# Patient Record
Sex: FEMALE | Race: BLACK OR AFRICAN AMERICAN | NOT HISPANIC OR LATINO | Employment: OTHER | URBAN - METROPOLITAN AREA
[De-identification: names, ages, dates, MRNs, and addresses within clinical notes are randomized per-mention and may not be internally consistent; named-entity substitution may affect disease eponyms.]

---

## 2019-03-05 ENCOUNTER — TRANSFERRED RECORDS (OUTPATIENT)
Dept: HEALTH INFORMATION MANAGEMENT | Facility: CLINIC | Age: 64
End: 2019-03-05

## 2019-10-29 ENCOUNTER — DOCUMENTATION ONLY (OUTPATIENT)
Dept: CARE COORDINATION | Facility: CLINIC | Age: 64
End: 2019-10-29

## 2019-10-30 ENCOUNTER — TELEPHONE (OUTPATIENT)
Dept: INTERNAL MEDICINE | Facility: CLINIC | Age: 64
End: 2019-10-30

## 2019-10-31 NOTE — TELEPHONE ENCOUNTER
Patient can't get in to see Dr. Hendrix until 12-26-19. She has been taking Omeprazole 20 mg tabs 1 tab before breakfast every morning. She needs to get this filled before her appointment.  Please call her at [phone].   '    Pt has not been seen or 2 years in PCP and will need to be seen before medication can be prescribed.  Delilah Rice RN 8:25 AM on 10/31/2019.

## 2019-11-08 ENCOUNTER — APPOINTMENT (OUTPATIENT)
Dept: OPTOMETRY | Facility: CLINIC | Age: 64
End: 2019-11-08
Payer: MEDICARE

## 2019-11-08 PROCEDURE — 92341 FIT SPECTACLES BIFOCAL: CPT | Performed by: OPTOMETRIST

## 2019-12-12 ENCOUNTER — PRE VISIT (OUTPATIENT)
Dept: INTERNAL MEDICINE | Facility: CLINIC | Age: 64
End: 2019-12-12

## 2020-01-13 ENCOUNTER — OFFICE VISIT (OUTPATIENT)
Dept: INTERNAL MEDICINE | Facility: CLINIC | Age: 65
End: 2020-01-13
Payer: MEDICARE

## 2020-01-13 VITALS
HEART RATE: 80 BPM | OXYGEN SATURATION: 95 % | SYSTOLIC BLOOD PRESSURE: 165 MMHG | WEIGHT: 199 LBS | DIASTOLIC BLOOD PRESSURE: 81 MMHG | BODY MASS INDEX: 32.12 KG/M2

## 2020-01-13 DIAGNOSIS — R21 RASH AND NONSPECIFIC SKIN ERUPTION: ICD-10-CM

## 2020-01-13 DIAGNOSIS — H04.123 DRY EYES: ICD-10-CM

## 2020-01-13 DIAGNOSIS — I10 HYPERTENSION, UNSPECIFIED TYPE: ICD-10-CM

## 2020-01-13 DIAGNOSIS — R10.11 RIGHT UPPER QUADRANT PAIN: Primary | ICD-10-CM

## 2020-01-13 DIAGNOSIS — R10.11 RIGHT UPPER QUADRANT PAIN: ICD-10-CM

## 2020-01-13 DIAGNOSIS — R12 HEART BURN: ICD-10-CM

## 2020-01-13 LAB
ALBUMIN SERPL-MCNC: 4 G/DL (ref 3.4–5)
ALP SERPL-CCNC: 121 U/L (ref 40–150)
ALT SERPL W P-5'-P-CCNC: 26 U/L (ref 0–50)
ANION GAP SERPL CALCULATED.3IONS-SCNC: 4 MMOL/L (ref 3–14)
AST SERPL W P-5'-P-CCNC: 12 U/L (ref 0–45)
BILIRUB SERPL-MCNC: 0.4 MG/DL (ref 0.2–1.3)
BUN SERPL-MCNC: 14 MG/DL (ref 7–30)
CALCIUM SERPL-MCNC: 9.3 MG/DL (ref 8.5–10.1)
CHLORIDE SERPL-SCNC: 112 MMOL/L (ref 94–109)
CHOLEST SERPL-MCNC: 242 MG/DL
CO2 SERPL-SCNC: 24 MMOL/L (ref 20–32)
CREAT SERPL-MCNC: 0.63 MG/DL (ref 0.52–1.04)
GFR SERPL CREATININE-BSD FRML MDRD: >90 ML/MIN/{1.73_M2}
GLUCOSE SERPL-MCNC: 93 MG/DL (ref 70–99)
HDLC SERPL-MCNC: 56 MG/DL
LDLC SERPL CALC-MCNC: 160 MG/DL
NONHDLC SERPL-MCNC: 186 MG/DL
POTASSIUM SERPL-SCNC: 3.8 MMOL/L (ref 3.4–5.3)
PROT SERPL-MCNC: 7.6 G/DL (ref 6.8–8.8)
SODIUM SERPL-SCNC: 141 MMOL/L (ref 133–144)
TRIGL SERPL-MCNC: 129 MG/DL

## 2020-01-13 RX ORDER — CLOTRIMAZOLE AND BETAMETHASONE DIPROPIONATE 10; .64 MG/G; MG/G
CREAM TOPICAL 2 TIMES DAILY
Qty: 15 G | Refills: 0 | Status: SHIPPED | OUTPATIENT
Start: 2020-01-13 | End: 2022-02-01

## 2020-01-13 RX ORDER — CARBOXYMETHYLCELLULOSE SODIUM 5 MG/ML
1 SOLUTION/ DROPS OPHTHALMIC 3 TIMES DAILY PRN
Qty: 1 BOTTLE | Refills: 3 | Status: SHIPPED | OUTPATIENT
Start: 2020-01-13 | End: 2021-10-13

## 2020-01-13 ASSESSMENT — PAIN SCALES - GENERAL: PAINLEVEL: NO PAIN (0)

## 2020-01-13 NOTE — NURSING NOTE
Chief Complaint   Patient presents with     Recheck Medication     patient is here for a general follow up        Keiko Royal EMT at 10:06 AM on 1/13/2020.

## 2020-01-13 NOTE — PATIENT INSTRUCTIONS
[facility] Medication Refill Request Information:  * Please contact your pharmacy regarding ANY request for medication refills.  ** [facility] Prescription Fax = [phone]  * Please allow 3 business days for routine medication refills.  * Please allow 5 business days for controlled substance medication refills.     [facility] Test Result notification information:  *You will be notified with in 7-10 days of your appointment day regarding the results of your test.  If you are on MyChart you will be notified as soon as the provider has reviewed the results and signed off on them.    [facility]: [phone]

## 2020-01-13 NOTE — NURSING NOTE
Yancy Rivera comes into clinic today at the request of Dr. Jonatan Villegas, Ordering Provider for an immunization/s.    I gave the flu immunization/s while the patient was in clinic. They received an informational sheet and I explained the common side effects of the injection. The patient tolerated the procedure well and had no complications while here in clinic.     This service provided today was under the supervising provider of the day Dr. Jonatan Villegas, who was available if needed.    Keiko Royal, EMT at 11:01 AM on 1/13/2020.

## 2020-01-14 NOTE — PROGRESS NOTES
"                     PRIMARY CARE CENTER       SUBJECTIVE:  Yancy Rivera is a 64 year old female with a PMHx of   Past Medical History:   Diagnosis Date     Blepharitis      Esophageal reflux (aka GERD)      Glaucoma      Hyperlipidemia LDL goal < 130      Nonsenile cataract      Shingles     11/14/15 Left approximately C6 distribution    who comes in  To re establish care after several years away from [place].Her acute complaint was  A rash on her r shoulder blade of several weeks duration. Other than that she wanted med refills for her  Heart burn and refill of  eye drops for dry eyes .  She felt well otherwise but will need referral for woman's health maintenance.    Medications and allergies reviewed by me today.     ROS10 point ROS was neg except for intermittent r sides abd pain with lifting that has been present since a \"hernia repair  \" several years ago. A CT in 3/2019 did not reveal gall bladder disease but showed divertivulosis.    OBJECTIVE:    BP (!) 165/81 (BP Location: Right arm, Patient Position: Sitting, Cuff Size: Adult Regular)   Pulse 80   Wt 90.3 kg (199 lb)   LMP  (LMP Unknown)   SpO2 95%   Breastfeeding No   BMI 32.12 kg/m     Wt Readings from Last 1 Encounters:   01/13/20 90.3 kg (199 lb)     A pleasant woman with  present cheerful alert good historian  Normal gait and strength- rises from chair un assisted.   HEENT sclera clear  Pupils equal reactive  EOm normal slight conjunctival erythema   Pharynx benign  Neck supple thyroid not enlarged  No cervical adenopathy  LUNGS   Clear to P&A  Heart  Regular no murmur     No cvat   ABd evidence of prior \"cupping\" procedures  BS hypo active   No masses some voluntary guarding R side lateral to umbilicus  Ext no edema or joint inflammation  neuro non focal  Skin r shoulder blade  Excoriation with out scaling or evidence of macule or  papules          ASSESSMENT/PLAN:        Yancy was seen today for recheck " medication.    Diagnoses and all orders for this visit:    Right upper quadrant pain  -     Comprehensive metabolic panel **(2 WKS); Future    Hypertension, unspecified type  -     Lipid panel reflex to direct LDL Fasting; Future    Heart burn  -     omeprazole (PRILOSEC) 20 MG DR capsule; Take 1 capsule (20 mg) by mouth daily    Dry eyes  -     carboxymethylcellulose PF (CARBOXYMETHYLCELLULOSE SODIUM) 0.5 % ophthalmic solution; Place 1 drop into both eyes 3 times daily as needed for dry eyes    Rash and nonspecific skin eruption  -     clotrimazole-betamethasone (LOTRISONE) 1-0.05 % external cream; Apply topically 2 times daily    Other orders  -     FLU VAC PRESRV FREE QUAD SPLIT VIR 3+YRS IM     labs reveal hyperlipidemia  No renal glucose ,LFTS  She will see NP on 1/22 for woman health maintenance exam   Will ask Wood(NP ) to address lipids at that time and order 24 home Bp monitor           Jonatan Villegas MD  Jan 13, 2020

## 2020-01-14 NOTE — RESULT ENCOUNTER NOTE
Mariaelena you see her on the 22 for woman's health    She is new to our sx  again   She needs Lipid conversation values on the 13th and perhaps a home Bp monitoring given BP in clinic on the 13th thank you

## 2020-01-21 NOTE — PROGRESS NOTES
[facility]  Primary Care Center   Mariaelena Schultz, CHRISSIE CNP  01/22/2020      Chief Complaint:   Women's Health       History of Present Illness:   Yancy Rivera is a 64 year old female with a history of glaucoma, hypertension, and prediabetes who presents for lab follow-up. She has no specific gynecologic concerns, but was recommended to return for routine pap/pelvic. Her pap was normal when done in 2016 with negative HPV co-testing. Denies breast changes.     She had a lipid panel on 1/13 which showed elevated LDL. 10 year ASCVD was 10.2%. Her blood pressure was high at her previous visit but close to goal today. Her at home blood pressures are typically 129-140 systolic and ~70 diastolic. She is no longer taking Amlodipine, she is working on diet and exercise. She's been off antihypertensives for 3 years after an episode of hypotension.    Review of Systems:   Pertinent items are noted in HPI, remainder of complete ROS is negative.      Active Medications:     Current Outpatient Medications:      amitriptyline (ELAVIL) 10 MG tablet, Take 1 tablet (10 mg) by mouth At Bedtime, Disp: 90 tablet, Rfl: 3     Artificial Tear Ointment (REFRESH P.M.) OINT, Apply 3.5 g to eye At Bedtime, Disp: 1 Tube, Rfl: 11     atorvastatin (LIPITOR) 20 MG tablet, Take 1 tablet (20 mg) by mouth daily, Disp: 90 tablet, Rfl: 3     carboxymethylcellulose (REFRESH PLUS) 0.5 % SOLN, Place 1 drop into both eyes 4 times daily as needed, Disp: , Rfl:      carboxymethylcellulose PF (CARBOXYMETHYLCELLULOSE SODIUM) 0.5 % ophthalmic solution, Place 1 drop into both eyes 3 times daily as needed for dry eyes, Disp: 1 Bottle, Rfl: 3     Cholecalciferol (VITAMIN D) 2000 UNITS tablet, Take 1,000 Units by mouth daily, Disp: 45 tablet, Rfl: 3     clotrimazole-betamethasone (LOTRISONE) 1-0.05 % external cream, Apply topically 2 times daily, Disp: 15 g, Rfl: 0     dorzolamide-timolol (COSOPT) 2-0.5 % ophthalmic solution, Place 1 drop into both eyes 2 times  daily, Disp: 1 Bottle, Rfl: 11     doxycycline Monohydrate 100 MG TABS, Take 100 mg by mouth daily, Disp: 90 tablet, Rfl: 3     ferrous sulfate (IRON) 325 (65 FE) MG tablet, Take 1 tablet (325 mg) by mouth daily (with breakfast), Disp: 30 tablet, Rfl: 11     gabapentin (NEURONTIN) 300 MG capsule, Take 1 capsule (300 mg) by mouth 3 times daily, Disp: 270 capsule, Rfl: 1     gabapentin (NEURONTIN) 600 MG tablet, Take 1 tablet (600 mg) by mouth 3 times daily, Disp: 90 tablet, Rfl: 11     hydroxychloroquine (PLAQUENIL) 200 MG tablet, Take 2 tablets (400 mg) by mouth daily, Disp: 60 tablet, Rfl: 4     latanoprost (XALATAN) 0.005 % ophthalmic solution, Place 1 drop into both eyes At Bedtime, Disp: 1 Bottle, Rfl: 11     methylPREDNISolone (MEDROL DOSEPAK) 4 MG tablet, Take 1 tablet (4 mg) by mouth See Admin Instructions, Disp: 21 tablet, Rfl: 0     Multiple Minerals (CALCIUM-MAGNESIUM-ZINC) TABS, Take 1 tablet by mouth daily, Disp: 90 tablet, Rfl: 0     omega 3 1000 MG CAPS, Take 1 g by mouth daily, Disp: 90 capsule, Rfl: 0     omeprazole (PRILOSEC) 20 MG capsule, Take 1 capsule (20 mg) by mouth daily, Disp: 90 capsule, Rfl: 1     omeprazole (PRILOSEC) 20 MG DR capsule, Take 1 capsule (20 mg) by mouth daily, Disp: 30 capsule, Rfl: 3     oxyCODONE (ROXICODONE) 5 MG immediate release tablet, Take 1 tablet (5 mg) by mouth every 6 hours as needed for moderate to severe pain, Disp: 20 tablet, Rfl: 0     piroxicam (FELDENE) 20 MG capsule, Take 1 capsule (20 mg) by mouth daily (with breakfast), Disp: 30 capsule, Rfl: 11     Riboflavin 400 MG TABS, Take 1 tablet by mouth daily, Disp: 30 tablet, Rfl: 11     SUMAtriptan (IMITREX) 50 MG tablet, Take 1 tablet (50 mg) by mouth at onset of headache for migraine (May repeat in one hour. Max 200 mg in 24 hours. limit use to no more than 2 days per week) May repeat dose within one hour., Disp: 12 tablet, Rfl: 6      Allergies:   No clinical screening - see comments      Past Medical  History:  Blepharitis  GERD (gastroesophageal reflux disease)  Glaucoma  Hyperlipidemia  Shingles  Prediabetes  Primary open angle glaucoma  Hypertension     Past Surgical History:  Chalazion excision  Hernia repair    Family History:   Mother: glaucoma     Social History:     Non smoker    Physical Exam:   /79 (BP Location: Right arm, Patient Position: Sitting, Cuff Size: Adult Large)   Pulse 72   Wt 90.3 kg (199 lb)   LMP  (LMP Unknown)   SpO2 96%   BMI 32.12 kg/m     Constitutional: Alert, oriented, pleasant, no acute distress  Head: Normocephalic, atraumatic  Eyes: Extra-ocular movements intact, pupils equally round and reactive bilaterally, no scleral icterus  ENT: Oropharynx clear, moist mucus membranes, good dentition  Cardiovascular: Regular rate and rhythm, no murmurs, rubs or gallops  Respiratory: Good air movement bilaterally, lungs clear, no wheezes/rales/rhonchi.   Breast/: deferred per patient  Psychiatric: normal mentation, affect and mood     The 10-year ASCVD risk score (Arline SIEGEL Jr., et al., 2013) is: 10.2%    Values used to calculate the score:      Age: 64 years      Sex: Female      Is Non- : Yes      Diabetic: No      Tobacco smoker: No      Systolic Blood Pressure: 134 mmHg      Is BP treated: Yes      HDL Cholesterol: 56 mg/dL      Total Cholesterol: 242 mg/dL    Labs  Component      Latest Ref Rng & Units 1/13/2020   Sodium      133 - 144 mmol/L 141   Potassium      3.4 - 5.3 mmol/L 3.8   Chloride      94 - 109 mmol/L 112 (H)   Carbon Dioxide      20 - 32 mmol/L 24   Anion Gap      3 - 14 mmol/L 4   Glucose      70 - 99 mg/dL 93   Urea Nitrogen      7 - 30 mg/dL 14   Creatinine      0.52 - 1.04 mg/dL 0.63   GFR Estimate      >60 mL/min/1.73:m2 >90   GFR Estimate If Black      >60 mL/min/1.73:m2 >90   Calcium      8.5 - 10.1 mg/dL 9.3   Bilirubin Total      0.2 - 1.3 mg/dL 0.4   Albumin      3.4 - 5.0 g/dL 4.0   Protein Total      6.8 - 8.8 g/dL 7.6    Alkaline Phosphatase      40 - 150 U/L 121   ALT      0 - 50 U/L 26   AST      0 - 45 U/L 12   Cholesterol      <200 mg/dL 242 (H)   Triglycerides      <150 mg/dL 129   HDL Cholesterol      >49 mg/dL 56   LDL Cholesterol Calculated      <100 mg/dL 160 (H)   Non HDL Cholesterol      <130 mg/dL 186 (H)     Assessment and Plan:  Hyperlipidemia LDL goal <100  Lipid panel reviewed. Her 10 year risk is 10.2% indicating she should begin statin therapy to help reduce risk of stroke or CVD. We discussed the need for follow-up labs after starting the medication. Recheck hepatic panel and fasting lipids in 1 month.  - atorvastatin (LIPITOR) 20 MG tablet  Dispense: 90 tablet; Refill: 3    Elevated BP without diagnosis of hypertension  Currently off blood pressure medications due to a previous episode of hypotension while on Amlodipine. She is slightly above goal in clinic today, will have her complete a 24 hour blood pressure monitor to determine whether antihypertensives need to be resumed.  - 24 Hour Blood Pressure Monitor - Adult     Reviewed lab records, pap is not due until June 2021. Offered to complete pelvic exam today without a pap, as well as routine breast exam, but she declines these today, preferring to wait until next year when her pap smear is due.    Follow-up: as needed for any changes or concerns     Scribe Disclosure:  I, Paresh Morton, am serving as a scribe to document services personally performed by CHRISSIE Hopper CNP at this visit, based upon the provider's statements to me. All documentation has been reviewed by the aforementioned provider prior to being entered into the official medical record.    Portions of this medical record were completed by a scribe. UPON MY REVIEW AND AUTHENTICATION BY ELECTRONIC SIGNATURE, this confirms (a) I performed the applicable clinical services, and (b) the record is accurate.     CHRISSIE Hopper CNP

## 2020-01-22 ENCOUNTER — OFFICE VISIT (OUTPATIENT)
Dept: INTERNAL MEDICINE | Facility: CLINIC | Age: 65
End: 2020-01-22
Payer: MEDICARE

## 2020-01-22 VITALS
OXYGEN SATURATION: 96 % | DIASTOLIC BLOOD PRESSURE: 79 MMHG | BODY MASS INDEX: 32.12 KG/M2 | SYSTOLIC BLOOD PRESSURE: 134 MMHG | WEIGHT: 199 LBS | HEART RATE: 72 BPM

## 2020-01-22 DIAGNOSIS — R03.0 ELEVATED BP WITHOUT DIAGNOSIS OF HYPERTENSION: ICD-10-CM

## 2020-01-22 DIAGNOSIS — E78.5 HYPERLIPIDEMIA LDL GOAL <100: Primary | ICD-10-CM

## 2020-01-22 RX ORDER — ATORVASTATIN CALCIUM 20 MG/1
20 TABLET, FILM COATED ORAL DAILY
Qty: 90 TABLET | Refills: 3 | Status: SHIPPED | OUTPATIENT
Start: 2020-01-22 | End: 2021-02-23

## 2020-01-22 ASSESSMENT — PAIN SCALES - GENERAL: PAINLEVEL: NO PAIN (0)

## 2020-01-22 NOTE — PATIENT INSTRUCTIONS
[facility] Medication Refill Request Information:  * Please contact your pharmacy regarding ANY request for medication refills.  ** [facility] Prescription Fax = [phone]  * Please allow 3 business days for routine medication refills.  * Please allow 5 business days for controlled substance medication refills.     [facility] Test Result notification information:  *You will be notified with in 7-10 days of your appointment day regarding the results of your test.  If you are on MyChart you will be notified as soon as the provider has reviewed the results and signed off on them.    [facility]: [phone]     Return to lab in 1 month for fasting lab appointment.

## 2020-01-22 NOTE — NURSING NOTE
Chief Complaint   Patient presents with     Women's Health     pt here for womens health issues       Kayley Bueno CMA, EMT at 9:56 AM on 1/22/2020.

## 2020-02-21 ENCOUNTER — APPOINTMENT (OUTPATIENT)
Dept: LAB | Facility: CLINIC | Age: 65
End: 2020-02-21
Attending: NURSE PRACTITIONER
Payer: MEDICARE

## 2020-02-21 ENCOUNTER — HOSPITAL ENCOUNTER (OUTPATIENT)
Dept: CARDIOLOGY | Facility: CLINIC | Age: 65
Discharge: HOME | End: 2020-02-21
Attending: NURSE PRACTITIONER | Admitting: NURSE PRACTITIONER
Payer: MEDICARE

## 2020-02-21 DIAGNOSIS — E78.5 HYPERLIPIDEMIA LDL GOAL <100: ICD-10-CM

## 2020-02-21 DIAGNOSIS — R03.0 ELEVATED BP WITHOUT DIAGNOSIS OF HYPERTENSION: ICD-10-CM

## 2020-02-21 LAB
ALBUMIN SERPL-MCNC: 4.2 G/DL (ref 3.4–5)
ALP SERPL-CCNC: 124 U/L (ref 40–150)
ALT SERPL W P-5'-P-CCNC: 24 U/L (ref 0–50)
AST SERPL W P-5'-P-CCNC: 17 U/L (ref 0–45)
BILIRUB DIRECT SERPL-MCNC: 0.1 MG/DL (ref 0–0.2)
BILIRUB SERPL-MCNC: 0.5 MG/DL (ref 0.2–1.3)
CHOLEST SERPL-MCNC: 173 MG/DL
HDLC SERPL-MCNC: 63 MG/DL
LDLC SERPL CALC-MCNC: 93 MG/DL
NONHDLC SERPL-MCNC: 110 MG/DL
PROT SERPL-MCNC: 7.6 G/DL (ref 6.8–8.8)
TRIGL SERPL-MCNC: 82 MG/DL

## 2020-02-21 PROCEDURE — 93786 AMBL BP MNTR W/SW REC ONLY: CPT

## 2020-02-21 PROCEDURE — T1013 SIGN LANG/ORAL INTERPRETER: HCPCS | Mod: U3

## 2020-02-21 PROCEDURE — 93790 AMBL BP MNTR W/SW I&R: CPT | Performed by: INTERNAL MEDICINE

## 2020-02-27 ENCOUNTER — TELEPHONE (OUTPATIENT)
Dept: INTERNAL MEDICINE | Facility: CLINIC | Age: 65
End: 2020-02-27

## 2020-02-27 NOTE — TELEPHONE ENCOUNTER
I reviewed recent 24 hour blood pressure monitoring and labs with Yancy today, with the assistance of an . We discussed that no changes are needed at this time. Jaydefranklinwalt voiced understanding and does not have further questions at this time.    Raegan Denton) PEGGY Felix

## 2020-05-07 ENCOUNTER — OFFICE VISIT (OUTPATIENT)
Dept: INTERNAL MEDICINE | Facility: CLINIC | Age: 65
End: 2020-05-07
Payer: MEDICARE

## 2020-05-07 ENCOUNTER — ANCILLARY PROCEDURE (OUTPATIENT)
Dept: GENERAL RADIOLOGY | Facility: CLINIC | Age: 65
End: 2020-05-07
Payer: MEDICARE

## 2020-05-07 VITALS
WEIGHT: 196.5 LBS | SYSTOLIC BLOOD PRESSURE: 170 MMHG | HEART RATE: 72 BPM | BODY MASS INDEX: 31.72 KG/M2 | DIASTOLIC BLOOD PRESSURE: 89 MMHG | OXYGEN SATURATION: 96 %

## 2020-05-07 DIAGNOSIS — M25.531 RIGHT WRIST PAIN: ICD-10-CM

## 2020-05-07 DIAGNOSIS — S52.614A CLOSED NONDISPLACED FRACTURE OF STYLOID PROCESS OF RIGHT ULNA, INITIAL ENCOUNTER: ICD-10-CM

## 2020-05-07 ASSESSMENT — PAIN SCALES - GENERAL: PAINLEVEL: EXTREME PAIN (8)

## 2020-05-07 NOTE — NURSING NOTE
Chief Complaint   Patient presents with     Musculoskeletal Problem     Pain on R hand.      María Ko, A 9:50 AM  5/7/2020

## 2020-05-07 NOTE — PROGRESS NOTES
Cast/splint application    Date/Time: 5/7/2020 12:16 PM  Performed by: Mary Pope ATC  Authorized by: Valentín Ortega MD     Consent:     Consent obtained:  Verbal    Consent given by:  Patient    Risks discussed:  Discoloration, numbness, swelling and pain  Pre-procedure details:     Sensation:  Normal  Procedure details:     Laterality:  Right    Location:  Wrist    Wrist:  R wrist    Strapping: no      Cast type:  Ulnar gutter    Splint type:  Ulnar gutter    Supplies used: orthoglass.  Post-procedure details:     Pain:  Unchanged    Sensation:  Normal    Patient tolerance of procedure:  Tolerated well, no immediate complications    Patient provided with cast or splint care instructions: Yes

## 2020-05-07 NOTE — PROGRESS NOTES
"                     PRIMARY CARE CENTER       SUBJECTIVE:  Yancy Rivera is a 64 year old female with a PMHx of HTN, HLD and prediabetes  who comes in for R hand pain.     Patient 2 weeks ago was trying to left a 3 gallons of water using her right hand when she felt an acute pain with a\"click\" in her right wrist. Pain is constant and 8/10 in intensity. Later patient started to notice that her hand got swollen, but no bruising. Pain is around the styloid process. She has been using a scarf to limit motion in her hand.   Pt reported that her hand is also looks different than before. She reported that she had an old fracture in her right hand in 2004, per chart review, it was a left radial styloid fracture.   No numbness or tingling sensation in distal hand.   No recent trauma or falls, no pain anywhere else.       Medications and allergies reviewed by me today.     ROS:   Constitutional, neuro, ENT, endocrine, pulmonary, cardiac, gastrointestinal, genitourinary, musculoskeletal, integument and psychiatric systems are negative, except as otherwise noted.    OBJECTIVE:    BP (!) 170/89   Pulse 72   Wt 89.1 kg (196 lb 8 oz)   LMP  (LMP Unknown)   SpO2 96%   BMI 31.72 kg/m     Wt Readings from Last 1 Encounters:   05/07/20 89.1 kg (196 lb 8 oz)        General: NAD, holding her right hand with her left arm     RESP: lungs clear to auscultation - no rales, rhonchi or wheezes     CV: regular rates and rhythm, normal S1 S2, no S3 or S4 and no murmur, click or rub     MS: extremities normal-  Right wrist has decreased ROM on pronation, supination flexion and extension. There is a distal downward ? Dislocation distal to the ulnar styloid process.  no gross deformities noted, no evidence of inflammation in joints, FROM in all other extremities.     SKIN: no suspicious lesions or rashes     NEURO: Normal strength and tone, sensory exam grossly normal, mentation intact and speech normal     ASSESSMENT/PLAN:  Yancy Rivera is " a 64 year old female with a PMHx of HTN, HLD and prediabetes  who comes in for R hand pain with concern for fracture/dislocation     Right wrist pain  Closed nondisplaced fracture of styloid process of right ulna  -     XR Wrist Right G/E 3 Views; Future  -     [facility] helped with applying a cast with plan to f/u with them in 2 months        Riaz Sanchez MD  Internal Medicine PGY-1  May 7, 2020    Pt  and plan of care discussed with Dr. Reyes   While the patient was in clinic, I reviewed the pertinent medical history and results.  I discussed the current findings on physical examination, as well as the patient s diagnosis and treatment plan with the resident and agree with the information as documented with the following exceptions: none.  Isidro Reyes MD

## 2020-05-14 ENCOUNTER — OFFICE VISIT (OUTPATIENT)
Dept: ORTHOPEDICS | Facility: CLINIC | Age: 65
End: 2020-05-14
Payer: MEDICARE

## 2020-05-14 ENCOUNTER — THERAPY VISIT (OUTPATIENT)
Dept: OCCUPATIONAL THERAPY | Facility: CLINIC | Age: 65
End: 2020-05-14
Payer: MEDICARE

## 2020-05-14 ENCOUNTER — ANCILLARY PROCEDURE (OUTPATIENT)
Dept: GENERAL RADIOLOGY | Facility: CLINIC | Age: 65
End: 2020-05-14
Attending: FAMILY MEDICINE
Payer: MEDICARE

## 2020-05-14 VITALS — BODY MASS INDEX: 31.64 KG/M2 | WEIGHT: 196 LBS

## 2020-05-14 DIAGNOSIS — M25.531 RIGHT WRIST PAIN: ICD-10-CM

## 2020-05-14 DIAGNOSIS — S52.614A CLOSED NONDISPLACED FRACTURE OF STYLOID PROCESS OF RIGHT ULNA: ICD-10-CM

## 2020-05-14 DIAGNOSIS — M25.531 RIGHT WRIST PAIN: Primary | ICD-10-CM

## 2020-05-14 DIAGNOSIS — M77.8 TENDINITIS OF EXTENSOR TENDON OF RIGHT HAND: Primary | ICD-10-CM

## 2020-05-14 PROCEDURE — 97110 THERAPEUTIC EXERCISES: CPT | Mod: GO | Performed by: OCCUPATIONAL THERAPIST

## 2020-05-14 PROCEDURE — 97760 ORTHOTIC MGMT&TRAING 1ST ENC: CPT | Mod: GO | Performed by: OCCUPATIONAL THERAPIST

## 2020-05-14 PROCEDURE — 97165 OT EVAL LOW COMPLEX 30 MIN: CPT | Mod: GO | Performed by: OCCUPATIONAL THERAPIST

## 2020-05-14 RX ADMIN — TRIAMCINOLONE ACETONIDE 20 MG: 40 INJECTION, SUSPENSION INTRA-ARTICULAR; INTRAMUSCULAR at 12:39

## 2020-05-14 RX ADMIN — LIDOCAINE HYDROCHLORIDE 2 ML: 10 INJECTION, SOLUTION EPIDURAL; INFILTRATION; INTRACAUDAL; PERINEURAL at 12:39

## 2020-05-14 NOTE — PROGRESS NOTES
"Hand Therapy Initial Evaluation    Current Date:  5/14/2020    Diagnosis: Closed nondisplaced fracture of styloid process of right ulna  DOI: April 2020  Procedure:  NA    Precautions: NA    Subjective:  Yancy Rivera is a 64 year old female.    Patient reports symptoms of the right wrist which occurred due to trying to lift 3 gallons of water using her right hand when she felt an acute pain with a\"click\" in her right wrist. Since onset symptoms are Unchanged  Special tests:  x-ray.  Previous treatment: casted, injection.    General health as reported by patient is good.  Pertinent medical history includes:  Past Medical History:   Diagnosis Date     Blepharitis      Esophageal reflux (aka GERD)      Glaucoma      Hyperlipidemia LDL goal < 130      Nonsenile cataract      Shingles     11/14/15 Left approximately C6 distribution     Medical allergies:none.  Surgical history:   Past Surgical History:   Procedure Laterality Date     CHALAZION EXCISION  08/21/2015    Left lid     HERNIA REPAIR      abdominal hernia repair 2012     Medication history: cholesterol, heartburn, painmedication.  Current occupation is retired  Currently retired    Occupational Profile Information:  Right hand dominant  Prior functional level:  no limitations  Patient reports symptoms of pain, stiffness/loss of motion, weakness/loss of strength and edema  Special tests:  x-ray.    Barriers include:none  Mobility: No difficulty  Transportation: public transportation and medical transportation    Objective:  Pain Level (Scale 0-10)   5/14/2020   At Rest 7/10   At Worst 9/10     Pain Description  Date 5/14/2020   Location Wrist, forearm, finger   Pain Quality Aching and Sharp   Frequency intermittent     Pain is worst  daytime   Exacerbated by  movement, use   Relieved by rest   Progression No change     Edema (Circumference measured in cm)   5/14/2020 5/14/2020    L R   DWC 16.0 17.7     Sensation   WNL throughout all nerve distributions; per " patient report    ROM  Hand 5/14/2020   AROM(PROM) R   Index MP 0/42   PIP 0/83   DIP 0/57   PELLETIER 182   Long MP -8/83   PIP 0/86   DIP 0/50   PELLETIER 211   Ring MP 0/75   PIP 0/80   DIP 0/42   PELLETIER 197   Small MP 0/77   PIP -12/74   DIP 0/45   PELLETIER 184     Strength  contraindicated    Assessment:  Patient presents with symptoms consistent with diagnosis of right wrist pain, secondary to ECU strain and ulna fracture,  with conservative intervention.     Patient's limitations or Problem List includes:  Pain, Decreased ROM/motion, Increased edema and Weakness of the right wrist which interferes with the patient's ability to perform Self Care Tasks (dressing, eating, bathing, hygiene/toileting), Work Tasks, Sleep Patterns, Recreational Activities, Household Chores and Driving  as compared to previous level of function.    Rehab Potential:  Good - Return to full activity, some limitations    Patient will benefit from skilled Occupational Therapy to increase ROM and overall strength and decrease pain and edema to return to previous activity level and resume normal daily tasks and to reach their rehab potential.    Barriers to Learning:  Language    Communication Issues:  Patient appears to be able to clearly communicate and understand verbal and written communication and follow directions correctly.    Chart Review: Simple history review with patient    Identified Performance Deficits: bathing/showering, toileting, dressing, feeding, hygiene and grooming, driving and community mobility, health management and maintenance, home establishment and management, meal preparation and cleanup, shopping and leisure activities    Assessment of Occupational Performance:  5 or more Performance Deficits    Clinical Decision Making (Complexity): Low complexity    Treatment Explanation:  The following has been discussed with the patient:  RX ordered/plan of care  Anticipated outcomes  Possible risks and side effects    Plan:  Frequency:  1 X  week, once daily  Duration:  for 6 weeks    Treatment Plan:   Therapeutic Exercise:  AROM, AAROM, PROM, Isotonics, Isometrics and Stabilization  Neuromuscular re-education:  Nerve Gliding and Kinesiotaping  Manual Techniques:  Myofascial release  Orthotic Fabrication:  Static orthosis  Self Care:  Self Care Tasks    Discharge Plan:  Achieve all LTG.  Independent in home treatment program.  Reach maximal therapeutic benefit.    Home Exercise Program:  Zipper orthosis full time, remove for hygiene and exercise  AROM of thumb  Tendon gliding  icing    Next Visit:  Orthosis modifications  AROM  MFR

## 2020-05-14 NOTE — PROGRESS NOTES
SPORTS & ORTHOPEDIC WALK-IN VISIT 5/14/2020    Primary Care Physician: Dr. Schultz    She was carrying a water jug and felt a click in her wrist. She is having pain on her lateral wrist down into mid forearm. The splint has been helping but she is still having pain.     Reason for visit:     What part of your body is injured / painful?  right wrist    What caused the injury /pain? Lifting    How long ago did your injury occur or pain begin? 3 weeks ago    What are your most bothersome symptoms? Pain    How would you characterize your symptom?  aching and burning    What makes your symptoms better? Ibuprofen and Wrap or brace    What makes your symptoms worse? Movement    Have you been previously seen for this problem? Yes, PCP    Medical History:    Any recent changes to your medical history? No    Any new medication prescribed since last visit? No    Have you had surgery on this body part before? No    Social History:    Occupation:     Handedness: Right    Exercise: Most days/week    Review of Systems:    Do you have fever, chills, weight loss? No    Do you have any vision problems? No    Do you have any chest pain or edema? No    Do you have any shortness of breath or wheezing?  No    Do you have stomach problems? No    Do you have any numbness or focal weakness? No    Do you have diabetes? No    Do you have problems with bleeding or clotting? No    Do you have an rashes or other skin lesions? No       Medium Joint Injection: ECU tendon sheath injection    Date/Time: 5/14/2020 12:39 PM  Performed by: Basil Santiago DO  Authorized by: Basil Santiago DO     Indications:  Pain  Needle Size:  25 G  Guidance: ultrasound    Approach:  Dorsal  Location:  Wrist  Location comment:  ECU tendon sheath  Medications:  20 mg triamcinolone 40 MG/ML; 2 mL lidocaine (PF) 1 %  Outcome:  Tolerated well, no immediate complications  Procedure discussed: discussed risks, benefits, and alternatives    Consent Given by:   Patient  Timeout: timeout called immediately prior to procedure    Prep: patient was prepped and draped in usual sterile fashion

## 2020-05-14 NOTE — LETTER
5/14/2020       RE: Yancy Rivera  [address]  [place] [address]     Dear Colleague,    Thank you for referring your patient, Yancy Rivera, to the [facility] SPORTS AND ORTHOPAEDIC WALK IN CLINIC at [facility]. Please see a copy of my visit note below.          SPORTS & ORTHOPEDIC WALK-IN VISIT 5/14/2020    Primary Care Physician: Dr. Schultz    She was carrying a water jug and felt a click in her wrist. She is having pain on her lateral wrist down into mid forearm. The splint has been helping but she is still having pain.     Reason for visit:     What part of your body is injured / painful?  right wrist    What caused the injury /pain? Lifting    How long ago did your injury occur or pain begin? 3 weeks ago    What are your most bothersome symptoms? Pain    How would you characterize your symptom?  aching and burning    What makes your symptoms better? Ibuprofen and Wrap or brace    What makes your symptoms worse? Movement    Have you been previously seen for this problem? Yes, PCP    Medical History:    Any recent changes to your medical history? No    Any new medication prescribed since last visit? No    Have you had surgery on this body part before? No    Social History:    Occupation:     Handedness: Right    Exercise: Most days/week    Review of Systems:    Do you have fever, chills, weight loss? No    Do you have any vision problems? No    Do you have any chest pain or edema? No    Do you have any shortness of breath or wheezing?  No    Do you have stomach problems? No    Do you have any numbness or focal weakness? No    Do you have diabetes? No    Do you have problems with bleeding or clotting? No    Do you have an rashes or other skin lesions? No       Medium Joint Injection: ECU tendon sheath injection    Date/Time: 5/14/2020 12:39 PM  Performed by: Basil Santiago DO  Authorized by: Basil Santiago DO     Indications:  Pain  Needle Size:  25  G  Guidance: ultrasound    Approach:  Dorsal  Location:  Wrist  Location comment:  ECU tendon sheath  Medications:  20 mg triamcinolone 40 MG/ML; 2 mL lidocaine (PF) 1 %  Outcome:  Tolerated well, no immediate complications  Procedure discussed: discussed risks, benefits, and alternatives    Consent Given by:  Patient  Timeout: timeout called immediately prior to procedure    Prep: patient was prepped and draped in usual sterile fashion            CHIEF COMPLAINT:  Follow Up of the Left Wrist       HISTORY OF PRESENT ILLNESS  Ms. Rivera is a pleasant 64 year old year old female who presents to clinic today with acute left wrist pain.  Yancy explains that she suffered injury three weeks ago, carrying a water jug and felt a click in her wrist. She is having pain on her lateral wrist down into mid forearm. The splint has been helping but she is still having pain.       What part of your body is injured / painful?  right wrist    What caused the injury /pain? Lifting    How long ago did your injury occur or pain begin? 3 weeks ago    What are your most bothersome symptoms? Pain    How would you characterize your symptom?  aching and burning    What makes your symptoms better? Ibuprofen and Wrap or brace    What makes your symptoms worse? Movement    Have you been previously seen for this problem? Yes, PCP    Additional history: as documented    MEDICAL HISTORY  Patient Active Problem List   Diagnosis     Hyperlipidemia with target LDL less than 130     Prediabetes     Disorder of bursae and tendons in shoulder region     Shingles     Primary open angle glaucoma of both eyes, moderate stage     Senile nuclear sclerosis, bilateral     Benign essential hypertension     Right wrist pain     Closed nondisplaced fracture of styloid process of right ulna       Current Outpatient Medications   Medication Sig Dispense Refill     amitriptyline (ELAVIL) 10 MG tablet Take 1 tablet (10 mg) by mouth At Bedtime 90 tablet 3      Artificial Tear Ointment (REFRESH P.M.) OINT Apply 3.5 g to eye At Bedtime 1 Tube 11     atorvastatin (LIPITOR) 20 MG tablet Take 1 tablet (20 mg) by mouth daily 90 tablet 3     carboxymethylcellulose (REFRESH PLUS) 0.5 % SOLN Place 1 drop into both eyes 4 times daily as needed       carboxymethylcellulose PF (CARBOXYMETHYLCELLULOSE SODIUM) 0.5 % ophthalmic solution Place 1 drop into both eyes 3 times daily as needed for dry eyes 1 Bottle 3     Cholecalciferol (VITAMIN D) 2000 UNITS tablet Take 1,000 Units by mouth daily 45 tablet 3     clotrimazole-betamethasone (LOTRISONE) 1-0.05 % external cream Apply topically 2 times daily 15 g 0     dorzolamide-timolol (COSOPT) 2-0.5 % ophthalmic solution Place 1 drop into both eyes 2 times daily 1 Bottle 11     doxycycline Monohydrate 100 MG TABS Take 100 mg by mouth daily 90 tablet 3     ferrous sulfate (IRON) 325 (65 FE) MG tablet Take 1 tablet (325 mg) by mouth daily (with breakfast) 30 tablet 11     gabapentin (NEURONTIN) 300 MG capsule Take 1 capsule (300 mg) by mouth 3 times daily 270 capsule 1     gabapentin (NEURONTIN) 600 MG tablet Take 1 tablet (600 mg) by mouth 3 times daily 90 tablet 11     hydroxychloroquine (PLAQUENIL) 200 MG tablet Take 2 tablets (400 mg) by mouth daily 60 tablet 4     latanoprost (XALATAN) 0.005 % ophthalmic solution Place 1 drop into both eyes At Bedtime 1 Bottle 11     methylPREDNISolone (MEDROL DOSEPAK) 4 MG tablet Take 1 tablet (4 mg) by mouth See Admin Instructions 21 tablet 0     Multiple Minerals (CALCIUM-MAGNESIUM-ZINC) TABS Take 1 tablet by mouth daily 90 tablet 0     omega 3 1000 MG CAPS Take 1 g by mouth daily 90 capsule 0     omeprazole (PRILOSEC) 20 MG capsule Take 1 capsule (20 mg) by mouth daily 90 capsule 1     omeprazole (PRILOSEC) 20 MG DR capsule Take 1 capsule (20 mg) by mouth daily 30 capsule 3     oxyCODONE (ROXICODONE) 5 MG immediate release tablet Take 1 tablet (5 mg) by mouth every 6 hours as needed for moderate to  severe pain 20 tablet 0     piroxicam (FELDENE) 20 MG capsule Take 1 capsule (20 mg) by mouth daily (with breakfast) 30 capsule 11     Riboflavin 400 MG TABS Take 1 tablet by mouth daily 30 tablet 11     SUMAtriptan (IMITREX) 50 MG tablet Take 1 tablet (50 mg) by mouth at onset of headache for migraine (May repeat in one hour. Max 200 mg in 24 hours. limit use to no more than 2 days per week) May repeat dose within one hour. 12 tablet 6       Allergies   Allergen Reactions     No Clinical Screening - See Comments Nausea and Vomiting     Liver side effects from INH for TB       Family History   Problem Relation Age of Onset     Macular Degeneration No family hx of      Cancer No family hx of      Diabetes No family hx of      Hypertension No family hx of      Cerebrovascular Disease No family hx of      Thyroid Disease No family hx of      Eye Surgery No family hx of      Glaucoma Mother        Additional medical/Social/Surgical histories reviewed in [facility] and updated as appropriate.     REVIEW OF SYSTEMS (5/17/2020)  A 10-point review of systems was obtained and is negative except for as noted in the HPI.      PHYSICAL EXAM  Wt 88.9 kg (196 lb)   LMP  (LMP Unknown)   BMI 31.64 kg/m      General  - normal appearance, in no obvious distress  HEENT  - conjunctivae not injected, moist mucous membranes  CV  - normal radial pulse   Pulm  - normal respiratory pattern, non-labored  Musculoskeletal - right wrist  - inspection: normal joint alignment, no obvious deformity, no swelling  - palpation: TTP at ECU tendon near distal ulna and into carpal region across joint.  No foveal tenderness.  - ROM:  Decreased ROM and pain with extension, ulnar deviation, near normal flexion and radial deviation  - strength: Grossly intact, painful extension active ulnar deviation.  - special tests:  (-) Tinel's   (-) Finkelstein  (+) ulnar impaction pain  Neuro  - no sensory or motor deficit, grossly normal coordination, normal muscle  tone  Skin  - no ecchymosis, erythema, warmth, or induration, no obvious rash  Psych  - interactive, appropriate, normal mood and affect    IMAGING :   3 views right wrist radiographs 5/14/2020 11:34 AM     History: AP, lateral and oblique; Right wrist pain      Comparison: 5/7/2020     Findings:     PA, oblique and lateral view(s) of the right wrist were obtained.      No acute osseous abnormality. No erosion.     Mild degenerative changes of the first carpometacarpal and triscaphe  joints.  Ossicle adjacent to the ulnar styloid likely from remote  trauma. Bones appear osteopenic.     Slight contour deformity of the fifth metacarpal, similar to prior,  presumably from remote trauma.     Soft tissue is unremarkable.                                                                      Impression:  1. No acute osseous abnormality. If persistent clinical concern, MRI  may be helpful given the underlying osteopenia.  2. Mild degenerative changes of the first carpometacarpal joint.  3. Osteopenic appearance.     I have personally reviewed the examination and initial interpretation  and I agree with the findings.     LYLY DOSS       ASSESSMENT & PLAN  Ms. Rivera is a 64 year old year old female who presents to clinic today with ongoing pain of right wrist after lifting a heavy object three weeks ago.  Although there was some concern initially for acute ulnar styloid fracture, I favor this to be a chronic appearing ossicle.  I believe she may have subluxed or exacerbated ECU tendon with ongoing synovitis.  Possible underlying TFCC involvement although this seems less likely based on exam.    Diagnosis: Acute pain of right wrist, ECU Tenosynovitis of right wrist    -ECU tendon sheath corticosteroid injection  -OT appointment for more functional zipper splint and ROM exercises for wrist and hand.  -Ice, analgesics as needed  -Follow up 4 weeks for reevaluation.    Wrist Injection, ECU TENDON SHEATH of RIGHT WRIST  The  "patient was informed of the risks and the benefits of the procedure and a written consent was signed.  The patient s right wrist was prepped with chlorhexidine in sterile fashion.   20 mg of kenalog suspension was drawn up into a 3 mL syringe with 2.0 mL of 1% lidocaine.  Injection was performed using sterile technique.  Under ultrasound guidance a 1.5\" 25-gauge needle was used to enter the right wrist at the ECU tendon sheath.  Needle placement was visualized and documented with ultrasound.  Ultrasound visualization required to ensure injection material enters the tendon sheath and not the tendon itself.  Injection performed long axis to the probe.  Injection solution visualized within the tendon sheath.  Images were permanently stored for the patient's record.  There were no complications. The patient tolerated the procedure well. There was negligible bleeding.   The patient was instructed to ice the wrist upon leaving clinic and refrain from overuse over the next 3 days.   The patient was instructed to call or go to the emergency room with any unusual pain, swelling, redness, or if otherwise concerned  It was a pleasure seeing Belenesh today.    Basil Santiago DO, [facility]  Primary Care Sports Medicine        "

## 2020-05-14 NOTE — LETTER
"DEPARTMENT OF HEALTH AND HUMAN SERVICES  CENTERS FOR MEDICARE & MEDICAID SERVICES    PLAN/UPDATED PLAN OF PROGRESS FOR OUTPATIENT REHABILITATION    PATIENTS NAME:  Yancy Rivera   : 1955  PROVIDER NUMBER:    [id number]  HICN: [id number]  PROVIDER NAME: [facility]  MEDICAL RECORD NUMBER: [id number]   START OF CARE DATE:  SOC Date: 20   TYPE:  PT  PRIMARY/TREATMENT DIAGNOSIS: (Pertinent Medical Diagnosis)  Right wrist pain  Closed nondisplaced fracture of styloid process of right ulna  VISITS FROM START OF CARE:  Rxs Used: 1     Hand Therapy Initial Evaluation  Current Date:  2020  Diagnosis: Closed nondisplaced fracture of styloid process of right ulna  DOI: 2020  Procedure:  NA  Precautions: NA    Subjective:  Yancy Rivera is a 64 year old female.    Patient reports symptoms of the right wrist which occurred due to trying to lift 3 gallons of water using her right hand when she felt an acute pain with a\"click\" in her right wrist. Since onset symptoms are Unchanged  Special tests:  x-ray.  Previous treatment: casted, injection.    General health as reported by patient is good.  Pertinent medical history includes:  Past Medical History:   Diagnosis Date     Blepharitis      Esophageal reflux (aka GERD)      Glaucoma      Hyperlipidemia LDL goal < 130      Nonsenile cataract      Shingles     11/14/15 Left approximately C6 distribution     Medical allergies:none.  Surgical history:   Past Surgical History:   Procedure Laterality Date     CHALAZION EXCISION  2015    Left lid     HERNIA REPAIR      abdominal hernia repair      Medication history: cholesterol, heartburn, painmedication.  Current occupation is retired  Currently retired      PATIENTS NAME:  Yancy Rivera   : 1955    Occupational Profile Information:  Right hand dominant  Prior functional level:  no limitations  Patient reports symptoms of pain, stiffness/loss of motion, weakness/loss of strength and " edema  Special tests:  x-ray.    Barriers include:none  Mobility: No difficulty  Transportation: public transportation and medical transportation    Objective:  Pain Level (Scale 0-10)   2020   At Rest 7/10   At Worst 9/10     Pain Description  Date 2020   Location Wrist, forearm, finger   Pain Quality Aching and Sharp   Frequency intermittent     Pain is worst  daytime   Exacerbated by  movement, use   Relieved by rest   Progression No change     Edema (Circumference measured in cm)   2020    L R   DWC 16.0 17.7     Sensation   WNL throughout all nerve distributions; per patient report                                    PATIENTS NAME:  Yancy Rivera   : 1955    ROM  Hand 2020   AROM(PROM) R   Index MP 0/42   PIP 0/83   DIP 0/57   PELLETIER 182   Long MP -8/83   PIP 0/86   DIP 0/50   PELLETIER 211   Ring MP 0/75   PIP 0/80   DIP 0/42   PELLETIER 197   Small MP 0/77   PIP -12/74   DIP 0/45   PELLETIER 184       Strength  contraindicated    Assessment:  Patient presents with symptoms consistent with diagnosis of right wrist pain, secondary to ECU strain and ulna fracture,  with conservative intervention.     Patient's limitations or Problem List includes:  Pain, Decreased ROM/motion, Increased edema and Weakness of the right wrist which interferes with the patient's ability to perform Self Care Tasks (dressing, eating, bathing, hygiene/toileting), Work Tasks, Sleep Patterns, Recreational Activities, Household Chores and Driving  as compared to previous level of function.  Rehab Potential:  Good - Return to full activity, some limitations  Patient will benefit from skilled Occupational Therapy to increase ROM and overall strength and decrease pain and edema to return to previous activity level and resume normal daily tasks and to reach their rehab potential.  Barriers to Learning:  Language  Communication Issues:  Patient appears to be able to clearly communicate and understand verbal and written  "communication and follow directions correctly.  Chart Review: Simple history review with patient  Identified Performance Deficits: bathing/showering, toileting, dressing, feeding, hygiene and grooming, driving and community mobility, health management and maintenance, home establishment and management, meal preparation and cleanup, shopping and leisure activities    Assessment of Occupational Performance:  5 or more Performance Deficits  Clinical Decision Making (Complexity): Low complexity  Treatment Explanation:  The following has been discussed with the patient:  RX ordered/plan of care  Anticipated outcomes  PATIENTS NAME:  Yancy Rivera   : 1955    Possible risks and side effects    Plan:  Frequency:  1 X week, once daily  Duration:  for 6 weeks  Treatment Plan:   Therapeutic Exercise:  AROM, AAROM, PROM, Isotonics, Isometrics and Stabilization  Neuromuscular re-education:  Nerve Gliding and Kinesiotaping  Manual Techniques:  Myofascial release  Orthotic Fabrication:  Static orthosis  Self Care:  Self Care Tasks  Discharge Plan:  Achieve all LTG.  Independent in home treatment program.  Reach maximal therapeutic benefit.  Home Exercise Program:  Zipper orthosis full time, remove for hygiene and exercise  AROM of thumb  Tendon gliding  icing  Next Visit:  Orthosis modifications  AROM  MFR      Caregiver Signature/Credentials _____________________________ Date ________         Elly Gonzales OTR/L, CHT  I have reviewed and certified the need for these services and plan of treatment while under my care.        PHYSICIAN'S SIGNATURE:   _____________________________________  Date___________     Basil Santiago MD    Certification period:  Beginning of Cert date period: 20 to  End of Cert period date: 20     Functional Level Progress Report: Please see attached \"Goal Flow sheet for Functional level.\"    ____X____ Continue Services or       ________ DC Services                Service dates: From  " SOC Date: 05/14/20 date to present

## 2020-05-14 NOTE — NURSING NOTE
[address] [address]  Dept: [phone]  ______________________________________________________________________________    Patient: Yancy Rivera   : 1955   MRN: [id number]   May 14, 2020    INVASIVE PROCEDURE SAFETY CHECKLIST    Date: May 14, 2020  Procedure: Right ecu tendon sheath injection  Patient Name: Yancy Rivera  MRN: [id number]  YOB: 1955    Action: Complete sections as appropriate. Any discrepancy results in a HARD COPY until resolved.     PRE PROCEDURE:  Patient ID verified with 2 identifiers (name and  or MRN): Yes  Procedure and site verified with patient/designee (when able): Yes  Accurate consent documentation in medical record: Yes  H&P (or appropriate assessment) documented in medical record: Yes  H&P must be up to 20 days prior to procedure and updates within 24 hours of procedure as applicable: NA  Relevant diagnostic and radiology test results appropriately labeled and displayed as applicable: Yes  Procedure site(s) marked with provider initials: NA    TIMEOUT:  Time-Out performed immediately prior to starting procedure, including verbal and active participation of all team members addressing the following:Yes  * Correct patient identify  * Confirmed that the correct side and site are marked  * An accurate procedure consent form  * Agreement on the procedure to be done  * Correct patient position  * Relevant images and results are properly labeled and appropriately displayed  * The need to administer antibiotics or fluids for irrigation purposes during the procedure as applicable   * Safety precautions based on patient history or medication use    DURING PROCEDURE: Verification of correct person, site, and procedures any time the responsibility for care of the patient is transferred to another member of the care team.     The following medication was given:     MEDICATION:  Kenalog 40 mg  ROUTE: tendon  sheath  SITE: right Wrist  DOSE: 1/2mL  LOT #: [id number]  : [employer]  EXPIRATION DATE: 11/2021  NDC#: [address]-8   Was there drug waste? Yes  Amount of drug waste (mL): 1/2.  Reason for waste:  As per MD    MEDICATION:  Lidocaine without epinephrine  ROUTE: tendon sheath  SITE: right Wrist  DOSE: 2mL  LOT #: [id number]  : [employer]  EXPIRATION DATE: 10/2023  NDC#: 06356-154-32   Was there drug waste? Yes  Amount of drug waste (mL): 3.  Reason for waste:  As per MD    Prior to injection, verified patient identity using patient's name and date of birth.  Due to injection administration, patient instructed to remain in clinic for 15 minutes  afterwards, and to report any adverse reaction to me immediately.    Mary Pope, ATC  May 14, 2020

## 2020-05-17 RX ORDER — LIDOCAINE HYDROCHLORIDE 10 MG/ML
2 INJECTION, SOLUTION EPIDURAL; INFILTRATION; INTRACAUDAL; PERINEURAL
Status: SHIPPED | OUTPATIENT
Start: 2020-05-14

## 2020-05-17 RX ORDER — TRIAMCINOLONE ACETONIDE 40 MG/ML
20 INJECTION, SUSPENSION INTRA-ARTICULAR; INTRAMUSCULAR
Status: SHIPPED | OUTPATIENT
Start: 2020-05-14

## 2020-05-17 NOTE — PROGRESS NOTES
CHIEF COMPLAINT:  Follow Up of the Left Wrist       HISTORY OF PRESENT ILLNESS  Ms. Rivera is a pleasant 64 year old year old female who presents to clinic today with acute left wrist pain.  Yancy explains that she suffered injury three weeks ago, carrying a water jug and felt a click in her wrist. She is having pain on her lateral wrist down into mid forearm. The splint has been helping but she is still having pain.       What part of your body is injured / painful?  right wrist    What caused the injury /pain? Lifting    How long ago did your injury occur or pain begin? 3 weeks ago    What are your most bothersome symptoms? Pain    How would you characterize your symptom?  aching and burning    What makes your symptoms better? Ibuprofen and Wrap or brace    What makes your symptoms worse? Movement    Have you been previously seen for this problem? Yes, PCP    Additional history: as documented    MEDICAL HISTORY  Patient Active Problem List   Diagnosis     Hyperlipidemia with target LDL less than 130     Prediabetes     Disorder of bursae and tendons in shoulder region     Shingles     Primary open angle glaucoma of both eyes, moderate stage     Senile nuclear sclerosis, bilateral     Benign essential hypertension     Right wrist pain     Closed nondisplaced fracture of styloid process of right ulna       Current Outpatient Medications   Medication Sig Dispense Refill     amitriptyline (ELAVIL) 10 MG tablet Take 1 tablet (10 mg) by mouth At Bedtime 90 tablet 3     Artificial Tear Ointment (REFRESH P.M.) OINT Apply 3.5 g to eye At Bedtime 1 Tube 11     atorvastatin (LIPITOR) 20 MG tablet Take 1 tablet (20 mg) by mouth daily 90 tablet 3     carboxymethylcellulose (REFRESH PLUS) 0.5 % SOLN Place 1 drop into both eyes 4 times daily as needed       carboxymethylcellulose PF (CARBOXYMETHYLCELLULOSE SODIUM) 0.5 % ophthalmic solution Place 1 drop into both eyes 3 times daily as needed for dry eyes 1 Bottle 3      Cholecalciferol (VITAMIN D) 2000 UNITS tablet Take 1,000 Units by mouth daily 45 tablet 3     clotrimazole-betamethasone (LOTRISONE) 1-0.05 % external cream Apply topically 2 times daily 15 g 0     dorzolamide-timolol (COSOPT) 2-0.5 % ophthalmic solution Place 1 drop into both eyes 2 times daily 1 Bottle 11     doxycycline Monohydrate 100 MG TABS Take 100 mg by mouth daily 90 tablet 3     ferrous sulfate (IRON) 325 (65 FE) MG tablet Take 1 tablet (325 mg) by mouth daily (with breakfast) 30 tablet 11     gabapentin (NEURONTIN) 300 MG capsule Take 1 capsule (300 mg) by mouth 3 times daily 270 capsule 1     gabapentin (NEURONTIN) 600 MG tablet Take 1 tablet (600 mg) by mouth 3 times daily 90 tablet 11     hydroxychloroquine (PLAQUENIL) 200 MG tablet Take 2 tablets (400 mg) by mouth daily 60 tablet 4     latanoprost (XALATAN) 0.005 % ophthalmic solution Place 1 drop into both eyes At Bedtime 1 Bottle 11     methylPREDNISolone (MEDROL DOSEPAK) 4 MG tablet Take 1 tablet (4 mg) by mouth See Admin Instructions 21 tablet 0     Multiple Minerals (CALCIUM-MAGNESIUM-ZINC) TABS Take 1 tablet by mouth daily 90 tablet 0     omega 3 1000 MG CAPS Take 1 g by mouth daily 90 capsule 0     omeprazole (PRILOSEC) 20 MG capsule Take 1 capsule (20 mg) by mouth daily 90 capsule 1     omeprazole (PRILOSEC) 20 MG DR capsule Take 1 capsule (20 mg) by mouth daily 30 capsule 3     oxyCODONE (ROXICODONE) 5 MG immediate release tablet Take 1 tablet (5 mg) by mouth every 6 hours as needed for moderate to severe pain 20 tablet 0     piroxicam (FELDENE) 20 MG capsule Take 1 capsule (20 mg) by mouth daily (with breakfast) 30 capsule 11     Riboflavin 400 MG TABS Take 1 tablet by mouth daily 30 tablet 11     SUMAtriptan (IMITREX) 50 MG tablet Take 1 tablet (50 mg) by mouth at onset of headache for migraine (May repeat in one hour. Max 200 mg in 24 hours. limit use to no more than 2 days per week) May repeat dose within one hour. 12 tablet 6        Allergies   Allergen Reactions     No Clinical Screening - See Comments Nausea and Vomiting     Liver side effects from INH for TB       Family History   Problem Relation Age of Onset     Macular Degeneration No family hx of      Cancer No family hx of      Diabetes No family hx of      Hypertension No family hx of      Cerebrovascular Disease No family hx of      Thyroid Disease No family hx of      Eye Surgery No family hx of      Glaucoma Mother        Additional medical/Social/Surgical histories reviewed in [facility] and updated as appropriate.     REVIEW OF SYSTEMS (5/17/2020)  A 10-point review of systems was obtained and is negative except for as noted in the HPI.      PHYSICAL EXAM  Wt 88.9 kg (196 lb)   LMP  (LMP Unknown)   BMI 31.64 kg/m      General  - normal appearance, in no obvious distress  HEENT  - conjunctivae not injected, moist mucous membranes  CV  - normal radial pulse   Pulm  - normal respiratory pattern, non-labored  Musculoskeletal - right wrist  - inspection: normal joint alignment, no obvious deformity, no swelling  - palpation: TTP at ECU tendon near distal ulna and into carpal region across joint.  No foveal tenderness.  - ROM:  Decreased ROM and pain with extension, ulnar deviation, near normal flexion and radial deviation  - strength: Grossly intact, painful extension active ulnar deviation.  - special tests:  (-) Tinel's   (-) Finkelstein  (+) ulnar impaction pain  Neuro  - no sensory or motor deficit, grossly normal coordination, normal muscle tone  Skin  - no ecchymosis, erythema, warmth, or induration, no obvious rash  Psych  - interactive, appropriate, normal mood and affect    IMAGING :   3 views right wrist radiographs 5/14/2020 11:34 AM     History: AP, lateral and oblique; Right wrist pain      Comparison: 5/7/2020     Findings:     PA, oblique and lateral view(s) of the right wrist were obtained.      No acute osseous abnormality. No erosion.     Mild degenerative changes  "of the first carpometacarpal and triscaphe  joints.  Ossicle adjacent to the ulnar styloid likely from remote  trauma. Bones appear osteopenic.     Slight contour deformity of the fifth metacarpal, similar to prior,  presumably from remote trauma.     Soft tissue is unremarkable.                                                                      Impression:  1. No acute osseous abnormality. If persistent clinical concern, MRI  may be helpful given the underlying osteopenia.  2. Mild degenerative changes of the first carpometacarpal joint.  3. Osteopenic appearance.     I have personally reviewed the examination and initial interpretation  and I agree with the findings.     LYLY DOSS       ASSESSMENT & PLAN  Ms. Rivera is a 64 year old year old female who presents to clinic today with ongoing pain of right wrist after lifting a heavy object three weeks ago.  Although there was some concern initially for acute ulnar styloid fracture, I favor this to be a chronic appearing ossicle.  I believe she may have subluxed or exacerbated ECU tendon with ongoing synovitis.  Possible underlying TFCC involvement although this seems less likely based on exam.    Diagnosis: Acute pain of right wrist, ECU Tenosynovitis of right wrist    -ECU tendon sheath corticosteroid injection  -OT appointment for more functional zipper splint and ROM exercises for wrist and hand.  -Ice, analgesics as needed  -Follow up 4 weeks for reevaluation.    Wrist Injection, ECU TENDON SHEATH of RIGHT WRIST  The patient was informed of the risks and the benefits of the procedure and a written consent was signed.  The patient s right wrist was prepped with chlorhexidine in sterile fashion.   20 mg of kenalog suspension was drawn up into a 3 mL syringe with 2.0 mL of 1% lidocaine.  Injection was performed using sterile technique.  Under ultrasound guidance a 1.5\" 25-gauge needle was used to enter the right wrist at the ECU tendon sheath.  Needle " placement was visualized and documented with ultrasound.  Ultrasound visualization required to ensure injection material enters the tendon sheath and not the tendon itself.  Injection performed long axis to the probe.  Injection solution visualized within the tendon sheath.  Images were permanently stored for the patient's record.  There were no complications. The patient tolerated the procedure well. There was negligible bleeding.   The patient was instructed to ice the wrist upon leaving clinic and refrain from overuse over the next 3 days.   The patient was instructed to call or go to the emergency room with any unusual pain, swelling, redness, or if otherwise concerned  It was a pleasure seeing Belenesh today.    Basil Santiago DO, CAM  Primary Care Sports Medicine

## 2020-06-01 ENCOUNTER — THERAPY VISIT (OUTPATIENT)
Dept: OCCUPATIONAL THERAPY | Facility: CLINIC | Age: 65
End: 2020-06-01
Payer: MEDICARE

## 2020-06-01 DIAGNOSIS — M25.531 RIGHT WRIST PAIN: ICD-10-CM

## 2020-06-01 DIAGNOSIS — S52.614A CLOSED NONDISPLACED FRACTURE OF STYLOID PROCESS OF RIGHT ULNA: ICD-10-CM

## 2020-06-01 DIAGNOSIS — R12 HEART BURN: ICD-10-CM

## 2020-06-01 PROCEDURE — 97140 MANUAL THERAPY 1/> REGIONS: CPT | Mod: GO | Performed by: OCCUPATIONAL THERAPIST

## 2020-06-01 PROCEDURE — 97110 THERAPEUTIC EXERCISES: CPT | Mod: GO | Performed by: OCCUPATIONAL THERAPIST

## 2020-06-01 NOTE — PROGRESS NOTES
SOAP note objective information for 6/1/2020.  ROM  Pain Report: - none  + mild    ++ moderate    +++ severe   Wrist 6/1/2020   AROM (PROM) R   Extension 44   Flexion 15   RD 10   UD 15   Supination 55   Pronation 74   Please refer to the daily flowsheet for treatment today, total treatment time and time spent performing 1:1 timed codes.       Home Exercise Program:  Zipper orthosis full time, remove for hygiene and exercise  AROM of thumb  Wrist AROM  Tendon gliding  icing    Next Visit:  Orthosis modifications  AROM  MFR

## 2020-06-08 ENCOUNTER — OFFICE VISIT (OUTPATIENT)
Dept: ORTHOPEDICS | Facility: CLINIC | Age: 65
End: 2020-06-08
Payer: MEDICARE

## 2020-06-08 VITALS — BODY MASS INDEX: 31.64 KG/M2 | WEIGHT: 196 LBS

## 2020-06-08 DIAGNOSIS — M25.531 RIGHT WRIST PAIN: Primary | ICD-10-CM

## 2020-06-08 NOTE — LETTER
6/8/2020     RE: Yancy Rivera  [address]  [place] [address]    Dear Colleague,    Thank you for referring your patient, Yancy Rivera, to the [facility]TS AND [facility]THOP[facility] WALK [facility] CLINIC. Please see a copy of my visit note below.          SPORTS & ORTHOPEDIC WALK-IN FOLLOW-UP VISIT 6/8/2020    Interval History:     Follow up reason: Right wrist pain    Date of injury: About 2 months    Date last seen: 5/14/2020    Following Therapeutic Plan: Yes     Pain: Worsening    Function: Unchanged    Interval History: She is still having a lot of achy pain. The injection helped slightly but she has had a lot more pain this week. She has been trying to do HEP from hand therapy but is struggling due to pain.     Medical History:    Any recent changes to your medical history? No    Any new medication prescribed since last visit? No    Review of Systems:    Do you have fever, chills, weight loss? No    Do you have any vision problems? No    Do you have any chest pain or edema? No    Do you have any shortness of breath or wheezing?  No    Do you have stomach problems? No    Do you have any numbness or focal weakness? No    Do you have diabetes? No    Do you have problems with bleeding or clotting? No    Do you have an rashes or other skin lesions? No             ESTABLISHED PATIENT FOLLOW-UP:  Follow Up of the Right Wrist     HISTORY OF PRESENT ILLNESS  Ms. Rivera is a pleasant 64 year old year old female who presents to clinic today for follow-up of right wrist pain    Date of injury: Late April 2020  Date last seen: 5/14/20  Following Therapeutic Plan: Yes  Pain: Unchanged  Function: Unchanged  Interval History: Yancy has been compliant with her bracing, home exercises with PT.  Had improvement of pain for about 3 days after injection.  Pain has spread to other areas of wrist. Still limited motion and pain is moderate to severe at times.  No pain proximally in arm, shoulder, or neck.     Additional  medical/Social/Surgical histories reviewed in [facility] and updated as appropriate.    REVIEW OF SYSTEMS (6/8/2020)  CONSTITUTIONAL: Denies fever and weight loss  GASTROINTESTINAL: Denies abdominal pain, nausea, vomiting  MUSCULOSKELETAL: See HPI  SKIN: Denies any recent rash or lesion  NEUROLOGICAL: Denies numbness or focal weakness     PHYSICAL EXAM  Wt 88.9 kg (196 lb)   LMP  (LMP Unknown)   BMI 31.64 kg/m      General  - normal appearance, in no obvious distress  Musculoskeletal - right wrist  - inspection: normal joint alignment, no obvious deformity  - palpation: TTP at ECU tendon near distal ulna and into carpal region across joint.  No foveal tenderness.  Tenderness to palpation at the radial aspect of wrist near extensor carpi radialis, tenderness at the radiocarpal joint.  - ROM:  Extension decreased to 45 degrees, flexion decreased to about 20 degrees.  Radial and ulnar deviation 15 degrees.  Supination limited.   - strength: Grossly intact, painful extension active ulnar deviation.  Neuro  - no sensory or motor deficit, grossly normal coordination, normal muscle tone  Skin  - no ecchymosis, erythema, warmth, or induration, no obvious rash  Psych  - interactive, appropriate, normal mood and affect    IMAGING :   3 views right wrist radiographs 5/14/2020 11:34 AM     History: AP, lateral and oblique; Right wrist pain      Comparison: 5/7/2020     Findings:     PA, oblique and lateral view(s) of the right wrist were obtained.      No acute osseous abnormality. No erosion.     Mild degenerative changes of the first carpometacarpal and triscaphe  joints.  Ossicle adjacent to the ulnar styloid likely from remote  trauma. Bones appear osteopenic.     Slight contour deformity of the fifth metacarpal, similar to prior,  presumably from remote trauma.     Soft tissue is unremarkable.                                                                    Impression:  1. No acute osseous abnormality. If persistent clinical  concern, MRI  may be helpful given the underlying osteopenia.  2. Mild degenerative changes of the first carpometacarpal joint.  3. Osteopenic appearance.     I have personally reviewed the examination and initial interpretation  and I agree with the findings.     LYLY DOSS     ASSESSMENT & PLAN  Ms. Rivera is a 64 year old year old female who presents to clinic today  for reevaluation of right wrist pain.  Her last visit she was diagnosed with ECU tendinitis and underwent sheath injection at her ulnar styloid. Today she notes not experiencing significant relief from this injection.  Her pain seems more diffuse today in the region of the ulnar side of the wrist as well as radial aspect.  Given lack of improvement to date with injection, hand therapy, bracing for past six weeks, MRI is warranted for further investigation.    It was a pleasure seeing Yancy.    Basil Santiago DO, CAM  Primary Care Sports Medicine

## 2020-06-08 NOTE — PROGRESS NOTES
SPORTS & ORTHOPEDIC WALK-IN FOLLOW-UP VISIT 6/8/2020    Interval History:     Follow up reason: Right wrist pain    Date of injury: About 2 months    Date last seen: 5/14/2020    Following Therapeutic Plan: Yes     Pain: Worsening    Function: Unchanged    Interval History: She is still having a lot of achy pain. The injection helped slightly but she has had a lot more pain this week. She has been trying to do HEP from hand therapy but is struggling due to pain.     Medical History:    Any recent changes to your medical history? No    Any new medication prescribed since last visit? No    Review of Systems:    Do you have fever, chills, weight loss? No    Do you have any vision problems? No    Do you have any chest pain or edema? No    Do you have any shortness of breath or wheezing?  No    Do you have stomach problems? No    Do you have any numbness or focal weakness? No    Do you have diabetes? No    Do you have problems with bleeding or clotting? No    Do you have an rashes or other skin lesions? No

## 2020-06-08 NOTE — PROGRESS NOTES
ESTABLISHED PATIENT FOLLOW-UP:  Follow Up of the Right Wrist       HISTORY OF PRESENT ILLNESS  Ms. Rivera is a pleasant 64 year old year old female who presents to clinic today for follow-up of right wrist pain    Date of injury: Late April 2020  Date last seen: 5/14/20  Following Therapeutic Plan: Yes  Pain: Unchanged  Function: Unchanged  Interval History: Yancy has been compliant with her bracing, home exercises with PT.  Had improvement of pain for about 3 days after injection.  Pain has spread to other areas of wrist. Still limited motion and pain is moderate to severe at times.  No pain proximally in arm, shoulder, or neck.     Additional medical/Social/Surgical histories reviewed in [facility] and updated as appropriate.    REVIEW OF SYSTEMS (6/8/2020)  CONSTITUTIONAL: Denies fever and weight loss  GASTROINTESTINAL: Denies abdominal pain, nausea, vomiting  MUSCULOSKELETAL: See HPI  SKIN: Denies any recent rash or lesion  NEUROLOGICAL: Denies numbness or focal weakness     PHYSICAL EXAM  Wt 88.9 kg (196 lb)   LMP  (LMP Unknown)   BMI 31.64 kg/m      General  - normal appearance, in no obvious distress  Musculoskeletal - right wrist  - inspection: normal joint alignment, no obvious deformity  - palpation: TTP at ECU tendon near distal ulna and into carpal region across joint.  No foveal tenderness.  Tenderness to palpation at the radial aspect of wrist near extensor carpi radialis, tenderness at the radiocarpal joint.  - ROM:  Extension decreased to 45 degrees, flexion decreased to about 20 degrees.  Radial and ulnar deviation 15 degrees.  Supination limited.   - strength: Grossly intact, painful extension active ulnar deviation.  Neuro  - no sensory or motor deficit, grossly normal coordination, normal muscle tone  Skin  - no ecchymosis, erythema, warmth, or induration, no obvious rash  Psych  - interactive, appropriate, normal mood and affect    IMAGING :   3 views right wrist radiographs 5/14/2020 11:34  AM     History: AP, lateral and oblique; Right wrist pain      Comparison: 5/7/2020     Findings:     PA, oblique and lateral view(s) of the right wrist were obtained.      No acute osseous abnormality. No erosion.     Mild degenerative changes of the first carpometacarpal and triscaphe  joints.  Ossicle adjacent to the ulnar styloid likely from remote  trauma. Bones appear osteopenic.     Slight contour deformity of the fifth metacarpal, similar to prior,  presumably from remote trauma.     Soft tissue is unremarkable.                                                                      Impression:  1. No acute osseous abnormality. If persistent clinical concern, MRI  may be helpful given the underlying osteopenia.  2. Mild degenerative changes of the first carpometacarpal joint.  3. Osteopenic appearance.     I have personally reviewed the examination and initial interpretation  and I agree with the findings.     LYLY DOSS     ASSESSMENT & PLAN  Ms. Rivera is a 64 year old year old female who presents to clinic today  for reevaluation of right wrist pain.  Her last visit she was diagnosed with ECU tendinitis and underwent sheath injection at her ulnar styloid. Today she notes not experiencing significant relief from this injection.  Her pain seems more diffuse today in the region of the ulnar side of the wrist as well as radial aspect.  Given lack of improvement to date with injection, hand therapy, bracing for past six weeks, MRI is warranted for further investigation.    It was a pleasure seeing Yancy.    Basil Santiago DO, [facility]  Primary Care Sports Medicine

## 2020-06-11 ENCOUNTER — ANCILLARY PROCEDURE (OUTPATIENT)
Dept: MRI IMAGING | Facility: CLINIC | Age: 65
End: 2020-06-11
Attending: FAMILY MEDICINE
Payer: MEDICARE

## 2020-06-11 DIAGNOSIS — M25.531 RIGHT WRIST PAIN: ICD-10-CM

## 2020-06-15 ENCOUNTER — THERAPY VISIT (OUTPATIENT)
Dept: OCCUPATIONAL THERAPY | Facility: CLINIC | Age: 65
End: 2020-06-15
Payer: MEDICARE

## 2020-06-15 ENCOUNTER — OFFICE VISIT (OUTPATIENT)
Dept: ORTHOPEDICS | Facility: CLINIC | Age: 65
End: 2020-06-15
Payer: MEDICARE

## 2020-06-15 VITALS — WEIGHT: 196 LBS | BODY MASS INDEX: 31.64 KG/M2

## 2020-06-15 DIAGNOSIS — M25.531 RIGHT WRIST PAIN: ICD-10-CM

## 2020-06-15 DIAGNOSIS — M19.031 PRIMARY OSTEOARTHRITIS, RIGHT WRIST: Primary | ICD-10-CM

## 2020-06-15 DIAGNOSIS — S52.614A CLOSED NONDISPLACED FRACTURE OF STYLOID PROCESS OF RIGHT ULNA: ICD-10-CM

## 2020-06-15 DIAGNOSIS — M25.531 RIGHT WRIST PAIN: Primary | ICD-10-CM

## 2020-06-15 PROCEDURE — 97110 THERAPEUTIC EXERCISES: CPT | Mod: GO | Performed by: OCCUPATIONAL THERAPIST

## 2020-06-15 NOTE — PROGRESS NOTES
Please refer to the daily flowsheet for treatment today, total treatment time and time spent performing 1:1 timed codes.      Home Exercise Program:  Zipper orthosis full time, remove for hygiene and exercise  AROM of thumb  Wrist AROM  Tendon gliding  Icing  6/15/2020  Hold on AROM of FA exercise due to pain level/ulnar wrist issues    Next Visit:  Orthosis modifications  AROM  MFR

## 2020-06-15 NOTE — PROGRESS NOTES
ESTABLISHED PATIENT FOLLOW-UP:  Follow Up of the Right Wrist       HISTORY OF PRESENT ILLNESS  Ms. Rivera is a pleasant 65 year old year old female who presents to clinic today for follow-up of right wrist pain.      service used during visit.    Date of injury: April 2020  Date last seen: 6/8/20  Following Therapeutic Plan: Yes  Pain: Unchanged  Function: Unchanged  Interval History: Patient has been wearing brace. Pain continues at wrist, pointing toward joint. Also pain worst at at ulnar side of wrist. Using a sling at times to keep arm still.      MRI completed.    Additional medical/Social/Surgical histories reviewed in [facility] and updated as appropriate.    REVIEW OF SYSTEMS (6/15/2020)  CONSTITUTIONAL: Denies fever and weight loss  GASTROINTESTINAL: Denies abdominal pain, nausea, vomiting  MUSCULOSKELETAL: See HPI  SKIN: Denies any recent rash or lesion  NEUROLOGICAL: Denies numbness or focal weakness     PHYSICAL EXAM  Wt 88.9 kg (196 lb)   LMP  (LMP Unknown)   BMI 31.64 kg/m      General  - normal appearance, in no obvious distress  Musculoskeletal - right wrist  - inspection: normal joint alignment, no obvious deformity  - palpation: TTP at ECU tendon near distal ulna and into carpal region across joint to distal ulna.  No foveal tenderness.  Tenderness to palpation at the radial aspect of wrist near extensor carpi radialis, tenderness at the radiocarpal joint.  - ROM:  Extension decreased to 45 degrees, flexion decreased to about 20 degrees.  Radial and ulnar deviation 15 degrees.  Supination limited.   - strength: Grossly intact, painful extension active ulnar deviation.  Neuro  - no sensory or motor deficit, grossly normal coordination, normal muscle tone  Skin  - no ecchymosis, erythema, warmth, or induration, no obvious rash  Psych  - interactive, appropriate, normal mood and affect    IMAGING :  IMPRESSION:  1.  Tearing and degeneration of the central disc of the TFCC.  2.  Degenerative  changes involving the triscaphe, first  carpometacarpal, radiocarpal and scapholunate joints. No acute osseous  abnormality  3.  Small joint effusion.  4.  Tendinosis and partial intrasubstance tearing of the extensor  carpi ulnaris.     I have personally reviewed the examination and initial interpretation  and I agree with the findings.     GAYATRI SPRINGER MD (Joe)     ASSESSMENT & PLAN  Ms. Rivera is a 65 year old year old female who presents to clinic today for reevaluation of right wrist pain, which remains quite debilitating.  MRI reviewed and reveals no significant acute osseous abnormalities.  There is degenerative changes of the TFCC as well as radiocarpal joint, into the carpal region.  There is also tendinosis and a partial intrasubstance tear of the ECU tendon.  Treatment options reviewed and will have her see our hand therapist today in person.  Continue wearing wrist brace.  I would encourage her to start utilizing home exercises and range of motion.  She should cease all use of the arm sling at this time.  Start diclofenac 4 times daily to wrist region.  May consider radiocarpal wrist injection in the future, although she does not have significant benefit from ECU tendon sheath injection.    1.  ECU tendinosis  2.  Osteoarthritis of right wrist    Follow-up in 4-6 weeks    It was a pleasure seeing Yancy.    Basil Santiago DO, [facility]  Primary Care Sports Medicine

## 2020-06-15 NOTE — LETTER
6/15/2020       RE: Yancy Rivera  [address]  [place] [address]      Dear Colleague,    Thank you for referring your patient, Yancy Rivera, to the [facility] SPORTS AND ORTHOPA[facility]IC WALK IN CLINIC. Please see a copy of my visit note below.          SPORTS & ORTHOPEDIC WALK-IN FOLLOW-UP VISIT 6/15/2020    MRI f/u    Interval History:     Follow up reason: MRI f/u    Date of injury: 4/2020    Date last seen: 6/8/20    Following Therapeutic Plan: Yes     Pain: Unchanged    Function: Unchanged                 ESTABLISHED PATIENT FOLLOW-UP:  Follow Up of the Right Wrist       HISTORY OF PRESENT ILLNESS  Ms. Rivera is a pleasant 65 year old year old female who presents to clinic today for follow-up of right wrist pain.      service used during visit.    Date of injury: April 2020  Date last seen: 6/8/20  Following Therapeutic Plan: Yes  Pain: Unchanged  Function: Unchanged  Interval History: Patient has been wearing brace. Pain continues at wrist, pointing toward joint. Also pain worst at at ulnar side of wrist. Using a sling at times to keep arm still.      MRI completed.    Additional medical/Social/Surgical histories reviewed in [facility] and updated as appropriate.    REVIEW OF SYSTEMS (6/15/2020)  CONSTITUTIONAL: Denies fever and weight loss  GASTROINTESTINAL: Denies abdominal pain, nausea, vomiting  MUSCULOSKELETAL: See HPI  SKIN: Denies any recent rash or lesion  NEUROLOGICAL: Denies numbness or focal weakness     PHYSICAL EXAM  Wt 88.9 kg (196 lb)   LMP  (LMP Unknown)   BMI 31.64 kg/m      General  - normal appearance, in no obvious distress  Musculoskeletal - right wrist  - inspection: normal joint alignment, no obvious deformity  - palpation: TTP at ECU tendon near distal ulna and into carpal region across joint to distal ulna.  No foveal tenderness.  Tenderness to palpation at the radial aspect of wrist near extensor carpi radialis, tenderness at the radiocarpal joint.  - ROM:   Extension decreased to 45 degrees, flexion decreased to about 20 degrees.  Radial and ulnar deviation 15 degrees.  Supination limited.   - strength: Grossly intact, painful extension active ulnar deviation.  Neuro  - no sensory or motor deficit, grossly normal coordination, normal muscle tone  Skin  - no ecchymosis, erythema, warmth, or induration, no obvious rash  Psych  - interactive, appropriate, normal mood and affect    IMAGING :  IMPRESSION:  1.  Tearing and degeneration of the central disc of the TFCC.  2.  Degenerative changes involving the triscaphe, first  carpometacarpal, radiocarpal and scapholunate joints. No acute osseous  abnormality  3.  Small joint effusion.  4.  Tendinosis and partial intrasubstance tearing of the extensor  carpi ulnaris.     I have personally reviewed the examination and initial interpretation  and I agree with the findings.     GAYATRI SPRINGER MD (Joe)     ASSESSMENT & PLAN  Ms. Rivera is a 65 year old year old female who presents to clinic today for reevaluation of right wrist pain, which remains quite debilitating.  MRI reviewed and reveals no significant acute osseous abnormalities.  There is degenerative changes of the TFCC as well as radiocarpal joint, into the carpal region.  There is also tendinosis and a partial intrasubstance tear of the ECU tendon.  Treatment options reviewed and will have her see our hand therapist today in person.  Continue wearing wrist brace.  I would encourage her to start utilizing home exercises and range of motion.  She should cease all use of the arm sling at this time.  Start diclofenac 4 times daily to wrist region.  May consider radiocarpal wrist injection in the future, although she does not have significant benefit from ECU tendon sheath injection.    1.  ECU tendinosis  2.  Osteoarthritis of right wrist    Follow-up in 4-6 weeks    It was a pleasure seeing Yancy.    Basil Santiago DO, [facility]  Primary Care Sports Medicine

## 2020-06-15 NOTE — PROGRESS NOTES
SPORTS & ORTHOPEDIC WALK-IN FOLLOW-UP VISIT 6/15/2020    MRI f/u    Interval History:     Follow up reason: MRI f/u    Date of injury: 4/2020    Date last seen: 6/8/20    Following Therapeutic Plan: Yes     Pain: Unchanged    Function: Unchanged

## 2020-06-21 ENCOUNTER — HOSPITAL ENCOUNTER (EMERGENCY)
Facility: CLINIC | Age: 65
Discharge: HOME | End: 2020-06-21
Attending: EMERGENCY MEDICINE | Admitting: EMERGENCY MEDICINE
Payer: MEDICARE

## 2020-06-21 ENCOUNTER — APPOINTMENT (OUTPATIENT)
Dept: GENERAL RADIOLOGY | Facility: CLINIC | Age: 65
End: 2020-06-21
Attending: EMERGENCY MEDICINE
Payer: MEDICARE

## 2020-06-21 VITALS
SYSTOLIC BLOOD PRESSURE: 132 MMHG | TEMPERATURE: 98 F | HEART RATE: 74 BPM | RESPIRATION RATE: 20 BRPM | OXYGEN SATURATION: 96 % | DIASTOLIC BLOOD PRESSURE: 80 MMHG

## 2020-06-21 DIAGNOSIS — M54.41 ACUTE RIGHT-SIDED LOW BACK PAIN WITH RIGHT-SIDED SCIATICA: ICD-10-CM

## 2020-06-21 PROCEDURE — 25000128 H RX IP 250 OP 636: Performed by: EMERGENCY MEDICINE

## 2020-06-21 PROCEDURE — 96374 THER/PROPH/DIAG INJ IV PUSH: CPT | Performed by: EMERGENCY MEDICINE

## 2020-06-21 PROCEDURE — 72100 X-RAY EXAM L-S SPINE 2/3 VWS: CPT

## 2020-06-21 PROCEDURE — 99284 EMERGENCY DEPT VISIT MOD MDM: CPT | Mod: 25 | Performed by: EMERGENCY MEDICINE

## 2020-06-21 PROCEDURE — 99284 EMERGENCY DEPT VISIT MOD MDM: CPT | Mod: Z6 | Performed by: EMERGENCY MEDICINE

## 2020-06-21 RX ORDER — KETOROLAC TROMETHAMINE 15 MG/ML
15 INJECTION, SOLUTION INTRAMUSCULAR; INTRAVENOUS ONCE
Status: COMPLETED | OUTPATIENT
Start: 2020-06-21 | End: 2020-06-21

## 2020-06-21 RX ORDER — NAPROXEN 500 MG/1
500 TABLET ORAL 2 TIMES DAILY WITH MEALS
Qty: 16 TABLET | Refills: 0 | Status: SHIPPED | OUTPATIENT
Start: 2020-06-21 | End: 2020-06-29

## 2020-06-21 RX ADMIN — KETOROLAC TROMETHAMINE 15 MG: 15 INJECTION, SOLUTION INTRAMUSCULAR; INTRAVENOUS at 11:47

## 2020-06-21 ASSESSMENT — ENCOUNTER SYMPTOMS
MYALGIAS: 0
DIFFICULTY URINATING: 0
WEAKNESS: 0
ABDOMINAL PAIN: 0
NAUSEA: 0
NUMBNESS: 1
COLOR CHANGE: 0
HEMATURIA: 0
HEADACHES: 0
EYE REDNESS: 0
BACK PAIN: 1
FLANK PAIN: 0
CONFUSION: 0
FEVER: 0
JOINT SWELLING: 0
SHORTNESS OF BREATH: 0
NECK PAIN: 0
ADENOPATHY: 0
DYSURIA: 0
NECK STIFFNESS: 0
VOMITING: 0
ARTHRALGIAS: 0
CHILLS: 0
BRUISES/BLEEDS EASILY: 0

## 2020-06-21 NOTE — ED PROVIDER NOTES
ED Provider Note  [facility]      History     Chief Complaint   Patient presents with     Back Pain     pt got up from chair on Friday and hurt the right esme of back.  Says it is hard to stand, sit, lay down.     The history is provided by the patient and medical records.     Yancy Rivera is a 65 year old female with past medical history significant for a herniated disc (per patient), HDL, and GERD who presents to the ED today with right lower back pain. Patient reports that on 6/19/2020, she went to stand up from a chair when she felt a sudden sharp pain in her right lower back. Patient reports that the pain is constant, moderate, sharp, confined to the right side, and the pain radiates and is numb down her right leg to her toes.  Walking actually improves it, but she reports that sitting makes it worse. Patient states she took Tylenol at 9am this morning (~2.5 hrs PTA) and stated that it helped ease the pain a little. Patient denies having a fever. Patient denies urinary retention, bowel incontinence, weakness or sensory changes in her lower extremities.  No fevers.  Patient denies being on any anticoagulation medications.    Per chart review, patient was seen at [facility] on 5/7/2020 for a closed nondisplaced fracture of styloid process of the right ulna.      Past Medical History  Past Medical History:   Diagnosis Date     Blepharitis      Esophageal reflux (aka GERD)      Glaucoma      Hyperlipidemia LDL goal < 130      Nonsenile cataract      Shingles     11/14/15 Left approximately C6 distribution     Past Surgical History:   Procedure Laterality Date     CHALAZION EXCISION  08/21/2015    Left lid     HERNIA REPAIR      abdominal hernia repair 2012     naproxen (NAPROSYN) 500 MG tablet  amitriptyline (ELAVIL) 10 MG tablet  Artificial Tear Ointment (REFRESH P.M.) OINT  atorvastatin (LIPITOR) 20 MG tablet  carboxymethylcellulose (REFRESH PLUS) 0.5 %  SOLN  carboxymethylcellulose PF (CARBOXYMETHYLCELLULOSE SODIUM) 0.5 % ophthalmic solution  Cholecalciferol (VITAMIN D) 2000 UNITS tablet  clotrimazole-betamethasone (LOTRISONE) 1-0.05 % external cream  diclofenac (VOLTAREN) 1 % topical gel  dorzolamide-timolol (COSOPT) 2-0.5 % ophthalmic solution  doxycycline Monohydrate 100 MG TABS  ferrous sulfate (IRON) 325 (65 FE) MG tablet  gabapentin (NEURONTIN) 300 MG capsule  gabapentin (NEURONTIN) 600 MG tablet  hydroxychloroquine (PLAQUENIL) 200 MG tablet  latanoprost (XALATAN) 0.005 % ophthalmic solution  methylPREDNISolone (MEDROL DOSEPAK) 4 MG tablet  Multiple Minerals (CALCIUM-MAGNESIUM-ZINC) TABS  omega 3 1000 MG CAPS  omeprazole (PRILOSEC) 20 MG capsule  omeprazole (PRILOSEC) 20 MG DR capsule  oxyCODONE (ROXICODONE) 5 MG immediate release tablet  piroxicam (FELDENE) 20 MG capsule  Riboflavin 400 MG TABS  SUMAtriptan (IMITREX) 50 MG tablet      Allergies   Allergen Reactions     No Clinical Screening - See Comments Nausea and Vomiting     Liver side effects from INH for TB     Past medical history, past surgical history, medications, and allergies were reviewed with the patient. Additional pertinent items: None    Family History  Family History   Problem Relation Age of Onset     Glaucoma Mother      Macular Degeneration No family hx of      Cancer No family hx of      Diabetes No family hx of      Hypertension No family hx of      Cerebrovascular Disease No family hx of      Thyroid Disease No family hx of      Eye Surgery No family hx of      Family history was reviewed with the patient. Additional pertinent items: None    Social History  Social History     Tobacco Use     Smoking status: Never Smoker     Smokeless tobacco: Never Used   Substance Use Topics     Alcohol use: No     Drug use: No      Social history was reviewed with the patient. Additional pertinent items: None    Review of Systems   Constitutional: Negative for chills and fever.   HENT: Negative for  congestion.    Eyes: Negative for redness.   Respiratory: Negative for shortness of breath.    Cardiovascular: Negative for chest pain.   Gastrointestinal: Negative for abdominal pain, nausea and vomiting.   Genitourinary: Negative for difficulty urinating, dysuria, flank pain and hematuria.   Musculoskeletal: Positive for back pain. Negative for arthralgias, gait problem, joint swelling, myalgias, neck pain and neck stiffness.   Skin: Negative for color change.   Neurological: Positive for numbness ( down right leg). Negative for weakness and headaches.   Hematological: Negative for adenopathy. Does not bruise/bleed easily.   Psychiatric/Behavioral: Negative for confusion.   All other systems reviewed and are negative.      Physical Exam   BP: 136/80  Pulse: 74  Heart Rate: 66  Temp: 98  F (36.7  C)  Resp: 20  SpO2: 97 %  Physical Exam  Vitals signs and nursing note reviewed.   Constitutional:       General: She is not in acute distress ( mild, from pain).     Appearance: Normal appearance. She is normal weight. She is not ill-appearing, toxic-appearing or diaphoretic.   HENT:      Head: Normocephalic and atraumatic.      Nose: Nose normal.      Mouth/Throat:      Mouth: Mucous membranes are moist.      Pharynx: Oropharynx is clear. No oropharyngeal exudate.   Eyes:      General: No scleral icterus.     Extraocular Movements: Extraocular movements intact.      Conjunctiva/sclera: Conjunctivae normal.   Neck:      Musculoskeletal: Normal range of motion and neck supple.   Cardiovascular:      Rate and Rhythm: Normal rate.      Pulses: Normal pulses.      Heart sounds: Normal heart sounds.   Pulmonary:      Effort: Pulmonary effort is normal. No respiratory distress.      Breath sounds: Normal breath sounds. No wheezing, rhonchi or rales.   Abdominal:      Palpations: Abdomen is soft.      Tenderness: There is no abdominal tenderness. There is no right CVA tenderness, left CVA tenderness or guarding.    Musculoskeletal: Normal range of motion.         General: Tenderness present.      Right lower leg: No edema.      Left lower leg: No edema.      Comments: No midline cervical, thoracic, or lumbar spinous process tenderness.  There is mild tenderness in the right, lower back musculature.  No tenderness over hips.  Full range of motion, active and passive, in hips, knees, and ankles with mild pain in right lower back with range of motion.   Skin:     General: Skin is warm.      Capillary Refill: Capillary refill takes less than 2 seconds.      Findings: No rash.   Neurological:      General: No focal deficit present.      Mental Status: She is alert and oriented to person, place, and time.      GCS: GCS eye subscore is 4. GCS verbal subscore is 5. GCS motor subscore is 6.      Cranial Nerves: Cranial nerves are intact. No cranial nerve deficit.      Sensory: Sensation is intact. No sensory deficit.      Motor: Motor function is intact. No weakness.      Coordination: Coordination is intact. Coordination normal.      Gait: Gait is intact. Gait normal.      Deep Tendon Reflexes: Reflexes normal.      Reflex Scores:       Patellar reflexes are 2+ on the right side and 2+ on the left side.       Achilles reflexes are 2+ on the right side and 2+ on the left side.     Comments: Strength 5/5 in b/l UEs with  and b/l LEs with dorsi- and plantar-flexion against resistance. Sensation to light touch and 2-point discrimination intact in b/l distal UEs and LEs. No saddle anesthesia. Normal gait. 2+ pattellar and Achilles reflexes b/l.   Psychiatric:         Mood and Affect: Mood normal.         Behavior: Behavior normal.         Thought Content: Thought content normal.         Judgment: Judgment normal.         ED Course      Procedures   11:30 AM  The patient was seen and examined by Jayy Garrido MD in Room ED02.                            Results for orders placed or performed during the hospital encounter of 06/21/20    Lumbar spine XR, 2-3 views     Status: None    Narrative    LUMBAR SPINE TWO-THREE  VIEWS  6/21/2020 12:54 PM     HISTORY: right lower back pain    COMPARISON: Film dated 10/28/2016.    FINDINGS: Mild curvature of the lumbar spine convex towards the right.  There is normal osseous alignment.  No fractures are identified.  Mild  degenerative change in the facet joints.      Impression    IMPRESSION: Mild degenerative change. No change since 10/28/2016.    JIM MCFARLAND MD     Medications   ketorolac (TORADOL) injection 15 mg (15 mg Intravenous Given 6/21/20 1147)        Assessments & Plan (with Medical Decision Making)   65-year-old woman presenting with right lower lobe back pain for 3 days.  Differential diagnosis: Nerve impingement, herniated disc, muscle strain, unlikely cauda equina syndrome.    After thorough history and physical examination patient appears to be mild distress due to pain.  I will treat her pain with IV Toradol and obtain an x-ray of the lumbar spine.  She agrees with the plan.  I do not see any red flags for cauda equina syndrome.    I reviewed patient's x-ray and I read the radiology report; no evidence of bony abnormalities such as fracture or spondylolisthesis.  Patient's pain has significant improved after IV Toradol and at this time she is stable for discharge with primary care follow-up and prescription for naproxen.  She agrees with the plan.  No red flags for cauda equina syndrome.  Gait is normal at discharge.  She agrees to return if her symptoms worsen.    I have reviewed the nursing notes. I have reviewed the findings, diagnosis, plan and need for follow up with the patient.    Discharge Medication List as of 6/21/2020  2:36 PM      START taking these medications    Details   naproxen (NAPROSYN) 500 MG tablet Take 1 tablet (500 mg) by mouth 2 times daily (with meals) for 8 days, Disp-16 tablet,R-0, E-Prescribe             Final diagnoses:   Acute right-sided low back pain with  right-sided sciatica   I, Tunde Chau, am serving as a trained medical scribe to document services personally performed by Jayy Garrido MD, based on the provider's statements to me.  IJayy MD, was physically present and have reviewed and verified the accuracy of this note documented by Tunde Chau.      --    Emergency Medicine   [facility], [place], EMERGENCY DEPARTMENT  6/21/2020     Jayy Garrido MD  06/21/20 [id number]

## 2020-06-21 NOTE — DISCHARGE INSTRUCTIONS
Please make an appointment to follow up with Your Primary Care Provider in 2 days unless symptoms completely resolve.  Please take the prescribed medications as instructed.  If your symptoms significantly worsen please come back to the emergency department.        *BACK PAIN [acute or chronic]    Back pain is usually caused by an injury to the muscles or ligaments of the spine. Sometimes the disks that separate each bone in the spine may bulge and cause pain by pressing on a nearby nerve. Back pain may also appear after a sudden twisting/bending force (such as in a car accident), after a simple awkward movement, or lifting something heavy with poor body positioning. In either case, muscle spasm is often present and adds to the pain.  Acute back pain usually gets better in one to two weeks. Back pain related to disk disease, arthritis in the spinal joints or spinal stenosis (narrowing of the spinal canal) can become chronic and last for months or years.  Unless you had a physical injury (for example, a car accident or fall) X-rays are usually not ordered for the initial evaluation of back pain. If pain continues and does not respond to medical treatment, x-rays and other tests may be performed at a later time.  HOME CARE:  You should rest as needed. But, as soon as possible, begin sitting or walking to avoid problems with prolonged bed rest (muscle weakness, worsening back stiffness and pain, blood clots in the legs).  When in bed, try to find a position of comfort. A firm mattress is best. Try lying flat on your back with pillows under your knees. You can also try lying on your side with your knees bent up towards your chest and a pillow between your knees.  Avoid prolonged sitting. This puts more stress on the lower back than standing or walking.  During the first two days after injury, apply an ICE PACK to the painful area for 20 minutes every 2-4 hours. This will reduce swelling and pain. HEAT (hot shower, hot  bath or heating pad) works well for muscle spasm. You can start with ice, then switch to heat after two days. Some patients feel best alternating ice and heat treatments. Use the one method that feels the best to you.  You may use acetaminophen (Tylenol) 650-1000 mg every 6 hours or ibuprofen (Motrin, Advil) 600 mg every 6-8 hours with food to control pain, if you are able to take these medicines. [NOTE: If you have chronic liver or kidney disease or ever had a stomach ulcer or GI bleeding, talk with your doctor before using these medicines.]  Be aware of safe lifting methods and do not lift anything over 15 pounds until all the pain is gone.  FOLLOW UP with your doctor if your symptoms do not start to improve after one week. Your doctor may consider using physical therapy to help your recover.   GET PROMPT MEDICAL ATTENTION if any of the following occur:  Pain becomes worse or spreads to your legs  Weakness or numbness in one or both legs  Loss of bowel or bladder control  Numbness in the groin or genital area    8765-9022 [employer], [address] [place] [address], [place] [address]. All rights reserved. This information is not intended as a substitute for professional medical care. Always follow your healthcare professional's instructions.

## 2020-06-21 NOTE — ED AVS SNAPSHOT
[facility], [place], Emergency Department  [address]  [place] [address]  Phone:  [phone]  Fax:  [phone]                                    Yancy Rivera   MRN: [id number]    Department:  [facility], Emergency Department   Date of Visit:  6/21/2020           After Visit Summary Signature Page    I have received my discharge instructions, and my questions have been answered. I have discussed any challenges I see with this plan with the nurse or doctor.    ..........................................................................................................................................  Patient/Patient Representative Signature      ..........................................................................................................................................  Patient Representative Print Name and Relationship to Patient    ..................................................               ................................................  Date                                   Time    ..........................................................................................................................................  Reviewed by Signature/Title    ...................................................              ..............................................  Date                                               Time          22EPIC Rev 08/18

## 2020-06-24 ENCOUNTER — THERAPY VISIT (OUTPATIENT)
Dept: OCCUPATIONAL THERAPY | Facility: CLINIC | Age: 65
End: 2020-06-24
Payer: MEDICARE

## 2020-06-24 DIAGNOSIS — M25.531 RIGHT WRIST PAIN: Primary | ICD-10-CM

## 2020-06-24 DIAGNOSIS — S52.614A CLOSED NONDISPLACED FRACTURE OF STYLOID PROCESS OF RIGHT ULNA: ICD-10-CM

## 2020-06-24 PROCEDURE — 97110 THERAPEUTIC EXERCISES: CPT | Mod: GO | Performed by: OCCUPATIONAL THERAPIST

## 2020-06-24 NOTE — PROGRESS NOTES
"Hand Therapy Progress Note  Please refer to the daily flowsheet for treatment today, total treatment time and time spent performing 1:1 timed codes.       Current Date:  6/24/2020    Diagnosis: Closed nondisplaced fracture of styloid process of right ulna, ulnar wrist pain  DOI: April 2020  Procedure:  NA    Precautions: NA  Reporting period is 5/14 to 6/24/2020       Subjective:  It is doing better, still sore to ulnar wrist, and dorsal wrist/hand, also radial wrist at times. The orthosis is tight in some areas. Has had bad back pain. Made appt this date with sports/ortho clinic.    Initial Subjective:  Yancy Rivera is a 64 year old female.  Patient reports symptoms of the right wrist which occurred due to trying to lift 3 gallons of water using her right hand when she felt an acute pain with a\"click\" in her right wrist. Since onset symptoms are Unchanged  Special tests:  x-ray.  Previous treatment: casted, injection.    General health as reported by patient is good.  Pertinent medical history includes:  Occupational Profile Information:  Right hand dominant  Transportation: public transportation and medical transportation    Objective:  Pain Level (Scale 0-10)   5/14/2020 6/24/2020     At Rest 7/10 Somewhat better   At Worst 9/10      Pain Description  Date 5/14/2020   Location Wrist, forearm, finger   Pain Quality Aching and Sharp   Frequency intermittent     Pain is worst  daytime   Exacerbated by  movement, use   Relieved by rest   Progression No change     Edema (Circumference measured in cm)   5/14/2020 5/14/2020    L R   DWC 16.0 17.7     Sensation   WNL throughout all nerve distributions; per patient report    SOAP note objective information for 6/1/2020.  ROM  Pain Report: - none  + mild    ++ moderate    +++ severe   Wrist 6/1/2020 6/24/2020     AROM (PROM) R R   Extension 44 59   Flexion 15 30   RD 10    UD 15    Supination 55    Pronation 74        ROM:  6/24/2020  Ability to make a fist is improving, " about 1 cm from finger tip to DPC this date, after ROM    Strength  contraindicated    Assessment:  Response to therapy has been improvement to:  ROM of Wrist:  Ext , Flex. Pt has notable hand stiffness. Started to ease back into FA AROM this date, avoiding full rotation.    Overall Assessment:  Patient is ready to progress to more complex exercises.  Patient would benefit from continued therapy to achieve rehab potential  STG/LTG:  STGoals have been reviewed and progress or achievement has occurred;  see goal sheet for details and updates.    Plan:  Frequency/Duration:  Recommend continuing to see patient  1 X week, once daily  for 6 weeks  Appropriateness of Rx I have re-evaluated this patient and find that the nature, scope, duration and intensity of the therapy is appropriate for the medical condition of the patient.    Treatment Plan:  Continue treatment plan as outlined in initial evaluation    Home Exercise Program:  Zipper orthosis full time, remove for hygiene and exercise  AROM of thumb  Wrist AROM  Tendon gliding  Icing  Supination gentle ROM okay, not full motion  Added tendon gliding  Modified orthosis slightly    Next Visit:  Orthosis modifications  AROM  MFR

## 2020-06-30 ENCOUNTER — THERAPY VISIT (OUTPATIENT)
Dept: OCCUPATIONAL THERAPY | Facility: CLINIC | Age: 65
End: 2020-06-30
Payer: MEDICARE

## 2020-06-30 ENCOUNTER — OFFICE VISIT (OUTPATIENT)
Dept: ORTHOPEDICS | Facility: CLINIC | Age: 65
End: 2020-06-30
Payer: MEDICARE

## 2020-06-30 VITALS — WEIGHT: 196 LBS | BODY MASS INDEX: 31.64 KG/M2

## 2020-06-30 DIAGNOSIS — M54.16 ACUTE RIGHT LUMBAR RADICULOPATHY: Primary | ICD-10-CM

## 2020-06-30 DIAGNOSIS — M25.531 RIGHT WRIST PAIN: Primary | ICD-10-CM

## 2020-06-30 DIAGNOSIS — S52.614A CLOSED NONDISPLACED FRACTURE OF STYLOID PROCESS OF RIGHT ULNA: ICD-10-CM

## 2020-06-30 PROCEDURE — 97110 THERAPEUTIC EXERCISES: CPT | Mod: GO | Performed by: OCCUPATIONAL THERAPIST

## 2020-06-30 PROCEDURE — 97140 MANUAL THERAPY 1/> REGIONS: CPT | Mod: GO | Performed by: OCCUPATIONAL THERAPIST

## 2020-06-30 RX ORDER — METHYLPREDNISOLONE 4 MG
TABLET, DOSE PACK ORAL
Qty: 21 TABLET | Refills: 0 | Status: SHIPPED | OUTPATIENT
Start: 2020-06-30 | End: 2022-01-10

## 2020-06-30 NOTE — PROGRESS NOTES
"SOAP note objective information for 6/30/2020.  Please refer to the daily flowsheet for treatment today, total treatment time and time spent performing 1:1 timed codes.       Diagnosis: Closed nondisplaced fracture of styloid process of right ulna, ulnar wrist pain  DOI: April 2020  Procedure:  NA    Precautions: NA    Initial Subjective:  Yancy Rivera is a 64 year old female.  Patient reports symptoms of the right wrist which occurred due to trying to lift 3 gallons of water using her right hand when she felt an acute pain with a\"click\" in her right wrist. Since onset symptoms are Unchanged  Special tests:  x-ray.  Previous treatment: casted, injection.    General health as reported by patient is good.  Pertinent medical history includes:  Occupational Profile Information:  Right hand dominant  Transportation: public transportation and medical transportation    Objective:  Pain Level (Scale 0-10)   5/14/2020 6/24/2020 6/30/2020     At Rest 7/10 Somewhat better 8/10 today   At Worst 9/10       Pain Description  Date 5/14/2020   Location Wrist, forearm, finger   Pain Quality Aching and Sharp   Frequency intermittent     Pain is worst  daytime   Exacerbated by  movement, use   Relieved by rest   Progression No change     Edema (Circumference measured in cm)   5/14/2020 5/14/2020    L R   DWC 16.0 17.7     Sensation   WNL throughout all nerve distributions; per patient report    SOAP note objective information for 6/1/2020.  ROM  Pain Report: - none  + mild    ++ moderate    +++ severe   Wrist 6/1/2020 6/24/2020 6/30/2020     AROM (PROM) R R R   Extension 44 59 54   Flexion 15 30 32   RD 10  15   UD 15  40   Supination 55  65   Pronation 74  75       ROM:  Finger AROM       Date 6/30/2020  R L   DPC to index (cm) 2 0   DPC to long (cm) 2 0   DPC to ring (cm) 2 0   DPC to small (cm) 2 0   As measured by Digit o Meter    Strength:  Pain-free /Pinch Test    6/30/2020   Trials R L   1   5 40     Lat Pinch  " 6/30/2020   Trials R L   1   8 18     3 Pt Pinch  6/30/2020   Trials R L   1   5 12     Home Exercise Program:  Zipper orthosis full time, remove for hygiene and exercise  AROM of thumb  Wrist AROM  Tendon gliding  Icing  Supination gentle ROM okay, not full motion  Added tendon gliding  Modified orthosis slightly - wear only when out of the home  Edema sleeve size E for day - otherwise splint off  Heat then gentle  Massage is okay    Next Visit:  AROM  MFR/STM, heat seems to help  Paraffin possibly and really work on fist  possibly add strengthening and/or progress ROM

## 2020-06-30 NOTE — LETTER
6/30/2020     RE: Yancy Rivera  [address]  [place] [address]    Dear Colleague,    Thank you for referring your patient, Yancy Rivera, to the [facility]. Please see a copy of my visit note below.          SPORTS & ORTHOPEDIC WALK-IN VISIT 6/30/2020    Primary Care Physician: Mariaelena Schultz    Reason for visit:     What part of your body is injured / painful?  low back and right lower leg    What caused the injury /pain? No inciting event     How long ago did your injury occur or pain begin? a week ago    What are your most bothersome symptoms? Pain    How would you characterize your symptom?  4/10    What makes your symptoms better? Other: Naproxen     What makes your symptoms worse? Other: Getting up from chair    Have you been previously seen for this problem? Yes, ED 6/21/2020    Medical History:    Any recent changes to your medical history? No    Any new medication prescribed since last visit? No    Have you had surgery on this body part before? No    Social History:    Occupation: Disability    Handedness: Right      Review of Systems:    Do you have fever, chills, weight loss? No    Do you have any vision problems? No    Do you have any chest pain or edema? No    Do you have any shortness of breath or wheezing?  No    Do you have stomach problems? No    Do you have any numbness or focal weakness? No    Do you have diabetes? No    Do you have problems with bleeding or clotting? No    Do you have an rashes or other skin lesions? No           CHIEF COMPLAINT:  Consult (Low back pain)       HISTORY OF PRESENT ILLNESS  Ms. Rivera is a pleasant 65 year old year old female who presents to clinic today with acute low back pain. She reports pain beginning on 6/19/20 when standing up from a seated position in a chair.  Felt immediate low back pain with radiation down back of leg into her foot.  Pain worse with sitting. Improved with movement and walking.  Seen  and examined in ED due to severity of pain on 6/21. XR without acute abnormality, discharged with naproxen and toradol administered in ED.      What part of your body is injured / painful?  low back and right lower leg    What caused the injury /pain? No inciting event     How long ago did your injury occur or pain begin? a week ago    What are your most bothersome symptoms? Pain    How would you characterize your symptom?  4/10    What makes your symptoms better? Other: Naproxen     What makes your symptoms worse? Other: Getting up from chair    Have you been previously seen for this problem? Yes, ED 6/21/2020    Additional history: as documented    MEDICAL HISTORY  Patient Active Problem List   Diagnosis     Hyperlipidemia with target LDL less than 130     Prediabetes     Disorder of bursae and tendons in shoulder region     Shingles     Primary open angle glaucoma of both eyes, moderate stage     Senile nuclear sclerosis, bilateral     Benign essential hypertension     Right wrist pain     Closed nondisplaced fracture of styloid process of right ulna       Current Outpatient Medications   Medication Sig Dispense Refill     amitriptyline (ELAVIL) 10 MG tablet Take 1 tablet (10 mg) by mouth At Bedtime 90 tablet 3     Artificial Tear Ointment (REFRESH P.M.) OINT Apply 3.5 g to eye At Bedtime 1 Tube 11     atorvastatin (LIPITOR) 20 MG tablet Take 1 tablet (20 mg) by mouth daily 90 tablet 3     carboxymethylcellulose (REFRESH PLUS) 0.5 % SOLN Place 1 drop into both eyes 4 times daily as needed       carboxymethylcellulose PF (CARBOXYMETHYLCELLULOSE SODIUM) 0.5 % ophthalmic solution Place 1 drop into both eyes 3 times daily as needed for dry eyes 1 Bottle 3     Cholecalciferol (VITAMIN D) 2000 UNITS tablet Take 1,000 Units by mouth daily 45 tablet 3     clotrimazole-betamethasone (LOTRISONE) 1-0.05 % external cream Apply topically 2 times daily 15 g 0     diclofenac (VOLTAREN) 1 % topical gel Place 2 g onto the skin 4  times daily 100 g 0     dorzolamide-timolol (COSOPT) 2-0.5 % ophthalmic solution Place 1 drop into both eyes 2 times daily 1 Bottle 11     doxycycline Monohydrate 100 MG TABS Take 100 mg by mouth daily 90 tablet 3     ferrous sulfate (IRON) 325 (65 FE) MG tablet Take 1 tablet (325 mg) by mouth daily (with breakfast) 30 tablet 11     gabapentin (NEURONTIN) 300 MG capsule Take 1 capsule (300 mg) by mouth 3 times daily 270 capsule 1     gabapentin (NEURONTIN) 600 MG tablet Take 1 tablet (600 mg) by mouth 3 times daily 90 tablet 11     hydroxychloroquine (PLAQUENIL) 200 MG tablet Take 2 tablets (400 mg) by mouth daily 60 tablet 4     latanoprost (XALATAN) 0.005 % ophthalmic solution Place 1 drop into both eyes At Bedtime 1 Bottle 11     methylPREDNISolone (MEDROL DOSEPAK) 4 MG tablet therapy pack Follow Package Directions 21 tablet 0     methylPREDNISolone (MEDROL DOSEPAK) 4 MG tablet Take 1 tablet (4 mg) by mouth See Admin Instructions 21 tablet 0     Multiple Minerals (CALCIUM-MAGNESIUM-ZINC) TABS Take 1 tablet by mouth daily 90 tablet 0     omega 3 1000 MG CAPS Take 1 g by mouth daily 90 capsule 0     omeprazole (PRILOSEC) 20 MG capsule Take 1 capsule (20 mg) by mouth daily 90 capsule 1     omeprazole (PRILOSEC) 20 MG DR capsule Take 1 capsule (20 mg) by mouth daily 90 capsule 3     oxyCODONE (ROXICODONE) 5 MG immediate release tablet Take 1 tablet (5 mg) by mouth every 6 hours as needed for moderate to severe pain 20 tablet 0     piroxicam (FELDENE) 20 MG capsule Take 1 capsule (20 mg) by mouth daily (with breakfast) 30 capsule 11     Riboflavin 400 MG TABS Take 1 tablet by mouth daily 30 tablet 11     SUMAtriptan (IMITREX) 50 MG tablet Take 1 tablet (50 mg) by mouth at onset of headache for migraine (May repeat in one hour. Max 200 mg in 24 hours. limit use to no more than 2 days per week) May repeat dose within one hour. 12 tablet 6     tiZANidine (ZANAFLEX) 4 MG tablet Take 1 tablet (4 mg) by mouth 3 times daily  10 tablet 0       Allergies   Allergen Reactions     No Clinical Screening - See Comments Nausea and Vomiting     Liver side effects from [facility] for TB       Family History   Problem Relation Age of Onset     Glaucoma Mother      Macular Degeneration No family hx of      Cancer No family hx of      Diabetes No family hx of      Hypertension No family hx of      Cerebrovascular Disease No family hx of      Thyroid Disease No family hx of      Eye Surgery No family hx of        Additional medical/Social/Surgical histories reviewed in [facility] and updated as appropriate.     REVIEW OF SYSTEMS (7/3/2020)  A 10-point review of systems was obtained and is negative except for as noted in the HPI.      PHYSICAL EXAM  Wt 88.9 kg (196 lb)   LMP  (LMP Unknown)   BMI 31.64 kg/m      General  - normal appearance, in no obvious distress  HEENT  - conjunctivae not injected, moist mucous membranes  CV  - normal peripheral perfusion  Pulm  - normal respiratory pattern, non-labored  Musculoskeletal - lumbar spine  - stance: slow to rise and sit  - inspection: normal bone and joint alignment, no obvious scoliosis  - palpation: bilateral lumbar paravertebral spasm  - ROM: pain with bilateral rotation, flexion past 30 deg, extension  - strength: lower extremities 5/5 in all planes  - special tests:  (+) straight leg raise right  Neuro  - patellar and Achilles DTRs 2+ bilaterally, no sensory or motor deficit, grossly normal coordination, normal muscle tone  Skin  - no ecchymosis, erythema, warmth, or induration, no obvious rash  Psych  - interactive, appropriate, normal mood and affect    IMAGING : XR Lumbar 2V.     LUMBAR SPINE TWO-THREE  VIEWS  6/21/2020 12:54 PM      HISTORY: right lower back pain     COMPARISON: Film dated 10/28/2016.     FINDINGS: Mild curvature of the lumbar spine convex towards the right.  There is normal osseous alignment.  No fractures are identified.  Mild  degenerative change in the facet joints.                                                                       IMPRESSION: Mild degenerative change. No change since 10/28/2016.     JIM MCFARLAND MD     ASSESSMENT & PLAN  Ms. Rivera is a 65 year old year old female who presents to clinic today acute right sided lumbago affecting right lower extremity.  XR revealing only mild degenerative changes. No weakness. Will proceed with conservative measures at this time.    Diagnosis: Lumbar radiculopathy affecting right lower extremity    -Medrol pack  -Tizanidine at bedtime  -Physical therapy, lumbar disc herniation protocol  activity modifications discussed  -Heat/ice  -Follow up 4 weeks if persisting or sooner if worsening; consider mri lumbar    It was a pleasure seeing Gelywalt today.    Basil Santiago DO, CAQSM  Primary Care Sports Medicine

## 2020-06-30 NOTE — PATIENT INSTRUCTIONS
tiZANidine (ZANAFLEX) 4 MG tablet - Take at night before bedtime  MEDROL PACK - FOLLOW INSTRUCTIONS ON PACKAGE  PHYSICAL THERAPY  FOLLOW UP IN July - 3 weeks

## 2020-06-30 NOTE — PROGRESS NOTES
SPORTS & ORTHOPEDIC WALK-IN VISIT 6/30/2020    Primary Care Physician: Mariaelena Schultz    Reason for visit:     What part of your body is injured / painful?  low back and right lower leg    What caused the injury /pain? No inciting event     How long ago did your injury occur or pain begin? a week ago    What are your most bothersome symptoms? Pain    How would you characterize your symptom?  4/10    What makes your symptoms better? Other: Naproxen     What makes your symptoms worse? Other: Getting up from chair    Have you been previously seen for this problem? Yes, ED 6/21/2020    Medical History:    Any recent changes to your medical history? No    Any new medication prescribed since last visit? No    Have you had surgery on this body part before? No    Social History:    Occupation: Disability    Handedness: Right      Review of Systems:    Do you have fever, chills, weight loss? No    Do you have any vision problems? No    Do you have any chest pain or edema? No    Do you have any shortness of breath or wheezing?  No    Do you have stomach problems? No    Do you have any numbness or focal weakness? No    Do you have diabetes? No    Do you have problems with bleeding or clotting? No    Do you have an rashes or other skin lesions? No

## 2020-07-02 ENCOUNTER — APPOINTMENT (OUTPATIENT)
Dept: INTERPRETER SERVICES | Facility: CLINIC | Age: 65
End: 2020-07-02
Payer: MEDICARE

## 2020-07-03 ENCOUNTER — THERAPY VISIT (OUTPATIENT)
Dept: PHYSICAL THERAPY | Facility: CLINIC | Age: 65
End: 2020-07-03
Payer: MEDICARE

## 2020-07-03 DIAGNOSIS — M54.16 RIGHT LUMBAR RADICULOPATHY: Primary | ICD-10-CM

## 2020-07-03 PROCEDURE — 97161 PT EVAL LOW COMPLEX 20 MIN: CPT | Mod: GP | Performed by: PHYSICAL THERAPIST

## 2020-07-03 PROCEDURE — 97110 THERAPEUTIC EXERCISES: CPT | Mod: GP | Performed by: PHYSICAL THERAPIST

## 2020-07-03 NOTE — PROGRESS NOTES
[facility] Initial Evaluation  Subjective:  The history is provided by the patient. No  was used (pt requested not to, appeared to be understanding well).   Patient Health History  Yancy Rivera being seen for right low back and leg pain.     Date of Onset: 6/30/20, date of order.   Problem occurred: not sure going from sitting to standing?   Pain is reported as 8/10 on pain scale.  General health as reported by patient is good.  Health conditions: current fracture of styloid process of right ulna (pt in sling) and unable to write currently, GERD, pre diabetic.   Red flags:  None as reported by patient.     Surgeries include:  Other (hernia repair 2012).        Current occupation is Retired.   Primary job tasks include:  Prolonged sitting.                  Therapist Generated HPI Evaluation  Problem details: Pt presents today with right sided low back pain. She has the most pain when getting up from the chair. In 2016 she fell down the stairs and had low back pain then. Pt can sit for no longer than 30 minutes without needing to move  .         Type of problem:  Lumbar.    This is a new condition.  Condition occurred with:  Insidious onset.  Where condition occurred: for unknown reasons.  Patient reports pain:  Lumbar spine right.  Pain is described as burning and shooting (numbness/tingling) and is intermittent.  Pain radiates to:  Thigh right, gluteals right, lower leg right and foot right. Pain is worse during the day.    Associated symptoms:  Numbness and tingling. Symptoms are exacerbated by sitting and walking (sitting<>standing)  Relieved by: laying on L side.  Special tests included:  X-ray (mild degenration lumbar spine, no change since 10/2016).    Restrictions due to condition include:  Working in normal job without restrictions (pt not working).  Barriers include:  None as reported by patient.                        Objective:    Gait:  Decreased stance time R  side    Gait Type:  Antalgic                    Lumbar/SI Evaluation  ROM:      Strength: trendelenburg positive bilaterally      Cord Signs:  Cord signs lumbar: pt able to heel toe walk, increase R LE pain with toe walking.    Lumbar Dermtomes:      L1 Left:  Normal-light touch     L1 Right:  Normal-light touch  L2 Left:  Normal-light touch     L2 Right:  Normal-light touch  L3 Left:  Normal-light touch     L3 Right:  Normal-light touch  L4 Left:  Normal-light touch       L4 Right:  Normal-light touch  L5 Left:  Normal-light touch     L5 Right:  Normal-light touch    Neural Tension/Mobility:      Left side:SLR  negative.   Right side:   SLR positive.  Lumbar Palpation:      Tenderness present at Right: Quadratus Lumborum; Erector Spinae; Piriformis and Gluteus Medius                                          Hip Evaluation          Functional Testing:          Quad:      Bilateral leg squat:  Requiring UE support  Moderate loss of control                  General     ROS    Assessment/Plan:    Patient is a 65 year old female with lumbar complaints.    Patient has the following significant findings with corresponding treatment plan.                Diagnosis 1:  R lumbar radiculopathy  Pain -  hot/cold therapy, US, electric stimulation, mechanical traction, manual therapy, STS, splint/taping/bracing/orthotics, self management, education, directional preference exercise and home program  Decreased ROM/flexibility - manual therapy, therapeutic exercise, therapeutic activity and home program  Decreased strength - therapeutic exercise, therapeutic activities and home program  Impaired gait - gait training, assistive devices and home program    Therapy Evaluation Codes:   Cumulative Therapy Evaluation is: Low complexity.    Previous and current functional limitations:  (See Goal Flow Sheet for this information)    Short term and Long term goals: (See Goal Flow Sheet for this information)     Communication ability:  Patient  appears to be able to clearly communicate and understand verbal and written communication and follow directions correctly.  Treatment Explanation - The following has been discussed with the patient:   RX ordered/plan of care  Anticipated outcomes  Possible risks and side effects  This patient would benefit from PT intervention to resume normal activities.   Rehab potential is good.    Frequency:  1 X week, once daily  Duration:  for 8 weeks  Discharge Plan:  Achieve all LTG.  Independent in home treatment program.  Reach maximal therapeutic benefit.    Please refer to the daily flowsheet for treatment today, total treatment time and time spent performing 1:1 timed codes.

## 2020-07-03 NOTE — PROGRESS NOTES
CHIEF COMPLAINT:  Consult (Low back pain)       HISTORY OF PRESENT ILLNESS  Ms. Rivera is a pleasant 65 year old year old female who presents to clinic today with acute low back pain. She reports pain beginning on 6/19/20 when standing up from a seated position in a chair.  Felt immediate low back pain with radiation down back of leg into her foot.  Pain worse with sitting. Improved with movement and walking.  Seen and examined in ED due to severity of pain on 6/21. XR without acute abnormality, discharged with naproxen and toradol administered in ED.      What part of your body is injured / painful?  low back and right lower leg    What caused the injury /pain? No inciting event     How long ago did your injury occur or pain begin? a week ago    What are your most bothersome symptoms? Pain    How would you characterize your symptom?  4/10    What makes your symptoms better? Other: Naproxen     What makes your symptoms worse? Other: Getting up from chair    Have you been previously seen for this problem? Yes, ED 6/21/2020    Additional history: as documented    MEDICAL HISTORY  Patient Active Problem List   Diagnosis     Hyperlipidemia with target LDL less than 130     Prediabetes     Disorder of bursae and tendons in shoulder region     Shingles     Primary open angle glaucoma of both eyes, moderate stage     Senile nuclear sclerosis, bilateral     Benign essential hypertension     Right wrist pain     Closed nondisplaced fracture of styloid process of right ulna       Current Outpatient Medications   Medication Sig Dispense Refill     amitriptyline (ELAVIL) 10 MG tablet Take 1 tablet (10 mg) by mouth At Bedtime 90 tablet 3     Artificial Tear Ointment (REFRESH P.M.) OINT Apply 3.5 g to eye At Bedtime 1 Tube 11     atorvastatin (LIPITOR) 20 MG tablet Take 1 tablet (20 mg) by mouth daily 90 tablet 3     carboxymethylcellulose (REFRESH PLUS) 0.5 % SOLN Place 1 drop into both eyes 4 times daily as needed        carboxymethylcellulose PF (CARBOXYMETHYLCELLULOSE SODIUM) 0.5 % ophthalmic solution Place 1 drop into both eyes 3 times daily as needed for dry eyes 1 Bottle 3     Cholecalciferol (VITAMIN D) 2000 UNITS tablet Take 1,000 Units by mouth daily 45 tablet 3     clotrimazole-betamethasone (LOTRISONE) 1-0.05 % external cream Apply topically 2 times daily 15 g 0     diclofenac (VOLTAREN) 1 % topical gel Place 2 g onto the skin 4 times daily 100 g 0     dorzolamide-timolol (COSOPT) 2-0.5 % ophthalmic solution Place 1 drop into both eyes 2 times daily 1 Bottle 11     doxycycline Monohydrate 100 MG TABS Take 100 mg by mouth daily 90 tablet 3     ferrous sulfate (IRON) 325 (65 FE) MG tablet Take 1 tablet (325 mg) by mouth daily (with breakfast) 30 tablet 11     gabapentin (NEURONTIN) 300 MG capsule Take 1 capsule (300 mg) by mouth 3 times daily 270 capsule 1     gabapentin (NEURONTIN) 600 MG tablet Take 1 tablet (600 mg) by mouth 3 times daily 90 tablet 11     hydroxychloroquine (PLAQUENIL) 200 MG tablet Take 2 tablets (400 mg) by mouth daily 60 tablet 4     latanoprost (XALATAN) 0.005 % ophthalmic solution Place 1 drop into both eyes At Bedtime 1 Bottle 11     methylPREDNISolone (MEDROL DOSEPAK) 4 MG tablet therapy pack Follow Package Directions 21 tablet 0     methylPREDNISolone (MEDROL DOSEPAK) 4 MG tablet Take 1 tablet (4 mg) by mouth See Admin Instructions 21 tablet 0     Multiple Minerals (CALCIUM-MAGNESIUM-ZINC) TABS Take 1 tablet by mouth daily 90 tablet 0     omega 3 1000 MG CAPS Take 1 g by mouth daily 90 capsule 0     omeprazole (PRILOSEC) 20 MG capsule Take 1 capsule (20 mg) by mouth daily 90 capsule 1     omeprazole (PRILOSEC) 20 MG DR capsule Take 1 capsule (20 mg) by mouth daily 90 capsule 3     oxyCODONE (ROXICODONE) 5 MG immediate release tablet Take 1 tablet (5 mg) by mouth every 6 hours as needed for moderate to severe pain 20 tablet 0     piroxicam (FELDENE) 20 MG capsule Take 1 capsule (20 mg) by mouth  daily (with breakfast) 30 capsule 11     Riboflavin 400 MG TABS Take 1 tablet by mouth daily 30 tablet 11     SUMAtriptan (IMITREX) 50 MG tablet Take 1 tablet (50 mg) by mouth at onset of headache for migraine (May repeat in one hour. Max 200 mg in 24 hours. limit use to no more than 2 days per week) May repeat dose within one hour. 12 tablet 6     tiZANidine (ZANAFLEX) 4 MG tablet Take 1 tablet (4 mg) by mouth 3 times daily 10 tablet 0       Allergies   Allergen Reactions     No Clinical Screening - See Comments Nausea and Vomiting     Liver side effects from [facility] for TB       Family History   Problem Relation Age of Onset     Glaucoma Mother      Macular Degeneration No family hx of      Cancer No family hx of      Diabetes No family hx of      Hypertension No family hx of      Cerebrovascular Disease No family hx of      Thyroid Disease No family hx of      Eye Surgery No family hx of        Additional medical/Social/Surgical histories reviewed in [employer] and updated as appropriate.     REVIEW OF SYSTEMS (7/3/2020)  A 10-point review of systems was obtained and is negative except for as noted in the HPI.      PHYSICAL EXAM  Wt 88.9 kg (196 lb)   LMP  (LMP Unknown)   BMI 31.64 kg/m      General  - normal appearance, in no obvious distress  HEENT  - conjunctivae not injected, moist mucous membranes  CV  - normal peripheral perfusion  Pulm  - normal respiratory pattern, non-labored  Musculoskeletal - lumbar spine  - stance: slow to rise and sit  - inspection: normal bone and joint alignment, no obvious scoliosis  - palpation: bilateral lumbar paravertebral spasm  - ROM: pain with bilateral rotation, flexion past 30 deg, extension  - strength: lower extremities 5/5 in all planes  - special tests:  (+) straight leg raise right  Neuro  - patellar and Achilles DTRs 2+ bilaterally, no sensory or motor deficit, grossly normal coordination, normal muscle tone  Skin  - no ecchymosis, erythema, warmth, or induration, no  obvious rash  Psych  - interactive, appropriate, normal mood and affect    IMAGING : XR Lumbar 2V.     LUMBAR SPINE TWO-THREE  VIEWS  6/21/2020 12:54 PM      HISTORY: right lower back pain     COMPARISON: Film dated 10/28/2016.     FINDINGS: Mild curvature of the lumbar spine convex towards the right.  There is normal osseous alignment.  No fractures are identified.  Mild  degenerative change in the facet joints.                                                                      IMPRESSION: Mild degenerative change. No change since 10/28/2016.     JIM MCFARLAND MD     ASSESSMENT & PLAN  Ms. Rivera is a 65 year old year old female who presents to clinic today acute right sided lumbago affecting right lower extremity.  XR revealing only mild degenerative changes. No weakness. Will proceed with conservative measures at this time.    Diagnosis: Lumbar radiculopathy affecting right lower extremity    -Medrol pack  -Tizanidine at bedtime  -Physical therapy, lumbar disc herniation protocol  activity modifications discussed  -Heat/ice  -Follow up 4 weeks if persisting or sooner if worsening; consider mri lumbar    It was a pleasure seeing Belenesh today.    Basil Santiago DO, CAQSM  Primary Care Sports Medicine

## 2020-07-03 NOTE — LETTER
DEPARTMENT OF HEALTH AND HUMAN SERVICES  CENTERS FOR MEDICARE & MEDICAID SERVICES    PLAN/UPDATED PLAN OF PROGRESS FOR OUTPATIENT REHABILITATION    PATIENTS NAME:  Yancy Rivera   : 1955  PROVIDER NUMBER:    [id number]  HICN:  [id number]  PROVIDER NAME: [facility] [place]  MEDICAL RECORD NUMBER: [id number]   START OF CARE DATE:  SOC Date: 20   TYPE:  PT  PRIMARY/TREATMENT DIAGNOSIS: (Pertinent Medical Diagnosis)  Right lumbar radiculopathy  VISITS FROM START OF CARE:  Rxs Used: 1     [place] [facility] Initial Evaluation  Subjective:  The history is provided by the patient. No  was used (pt requested not to, appeared to be understanding well).   Patient Health History  Yancy Rivera being seen for right low back and leg pain.   Date of Onset: 20, date of order.   Problem occurred: not sure going from sitting to standing?   Pain is reported as 8/10 on pain scale.  General health as reported by patient is good.  Health conditions: current fracture of styloid process of right ulna (pt in sling) and unable to write currently, GERD, pre diabetic.   Red flags:  None as reported by patient.     Surgeries include:  Other (hernia repair )    Current occupation is Retired.   Primary job tasks include:  Prolonged sitting.                Therapist Generated HPI Evaluation  Problem details: Pt presents today with right sided low back pain. She has the most pain when getting up from the chair. In 2016 she fell down the stairs and had low back pain then. Pt can sit for no longer than 30 minutes without needing to move.         Type of problem:  Lumbar.  This is a new condition.  Condition occurred with:  Insidious onset.  Where condition occurred: for unknown reasons.  Patient reports pain:  Lumbar spine right.  Pain is described as burning and shooting (numbness/tingling) and is intermittent.  Pain radiates to:  Thigh right, gluteals right, lower  leg right and foot right. Pain is worse during the day.    Associated symptoms:  Numbness and tingling. Symptoms are exacerbated by sitting and walking (sitting<>standing)  Relieved by: laying on L side.  Special tests included:  X-ray (mild degenration lumbar spine, no change since 10/2016).    Restrictions due to condition include:  Working in normal job without restrictions (pt not working).  Barriers include:  None as reported by patient.    Objective:  Gait:  Decreased stance time R side  Gait Type:  Antalgic          Lumbar/SI Evaluation  ROM:    Strength: trendelenburg positive bilaterally  Cord Signs:  Cord signs lumbar: pt able to heel toe walk, increase R LE pain with toe walking.  Lumbar Dermtomes:    L1 Left:  Normal-light touch     L1 Right:  Normal-light touch  L2 Left:  Normal-light touch     L2 Right:  Normal-light touch  L3 Left:  Normal-light touch     L3 Right:  Normal-light touch  L4 Left:  Normal-light touch       L4 Right:  Normal-light touch  L5 Left:  Normal-light touch     L5 Right:  Normal-light touch    Neural Tension/Mobility:    Left side:SLR  negative.   Right side:   SLR positive.  Lumbar Palpation:    Tenderness present at Right: Quadratus Lumborum; Erector Spinae; Piriformis and Gluteus Medius       Hip Evaluation  Functional Testing:    Quad:    Bilateral leg squat:  Requiring UE support  Moderate loss of control     General   ROS    Assessment/Plan:    Patient is a 65 year old female with lumbar complaints.    Patient has the following significant findings with corresponding treatment plan.                Diagnosis 1:  R lumbar radiculopathy  Pain -  hot/cold therapy, US, electric stimulation, mechanical traction, manual therapy, STS, splint/taping/bracing/orthotics, self management, education, directional preference exercise and home program  Decreased ROM/flexibility - manual therapy, therapeutic exercise, therapeutic activity and home program  Decreased strength - therapeutic  "exercise, therapeutic activities and home program  Impaired gait - gait training, assistive devices and home program    Therapy Evaluation Codes:   Cumulative Therapy Evaluation is: Low complexity.    Previous and current functional limitations:  (See Goal Flow Sheet for this information)    Short term and Long term goals: (See Goal Flow Sheet for this information)     Communication ability:  Patient appears to be able to clearly communicate and understand verbal and written communication and follow directions correctly.  Treatment Explanation - The following has been discussed with the patient:   RX ordered/plan of care  Anticipated outcomes  Possible risks and side effects  This patient would benefit from PT intervention to resume normal activities.   Rehab potential is good.    Frequency:  1 X week, once daily  Duration:  for 8 weeks  Discharge Plan:  Achieve all LTG.  Independent in home treatment program.  Reach maximal therapeutic benefit.      Caregiver Signature/Credentials _____________________________ Date ________       Treating Provider: Michelle Phillips DPT     I have reviewed and certified the need for these services and plan of treatment while under my care.        PHYSICIAN'S SIGNATURE:   _________________________________________  Date___________   Basil Santiago MD    Certification period:  Beginning of Cert date period: 07/03/20 to  End of Cert period date: 08/28/20     Functional Level Progress Report: Please see attached \"Goal Flow sheet for Functional level.\"    ____X____ Continue Services or       ________ DC Services                Service dates: From  SOC Date: 07/03/20 date to present                         "

## 2020-07-08 ENCOUNTER — THERAPY VISIT (OUTPATIENT)
Dept: PHYSICAL THERAPY | Facility: CLINIC | Age: 65
End: 2020-07-08
Payer: MEDICARE

## 2020-07-08 ENCOUNTER — THERAPY VISIT (OUTPATIENT)
Dept: OCCUPATIONAL THERAPY | Facility: CLINIC | Age: 65
End: 2020-07-08
Payer: MEDICARE

## 2020-07-08 DIAGNOSIS — M54.16 RIGHT LUMBAR RADICULOPATHY: ICD-10-CM

## 2020-07-08 DIAGNOSIS — M25.531 RIGHT WRIST PAIN: Primary | ICD-10-CM

## 2020-07-08 DIAGNOSIS — S52.614A CLOSED NONDISPLACED FRACTURE OF STYLOID PROCESS OF RIGHT ULNA: ICD-10-CM

## 2020-07-08 PROCEDURE — 97110 THERAPEUTIC EXERCISES: CPT | Mod: GO | Performed by: OCCUPATIONAL THERAPIST

## 2020-07-08 PROCEDURE — 97140 MANUAL THERAPY 1/> REGIONS: CPT | Mod: GP | Performed by: PHYSICAL THERAPIST

## 2020-07-08 PROCEDURE — 97140 MANUAL THERAPY 1/> REGIONS: CPT | Mod: GO | Performed by: OCCUPATIONAL THERAPIST

## 2020-07-08 PROCEDURE — 97110 THERAPEUTIC EXERCISES: CPT | Mod: GP | Performed by: PHYSICAL THERAPIST

## 2020-07-08 NOTE — PROGRESS NOTES
"SOAP note objective information for 7/8/2020.  Please refer to the daily flowsheet for treatment today, total treatment time and time spent performing 1:1 timed codes.       Diagnosis: Closed nondisplaced fracture of styloid process of right ulna, ulnar wrist pain  DOI: April 2020  Procedure:  NA    Precautions: NA    Initial Subjective:  Yancy Rivera is a 64 year old female.  Patient reports symptoms of the right wrist which occurred due to trying to lift 3 gallons of water using her right hand when she felt an acute pain with a\"click\" in her right wrist. Since onset symptoms are Unchanged  Special tests:  x-ray.  Previous treatment: casted, injection.    General health as reported by patient is good.  Pertinent medical history includes:  Occupational Profile Information:  Right hand dominant  Transportation: public transportation and medical transportation    Objective:  Pain Level (Scale 0-10)   5/14/2020 6/24/2020 6/30/2020     At Rest 7/10 Somewhat better 8/10 today   At Worst 9/10       Pain Description  Date 5/14/2020   Location Wrist, forearm, finger   Pain Quality Aching and Sharp   Frequency intermittent     Pain is worst  daytime   Exacerbated by  movement, use   Relieved by rest   Progression No change     Edema (Circumference measured in cm)   5/14/2020 5/14/2020 7/8/2020      L R R   DWC 16.0 17.7 18.4     Sensation   WNL throughout all nerve distributions; per patient report    ROM  Pain Report: - none  + mild    ++ moderate    +++ severe   Wrist 6/1/2020 6/24/2020 6/30/2020 7/8/2020     AROM (PROM) R R R R   Extension 44 59 54 50 pain to volar wrist, dorsal central hand   Flexion 15 30 32 38   RD 10  15    UD 15  40    Supination 55  65    Pronation 74  75        ROM:  Finger AROM        Date 6/30/2020  R L 7/8/2020     DPC to index (cm) 2 0 1   DPC to long (cm) 2 0 2   DPC to ring (cm) 2 0 1   DPC to small (cm) 2 0 1.5   As measured by Digit o Meter    Strength:  Pain-free /Pinch " Test    6/30/2020   Trials R L   1   5 40     Lat Pinch  6/30/2020   Trials R L   1   8 18     3 Pt Pinch  6/30/2020   Trials R L   1   5 12     Home Exercise Program:  Zipper orthosis only when out  AROM of thumb  Wrist AROM  Tendon gliding  Icing  Supination gentle ROM okay, not full motion  Added tendon gliding  Modified orthosis slightly - wear only when out of the home  Edema sleeve size E for day - otherwise splint off  Heat then gentle  Massage is okay  Added gripping - gentle  Added wrist PROM ext    Next Visit:  AROM  MFR/STM, heat seems to help  heat and really work on fist  possibly add more strengthening and/or progress ROM

## 2020-07-15 ENCOUNTER — THERAPY VISIT (OUTPATIENT)
Dept: PHYSICAL THERAPY | Facility: CLINIC | Age: 65
End: 2020-07-15
Payer: MEDICARE

## 2020-07-15 DIAGNOSIS — M54.16 RIGHT LUMBAR RADICULOPATHY: ICD-10-CM

## 2020-07-15 PROCEDURE — 97110 THERAPEUTIC EXERCISES: CPT | Mod: GP | Performed by: PHYSICAL THERAPIST

## 2020-07-15 PROCEDURE — 97140 MANUAL THERAPY 1/> REGIONS: CPT | Mod: GP | Performed by: PHYSICAL THERAPIST

## 2020-07-21 ENCOUNTER — OFFICE VISIT (OUTPATIENT)
Dept: ORTHOPEDICS | Facility: CLINIC | Age: 65
End: 2020-07-21
Payer: MEDICARE

## 2020-07-21 VITALS — BODY MASS INDEX: 32.65 KG/M2 | HEIGHT: 65 IN | WEIGHT: 196 LBS

## 2020-07-21 DIAGNOSIS — M19.031 PRIMARY OSTEOARTHRITIS, RIGHT WRIST: ICD-10-CM

## 2020-07-21 DIAGNOSIS — G89.29 CHRONIC PAIN OF RIGHT WRIST: Primary | ICD-10-CM

## 2020-07-21 DIAGNOSIS — M25.531 CHRONIC PAIN OF RIGHT WRIST: Primary | ICD-10-CM

## 2020-07-21 DIAGNOSIS — M54.16 ACUTE RIGHT LUMBAR RADICULOPATHY: ICD-10-CM

## 2020-07-21 ASSESSMENT — MIFFLIN-ST. JEOR: SCORE: 1444.93

## 2020-07-21 NOTE — PATIENT INSTRUCTIONS
Bone health Information    Calcium  Adequate calcium ingestion is essential for maintaining healthy bones. The recommended dose daily intake of calcium varies depending on individual needs but is usually between 1200 and 1500mg daily. This is equivalent to about five 8oz glasses of milk per day.    Start with calcium supplement - 500 mg/day.    Many foods are rich in calcium and they include:  - Dairy products: milk, cheese, yogurt, ice-cream  - Fish products: canned salmon, sardines and shrimp  - Vegetables and Fruit: bok-burks, turnips, broccoli, kale, collards,  - Cereals and nuts: almonds, sesame seeds, fortified cereals and oatmeal  - Other foods: fortified orange-juice, figs, soybeans, other beans and eggs.    CALCIUM CALCULATOR - This tool will help you calculate the amount of calcium in foods you are already consuming, to see whether additional supplementation is needed to reach the recommended daily intake.  Visit website <https://www.iofbonehealth.org/calcium-calculator>.    If you have a low calcium diet and cannot tolerate calcium-rich foods, many supplements are available today. Your pharmacist can help you choose the one which best suits your needs.     A few tips on supplements:  - They should be easy to swallow  - They should dissolve easily in   cup of vinegar in < 15 minutes.  - Count the ELEMENTAL calcium mgs. E.g. Calcium 499mg may have only 221mg of elemental Calcium.  - There is such a variety today that it is best to bring in the bottle to your doctor to show them exactly what you are taking.    Lastly too much calcium can be bad for you. Recent studies show extra supplements may increase your risk of kidney stones or cause high calcium levels in some people. You should discuss how much you should be taking with your doctor before starting them.    Further Information is available from the following resources:    www.nof.org    http://www.osteo.org/osteolinks.asp    National Institutes of  [facility]: 0-150-970-BONE    [facility] Calcium Information [place]: 1-599.420.5832     Vitamin D  Vitamin D is essential for calcium metabolism. It is really a hormone produced mainly in your skin after exposure to sunlight. Vitamin D helps you absorb calcium from your stomach and kidneys and incorporates it into your bones. Studies show approximately 50% of [place] men and women are vitamin D deficient in the winter. Milder cases of vitamin D are usually asymptomatic so the only way to know you have a problem is to have a blood level checked. More severe cases can cause osteomalacia (a.k.a. fei) which can result in bone pain, weak bones and several abnormal laboratory tests and also weak muscles (a.k.a. myopathy). When this happens, your bones lose a lot of their calcium stores as the body tries to regulate the calcium required by other tissues. Prolonged deficiency can lead to severe bone disorders and fractures.  Unlike calcium, dietary sources of vitamin D are rare, limited to a few fish oils particularly cod-liver oil, other fortified foods and egg yolks. Often supplementation is needed. Many multivitamins contain some vitamin D and vitamin D alone preparations are now available in several forms. The recommended daily intake of vitamin D is usually between 400 and 800 international units, but often larger amounts are needed. Your doctor can prescribe prescription strength vitamin D for you if necessary, if you have marked deficiency or diseases of the liver or kidney. Supplementation in patients with severe deficiency can stabilize or improve bone mineral density and in frail elderly persons, may reduce their risk of falling.    WOMEN AND MEN  Under age 50- 400-800 international units (IU) daily**  Age 50 and older - 800-1,000 IU daily**    Additional Information is available  from:    http://www.[facility].org/arthritis/osteo/info.htm    www.nof.org    http://ods.od.nih.gov/factsheets/vitamind.asp    National Institutes of Health: 2-540-748-BONE    [facility] Calcium Information [place]: 7-240-547-2248    Summarizing Recommendations:    Bone Density: check as indicated and after menopause.    Calcium(1200mg total a day in divided doses as dietary intake or supplement). If history of kidney stones (calcium type), then Calcium Citrate is the preferred calcium supplement and it is recommended to avoid caclium carbonate products.    Vitamin D(1000 to 2000 IU total a day or dose necessary to achieve a Vitamin D 25-OH blood level in range of 40-60 ng/mL). If vitamin D level is unknown, start at 1,000 IU    Regular weight-bearing and muscle-strengthening exercise    Avoidance of tobacco smoking, excessive alcohol intake and excessive caffeine intake.    Continue with good dental/gum care and regular dental follow-ups

## 2020-07-21 NOTE — PROGRESS NOTES
"ESTABLISHED PATIENT FOLLOW-UP:  Pain and Follow Up of the Right Wrist       HISTORY OF PRESENT ILLNESS  Ms. Rivera is a pleasant 65 year old year old female who presents to clinic today for follow-up of right wrist pain and low back pain.    WRIST PAIN  Date of injury: Late April 2020  Date last seen: 6/15/20  Following Therapeutic Plan: Yes  Pain: Unchanged  Function: Unchanged  Interval History: Has been making some progress in OT. Diclofenac gel was utilized and she feels this helps. Pain remains in region of wrist joint, sometimes to fingers.  In region of ECU near ulna.    LUMBAR PAIN  Date of injury: 6/21/20  Date last seen: 6/30/20  Following Therapeutic Plan:Yes  Pain: Remains at low lumbar LS region but improving, no radicular symptoms  Function: Improving  Interval History: Working with Michelle in PT. Pain has improved, still aching with spine rotation and flexion. Resolved radiation.    Additional medical/Social/Surgical histories reviewed in EP[facility] and updated as appropriate.    REVIEW OF SYSTEMS (7/21/2020)  CONSTITUTIONAL: Denies fever and weight loss  GASTROINTESTINAL: Denies abdominal pain, nausea, vomiting  MUSCULOSKELETAL: See HPI  SKIN: Denies any recent rash or lesion  NEUROLOGICAL: Denies numbness or focal weakness     PHYSICAL EXAM  Ht 1.651 m (5' 5\")   Wt 88.9 kg (196 lb)   LMP  (LMP Unknown)   BMI 32.62 kg/m      General  - normal appearance, in no obvious distress  Musculoskeletal - lumbar spine  - stance: slow to rise and sit  - inspection: normal bone and joint alignment, no obvious scoliosis  - palpation: bilateral lumbar paravertebral spasm  - ROM: pain with bilateral rotation, flexion past 60 deg, extension  - strength: lower extremities 5/5 in all planes  - special tests:  (-) straight leg raise right  Musculoskeletal - right wrist  - inspection: normal joint alignment, no obvious deformity  - palpation: TTP at ECU tendon near distal ulna and into carpal region across joint to distal " ulna.  No foveal tenderness.  Tenderness to palpation at the radial aspect of wrist near extensor carpi radialis, tenderness at the radiocarpal joint.  - ROM:  Extension decreased to 55 degrees, flexion decreased to about 45 degrees.  Radial and ulnar deviation 15 degrees. Supination 65, pronation 76  - strength: Grossly intact, painful extension active ulnar deviation.  Neuro  - no sensory or motor deficit, grossly normal coordination, normal muscle tone  Skin  - no ecchymosis, erythema, warmth, or induration, no obvious rash  Psych  - interactive, appropriate, normal mood and affect     IMAGING :  IMPRESSION:  1.  Tearing and degeneration of the central disc of the TFCC.  2.  Degenerative changes involving the triscaphe, first  carpometacarpal, radiocarpal and scapholunate joints. No acute osseous  abnormality  3.  Small joint effusion.  4.  Tendinosis and partial intrasubstance tearing of the extensor  carpi ulnaris.     I have personally reviewed the examination and initial interpretation  and I agree with the findings.     GAYATRI SPRINGER MD (Joe)    ASSESSMENT & PLAN  Ms. Rivera is a 65 year old year old female who presents to clinic today for reevaluation of right wrist and lumbar back pain.  In regard to her right wrist pain, she does continue to have pain despite ongoing occupational therapy.  Her range of motion however is improved steadily since her sessions.  I would like her to continue wearing zipper orthosis out of the house.  Focusing on range of motion exercises.  Diclofenac gel was refilled today.  I also discussed consideration for intra-articular corticosteroid injection.  Previous ECU tendon injection was symptomatically unsuccessful.  Despite this she would like to proceed with intra-articular right wrist injection next week.  I do not want her to wear her right shoulder sling and this was discussed today.    In regard to her lumbar pain, it has been making steady improvement.  She is  working with Michelle in physical therapy.  I recommend she continue physical therapy exercises as well as home program.  Heat/ice, over-the-counter analgesics as needed.  Do not feel MRI is warranted at this time and she can follow-up as needed if she reaches a plateau.    We did discuss considering gabapentin, patient was hesitant.  We may consider this in the future if her wrist pain continues to become a significant issue.    It was a pleasure seeing Yancy.    Basil Santiago DO, CAQSM  Primary Care Sports Medicine

## 2020-07-21 NOTE — LETTER
7/21/2020         RE: Yancy Rivera  [address]  [place] [address]        Dear Colleague,    Thank you for referring your patient, Yancy Rivera, to the [facility] WALK [facility]INIC. Please see a copy of my visit note below.          SPORTS & ORTHOPEDIC WALK-IN FOLLOW-UP VISIT 7/21/2020    PT seems to be helping a little bit. The topical diclofenac was helpful.    Has pain on volar and dorsal sides of her carpal bones. Demonstrates difficulty with dextrous motions and is concerned for some finger swelling.    Interval History:     Follow up reason: persistent pain Sx    Date of injury: 4/2020    Date last seen: 6/15/20    Following Therapeutic Plan: Yes     Pain: Improving    Function: Unchanged      Medical History:    Any recent changes to your medical history? No    Any new medication prescribed since last visit? No    Review of Systems:    Do you have fever, chills, weight loss? No    Do you have any vision problems? No    Do you have any chest pain or edema? No    Do you have any shortness of breath or wheezing?  No    Do you have stomach problems? No    Do you have any numbness or focal weakness? No    Do you have diabetes? No    Do you have problems with bleeding or clotting? No    Do you have an rashes or other skin lesions? No             ESTABLISHED PATIENT FOLLOW-UP:  Pain and Follow Up of the Right Wrist       HISTORY OF PRESENT ILLNESS  Ms. Rivera is a pleasant 65 year old year old female who presents to clinic today for follow-up of right wrist pain and low back pain.    WRIST PAIN  Date of injury: Late April 2020  Date last seen: 6/15/20  Following Therapeutic Plan: Yes  Pain: Unchanged  Function: Unchanged  Interval History: Has been making some progress in OT. Diclofenac gel was utilized and she feels this helps. Pain remains in region of wrist joint, sometimes to fingers.  In region of ECU near ulna.    LUMBAR PAIN  Date of injury: 6/21/20  Date last seen:  "6/30/20  Following Therapeutic Plan:Yes  Pain: Remains at low lumbar LS region but improving, no radicular symptoms  Function: Improving  Interval History: Working with Michelle in PT. Pain has improved, still aching with spine rotation and flexion. Resolved radiation.    Additional medical/Social/Surgical histories reviewed in [facility] and updated as appropriate.    REVIEW OF SYSTEMS (7/21/2020)  CONSTITUTIONAL: Denies fever and weight loss  GASTROINTESTINAL: Denies abdominal pain, nausea, vomiting  MUSCULOSKELETAL: See HPI  SKIN: Denies any recent rash or lesion  NEUROLOGICAL: Denies numbness or focal weakness     PHYSICAL EXAM  Ht 1.651 m (5' 5\")   Wt 88.9 kg (196 lb)   LMP  (LMP Unknown)   BMI 32.62 kg/m      General  - normal appearance, in no obvious distress  Musculoskeletal - lumbar spine  - stance: slow to rise and sit  - inspection: normal bone and joint alignment, no obvious scoliosis  - palpation: bilateral lumbar paravertebral spasm  - ROM: pain with bilateral rotation, flexion past 60 deg, extension  - strength: lower extremities 5/5 in all planes  - special tests:  (-) straight leg raise right  Musculoskeletal - right wrist  - inspection: normal joint alignment, no obvious deformity  - palpation: TTP at ECU tendon near distal ulna and into carpal region across joint to distal ulna.  No foveal tenderness.  Tenderness to palpation at the radial aspect of wrist near extensor carpi radialis, tenderness at the radiocarpal joint.  - ROM:  Extension decreased to 55 degrees, flexion decreased to about 45 degrees.  Radial and ulnar deviation 15 degrees. Supination 65, pronation 76  - strength: Grossly intact, painful extension active ulnar deviation.  Neuro  - no sensory or motor deficit, grossly normal coordination, normal muscle tone  Skin  - no ecchymosis, erythema, warmth, or induration, no obvious rash  Psych  - interactive, appropriate, normal mood and affect     IMAGING :  IMPRESSION:  1.  Tearing and " degeneration of the central disc of the TFCC.  2.  Degenerative changes involving the triscaphe, first  carpometacarpal, radiocarpal and scapholunate joints. No acute osseous  abnormality  3.  Small joint effusion.  4.  Tendinosis and partial intrasubstance tearing of the extensor  carpi ulnaris.     I have personally reviewed the examination and initial interpretation  and I agree with the findings.     GAYATRI SPRINGER MD (Joe)    ASSESSMENT & PLAN  Ms. Rivera is a 65 year old year old female who presents to clinic today for reevaluation of right wrist and lumbar back pain.  In regard to her right wrist pain, she does continue to have pain despite ongoing occupational therapy.  Her range of motion however is improved steadily since her sessions.  I would like her to continue wearing zipper orthosis out of the house.  Focusing on range of motion exercises.  Diclofenac gel was refilled today.  I also discussed consideration for intra-articular corticosteroid injection.  Previous ECU tendon injection was symptomatically unsuccessful.  Despite this she would like to proceed with intra-articular right wrist injection next week.  I do not want her to wear her right shoulder sling and this was discussed today.    In regard to her lumbar pain, it has been making steady improvement.  She is working with Michelle in physical therapy.  I recommend she continue physical therapy exercises as well as home program.  Heat/ice, over-the-counter analgesics as needed.  Do not feel MRI is warranted at this time and she can follow-up as needed if she reaches a plateau.    We did discuss considering gabapentin, patient was hesitant.  We may consider this in the future if her wrist pain continues to become a significant issue.    It was a pleasure seeing Yancy.    Basil Santiago DO, [facility]  Primary Care Sports Medicine

## 2020-07-21 NOTE — PROGRESS NOTES
SPORTS & ORTHOPEDIC WALK-IN FOLLOW-UP VISIT 7/21/2020    PT seems to be helping a little bit. The topical diclofenac was helpful.    Has pain on volar and dorsal sides of her carpal bones. Demonstrates difficulty with dextrous motions and is concerned for some finger swelling.    Interval History:     Follow up reason: persistent pain Sx    Date of injury: 4/2020    Date last seen: 6/15/20    Following Therapeutic Plan: Yes     Pain: Improving    Function: Unchanged      Medical History:    Any recent changes to your medical history? No    Any new medication prescribed since last visit? No    Review of Systems:    Do you have fever, chills, weight loss? No    Do you have any vision problems? No    Do you have any chest pain or edema? No    Do you have any shortness of breath or wheezing?  No    Do you have stomach problems? No    Do you have any numbness or focal weakness? No    Do you have diabetes? No    Do you have problems with bleeding or clotting? No    Do you have an rashes or other skin lesions? No

## 2020-07-28 ENCOUNTER — TELEPHONE (OUTPATIENT)
Dept: ORTHOPEDICS | Facility: CLINIC | Age: 65
End: 2020-07-28

## 2020-07-28 NOTE — TELEPHONE ENCOUNTER
Spoke with Yancy via .  We helped her reschedule her Wednesday appt with Dr. Ortega to Friday in the injection clinic with Dr. Santiago.  Yancy was wondering if I could change her OT appt that day as well, I apologized and let her know she needs to call their scheduling number to change those appts.     - Sb ARROYO ATC

## 2020-07-30 ENCOUNTER — APPOINTMENT (OUTPATIENT)
Dept: INTERPRETER SERVICES | Facility: CLINIC | Age: 65
End: 2020-07-30
Payer: MEDICARE

## 2020-07-31 ENCOUNTER — THERAPY VISIT (OUTPATIENT)
Dept: PHYSICAL THERAPY | Facility: CLINIC | Age: 65
End: 2020-07-31
Payer: MEDICARE

## 2020-07-31 ENCOUNTER — OFFICE VISIT (OUTPATIENT)
Dept: ORTHOPEDICS | Facility: CLINIC | Age: 65
End: 2020-07-31
Payer: MEDICARE

## 2020-07-31 VITALS — WEIGHT: 196 LBS | HEIGHT: 65 IN | BODY MASS INDEX: 32.65 KG/M2

## 2020-07-31 DIAGNOSIS — M54.16 RIGHT LUMBAR RADICULOPATHY: ICD-10-CM

## 2020-07-31 DIAGNOSIS — M19.031 PRIMARY OSTEOARTHRITIS, RIGHT WRIST: Primary | ICD-10-CM

## 2020-07-31 PROCEDURE — 97110 THERAPEUTIC EXERCISES: CPT | Mod: GP | Performed by: PHYSICAL THERAPIST

## 2020-07-31 PROCEDURE — 97530 THERAPEUTIC ACTIVITIES: CPT | Mod: GP | Performed by: PHYSICAL THERAPIST

## 2020-07-31 RX ORDER — TRIAMCINOLONE ACETONIDE 40 MG/ML
40 INJECTION, SUSPENSION INTRA-ARTICULAR; INTRAMUSCULAR
Status: SHIPPED | OUTPATIENT
Start: 2020-07-31

## 2020-07-31 RX ORDER — LIDOCAINE HYDROCHLORIDE 10 MG/ML
2 INJECTION, SOLUTION EPIDURAL; INFILTRATION; INTRACAUDAL; PERINEURAL
Status: SHIPPED | OUTPATIENT
Start: 2020-07-31

## 2020-07-31 RX ORDER — LIDOCAINE HYDROCHLORIDE 10 MG/ML
3 INJECTION, SOLUTION EPIDURAL; INFILTRATION; INTRACAUDAL; PERINEURAL
Status: SHIPPED | OUTPATIENT
Start: 2020-07-31

## 2020-07-31 RX ADMIN — LIDOCAINE HYDROCHLORIDE 2 ML: 10 INJECTION, SOLUTION EPIDURAL; INFILTRATION; INTRACAUDAL; PERINEURAL at 14:43

## 2020-07-31 RX ADMIN — LIDOCAINE HYDROCHLORIDE 3 ML: 10 INJECTION, SOLUTION EPIDURAL; INFILTRATION; INTRACAUDAL; PERINEURAL at 14:43

## 2020-07-31 RX ADMIN — TRIAMCINOLONE ACETONIDE 40 MG: 40 INJECTION, SUSPENSION INTRA-ARTICULAR; INTRAMUSCULAR at 14:43

## 2020-07-31 ASSESSMENT — MIFFLIN-ST. JEOR: SCORE: 1444.93

## 2020-07-31 NOTE — NURSING NOTE
[address] [address]  Dept: [phone]  ______________________________________________________________________________    Patient: Yancy Rivera   : 1955   MRN: [id number]   2020    INVASIVE PROCEDURE SAFETY CHECKLIST    Date: 2020   Procedure: Right radiocarpal joint US guided CSI  Patient Name: Yancy Rivera  MRN: [id number]  YOB: 1955    Action: Complete sections as appropriate. Any discrepancy results in a HARD COPY until resolved.     PRE PROCEDURE:  Patient ID verified with 2 identifiers (name and  or MRN): Yes  Procedure and site verified with patient/designee (when able): Yes  Accurate consent documentation in medical record: Yes  H&P (or appropriate assessment) documented in medical record: Yes  H&P must be up to 20 days prior to procedure and updates within 24 hours of procedure as applicable: NA  Relevant diagnostic and radiology test results appropriately labeled and displayed as applicable: Yes  Procedure site(s) marked with provider initials: NA    TIMEOUT:  Time-Out performed immediately prior to starting procedure, including verbal and active participation of all team members addressing the following:Yes  * Correct patient identify  * Confirmed that the correct side and site are marked  * An accurate procedure consent form  * Agreement on the procedure to be done  * Correct patient position  * Relevant images and results are properly labeled and appropriately displayed  * The need to administer antibiotics or fluids for irrigation purposes during the procedure as applicable   * Safety precautions based on patient history or medication use    DURING PROCEDURE: Verification of correct person, site, and procedures any time the responsibility for care of the patient is transferred to another member of the care team.       Prior to injection, verified patient identity using patient's name and date of  birth.  Due to injection administration, patient instructed to remain in clinic for 15 minutes  afterwards, and to report any adverse reaction to me immediately.    Joint injection was performed.      Drug Amount Wasted:  None.  Vial/Syringe: Single dose vial  Expiration Date:  N/A      Tania Carlson, ATC  July 31, 2020

## 2020-07-31 NOTE — PROGRESS NOTES
PROCEDURE ENCOUNTER    [facility]  Orthopedics  Basil Santiago DO  2020     Name: Yancy Rivera  MRN: [id number]  Age: 65 year old  : 1955    Referring provider: Basil Santiago DO  Diagnosis: Right Radiocarpal joint osteoarthritis, chronic right wrist pain.    Right Wrist Injection - Radiocarpal under Ultrasound Guidance  The patient was informed of the risks and the benefits of the procedure and a written consent was signed.  The patient s Right wrist was prepped with chlorhexidine in sterile fashion.   40 mg of kenalog suspension was drawn up into a 5 mL syringe with 2 mL of 1% lidocaine.  Local anesthesia was performed using a 27-gauge 1.5-inch needle to administer 3 mL of 1% lidocaine without epi.  Injection was performed using sterile technique.  Under ultrasound guidance a 1.5-inch 22-gauge needle was used to enter the dorsal radiocarpal joint of the wrist.  Needle placement was visualized and documented with ultrasound.  Needle placed in short axis to the probe.  Ultrasound visualization was necessary due to decreased joint space in the setting of osteoarthritis and avoidance of vascular structures.  Images were permanently stored for the patient's record.  There were no complications. The patient tolerated the procedure well. There was negligible bleeding.   The patient was instructed to ice wrist upon leaving clinic and refrain from overuse over the next 3 days.   The patient was instructed to call or go to the emergency room with any unusual pain, swelling, redness, or if otherwise concerned.  Continue occupational therapy  Follow up in my clinic 4 weeks  Medium Joint Injection/Arthrocentesis: R radiocarpal    Date/Time: 2020 2:43 PM  Performed by: Basil Santiago DO  Authorized by: Basil Santiago DO     Indications:  Pain  Needle Size:  22 G  Guidance: ultrasound    Approach:  Dorsal  Location:  Wrist  Site:  R radiocarpal  Medications:  2 mL lidocaine (PF) 1 %; 3 mL lidocaine (PF) 1  %; 40 mg triamcinolone 40 MG/ML  Outcome:  Tolerated well, no immediate complications  Procedure discussed: discussed risks, benefits, and alternatives    Consent Given by:  Patient  Timeout: timeout called immediately prior to procedure    Prep: patient was prepped and draped in usual sterile fashion     Scribed by Tania Carlson, MS, LAT, ATC for Dr. Santiago on 7/31/2020 at 2:44 PM, based on the provider s statements to me.

## 2020-07-31 NOTE — LETTER
2020      RE: Yancy Rivera  [address]  [place] [address]       PROCEDURE ENCOUNTER    [facility]  Orthopedics  Basil Santiago DO  2020     Name: Yancy Rivera  MRN: [id number]  Age: 65 year old  : 1955    Referring provider: Basil Santiago DO  Diagnosis: Right Radiocarpal joint osteoarthritis, chronic right wrist pain.    Right Wrist Injection - Radiocarpal under Ultrasound Guidance  The patient was informed of the risks and the benefits of the procedure and a written consent was signed.  The patient s Right wrist was prepped with chlorhexidine in sterile fashion.   40 mg of kenalog suspension was drawn up into a 5 mL syringe with 2 mL of 1% lidocaine.  Local anesthesia was performed using a 27-gauge 1.5-inch needle to administer 3 mL of 1% lidocaine without epi.  Injection was performed using sterile technique.  Under ultrasound guidance a 1.5-inch 22-gauge needle was used to enter the dorsal radiocarpal joint of the wrist.  Needle placement was visualized and documented with ultrasound.  Needle placed in short axis to the probe.  Ultrasound visualization was necessary due to decreased joint space in the setting of osteoarthritis and avoidance of vascular structures.  Images were permanently stored for the patient's record.  There were no complications. The patient tolerated the procedure well. There was negligible bleeding.   The patient was instructed to ice wrist upon leaving clinic and refrain from overuse over the next 3 days.   The patient was instructed to call or go to the emergency room with any unusual pain, swelling, redness, or if otherwise concerned.  Continue occupational therapy  Follow up in my clinic 4 weeks  Medium Joint Injection/Arthrocentesis: R radiocarpal    Date/Time: 2020 2:43 PM  Performed by: Basil Santiago DO  Authorized by: Basil Santiago DO     Indications:  Pain  Needle Size:  22 G  Guidance: ultrasound    Approach:  Dorsal  Location:   Wrist  Site:  R radiocarpal  Medications:  2 mL lidocaine (PF) 1 %; 3 mL lidocaine (PF) 1 %; 40 mg triamcinolone 40 MG/ML  Outcome:  Tolerated well, no immediate complications  Procedure discussed: discussed risks, benefits, and alternatives    Consent Given by:  Patient  Timeout: timeout called immediately prior to procedure    Prep: patient was prepped and draped in usual sterile fashion     Scribed by Tania Carlson MS, LAT, ATC for Dr. Santiago on 7/31/2020 at 2:44 PM, based on the provider s statements to me.        Basil Santiago, DO

## 2020-07-31 NOTE — PROGRESS NOTES
DISCHARGE REPORT    Progress reporting period is from 7/3/20 to 7/31/20.       SUBJECTIVE   Subjective: The pt reports she is not having any pain in her back. About 4 days ago she had some pain in her low back when bending forward, which went away upon standing. She has been keeping up with her exercises.    Current pain level is 0/10  .     Changes in function:  Yes (See Goal flowsheet attached for changes in current functional level)  Adverse reaction to treatment or activity: None    OBJECTIVE  Changes noted in objective findings:  Yes,   Objective: UMAIR: 0, Full lumbar AROM no pain     ASSESSMENT/PLAN  Updated problem list and treatment plan: Diagnosis 1:  Low back pain  Pain -  hot/cold therapy, US, electric stimulation, mechanical traction, manual therapy, STS, splint/taping/bracing/orthotics, self management, education, directional preference exercise and home program  Decreased strength - therapeutic exercise, therapeutic activities and home program  STG/LTGs have been met or progress has been made towards goals:  Yes (See Goal flow sheet completed today.)  Assessment of Progress: The patient has met all of their long term goals.  Self Management Plans:  Patient is independent in a home treatment program.  Patient is independent in self management of symptoms.  I have re-evaluated this patient and find that the nature, scope, duration and intensity of the therapy is appropriate for the medical condition of the patient.  [facility] continues to require the following intervention to meet STG and LTG's:  PT intervention is no longer required to meet STG/LTG.    Recommendations:  This patient is ready to be discharged from therapy and continue their home treatment program.    Please refer to the daily flowsheet for treatment today, total treatment time and time spent performing 1:1 timed codes.

## 2020-08-12 ENCOUNTER — THERAPY VISIT (OUTPATIENT)
Dept: OCCUPATIONAL THERAPY | Facility: CLINIC | Age: 65
End: 2020-08-12
Payer: MEDICARE

## 2020-08-12 DIAGNOSIS — Z23 NEED FOR SHINGLES VACCINE: Primary | ICD-10-CM

## 2020-08-12 DIAGNOSIS — M25.531 RIGHT WRIST PAIN: Primary | ICD-10-CM

## 2020-08-12 DIAGNOSIS — S52.614A CLOSED NONDISPLACED FRACTURE OF STYLOID PROCESS OF RIGHT ULNA: ICD-10-CM

## 2020-08-12 PROCEDURE — 97140 MANUAL THERAPY 1/> REGIONS: CPT | Mod: GO | Performed by: OCCUPATIONAL THERAPIST

## 2020-08-12 PROCEDURE — 97110 THERAPEUTIC EXERCISES: CPT | Mod: GO | Performed by: OCCUPATIONAL THERAPIST

## 2020-08-12 NOTE — LETTER
DEPARTMENT OF HEALTH AND HUMAN SERVICES  CENTERS FOR MEDICARE & MEDICAID SERVICES    PLAN/UPDATED PLAN OF PROGRESS FOR OUTPATIENT REHABILITATION@       PATIENTS NAME:  Yancy Rivera   : 1955  PROVIDER NUMBER:    [id number]  HICN: [id number]   PROVIDER NAME: [facility]  MEDICAL RECORD NUMBER: [id number]   START OF CARE DATE:  SOC Date: 20   TYPE:  PT  PRIMARY/TREATMENT DIAGNOSIS: (Pertinent Medical Diagnosis)     Right wrist pain  Closed nondisplaced fracture of styloid process of right ulna    VISITS FROM START OF CARE:  Rxs Used: 7     Hand Therapy Progress Note  Please refer to the daily flowsheet for treatment today, total treatment time and time spent performing 1:1 timed codes.       Current Date:  2020    Diagnosis: Closed nondisplaced fracture of styloid process of right ulna, ulnar wrist pain  DOI: 2020  Procedure:  NA     Precautions: NA    Reporting period is 20 to 2020    Subjective:  Can lift things but not heavy items. Feeling better after injection      Objective:  Pain Level (Scale 0-10)   2020     At Rest 7/10 Somewhat better 8/10 today 2   At Worst 9/10        Pain Description  Date 2020     Location Wrist, forearm, finger R wrist   Pain Quality Aching and Sharp    Frequency intermittent      Pain is worst  daytime    Exacerbated by  movement, use    Relieved by rest    Progression No change Improving, injection helped     Edema (Circumference measured in cm)   2020      L R R R   DWC 16.0 17.7 18.4 16.8     Sensation   WNL throughout all nerve distributions; per patient report    ROM  Pain Report: - none  + mild    ++ moderate    +++ severe   Wrist 2020     AROM (PROM) R R R   Extension 44 50 pain to volar wrist, dorsal central hand 63   Flexion 15 38 55   RD 10  15   UD 15  55   Supination 55  75   Pronation 74         ROM:  Finger  AROM         Date 6/30/2020  R L 7/8/2020 8/12/2020     DPC to index (cm) 2 0 1 0   DPC to long (cm) 2 0 2 0   DPC to ring (cm) 2 0 1 0   DPC to small (cm) 2 0 1.5 0   As measured by Digit o Meter    Strength:  Pain-free /Pinch Test    6/30/2020 8/12/2020     Trials R L R   1   5 40 13     Lat Pinch  6/30/2020 .8/12/2020     Trials R L R   1   8 18 11     3 Pt Pinch  6/30/2020 8/12/2020     Trials R L R   1   5 12 8     Assessment:  Response to therapy has been improvement to:  ROM of Wrist:  All Planes  Overall Assessment:  Patient is progressing well and is ready to decrease frequency of treatment in the clinic.  Patient is ready to progress to more complex exercises.  Patient would benefit from continued therapy to achieve rehab potential  STG/LTG:  STGoals have been reviewed and progress or achievement has occurred;  see goal sheet for details and updates.  LTGoals have been reviewed and progress or achievement has occurred:  see goal sheet for details and updates  Appropriateness of Rx I have re-evaluated this patient and find that the nature, scope, duration and intensity of the therapy is appropriate for the medical condition of the patient.  Plan:  Frequency/Duration:  Recommend continuing to see patient  2 X a month, once daily  for 2 months    Treatment Plan:    Continue plan as outlined in initial evaluation   Discharge Plan:  Achieve all LTG.  Independent in home treatment program.  Reach maximal therapeutic benefit.  Home Exercise Program:  Zipper orthosis only when out  Wrist AROM, PROM  Gentle strengthening  Pt looking into getting hot pack for back; likes ice to wrist  Next Visit:  AROM  MFR/STM  possibly add more strengthening and/or progress ROM      Caregiver Signature/Credentials _____________________________ Date ________       Staci Chandler OTR/L CHT    I have reviewed and certified the need for these services and plan of treatment while under my care.        PHYSICIAN'S SIGNATURE:    "_________________________________________  Date___________   Basil Santiago MD    Certification period:  Beginning of Cert date period: 08/12/20 to  End of Cert period date: 10/13/20     Functional Level Progress Report: Please see attached \"Goal Flow sheet for Functional level.\"    ____X____ Continue Services or       ________ DC Services                Service dates: From  SOC Date: 05/14/20 date to present                         "

## 2020-08-12 NOTE — PROGRESS NOTES
Hand Therapy Progress Note  Please refer to the daily flowsheet for treatment today, total treatment time and time spent performing 1:1 timed codes.       Current Date:  8/13/2020    Diagnosis: Closed nondisplaced fracture of styloid process of right ulna, ulnar wrist pain  DOI: April 2020  Procedure:  NA     Precautions: NA    Reporting period is 6/24/20 to 8/13/2020    Subjective:  Can lift things but not heavy items. Feeling better after injection      Objective:  Pain Level (Scale 0-10)   5/14/2020 6/24/2020 6/30/2020 8/12/2020     At Rest 7/10 Somewhat better 8/10 today 2   At Worst 9/10        Pain Description  Date 5/14/2020 8/12/2020     Location Wrist, forearm, finger R wrist   Pain Quality Aching and Sharp    Frequency intermittent      Pain is worst  daytime    Exacerbated by  movement, use    Relieved by rest    Progression No change Improving, injection helped     Edema (Circumference measured in cm)   5/14/2020 5/14/2020 7/8/2020 8/12/2020      L R R R   DWC 16.0 17.7 18.4 16.8     Sensation   WNL throughout all nerve distributions; per patient report    ROM  Pain Report: - none  + mild    ++ moderate    +++ severe   Wrist 6/1/2020 7/8/2020 8/12/2020     AROM (PROM) R R R   Extension 44 50 pain to volar wrist, dorsal central hand 63   Flexion 15 38 55   RD 10  15   UD 15  55   Supination 55  75   Pronation 74         ROM:  Finger AROM         Date 6/30/2020  R L 7/8/2020 8/12/2020     DPC to index (cm) 2 0 1 0   DPC to long (cm) 2 0 2 0   DPC to ring (cm) 2 0 1 0   DPC to small (cm) 2 0 1.5 0   As measured by Digit o Meter    Strength:  Pain-free /Pinch Test    6/30/2020 8/12/2020     Trials R L R   1   5 40 13     Lat Pinch  6/30/2020 .8/12/2020     Trials R L R   1   8 18 11     3 Pt Pinch  6/30/2020 8/12/2020     Trials R L R   1   5 12 8     Assessment:  Response to therapy has been improvement to:  ROM of Wrist:  All Planes    Overall Assessment:  Patient is progressing well and  is ready to decrease frequency of treatment in the clinic.  Patient is ready to progress to more complex exercises.  Patient would benefit from continued therapy to achieve rehab potential  STG/LTG:  STGoals have been reviewed and progress or achievement has occurred;  see goal sheet for details and updates.  LTGoals have been reviewed and progress or achievement has occurred:  see goal sheet for details and updates.    Appropriateness of Rx I have re-evaluated this patient and find that the nature, scope, duration and intensity of the therapy is appropriate for the medical condition of the patient.    Plan:  Frequency/Duration:  Recommend continuing to see patient  2 X a month, once daily  for 2 months    Treatment Plan:    Continue plan as outlined in initial evaluation     Discharge Plan:  Achieve all LTG.  Independent in home treatment program.  Reach maximal therapeutic benefit.    Home Exercise Program:  Zipper orthosis only when out  Wrist AROM, PROM  Gentle strengthening  Pt looking into getting hot pack for back; likes ice to wrist    Next Visit:  AROM  MFR/STM  possibly add more strengthening and/or progress ROM

## 2020-08-31 ENCOUNTER — OFFICE VISIT (OUTPATIENT)
Dept: ORTHOPEDICS | Facility: CLINIC | Age: 65
End: 2020-08-31
Payer: MEDICARE

## 2020-08-31 ENCOUNTER — THERAPY VISIT (OUTPATIENT)
Dept: OCCUPATIONAL THERAPY | Facility: CLINIC | Age: 65
End: 2020-08-31
Payer: MEDICARE

## 2020-08-31 DIAGNOSIS — M25.531 RIGHT WRIST PAIN: Primary | ICD-10-CM

## 2020-08-31 DIAGNOSIS — M19.031 PRIMARY OSTEOARTHRITIS, RIGHT WRIST: ICD-10-CM

## 2020-08-31 DIAGNOSIS — G89.29 CHRONIC PAIN OF RIGHT WRIST: Primary | ICD-10-CM

## 2020-08-31 DIAGNOSIS — M25.531 CHRONIC PAIN OF RIGHT WRIST: Primary | ICD-10-CM

## 2020-08-31 DIAGNOSIS — M54.16 RIGHT LUMBAR RADICULOPATHY: ICD-10-CM

## 2020-08-31 DIAGNOSIS — S52.614A CLOSED NONDISPLACED FRACTURE OF STYLOID PROCESS OF RIGHT ULNA: ICD-10-CM

## 2020-08-31 PROCEDURE — 97110 THERAPEUTIC EXERCISES: CPT | Mod: GO | Performed by: OCCUPATIONAL THERAPIST

## 2020-08-31 NOTE — LETTER
8/31/2020         RE: Yancy Rivera  [address]  [place] [address]        Dear Colleague,    Thank you for referring your patient, Yancy Rivera, to the [facility]. Please see a copy of my visit note below.          SPORTS & ORTHOPEDIC WALK-IN FOLLOW-UP VISIT 8/31/2020    Mentions that she is feeling improvement. Did notice some pain as she was cutting vegetables.    Interval History:     Follow up reason: 1 month f/u    Date of injury: Late 4/2020    Date last seen: 7/31/20    Following Therapeutic Plan: Yes     Pain: Improving    Function: Improving      Medical History:    Any recent changes to your medical history? No    Any new medication prescribed since last visit? No    Review of Systems:    Do you have fever, chills, weight loss? No    Do you have any vision problems? No    Do you have any chest pain or edema? No    Do you have any shortness of breath or wheezing?  No    Do you have stomach problems? No    Do you have any numbness or focal weakness? No    Do you have diabetes? No    Do you have problems with bleeding or clotting? No    Do you have an rashes or other skin lesions? No             ESTABLISHED PATIENT FOLLOW-UP:  Follow Up of the Right Wrist       HISTORY OF PRESENT ILLNESS  Ms. Rivera is a pleasant 65 year old year old female who presents to clinic today for follow-up of right wrist pain secondary to injury on chronic right wrist pain.     Interval History:     Follow up reason: 1 month f/u    Date of injury: April 2020    Date last seen: 7/31/20    Following Therapeutic Plan: Yes     Pain: Improving    Function: Improving    No longer requiring wrist brace.  She is ceased to use the arm sling over the past 6 weeks.  She notes her range of motion is improved greatly.  She feels as if the injection helped her to turn the corner substantially.  Corticosteroid injection of dorsal radiocarpal joint on 7/31/2020.    Additional  medical/Social/Surgical histories reviewed in [facility] and updated as appropriate.    REVIEW OF SYSTEMS (8/31/2020)  CONSTITUTIONAL: Denies fever and weight loss  GASTROINTESTINAL: Denies abdominal pain, nausea, vomiting  MUSCULOSKELETAL: See HPI  SKIN: Denies any recent rash or lesion  NEUROLOGICAL: Denies numbness or focal weakness     PHYSICAL EXAM  LMP  (LMP Unknown)     General  - normal appearance, in no obvious distress  Musculoskeletal - right wrist  - inspection: normal joint alignment, no obvious deformity  - palpation: Resolved TTP at ECU tendon near distal ulna and into carpal region across joint.  No foveal tenderness.  Minimal tenderness to palpation at the radial aspect of wrist near extensor carpi radialis  - ROM: Wrist extension approximately 65 degrees, wrist flexion is 70 degrees, radial ulnar deviation intact.  Markedly improved supination pronation.  - strength: 5/5 strength in all planes  Neuro  - no sensory or motor deficit, grossly normal coordination, normal muscle tone  Skin  - no ecchymosis, erythema, warmth, or induration, no obvious rash  Psych  - interactive, appropriate, normal mood and affect    ASSESSMENT & PLAN  Ms. Rivera is a 65 year old year old female who presents to clinic today with Follow Up of the Right Wrist    Delighted to report that she is making great strides over the past month.  She is completely wean her brace and her range of motion is returning nicely.    Diagnosis:   Primary osteoarthritis of right wrist  ECU tendinosis of right wrist    -Market improvement with last radiocarpal corticosteroid injection last month  -Continue with occupational therapy, she may be graduating soon due to the substantial improvement  -May continue to use wrist brace with provocative activities such as cutting vegetables  -Continue home exercise program for range of motion and strengthening  -Voltaren gel refill was provided today  -Follow up PRN    It was a pleasure seeing  Yancy.    Basil Santiago DO, CAQSM  Primary Care Sports Medicine

## 2020-08-31 NOTE — PROGRESS NOTES
Discharge Note - Hand Therapy      Current Date:  8/31/2020    Initial Evaluation Date:  6/1/20  Number of Visits:  8    Diagnosis: Closed nondisplaced fracture of styloid process of right ulna, ulnar wrist pain  DOI: April 2020  Procedure:  NA  Precautions: NA  Reporting period is  8/13/2020 to 8/31/2020    Subjective:  Doing more things, improving. Feels she can continue on her own with her home program to gain strength.       Objective:  Pain Level (Scale 0-10)   5/14/2020 6/24/2020 6/30/2020 8/12/2020 8/31/2020     At Rest 7/10 Somewhat better 8/10 today 2 2   At Worst 9/10         Pain Description  Date 5/14/2020 8/12/2020     Location Wrist, forearm, finger R wrist   Pain Quality Aching and Sharp    Frequency intermittent      Pain is worst  daytime    Exacerbated by  movement, use    Relieved by rest    Progression No change Improving, injection helped     Edema (Circumference measured in cm)   5/14/2020 5/14/2020 7/8/2020 8/12/2020      L R R R   DWC 16.0 17.7 18.4 16.8     Sensation   WNL throughout all nerve distributions; per patient report    ROM  Pain Report: - none  + mild    ++ moderate    +++ severe   Wrist 6/1/2020 7/8/2020 8/12/2020   .8/31/2020     AROM (PROM) R R R R   Extension 44 50 pain to volar wrist, dorsal central hand 63 70   Flexion 15 38 55 59   RD 10  15 10   UD 15  55 52   Supination 55  75 70   Pronation 74          ROM:  Finger AROM         Date 6/30/2020  R L 7/8/2020 8/12/2020     DPC to index (cm) 2 0 1 0   DPC to long (cm) 2 0 2 0   DPC to ring (cm) 2 0 1 0   DPC to small (cm) 2 0 1.5 0   As measured by Digit o Meter    Strength:  Pain-free /Pinch Test    6/30/2020 8/12/2020 8/31/2020     Trials R L R R   1   5 40 13 19     Lat Pinch  6/30/2020 .8/12/2020 8/31/2020     Trials R L R R   1   8 18 11 14.5     3 Pt Pinch  6/30/2020 8/12/2020 8/31/2020     Trials R L R R   1   5 12 8 9     Assessment:  Response to therapy has been improvement to:  ROM of  Wrist:  All Planes  Fingers: All Planes  Strength:   and pinch and wrist strength  Appropriateness of Rx I have re-evaluated this patient and find that the nature, scope, duration and intensity of the therapy is appropriate for the medical condition of the patient.  Overall Assessment:  Patient is progressing well.  Patient is ready to be discharged from therapy and continue their home treatment program.  STG/LTG:  See goal sheet for details and updates.    Plan:  Frequency/Duration:  Discharge from Hand Therapy; continue home program.

## 2020-08-31 NOTE — PROGRESS NOTES
ESTABLISHED PATIENT FOLLOW-UP:  Follow Up of the Right Wrist       HISTORY OF PRESENT ILLNESS  Ms. Rivera is a pleasant 65 year old year old female who presents to clinic today for follow-up of right wrist pain secondary to injury on chronic right wrist pain.     Interval History:     Follow up reason: 1 month f/u    Date of injury: April 2020    Date last seen: 7/31/20    Following Therapeutic Plan: Yes     Pain: Improving    Function: Improving    No longer requiring wrist brace.  She is ceased to use the arm sling over the past 6 weeks.  She notes her range of motion is improved greatly.  She feels as if the injection helped her to turn the corner substantially.  Corticosteroid injection of dorsal radiocarpal joint on 7/31/2020.    Additional medical/Social/Surgical histories reviewed in [facility] and updated as appropriate.    REVIEW OF SYSTEMS (8/31/2020)  CONSTITUTIONAL: Denies fever and weight loss  GASTROINTESTINAL: Denies abdominal pain, nausea, vomiting  MUSCULOSKELETAL: See HPI  SKIN: Denies any recent rash or lesion  NEUROLOGICAL: Denies numbness or focal weakness     PHYSICAL EXAM  LMP  (LMP Unknown)     General  - normal appearance, in no obvious distress  Musculoskeletal - right wrist  - inspection: normal joint alignment, no obvious deformity  - palpation: Resolved TTP at ECU tendon near distal ulna and into carpal region across joint.  No foveal tenderness.  Minimal tenderness to palpation at the radial aspect of wrist near extensor carpi radialis  - ROM: Wrist extension approximately 65 degrees, wrist flexion is 70 degrees, radial ulnar deviation intact.  Markedly improved supination pronation.  - strength: 5/5 strength in all planes  Neuro  - no sensory or motor deficit, grossly normal coordination, normal muscle tone  Skin  - no ecchymosis, erythema, warmth, or induration, no obvious rash  Psych  - interactive, appropriate, normal mood and affect    ASSESSMENT & PLAN  Ms. Rivera is a 65 year old year old  female who presents to clinic today with Follow Up of the Right Wrist    Delighted to report that she is making great strides over the past month.  She is completely wean her brace and her range of motion is returning nicely.    Diagnosis:   Primary osteoarthritis of right wrist  ECU tendinosis of right wrist    -Market improvement with last radiocarpal corticosteroid injection last month  -Continue with occupational therapy, she may be graduating soon due to the substantial improvement  -May continue to use wrist brace with provocative activities such as cutting vegetables  -Continue home exercise program for range of motion and strengthening  -Voltaren gel refill was provided today  -Follow up PRN    It was a pleasure seeing Yancy.    Basil Santiago DO, CAQSM  Primary Care Sports Medicine

## 2020-08-31 NOTE — PROGRESS NOTES
SPORTS & ORTHOPEDIC WALK-IN FOLLOW-UP VISIT 8/31/2020    Mentions that she is feeling improvement. Did notice some pain as she was cutting vegetables.    Interval History:     Follow up reason: 1 month f/u    Date of injury: Late 4/2020    Date last seen: 7/31/20    Following Therapeutic Plan: Yes     Pain: Improving    Function: Improving      Medical History:    Any recent changes to your medical history? No    Any new medication prescribed since last visit? No    Review of Systems:    Do you have fever, chills, weight loss? No    Do you have any vision problems? No    Do you have any chest pain or edema? No    Do you have any shortness of breath or wheezing?  No    Do you have stomach problems? No    Do you have any numbness or focal weakness? No    Do you have diabetes? No    Do you have problems with bleeding or clotting? No    Do you have an rashes or other skin lesions? No

## 2020-09-29 RX ORDER — ZOSTER VACCINE RECOMBINANT, ADJUVANTED 50 MCG/0.5
KIT INTRAMUSCULAR
Qty: 1 EACH | Refills: 0 | Status: SHIPPED | OUTPATIENT
Start: 2020-09-29 | End: 2021-04-30

## 2021-02-21 DIAGNOSIS — E78.5 HYPERLIPIDEMIA LDL GOAL <100: ICD-10-CM

## 2021-02-23 RX ORDER — ATORVASTATIN CALCIUM 20 MG/1
TABLET, FILM COATED ORAL
Qty: 90 TABLET | Refills: 0 | Status: SHIPPED | OUTPATIENT
Start: 2021-02-23 | End: 2023-03-28

## 2021-02-23 NOTE — TELEPHONE ENCOUNTER
Atorvastatin Calcium 20 MG Oral Tablet    Last Written Prescription Date:  1/22/20  Last Fill Quantity: 90,   # refills: 3  Last Office Visit : 5/7/20  Future Office visit:  None       90 RF sent, LDL due  02/21/20  1100    LDL 93

## 2021-03-18 ENCOUNTER — OFFICE VISIT (OUTPATIENT)
Dept: OPHTHALMOLOGY | Facility: CLINIC | Age: 66
End: 2021-03-18
Attending: OPHTHALMOLOGY
Payer: MEDICARE

## 2021-03-18 DIAGNOSIS — Z79.899 HIGH RISK MEDICATION USE: ICD-10-CM

## 2021-03-18 DIAGNOSIS — H40.1132 PRIMARY OPEN ANGLE GLAUCOMA OF BOTH EYES, MODERATE STAGE: Primary | ICD-10-CM

## 2021-03-18 DIAGNOSIS — M06.9 RHEUMATOID ARTHRITIS INVOLVING MULTIPLE SITES, UNSPECIFIED WHETHER RHEUMATOID FACTOR PRESENT (H): ICD-10-CM

## 2021-03-18 PROCEDURE — 92133 CPTRZD OPH DX IMG PST SGM ON: CPT | Performed by: OPHTHALMOLOGY

## 2021-03-18 PROCEDURE — 92134 CPTRZ OPH DX IMG PST SGM RTA: CPT | Performed by: OPHTHALMOLOGY

## 2021-03-18 PROCEDURE — 92250 FUNDUS PHOTOGRAPHY W/I&R: CPT | Performed by: OPHTHALMOLOGY

## 2021-03-18 PROCEDURE — G0463 HOSPITAL OUTPT CLINIC VISIT: HCPCS

## 2021-03-18 PROCEDURE — 99207 OCT OPTIC NERVE RNFL SPECTRALIS OU (BOTH EYES): CPT | Mod: 26 | Performed by: OPHTHALMOLOGY

## 2021-03-18 PROCEDURE — 92015 DETERMINE REFRACTIVE STATE: CPT | Mod: GY

## 2021-03-18 PROCEDURE — 99207 FUNDUS AUTOFLUORESCENCE IMAGE (FAF) OU (BOTH EYES): CPT | Mod: 26 | Performed by: OPHTHALMOLOGY

## 2021-03-18 PROCEDURE — 99204 OFFICE O/P NEW MOD 45 MIN: CPT | Performed by: OPHTHALMOLOGY

## 2021-03-18 ASSESSMENT — REFRACTION_MANIFEST
OS_SPHERE: +0.75
OS_ADD: +2.50
OS_AXIS: 135
OS_CYLINDER: +0.50
OD_ADD: +2.50
OD_CYLINDER: +1.00
OD_AXIS: 151
OD_SPHERE: -0.25

## 2021-03-18 ASSESSMENT — VISUAL ACUITY
OD_PH_SC+: +2
OS_PH_SC: 20/30
OS_SC+: -2
OD_SC: 20/40 SLOW
METHOD: SNELLEN - LINEAR
OS_SC: 20/50 SLOW
OD_PH_SC: 20/40
OD_SC+: -1

## 2021-03-18 ASSESSMENT — SLIT LAMP EXAM - LIDS
COMMENTS: PTOSIS
COMMENTS: PTOSIS

## 2021-03-18 ASSESSMENT — TONOMETRY
OS_IOP_MMHG: 19
OD_IOP_MMHG: 22
IOP_METHOD: APPLANATION

## 2021-03-18 ASSESSMENT — EXTERNAL EXAM - LEFT EYE: OS_EXAM: NORMAL

## 2021-03-18 ASSESSMENT — EXTERNAL EXAM - RIGHT EYE: OD_EXAM: NORMAL

## 2021-03-18 ASSESSMENT — CONF VISUAL FIELD
OD_NORMAL: 1
OS_NORMAL: 1

## 2021-03-18 ASSESSMENT — CUP TO DISC RATIO
OS_RATIO: 0.7
OD_RATIO: 0.6

## 2021-03-18 NOTE — PROGRESS NOTES
Chief Complaint(s) and History of Present Illness(es)     COMPREHENSIVE EYE EXAM     Laterality: both eyes    Associated symptoms: dryness, eye pain and photophobia.  Negative for   redness and tearing    Pain scale: 3/10              Comments     Pt is here for a complete eye exam. Pts last visit was back in 2016 with   Dr. Masters. Pt had cataract surgery in [place] back in 2018. Pt would   like to get new glasses. Pt also notes she is sensitive to light as well x   many years, a little better after the cataract surgery. Pt feels like her   vision is still good after cataract surgery.     Pt c/o a FB sensation in the RE like it is poking her x 1 week. Pt tried   flushing it out, but no help. Pt would like to get a prescription for her   GenTeal tears. BE are dry, but not any worse.     Ocular meds:   GenTeal Tears QID BE    Darlene Akbar, COMT 9:33 AM March 18, 2021                 Review of systems for the eyes was negative other than the pertinent positives/negatives listed in the HPI.      Assessment & Plan      Yancy Rivera is a 65 year old female with the following diagnoses:   1. Primary open angle glaucoma of both eyes, moderate stage    2. High risk medication use    3. Rheumatoid arthritis involving multiple sites, unspecified whether rheumatoid factor present (H)       Last seen with Dr. Masters in 2016  Moved to [place] and underwent cataract extraction in both eyes in [place]  Reviewed outside records.  Intraocular pressure was 20-25 in both eyes during that time  She stopped eye drops due to irritation, and has not been examined for glaucoma since our last visit in 2016    She has also self discontinued plaquenil, after being seen in [place] and told she did not have RA  No plaquenil maculopathy on exam today  Macular pigmentary changes right eye since previous exam    Progressed glaucoma in both eyes  Reviewed the need to resume intraocular pressure lowering  Offered selected laser trabeculoplasty (SLT)  given previous drop sensitivity; she would like to proceed with this  Discussed need for chronic treatment to avoid permanent blindness and recommended management in detail         Patient disposition:   Return for VT, 24-2 Dynamic VF, SLT left eye.   Refer to Rheumatology to re-evaluate possible RA           Attending Physician Attestation:  Complete documentation of historical and exam elements from today's encounter can be found in the full encounter summary report (not reduplicated in this progress note).  I personally obtained the chief complaint(s) and history of present illness.  I confirmed and edited as necessary the review of systems, past medical/surgical history, family history, social history, and examination findings as documented by others; and I examined the patient myself.  I personally reviewed the relevant tests, images, and reports as documented above.  I formulated and edited as necessary the assessment and plan and discussed the findings and management plan with the patient and family. . - Constantin Rollins MD

## 2021-03-18 NOTE — NURSING NOTE
Chief Complaint(s) and History of Present Illness(es)     COMPREHENSIVE EYE EXAM     In both eyes.  Associated symptoms include dryness, eye pain and photophobia.  Negative for redness and tearing.  Pain was noted as 3/10.              Comments     Pt is here for a complete eye exam. Pts last visit was back in 2016 with Dr. Masters. Pt had cataract surgery in [place] back in 2018. Pt would like to get new glasses. Pt also notes she is sensitive to light as well x many years, a little better after the cataract surgery. Pt feels like her vision is still good after cataract surgery.     Pt c/o a FB sensation in the RE like it is poking her x 1 week. Pt tried flushing it out, but no help. Pt would like to get a prescription for her GenTeal tears. BE are dry, but not any worse.     Ocular meds:   GenTeal Tears QID BE    Darlene Akbar, COMT 9:33 AM March 18, 2021

## 2021-03-21 DIAGNOSIS — E78.5 HYPERLIPIDEMIA LDL GOAL <100: ICD-10-CM

## 2021-03-25 RX ORDER — ATORVASTATIN CALCIUM 20 MG/1
TABLET, FILM COATED ORAL
Qty: 30 TABLET | Refills: 0 | OUTPATIENT
Start: 2021-03-25

## 2021-03-25 NOTE — TELEPHONE ENCOUNTER
Atorvastatin Calcium 20 MG Oral Tablet   atorvastatin (LIPITOR) 20 MG tablet 90 tablet 0 2/23/2021  No   Sig: Take 1 tablet by mouth once daily   Sent to pharmacy as: Atorvastatin Calcium 20 MG Oral Tablet (LIPITOR)   Class: E-Prescribe   Order: [id number]   E-Prescribing Status: Receipt confirmed by pharmacy (2/23/2021  3:17 PM CST)   Printout Tracking    External Result Report   Pharmacy    [facility] - [place], [place] - [address] [place] [address]   Associated Diagnoses    Hyperlipidemia LDL goal <100 [E78.5]             Vijaya Huston RN  Central Triage Red Flags/Med Refills

## 2021-03-26 ENCOUNTER — APPOINTMENT (OUTPATIENT)
Dept: INTERPRETER SERVICES | Facility: CLINIC | Age: 66
End: 2021-03-26
Payer: MEDICARE

## 2021-04-05 DIAGNOSIS — H40.1132 PRIMARY OPEN ANGLE GLAUCOMA OF BOTH EYES, MODERATE STAGE: Primary | ICD-10-CM

## 2021-04-07 ENCOUNTER — OFFICE VISIT (OUTPATIENT)
Dept: OPHTHALMOLOGY | Facility: CLINIC | Age: 66
End: 2021-04-07
Attending: OPHTHALMOLOGY
Payer: MEDICARE

## 2021-04-07 ENCOUNTER — APPOINTMENT (OUTPATIENT)
Dept: INTERPRETER SERVICES | Facility: CLINIC | Age: 66
End: 2021-04-07
Payer: MEDICARE

## 2021-04-07 DIAGNOSIS — H40.1132 PRIMARY OPEN ANGLE GLAUCOMA OF BOTH EYES, MODERATE STAGE: ICD-10-CM

## 2021-04-07 PROCEDURE — G0463 HOSPITAL OUTPT CLINIC VISIT: HCPCS | Mod: 25

## 2021-04-07 PROCEDURE — 65855 TRABECULOPLASTY LASER SURG: CPT | Mod: LT | Performed by: OPHTHALMOLOGY

## 2021-04-07 PROCEDURE — 92083 EXTENDED VISUAL FIELD XM: CPT | Performed by: OPHTHALMOLOGY

## 2021-04-07 RX ORDER — KETOROLAC TROMETHAMINE 5 MG/ML
1 SOLUTION OPHTHALMIC 2 TIMES DAILY
Qty: 5 ML | Refills: 0 | Status: SHIPPED | OUTPATIENT
Start: 2021-04-07 | End: 2021-05-05

## 2021-04-07 ASSESSMENT — TONOMETRY
IOP_METHOD: APPLANATION
IOP_METHOD: APPLANATION
OD_IOP_MMHG: 10
OS_IOP_MMHG: 10
IOP_METHOD: APPLANATION
OS_IOP_MMHG: 10
OD_IOP_MMHG: 12
IOP_METHOD: APPLANATION
OD_IOP_MMHG: 11
OS_IOP_MMHG: 9
OS_IOP_MMHG: 13

## 2021-04-07 ASSESSMENT — CONF VISUAL FIELD
OD_NORMAL: 1
OS_NORMAL: 1
METHOD: COUNTING FINGERS

## 2021-04-07 ASSESSMENT — VISUAL ACUITY
OD_SC: 20/30
OS_SC+: -2
OS_SC: 20/30
OD_SC+: -2
METHOD: SNELLEN - LINEAR

## 2021-04-07 NOTE — NURSING NOTE
Chief Complaints and History of Present Illnesses   Patient presents with     Follow Up     Primary open angle glaucoma of both eyes, moderate stage      Chief Complaint(s) and History of Present Illness(es)     Follow Up     Laterality: both eyes    Onset: gradual    Onset: years ago    Course: gradually worsening    Associated symptoms: foreign body sensation, eye pain, dryness and photophobia.  Negative for tearing, flashes and floaters    Treatments tried: artificial tears    Response to treatment: mild improvement    Comments: Primary open angle glaucoma of both eyes, moderate stage               Comments     Pt states vision is worse since last visit.  She has had pain/FBS BE for about 3 weeks.  Treats CHRISTOPHER with Refresh  PFAT's 4x day.  She is also light sensitive for many years.  She did not fill last Rx.    TAMMI Cruz April 7, 2021 9:39 AM

## 2021-04-07 NOTE — PROGRESS NOTES
Chief Complaint(s) and History of Present Illness(es)     Follow Up     Laterality: both eyes    Onset: gradual    Onset: years ago    Course: gradually worsening    Associated symptoms: foreign body sensation, eye pain, dryness and   photophobia.  Negative for tearing, flashes and floaters    Treatments tried: artificial tears    Response to treatment: mild improvement    Comments: Primary open angle glaucoma of both eyes, moderate stage               Comments     Pt states vision is worse since last visit.  She has had pain/FBS BE for   about 3 weeks.  Treats CHRISTOPHER with Refresh  PFAT's 4x day.  She is also light   sensitive for many years.  She did not fill last Rx.    TAMMI Cruz April 7, 2021 9:39 AM              Review of systems for the eyes was negative other than the pertinent positives/negatives listed in the HPI.      Assessment & Plan      Yancy Rivera is a 65 year old female with the following diagnoses:   1. Primary open angle glaucoma of both eyes, moderate stage         Moved to [place] and underwent cataract extraction in both eyes in [place]  Reviewed outside records.  Intraocular pressure was 20-25 in both eyes during that time  She stopped eye drops due to irritation, and has not been examined for glaucoma since our last visit in 2016     She has also self discontinued plaquenil, after being seen in [place] and told she did not have RA.   No plaquenil maculopathy on exam today  Macular pigmentary changes right eye since previous exam     Progressed glaucoma in both eyes. VF 24-2 today both eyes worse than 2016.   IOP around 10 each eye today.  Selected laser trabeculoplasty (SLT) still indicated, discussed RBA, consent obtained    Proceed with SLT left eye today.   Start Ketorolac BID left eye until next visit.   Return precautions reviewed       Patient disposition:   RTC in 4 weeks, VT, SLT right eye    Gaudencio Tony MD  Ophthalmology PGY-3      Attending Physician Attestation:  Complete  documentation of historical and exam elements from today's encounter can be found in the full encounter summary report (not reduplicated in this progress note).  I personally obtained the chief complaint(s) and history of present illness.  I confirmed and edited as necessary the review of systems, past medical/surgical history, family history, social history, and examination findings as documented by others; and I examined the patient myself.  I personally reviewed the relevant tests, images, and reports as documented above.  I formulated and edited as necessary the assessment and plan and discussed the findings and management plan with the patient and family. .Attending Physician Image/Tesing Attestation: I personally reviewed the ophthalmic test(s) associated with this encounter, agree with the interpretation(s) as documented by the resident/fellow, and have edited the corresponding report(s) as necessary.  Attending Physician Procedure Attestation: I was present for the entire procedure      - Constantin Rollins MD

## 2021-04-12 ENCOUNTER — TELEPHONE (OUTPATIENT)
Dept: INTERNAL MEDICINE | Facility: CLINIC | Age: 66
End: 2021-04-12

## 2021-04-12 ENCOUNTER — NURSE TRIAGE (OUTPATIENT)
Dept: NURSING | Facility: CLINIC | Age: 66
End: 2021-04-12

## 2021-04-12 NOTE — TELEPHONE ENCOUNTER
Call patient regarding abdominal pain and to help schedule in-person appointment with any available providers.  If no sooner appointment, advise urgent care or ER. Patient was offer NP clinic on Thursday April 15 as PCP has no sooner openings. Patient states she will go to the ER is symptoms persist.    Darlene Akbar, Clinic Coordinator, April 12, 2021 at 12:04 PM

## 2021-04-12 NOTE — TELEPHONE ENCOUNTER
Will have our clinic coordinator call to schedule in-person appointment with any available providers.  If no sooner appointment, advise urgent care or ER.      Nic Chinchilla CMA ([facility]) at 11:40 AM on 4/12/2021

## 2021-04-12 NOTE — TELEPHONE ENCOUNTER
Pt reporting, having moderate to severe abdominal pain for over a week.    The pain seems to be getting worse.  Lower right abdominal pain.   Pt does not have her appendix anymore.   No nausea, vomiting, diarrhea or constipation noted.  No fever symptoms.       Pt mentioned she had hernia surgery awhile ago.   Wondering if she is still having issues with the hernia again that she had surgery on.           Pt refused ER visit.  Pt wanted to be seen in the clinic.     Please call Pt back @ [phone] for further assistance.      Vijaya Huston RN  Central Triage Red Flags/Med Refills      Additional Information    Negative: Passed out (i.e., fainted, collapsed and was not responding)    Negative: Shock suspected (e.g., cold/pale/clammy skin, too weak to stand, low BP, rapid pulse)    Negative: Sounds like a life-threatening emergency to the triager    Negative: Chest pain    Negative: Pain is mainly in upper abdomen (if needed ask: 'is it mainly above the belly button?')    Negative: Abdominal pain and pregnant > 20 weeks    Negative: Abdominal pain and pregnant < 20 weeks    Negative: SEVERE abdominal pain (e.g., excruciating)    Negative: Vomiting red blood or black (coffee ground) material    Negative: Bloody, black, or tarry bowel movements    Negative: Constant abdominal pain lasting > 2 hours    Negative: Vomiting bile (green color)    Negative: Patient sounds very sick or weak to the triager    Negative: Vomiting and abdomen looks much more swollen than usual    Negative: White of the eyes have turned yellow (i.e., jaundice)    Negative: Blood in urine (red, pink, or tea-colored)    Negative: Fever > 103 F (39.4 C)    Negative: Fever > 101 F (38.3 C) and over 60 years of age    Negative: Fever > 100.0 F (37.8 C) and has diabetes mellitus or a weak immune system (e.g., HIV positive, cancer chemotherapy, organ transplant, splenectomy, chronic steroids)    Negative: Fever > 100.0 F (37.8 C) and bedridden (e.g.,  nursing home patient, stroke, chronic illness, recovering from surgery)    Negative: Pregnant or could be pregnant (i.e., missed last menstrual period)    Negative: MODERATE OR MILD pain that comes and goes (cramps) lasts > 24 hours    Negative: Unusual vaginal discharge    Negative: Age > 60 years    Patient wants to be seen    Protocols used: ABDOMINAL PAIN - FEMALE-A-[place]

## 2021-04-13 ENCOUNTER — APPOINTMENT (OUTPATIENT)
Dept: CT IMAGING | Facility: CLINIC | Age: 66
End: 2021-04-13
Attending: EMERGENCY MEDICINE
Payer: MEDICARE

## 2021-04-13 ENCOUNTER — HOSPITAL ENCOUNTER (EMERGENCY)
Facility: CLINIC | Age: 66
Discharge: HOME | End: 2021-04-13
Attending: EMERGENCY MEDICINE | Admitting: EMERGENCY MEDICINE
Payer: MEDICARE

## 2021-04-13 VITALS
WEIGHT: 200.2 LBS | TEMPERATURE: 98.3 F | DIASTOLIC BLOOD PRESSURE: 89 MMHG | HEART RATE: 86 BPM | BODY MASS INDEX: 33.32 KG/M2 | RESPIRATION RATE: 18 BRPM | OXYGEN SATURATION: 99 % | SYSTOLIC BLOOD PRESSURE: 145 MMHG

## 2021-04-13 DIAGNOSIS — R10.11 RUQ ABDOMINAL PAIN: ICD-10-CM

## 2021-04-13 LAB
ALBUMIN SERPL-MCNC: 3.8 G/DL (ref 3.4–5)
ALBUMIN UR-MCNC: NEGATIVE MG/DL
ALP SERPL-CCNC: 104 U/L (ref 40–150)
ALT SERPL W P-5'-P-CCNC: 21 U/L (ref 0–50)
ANION GAP SERPL CALCULATED.3IONS-SCNC: 8 MMOL/L (ref 3–14)
APPEARANCE UR: CLEAR
AST SERPL W P-5'-P-CCNC: 11 U/L (ref 0–45)
BASOPHILS # BLD AUTO: 0 10E9/L (ref 0–0.2)
BASOPHILS NFR BLD AUTO: 0.4 %
BILIRUB SERPL-MCNC: 0.4 MG/DL (ref 0.2–1.3)
BILIRUB UR QL STRIP: NEGATIVE
BUN SERPL-MCNC: 8 MG/DL (ref 7–30)
CALCIUM SERPL-MCNC: 9 MG/DL (ref 8.5–10.1)
CHLORIDE SERPL-SCNC: 106 MMOL/L (ref 94–109)
CO2 SERPL-SCNC: 24 MMOL/L (ref 20–32)
COLOR UR AUTO: ABNORMAL
CREAT SERPL-MCNC: 0.7 MG/DL (ref 0.52–1.04)
DIFFERENTIAL METHOD BLD: NORMAL
EOSINOPHIL # BLD AUTO: 0.3 10E9/L (ref 0–0.7)
EOSINOPHIL NFR BLD AUTO: 4.9 %
ERYTHROCYTE [DISTWIDTH] IN BLOOD BY AUTOMATED COUNT: 12.4 % (ref 10–15)
GFR SERPL CREATININE-BSD FRML MDRD: >90 ML/MIN/{1.73_M2}
GLUCOSE SERPL-MCNC: 112 MG/DL (ref 70–99)
GLUCOSE UR STRIP-MCNC: NEGATIVE MG/DL
HCT VFR BLD AUTO: 38.7 % (ref 35–47)
HGB BLD-MCNC: 12.9 G/DL (ref 11.7–15.7)
HGB UR QL STRIP: NEGATIVE
IMM GRANULOCYTES # BLD: 0 10E9/L (ref 0–0.4)
IMM GRANULOCYTES NFR BLD: 0.3 %
KETONES UR STRIP-MCNC: NEGATIVE MG/DL
LEUKOCYTE ESTERASE UR QL STRIP: NEGATIVE
LIPASE SERPL-CCNC: 174 U/L (ref 73–393)
LYMPHOCYTES # BLD AUTO: 1.6 10E9/L (ref 0.8–5.3)
LYMPHOCYTES NFR BLD AUTO: 23.6 %
MCH RBC QN AUTO: 29.3 PG (ref 26.5–33)
MCHC RBC AUTO-ENTMCNC: 33.3 G/DL (ref 31.5–36.5)
MCV RBC AUTO: 88 FL (ref 78–100)
MONOCYTES # BLD AUTO: 0.5 10E9/L (ref 0–1.3)
MONOCYTES NFR BLD AUTO: 6.9 %
NEUTROPHILS # BLD AUTO: 4.4 10E9/L (ref 1.6–8.3)
NEUTROPHILS NFR BLD AUTO: 63.9 %
NITRATE UR QL: NEGATIVE
NRBC # BLD AUTO: 0 10*3/UL
NRBC BLD AUTO-RTO: 0 /100
PH UR STRIP: 7.5 PH (ref 5–7)
PLATELET # BLD AUTO: 274 10E9/L (ref 150–450)
POTASSIUM SERPL-SCNC: 3.8 MMOL/L (ref 3.4–5.3)
PROT SERPL-MCNC: 7.2 G/DL (ref 6.8–8.8)
RBC # BLD AUTO: 4.41 10E12/L (ref 3.8–5.2)
SODIUM SERPL-SCNC: 138 MMOL/L (ref 133–144)
SOURCE: ABNORMAL
SP GR UR STRIP: 1 (ref 1–1.03)
UROBILINOGEN UR STRIP-MCNC: NORMAL MG/DL (ref 0–2)
WBC # BLD AUTO: 7 10E9/L (ref 4–11)

## 2021-04-13 PROCEDURE — 85025 COMPLETE CBC W/AUTO DIFF WBC: CPT | Performed by: EMERGENCY MEDICINE

## 2021-04-13 PROCEDURE — 74177 CT ABD & PELVIS W/CONTRAST: CPT | Mod: MG

## 2021-04-13 PROCEDURE — 250N000009 HC RX 250: Performed by: EMERGENCY MEDICINE

## 2021-04-13 PROCEDURE — 80053 COMPREHEN METABOLIC PANEL: CPT | Performed by: EMERGENCY MEDICINE

## 2021-04-13 PROCEDURE — 99285 EMERGENCY DEPT VISIT HI MDM: CPT | Mod: 25 | Performed by: EMERGENCY MEDICINE

## 2021-04-13 PROCEDURE — 250N000013 HC RX MED GY IP 250 OP 250 PS 637: Performed by: EMERGENCY MEDICINE

## 2021-04-13 PROCEDURE — 81003 URINALYSIS AUTO W/O SCOPE: CPT | Performed by: EMERGENCY MEDICINE

## 2021-04-13 PROCEDURE — 83690 ASSAY OF LIPASE: CPT | Performed by: EMERGENCY MEDICINE

## 2021-04-13 PROCEDURE — 99284 EMERGENCY DEPT VISIT MOD MDM: CPT | Performed by: EMERGENCY MEDICINE

## 2021-04-13 PROCEDURE — 250N000011 HC RX IP 250 OP 636: Performed by: EMERGENCY MEDICINE

## 2021-04-13 RX ORDER — IOPAMIDOL 755 MG/ML
100 INJECTION, SOLUTION INTRAVASCULAR ONCE
Status: COMPLETED | OUTPATIENT
Start: 2021-04-13 | End: 2021-04-13

## 2021-04-13 RX ADMIN — LIDOCAINE HYDROCHLORIDE 30 ML: 20 SOLUTION ORAL; TOPICAL at 09:44

## 2021-04-13 RX ADMIN — IOPAMIDOL 98 ML: 755 INJECTION, SOLUTION INTRAVENOUS at 10:55

## 2021-04-13 RX ADMIN — SODIUM CHLORIDE 65 ML: 9 INJECTION, SOLUTION INTRAVENOUS at 10:58

## 2021-04-13 ASSESSMENT — ENCOUNTER SYMPTOMS
CONFUSION: 0
FEVER: 0
SHORTNESS OF BREATH: 0
EYE REDNESS: 0
NECK STIFFNESS: 0
HEADACHES: 0
ARTHRALGIAS: 0
COLOR CHANGE: 0
ABDOMINAL PAIN: 1
DIFFICULTY URINATING: 0

## 2021-04-13 NOTE — ED NOTES
States abdomen pain is similar to hernia pain in past. Had hernia surgery in 2012 at [facility]

## 2021-04-13 NOTE — ED PROVIDER NOTES
ED Provider Note  [facility]      History     Chief Complaint   Patient presents with     Abdominal Pain     mid abdomen pain for 1 week. pain is similar to hernia pain in the past     HPI  Yancy Rivera is a 65 year old female with a history of glaucoma, lumbar radiculopathy, essential hypertension, hyperlipidemia and prediabetes who presents for evaluation of intermittent periumbilical and right upper quadrant abdominal pain.  Patient notes the pain began approximately 1 to 2 weeks earlier.  She notes that on occasion, the pain will radiate towards her back.  It is worse after eating as well as with straining for a bowel movement.  She notes that when she lays on her back, she notes the pain is more in her right flank region.  Patient states that she has a history of hernia repair 9 years ago as well as an appendectomy in the past.  She denies other ongoing symptoms with the exception of occasional hard stool.  Specifically, she denies nausea, vomiting, diarrhea, weight loss, fever, chills, sweats.    Past Medical History  Past Medical History:   Diagnosis Date     Blepharitis      Esophageal reflux (aka GERD)      Glaucoma      Hyperlipidemia LDL goal < 130      Nonsenile cataract      Shingles     11/14/15 Left approximately C6 distribution     Past Surgical History:   Procedure Laterality Date     CATARACT IOL, RT/LT Right 10/16/2018    [facility]      CATARACT IOL, RT/LT Left 11/01/2018    [facility]      CHALAZION EXCISION  08/21/2015    Left lid     HERNIA REPAIR      abdominal hernia repair 2012     amitriptyline (ELAVIL) 10 MG tablet  Artificial Tear Ointment (REFRESH P.M.) OINT  atorvastatin (LIPITOR) 20 MG tablet  carboxymethylcellulose (REFRESH PLUS) 0.5 % SOLN  carboxymethylcellulose PF (CARBOXYMETHYLCELLULOSE SODIUM) 0.5 % ophthalmic solution  Cholecalciferol (VITAMIN D) 2000 UNITS  tablet  clotrimazole-betamethasone (LOTRISONE) 1-0.05 % external cream  diclofenac (VOLTAREN) 1 % topical gel  diclofenac (VOLTAREN) 1 % topical gel  doxycycline Monohydrate 100 MG TABS  ferrous sulfate (IRON) 325 (65 FE) MG tablet  gabapentin (NEURONTIN) 300 MG capsule  gabapentin (NEURONTIN) 600 MG tablet  hydroxychloroquine (PLAQUENIL) 200 MG tablet  ketorolac (ACULAR) 0.5 % ophthalmic solution  methylPREDNISolone (MEDROL DOSEPAK) 4 MG tablet therapy pack  methylPREDNISolone (MEDROL DOSEPAK) 4 MG tablet  Multiple Minerals (CALCIUM-MAGNESIUM-ZINC) TABS  omega 3 1000 MG CAPS  omeprazole (PRILOSEC) 20 MG capsule  omeprazole (PRILOSEC) 20 MG DR capsule  oxyCODONE (ROXICODONE) 5 MG immediate release tablet  piroxicam (FELDENE) 20 MG capsule  Riboflavin 400 MG TABS  SHINGRIX injection  SUMAtriptan (IMITREX) 50 MG tablet  tiZANidine (ZANAFLEX) 4 MG tablet      Allergies   Allergen Reactions     No Clinical Screening - See Comments Nausea and Vomiting     Liver side effects from INH for TB     Past medical history, past surgical history, medications, and allergies were reviewed with the patient. Additional pertinent items: None    Family History  Family History   Problem Relation Age of Onset     Glaucoma Mother      Macular Degeneration No family hx of      Cancer No family hx of      Diabetes No family hx of      Hypertension No family hx of      Cerebrovascular Disease No family hx of      Thyroid Disease No family hx of      Eye Surgery No family hx of      Family history was reviewed with the patient. Additional pertinent items: None    Social History  Social History     Tobacco Use     Smoking status: Never Smoker     Smokeless tobacco: Never Used   Substance Use Topics     Alcohol use: No     Drug use: No      Social history was reviewed with the patient. Additional pertinent items: None      Review of Systems   Constitutional: Negative for fever.   HENT: Negative for congestion.    Eyes: Negative for redness.    Respiratory: Negative for shortness of breath.    Cardiovascular: Negative for chest pain.   Gastrointestinal: Positive for abdominal pain.   Genitourinary: Negative for difficulty urinating.   Musculoskeletal: Negative for arthralgias and neck stiffness.   Skin: Negative for color change.   Neurological: Negative for headaches.   Psychiatric/Behavioral: Negative for confusion.     A complete review of systems was performed with pertinent positives and negatives noted in the HPI, and all other systems negative.    Physical Exam   BP: (!) 179/90  Pulse: 92  Temp: 96.5  F (35.8  C)  Resp: 16  Weight: 90.8 kg (200 lb 3.2 oz)  SpO2: 99 %  Physical Exam  Constitutional:       General: She is not in acute distress.     Appearance: She is obese. She is not diaphoretic.   HENT:      Head: Atraumatic.      Mouth/Throat:      Pharynx: No oropharyngeal exudate.   Eyes:      General: No scleral icterus.     Pupils: Pupils are equal, round, and reactive to light.   Cardiovascular:      Heart sounds: Normal heart sounds.   Pulmonary:      Effort: No respiratory distress.      Breath sounds: Normal breath sounds.   Abdominal:      General: Bowel sounds are normal.      Palpations: Abdomen is soft.      Tenderness: There is no abdominal tenderness.       Musculoskeletal:         General: No tenderness.   Skin:     General: Skin is warm.      Findings: No rash.         ED Course      Procedures        The medical record was reviewed and interpreted.  Current labs reviewed and interpreted.  Previous labs reviewed and interpreted.  Current images reviewed and interpreted: No obvious ventral hernia.       Results for orders placed or performed during the hospital encounter of 04/13/21   CT Abdomen Pelvis w Contrast     Status: None    Narrative    CT ABDOMEN/PELVIS WITH CONTRAST April 13, 2021 11:01 AM    CLINICAL HISTORY: Abdominal pain, hernia suspected.    TECHNIQUE: CT scan of the abdomen and pelvis was performed  following  injection of IV contrast. Multiplanar reformats were obtained. Dose  reduction techniques were used.  CONTRAST: 98mL of isovue 370.    COMPARISON: None.    FINDINGS:   LOWER CHEST: No infiltrates or effusions.    HEPATOBILIARY: No significant mass or bile duct dilatation. No  calcified gallstones.     PANCREAS: No significant mass, duct dilatation, or inflammatory  change.    SPLEEN: Normal size.    ADRENAL GLANDS: No significant nodules.    KIDNEYS/BLADDER: No significant mass, stones, or hydronephrosis.    BOWEL: No obstruction or inflammatory change.    PELVIC ORGANS: No pelvic masses.    ADDITIONAL FINDINGS: Hernia repair changes seen anteriorly above the  umbilicus. No definite recurrent hernia there. Question a small  fat-containing right inguinal hernia. Possible small fat-containing  left inguinal hernia as well. No umbilical hernia. No ascites.    MUSCULOSKELETAL: Survey of the visualized bony structures demonstrates  no destructive bony lesions.      Impression    IMPRESSION:   1.  No acute process demonstrated.  2.  Possible tiny bilateral fat-containing inguinal hernias without  inflammatory change. No other hernia demonstrated.    RONN GONZALEZ MD   Comprehensive metabolic panel     Status: Abnormal   Result Value Ref Range    Sodium 138 133 - 144 mmol/L    Potassium 3.8 3.4 - 5.3 mmol/L    Chloride 106 94 - 109 mmol/L    Carbon Dioxide 24 20 - 32 mmol/L    Anion Gap 8 3 - 14 mmol/L    Glucose 112 (H) 70 - 99 mg/dL    Urea Nitrogen 8 7 - 30 mg/dL    Creatinine 0.70 0.52 - 1.04 mg/dL    GFR Estimate >90 >60 mL/min/[1.73_m2]    GFR Estimate If Black >90 >60 mL/min/[1.73_m2]    Calcium 9.0 8.5 - 10.1 mg/dL    Bilirubin Total 0.4 0.2 - 1.3 mg/dL    Albumin 3.8 3.4 - 5.0 g/dL    Protein Total 7.2 6.8 - 8.8 g/dL    Alkaline Phosphatase 104 40 - 150 U/L    ALT 21 0 - 50 U/L    AST 11 0 - 45 U/L   CBC with platelets differential     Status: None   Result Value Ref Range    WBC 7.0 4.0 - 11.0 10e9/L     RBC Count 4.41 3.8 - 5.2 10e12/L    Hemoglobin 12.9 11.7 - 15.7 g/dL    Hematocrit 38.7 35.0 - 47.0 %    MCV 88 78 - 100 fl    MCH 29.3 26.5 - 33.0 pg    MCHC 33.3 31.5 - 36.5 g/dL    RDW 12.4 10.0 - 15.0 %    Platelet Count 274 150 - 450 10e9/L    Diff Method Automated Method     % Neutrophils 63.9 %    % Lymphocytes 23.6 %    % Monocytes 6.9 %    % Eosinophils 4.9 %    % Basophils 0.4 %    % Immature Granulocytes 0.3 %    Nucleated RBCs 0 0 /100    Absolute Neutrophil 4.4 1.6 - 8.3 10e9/L    Absolute Lymphocytes 1.6 0.8 - 5.3 10e9/L    Absolute Monocytes 0.5 0.0 - 1.3 10e9/L    Absolute Eosinophils 0.3 0.0 - 0.7 10e9/L    Absolute Basophils 0.0 0.0 - 0.2 10e9/L    Abs Immature Granulocytes 0.0 0 - 0.4 10e9/L    Absolute Nucleated RBC 0.0    Lipase     Status: None   Result Value Ref Range    Lipase 174 73 - 393 U/L   UA reflex to Microscopic and Culture     Status: Abnormal    Specimen: Urine Midstream; Midstream Urine   Result Value Ref Range    Color Urine Straw     Appearance Urine Clear     Glucose Urine Negative NEG^Negative mg/dL    Bilirubin Urine Negative NEG^Negative    Ketones Urine Negative NEG^Negative mg/dL    Specific Gravity Urine 1.004 1.003 - 1.035    Blood Urine Negative NEG^Negative    pH Urine 7.5 (H) 5.0 - 7.0 pH    Protein Albumin Urine Negative NEG^Negative mg/dL    Urobilinogen mg/dL Normal 0.0 - 2.0 mg/dL    Nitrite Urine Negative NEG^Negative    Leukocyte Esterase Urine Negative NEG^Negative    Source Midstream Urine      Medications   lidocaine (XYLOCAINE) 2 % 15 mL, alum & mag hydroxide-simethicone (MAALOX) 15 mL GI Cocktail (30 mLs Oral Given 4/13/21 1802)   iopamidol (ISOVUE-370) solution 100 mL (98 mLs Intravenous Given 4/13/21 0759)   sodium chloride 0.9 % bag 500mL for CT scan flush use (65 mLs As instructed Given 4/13/21 6064)        Assessments & Plan (with Medical Decision Making)   65-year-old female presents for evaluation of right upper quadrant abdomen and periumbilical  pain.  Differential included hernia, incarceration, strangulation, pancreatitis, cholecystitis, cholelithiasis, colitis, diverticulitis.  Exam revealed minimal right upper quadrant tenderness.  Laboratories including CBC, comprehensive metabolic panel and urinalysis were all grossly normal.  CT of the abdomen revealed no acute pathology with exception of possible inguinal hernia.  Will be treated with over-the-counter analgesics and instructions to follow-up with primary physician.    I have reviewed the nursing notes. I have reviewed the findings, diagnosis, plan and need for follow up with the patient.    New Prescriptions    No medications on file       Final diagnoses:   RUQ abdominal pain       --  Kirit Barros MD  [facility] EMERGENCY DEPARTMENT  4/13/2021     Kirit Barros MD  04/13/21 1214

## 2021-04-30 ENCOUNTER — OFFICE VISIT (OUTPATIENT)
Dept: INTERNAL MEDICINE | Facility: CLINIC | Age: 66
End: 2021-04-30
Payer: MEDICARE

## 2021-04-30 ENCOUNTER — ANCILLARY PROCEDURE (OUTPATIENT)
Dept: MAMMOGRAPHY | Facility: CLINIC | Age: 66
End: 2021-04-30
Attending: NURSE PRACTITIONER
Payer: MEDICARE

## 2021-04-30 VITALS
OXYGEN SATURATION: 97 % | DIASTOLIC BLOOD PRESSURE: 83 MMHG | WEIGHT: 200.5 LBS | SYSTOLIC BLOOD PRESSURE: 148 MMHG | BODY MASS INDEX: 33.36 KG/M2 | HEART RATE: 72 BPM

## 2021-04-30 DIAGNOSIS — Z13.220 LIPID SCREENING: ICD-10-CM

## 2021-04-30 DIAGNOSIS — K21.9 GASTROESOPHAGEAL REFLUX DISEASE, UNSPECIFIED WHETHER ESOPHAGITIS PRESENT: ICD-10-CM

## 2021-04-30 DIAGNOSIS — R73.03 PREDIABETES: ICD-10-CM

## 2021-04-30 DIAGNOSIS — Z11.4 SCREENING FOR HUMAN IMMUNODEFICIENCY VIRUS WITHOUT PRESENCE OF RISK FACTORS: ICD-10-CM

## 2021-04-30 DIAGNOSIS — Z87.19 S/P HERNIA REPAIR: ICD-10-CM

## 2021-04-30 DIAGNOSIS — Z12.31 ENCOUNTER FOR SCREENING MAMMOGRAM FOR BREAST CANCER: ICD-10-CM

## 2021-04-30 DIAGNOSIS — Z12.4 CERVICAL CANCER SCREENING: ICD-10-CM

## 2021-04-30 DIAGNOSIS — Z00.00 ROUTINE HISTORY AND PHYSICAL EXAMINATION OF ADULT: Primary | ICD-10-CM

## 2021-04-30 DIAGNOSIS — I10 BENIGN ESSENTIAL HYPERTENSION: ICD-10-CM

## 2021-04-30 DIAGNOSIS — M25.50 MULTIPLE JOINT PAIN: ICD-10-CM

## 2021-04-30 DIAGNOSIS — Z98.890 S/P HERNIA REPAIR: ICD-10-CM

## 2021-04-30 DIAGNOSIS — Z12.11 SPECIAL SCREENING FOR MALIGNANT NEOPLASMS, COLON: ICD-10-CM

## 2021-04-30 DIAGNOSIS — L29.9 GENERALIZED PRURITUS: ICD-10-CM

## 2021-04-30 LAB
CHOLEST SERPL-MCNC: 176 MG/DL
HBA1C MFR BLD: 5.7 % (ref 0–5.6)
HDLC SERPL-MCNC: 70 MG/DL
HIV 1+2 AB+HIV1 P24 AG SERPL QL IA: NONREACTIVE
LDLC SERPL CALC-MCNC: 85 MG/DL
NONHDLC SERPL-MCNC: 106 MG/DL
TRIGL SERPL-MCNC: 103 MG/DL

## 2021-04-30 PROCEDURE — 83036 HEMOGLOBIN GLYCOSYLATED A1C: CPT | Performed by: PATHOLOGY

## 2021-04-30 PROCEDURE — 87624 HPV HI-RISK TYP POOLED RSLT: CPT | Performed by: PATHOLOGY

## 2021-04-30 PROCEDURE — 36415 COLL VENOUS BLD VENIPUNCTURE: CPT | Performed by: PATHOLOGY

## 2021-04-30 PROCEDURE — 87389 HIV-1 AG W/HIV-1&-2 AB AG IA: CPT | Performed by: NURSE PRACTITIONER

## 2021-04-30 PROCEDURE — G0123 SCREEN CERV/VAG THIN LAYER: HCPCS | Performed by: PATHOLOGY

## 2021-04-30 PROCEDURE — 80061 LIPID PANEL: CPT | Performed by: PATHOLOGY

## 2021-04-30 PROCEDURE — 77067 SCR MAMMO BI INCL CAD: CPT | Mod: GC | Performed by: RADIOLOGY

## 2021-04-30 PROCEDURE — 99396 PREV VISIT EST AGE 40-64: CPT | Performed by: NURSE PRACTITIONER

## 2021-04-30 RX ORDER — PANTOPRAZOLE SODIUM 20 MG/1
40 TABLET, DELAYED RELEASE ORAL DAILY
Qty: 90 TABLET | Refills: 1 | Status: SHIPPED | OUTPATIENT
Start: 2021-04-30 | End: 2021-05-04

## 2021-04-30 RX ORDER — CETIRIZINE HYDROCHLORIDE 10 MG/1
10 TABLET ORAL DAILY
Qty: 90 TABLET | Refills: 1 | Status: SHIPPED | OUTPATIENT
Start: 2021-04-30 | End: 2021-09-14

## 2021-04-30 NOTE — LETTER
Patient:  Yancy Rivera  :   1955  MRN:     [id number]        Ms. Yancy Rivera  [address]   [place] [address]        May 10, 2021    Dear Ms. Rivera,    Thank you for choosing the [facility] [place] for your healthcare needs.  We appreciate the opportunity to serve you.    The following are your recent test results.     HiYancy,     Your pap smear was normal. There was no sign of abnormal cervical cells. You tested negative for HPV (the virus that causes cervical cancer).     We don't typically do routine pap smears after age 65 as long as you are not having any abnormal symptoms.     Please let me know if you have any questions.   Thank you,   CHRISSIE Hopper CNP   Resulted Orders   HPV High Risk Types DNA Cervical   Result Value Ref Range    HPV Source SurePath     HPV 16 DNA Negative NEG^Negative    HPV 18 DNA Negative NEG^Negative    Other HR HPV Negative NEG^Negative    Final Diagnosis This patient's sample is negative for HPV DNA.       Comment:      This test was developed and its performance characteristics determined by the   [facility], [facility]. It   has not been cleared or approved by the FDA. The laboratory is regulated under   CLIA as qualified to perform high-complexity testing. This test is used for   clinical purposes. It should not be regarded as investigational or for   research.  (Note)  METHODOLOGY:  The Roche roshan 4800 system uses automated extraction,   simultaneous amplification of HPV (L1 region) and beta-globin,    followed by  real time detection of fluorescent labeled HPV and beta   globin using specific oligonucleotide probes . The test specifically   identifies types HPV 16 DNA and HPV 18 DNA while concurrently   detecting the rest of the high risk types (31, 33, 35, 39, 45, 51,   52, 56, 58, 59, 66 or 68).  COMMENTS:  This test is not intended for use as  a screening device   for women under age 30 with normal cervical cytology.  Results should   be correl ated with cytologic and histologic findings. Close clinical   followup is recommended.      Specimen Description Cervical Cells    A pap thin layer screen   Result Value Ref Range    PAP NIL     Copath Report         Patient Name: ROSA DOUGHERTY  MR#: [id number]  Specimen #: [id number]  Collected: 4/30/2021  Received: 5/4/2021  Reported: 5/5/2021 10:21  Ordering Phy(s): BETSY MONTEIRO    For improved result formatting, select 'View Enhanced Report Format' under   Linked Documents section.    SPECIMEN/STAIN PROCESS:  Pap thin layer prep screening (Surepath)       Pap-Cyto x 1, HPV ordered x 1    SOURCE: Cervical, endocervical  ----------------------------------------------------------------   Pap thin layer prep screening (Surepath)  SPECIMEN ADEQUACY:  Satisfactory for evaluation.  Specimen was unable to be imaged on the   Longaccess Slide .  -Transformation zone component present.    CYTOLOGIC INTERPRETATION:    Negative for intraepithelial lesion or malignancy    Electronically signed out by:  BELINDA Koch (ASCP)    CLINICAL HISTORY:    Post Menopausal, A previous normal pap  Date of Last Pap: 6/20/2016,    Papanicolaou Test Limitations:  Cervical cytology is a screening t est with   limited sensitivity; regular  screening is critical for cancer prevention; Pap tests are primarily   effective for the diagnosis/prevention of  squamous cell carcinoma, not adenocarcinomas or other cancers.    The technical component of this testing was completed at the [facility], with the professional component performed   at the [facility], [address] [place] [address],   [place] [address] ([phone])    COLLECTION SITE:  Client:  [facility]  Location:  [facility] (B)         Please contact your provider if you have any questions or concerns.  We look forward to serving your needs in the future.      Sincerely,        [facility] Staff

## 2021-04-30 NOTE — NURSING NOTE
Chief Complaint   Patient presents with     Physical     annual physical     Gastric Problem     wants to change to protonix 40mg     Derm Problem     itching along entire body       Evelyn Chicas EMT at 10:42 AM on 4/30/2021

## 2021-04-30 NOTE — PROGRESS NOTES
"SUBJECTIVE:  Yancy Rivera is a 65 year old female with pmh of   Patient Active Problem List   Diagnosis     Hyperlipidemia with target LDL less than 130     Prediabetes     Disorder of bursae and tendons in shoulder region     Shingles     Primary open angle glaucoma of both eyes, moderate stage     Senile nuclear sclerosis, bilateral     Right lumbar radiculopathy     Benign essential hypertension     who comes in for preventive care examination today.   She has the following concerns     GERD--has been taking Omeprazole for >20 years, but this is no longer helping. Has acid reflux on daily basis. Would like to try Pantoprazole 20 mg as an alternative, borrowed from a friend and this worked better for her.     Hernia/lower abd pain--had hernia repair in 2012, feels pain when walking, seems similar now. Pain has been going on x2 months, presented to ED on 4/13/21, CT abd/pelvis showed per radiology read,   \"IMPRESSION:   1.  No acute process demonstrated.  2.  Possible tiny bilateral fat-containing inguinal hernias without  inflammatory change. No other hernia demonstrated.\"    Generalized itching--feels \"itchy everywhere\" over the past month, no rashes or discoloration. Has not done anything to treat it. Itching is random, no known causes. Not sure if she's had any new lotions, but otherwise no new products or detergents.    In terms of lifestyle, exercise has been limited, whatever she can do at home. Not as much as she would like, however. Does an exercise video, as well as in her senior living complex. Certain movements cause abdominal pain, core exercises, etc.    BPs used to fluctuate at home, no longer has a BP monitor but would like to have one. 24-hour BP monitor in 2019 showed average 120s/70s.     She previously saw Dr. Gallardo in Rheumatology for arthritis, was on Plaquenil, would like to follow-up with him again.     Menses:  No LMP recorded (lmp unknown). Patient is postmenopausal.  Menstrual cycles " are absent, menopause ~20 yrs ago.     PAP HX:   Last   Lab Results   Component Value Date    PAP NIL 06/20/2016     History of abnormal None    Breast:   Patient performs self breast exams  Yes   Breast concerns No  No results found.    Sexual Hx:  Sexually active  not at present  Partner(s) are  [place]  IS NOT interested in STD testing    Pregnancy:   G  5 and P  7 (triplets)      Medications and allergies reviewed by me today.     Family History   Problem Relation Age of Onset     Glaucoma Mother      Macular Degeneration No family hx of      Cancer No family hx of      Diabetes No family hx of      Hypertension No family hx of      Cerebrovascular Disease No family hx of      Thyroid Disease No family hx of      Eye Surgery No family hx of        Social History     Tobacco Use     Smoking status: Never Smoker     Smokeless tobacco: Never Used   Substance Use Topics     Alcohol use: No     Drug use: No       Immunization History   Administered Date(s) Administered     COVID-19,PF,Moderna 02/24/2021, 03/24/2021     HEPA 07/02/2013     HepA-Adult 06/20/2016     Influenza (IIV3) PF 10/01/2009, 12/21/2011, 10/24/2012, 02/12/2014, 11/10/2014, 10/18/2016     Influenza Vaccine IM > 6 months Valent IIV4 01/13/2020     MMR 05/24/1999, 11/19/2001     Mantoux Tuberculin Skin Test 02/06/2008     Meningococcal (Menomune ) 07/02/2013     Pneumococcal 23 valent 06/20/2016     Poliovirus, inactivated (IPV) 07/02/2013     TD (ADULT, 7+) 05/24/1999, 07/09/2001     Tdap (Adacel,Boostrix) 12/21/2011     Typhoid IM 07/02/2013     Yellow Fever 07/02/2013     Zoster vaccine recombinant adjuvanted (SHINGRIX) 05/07/2020, 08/12/2020         Review Of Systems    Constitutional: no fevers, chills, night sweats or unintentional weight change   Eyes: no vision change, diplopia or red eyes   Ears, Nose, Mouth, Throat: no tinnitus or hearing change, no epistaxis or nasal discharge, no oral lesions, throat clear   Cardiovascular: no chest pain,  palpitations, or pain with walking, no orthopnea or PND   Respiratory: no dyspnea, cough, shortness of breath or wheezing   GI: no nausea, vomiting, diarrhea or constipation, no abdominal pain   : no change in urine, no dysuria or hematuria, no sexual dysfunction   Musculoskeletal: no joint or muscle pain or swelling   Integumentary: no concerning lesions or moles   Neuro: no loss of strength or sensation, no numbness or tingling, no tremor, no dizziness, no headache   Psych: no depression or anxiety, no sleep problems      OBJECTIVE:    BP (!) 148/83   Pulse 72   Wt 90.9 kg (200 lb 8 oz)   LMP  (LMP Unknown)   SpO2 97%   BMI 33.36 kg/m     Wt Readings from Last 1 Encounters:   04/30/21 90.9 kg (200 lb 8 oz)       Constitutional: no distress, comfortable, pleasant   Eyes: anicteric, normal extra-ocular movements   Ears, Nose and Throat: tympanic membranes clear, nose clear and free of lesions, throat clear, neck supple with full range of motion, no thyromegaly.   Cardiovascular: regular rate and rhythm, normal S1 and S2, no murmurs, rubs or gallops, peripheral pulses full and symmetric   Respiratory: clear to auscultation, no wheezes or crackles, normal breath sounds   Gastrointestinal: positive bowel sounds, nontender, no hepatosplenomegaly, no masses   Genitourinary: normal external genitalia,  no enlargement of the [place] or [place] glands, urethra normal, perineum normal and free of lesions, cervix normal without lesions, no masses or cervical motion tenderness, adnexa without enlargement or lesions, though exam limited d/t body habitus  Musculoskeletal: full range of motion, no edema   Skin: no concerning rash, has a few scattered small scabs on arms, no jaundice   Breast: no lumps or masses, no nipple discharge, no skin changes  Neurological: cranial nerves intact, normal strength and sensation, reflexes at patella and biceps normal, normal gait, no tremor   Psychological: appropriate mood,  demonstrates intact judgment and logical thought processes  Lymphatic: no cervical or clavicular lymphadenopathy    ASSESSMENT/PLAN:  Pt is a 65 year old female here for preventive examination    1. Routine history and physical examination of adult  Encouraged work on healthy lifestyle with increased physical activity, nutritious diet with good portion control, focusing on lean proteins and vegetable/fruit intake, stress management and safety.    2. Benign essential hypertension  BP elevated in clinic; has previously been within normal limits on 24-hr ambulatory BP monitor. Continue to monitor at home, new BP cuff ordered for her.   - Home Blood Pressure Monitor Order    3. Prediabetes  Recheck A1c today.  - Hemoglobin A1c; Future    4. S/P hernia repair  She has ongoing pain at her prior hernia repair site. Discussed that this is likely multifactorial, could be related to scar tissue, etc. Encouraged her to work on weight loss. She would like to meet with Gen Surgery to discuss options. Referral provided.  - GENERAL SURG ADULT REFERRAL    5. Cervical cancer screening  Due for routine pap.  - Pap imaged thin layer screen with HPV - recommended age 30 - 65 years (select HPV order below)  - HPV High Risk Types DNA Cervical    6. Encounter for screening mammogram for breast cancer  - Mammogram, routine screening; Future    7. Lipid screening  She is fasting today. Continues on the Atorvastatin.  - Lipid panel reflex to direct LDL Fasting; Future    8. Gastroesophageal reflux disease, unspecified whether esophagitis present  Due for colonoscopy, and given persistent GERD on Omeprazole x20 years, now with exacerbation in her symptoms, will check EGD as well.   - GASTROENTEROLOGY ADULT REF PROCEDURE ONLY; Future  - pantoprazole (PROTONIX) 20 MG EC tablet; Take 2 tablets (40 mg) by mouth daily  Dispense: 90 tablet; Refill: 1    9. Special screening for malignant neoplasms, colon  - GASTROENTEROLOGY ADULT REF PROCEDURE  ONLY; Future    10. Generalized pruritus  Can use Cetrizine for generalized itching.   - cetirizine (ZYRTEC) 10 MG tablet; Take 1 tablet (10 mg) by mouth daily  Dispense: 90 tablet; Refill: 1    11. Multiple joint pain  Seronegative rheumatoid arthritis (RF, PREET, and anti-CCP negative in 8007-4128, increased CRP). She would like to follow-up with Rheumatology, last saw Dr. Gallardo in 2016 and 6 month follow-up was recommended at that time. She has persistent pain in the hands and wrists.  - Rheumatology Referral; Future    12. Screening for human immunodeficiency virus without presence of risk factors  One-time screening.   - HIV Antigen Antibody Combo; Future    FOLLOW UP: Return in about 3 months (around 7/30/2021) for in person.    GYN TESTING:  Breast examination performed.Yes   Pelvic examination performed Yes    Pap   Yes  If normal PAP results no further pap needed  STD testing No declines, not sexually active in several years    PREVENTATIVE TESTING:  Breast cancer screening   Ordered  Colorectal screening   Ordered  Osteoporosis screening not indicated.  Recommend that patient take 1200 mg calcium in divided doses and 1000 IU of vitamin D on daily basis.   Immunizations reviewed and updated in EPIC    Labs ordered As above  Counseling was provided in the following areas:  regular exercise  weight management  healthy diet/nutrition  osteoporosis prevention/bone health  self breast exam     CHRISSIE Hopper CNP      DME (Durable Medical Equipment) Orders and Documentation  Orders Placed This Encounter   Procedures     Home Blood Pressure Monitor Order      The patient was assessed and it was determined the patient is in need of the following listed DME Supplies/Equipment. Please complete supporting documentation below to demonstrate medical necessity.      DME All Other Item(s) Documentation    List reason for need and supporting documentation for medical necessity below for each DME item.     1. BP  elevated in clinic, needs home BP monitor for ongoing assessment to determine need for antihypertensive medication.

## 2021-05-03 ENCOUNTER — TELEPHONE (OUTPATIENT)
Dept: INTERNAL MEDICINE | Facility: CLINIC | Age: 66
End: 2021-05-03

## 2021-05-03 DIAGNOSIS — K21.9 GASTROESOPHAGEAL REFLUX DISEASE, UNSPECIFIED WHETHER ESOPHAGITIS PRESENT: ICD-10-CM

## 2021-05-03 NOTE — TELEPHONE ENCOUNTER
[facility] Call Center    Phone Message    May a detailed message be left on voicemail: yes     Reason for Call: Medication Question or concern regarding medication   Prescription Clarification  Name of Medication: pantoprazole (PROTONIX) 20 MG EC tablet  Prescribing Provider: Mariaelena Schultz   Pharmacy: Patient wants to switch pharmacy to [facility] in [place] [address] [address]   What on the order needs clarification? Patient would like to know the status of this prescription refill and would like to switch pharmacy. Please send prescription to new pharmacy. Thank you.           Action Taken: Message routed to:  [facility] ([facility]    Travel Screening: Not Applicable

## 2021-05-03 NOTE — TELEPHONE ENCOUNTER
REFERRAL INFORMATION:    Referring Provider:  Mariaelena Schultz NP    Referring Clinic:  [facility] - Primary Care    Reason for Visit/Diagnosis: Abdominal Pain. [place]       FUTURE VISIT INFORMATION:    Appointment Date: 5/6/2021    Appointment Time: 12:30 PM     NOTES RECORD STATUS  DETAILS   OFFICE NOTE from Referring Provider Internal ealth:  4/30/21 - [facility] OV with Mariaelena Schultz NP   OFFICE NOTE from Other Specialists Care Everywhere [place]:  6/2/17 - [facility] OV with Dr. Bonner   HOSPITAL DISCHARGE SUMMARY/ ED VISITS  Care Everywhere / Internal [facility]:  4/13/21 - ED OV with Dr. Papo Villalta:  3/5/19 - ED OV with Dr. Faustin   OPERATIVE REPORT Care Everywhere [facility]  11/12/2012 - OP Note for Hernia Repair   ENDOSCOPY (EGD)  N/A    PERTINENT LABS Internal    PATHOLOGY REPORTS (RELATED) N/A    IMAGING (CT, MRI, US, XR)  In process / Internal [facility]:  4/13/21 - CT Abd/Pelvis    [facility]:  3/5/19 - CT Abd/Pelvis     Records Requested  05/03/21    Facility  [facility]  Fax: [phone]    Outcome * 5/3/21 12:30 PM faxed req to [employer] for images to be sent to the clinic. - Yolanda

## 2021-05-04 RX ORDER — PANTOPRAZOLE SODIUM 20 MG/1
40 TABLET, DELAYED RELEASE ORAL DAILY
Qty: 90 TABLET | Refills: 1 | Status: SHIPPED | OUTPATIENT
Start: 2021-05-04 | End: 2021-07-27

## 2021-05-05 ENCOUNTER — OFFICE VISIT (OUTPATIENT)
Dept: OPHTHALMOLOGY | Facility: CLINIC | Age: 66
End: 2021-05-05
Attending: OPHTHALMOLOGY
Payer: MEDICARE

## 2021-05-05 DIAGNOSIS — H40.1132 PRIMARY OPEN ANGLE GLAUCOMA OF BOTH EYES, MODERATE STAGE: Primary | ICD-10-CM

## 2021-05-05 LAB
COPATH REPORT: NORMAL
PAP: NORMAL

## 2021-05-05 PROCEDURE — 65855 TRABECULOPLASTY LASER SURG: CPT | Mod: RT | Performed by: OPHTHALMOLOGY

## 2021-05-05 PROCEDURE — G0463 HOSPITAL OUTPT CLINIC VISIT: HCPCS | Mod: 25

## 2021-05-05 RX ORDER — KETOROLAC TROMETHAMINE 5 MG/ML
1 SOLUTION OPHTHALMIC 2 TIMES DAILY
Qty: 5 ML | Refills: 1 | Status: SHIPPED | OUTPATIENT
Start: 2021-05-05 | End: 2021-10-13

## 2021-05-05 ASSESSMENT — VISUAL ACUITY
OS_SC+: -1
OD_SC: 20/25
OS_PH_SC+: -1
OS_SC: 20/40
OS_PH_SC: 20/30
METHOD: SNELLEN - LINEAR
OD_SC+: -1

## 2021-05-05 ASSESSMENT — SLIT LAMP EXAM - LIDS
COMMENTS: PTOSIS
COMMENTS: PTOSIS

## 2021-05-05 ASSESSMENT — TONOMETRY
OD_IOP_MMHG: 13
OS_IOP_MMHG: 14
IOP_METHOD: TONOPEN
OD_IOP_MMHG: 16
IOP_METHOD: APPLANATION

## 2021-05-05 ASSESSMENT — CONF VISUAL FIELD
OS_NORMAL: 1
METHOD: COUNTING FINGERS
OD_NORMAL: 1

## 2021-05-05 ASSESSMENT — EXTERNAL EXAM - LEFT EYE: OS_EXAM: NORMAL

## 2021-05-05 ASSESSMENT — EXTERNAL EXAM - RIGHT EYE: OD_EXAM: NORMAL

## 2021-05-05 NOTE — PROGRESS NOTES
Chief Complaint(s) and History of Present Illness(es)     Glaucoma Follow-Up     Laterality: both eyes    Associated symptoms: Negative for eye pain, floaters and flashes    Pain scale: 0/10              Comments     Yancy is here to continue care for Primary open angle glaucoma of both   eyes, moderate stage. Today she is here for SLT laser RE and is one week   post SLT LE. She says LE feels very good.     Gigi Villar COT 10:38 AM May 5, 2021               Review of systems for the eyes was negative other than the pertinent positives/negatives listed in the HPI.      Assessment & Plan      Yancy Rivera is a 65 year old female with the following diagnoses:   1. Primary open angle glaucoma of both eyes, moderate stage       S/P Selected laser trabeculoplasty (SLT) left eye 4/7/21  Intraocular pressure stable in the low teens, no complications  Here today for right eye laser    RBA to Selected laser trabeculoplasty (SLT) right eye discussed, consent obtained, will proceed today.   Start Ketorolac BID left eye until next visit.         Patient disposition:   Return in about 6 weeks (around 6/16/2021) for VT only.           Attending Physician Attestation:  Complete documentation of historical and exam elements from today's encounter can be found in the full encounter summary report (not reduplicated in this progress note).  I personally obtained the chief complaint(s) and history of present illness.  I confirmed and edited as necessary the review of systems, past medical/surgical history, family history, social history, and examination findings as documented by others; and I examined the patient myself.  I personally reviewed the relevant tests, images, and reports as documented above.  I formulated and edited as necessary the assessment and plan and discussed the findings and management plan with the patient and family. . - Constantin Rollins MD

## 2021-05-05 NOTE — NURSING NOTE
Chief Complaints and History of Present Illnesses   Patient presents with     Glaucoma Follow-Up     Chief Complaint(s) and History of Present Illness(es)     Glaucoma Follow-Up     Laterality: both eyes    Associated symptoms: Negative for eye pain, floaters and flashes    Pain scale: 0/10              Comments     Yancy is here to continue care for Primary open angle glaucoma of both eyes, moderate stage. Today she is here for SLT laser RE and is one week post SLT LE. She says LE feels very good.     Gigi Villar COT 10:38 AM May 5, 2021

## 2021-05-06 ENCOUNTER — OFFICE VISIT (OUTPATIENT)
Dept: SURGERY | Facility: CLINIC | Age: 66
End: 2021-05-06
Attending: NURSE PRACTITIONER
Payer: MEDICARE

## 2021-05-06 ENCOUNTER — PRE VISIT (OUTPATIENT)
Dept: SURGERY | Facility: CLINIC | Age: 66
End: 2021-05-06

## 2021-05-06 VITALS
SYSTOLIC BLOOD PRESSURE: 141 MMHG | DIASTOLIC BLOOD PRESSURE: 75 MMHG | BODY MASS INDEX: 33.15 KG/M2 | TEMPERATURE: 97.7 F | WEIGHT: 199 LBS | OXYGEN SATURATION: 98 % | HEART RATE: 63 BPM | HEIGHT: 65 IN

## 2021-05-06 DIAGNOSIS — R10.33 PERIUMBILICAL PAIN: Primary | ICD-10-CM

## 2021-05-06 PROCEDURE — 99203 OFFICE O/P NEW LOW 30 MIN: CPT | Performed by: SURGERY

## 2021-05-06 ASSESSMENT — PAIN SCALES - GENERAL: PAINLEVEL: WORST PAIN (10)

## 2021-05-06 ASSESSMENT — MIFFLIN-ST. JEOR: SCORE: 1450.6

## 2021-05-06 NOTE — LETTER
5/6/2021       RE: Yancy Rivera  [address]  [place] [address]     Dear Colleague,    Thank you for referring your patient, Yancy Rivera, to the [facility] GENERAL SURGERY [facility]IC [place] [facility]. Please see a copy of my visit note below.    Yancy Rivera is a 65 year old female with a 2 month history of a tori umbilical  abdominal pain that is intermittent and localized at points.    Onset did not occur with lifting.  Obstructive symptoms:  no  Urinary difficulties:  no  Chronic cough: no  Constipation:  Not assessed.  Current level of activity:  Low    Past medical history, medications, allergies, family history, and social history were reviewed with the patient.  [place] [facility] [place]    Past Medical History:   Diagnosis Date     Blepharitis      Esophageal reflux (aka GERD)      Glaucoma      Hyperlipidemia LDL goal < 130      Nonsenile cataract      Shingles     11/14/15 Left approximately C6 distribution       Past Surgical History:   Procedure Laterality Date     CATARACT IOL, RT/LT Right 10/16/2018    [place] [facility]      CATARACT IOL, RT/LT Left 11/01/2018    [place] [facility]      CHALAZION EXCISION  08/21/2015    Left lid     HERNIA REPAIR      abdominal hernia repair 2012       ROS: 10 point review of systems negative except noted in HPI  CT done at ER visit.  Results reviewed and I agree with findings -  IMPRESSION:   1.  No acute process demonstrated.  2.  Possible tiny bilateral fat-containing inguinal hernias without  inflammatory change. No other hernia demonstrated.  OF NOTE: multiple tacks noted around mesh repair from previous surgery. No recurrence of hernia.  Findings of fat in inguinal canals are incidental and of no concern to today's evaluation.    Impression: chronic pain in patient s/p ventral hernia repair  "without recurrence. Suspect scar tear inflammatory response.  Rec: Local heat and follow up with pain specialist. See no indications for further surgical intervention.  Inguinal hernia are incidental and asymptomatic. Follow watchful waiting protocol.  \"Total time = 30 minutes, spent on the date of encounter doing chart review, history and physical, dressing changes, documentation, patient education, and any further activity as noted above.     Again, thank you for allowing me to participate in the care of your patient.      Sincerely,    Rajan Holloway MD      "

## 2021-05-06 NOTE — NURSING NOTE
"Chief Complaint   Patient presents with     Hernia     New consult for possible hernia surgery       Vitals:    05/06/21 1205   BP: (!) 141/75   BP Location: Left arm   Patient Position: Sitting   Cuff Size: Adult Large   Pulse: 63   Temp: 97.7  F (36.5  C)   TempSrc: Oral   SpO2: 98%   Weight: 199 lb   Height: 5' 4.5\"       Body mass index is 33.63 kg/m .       Bre Richard CMA    "

## 2021-05-06 NOTE — PATIENT INSTRUCTIONS
You met with Dr. Rajan Holloway.      Today's visit instructions:    Return to the Surgery Clinic on an as needed basis.  No surgery is recommended.    A referral has been placed for your to consult with a Pain Specialist.       If you have questions please contact Fanny WOODS during regular clinic hours, Monday through Friday 7:30 AM - 4:00 PM, or you can contact us via [email or web address] at anytime.       If you have urgent needs after-hours, weekends, or holidays please call the hospital at [phone] and ask to speak with our on-call General Surgery Team.    Appointment [phone], option #1   Nurse Advice (Fanny): [phone]   Surgery Scheduler (Nayla): [phone]  Fax: [phone]

## 2021-05-06 NOTE — PROGRESS NOTES
"Yancy Rivera is a 65 year old female with a 2 month history of a tori umbilical  abdominal pain that is intermittent and localized at points.    Onset did not occur with lifting.  Obstructive symptoms:  no  Urinary difficulties:  no  Chronic cough: no  Constipation:  Not assessed.  Current level of activity:  Low    Past medical history, medications, allergies, family history, and social history were reviewed with the patient.  Had [place] [facility].    Past Medical History:   Diagnosis Date     Blepharitis      Esophageal reflux (aka GERD)      Glaucoma      Hyperlipidemia LDL goal < 130      Nonsenile cataract      Shingles     11/14/15 Left approximately C6 distribution       Past Surgical History:   Procedure Laterality Date     CATARACT IOL, RT/LT Right 10/16/2018    [place] [facility]      CATARACT IOL, RT/LT Left 11/01/2018    [place] [facility]      CHALAZION EXCISION  08/21/2015    Left lid     HERNIA REPAIR      abdominal hernia repair 2012       ROS: 10 point review of systems negative except noted in HPI  CT done at ER visit.  Results reviewed and I agree with findings -  IMPRESSION:   1.  No acute process demonstrated.  2.  Possible tiny bilateral fat-containing inguinal hernias without  inflammatory change. No other hernia demonstrated.  OF NOTE: multiple tacks noted around mesh repair from previous surgery. No recurrence of hernia.  Findings of fat in inguinal canals are incidental and of no concern to today's evaluation.    Impression: chronic pain in patient s/p ventral hernia repair without recurrence. Suspect scar tear inflammatory response.  Rec: Local heat and follow up with pain specialist. See no indications for further surgical intervention.  Inguinal hernia are incidental and asymptomatic. Follow watchful waiting protocol.  \"Total time = 30 minutes, spent on the date of encounter doing chart review, " history and physical, dressing changes, documentation, patient education, and any further activity as noted above.

## 2021-05-07 ENCOUNTER — OFFICE VISIT (OUTPATIENT)
Dept: ANESTHESIOLOGY | Facility: CLINIC | Age: 66
End: 2021-05-07
Attending: SURGERY
Payer: MEDICARE

## 2021-05-07 ENCOUNTER — TELEPHONE (OUTPATIENT)
Dept: ANESTHESIOLOGY | Facility: CLINIC | Age: 66
End: 2021-05-07

## 2021-05-07 VITALS
BODY MASS INDEX: 33.15 KG/M2 | HEART RATE: 63 BPM | SYSTOLIC BLOOD PRESSURE: 155 MMHG | RESPIRATION RATE: 16 BRPM | HEIGHT: 65 IN | WEIGHT: 199 LBS | DIASTOLIC BLOOD PRESSURE: 81 MMHG

## 2021-05-07 DIAGNOSIS — R10.9 ABDOMINAL PAIN: Primary | ICD-10-CM

## 2021-05-07 DIAGNOSIS — R10.33 PERIUMBILICAL PAIN: Primary | ICD-10-CM

## 2021-05-07 LAB
FINAL DIAGNOSIS: NORMAL
HPV HR 12 DNA CVX QL NAA+PROBE: NEGATIVE
HPV16 DNA SPEC QL NAA+PROBE: NEGATIVE
HPV18 DNA SPEC QL NAA+PROBE: NEGATIVE
SPECIMEN DESCRIPTION: NORMAL
SPECIMEN SOURCE CVX/VAG CYTO: NORMAL

## 2021-05-07 PROCEDURE — 99203 OFFICE O/P NEW LOW 30 MIN: CPT | Mod: 24

## 2021-05-07 RX ORDER — GABAPENTIN 100 MG/1
100 CAPSULE ORAL 3 TIMES DAILY
Qty: 90 CAPSULE | Refills: 1 | Status: SHIPPED | OUTPATIENT
Start: 2021-05-07 | End: 2023-03-28

## 2021-05-07 ASSESSMENT — PAIN SCALES - GENERAL: PAINLEVEL: WORST PAIN (10)

## 2021-05-07 ASSESSMENT — MIFFLIN-ST. JEOR: SCORE: 1450.6

## 2021-05-07 NOTE — LETTER
5/7/2021       RE: Yancy Rivera  [address]  [place] [address]     Dear Colleague,    Thank you for referring your patient, Yancy Rivera, to the [facility] [place] at [facility]. Please see a copy of my visit note below.    Yancy Rivera is a 65 year old female with a past medical history significant for esophageal reflux and hyperlipidemia who presents with a chief complaint of right upper and lowe quadrant pain.  The patient states that she had umbilical hernia surgery in 2012.  She developed pain approximately one month ago.     The pain has been present for one month .    The pain is Worst Pain (10) in severity.    The pain is described as sharp and burning.   The pain is alleviated by lying in the left lateral decubitus position.    It is exacerbated by straightening her legs.    She has not tried anything for her pain    Current Outpatient Medications   Medication     amitriptyline (ELAVIL) 10 MG tablet     Artificial Tear Ointment (REFRESH P.M.) OINT     atorvastatin (LIPITOR) 20 MG tablet     carboxymethylcellulose PF (CARBOXYMETHYLCELLULOSE SODIUM) 0.5 % ophthalmic solution     cetirizine (ZYRTEC) 10 MG tablet     Cholecalciferol (VITAMIN D) 2000 UNITS tablet     clotrimazole-betamethasone (LOTRISONE) 1-0.05 % external cream     diclofenac (VOLTAREN) 1 % topical gel     diclofenac (VOLTAREN) 1 % topical gel     doxycycline Monohydrate 100 MG TABS     ferrous sulfate (IRON) 325 (65 FE) MG tablet     gabapentin (NEURONTIN) 300 MG capsule     gabapentin (NEURONTIN) 600 MG tablet     hydroxychloroquine (PLAQUENIL) 200 MG tablet     ketorolac (ACULAR) 0.5 % ophthalmic solution     methylPREDNISolone (MEDROL DOSEPAK) 4 MG tablet therapy pack     methylPREDNISolone (MEDROL DOSEPAK) 4 MG tablet     Multiple Minerals (CALCIUM-MAGNESIUM-ZINC) TABS     omega 3 1000 MG CAPS     oxyCODONE  (ROXICODONE) 5 MG immediate release tablet     pantoprazole (PROTONIX) 20 MG EC tablet     piroxicam (FELDENE) 20 MG capsule     Riboflavin 400 MG TABS     SUMAtriptan (IMITREX) 50 MG tablet     tiZANidine (ZANAFLEX) 4 MG tablet     gabapentin (NEURONTIN) 100 MG capsule     Current Facility-Administered Medications   Medication     lidocaine (PF) (XYLOCAINE) 1 % injection 2 mL     lidocaine (PF) (XYLOCAINE) 1 % injection 2 mL     lidocaine (PF) (XYLOCAINE) 1 % injection 3 mL     triamcinolone (KENALOG-40) injection 20 mg     triamcinolone (KENALOG-40) injection 40 mg     Allergies   Allergen Reactions     No Clinical Screening - See Comments Nausea and Vomiting     Liver side effects from INH for TB      Past Medical History:   Diagnosis Date     Blepharitis      Esophageal reflux (aka GERD)      Glaucoma      Hyperlipidemia LDL goal < 130      Nonsenile cataract      Shingles     11/14/15 Left approximately C6 distribution     Past Surgical History:   Procedure Laterality Date     CATARACT IOL, RT/LT Right 10/16/2018    [place] [facility]      CATARACT IOL, RT/LT Left 11/01/2018    [place] [facility]      CHALAZION EXCISION  08/21/2015    Left lid     HERNIA REPAIR      abdominal hernia repair 2012     Family History   Problem Relation Age of Onset     Glaucoma Mother      Macular Degeneration No family hx of      Cancer No family hx of      Diabetes No family hx of      Hypertension No family hx of      Cerebrovascular Disease No family hx of      Thyroid Disease No family hx of      Eye Surgery No family hx of      Social History     Socioeconomic History     Marital status:      Spouse name: None     Number of children: None     Years of education: None     Highest education level: None   Occupational History     None   Social Needs     Financial resource strain: None     Food insecurity     Worry: None     Inability: None      Transportation needs     Medical: None     Non-medical: None   Tobacco Use     Smoking status: Never Smoker     Smokeless tobacco: Never Used   Substance and Sexual Activity     Alcohol use: No     Drug use: No     Sexual activity: Never   Lifestyle     Physical activity     Days per week: None     Minutes per session: None     Stress: None   Relationships     Social connections     Talks on phone: None     Gets together: None     Attends Latter day service: None     Active member of club or organization: None     Attends meetings of clubs or organizations: None     Relationship status: None     Intimate partner violence     Fear of current or ex partner: None     Emotionally abused: None     Physically abused: None     Forced sexual activity: None   Other Topics Concern     Parent/sibling w/ CABG, MI or angioplasty before 65F 55M? Not Asked   Social History Narrative     None      ROS: 10 point ROS neg other than the symptoms noted above in the HPI.  Physical Exam  Vitals signs and nursing note reviewed.   Constitutional:       Appearance: Normal appearance.   HENT:      Head: Normocephalic.      Mouth/Throat:      Mouth: Mucous membranes are moist.   Eyes:      Extraocular Movements: Extraocular movements intact.      Pupils: Pupils are equal, round, and reactive to light.   Neck:      Musculoskeletal: Normal range of motion and neck supple.   Cardiovascular:      Rate and Rhythm: Normal rate and regular rhythm.   Pulmonary:      Effort: Pulmonary effort is normal.      Breath sounds: Normal breath sounds.   Abdominal:      General: Abdomen is flat. There is no distension.      Palpations: Abdomen is soft. There is no mass.      Tenderness: There is abdominal tenderness. There is guarding. There is no right CVA tenderness, left CVA tenderness or rebound.      Hernia: No hernia is present.   Neurological:      Mental Status: She is alert.         A/P:The patient is a 66 y/o lady with abdominal pain in the right  upper and lower quadrants.  She notes that she had an umbilical hernia repair in 2012. It is now clear that the pain is secondary to the procedure.  She states that mesh was placed,  Her description of pain is neuropathic.  She notes some swelling in her abdomen.  She denies weight loss.  I recommend:  1.  Add gabapentin 100 mg tid.  Will reassess and escalate if needed.  2.  Tylenol 1000 mg tid  3.  Consider abdominal CT in light of the pain and self reported swelling.    Again, thank you for allowing me to participate in the care of your patient.      Sincerely,    Ethan Francois MD

## 2021-05-07 NOTE — TELEPHONE ENCOUNTER
LPN returned phone call to the pt, and left VM.   Medication was sent to the pharmacy.   Staci Ramos LPN

## 2021-05-07 NOTE — TELEPHONE ENCOUNTER
[facility] Call Center    Phone Message    May a detailed message be left on voicemail: yes     Reason for Call: Medication Question or concern regarding medication   Prescription Clarification  Name of Medication: Gabapentin  Prescribing Provider: Dr. Francois   Pharmacy: [facility] in [place]   What on the order needs clarification? Pt called stating she saw Dr. Francois today and he ordered the pt Gabapentin and when she went to the pharmacy it was not there.    Pt would like a call back to discuss          Action Taken: Message routed to:  [facility] ([facility] Pain    Travel Screening: Not Applicable

## 2021-05-07 NOTE — PATIENT INSTRUCTIONS
Medications:    Start Gabapentin 100 mg, Three times daily (Morning afternoon and Bedtime)    Trial using Tylenol 1,000 mg- up to three times daily as needed for pain.     Imaging:    Abdominal CT with contrast ordered for work up of your Abdominal pain.     IMAGING SERVICES HOURS:    All imaging modalities are available from 7 a.m. - 9 p.m. Monday through Friday  X-ray, CT, MRI, and General Ultrasound appointments are available from 7 a.m. -3:30 p.m. on Saturdays  X-ray, CT and MRI appointments are available from 8 a.m. - 4:30 p.m. on Sundays  Please call [phone] to schedule imaging exams      Recommended Follow up:      After the CT to discuss with Dr. Francois. (1-2 months or earlier if indicated.)    To speak with a nurse, schedule/reschedule/cancel a clinic appointment, or request a medication refill call: [phone], option #1.    You can also reach us by [employer]: [email or web address]

## 2021-05-13 ENCOUNTER — ANCILLARY PROCEDURE (OUTPATIENT)
Dept: CT IMAGING | Facility: CLINIC | Age: 66
End: 2021-05-13
Payer: MEDICARE

## 2021-05-13 DIAGNOSIS — R10.33 PERIUMBILICAL PAIN: ICD-10-CM

## 2021-05-13 DIAGNOSIS — R10.9 ABDOMINAL PAIN: ICD-10-CM

## 2021-05-13 PROCEDURE — G1004 CDSM NDSC: HCPCS | Performed by: RADIOLOGY

## 2021-05-13 PROCEDURE — 74177 CT ABD & PELVIS W/CONTRAST: CPT | Mod: MG | Performed by: RADIOLOGY

## 2021-05-13 RX ORDER — IOPAMIDOL 755 MG/ML
122 INJECTION, SOLUTION INTRAVASCULAR ONCE
Status: COMPLETED | OUTPATIENT
Start: 2021-05-13 | End: 2021-05-13

## 2021-05-13 RX ADMIN — IOPAMIDOL 122 ML: 755 INJECTION, SOLUTION INTRAVASCULAR at 12:51

## 2021-05-17 ENCOUNTER — TELEPHONE (OUTPATIENT)
Dept: INTERNAL MEDICINE | Facility: CLINIC | Age: 66
End: 2021-05-17

## 2021-05-17 ENCOUNTER — APPOINTMENT (OUTPATIENT)
Dept: INTERPRETER SERVICES | Facility: CLINIC | Age: 66
End: 2021-05-17
Payer: MEDICARE

## 2021-05-17 NOTE — PROGRESS NOTES
Yancy Rivera is a 65 year old female with a past medical history significant for esophageal reflux and hyperlipidemia who presents with a chief complaint of right upper and lowe quadrant pain.  The patient states that she had umbilical hernia surgery in 2012.  She developed pain approximately one month ago.     The pain has been present for one month .    The pain is Worst Pain (10) in severity.    The pain is described as sharp and burning.   The pain is alleviated by lying in the left lateral decubitus position.    It is exacerbated by straightening her legs.    She has not tried anything for her pain        Current Outpatient Medications   Medication     amitriptyline (ELAVIL) 10 MG tablet     Artificial Tear Ointment (REFRESH P.M.) OINT     atorvastatin (LIPITOR) 20 MG tablet     carboxymethylcellulose PF (CARBOXYMETHYLCELLULOSE SODIUM) 0.5 % ophthalmic solution     cetirizine (ZYRTEC) 10 MG tablet     Cholecalciferol (VITAMIN D) 2000 UNITS tablet     clotrimazole-betamethasone (LOTRISONE) 1-0.05 % external cream     diclofenac (VOLTAREN) 1 % topical gel     diclofenac (VOLTAREN) 1 % topical gel     doxycycline Monohydrate 100 MG TABS     ferrous sulfate (IRON) 325 (65 FE) MG tablet     gabapentin (NEURONTIN) 300 MG capsule     gabapentin (NEURONTIN) 600 MG tablet     hydroxychloroquine (PLAQUENIL) 200 MG tablet     ketorolac (ACULAR) 0.5 % ophthalmic solution     methylPREDNISolone (MEDROL DOSEPAK) 4 MG tablet therapy pack     methylPREDNISolone (MEDROL DOSEPAK) 4 MG tablet     Multiple Minerals (CALCIUM-MAGNESIUM-ZINC) TABS     omega 3 1000 MG CAPS     oxyCODONE (ROXICODONE) 5 MG immediate release tablet     pantoprazole (PROTONIX) 20 MG EC tablet     piroxicam (FELDENE) 20 MG capsule     Riboflavin 400 MG TABS     SUMAtriptan (IMITREX) 50 MG tablet     tiZANidine (ZANAFLEX) 4 MG tablet     gabapentin (NEURONTIN) 100 MG capsule     Current Facility-Administered Medications   Medication     lidocaine (PF)  (XYLOCAINE) 1 % injection 2 mL     lidocaine (PF) (XYLOCAINE) 1 % injection 2 mL     lidocaine (PF) (XYLOCAINE) 1 % injection 3 mL     triamcinolone (KENALOG-40) injection 20 mg     triamcinolone (KENALOG-40) injection 40 mg     Allergies   Allergen Reactions     No Clinical Screening - See Comments Nausea and Vomiting     Liver side effects from INH for TB      Past Medical History:   Diagnosis Date     Blepharitis      Esophageal reflux (aka GERD)      Glaucoma      Hyperlipidemia LDL goal < 130      Nonsenile cataract      Shingles     11/14/15 Left approximately C6 distribution     Past Surgical History:   Procedure Laterality Date     CATARACT IOL, RT/LT Right 10/16/2018    [place] [facility]      CATARACT IOL, RT/LT Left 11/01/2018    [place] [facility]      CHALAZION EXCISION  08/21/2015    Left lid     HERNIA REPAIR      abdominal hernia repair 2012     Family History   Problem Relation Age of Onset     Glaucoma Mother      Macular Degeneration No family hx of      Cancer No family hx of      Diabetes No family hx of      Hypertension No family hx of      Cerebrovascular Disease No family hx of      Thyroid Disease No family hx of      Eye Surgery No family hx of      Social History     Socioeconomic History     Marital status:      Spouse name: None     Number of children: None     Years of education: None     Highest education level: None   Occupational History     None   Social Needs     Financial resource strain: None     Food insecurity     Worry: None     Inability: None     Transportation needs     Medical: None     Non-medical: None   Tobacco Use     Smoking status: Never Smoker     Smokeless tobacco: Never Used   Substance and Sexual Activity     Alcohol use: No     Drug use: No     Sexual activity: Never   Lifestyle     Physical activity     Days per week: None     Minutes per session: None     Stress: None   Relationships      Social connections     Talks on phone: None     Gets together: None     Attends Yarsani service: None     Active member of club or organization: None     Attends meetings of clubs or organizations: None     Relationship status: None     Intimate partner violence     Fear of current or ex partner: None     Emotionally abused: None     Physically abused: None     Forced sexual activity: None   Other Topics Concern     Parent/sibling w/ CABG, MI or angioplasty before 65F 55M? Not Asked   Social History Narrative     None      ROS: 10 point ROS neg other than the symptoms noted above in the HPI.  Physical Exam  Vitals signs and nursing note reviewed.   Constitutional:       Appearance: Normal appearance.   HENT:      Head: Normocephalic.      Mouth/Throat:      Mouth: Mucous membranes are moist.   Eyes:      Extraocular Movements: Extraocular movements intact.      Pupils: Pupils are equal, round, and reactive to light.   Neck:      Musculoskeletal: Normal range of motion and neck supple.   Cardiovascular:      Rate and Rhythm: Normal rate and regular rhythm.   Pulmonary:      Effort: Pulmonary effort is normal.      Breath sounds: Normal breath sounds.   Abdominal:      General: Abdomen is flat. There is no distension.      Palpations: Abdomen is soft. There is no mass.      Tenderness: There is abdominal tenderness. There is guarding. There is no right CVA tenderness, left CVA tenderness or rebound.      Hernia: No hernia is present.   Neurological:      Mental Status: She is alert.         A/P:The patient is a 66 y/o lady with abdominal pain in the right upper and lower quadrants.  She notes that she had an umbilical hernia repair in 2012. It is now clear that the pain is secondary to the procedure.  She states that mesh was placed,  Her description of pain is neuropathic.  She notes some swelling in her abdomen.  She denies weight loss.  I recommend:  1.  Add gabapentin 100 mg tid.  Will reassess and escalate  if needed.  2.  Tylenol 1000 mg tid  3.  Consider abdominal CT in light of the pain and self reported swelling.

## 2021-05-17 NOTE — TELEPHONE ENCOUNTER
I called Yancy to follow up on the results letter that were sent out, and determine if patient has any questions. Voicemail reached. Message left with the assistance of an . Callback requested if patient has any questions regarding blood lab and pap results.    Raegan RubiCrittenden [facility] Hospital) PEGGY Felix

## 2021-05-20 ENCOUNTER — OFFICE VISIT (OUTPATIENT)
Dept: RHEUMATOLOGY | Facility: CLINIC | Age: 66
End: 2021-05-20
Attending: NURSE PRACTITIONER
Payer: MEDICARE

## 2021-05-20 VITALS — OXYGEN SATURATION: 98 % | WEIGHT: 202.2 LBS | HEART RATE: 73 BPM | HEIGHT: 65 IN | BODY MASS INDEX: 33.69 KG/M2

## 2021-05-20 DIAGNOSIS — M54.6 CHRONIC MIDLINE THORACIC BACK PAIN: ICD-10-CM

## 2021-05-20 DIAGNOSIS — M21.42 BILATERAL PES PLANUS: ICD-10-CM

## 2021-05-20 DIAGNOSIS — G89.29 CHRONIC MIDLINE LOW BACK PAIN WITHOUT SCIATICA: Primary | ICD-10-CM

## 2021-05-20 DIAGNOSIS — M21.41 BILATERAL PES PLANUS: ICD-10-CM

## 2021-05-20 DIAGNOSIS — Z87.39 HISTORY OF RHEUMATOID ARTHRITIS: ICD-10-CM

## 2021-05-20 DIAGNOSIS — E66.811 OBESITY (BMI 30.0-34.9): ICD-10-CM

## 2021-05-20 DIAGNOSIS — G89.29 CHRONIC MIDLINE THORACIC BACK PAIN: ICD-10-CM

## 2021-05-20 DIAGNOSIS — M54.50 CHRONIC MIDLINE LOW BACK PAIN WITHOUT SCIATICA: Primary | ICD-10-CM

## 2021-05-20 PROCEDURE — 99204 OFFICE O/P NEW MOD 45 MIN: CPT | Performed by: INTERNAL MEDICINE

## 2021-05-20 ASSESSMENT — MIFFLIN-ST. JEOR: SCORE: 1465.11

## 2021-05-20 NOTE — PROGRESS NOTES
Rheumatology Clinic Visit      Yancy Rivera MRN# [id number]   YOB: 1955 Age: 65 year old      Date of visit: 5/20/21   PCP: Mariaelena Schultz    Chief Complaint   Patient presents with:  Consult: All joints hurt    Assessment and Plan     1.  Chronic lumbar and thoracic spine pain: Symptoms are degenerative in nature.  She reports having had a back injection when she was in [place] that did not provide much benefit.  Imaging shows mild degenerative changes lumbar spine and degenerative changes of the SI joints; personal review of the 5/13/2021 CT abd/pelvis showed degenerative changes of the lumbar spine and SI joints, that is consistent with the radiology report.  She is most symptomatic at the SI joints.  Advised that she start physical therapy, and lose weight.  Advised that she follow-up with her PCP for longterm management of this issue.   -Physical therapy referral    2.  Bilateral medial ankle pain: Worse when she is ambulating without shoes.  Pes planus bilaterally.  Advised that she wear stiff soled shoes with good arch support whenever ambulatory.    3.  Right wrist pain: Status-post injury due to a fall last year.  An MRI performed on 6/11/2020 showed degenerative changes but no evidence of an inflammatory arthritis.  Please see the radiology report for complete details of the MRI.    4.  History of seronegative rheumatoid arthritis: Diagnosed at the [facility], and then reportedly told that she did not have rheumatoid arthritis when she was in [place].  Hydroxychloroquine was documented to be of benefit but the patient reports that it did not help her.  No change in symptoms since stopping hydroxychloroquine.  No synovitis on exam today.  No synovitis seen on the 6/11/2020 MRI of the right wrist.  At this point she does not have active rheumatoid arthritis so no DMARDs to be added at this time.    5.  Obesity, BMI 34.17: Encouraged weight loss.  Diet and exercise advised.      6. Elevated blood pressure:  Yancy to follow up with Primary Care provider regarding elevated blood pressure.     # s/p 2 doses of the Moderna COVID19 vaccine    Total minutes spent in evaluation with patient, documentation, , and review of pertinent studies and chart notes: 39.  Independent interpretation of the SI joints and lumbar spine seen on the 5/13/2021 CT abdomen/pelvis    Ms. Rivera verbalized agreement with and understanding of the rational for the diagnosis and treatment plan.  All questions were answered to best of my ability and the patient's satisfaction. Ms. Rivera was advised to contact the clinic with any questions that may arise after the clinic visit.      Thank you for involving me in the care of the patient    Return to clinic: No scheduled return appointment in rheumatology needed at this time. Return PRN.       HPI   Yancy Rivera is a 65 year old female with a past medical history significant for glaucoma, hypertension, hyperlipidemia, and history of rheumatoid arthritis who presents for initial rheumatology evaluation in this clinic for rheumatoid arthritis.    12/9/2016 [facility] rheumatology clinic note by Dr. Eduardo Gallardo documents seronegative rheumatoid arthritis, with significant improvement since starting hydroxychloroquine.  No active synovitis at that time and is suggested to use hydroxychloroquine 400 mg daily and naproxen 500 mg twice daily.  Impingement syndrome of both shoulders.    Today, 5/20/2021:  Ms. Rivera reports that she has a history of rheumatoid arthritis and was on hydroxychloroquine 400 mg daily.  Hydroxychloroquine provides no benefit to her.  She then moved to [place] and she was told that she did not have rheumatoid arthritis so hydroxychloroquine was discontinued with no worsening of her joint symptoms.  When in [place] she had worsening back pain and had an injection in her back that provided some benefit that was short-lived.   Currently with low back pain that is worse in the evening, worse after activity, and improved with rest.  Back pain does not radiate down her legs.  Also with mid back pain that is worse with activity and improved with rest.  Tenseness of the upper back.  She fell last year and hurt her right wrist; no surgical treatment was needed for this.  Her right wrist still bothers her from time to time, worse with activity and improves with rest; no swelling or increased warmth of the right wrist.  MCPs, PIPs, DIPs without swelling or pain.  Knees and MTPs without swelling or pain.  Medial ankles sometimes hurt when she is ambulating more; she typically uses slippers at home, and is often at home now during the pandemic.  Morning stiffness for no more than 5 minutes.     Denies fevers, chills, nausea, vomiting, constipation, diarrhea. No abdominal pain. No chest pain/pressure, palpitations, or shortness of breath. No LE swelling. No neck pain. No oral or nasal sores.  No rash. No sicca symptoms. No photosensitivity or photophobia. No eye pain or redness. No history of inflammatory eye disease.  No history of inflammatory bowel disease.  Denies family history of rheumatologic/autoimmune disease    An Affaredelgiorno  assisted with the entire clinic visit.     Tobacco: None  EtOH: None  Drugs: None    ROS   GEN: No fevers, chills, night sweats, or weight change  SKIN: No itching, rashes, sores  HEENT: No epistaxis. No oral or nasal ulcers.  CV: No chest pain, pressure, palpitations, or dyspnea on exertion.  PULM: No SOB, wheeze, cough.  GI: No nausea, vomiting, constipation, diarrhea. No blood in stool. No abdominal pain.  : No blood in urine.  MSK: See HPI.  NEURO: No numbness or tingling  EXT: No LE swelling  PSYCH: Negative    Active Problem List     Patient Active Problem List   Diagnosis     Hyperlipidemia with target LDL less than 130     Prediabetes     Disorder of bursae and tendons in shoulder region     Shingles      Primary open angle glaucoma of both eyes, moderate stage     Senile nuclear sclerosis, bilateral     Right lumbar radiculopathy     Benign essential hypertension     Past Medical History     Past Medical History:   Diagnosis Date     Blepharitis      Esophageal reflux (aka GERD)      Glaucoma      Hyperlipidemia LDL goal < 130      Nonsenile cataract      Shingles     11/14/15 Left approximately C6 distribution     Past Surgical History     Past Surgical History:   Procedure Laterality Date     CATARACT IOL, RT/LT Right 10/16/2018    [place] [facility]      CATARACT IOL, RT/LT Left 11/01/2018    [place] [facility]      CHALAZION EXCISION  08/21/2015    Left lid     HERNIA REPAIR      abdominal hernia repair 2012     Allergy     Allergies   Allergen Reactions     No Clinical Screening - See Comments Nausea and Vomiting     Liver side effects from INH for TB     Current Medication List     Current Outpatient Medications   Medication Sig     amitriptyline (ELAVIL) 10 MG tablet Take 1 tablet (10 mg) by mouth At Bedtime     Artificial Tear Ointment (REFRESH P.M.) OINT Apply 3.5 g to eye At Bedtime     atorvastatin (LIPITOR) 20 MG tablet Take 1 tablet by mouth once daily     carboxymethylcellulose PF (CARBOXYMETHYLCELLULOSE SODIUM) 0.5 % ophthalmic solution Place 1 drop into both eyes 3 times daily as needed for dry eyes     cetirizine (ZYRTEC) 10 MG tablet Take 1 tablet (10 mg) by mouth daily     Cholecalciferol (VITAMIN D) 2000 UNITS tablet Take 1,000 Units by mouth daily     clotrimazole-betamethasone (LOTRISONE) 1-0.05 % external cream Apply topically 2 times daily     diclofenac (VOLTAREN) 1 % topical gel Place 2 g onto the skin 4 times daily To right wrist     diclofenac (VOLTAREN) 1 % topical gel Place 2 g onto the skin 4 times daily     doxycycline Monohydrate 100 MG TABS Take 100 mg by mouth daily     ferrous sulfate (IRON) 325 (65 FE) MG  tablet Take 1 tablet (325 mg) by mouth daily (with breakfast)     gabapentin (NEURONTIN) 100 MG capsule Take 1 capsule (100 mg) by mouth 3 times daily     gabapentin (NEURONTIN) 300 MG capsule Take 1 capsule (300 mg) by mouth 3 times daily     gabapentin (NEURONTIN) 600 MG tablet Take 1 tablet (600 mg) by mouth 3 times daily     hydroxychloroquine (PLAQUENIL) 200 MG tablet Take 2 tablets (400 mg) by mouth daily     ketorolac (ACULAR) 0.5 % ophthalmic solution Place 1 drop into both eyes 2 times daily Until next appointment on 6/9/21     methylPREDNISolone (MEDROL DOSEPAK) 4 MG tablet therapy pack Follow Package Directions     methylPREDNISolone (MEDROL DOSEPAK) 4 MG tablet Take 1 tablet (4 mg) by mouth See Admin Instructions     Multiple Minerals (CALCIUM-MAGNESIUM-ZINC) TABS Take 1 tablet by mouth daily     omega 3 1000 MG CAPS Take 1 g by mouth daily     oxyCODONE (ROXICODONE) 5 MG immediate release tablet Take 1 tablet (5 mg) by mouth every 6 hours as needed for moderate to severe pain     pantoprazole (PROTONIX) 20 MG EC tablet Take 2 tablets (40 mg) by mouth daily     piroxicam (FELDENE) 20 MG capsule Take 1 capsule (20 mg) by mouth daily (with breakfast)     Riboflavin 400 MG TABS Take 1 tablet by mouth daily     SUMAtriptan (IMITREX) 50 MG tablet Take 1 tablet (50 mg) by mouth at onset of headache for migraine (May repeat in one hour. Max 200 mg in 24 hours. limit use to no more than 2 days per week) May repeat dose within one hour.     tiZANidine (ZANAFLEX) 4 MG tablet Take 1 tablet (4 mg) by mouth 3 times daily     Current Facility-Administered Medications   Medication     lidocaine (PF) (XYLOCAINE) 1 % injection 2 mL     lidocaine (PF) (XYLOCAINE) 1 % injection 2 mL     lidocaine (PF) (XYLOCAINE) 1 % injection 3 mL     triamcinolone (KENALOG-40) injection 20 mg     triamcinolone (KENALOG-40) injection 40 mg         Social History   See HPI    Family History     Family History   Problem Relation Age of  "Onset     Glaucoma Mother      Macular Degeneration No family hx of      Cancer No family hx of      Diabetes No family hx of      Hypertension No family hx of      Cerebrovascular Disease No family hx of      Thyroid Disease No family hx of      Eye Surgery No family hx of      Denies family history of rheumatologic disease    Physical Exam     Temp Readings from Last 3 Encounters:   05/06/21 97.7  F (36.5  C) (Oral)   04/13/21 98.3  F (36.8  C) (Oral)   06/21/20 98  F (36.7  C) (Oral)     BP Readings from Last 5 Encounters:   05/07/21 (!) 155/81   05/06/21 (!) 141/75   04/30/21 (!) 148/83   04/13/21 (!) 145/89   06/21/20 132/80     Pulse Readings from Last 1 Encounters:   05/20/21 73     Resp Readings from Last 1 Encounters:   05/07/21 16     Estimated body mass index is 34.17 kg/m  as calculated from the following:    Height as of this encounter: 1.638 m (5' 4.5\").    Weight as of this encounter: 91.7 kg (202 lb 3.2 oz).      GEN: NAD.  Obese  HEENT:  Anicteric, noninjected sclera. No obvious external lesions of the ear and nose. Hearing intact.  PULM: No increased work of breathing  MSK: MCPs, PIPs, DIPs without swelling or tenderness to palpation.  Right wrist with reduced range of motion, but no swelling, increased warmth, tenderness to palpation, or overlying erythema.  Left wrist without swelling or tenderness to palpation.  Elbows and shoulders without swelling or tenderness to palpation.  Mildly tender to palpation across the trapezius bilaterally.  Lumbar spine nontender to palpation.  Thoracic spine nontender to palpation.  Mildly tender to palpation over the bilateral SI joints.  Hips nontender over the trochanteric bursae.  Nontender with internal or external range of motion of the hips.  Knees without swelling or tenderness palpation.  Ankles without swelling or tenderness to palpation.  Pes planus bilaterally with ankle eversion when she stands.  Negative MTP squeeze.      SKIN: No rash or jaundice " seen  PSYCH: Alert. Appropriate.        Labs / Imaging (select studies)     RF/CCP  Recent Labs   Lab Test 12/31/14  1304   CCPABY <20  Interpretation:  Negative     RHF <20     PREET  Recent Labs   Lab Test 12/31/14  1304   KATERINA <1.0  Interpretation:  Negative       RNP/Sm/SSA/SSB  Recent Labs   Lab Test 12/31/14  1304   SSAIGG <0.2  Negative   Antibody index (AI) values reflect qualitative changes in antibody   concentration that cannot be directly associated with clinical condition or   disease state.     SSBIGG <0.2  Negative   Antibody index (AI) values reflect qualitative changes in antibody   concentration that cannot be directly associated with clinical condition or   disease state.       C3/C4  Recent Labs   Lab Test 12/31/14  1304   U5EMFGC 113   S4RXUPV 26     CBC  Recent Labs   Lab Test 04/13/21  0824 10/28/16  0825 06/09/16  0931   WBC 7.0 4.8 6.8   RBC 4.41 4.49 4.53   HGB 12.9 12.9 13.4   HCT 38.7 40.5 41.4   MCV 88 90 91   RDW 12.4 13.1 12.4    214 264   MCH 29.3 28.7 29.6   MCHC 33.3 31.9 32.4   NEUTROPHIL 63.9 50.8 60.1   LYMPH 23.6 36.5 26.7   MONOCYTE 6.9 6.3 6.0   EOSINOPHIL 4.9 6.0 6.2   BASOPHIL 0.4 0.2 0.4   ANEU 4.4 2.4 4.1   ALYM 1.6 1.8 1.8   TALISHA 0.5 0.3 0.4   AEOS 0.3 0.3 0.4   ABAS 0.0 0.0 0.0     CMP  Recent Labs   Lab Test 04/13/21  0824 02/21/20  1100 01/13/20  1131 10/28/16  0825 06/09/16  0931 10/29/15  1003 10/29/15  1003 12/31/14  1304 12/31/14  1304     --  141 142 139  --  141  --  139   POTASSIUM 3.8  --  3.8 3.8 4.0  --  4.0  --  3.7   CHLORIDE 106  --  112* 109 109  --  109  --  111*   CO2 24  --  24 28 26  --  26  --  25   ANIONGAP 8  --  4 5 4  --  6  --  3   *  --  93 97 101*  --  84  --  79   BUN 8  --  14 7 11  --  12  --  14   CR 0.70  --  0.63 0.68 0.64   < > 0.71  --  0.69   GFRESTIMATED >90  --  >90 88 >90  Non  GFR Calc     < > 84  --  87   GFRESTBLACK >90  --  >90 >90   GFR Calc   >90   GFR Calc      < > >90   GFR Calc    --  >90   GFR Calc     SUZANNA 9.0  --  9.3 9.2 9.3  --  9.2  --  9.2   BILITOTAL 0.4 0.5 0.4 0.4  --   --  0.5   < >  --    ALBUMIN 3.8 4.2 4.0 4.0  --   --  3.7   < >  --    PROTTOTAL 7.2 7.6 7.6 7.4  --   --  7.4   < >  --    ALKPHOS 104 124 121 97  --   --  89   < >  --    AST 11 17 12 15  --   --  14   < >  --    ALT 21 24 26 22  --   --  28   < >  --     < > = values in this interval not displayed.     HgA1c  Recent Labs   Lab Test 04/30/21  1256   A1C 5.7*     Calcium/VitaminD  Recent Labs   Lab Test 04/13/21  0824 01/13/20  1131 10/28/16  0825 06/01/16  1033 06/01/16  1033 12/31/14  1304 12/31/14  1304   SUZANNA 9.0 9.3 9.2   < >  --    < > 9.2   VITDT  --   --   --   --  22  --  16*    < > = values in this interval not displayed.     ESR/CRP  Recent Labs   Lab Test 10/28/16  0825 06/09/16  0931 06/01/16  1033 09/02/15  1000 02/25/15  0943   SED 9  --   --   --  10   CRP  --  6.4 11.0* 16.0* 5.5     TSH/T4  Recent Labs   Lab Test 02/25/15  0943   TSH 2.40     Lipid Panel  Recent Labs   Lab Test 04/30/21  1256 02/21/20  1100 01/13/20  1131 09/23/14  1021   CHOL 176 173 242* 231*   TRIG 103 82 129 123   HDL 70 63 56 65   LDL 85 93 160* 141*   VLDL  --   --   --  25   CHOLHDLRATIO  --   --   --  3.6   NHDL 106 110 186*  --      Hepatitis B  Recent Labs   Lab Test 02/25/15  0943   HEPBANG Nonreactive     Hepatitis C  Recent Labs   Lab Test 02/25/15  0943   HCVAB Nonreactive   Assay performance characteristics have not been established for newborns,   infants, and children       HIV Screening  Recent Labs   Lab Test 04/30/21  1256   HIAGAB Nonreactive     UA  Recent Labs   Lab Test 04/13/21  0946 11/02/16  1830 05/27/15  1110 02/25/15  1041   COLOR Straw Straw Light Yellow Straw   APPEARANCE Clear Clear Clear Clear   URINEGLC Negative Negative Negative Negative   URINEBILI Negative Negative Negative Negative   SG 1.004 1.006 1.010 1.006   URINEPH 7.5* 6.0 7.0 7.0    PROTEIN Negative Negative Negative Negative   NITRITE Negative Negative Negative Negative   UBLD Negative Negative Negative Negative   LEUKEST Negative Negative Moderate* Trace*   WBCU  --  1 3* 1   RBCU  --  1 1 0   BACTERIA  --  Few*  --   --      Urine Microscopic  Recent Labs   Lab Test 11/02/16  1830 05/27/15  1110 02/25/15  1041   WBCU 1 3* 1   RBCU 1 1 0   BACTERIA Few*  --   --      Immunization History     Immunization History   Administered Date(s) Administered     COVID-19,PF,Moderna 02/24/2021, 03/24/2021     HEPA 07/02/2013     HepA-Adult 06/20/2016     Influenza (IIV3) PF 10/01/2009, 12/21/2011, 10/24/2012, 02/12/2014, 11/10/2014, 10/18/2016     Influenza Vaccine IM > 6 months Valent IIV4 01/13/2020     MMR 05/24/1999, 11/19/2001     Mantoux Tuberculin Skin Test 02/06/2008     Meningococcal (Menomune ) 07/02/2013     Pneumococcal 23 valent 06/20/2016     Poliovirus, inactivated (IPV) 07/02/2013     TD (ADULT, 7+) 05/24/1999, 07/09/2001     Tdap (Adacel,Boostrix) 12/21/2011     Typhoid IM 07/02/2013     Yellow Fever 07/02/2013     Zoster vaccine recombinant adjuvanted (SHINGRIX) 05/07/2020, 08/12/2020          Chart documentation done in part with Dragon Voice recognition Software. Although reviewed after completion, some word and grammatical error may remain.    Tao Lawrence MD

## 2021-05-24 ENCOUNTER — THERAPY VISIT (OUTPATIENT)
Dept: PHYSICAL THERAPY | Facility: CLINIC | Age: 66
End: 2021-05-24
Attending: INTERNAL MEDICINE
Payer: MEDICARE

## 2021-05-24 DIAGNOSIS — M54.6 CHRONIC MIDLINE THORACIC BACK PAIN: ICD-10-CM

## 2021-05-24 DIAGNOSIS — M54.50 CHRONIC MIDLINE LOW BACK PAIN WITHOUT SCIATICA: Primary | ICD-10-CM

## 2021-05-24 DIAGNOSIS — G89.29 CHRONIC MIDLINE THORACIC BACK PAIN: ICD-10-CM

## 2021-05-24 DIAGNOSIS — G89.29 CHRONIC MIDLINE LOW BACK PAIN WITHOUT SCIATICA: Primary | ICD-10-CM

## 2021-05-24 PROCEDURE — 97112 NEUROMUSCULAR REEDUCATION: CPT | Mod: GP | Performed by: PHYSICAL THERAPIST

## 2021-05-24 PROCEDURE — 97162 PT EVAL MOD COMPLEX 30 MIN: CPT | Mod: GP | Performed by: PHYSICAL THERAPIST

## 2021-05-24 PROCEDURE — 97110 THERAPEUTIC EXERCISES: CPT | Mod: GP | Performed by: PHYSICAL THERAPIST

## 2021-05-24 NOTE — PROGRESS NOTES
[facility] Initial Evaluation -- Lumbar    Date: May 24, 2021  Yancy Rivera is a 65 year old female with a mid and  LBP condition.   Referral: Ortho  Work mechanical stresses:  None - disabled  Employment status:    Leisure mechanical stresses: Walks daily walks 1-2 hours  Functional disability score (UMAIR/STarT Back):  See Flowsheet   VAS score (0-10): 9/10  Patient goals/expectations:  Be able to bend, sit, rise from sitting w/ less pain    HISTORY:    Present symptoms: Constant Bilat LBP (R>L) with Int R leg pain to foot  Pain quality (sharp/shooting/stabbing/aching/burning/cramping):  aching   Paresthesia (yes/no):  Yes - Int numbness thigh, lower leg and foot    Present since (onset date): Recent exacerbation 2 mos ago.  MD referral 5-    Symptoms (improving/unchanging/worsening):  unchanging.     Symptoms commenced as a result of: Unknown   Condition occurred in the following environment:   Unknown     Symptoms at onset (back/thigh/leg): back initially then moved into leg also  Constant symptoms (back/thigh/leg): LB  Intermittent symptoms (back/thigh/leg): R leg    Symptoms are made worse with the following: Always Bending, Always Sitting, Always Rising, Time of day - No effect - varies day to day and Other - stairs - painful on joints   Symptoms are made better with the following: Always Walking and Sometimes Lying    Disturbed sleep (yes/no):  no Sleeping postures (prone/sup/side R/L):     Previous episodes (0/1-5/6-10/11+): 1-5 Year of first episode: 2016    Previous history: fell down stairs carrying something heavy - Ruptured disc, did not have surgery, had PT and pain resolved  Previous treatments:  PT- manual and exer      Specific Questions:  Cough/Sneeze/Strain (pos/neg): neg  Bowel/Bladder (normal/abnormal): normal  Gait (normal/abnormal): normal  Medications (nil/NSAIDS/analg/steroids/anticoag/other):  Narcotics/Opiods and Other - cholesterol and acid reflux  Medical  allergies:  NKA  General health (excellent/good/fair/poor):  good  Pertinent medical history:  Menopausal, Osteoarthritis and Cholesterol  Imaging (None/Xray/MRI/Other):  None recently  Recent or major surgery (yes/no):  Hernia 2012  Night pain (yes/no): no  Accidents (yes/no): no  Unexplained weight loss (yes/no): no  Barriers at home: none  Other red flags: N&T and Pain at rest    EXAMINATION    Posture:   Sitting (good/fair/poor): poor  Standing (good/fair/poor):fair-good  Lordosis (red/acc/normal): incr  Correction of posture (better/worse/no effect): better    Lateral Shift (right/left/nil): nil  Relevant (yes/no):    Other Observations: Pt is very expressive about her pain.   Pt pointed out that she has a lump in LB at L4-5 on R side - feels it is the cause of her pain.  Appears to be a lipoma, reassured pt it is probably not the cause of her pain.    Neurological:    Motor deficit:  NA d/t time limits  Reflexes:  NA  Sensory deficit:  NA  Dural signs:  Slump: R Pain LB thigh and ankle R, L (-)    Movement Loss:   Mike Mod Min Nil Pain   Flexion   X  R LB and leg   Extension  X   R LB and leg   Side Gliding R   X     Side Gliding L   X  Mod LB and leg pain     Test Movements:   During: produces, abolishes, increases, decreases, no effect, centralizing, peripheralizing   After: better, worse, no better, no worse, no effect, centralized, peripheralized    Pretest symptoms standing: Pain R>L LB and slight R leg pain   Symptoms During Symptoms After ROM increased ROM decreased No Effect   Rep FIS        Rep EIS - hands on back Increases  LB  No Worse      X   Rep EIS - Back against table Increases  LB, Decr R leg  Better  Less leg pain  X       Static Tests:  Sitting slouched:  Painful   Sitting erect:  decr pain  Standing slouched:   Standing erect:    Lying prone in extension:   Long sitting:      Other Tests:     Provisional Classification:  Derangement - Asymmetrical, unilateral, symptoms below  knee    Principle of Management:  Education:  Posture - Neutral Spine, use of L-roll, affect of posture on spine/pain, no couch, recliner, or propping up on pillows in bed, specificity of exer, centralisation/peripheralisation, and HEP     Equipment provided:  None - Recommended using rolled towel for L-Roll whenever sitting.  Mechanical therapy (Y/N):  Y   Extension principle:  EIS back against the kitchen counter x10 6 x/day  Lateral Principle:    Flexion principle:    Other:      ASSESSMENT/PLAN:    Patient is a 65 year old female with lumbar complaints.    Patient has the following significant findings with corresponding treatment plan.                 Diagnosis 1:  Chronic Midline LBP w/o Sciatica  Pain -  hot/cold therapy, manual therapy, self management, education, directional preference exercise and home program  Decreased ROM/flexibility - manual therapy, therapeutic exercise and home program  Decreased strength - therapeutic exercise, therapeutic activities and home program  Decreased function - therapeutic activities and home program  Impaired posture - neuro re-education and home program    Therapy Evaluation Codes:   1) History comprised of:   Personal factors that impact the plan of care:      Past/current experiences and Social history/culture.    Comorbidity factors that impact the plan of care are:      Menopausal, Numbness/tingling, Osteoarthritis and Pain at night/rest.     Medications impacting care: Pain and Cholesterol and Acid Reflux meds.  2) Examination of Body Systems comprised of:   Body structures and functions that impact the plan of care:      Lumbar spine.   Activity limitations that impact the plan of care are:      Bending, Dressing, Lifting, Sitting and rising from sitting.  3) Clinical presentation characteristics are:   Evolving/Changing.  4) Decision-Making    Moderate complexity using standardized patient assessment instrument and/or measureable assessment of functional  outcome.  Cumulative Therapy Evaluation is: Moderate complexity.    Previous and current functional limitations:  (See Goal Flow Sheet for this information)    Short term and Long term goals: (See Goal Flow Sheet for this information)     Communication ability:  Patient appears to be able to clearly communicate and understand verbal and written communication and follow directions correctly.  Treatment Explanation - The following has been discussed with the patient:   RX ordered/plan of care  Anticipated outcomes  Possible risks and side effects  This patient would benefit from PT intervention to resume normal activities.   Rehab potential is good.    Frequency:  1 X week, once daily  Duration:  for 8 weeks  Discharge Plan:  Achieve all LTG.  Independent in home treatment program.  Reach maximal therapeutic benefit.    Please refer to the daily flowsheet for treatment today, total treatment time and time spent performing 1:1 timed codes.

## 2021-05-24 NOTE — LETTER
DEPARTMENT OF HEALTH AND HUMAN SERVICES  [facility] FOR MEDICARE & MEDICAID SERVICES    PLAN/UPDATED PLAN OF PROGRESS FOR OUTPATIENT REHABILITATION     PATIENTS NAME:  Yancy Rivera   : 1955  PROVIDER NUMBER:    [id number]  HICN: [id number]   PROVIDER NAME: [facility]  MEDICAL RECORD NUMBER: [id number]   START OF CARE DATE:  SOC Date: 21   TYPE:  PT  PRIMARY/TREATMENT DIAGNOSIS: (Pertinent Medical Diagnosis)  Chronic midline low back pain without sciatica  Chronic midline thoracic back pain  VISITS FROM START OF CARE:  1     [facility] Initial Evaluation -- Lumbar  Date: May 24, 2021  Yancy Rivera is a 65 year old female with a mid and  LBP condition.   Referral: Ortho  Work mechanical stresses:  None - disabled  Employment status:    Leisure mechanical stresses: Walks daily walks 1-2 hours  Functional disability score (UMAIR/STarT Back):  See Flowsheet   VAS score (0-10): 9/10  Patient goals/expectations:  Be able to bend, sit, rise from sitting w/ less pain    HISTORY:  Present symptoms: Constant Bilat LBP (R>L) with Int R leg pain to foot  Pain quality (sharp/shooting/stabbing/aching/burning/cramping):  aching   Paresthesia (yes/no):  Yes - Int numbness thigh, lower leg and foot  Present since (onset date): Recent exacerbation 2 mos ago.  MD referral 2021    Symptoms (improving/unchanging/worsening):  unchanging.   Symptoms commenced as a result of: Unknown   Condition occurred in the following environment:   Unknown   Symptoms at onset (back/thigh/leg): back initially then moved into leg also  Constant symptoms (back/thigh/leg): LB  Intermittent symptoms (back/thigh/leg): R leg  Symptoms are made worse with the following: Always Bending, Always Sitting, Always Rising, Time of day - No effect - varies day to day and Other - stairs - painful on joints   Symptoms are made better with the following: Always Walking and Sometimes  Lying  Disturbed sleep (yes/no):  no   Sleeping postures (prone/sup/side R/L):   Previous episodes (0/1-5/6-10/11+): 1-5   Year of first episode: 2016  Previous history: fell down stairs carrying something heavy - Ruptured disc, did not have surgery, had PT and pain resolved  Previous treatments:  PT- manual and exer        PATIENTS NAME:  Yancy Rivera   : 1955    Specific Questions:  Cough/Sneeze/Strain (pos/neg): neg  Bowel/Bladder (normal/abnormal): normal  Gait (normal/abnormal): normal  Medications (nil/NSAIDS/analg/steroids/anticoag/other):  Narcotics/Opiods and Other - cholesterol and acid reflux  Medical allergies:  NKA  General health (excellent/good/fair/poor):  good  Pertinent medical history:  Menopausal, Osteoarthritis and Cholesterol  Imaging (None/Xray/MRI/Other):  None recently  Recent or major surgery (yes/no):  Hernia   Night pain (yes/no): no  Accidents (yes/no): no  Unexplained weight loss (yes/no): no  Barriers at home: none  Other red flags: N&T and Pain at rest    EXAMINATION  Posture:   Sitting (good/fair/poor): poor  Standing (good/fair/poor):fair-good  Lordosis (red/acc/normal): incr  Correction of posture (better/worse/no effect): better  Lateral Shift (right/left/nil): nil  Relevant (yes/no):    Other Observations: Pt is very expressive about her pain.   Pt pointed out that she has a lump in LB at L4-5 on R side - feels it is the cause of her pain.  Appears to be a lipoma, reassured pt it is probably not the cause of her pain.    Neurological:  Motor deficit:  NA d/t time limits    Reflexes:  NA  Sensory deficit:  NA    Dural signs:  Slump: R Pain LB thigh and ankle R, L (-)    Movement Loss:   Mike Mod Min Nil Pain   Flexion   X  R LB and leg   Extension  X   R LB and leg   Side Gliding R   X     Side Gliding L   X  Mod LB and leg pain     Test Movements:   During: produces, abolishes, increases, decreases, no effect, centralizing, peripheralizing             PATIENTS NAME:   Yancy Rivera   : 1955    After: better, worse, no better, no worse, no effect, centralized, peripheralized  Pretest symptoms standing: Pain R>L LB and slight R leg pain   Symptoms During Symptoms After ROM increased ROM decreased No Effect   Rep FIS        Rep EIS - hands on back Increases  LB  No Worse      X   Rep EIS - Back against table Increases  LB, Decr R leg  Better  Less leg pain  X       Static Tests:  Sitting slouched:  Painful     Sitting erect:  decr pain  Standing slouched:     Standing erect:    Lying prone in extension:    Long sitting:    Other Tests:   Provisional Classification:  Derangement - Asymmetrical, unilateral, symptoms below knee  Principle of Management:  Education:  Posture - Neutral Spine, use of L-roll, affect of posture on spine/pain, no couch, recliner, or propping up on pillows in bed, specificity of exer, centralisation/peripheralisation, and HEP  Equipment provided:  None - Recommended using rolled towel for L-Roll whenever sitting.  Mechanical therapy (Y/N):  Y   Extension principle:  EIS back against the kitchen counter x10 6 x/day   Lateral Principle:    Flexion principle:      Other:      ASSESSMENT/PLAN:  Patient is a 65 year old female with lumbar complaints.    Patient has the following significant findings with corresponding treatment plan.                 Diagnosis 1:  Chronic Midline LBP w/o Sciatica  Pain -  hot/cold therapy, manual therapy, self management, education, directional preference exercise and home program  Decreased ROM/flexibility - manual therapy, therapeutic exercise and home program  Decreased strength - therapeutic exercise, therapeutic activities and home program  Decreased function - therapeutic activities and home program  Impaired posture - neuro re-education and home program                  PATIENTS NAME:  Yancy Rivera   : 1955    Therapy Evaluation Codes:   1) History comprised of:   Personal factors that impact the plan of  care:      Past/current experiences and Social history/culture.    Comorbidity factors that impact the plan of care are:      Menopausal, Numbness/tingling, Osteoarthritis and Pain at night/rest.     Medications impacting care: Pain and Cholesterol and Acid Reflux meds.  2) Examination of Body Systems comprised of:   Body structures and functions that impact the plan of care:      Lumbar spine.   Activity limitations that impact the plan of care are:      Bending, Dressing, Lifting, Sitting and rising from sitting.  3) Clinical presentation characteristics are:   Evolving/Changing.  4) Decision-Making    Moderate complexity using standardized patient assessment instrument   and/or measureable assessment of functional outcome.  Cumulative Therapy Evaluation is: Moderate complexity.    Previous and current functional limitations:  (See Goal Flow Sheet for this information)    Short term and Long term goals: (See Goal Flow Sheet for this information)   Communication ability:  Patient appears to be able to clearly communicate and understand verbal and written communication and follow directions correctly.  Treatment Explanation - The following has been discussed with the patient:   RX ordered/plan of care, Anticipated outcomes, Possible risks and side effects                                                      PATIENTS NAME:  Yancy Rivera   : 1955      This patient would benefit from PT intervention to resume normal activities.   Rehab potential is good.  Frequency:  1 X week, once daily  Duration:  for 8 weeks  Discharge Plan:  Achieve all LTG.  Independent in home treatment program.  Reach maximal therapeutic benefit.        Caregiver Signature/Credentials _____________________________ Date ________         Sofya Sierra, PT, Cert. MDT    I have reviewed and certified the need for these services and plan of treatment while under my care.        PHYSICIAN'S SIGNATURE:    "_________________________________________      Date___________   Tao Lawrence MD    Certification period:  Beginning of Cert date period: 05/24/21 to  08/21/21     Functional Level Progress Report: Please see attached \"Goal Flow sheet for Functional level.\"    ____X____ Continue Services or       ________ DC Services                Service dates: From  SOC Date: 05/24/21 to present                         "

## 2021-05-31 PROBLEM — M54.50 CHRONIC MIDLINE LOW BACK PAIN WITHOUT SCIATICA: Status: ACTIVE | Noted: 2021-05-31

## 2021-05-31 PROBLEM — G89.29 CHRONIC MIDLINE LOW BACK PAIN WITHOUT SCIATICA: Status: ACTIVE | Noted: 2021-05-31

## 2021-06-01 ENCOUNTER — THERAPY VISIT (OUTPATIENT)
Dept: PHYSICAL THERAPY | Facility: CLINIC | Age: 66
End: 2021-06-01
Payer: MEDICARE

## 2021-06-01 DIAGNOSIS — G89.29 CHRONIC MIDLINE LOW BACK PAIN WITHOUT SCIATICA: Primary | ICD-10-CM

## 2021-06-01 DIAGNOSIS — M54.50 CHRONIC MIDLINE LOW BACK PAIN WITHOUT SCIATICA: Primary | ICD-10-CM

## 2021-06-01 PROCEDURE — 97110 THERAPEUTIC EXERCISES: CPT | Mod: GP | Performed by: PHYSICAL THERAPIST

## 2021-06-01 PROCEDURE — 97530 THERAPEUTIC ACTIVITIES: CPT | Mod: GP | Performed by: PHYSICAL THERAPIST

## 2021-06-01 NOTE — PROGRESS NOTES
Daily Note:    SUBJECTIVE:  See Flowsheet    OBJECTIVE:    Lumbar AROM:   Flex To toes   Ext Min decr - R LBP   SG R Min decr - center LB          L Min Decr - center LB  Slump: R Pain LB thigh and ankle R, L (-)  Myotomes: R  L   HF 5-/5  5/5   Q 5-/5  5/5   HS 4+/5  5/5   AT 5-/5  5/5  ASSESSMENT/PLAN:  See Flowsheet

## 2021-06-07 ENCOUNTER — APPOINTMENT (OUTPATIENT)
Dept: CT IMAGING | Facility: CLINIC | Age: 66
End: 2021-06-07
Attending: FAMILY MEDICINE
Payer: MEDICARE

## 2021-06-07 ENCOUNTER — HOSPITAL ENCOUNTER (EMERGENCY)
Facility: CLINIC | Age: 66
Discharge: HOME | End: 2021-06-07
Attending: FAMILY MEDICINE | Admitting: FAMILY MEDICINE
Payer: MEDICARE

## 2021-06-07 ENCOUNTER — APPOINTMENT (OUTPATIENT)
Dept: GENERAL RADIOLOGY | Facility: CLINIC | Age: 66
End: 2021-06-07
Attending: FAMILY MEDICINE
Payer: MEDICARE

## 2021-06-07 ENCOUNTER — TELEPHONE (OUTPATIENT)
Dept: NURSING | Facility: CLINIC | Age: 66
End: 2021-06-07

## 2021-06-07 VITALS
BODY MASS INDEX: 34.01 KG/M2 | DIASTOLIC BLOOD PRESSURE: 70 MMHG | HEART RATE: 73 BPM | HEIGHT: 64 IN | WEIGHT: 199.2 LBS | SYSTOLIC BLOOD PRESSURE: 130 MMHG | OXYGEN SATURATION: 98 % | TEMPERATURE: 98.1 F | RESPIRATION RATE: 18 BRPM

## 2021-06-07 DIAGNOSIS — M79.18 MUSCULOSKELETAL PAIN: ICD-10-CM

## 2021-06-07 DIAGNOSIS — M25.512 ACUTE PAIN OF LEFT SHOULDER: ICD-10-CM

## 2021-06-07 LAB
ALBUMIN SERPL-MCNC: 4 G/DL (ref 3.4–5)
ALP SERPL-CCNC: 98 U/L (ref 40–150)
ALT SERPL W P-5'-P-CCNC: 25 U/L (ref 0–50)
ANION GAP SERPL CALCULATED.3IONS-SCNC: 5 MMOL/L (ref 3–14)
APTT PPP: 26 SEC (ref 22–37)
AST SERPL W P-5'-P-CCNC: 19 U/L (ref 0–45)
BASOPHILS # BLD AUTO: 0 10E9/L (ref 0–0.2)
BASOPHILS NFR BLD AUTO: 0.3 %
BILIRUB SERPL-MCNC: 0.4 MG/DL (ref 0.2–1.3)
BUN SERPL-MCNC: 9 MG/DL (ref 7–30)
CALCIUM SERPL-MCNC: 9.3 MG/DL (ref 8.5–10.1)
CHLORIDE SERPL-SCNC: 108 MMOL/L (ref 94–109)
CO2 SERPL-SCNC: 24 MMOL/L (ref 20–32)
CREAT SERPL-MCNC: 0.7 MG/DL (ref 0.52–1.04)
D DIMER PPP FEU-MCNC: 4.3 UG/ML FEU (ref 0–0.5)
DIFFERENTIAL METHOD BLD: NORMAL
EOSINOPHIL # BLD AUTO: 0.3 10E9/L (ref 0–0.7)
EOSINOPHIL NFR BLD AUTO: 3.7 %
ERYTHROCYTE [DISTWIDTH] IN BLOOD BY AUTOMATED COUNT: 12.8 % (ref 10–15)
GFR SERPL CREATININE-BSD FRML MDRD: >90 ML/MIN/{1.73_M2}
GLUCOSE SERPL-MCNC: 91 MG/DL (ref 70–99)
HCT VFR BLD AUTO: 39.9 % (ref 35–47)
HGB BLD-MCNC: 12.8 G/DL (ref 11.7–15.7)
IMM GRANULOCYTES # BLD: 0 10E9/L (ref 0–0.4)
IMM GRANULOCYTES NFR BLD: 0.3 %
INR PPP: 0.99 (ref 0.86–1.14)
INTERPRETATION ECG - MUSE: NORMAL
LABORATORY COMMENT REPORT: NORMAL
LYMPHOCYTES # BLD AUTO: 1.8 10E9/L (ref 0.8–5.3)
LYMPHOCYTES NFR BLD AUTO: 23.2 %
MCH RBC QN AUTO: 28.9 PG (ref 26.5–33)
MCHC RBC AUTO-ENTMCNC: 32.1 G/DL (ref 31.5–36.5)
MCV RBC AUTO: 90 FL (ref 78–100)
MONOCYTES # BLD AUTO: 0.5 10E9/L (ref 0–1.3)
MONOCYTES NFR BLD AUTO: 6.6 %
NEUTROPHILS # BLD AUTO: 5 10E9/L (ref 1.6–8.3)
NEUTROPHILS NFR BLD AUTO: 65.9 %
NRBC # BLD AUTO: 0 10*3/UL
NRBC BLD AUTO-RTO: 0 /100
NT-PROBNP SERPL-MCNC: 55 PG/ML (ref 0–900)
PLATELET # BLD AUTO: 273 10E9/L (ref 150–450)
POTASSIUM SERPL-SCNC: 4.1 MMOL/L (ref 3.4–5.3)
PROT SERPL-MCNC: 7.4 G/DL (ref 6.8–8.8)
RBC # BLD AUTO: 4.43 10E12/L (ref 3.8–5.2)
SARS-COV-2 RNA RESP QL NAA+PROBE: NEGATIVE
SODIUM SERPL-SCNC: 138 MMOL/L (ref 133–144)
SPECIMEN SOURCE: NORMAL
TROPONIN I SERPL-MCNC: <0.015 UG/L (ref 0–0.04)
WBC # BLD AUTO: 7.6 10E9/L (ref 4–11)

## 2021-06-07 PROCEDURE — 83880 ASSAY OF NATRIURETIC PEPTIDE: CPT | Performed by: FAMILY MEDICINE

## 2021-06-07 PROCEDURE — G1004 CDSM NDSC: HCPCS | Mod: GC | Performed by: RADIOLOGY

## 2021-06-07 PROCEDURE — 250N000011 HC RX IP 250 OP 636: Performed by: GENERAL PRACTICE

## 2021-06-07 PROCEDURE — 84484 ASSAY OF TROPONIN QUANT: CPT | Performed by: FAMILY MEDICINE

## 2021-06-07 PROCEDURE — 250N000013 HC RX MED GY IP 250 OP 250 PS 637: Performed by: FAMILY MEDICINE

## 2021-06-07 PROCEDURE — 71046 X-RAY EXAM CHEST 2 VIEWS: CPT | Mod: 26 | Performed by: RADIOLOGY

## 2021-06-07 PROCEDURE — 73030 X-RAY EXAM OF SHOULDER: CPT | Mod: 26 | Performed by: RADIOLOGY

## 2021-06-07 PROCEDURE — 96361 HYDRATE IV INFUSION ADD-ON: CPT | Performed by: FAMILY MEDICINE

## 2021-06-07 PROCEDURE — 250N000011 HC RX IP 250 OP 636: Performed by: FAMILY MEDICINE

## 2021-06-07 PROCEDURE — 71275 CT ANGIOGRAPHY CHEST: CPT | Mod: 26 | Performed by: RADIOLOGY

## 2021-06-07 PROCEDURE — 93010 ELECTROCARDIOGRAM REPORT: CPT | Performed by: FAMILY MEDICINE

## 2021-06-07 PROCEDURE — U0003 INFECTIOUS AGENT DETECTION BY NUCLEIC ACID (DNA OR RNA); SEVERE ACUTE RESPIRATORY SYNDROME CORONAVIRUS 2 (SARS-COV-2) (CORONAVIRUS DISEASE [COVID-19]), AMPLIFIED PROBE TECHNIQUE, MAKING USE OF HIGH THROUGHPUT TECHNOLOGIES AS DESCRIBED BY CMS-2020-01-R: HCPCS | Performed by: FAMILY MEDICINE

## 2021-06-07 PROCEDURE — U0005 INFEC AGEN DETEC AMPLI PROBE: HCPCS | Performed by: FAMILY MEDICINE

## 2021-06-07 PROCEDURE — 93005 ELECTROCARDIOGRAM TRACING: CPT | Performed by: FAMILY MEDICINE

## 2021-06-07 PROCEDURE — 99285 EMERGENCY DEPT VISIT HI MDM: CPT | Mod: 25 | Performed by: FAMILY MEDICINE

## 2021-06-07 PROCEDURE — 73030 X-RAY EXAM OF SHOULDER: CPT | Mod: LT

## 2021-06-07 PROCEDURE — 96374 THER/PROPH/DIAG INJ IV PUSH: CPT | Performed by: FAMILY MEDICINE

## 2021-06-07 PROCEDURE — 85730 THROMBOPLASTIN TIME PARTIAL: CPT | Performed by: FAMILY MEDICINE

## 2021-06-07 PROCEDURE — 258N000003 HC RX IP 258 OP 636: Performed by: FAMILY MEDICINE

## 2021-06-07 PROCEDURE — 85025 COMPLETE CBC W/AUTO DIFF WBC: CPT | Performed by: FAMILY MEDICINE

## 2021-06-07 PROCEDURE — 80053 COMPREHEN METABOLIC PANEL: CPT | Performed by: FAMILY MEDICINE

## 2021-06-07 PROCEDURE — C9803 HOPD COVID-19 SPEC COLLECT: HCPCS | Performed by: FAMILY MEDICINE

## 2021-06-07 PROCEDURE — 85610 PROTHROMBIN TIME: CPT | Performed by: FAMILY MEDICINE

## 2021-06-07 PROCEDURE — 71275 CT ANGIOGRAPHY CHEST: CPT | Mod: MG

## 2021-06-07 PROCEDURE — 99284 EMERGENCY DEPT VISIT MOD MDM: CPT | Mod: 25 | Performed by: FAMILY MEDICINE

## 2021-06-07 PROCEDURE — 85379 FIBRIN DEGRADATION QUANT: CPT | Performed by: FAMILY MEDICINE

## 2021-06-07 PROCEDURE — 71046 X-RAY EXAM CHEST 2 VIEWS: CPT

## 2021-06-07 RX ORDER — LIDOCAINE 4 G/G
3 PATCH TOPICAL ONCE
Status: DISCONTINUED | OUTPATIENT
Start: 2021-06-07 | End: 2021-06-07 | Stop reason: HOSPADM

## 2021-06-07 RX ORDER — LIDOCAINE 40 MG/G
CREAM TOPICAL
Status: DISCONTINUED | OUTPATIENT
Start: 2021-06-07 | End: 2021-06-07 | Stop reason: HOSPADM

## 2021-06-07 RX ORDER — TIZANIDINE HYDROCHLORIDE 4 MG/1
4 CAPSULE, GELATIN COATED ORAL 3 TIMES DAILY PRN
Qty: 15 CAPSULE | Refills: 0 | Status: SHIPPED | OUTPATIENT
Start: 2021-06-07 | End: 2023-03-28

## 2021-06-07 RX ORDER — LIDOCAINE 4 G/G
1 PATCH TOPICAL ONCE
Status: DISCONTINUED | OUTPATIENT
Start: 2021-06-07 | End: 2021-06-07

## 2021-06-07 RX ORDER — IOPAMIDOL 755 MG/ML
145 INJECTION, SOLUTION INTRAVASCULAR ONCE
Status: COMPLETED | OUTPATIENT
Start: 2021-06-07 | End: 2021-06-07

## 2021-06-07 RX ORDER — ACETAMINOPHEN 500 MG
1000 TABLET ORAL ONCE
Status: COMPLETED | OUTPATIENT
Start: 2021-06-07 | End: 2021-06-07

## 2021-06-07 RX ORDER — TRAMADOL HYDROCHLORIDE 50 MG/1
50 TABLET ORAL EVERY 6 HOURS PRN
Qty: 15 TABLET | Refills: 0 | Status: SHIPPED | OUTPATIENT
Start: 2021-06-07 | End: 2022-09-01

## 2021-06-07 RX ORDER — KETOROLAC TROMETHAMINE 15 MG/ML
15 INJECTION, SOLUTION INTRAMUSCULAR; INTRAVENOUS ONCE
Status: COMPLETED | OUTPATIENT
Start: 2021-06-07 | End: 2021-06-07

## 2021-06-07 RX ADMIN — SODIUM CHLORIDE 1000 ML: 9 INJECTION, SOLUTION INTRAVENOUS at 13:17

## 2021-06-07 RX ADMIN — IOPAMIDOL 145 ML: 755 INJECTION, SOLUTION INTRAVENOUS at 13:51

## 2021-06-07 RX ADMIN — KETOROLAC TROMETHAMINE 15 MG: 15 INJECTION, SOLUTION INTRAMUSCULAR; INTRAVENOUS at 15:21

## 2021-06-07 RX ADMIN — ACETAMINOPHEN 1000 MG: 500 TABLET, FILM COATED ORAL at 15:35

## 2021-06-07 RX ADMIN — LIDOCAINE 3 PATCH: 560 PATCH PERCUTANEOUS; TOPICAL; TRANSDERMAL at 15:22

## 2021-06-07 ASSESSMENT — ENCOUNTER SYMPTOMS
NECK PAIN: 1
EYE REDNESS: 0
TROUBLE SWALLOWING: 0
SINUS PAIN: 0
VOICE CHANGE: 0
FATIGUE: 0
DIFFICULTY URINATING: 0
VOMITING: 0
CHEST TIGHTNESS: 0
DYSPHORIC MOOD: 0
COLOR CHANGE: 0
SINUS PRESSURE: 0
NAUSEA: 0
ABDOMINAL PAIN: 0
BACK PAIN: 0
BRUISES/BLEEDS EASILY: 0
WEAKNESS: 0
SHORTNESS OF BREATH: 1
CHILLS: 0
ARTHRALGIAS: 1
DYSURIA: 0
MYALGIAS: 1
HEADACHES: 0
FACIAL ASYMMETRY: 0
SORE THROAT: 0
COUGH: 0
FEVER: 0
ACTIVITY CHANGE: 1
NECK STIFFNESS: 0
JOINT SWELLING: 0
CONFUSION: 0
NUMBNESS: 0
DECREASED CONCENTRATION: 0

## 2021-06-07 ASSESSMENT — MIFFLIN-ST. JEOR: SCORE: 1443.57

## 2021-06-07 NOTE — ED PROVIDER NOTES
"    [place] EMERGENCY DEPARTMENT ([facility])  6/07/21    History     Chief Complaint   Patient presents with     Shoulder Pain     Back Pain     The history is provided by the patient and medical records.     Yancy Rivera is a 65 year old female with a medical history significant for prediabetes, HLD, HTN, GERD, osteopenia of lumbar spine, chronic lumbar and thoracic spine pain, and seronegative rheumatoid arthritis who presents to the emergency department for evaluation of left shoulder pain and left upper back pain for the past 2 weeks. She notes that these symptoms have worsened since last evening and endorses intermittent shortness of breath.  Patient notes her symptoms began when she opened her window 2 weeks ago, to let \"outside air\" into her home. She denies history of asthma or high blood pressure. Patient denies abdominal pain or pain/numbness in her extremities.   Patient denies any cough hemoptysis.  No history of pulmonary embolism or hypertension or cardiac disease.  Patient has not gotten her Covid vaccine.  Patient notes pain describing it is sharp and worsened with movement in the lateral neck area along with the posterior scapular area of the lateral shoulder anterior chest area.  There is no rash no reports of zoster etc.    Per chart review patient underwent CT Abdomen Pelvis at [facility] on 5/13/21:     Abdomen Pelvis CT with contrast 5/13/21  IMPRESSION: Hepatic steatosis with mild enlargement of portal vein,  this may indicate portal hypertension. Subcentimeter left renal cyst  versus small cortical scar. Degenerative changes in the spine,  sacroiliac joints and parasymphyseal joint.    Past Medical History:   Diagnosis Date     Blepharitis      Esophageal reflux (aka GERD)      Glaucoma      Hyperlipidemia LDL goal < 130      Nonsenile cataract      Shingles     11/14/15 Left approximately C6 distribution       Past Surgical History:   Procedure Laterality " Date     CATARACT IOL, RT/LT Right 10/16/2018    [facility]      CATARACT IOL, RT/LT Left 11/01/2018    [facility]      CHALAZION EXCISION  08/21/2015    Left lid     HERNIA REPAIR      abdominal hernia repair 2012       Family History   Problem Relation Age of Onset     Glaucoma Mother      Macular Degeneration No family hx of      Cancer No family hx of      Diabetes No family hx of      Hypertension No family hx of      Cerebrovascular Disease No family hx of      Thyroid Disease No family hx of      Eye Surgery No family hx of        Social History     Tobacco Use     Smoking status: Never Smoker     Smokeless tobacco: Never Used   Substance Use Topics     Alcohol use: No       Current Facility-Administered Medications   Medication     lidocaine (PF) (XYLOCAINE) 1 % injection 2 mL     lidocaine (PF) (XYLOCAINE) 1 % injection 2 mL     lidocaine (PF) (XYLOCAINE) 1 % injection 3 mL     triamcinolone (KENALOG-40) injection 20 mg     triamcinolone (KENALOG-40) injection 40 mg     Current Outpatient Medications   Medication     tiZANidine (ZANAFLEX) 4 MG capsule     traMADol (ULTRAM) 50 MG tablet     amitriptyline (ELAVIL) 10 MG tablet     Artificial Tear Ointment (REFRESH P.M.) OINT     atorvastatin (LIPITOR) 20 MG tablet     carboxymethylcellulose PF (CARBOXYMETHYLCELLULOSE SODIUM) 0.5 % ophthalmic solution     cetirizine (ZYRTEC) 10 MG tablet     Cholecalciferol (VITAMIN D) 2000 UNITS tablet     clotrimazole-betamethasone (LOTRISONE) 1-0.05 % external cream     diclofenac (VOLTAREN) 1 % topical gel     diclofenac (VOLTAREN) 1 % topical gel     doxycycline Monohydrate 100 MG TABS     ferrous sulfate (IRON) 325 (65 FE) MG tablet     gabapentin (NEURONTIN) 100 MG capsule     gabapentin (NEURONTIN) 300 MG capsule     gabapentin (NEURONTIN) 600 MG tablet     ketorolac (ACULAR) 0.5 % ophthalmic solution     methylPREDNISolone (MEDROL  DOSEPAK) 4 MG tablet therapy pack     methylPREDNISolone (MEDROL DOSEPAK) 4 MG tablet     Multiple Minerals (CALCIUM-MAGNESIUM-ZINC) TABS     omega 3 1000 MG CAPS     oxyCODONE (ROXICODONE) 5 MG immediate release tablet     pantoprazole (PROTONIX) 20 MG EC tablet     piroxicam (FELDENE) 20 MG capsule     Riboflavin 400 MG TABS     SUMAtriptan (IMITREX) 50 MG tablet     tiZANidine (ZANAFLEX) 4 MG tablet        Allergies   Allergen Reactions     No Clinical Screening - See Comments Nausea and Vomiting     Liver side effects from INH for TB        Review of Systems   Constitutional: Positive for activity change. Negative for chills, fatigue and fever.        Movement precipitates left shoulder pain   HENT: Negative for congestion, sinus pressure, sinus pain, sore throat, trouble swallowing and voice change.    Eyes: Negative for redness and visual disturbance.   Respiratory: Positive for shortness of breath. Negative for cough and chest tightness.    Cardiovascular: Negative for chest pain.   Gastrointestinal: Negative for abdominal pain, nausea and vomiting.   Genitourinary: Negative for difficulty urinating and dysuria.   Musculoskeletal: Positive for arthralgias, myalgias and neck pain. Negative for back pain, joint swelling and neck stiffness.        Positive for left shoulder pain and left upper back pain   Skin: Negative for color change and rash.   Allergic/Immunologic: Negative for immunocompromised state.   Neurological: Negative for facial asymmetry, weakness, numbness and headaches.   Hematological: Does not bruise/bleed easily.   Psychiatric/Behavioral: Negative for confusion, decreased concentration and dysphoric mood.   All other systems reviewed and are negative.    A complete review of systems was performed with pertinent positives and negatives noted in the HPI, and all other systems negative.    Physical Exam   BP: (!) 177/77  Pulse: 71  Temp: 98.1  F (36.7  C)  Resp: 18  Height: 162.6 cm (5'  "4\")  Weight: 90.4 kg (199 lb 3.2 oz)  SpO2: 97 %  Physical Exam  Vitals signs and nursing note reviewed.   Constitutional:       General: She is in acute distress.      Appearance: She is well-developed. She is not toxic-appearing or diaphoretic.      Comments: Patient uncomfortable nontoxic no respiratory distress.  Vitals are stable mild hypertension   HENT:      Head: Normocephalic and atraumatic.      Nose: Nose normal.      Mouth/Throat:      Mouth: Mucous membranes are moist.      Pharynx: Oropharynx is clear.   Eyes:      General: No scleral icterus.     Extraocular Movements: Extraocular movements intact.      Conjunctiva/sclera: Conjunctivae normal.      Pupils: Pupils are equal, round, and reactive to light.   Neck:      Musculoskeletal: Normal range of motion and neck supple. Muscular tenderness present. No neck rigidity.      Comments: Trachea is midline.  No crepitus noted.  No dysphonia.  Tenderness of the lateral neck trapezial muscle region.  No mass noted  Cardiovascular:      Rate and Rhythm: Normal rate.   Pulmonary:      Effort: No respiratory distress.      Comments: Normal respiratory effort patient with reproducible tenderness upon palpation of the left anterior chest without rash or crepitus  Chest:      Chest wall: Tenderness present.   Abdominal:      General: There is no distension.      Palpations: Abdomen is soft. There is no mass.      Tenderness: There is no abdominal tenderness.   Musculoskeletal:         General: Tenderness present. No swelling, deformity or signs of injury.      Comments: Focal tenderness noted anterior chest lateral deltoid posterior scapular rhomboid area some trigger point tenderness also.  Distal CMS otherwise intact of the left arm lower extremities no edema negative Homans' sign pulses equal throughout.   Skin:     General: Skin is warm and dry.      Capillary Refill: Capillary refill takes less than 2 seconds.      Coloration: Skin is not jaundiced or pale. "      Findings: No erythema or rash.   Neurological:      General: No focal deficit present.      Mental Status: She is alert and oriented to person, place, and time. Mental status is at baseline.   Psychiatric:      Comments: Patient mildly uncomfortable but appropriate           ED Course     10:52 AM  The patient was seen and examined by Tom Hair MD in Room ED08.     Patient was evaluated here in the ER.  EKG done revealing normal sinus rhythm there is no tachycardia no acute ischemic changes.  Left shoulder film without acute abnormalities.  Chest x-ray some atelectasis no other mass noted mediastinum within normal limits.  Laboratory testing CBC chemistries within normal limits troponin negative.  Covid testing negative.  D-dimer is elevated 4.3.    With patient symptoms of some shortness of breath left upper chest pain also lateral neck pain wanted make sure this was anything such as a PE or possible dissection.  Vital signs are stable otherwise patient had a CT scan done a liter of normal saline bolus given also.  CT of the chest was negative for dissection CT PE protocol was also negative no reports of any significant abnormalities noted per radiology preliminary report.    Discussed with patient regarding findings more likely musculoskeletal with her history of rheumatological disorder also may be more myofascial.  Lidoderm patch was placed in the posterior scapular area for pain control she did go home with a couple of these also to use every 24 hours as needed also patient was given Toradol and Tylenol here in the ER.  Toradol was IV Toradol orally.    Patient stable is ready to go home.  At this point discussed with her extensively all are findings with reassurance etc. she was discharged with Ultram for pain Tylenol along with tizanidine for muscle spasm she is had this before in the past.    Discussed follow-up recommended with primary physician patient does agree with plan comfortable plan to be  discharged will use ice and heat also and return if any concerns.      Procedures              EKG Interpretation:        Interpreted by Tom Hair MD  Time reviewed: 10:41  Symptoms at time of EKG: Shoulder pain, back pain  Rhythm: Normal sinus   Rate: 68 bpm  Axis: Normal  Ectopy: None  Conduction: Normal  ST Segments/ T Waves: No ST-T wave changes and No acute ischemic changes  Q Waves: None  Comparison to prior: Unchanged from 1/2/2010    Clinical Impression: normal EKG             Results for orders placed or performed during the hospital encounter of 06/07/21   XR Chest 2 Views     Status: None    Narrative    Exam:  Chest X-ray 6/7/2021 11:47 AM    History: left chest and shoulder pain    Comparison: CT 9/24/2010. X-ray 1/1/2010. CT 5/13/2021    Findings: PA and lateral views of the chest. Trachea is midline. The  cardiomediastinal silhouette is within normal limits. No focal  opacity. Atelectasis in the left lung base. The pulmonary vessels are  distinct. No pneumothorax. No acute bone findings. Multilevel  degenerative changes of the thoracic spine.      Impression    Impression: Platelike atelectasis in the left lung base, otherwise  clear chest.    I have personally reviewed the examination and initial interpretation  and I agree with the findings.    JOEL PULLIAM MD   XR Shoulder Left 2 Views     Status: None    Narrative    3 views left shoulder radiographs 6/7/2021 11:54 AM    History: shoulder pain     Comparison: None    Findings:    AP internal, external rotation and transscapular Y  views of the left  shoulder were obtained.     No acute osseous abnormality. Glenohumeral and acromioclavicular  joints are congruent.    Mild degenerative changes of the acromioclavicular joint. No  substantial degenerative change of the glenohumeral joint.    Soft tissue is unremarkable. The visualized lung is clear.      Impression    Impression:  1. No acute osseous abnormality.  2. No substantial  degenerative change.    GAYATRI SPRINGER MD (Joe)   CTA Chest with Contrast     Status: None    Narrative    EXAMINATION: CTA pulmonary angiogram, 6/7/2021 2:37 PM     COMPARISON: Same-day radiograph and CT 9/24/2010    HISTORY: Elevated d dimer with htn with left neck and shoulder,back  and chest pain with sob. eval for PE vs aortic dissection. double rule  out.     TECHNIQUE: Volumetric helical acquisition of CT images of the chest  from the lung apices to the kidneys were acquired after the  administration of 145 mL of Isovue-370 IV contrast.   Post-processed  multiplanar and/or MIP reformations were obtained, archived to PACS  and used in interpretation of this study.     FINDINGS:  Contrast bolus is: adequate.  Exam is negative for acute  pulmonary embolism. Main pulmonary artery is normal in caliber. No  evidence of aortic injury or aortic dissection. Thoracic aorta is  normal in caliber.      Mild cardiomegaly. No pericardial effusion. No significant coronary  artery calcification. Common origin of the left common carotid and  brachiocephalic arteries. No thoracic lymphadenopathy. Small hiatal  hernia. Unchanged hypodense thyroid nodule in the right lobe.    The central tracheobronchial tree is patent. No pneumothorax or  pleural effusion. Bibasilar atelectasis. No focal pulmonary  consolidation. No suspicious mass or pulmonary nodule.    Limited evaluation of the upper abdomen. Adrenal glands are normal.    No acute or aggressive appearing osseous lesion. Degenerative changes  spine.       Impression    IMPRESSION:   1. Exam is negative for acute pulmonary embolism or acute aortic  pathology.       2. No acute airspace disease or other acute pathology is visualized  within the chest.    3. Mild cardiomegaly.       In the event of a positive result for acute pulmonary embolism  resulting in right heart strain, please activate the PERT  Multidisciplinary group for consultation by paging  [phone]  [phone]).     PERT -- Pulmonary Embolism Response Team (Multidisciplinary team  including cardiology, interventional radiology, critical care,  hematology)    I have personally reviewed the examination and initial interpretation  and I agree with the findings.    CASSIA MEJIA MD   CBC with platelets differential     Status: None   Result Value Ref Range    WBC 7.6 4.0 - 11.0 10e9/L    RBC Count 4.43 3.8 - 5.2 10e12/L    Hemoglobin 12.8 11.7 - 15.7 g/dL    Hematocrit 39.9 35.0 - 47.0 %    MCV 90 78 - 100 fl    MCH 28.9 26.5 - 33.0 pg    MCHC 32.1 31.5 - 36.5 g/dL    RDW 12.8 10.0 - 15.0 %    Platelet Count 273 150 - 450 10e9/L    Diff Method Automated Method     % Neutrophils 65.9 %    % Lymphocytes 23.2 %    % Monocytes 6.6 %    % Eosinophils 3.7 %    % Basophils 0.3 %    % Immature Granulocytes 0.3 %    Nucleated RBCs 0 0 /100    Absolute Neutrophil 5.0 1.6 - 8.3 10e9/L    Absolute Lymphocytes 1.8 0.8 - 5.3 10e9/L    Absolute Monocytes 0.5 0.0 - 1.3 10e9/L    Absolute Eosinophils 0.3 0.0 - 0.7 10e9/L    Absolute Basophils 0.0 0.0 - 0.2 10e9/L    Abs Immature Granulocytes 0.0 0 - 0.4 10e9/L    Absolute Nucleated RBC 0.0    D dimer quantitative     Status: Abnormal   Result Value Ref Range    D Dimer 4.3 (H) 0.0 - 0.50 ug/ml FEU   INR     Status: None   Result Value Ref Range    INR 0.99 0.86 - 1.14   Partial thromboplastin time     Status: None   Result Value Ref Range    PTT 26 22 - 37 sec   Comprehensive metabolic panel     Status: None   Result Value Ref Range    Sodium 138 133 - 144 mmol/L    Potassium 4.1 3.4 - 5.3 mmol/L    Chloride 108 94 - 109 mmol/L    Carbon Dioxide 24 20 - 32 mmol/L    Anion Gap 5 3 - 14 mmol/L    Glucose 91 70 - 99 mg/dL    Urea Nitrogen 9 7 - 30 mg/dL    Creatinine 0.70 0.52 - 1.04 mg/dL    GFR Estimate >90 >60 mL/min/[1.73_m2]    GFR Estimate If Black >90 >60 mL/min/[1.73_m2]    Calcium 9.3 8.5 - 10.1 mg/dL    Bilirubin Total 0.4 0.2 - 1.3 mg/dL    Albumin 4.0 3.4 - 5.0  g/dL    Protein Total 7.4 6.8 - 8.8 g/dL    Alkaline Phosphatase 98 40 - 150 U/L    ALT 25 0 - 50 U/L    AST 19 0 - 45 U/L   Troponin I     Status: None   Result Value Ref Range    Troponin I ES <0.015 0.000 - 0.045 ug/L   Nt probnp inpatient (BNP)     Status: None   Result Value Ref Range    N-Terminal Pro BNP Inpatient 55 0 - 900 pg/mL   Asymptomatic SARS-CoV-2 COVID-19 Virus (Coronavirus) by PCR     Status: None    Specimen: Nasopharyngeal   Result Value Ref Range    SARS-CoV-2 Virus Specimen Source Nasopharyngeal     SARS-CoV-2 PCR Result NEGATIVE     SARS-CoV-2 PCR Comment       Testing was performed using the [employer] Xpress SARS-CoV-2 Assay on the Cepheid Gene-Xpert   Instrument Systems. Additional information about this Emergency Use Authorization (EUA)   assay can be found via the Lab Guide.     EKG 12 lead     Status: None   Result Value Ref Range    Interpretation ECG Click View Image link to view waveform and result      Medications   0.9% sodium chloride BOLUS (0 mLs Intravenous Stopped 6/7/21 1523)   iopamidol (ISOVUE-370) solution 145 mL (145 mLs Intravenous Given 6/7/21 1351)   sodium chloride (PF) 0.9% PF flush 100 mL (100 mLs Intracatheter Given 6/7/21 1351)   ketorolac (TORADOL) injection 15 mg (15 mg Intravenous Given 6/7/21 1521)   acetaminophen (TYLENOL) tablet 1,000 mg (1,000 mg Oral Given 6/7/21 1535)        Assessments & Plan (with Medical Decision Making)  65-year-old female with history of rheumatological disorders etc. presents ER with 2 weeks of left shoulder pain worsened with movement some reports of intermittent shortness of breath but no cough.  Patient not had the Covid vaccine.  Patient noted symptoms began after she opened a window.  Questionable if it is musculoskeletal in etiology.  No history cardiac disease PEs DVTs etc.  Here in the ER patient's EKG was normal sinus rhythm no changes noted no tachycardia.  Chest x-ray without significant findings no pneumothorax etc.  Patient  D-dimer was elevated 4.5 other labs were negative including troponin.  IV fluids given CTA of the chest along with CT PE protocol was done with negative findings.  Patient here in the ER her symptoms are reproducible with movement palpation etc. most likely myofascial type pain syndrome.  We will treat for musculoskeletal given Tylenol orally and Toradol IV.  Patient sent home with a Lidoderm patch placed to posterior scapular area and 2 more if needed.  Prescription for tizanidine along with Ultram for pain and spasms and use Tylenol ice heat monitor symptoms follow-up with MD return if worsening symptoms.  Patient agrees with plan comfortably discharge.       I have reviewed the nursing notes. I have reviewed the findings, diagnosis, plan and need for follow up with the patient.    Discharge Medication List as of 6/7/2021  3:31 PM      START taking these medications    Details   tiZANidine (ZANAFLEX) 4 MG capsule Take 1 capsule (4 mg) by mouth 3 times daily as needed for muscle spasms, Disp-15 capsule, R-0, Local Print      traMADol (ULTRAM) 50 MG tablet Take 1 tablet (50 mg) by mouth every 6 hours as needed for pain or moderate to severe pain, Disp-15 tablet, R-0, Local Print             Final diagnoses:   Acute pain of left shoulder   Musculoskeletal pain     I, Nereyda Ziegler, am serving as a trained medical scribe to document services personally performed by Tom Hair MD based on the provider's statements to me on June 7, 2021.  This document has been checked and approved by the attending provider.    I, Tom Hair MD, was physically present and have reviewed and verified the accuracy of this note documented by Nereyda Ziegler, medical scribe.      --  Tom Hair MD  [facility] EMERGENCY DEPARTMENT  6/7/2021    This note was created at least in part by the use of dragon voice dictation system. Inadvertent typographical errors may still exist.  Tom Hair  MD.    Patient evaluated in the emergency department during the COVID-19 pandemic period. Careful attention to patients safety was addressed throughout the evaluation. Evaluation and treatment management was initiated with disposition made efficiently and appropriate as possible to minimize any risk of potential exposure to patient during this evaluation.       Tom Hair MD  06/07/21 2042

## 2021-06-07 NOTE — ED TRIAGE NOTES
Pt arrives to triage with complaints of L shoulder pain and L upper back pain. Pt reports pain for the past 2 weeks, increased since last evening. Intermittent SOB. A&O, BP elevated in triage.

## 2021-06-07 NOTE — TELEPHONE ENCOUNTER
Noted.   Confirmed patient went to ED today. [facility].      Nic Chinchilla CMA (AAMA) at 3:04 PM on 6/7/2021

## 2021-06-07 NOTE — TELEPHONE ENCOUNTER
".  [facility]: Nurse Triage Note  SITUATION/BACKGROUND                                                      Patient transferred to Red Flag Triage with [facility]  on line ID#[id number], \"Ana\".   Yancy Rivera is a 65 year old female who calls to report having intermittent sharp pain under (L) breast for 2 weeks.   Since last night pain has become worse had problem with sleeping. Inquired if she was having pain anywhere else? Patient responded, in left shoulder and neck. Took tylenol with little relief. Pain is constant with intermittent SOB. Pain increases with inhalation. Rates pain at 10/10.  This nurse advised patient to seek ER care now.  Offered to call 911.   Patient insisted that she would call her  who would be able to take her at 11 am. This nurse insisted that she seek care at ER now - right away. Patient agreed.    stated that he would assist patient with calling 911.   Patient will to to the  at [place] [address] [facility].   Routed as High Priority to Primary Care to review and follow up call to patient at [phone].     "

## 2021-06-07 NOTE — DISCHARGE INSTRUCTIONS
Home.  Your ekg and heart test and xray look good.  Your CT scan of chest did not show any blood clot or any changes with your major blood vessels.  Your pain may be more of musculoskeletal in nature.  Ice or heat to shoulder.  You can put a new lidocaine patch on left shoulder and take the other one off every day if needed.  Take the ultram for pain.  Tizanidine for pain and spasm  Tylenol for pain.  Return if any concerns.  Follow up with MD.

## 2021-06-09 ENCOUNTER — OFFICE VISIT (OUTPATIENT)
Dept: OPHTHALMOLOGY | Facility: CLINIC | Age: 66
End: 2021-06-09
Attending: OPHTHALMOLOGY
Payer: MEDICARE

## 2021-06-09 DIAGNOSIS — H40.1132 PRIMARY OPEN ANGLE GLAUCOMA OF BOTH EYES, MODERATE STAGE: Primary | ICD-10-CM

## 2021-06-09 DIAGNOSIS — H52.7 REFRACTIVE ERROR: ICD-10-CM

## 2021-06-09 DIAGNOSIS — Z96.1 PSEUDOPHAKIA OF BOTH EYES: ICD-10-CM

## 2021-06-09 PROCEDURE — 92012 INTRM OPH EXAM EST PATIENT: CPT | Performed by: OPHTHALMOLOGY

## 2021-06-09 PROCEDURE — G0463 HOSPITAL OUTPT CLINIC VISIT: HCPCS

## 2021-06-09 ASSESSMENT — VISUAL ACUITY
OS_SC: 20/40-
OS_PH_SC: 20/25-2/+2
METHOD: SNELLEN - LINEAR
OD_SC: 20/25-/+2

## 2021-06-09 ASSESSMENT — EXTERNAL EXAM - RIGHT EYE: OD_EXAM: NORMAL

## 2021-06-09 ASSESSMENT — EXTERNAL EXAM - LEFT EYE: OS_EXAM: NORMAL

## 2021-06-09 ASSESSMENT — TONOMETRY
OS_IOP_MMHG: 10
OD_IOP_MMHG: 10

## 2021-06-09 ASSESSMENT — SLIT LAMP EXAM - LIDS: COMMENTS: PTOSIS

## 2021-06-09 NOTE — PROGRESS NOTES
Chief Complaint(s) and History of Present Illness(es)     Post Op (Ophthalmology) Both Eyes     Laterality: both eyes    Pain scale: 0/10              Comments     Post op SLT each eye (Left eye 4/27/21, right eye 5/5/21)    Was using Ketorolac in both eyes BID - ran out yesterday and the pharmacy   said she couldn't get a refill covered by insurance until 6/17/21.    Eyes feel a little dry not using the ketorolac  Otherwise no complaints, no pain, stable per pt    Ragini Mckeon, TAMMI 10:40 AM 06/09/2021                  Review of systems for the eyes was negative other than the pertinent positives/negatives listed in the HPI.      Assessment & Plan      Yancy Rivera is a 65 year old female with the following diagnoses:   1. Primary open angle glaucoma of both eyes, moderate stage    2. Refractive error    3. Pseudophakia of both eyes         S/P Selected laser trabeculoplasty (SLT) both eyes, great Intraocular pressure response  Ok to monitor without drops for now  Discussed need for chronic treatment and recommended management in detail   Refraction given from March 2021  Return precautions reviewed       Patient disposition:   Return in about 4 months (around 10/9/2021) for VT only, 24-2 Dynamic VF, OCT NFL.           Attending Physician Attestation:  Complete documentation of historical and exam elements from today's encounter can be found in the full encounter summary report (not reduplicated in this progress note).  I personally obtained the chief complaint(s) and history of present illness.  I confirmed and edited as necessary the review of systems, past medical/surgical history, family history, social history, and examination findings as documented by others; and I examined the patient myself.  I personally reviewed the relevant tests, images, and reports as documented above.  I formulated and edited as necessary the assessment and plan and discussed the findings and management plan with the patient  and family. . - Constantin Rollins MD

## 2021-06-14 ENCOUNTER — DOCUMENTATION ONLY (OUTPATIENT)
Dept: SURGERY | Facility: CLINIC | Age: 66
End: 2021-06-14

## 2021-06-14 NOTE — PROGRESS NOTES
Action 6/14/2021 1:50pm -Emily   Action Taken Received CD with CT image 3/5/19; dropped off to [facility] ([facility]) for image to be uploaded into PACS.

## 2021-07-25 DIAGNOSIS — K21.9 GASTROESOPHAGEAL REFLUX DISEASE, UNSPECIFIED WHETHER ESOPHAGITIS PRESENT: ICD-10-CM

## 2021-07-27 RX ORDER — PANTOPRAZOLE SODIUM 40 MG/1
40 TABLET, DELAYED RELEASE ORAL DAILY
Qty: 90 TABLET | Refills: 1 | Status: SHIPPED | OUTPATIENT
Start: 2021-07-27 | End: 2022-05-16

## 2021-07-27 NOTE — TELEPHONE ENCOUNTER
pantoprazole (PROTONIX) 20 MG EC tablet      Last Written Prescription Date:  5/4/2021  Last Fill Quantity: 90,   # refills: 1  Last Office Visit : 4/30/2021  Future Office visit:  8/4/2021    Routing refill request to provider for review/approval because:  Refill needs review- tab strength change.  - dose increased to 40 mg QD at 4/30/2021 office visit  - Rx sent 5/4/2021 20 mg tabs take 2 QD (40 mg)  - Requested as 40 mg tabs take 1 QD (40 mg)  - pended Rx as requested.

## 2021-09-14 ENCOUNTER — LAB (OUTPATIENT)
Dept: LAB | Facility: CLINIC | Age: 66
End: 2021-09-14
Payer: MEDICARE

## 2021-09-14 ENCOUNTER — OFFICE VISIT (OUTPATIENT)
Dept: INTERNAL MEDICINE | Facility: CLINIC | Age: 66
End: 2021-09-14
Payer: MEDICARE

## 2021-09-14 VITALS
SYSTOLIC BLOOD PRESSURE: 152 MMHG | BODY MASS INDEX: 33.88 KG/M2 | DIASTOLIC BLOOD PRESSURE: 82 MMHG | HEART RATE: 82 BPM | WEIGHT: 197.4 LBS | OXYGEN SATURATION: 100 %

## 2021-09-14 DIAGNOSIS — L29.9 GENERALIZED PRURITUS: ICD-10-CM

## 2021-09-14 DIAGNOSIS — K21.00 GASTROESOPHAGEAL REFLUX DISEASE WITH ESOPHAGITIS WITHOUT HEMORRHAGE: ICD-10-CM

## 2021-09-14 DIAGNOSIS — M54.50 CHRONIC MIDLINE LOW BACK PAIN WITHOUT SCIATICA: ICD-10-CM

## 2021-09-14 DIAGNOSIS — E78.5 HYPERLIPIDEMIA LDL GOAL <130: ICD-10-CM

## 2021-09-14 DIAGNOSIS — L29.9 GENERALIZED PRURITUS: Primary | ICD-10-CM

## 2021-09-14 DIAGNOSIS — G89.29 CHRONIC MIDLINE LOW BACK PAIN WITHOUT SCIATICA: ICD-10-CM

## 2021-09-14 LAB
ALBUMIN SERPL-MCNC: 4 G/DL (ref 3.4–5)
ALP SERPL-CCNC: 119 U/L (ref 40–150)
ALT SERPL W P-5'-P-CCNC: 27 U/L (ref 0–50)
ANION GAP SERPL CALCULATED.3IONS-SCNC: 7 MMOL/L (ref 3–14)
AST SERPL W P-5'-P-CCNC: 18 U/L (ref 0–45)
BASOPHILS # BLD AUTO: 0 10E3/UL (ref 0–0.2)
BASOPHILS NFR BLD AUTO: 0 %
BILIRUB SERPL-MCNC: 0.3 MG/DL (ref 0.2–1.3)
BUN SERPL-MCNC: 11 MG/DL (ref 7–30)
CALCIUM SERPL-MCNC: 9.5 MG/DL (ref 8.5–10.1)
CHLORIDE BLD-SCNC: 108 MMOL/L (ref 94–109)
CO2 SERPL-SCNC: 24 MMOL/L (ref 20–32)
CREAT SERPL-MCNC: 0.67 MG/DL (ref 0.52–1.04)
EOSINOPHIL # BLD AUTO: 0.2 10E3/UL (ref 0–0.7)
EOSINOPHIL NFR BLD AUTO: 3 %
ERYTHROCYTE [DISTWIDTH] IN BLOOD BY AUTOMATED COUNT: 12.9 % (ref 10–15)
GFR SERPL CREATININE-BSD FRML MDRD: >90 ML/MIN/1.73M2
GLUCOSE BLD-MCNC: 87 MG/DL (ref 70–99)
HCT VFR BLD AUTO: 38.8 % (ref 35–47)
HGB BLD-MCNC: 12.8 G/DL (ref 11.7–15.7)
IMM GRANULOCYTES # BLD: 0 10E3/UL
IMM GRANULOCYTES NFR BLD: 0 %
LYMPHOCYTES # BLD AUTO: 1.9 10E3/UL (ref 0.8–5.3)
LYMPHOCYTES NFR BLD AUTO: 25 %
MCH RBC QN AUTO: 28.8 PG (ref 26.5–33)
MCHC RBC AUTO-ENTMCNC: 33 G/DL (ref 31.5–36.5)
MCV RBC AUTO: 87 FL (ref 78–100)
MONOCYTES # BLD AUTO: 0.6 10E3/UL (ref 0–1.3)
MONOCYTES NFR BLD AUTO: 7 %
NEUTROPHILS # BLD AUTO: 4.9 10E3/UL (ref 1.6–8.3)
NEUTROPHILS NFR BLD AUTO: 65 %
NRBC # BLD AUTO: 0 10E3/UL
NRBC BLD AUTO-RTO: 0 /100
PLATELET # BLD AUTO: 280 10E3/UL (ref 150–450)
POTASSIUM BLD-SCNC: 3.9 MMOL/L (ref 3.4–5.3)
PROT SERPL-MCNC: 7.5 G/DL (ref 6.8–8.8)
RBC # BLD AUTO: 4.45 10E6/UL (ref 3.8–5.2)
SODIUM SERPL-SCNC: 139 MMOL/L (ref 133–144)
TSH SERPL DL<=0.005 MIU/L-ACNC: 1.02 MU/L (ref 0.4–4)
WBC # BLD AUTO: 7.6 10E3/UL (ref 4–11)

## 2021-09-14 PROCEDURE — 86803 HEPATITIS C AB TEST: CPT | Performed by: INTERNAL MEDICINE

## 2021-09-14 PROCEDURE — 84443 ASSAY THYROID STIM HORMONE: CPT | Performed by: PATHOLOGY

## 2021-09-14 PROCEDURE — 36415 COLL VENOUS BLD VENIPUNCTURE: CPT | Performed by: PATHOLOGY

## 2021-09-14 PROCEDURE — 85025 COMPLETE CBC W/AUTO DIFF WBC: CPT | Performed by: PATHOLOGY

## 2021-09-14 PROCEDURE — 80053 COMPREHEN METABOLIC PANEL: CPT | Performed by: PATHOLOGY

## 2021-09-14 PROCEDURE — 99213 OFFICE O/P EST LOW 20 MIN: CPT | Mod: GC

## 2021-09-14 RX ORDER — ATORVASTATIN CALCIUM 20 MG/1
20 TABLET, FILM COATED ORAL DAILY
Qty: 30 TABLET | Refills: 11 | Status: SHIPPED | OUTPATIENT
Start: 2021-09-14 | End: 2021-10-14

## 2021-09-14 RX ORDER — IBUPROFEN 200 MG
400 TABLET ORAL
COMMUNITY
End: 2023-03-28

## 2021-09-14 RX ORDER — PANTOPRAZOLE SODIUM 40 MG/1
40 TABLET, DELAYED RELEASE ORAL DAILY
Qty: 30 TABLET | Refills: 11 | Status: SHIPPED | OUTPATIENT
Start: 2021-09-14 | End: 2021-10-14

## 2021-09-14 RX ORDER — GABAPENTIN 100 MG/1
100 CAPSULE ORAL 3 TIMES DAILY
Qty: 90 CAPSULE | Refills: 3 | Status: SHIPPED | OUTPATIENT
Start: 2021-09-14 | End: 2021-10-14

## 2021-09-14 RX ORDER — HYDROXYZINE HYDROCHLORIDE 25 MG/1
25 TABLET, FILM COATED ORAL 3 TIMES DAILY PRN
Qty: 90 TABLET | Refills: 1 | Status: SHIPPED | OUTPATIENT
Start: 2021-09-14 | End: 2021-10-14

## 2021-09-14 ASSESSMENT — PAIN SCALES - GENERAL: PAINLEVEL: NO PAIN (0)

## 2021-09-14 NOTE — LETTER
September 16, 2021      Gelywalt Valderramaleslee  [address]   [place] [address]        Dear ,    We are writing to inform you of your test results.    Dear Ms. Haines,     We received the results of your laboratory tests from yesterday. Your studies are normal, specifically your liver, and your thyroid function appropriately and your blood cells are at normal levels. Therefore, at this point, we do not have a diagnosis that could explain your continuous itching. As discussed yesterday, we will make a referral to allergy and dermatology to take a deeper look into potential causes for your symptom. It will be my pleasure to see you again in a few weeks to discuss the management of your other health issues, most notably your hyperlipidemia, your colon cancer screening and start treatment for seronegative rheumatoid arthritis. It has been my pleasure to be involved in your care!     Jennifer Nava MD       Resulted Orders   Comprehensive metabolic panel   Result Value Ref Range    Sodium 139 133 - 144 mmol/L    Potassium 3.9 3.4 - 5.3 mmol/L    Chloride 108 94 - 109 mmol/L    Carbon Dioxide (CO2) 24 20 - 32 mmol/L    Anion Gap 7 3 - 14 mmol/L    Urea Nitrogen 11 7 - 30 mg/dL    Creatinine 0.67 0.52 - 1.04 mg/dL    Calcium 9.5 8.5 - 10.1 mg/dL    Glucose 87 70 - 99 mg/dL    Alkaline Phosphatase 119 40 - 150 U/L    AST 18 0 - 45 U/L    ALT 27 0 - 50 U/L    Protein Total 7.5 6.8 - 8.8 g/dL    Albumin 4.0 3.4 - 5.0 g/dL    Bilirubin Total 0.3 0.2 - 1.3 mg/dL    GFR Estimate >90 >60 mL/min/1.73m2      Comment:      As of July 11, 2021, eGFR is calculated by the CKD-EPI creatinine equation, without race adjustment. eGFR can be influenced by muscle mass, exercise, and diet. The reported eGFR is an estimation only and is only applicable if the renal function is stable.   TSH with free T4 reflex   Result Value Ref Range    TSH 1.02 0.40 - 4.00 mU/L   CBC with platelets and differential   Result Value Ref  Range    WBC Count 7.6 4.0 - 11.0 10e3/uL    RBC Count 4.45 3.80 - 5.20 10e6/uL    Hemoglobin 12.8 11.7 - 15.7 g/dL    Hematocrit 38.8 35.0 - 47.0 %    MCV 87 78 - 100 fL    MCH 28.8 26.5 - 33.0 pg    MCHC 33.0 31.5 - 36.5 g/dL    RDW 12.9 10.0 - 15.0 %    Platelet Count 280 150 - 450 10e3/uL    % Neutrophils 65 %    % Lymphocytes 25 %    % Monocytes 7 %    % Eosinophils 3 %    % Basophils 0 %    % Immature Granulocytes 0 %    NRBCs per 100 WBC 0 <1 /100    Absolute Neutrophils 4.9 1.6 - 8.3 10e3/uL    Absolute Lymphocytes 1.9 0.8 - 5.3 10e3/uL    Absolute Monocytes 0.6 0.0 - 1.3 10e3/uL    Absolute Eosinophils 0.2 0.0 - 0.7 10e3/uL    Absolute Basophils 0.0 0.0 - 0.2 10e3/uL    Absolute Immature Granulocytes 0.0 <=0.0 10e3/uL    Absolute NRBCs 0.0 10e3/uL       If you have any questions or concerns, please call the clinic at the number listed above.       Sincerely,      Melony Madison MD

## 2021-09-14 NOTE — NURSING NOTE
Chief Complaint   Patient presents with     Derm Problem     Skin/body is itchy all over; Pt was here 3 months ago and got medication but it didn't help     Marlo Layne, EMT at 12:44 PM on 9/14/2021.

## 2021-09-14 NOTE — LETTER
September 15, 2021      Jaydekatie Rivera  [address]   [place] [address]        Dear Ms. Rivera,    We received the results of your laboratory tests from yesterday. Your studies are normal, specifically your liver, and your thyroid function appropriately and your blood cells are at normal levels. Therefore, at this point, we do not have a diagnosis that could explain your continuous itching. As discussed yesterday, we will make a referral to allergy and dermatology to take a deeper look into potential causes for your symptom. It will be my pleasure to see you again in a few weeks to discuss the management of your other health issues, most notably your hyperlipidemia, your colon cancer screening and start treatment for seronegative rheumatoid arthritis. It has been my pleasure to be involved in your care!      Resulted Orders   Comprehensive metabolic panel   Result Value Ref Range    Sodium 139 133 - 144 mmol/L    Potassium 3.9 3.4 - 5.3 mmol/L    Chloride 108 94 - 109 mmol/L    Carbon Dioxide (CO2) 24 20 - 32 mmol/L    Anion Gap 7 3 - 14 mmol/L    Urea Nitrogen 11 7 - 30 mg/dL    Creatinine 0.67 0.52 - 1.04 mg/dL    Calcium 9.5 8.5 - 10.1 mg/dL    Glucose 87 70 - 99 mg/dL    Alkaline Phosphatase 119 40 - 150 U/L    AST 18 0 - 45 U/L    ALT 27 0 - 50 U/L    Protein Total 7.5 6.8 - 8.8 g/dL    Albumin 4.0 3.4 - 5.0 g/dL    Bilirubin Total 0.3 0.2 - 1.3 mg/dL    GFR Estimate >90 >60 mL/min/1.73m2      Comment:      As of July 11, 2021, eGFR is calculated by the CKD-EPI creatinine equation, without race adjustment. eGFR can be influenced by muscle mass, exercise, and diet. The reported eGFR is an estimation only and is only applicable if the renal function is stable.   TSH with free T4 reflex   Result Value Ref Range    TSH 1.02 0.40 - 4.00 mU/L   CBC with platelets and differential   Result Value Ref Range    WBC Count 7.6 4.0 - 11.0 10e3/uL    RBC Count 4.45 3.80 - 5.20 10e6/uL    Hemoglobin 12.8 11.7  - 15.7 g/dL    Hematocrit 38.8 35.0 - 47.0 %    MCV 87 78 - 100 fL    MCH 28.8 26.5 - 33.0 pg    MCHC 33.0 31.5 - 36.5 g/dL    RDW 12.9 10.0 - 15.0 %    Platelet Count 280 150 - 450 10e3/uL    % Neutrophils 65 %    % Lymphocytes 25 %    % Monocytes 7 %    % Eosinophils 3 %    % Basophils 0 %    % Immature Granulocytes 0 %    NRBCs per 100 WBC 0 <1 /100    Absolute Neutrophils 4.9 1.6 - 8.3 10e3/uL    Absolute Lymphocytes 1.9 0.8 - 5.3 10e3/uL    Absolute Monocytes 0.6 0.0 - 1.3 10e3/uL    Absolute Eosinophils 0.2 0.0 - 0.7 10e3/uL    Absolute Basophils 0.0 0.0 - 0.2 10e3/uL    Absolute Immature Granulocytes 0.0 <=0.0 10e3/uL    Absolute NRBCs 0.0 10e3/uL       If you have any questions or concerns, please call the clinic at the number listed above.       Sincerely,      Melony Madison MD

## 2021-09-14 NOTE — LETTER
[address] [address]  Phone: [phone]  Fax: [phone]                  September 15, 2021        Yancy Rivera  [address]   [place] [address]           Dear Ms. Rivera,     We received the results of your laboratory tests from yesterday. Your studies are normal, specifically your liver, and your thyroid function appropriately and your blood cells are at normal levels. Therefore, at this point, we do not have a diagnosis that could explain your continuous itching. As discussed yesterday, we will make a referral to allergy and dermatology to take a deeper look into potential causes for your symptom. It will be my pleasure to see you again in a few weeks to discuss the management of your other health issues, most notably your hyperlipidemia, your colon cancer screening and start treatment for seronegative rheumatoid arthritis. It has been my pleasure to be involved in your care!                Resulted Orders   Comprehensive metabolic panel   Result Value Ref Range     Sodium 139 133 - 144 mmol/L     Potassium 3.9 3.4 - 5.3 mmol/L     Chloride 108 94 - 109 mmol/L     Carbon Dioxide (CO2) 24 20 - 32 mmol/L     Anion Gap 7 3 - 14 mmol/L     Urea Nitrogen 11 7 - 30 mg/dL     Creatinine 0.67 0.52 - 1.04 mg/dL     Calcium 9.5 8.5 - 10.1 mg/dL     Glucose 87 70 - 99 mg/dL     Alkaline Phosphatase 119 40 - 150 U/L     AST 18 0 - 45 U/L     ALT 27 0 - 50 U/L     Protein Total 7.5 6.8 - 8.8 g/dL     Albumin 4.0 3.4 - 5.0 g/dL     Bilirubin Total 0.3 0.2 - 1.3 mg/dL     GFR Estimate >90 >60 mL/min/1.73m2         Comment:         As of July 11, 2021, eGFR is calculated by the CKD-EPI creatinine equation, without race adjustment. eGFR can be influenced by muscle mass, exercise, and diet. The reported eGFR is an estimation only and is only applicable if the renal function is stable.   TSH with free T4 reflex   Result Value Ref Range      TSH 1.02 0.40 - 4.00 mU/L   CBC with platelets and differential   Result Value Ref Range     WBC Count 7.6 4.0 - 11.0 10e3/uL     RBC Count 4.45 3.80 - 5.20 10e6/uL     Hemoglobin 12.8 11.7 - 15.7 g/dL     Hematocrit 38.8 35.0 - 47.0 %     MCV 87 78 - 100 fL     MCH 28.8 26.5 - 33.0 pg     MCHC 33.0 31.5 - 36.5 g/dL     RDW 12.9 10.0 - 15.0 %     Platelet Count 280 150 - 450 10e3/uL     % Neutrophils 65 %     % Lymphocytes 25 %     % Monocytes 7 %     % Eosinophils 3 %     % Basophils 0 %     % Immature Granulocytes 0 %     NRBCs per 100 WBC 0 <1 /100     Absolute Neutrophils 4.9 1.6 - 8.3 10e3/uL     Absolute Lymphocytes 1.9 0.8 - 5.3 10e3/uL     Absolute Monocytes 0.6 0.0 - 1.3 10e3/uL     Absolute Eosinophils 0.2 0.0 - 0.7 10e3/uL     Absolute Basophils 0.0 0.0 - 0.2 10e3/uL     Absolute Immature Granulocytes 0.0 <=0.0 10e3/uL     Absolute NRBCs 0.0 10e3/uL         If you have any questions or concerns, please call the clinic at the number listed above.         Sincerely,        Melony Madison MD

## 2021-09-14 NOTE — PROGRESS NOTES
[facility]         HPI:       Yancy Rivera is a 66 year old female with PMH of seronegative rheumatoid athritis and hyperlipidemia who presents for itching. The itching started three months ago in May 2021 and is progressively getting worse. It is persistent, all over the patient's body and leads to the patient itching till her skin bleeds after scratch. The patient denies any relationship with showers, cold or water. She cannot identify any triggering or alleviating factors. She has tried cetirizine to control her symptoms but without success. She denies any other symptoms including abdominal pain, melena, lower GI bleeding, changes in her bowel habits or jaundice. She reports that she has had difficulty controlling her weight despite following a healthy diet.  She had a recent fall resulting in fracture. She was recently diagnosed with rheumatoid arthritis, was prescribed hydroxychloroquine but never took it. She denies any joint pain today.    Current Outpatient Medications   Medication Sig Dispense Refill     atorvastatin (LIPITOR) 20 MG tablet Take 1 tablet (20 mg) by mouth daily 30 tablet 11     atorvastatin (LIPITOR) 20 MG tablet Take 1 tablet by mouth once daily 90 tablet 0     gabapentin (NEURONTIN) 100 MG capsule Take 1 capsule (100 mg) by mouth 3 times daily 90 capsule 3     gabapentin (NEURONTIN) 100 MG capsule Take 1 capsule (100 mg) by mouth 3 times daily 90 capsule 1     hydrOXYzine (ATARAX) 25 MG tablet Take 1 tablet (25 mg) by mouth 3 times daily as needed for itching (Take at night) 90 tablet 1     pantoprazole (PROTONIX) 40 MG EC tablet Take 1 tablet (40 mg) by mouth daily 30 tablet 11     pantoprazole (PROTONIX) 40 MG EC tablet Take 1 tablet (40 mg) by mouth daily 90 tablet 1     Artificial Tear Ointment (REFRESH P.M.) OINT Apply 3.5 g to eye At Bedtime (Patient not taking: Reported on 9/14/2021) 1 Tube 11     carboxymethylcellulose PF (CARBOXYMETHYLCELLULOSE SODIUM) 0.5 %  ophthalmic solution Place 1 drop into both eyes 3 times daily as needed for dry eyes (Patient not taking: Reported on 9/14/2021) 1 Bottle 3     Cholecalciferol (VITAMIN D) 2000 UNITS tablet Take 1,000 Units by mouth daily (Patient not taking: Reported on 9/14/2021) 45 tablet 3     clotrimazole-betamethasone (LOTRISONE) 1-0.05 % external cream Apply topically 2 times daily (Patient not taking: Reported on 9/14/2021) 15 g 0     diclofenac (VOLTAREN) 1 % topical gel Place 2 g onto the skin 4 times daily To right wrist (Patient not taking: Reported on 9/14/2021) 100 g 3     diclofenac (VOLTAREN) 1 % topical gel Place 2 g onto the skin 4 times daily (Patient not taking: Reported on 9/14/2021) 100 g 3     doxycycline Monohydrate 100 MG TABS Take 100 mg by mouth daily (Patient not taking: Reported on 9/14/2021) 90 tablet 3     ferrous sulfate (IRON) 325 (65 FE) MG tablet Take 1 tablet (325 mg) by mouth daily (with breakfast) (Patient not taking: Reported on 9/14/2021) 30 tablet 11     gabapentin (NEURONTIN) 300 MG capsule Take 1 capsule (300 mg) by mouth 3 times daily (Patient not taking: Reported on 9/14/2021) 270 capsule 1     gabapentin (NEURONTIN) 600 MG tablet Take 1 tablet (600 mg) by mouth 3 times daily (Patient not taking: Reported on 9/14/2021) 90 tablet 11     ibuprofen (ADVIL/MOTRIN) 200 MG tablet Take 400 mg by mouth (Patient not taking: Reported on 9/14/2021)       ketorolac (ACULAR) 0.5 % ophthalmic solution Place 1 drop into both eyes 2 times daily Until next appointment on 6/9/21 (Patient not taking: Reported on 9/14/2021) 5 mL 1     methylPREDNISolone (MEDROL DOSEPAK) 4 MG tablet therapy pack Follow Package Directions (Patient not taking: Reported on 9/14/2021) 21 tablet 0     methylPREDNISolone (MEDROL DOSEPAK) 4 MG tablet Take 1 tablet (4 mg) by mouth See Admin Instructions (Patient not taking: Reported on 9/14/2021) 21 tablet 0     Multiple Minerals (CALCIUM-MAGNESIUM-ZINC) TABS Take 1 tablet by mouth  daily (Patient not taking: Reported on 9/14/2021) 90 tablet 0     omega 3 1000 MG CAPS Take 1 g by mouth daily (Patient not taking: Reported on 9/14/2021) 90 capsule 0     oxyCODONE (ROXICODONE) 5 MG immediate release tablet Take 1 tablet (5 mg) by mouth every 6 hours as needed for moderate to severe pain (Patient not taking: Reported on 9/14/2021) 20 tablet 0     piroxicam (FELDENE) 20 MG capsule Take 1 capsule (20 mg) by mouth daily (with breakfast) (Patient not taking: Reported on 9/14/2021) 30 capsule 11     Riboflavin 400 MG TABS Take 1 tablet by mouth daily (Patient not taking: Reported on 9/14/2021) 30 tablet 11     SUMAtriptan (IMITREX) 50 MG tablet Take 1 tablet (50 mg) by mouth at onset of headache for migraine (May repeat in one hour. Max 200 mg in 24 hours. limit use to no more than 2 days per week) May repeat dose within one hour. (Patient not taking: Reported on 9/14/2021) 12 tablet 6     tiZANidine (ZANAFLEX) 4 MG capsule Take 1 capsule (4 mg) by mouth 3 times daily as needed for muscle spasms (Patient not taking: Reported on 9/14/2021) 15 capsule 0     tiZANidine (ZANAFLEX) 4 MG tablet Take 1 tablet (4 mg) by mouth 3 times daily (Patient not taking: Reported on 9/14/2021) 10 tablet 0     traMADol (ULTRAM) 50 MG tablet Take 1 tablet (50 mg) by mouth every 6 hours as needed for pain or moderate to severe pain (Patient not taking: Reported on 9/14/2021) 15 tablet 0          Allergies   Allergen Reactions     No Clinical Screening - See Comments Nausea and Vomiting     Liver side effects from [facility] for TB            Review of Systems:     ROS  I have personally reviewed and updated the complete ROS on the day of the visit.             Past Medical History:     Past Medical History:   Diagnosis Date     Blepharitis      Esophageal reflux (aka GERD)      Glaucoma      Hyperlipidemia LDL goal < 130      Nonsenile cataract      Shingles     11/14/15 Left approximately C6 distribution            Past Surgical  History:     Past Surgical History:   Procedure Laterality Date     CATARACT IOL, RT/LT Right 10/16/2018    [place] [facility]      CATARACT IOL, RT/LT Left 11/01/2018    [place] [facility]      CHALAZION EXCISION  08/21/2015    Left lid     HERNIA REPAIR      abdominal hernia repair 2012            Family History:     Family History   Problem Relation Age of Onset     Glaucoma Mother      Macular Degeneration No family hx of      Cancer No family hx of      Diabetes No family hx of      Hypertension No family hx of      Cerebrovascular Disease No family hx of      Thyroid Disease No family hx of      Eye Surgery No family hx of             Social History:     Social History     Socioeconomic History     Marital status:      Spouse name: Not on file     Number of children: Not on file     Years of education: Not on file     Highest education level: Not on file   Occupational History     Not on file   Tobacco Use     Smoking status: Never Smoker     Smokeless tobacco: Never Used   Substance and Sexual Activity     Alcohol use: No     Drug use: No     Sexual activity: Never   Other Topics Concern     Parent/sibling w/ CABG, MI or angioplasty before 65F 55M? Not Asked   Social History Narrative     Not on file     Social Determinants of Health     Financial Resource Strain:      Difficulty of Paying Living Expenses:    Food Insecurity:      Worried About Running Out of Food in the Last Year:      Ran Out of Food in the Last Year:    Transportation Needs:      Lack of Transportation (Medical):      Lack of Transportation (Non-Medical):    Physical Activity:      Days of Exercise per Week:      Minutes of Exercise per Session:    Stress:      Feeling of Stress :    Social Connections:      Frequency of Communication with Friends and Family:      Frequency of Social Gatherings with Friends and Family:      Attends Rastafari Services:      Active  Member of Clubs or Organizations:      Attends Club or Organization Meetings:      Marital Status:    Intimate Partner Violence:      Fear of Current or Ex-Partner:      Emotionally Abused:      Physically Abused:      Sexually Abused:             Physical Exam:   BP (!) 152/82 (BP Location: Left arm, Patient Position: Sitting, Cuff Size: Adult Regular)   Pulse 82   Wt 89.5 kg (197 lb 6.4 oz)   LMP  (LMP Unknown)   SpO2 100%   BMI 33.88 kg/m    Body mass index is 33.88 kg/m .  Vitals were reviewed     Physical Exam  Constitutional:       Appearance: Normal appearance.   HENT:      Head: Normocephalic and atraumatic.      Mouth/Throat:      Mouth: Mucous membranes are moist.   Eyes:      Extraocular Movements: Extraocular movements intact.      Pupils: Pupils are equal, round, and reactive to light.   Cardiovascular:      Rate and Rhythm: Normal rate and regular rhythm.      Pulses: Normal pulses.      Heart sounds: Normal heart sounds. No murmur heard.   No friction rub. No gallop.    Pulmonary:      Effort: Pulmonary effort is normal. No respiratory distress.      Breath sounds: Normal breath sounds. No wheezing or rhonchi.   Abdominal:      General: There is no distension.      Palpations: There is no mass.      Tenderness: There is no abdominal tenderness. There is no guarding or rebound.      Hernia: No hernia is present.   Musculoskeletal:      Right wrist: No swelling.      Left wrist: No swelling.      Comments: Brace in right forearm due to fracture   Skin:     General: Skin is warm.      Comments: Diffuse excoriations resulting from the patient's itching. No randy rash.   Neurological:      General: No focal deficit present.      Mental Status: She is alert and oriented to person, place, and time. Mental status is at baseline.         Assessment and Plan     Yancy was seen today for derm problem and recheck medication.    Diagnoses and all orders for this visit:    Generalized pruritus  -      hydrOXYzine (ATARAX) 25 MG tablet; Take 1 tablet (25 mg) by mouth 3 times daily as needed for itching (Take at night)  -     Comprehensive metabolic panel; Future  -     HCV Screen with Reflex; Future  -     TSH with free T4 reflex; Future  -     CBC with platelets differential; Future    Hyperlipidemia LDL goal <130  -     atorvastatin (LIPITOR) 20 MG tablet; Take 1 tablet (20 mg) by mouth daily    Gastroesophageal reflux disease with esophagitis without hemorrhage  -     pantoprazole (PROTONIX) 40 MG EC tablet; Take 1 tablet (40 mg) by mouth daily    Chronic midline low back pain without sciatica  -     gabapentin (NEURONTIN) 100 MG capsule; Take 1 capsule (100 mg) by mouth 3 times daily        Ms. Haines pruritus has an unclear cause. The generalized nature, the lack of similar symptoms in the patient's environment together with the absence of exposure points towards a systemic cause. However, liver function tests and CBC were normal three months ago, shifting our thoughts away from liver disease or polycythemia vera as causes for the patient's itching. Her last CT scan demonstrated evidence of steatosis, but it is unlikely to be the cause for the patient's pruritus in the absence of abnormal liver function. We have reordered LFT and CBC, together with TSH as thyroid disorders can be a cause of itching.  However, we clarified to the patient that we do not necessarily anticipate to identify the cause of her pruritus from these tests. A referral to allergy and dermatology will be made if symptoms are negative. Since cetirizine has been ineffective in controlling the patient's itching, we have prescribed hydroxyzine 25 mg for symptom management. The patient's medications for her chronic conditions have been refilled.Options for treatment and follow-up care were reviewed with the patient. We will have a follow-up appointment in 6 weeks to assess progression of symptoms and management of her chronic health  conditions. Yancy Rivera engaged in the decision making process and verbalized understanding of the options discussed and agreed with the final plan.    Jennifer Nava MD  Sep 14, 2021    Pt was seen and plan of care discussed with Dr. Madison.    Attending Addendum:  Patient seen and examined with resident in clinic today.  Pertinent portions of history and exam were independently verified by myself.  I agree with the exam and plan as outlined above with the following modifications: none.  Melony Madison MD  Internal Medicine

## 2021-09-15 NOTE — RESULT ENCOUNTER NOTE
Dear Ms. Yancy,    We received the results of your laboratory tests from yesterday. Your studies are normal, specifically your liver, and your thyroid function appropriately and your blood cells are at normal levels. Therefore, at this point, we do not have a diagnosis that could explain your continuous itching. As discussed yesterday, we will make a referral to allergy and dermatology to take a deeper look into potential causes for your symptom. It will be my pleasure to see you again in a few weeks to discuss the management of your other health issues, most notably your hyperlipidemia, your colon cancer screening and start treatment for seronegative rheumatoid arthritis. It has been my pleasure to be involved in your care!    Jeninfer Nava MD

## 2021-09-16 DIAGNOSIS — L29.9 PRURITIC DISORDER: Primary | ICD-10-CM

## 2021-09-16 LAB — HCV AB SERPL QL IA: NONREACTIVE

## 2021-09-17 ENCOUNTER — APPOINTMENT (OUTPATIENT)
Dept: INTERPRETER SERVICES | Facility: CLINIC | Age: 66
End: 2021-09-17
Payer: MEDICARE

## 2021-10-08 DIAGNOSIS — H40.1132 PRIMARY OPEN ANGLE GLAUCOMA OF BOTH EYES, MODERATE STAGE: Primary | ICD-10-CM

## 2021-10-13 ENCOUNTER — OFFICE VISIT (OUTPATIENT)
Dept: OPHTHALMOLOGY | Facility: CLINIC | Age: 66
End: 2021-10-13
Attending: OPHTHALMOLOGY
Payer: MEDICARE

## 2021-10-13 DIAGNOSIS — M06.9 RHEUMATOID ARTHRITIS INVOLVING MULTIPLE SITES, UNSPECIFIED WHETHER RHEUMATOID FACTOR PRESENT (H): ICD-10-CM

## 2021-10-13 DIAGNOSIS — H04.123 DRY EYES, BILATERAL: ICD-10-CM

## 2021-10-13 DIAGNOSIS — H40.1132 PRIMARY OPEN ANGLE GLAUCOMA OF BOTH EYES, MODERATE STAGE: ICD-10-CM

## 2021-10-13 DIAGNOSIS — Z96.1 PSEUDOPHAKIA OF BOTH EYES: Primary | ICD-10-CM

## 2021-10-13 PROCEDURE — 92083 EXTENDED VISUAL FIELD XM: CPT | Performed by: OPHTHALMOLOGY

## 2021-10-13 PROCEDURE — G0463 HOSPITAL OUTPT CLINIC VISIT: HCPCS

## 2021-10-13 PROCEDURE — 92133 CPTRZD OPH DX IMG PST SGM ON: CPT | Performed by: OPHTHALMOLOGY

## 2021-10-13 PROCEDURE — 99214 OFFICE O/P EST MOD 30 MIN: CPT | Performed by: OPHTHALMOLOGY

## 2021-10-13 RX ORDER — MINERAL OIL, PETROLATUM 425; 573 MG/G; MG/G
1 OINTMENT OPHTHALMIC AT BEDTIME
Qty: 3.5 G | Refills: 3 | Status: SHIPPED | OUTPATIENT
Start: 2021-10-13 | End: 2021-10-14

## 2021-10-13 RX ORDER — MINERAL OIL, PETROLATUM 425; 573 MG/G; MG/G
1 OINTMENT OPHTHALMIC AT BEDTIME
Qty: 3.5 G | Refills: 3 | Status: SHIPPED | OUTPATIENT
Start: 2021-10-13 | End: 2021-10-13

## 2021-10-13 RX ORDER — CARBOXYMETHYLCELLULOSE SODIUM 5 MG/ML
1 SOLUTION/ DROPS OPHTHALMIC 4 TIMES DAILY PRN
Qty: 70 EACH | Refills: 11 | Status: SHIPPED | OUTPATIENT
Start: 2021-10-13 | End: 2023-06-07

## 2021-10-13 RX ORDER — CARBOXYMETHYLCELLULOSE SODIUM 5 MG/ML
1 SOLUTION/ DROPS OPHTHALMIC 4 TIMES DAILY PRN
Qty: 70 EACH | Refills: 11 | Status: SHIPPED | OUTPATIENT
Start: 2021-10-13 | End: 2021-10-13

## 2021-10-13 ASSESSMENT — EXTERNAL EXAM - RIGHT EYE: OD_EXAM: NORMAL

## 2021-10-13 ASSESSMENT — VISUAL ACUITY
METHOD: SNELLEN - LINEAR
OD_SC+: -1
OD_SC: 20/25
OS_SC: 20/40

## 2021-10-13 ASSESSMENT — EXTERNAL EXAM - LEFT EYE: OS_EXAM: NORMAL

## 2021-10-13 ASSESSMENT — CONF VISUAL FIELD
OD_NORMAL: 1
OS_NORMAL: 1

## 2021-10-13 ASSESSMENT — TONOMETRY
IOP_METHOD: APPLANATION
OD_IOP_MMHG: 12
OS_IOP_MMHG: 12

## 2021-10-13 NOTE — NURSING NOTE
Chief Complaints and History of Present Illnesses   Patient presents with     Glaucoma Follow-Up     Chief Complaint(s) and History of Present Illness(es)     Glaucoma Follow-Up     Laterality: both eyes              Comments     Pt. States that she is doing well. No change in VA BE. No pain BE.  Colette Degroot COT 9:28 AM October 13, 2021

## 2021-10-13 NOTE — PROGRESS NOTES
Chief Complaint(s) and History of Present Illness(es)     Glaucoma Follow-Up     Laterality: both eyes              Comments     Pt. States that she is doing well. No change in VA BE. No pain BE.  Colette Degroot COT 9:28 AM October 13, 2021               Review of systems for the eyes was negative other than the pertinent positives/negatives listed in the HPI.      Assessment & Plan      Yancy Rivera is a 66 year old female with the following diagnoses:   1. Pseudophakia of both eyes    2. Primary open angle glaucoma of both eyes, moderate stage    3. Dry eyes, bilateral    4. Rheumatoid arthritis involving multiple sites, unspecified whether rheumatoid factor present (H)         Doing well overall, no vision change  Eyes still dry, but artificial tears helping    S/P Selected laser trabeculoplasty (SLT) both eyes Spring 2021  Intraocular pressure acceptable since that time  Goal low teens both eyes     Stable visual field and OCT Nerve fiber layer today   Continue close monitoring  Artificial tears four times a day    Artificial tear ointment at bedtime both eyes   Rx sent to pharmacy for both    Patient disposition:   Return in about 6 months (around 4/13/2022) for DFE, 24-2 Dynamic VF, OCT NFL.           Attending Physician Attestation:  Complete documentation of historical and exam elements from today's encounter can be found in the full encounter summary report (not reduplicated in this progress note).  I personally obtained the chief complaint(s) and history of present illness.  I confirmed and edited as necessary the review of systems, past medical/surgical history, family history, social history, and examination findings as documented by others; and I examined the patient myself.  I personally reviewed the relevant tests, images, and reports as documented above.  I formulated and edited as necessary the assessment and plan and discussed the findings and management plan with the patient and family. . -  Constantin Rollins MD

## 2021-10-14 DIAGNOSIS — H04.123 DRY EYES, BILATERAL: ICD-10-CM

## 2021-10-14 RX ORDER — MINERAL OIL, PETROLATUM 425; 573 MG/G; MG/G
OINTMENT OPHTHALMIC
Qty: 3.5 G | Refills: 3 | Status: SHIPPED | OUTPATIENT
Start: 2021-10-14 | End: 2023-08-09

## 2021-10-14 NOTE — TELEPHONE ENCOUNTER
Medication: White Petrolatum-Mineral Oil (REFRESH P.M.) OINT    Requested directions: *PHARM REQUESTING CLARIFICATION   Current directions on the medication list: Apply 3.5 g to eye At Bedtime - Ophthalmic    Last Written Prescription Date:  10-13-21  Last Fill Quantity: 3.5 G,   # refills: 3    Last Office Visit: 10-13-21  Future Office visit: 4-13-22    Attending Provider: Papito  Last Clinic Note: Artificial tear ointment at bedtime both eyes     Routing refill request to provider for review/approval because:  Pharmacy requesting clarification on directions and eye

## 2021-12-20 DIAGNOSIS — M79.642 LEFT HAND PAIN: Primary | ICD-10-CM

## 2021-12-20 NOTE — TELEPHONE ENCOUNTER
DIAGNOSIS: L hand pain fell on her hand 12/19 , per pt , no images   APPOINTMENT DATE: 12.21.21   NOTES STATUS DETAILS   OFFICE NOTE from referring provider N/A    OFFICE NOTE from other specialist N/A    DISCHARGE SUMMARY from hospital N/A    DISCHARGE REPORT from the ER N/A    OPERATIVE REPORT N/A    EMG report N/A    MEDICATION LIST Internal    MRI N/A    DEXA (osteoporosis/bone health) N/A    CT SCAN N/A    XRAYS (IMAGES & REPORTS) Internal 12.31.14 B hands

## 2021-12-21 ENCOUNTER — OFFICE VISIT (OUTPATIENT)
Dept: ORTHOPEDICS | Facility: CLINIC | Age: 66
End: 2021-12-21
Payer: MEDICARE

## 2021-12-21 ENCOUNTER — ANCILLARY PROCEDURE (OUTPATIENT)
Dept: GENERAL RADIOLOGY | Facility: CLINIC | Age: 66
End: 2021-12-21
Attending: FAMILY MEDICINE
Payer: MEDICARE

## 2021-12-21 ENCOUNTER — PRE VISIT (OUTPATIENT)
Dept: ORTHOPEDICS | Facility: CLINIC | Age: 66
End: 2021-12-21

## 2021-12-21 ENCOUNTER — ANCILLARY PROCEDURE (OUTPATIENT)
Dept: CT IMAGING | Facility: CLINIC | Age: 66
End: 2021-12-21
Attending: FAMILY MEDICINE
Payer: MEDICARE

## 2021-12-21 DIAGNOSIS — M54.2 NECK PAIN, ACUTE: ICD-10-CM

## 2021-12-21 DIAGNOSIS — S09.90XA TRAUMATIC INJURY OF HEAD, INITIAL ENCOUNTER: ICD-10-CM

## 2021-12-21 DIAGNOSIS — M25.532 LEFT WRIST PAIN: Primary | ICD-10-CM

## 2021-12-21 DIAGNOSIS — M79.642 LEFT HAND PAIN: ICD-10-CM

## 2021-12-21 PROCEDURE — 70450 CT HEAD/BRAIN W/O DYE: CPT | Mod: ME | Performed by: RADIOLOGY

## 2021-12-21 PROCEDURE — G1004 CDSM NDSC: HCPCS | Mod: GC | Performed by: RADIOLOGY

## 2021-12-21 PROCEDURE — 72040 X-RAY EXAM NECK SPINE 2-3 VW: CPT | Mod: GC | Performed by: RADIOLOGY

## 2021-12-21 PROCEDURE — 73130 X-RAY EXAM OF HAND: CPT | Mod: LT | Performed by: RADIOLOGY

## 2021-12-21 PROCEDURE — 99204 OFFICE O/P NEW MOD 45 MIN: CPT | Performed by: FAMILY MEDICINE

## 2021-12-21 NOTE — LETTER
12/21/2021      RE: Yancy Rivera  [address]  [place] [address]         [facility]  SPORTS & ORTHOPEDIC CLINIC VISIT     Dec 21, 2021        SUBJECTIVE  Ms. Rivera is a 66 year old female who is seen as a self referral for evaluation of left wrist pain after a fall. She is complaining of left hip pain and head pain.  She suffered a fall while cooking at home 2 nights ago.  She slipped, leaning to her left, directly striking her head and neck on the left side against a refrigerator.  She also fell to the ground, breaking the fall with an outstretched left hand.  Her headache is severe, this is her main complaint today besides her left wrist.  She reports she was unable to sleep last evening due to headache.     The patient is seen by themselves.  The patient is Right handed    Onset: 2 day(s) ago. Patient describes injury as she tripped in the kitchen and fell into fridge and cabinet.   Location of Pain: left wrist  Worsened by: all wrist motions.  Better with: Nothing  Treatments tried: ice, Tylenol and ibuprofen  Associated symptoms: swelling, warmth, decreased ROM of wrist and 4th-5th fingers and weakness of left wrist.    Orthopedic/Surgical history: Yes, history of right wrist fracture.   Social History/Occupation: None      REVIEW OF SYSTEMS:    Do you have fever, chills, weight loss? No    Do you have any vision problems? Yes, due to fall on 12/19/21    Do you have any chest pain or edema? No    Do you have any shortness of breath or wheezing?  No    Do you have stomach problems? No    Do you have any numbness or focal weakness? No    Do you have diabetes? No    Do you have problems with bleeding or clotting? No    Do you have an rashes or other skin lesions? Yes, being seen with dermatology.     OBJECTIVE:  General  - in mild distress  HEENT  -Pain and nystagmus with leftward gaze of left eye, painful upward gaze, pupils equal and reactive to  light  Musculoskeletal - left wrist  - inspection: swelling at ulnar aspect of wrist  - palpation: tender ulnar recess  - ROM:  90 deg flexion   70 deg extension   25 deg abduction   65 deg adduction  - strength: 5/5  strength  Neuro  - no sensory or motor deficit, grossly normal coordination, normal muscle tone      ASSESSMENT & PLAN  Ms. Rivera is a 66-year-old woman here with a headache and a left wrist injury after a fall 2 nights ago.    Her headache is severe, this does seem to be the most concerning issue, clinically.  Given her pain and dysfunction with leftward and upward gaze I am ordering a CT of her head.  I am also ordering a x-ray of her C-spine.  I will get in touch with her with the results.    As for her wrist I ordered and independently reviewed an x-ray of her wrist, this shows a bony projection at the ulnar recess that appears to be old.  No obvious acute fracture, no other obvious chronic issues.    We gave her a wrist brace today that she should wear while sleeping and at all times she is able while awake.  I do expect this to improve over the course of the coming weeks.      Basil Rodriguez DO  [facility] SPORTS MEDICINE CLINIC [place]

## 2021-12-21 NOTE — PROGRESS NOTES
[facility]  SPORTS & ORTHOPEDIC CLINIC VISIT     Dec 21, 2021        SUBJECTIVE  Ms. Rivera is a 66 year old female who is seen as a self referral for evaluation of left wrist pain after a fall. She is complaining of left hip pain and head pain.  She suffered a fall while cooking at home 2 nights ago.  She slipped, leaning to her left, directly striking her head and neck on the left side against a refrigerator.  She also fell to the ground, breaking the fall with an outstretched left hand.  Her headache is severe, this is her main complaint today besides her left wrist.  She reports she was unable to sleep last evening due to headache.     The patient is seen by themselves.  The patient is Right handed    Onset: 2 day(s) ago. Patient describes injury as she tripped in the kitchen and fell into fridge and cabinet.   Location of Pain: left wrist  Worsened by: all wrist motions.  Better with: Nothing  Treatments tried: ice, Tylenol and ibuprofen  Associated symptoms: swelling, warmth, decreased ROM of wrist and 4th-5th fingers and weakness of left wrist.    Orthopedic/Surgical history: Yes, history of right wrist fracture.   Social History/Occupation: None      REVIEW OF SYSTEMS:    Do you have fever, chills, weight loss? No    Do you have any vision problems? Yes, due to fall on 12/19/21    Do you have any chest pain or edema? No    Do you have any shortness of breath or wheezing?  No    Do you have stomach problems? No    Do you have any numbness or focal weakness? No    Do you have diabetes? No    Do you have problems with bleeding or clotting? No    Do you have an rashes or other skin lesions? Yes, being seen with dermatology.     OBJECTIVE:  General  - in mild distress  HEENT  -Pain and nystagmus with leftward gaze of left eye, painful upward gaze, pupils equal and reactive to light  Musculoskeletal - left wrist  - inspection: swelling at ulnar aspect of wrist  - palpation: tender ulnar  recess  - ROM:  90 deg flexion   70 deg extension   25 deg abduction   65 deg adduction  - strength: 5/5  strength  Neuro  - no sensory or motor deficit, grossly normal coordination, normal muscle tone      ASSESSMENT & PLAN  Ms. Rivera is a 66-year-old woman here with a headache and a left wrist injury after a fall 2 nights ago.    Her headache is severe, this does seem to be the most concerning issue, clinically.  Given her pain and dysfunction with leftward and upward gaze I am ordering a CT of her head.  I am also ordering a x-ray of her C-spine.  I will get in touch with her with the results.    As for her wrist I ordered and independently reviewed an x-ray of her wrist, this shows a bony projection at the ulnar recess that appears to be old.  No obvious acute fracture, no other obvious chronic issues.    We gave her a wrist brace today that she should wear while sleeping and at all times she is able while awake.  I do expect this to improve over the course of the coming weeks.          Basil Rodriguez DO  [facility] SPORTS MEDICINE CLINIC [place]

## 2022-01-10 ENCOUNTER — ANCILLARY PROCEDURE (OUTPATIENT)
Dept: GENERAL RADIOLOGY | Facility: CLINIC | Age: 67
End: 2022-01-10
Attending: PHYSICAL MEDICINE & REHABILITATION
Payer: MEDICARE

## 2022-01-10 ENCOUNTER — OFFICE VISIT (OUTPATIENT)
Dept: PHYSICAL MEDICINE AND REHAB | Facility: CLINIC | Age: 67
End: 2022-01-10
Payer: MEDICARE

## 2022-01-10 VITALS
OXYGEN SATURATION: 98 % | HEIGHT: 64 IN | WEIGHT: 190 LBS | BODY MASS INDEX: 32.44 KG/M2 | DIASTOLIC BLOOD PRESSURE: 82 MMHG | SYSTOLIC BLOOD PRESSURE: 157 MMHG | HEART RATE: 66 BPM

## 2022-01-10 DIAGNOSIS — S16.1XXA CERVICAL MYOFASCIAL STRAIN, INITIAL ENCOUNTER: ICD-10-CM

## 2022-01-10 DIAGNOSIS — R68.89 LIGHT SENSITIVITY: ICD-10-CM

## 2022-01-10 DIAGNOSIS — G44.319 ACUTE POST-TRAUMATIC HEADACHE, NOT INTRACTABLE: ICD-10-CM

## 2022-01-10 DIAGNOSIS — S06.0X0A CONCUSSION WITHOUT LOSS OF CONSCIOUSNESS, INITIAL ENCOUNTER: Primary | ICD-10-CM

## 2022-01-10 DIAGNOSIS — M25.531 RIGHT WRIST PAIN: ICD-10-CM

## 2022-01-10 PROCEDURE — 99204 OFFICE O/P NEW MOD 45 MIN: CPT | Performed by: PHYSICAL MEDICINE & REHABILITATION

## 2022-01-10 PROCEDURE — 73110 X-RAY EXAM OF WRIST: CPT | Mod: RT | Performed by: RADIOLOGY

## 2022-01-10 RX ORDER — BUTALBITAL, ACETAMINOPHEN AND CAFFEINE 50; 325; 40 MG/1; MG/1; MG/1
1 TABLET ORAL DAILY PRN
Qty: 14 TABLET | Refills: 1 | Status: SHIPPED | OUTPATIENT
Start: 2022-01-10 | End: 2022-01-24

## 2022-01-10 RX ORDER — LIDOCAINE 4 G/G
1 PATCH TOPICAL DAILY PRN
Qty: 30 PATCH | Refills: 1 | Status: SHIPPED | OUTPATIENT
Start: 2022-01-10 | End: 2022-02-09

## 2022-01-10 ASSESSMENT — MIFFLIN-ST. JEOR: SCORE: 1396.83

## 2022-01-10 ASSESSMENT — PAIN SCALES - GENERAL: PAINLEVEL: EXTREME PAIN (8)

## 2022-01-10 NOTE — PROGRESS NOTES
PM&R Clinic Note     Patient Name: Yancy Rivera : 1955 Medical Record: [id number]     Requesting Physician/clinician: No att. providers found           History of Present Illness:     Yancy Rivera is a 66 year old female that per records and reporting patient had a fall on Left side 15 days ago, 21.  No LOC.  But hurt l side.  She also fell in 2020 and hurt her R hand wrist which has been hurting since her last fall.     Symptoms  CONCUSSION SYMPTOMS ASSESSMENT 1/10/2022   Headache or Pressure In Head 5 - severe   Upset Stomach or Throwing Up 0 - none   Problems with Balance 4 - moderate to severe   Feeling Dizzy 4 - moderate to severe   Sensitivity to Light 4 - moderate to severe   Sensitivity to Noise 0 - none   Mood Changes 0 - none   Feeling sluggish, hazy, or foggy 0 - none   Trouble Concentrating, Lack of Focus 0 - none   Motion Sickness 0 - none   Vision Changes 0 - none   Memory Problems 0 - none   Feeling Confused 0 - none   Neck Pain 5 - severe   Trouble Sleeping 0 - none   Total Number of Symptoms 5   Symptom Severity Score 22       Headache on L side, sometimes advil and APAP helps.  Neck is sore as well, muscles are tight.  She has not started any therapy.  Sensitive to light.  Gets dizzy with overexertion, getting upas well. She states that her R arm is sore as well, she injured it a year ago but since fall she uses R side more and seems to be in more pain, she may have hit it again when she fell 21.  No issues with going to bathroom.    She states she keeps BP journal.                Past Medical and Surgical History:     Past Medical History:   Diagnosis Date     Blepharitis      Esophageal reflux (aka GERD)      Glaucoma      Hyperlipidemia LDL goal < 130      [place] cataract      Shingles     11/14/15 Left approximately C6 distribution     Past Surgical History:   Procedure Laterality Date     CATARACT IOL, RT/LT Right 10/16/2018    [place]      CATARACT IOL, RT/LT Left 11/01/2018    [place] [facility]      CHALAZION EXCISION  08/21/2015    Left lid     HERNIA REPAIR      abdominal hernia repair 2012            Social History:     Social History     Tobacco Use     Smoking status: Never Smoker     Smokeless tobacco: Never Used   Substance Use Topics     Alcohol use: No       Living situation: apartment  Family support:   Vocational History: retired  Recreational drug use: none         Functional history:     Yancy Rivera is independent with all aspects of life.    ADLs: I  Assistive devices: none   iADLs (medication management and finances): I  Hand dominance: R  Driving: no            Family History:     Family History   Problem Relation Age of Onset     Glaucoma Mother      Macular Degeneration No family hx of      Cancer No family hx of      Diabetes No family hx of      Hypertension No family hx of      Cerebrovascular Disease No family hx of      Thyroid Disease No family hx of      Eye Surgery No family hx of             Medications:     Current Outpatient Medications   Medication Sig Dispense Refill     atorvastatin (LIPITOR) 20 MG tablet Take 1 tablet by mouth once daily 90 tablet 0     carboxymethylcellulose PF (CARBOXYMETHYLCELLULOSE SODIUM) 0.5 % ophthalmic solution Place 1 drop into both eyes 4 times daily as needed for dry eyes 70 each 11     Cholecalciferol (VITAMIN D) 2000 UNITS tablet Take 1,000 Units by mouth daily 45 tablet 3     gabapentin (NEURONTIN) 100 MG capsule Take 1 capsule (100 mg) by mouth 3 times daily 90 capsule 1     gabapentin (NEURONTIN) 600 MG tablet Take 1 tablet (600 mg) by mouth 3 times daily 90 tablet 11     ibuprofen (ADVIL/MOTRIN) 200 MG tablet Take 400 mg by mouth        pantoprazole (PROTONIX) 40 MG EC tablet Take 1 tablet (40 mg) by mouth daily 90 tablet 1     atorvastatin (LIPITOR) 20 MG tablet Take 1 tablet (20 mg) by mouth daily 30  tablet 11     clotrimazole-betamethasone (LOTRISONE) 1-0.05 % external cream Apply topically 2 times daily (Patient not taking: Reported on 9/14/2021) 15 g 0     diclofenac (VOLTAREN) 1 % topical gel Place 2 g onto the skin 4 times daily To right wrist (Patient not taking: Reported on 9/14/2021) 100 g 3     diclofenac (VOLTAREN) 1 % topical gel Place 2 g onto the skin 4 times daily (Patient not taking: Reported on 9/14/2021) 100 g 3     doxycycline Monohydrate 100 MG TABS Take 100 mg by mouth daily (Patient not taking: Reported on 9/14/2021) 90 tablet 3     ferrous sulfate (IRON) 325 (65 FE) MG tablet Take 1 tablet (325 mg) by mouth daily (with breakfast) (Patient not taking: Reported on 9/14/2021) 30 tablet 11     gabapentin (NEURONTIN) 100 MG capsule Take 1 capsule (100 mg) by mouth 3 times daily 90 capsule 3     gabapentin (NEURONTIN) 300 MG capsule Take 1 capsule (300 mg) by mouth 3 times daily (Patient not taking: Reported on 9/14/2021) 270 capsule 1     methylPREDNISolone (MEDROL DOSEPAK) 4 MG tablet Take 1 tablet (4 mg) by mouth See Admin Instructions (Patient not taking: Reported on 1/10/2022) 21 tablet 0     Multiple Minerals (CALCIUM-MAGNESIUM-ZINC) TABS Take 1 tablet by mouth daily (Patient not taking: Reported on 9/14/2021) 90 tablet 0     omega 3 1000 MG CAPS Take 1 g by mouth daily (Patient not taking: Reported on 9/14/2021) 90 capsule 0     oxyCODONE (ROXICODONE) 5 MG immediate release tablet Take 1 tablet (5 mg) by mouth every 6 hours as needed for moderate to severe pain (Patient not taking: Reported on 9/14/2021) 20 tablet 0     piroxicam (FELDENE) 20 MG capsule Take 1 capsule (20 mg) by mouth daily (with breakfast) (Patient not taking: Reported on 9/14/2021) 30 capsule 11     Riboflavin 400 MG TABS Take 1 tablet by mouth daily (Patient not taking: Reported on 9/14/2021) 30 tablet 11     SUMAtriptan (IMITREX) 50 MG tablet Take 1 tablet (50 mg) by mouth at onset of headache for migraine (May repeat  "in one hour. Max 200 mg in 24 hours. limit use to no more than 2 days per week) May repeat dose within one hour. (Patient not taking: Reported on 9/14/2021) 12 tablet 6     tiZANidine (ZANAFLEX) 4 MG capsule Take 1 capsule (4 mg) by mouth 3 times daily as needed for muscle spasms (Patient not taking: Reported on 9/14/2021) 15 capsule 0     traMADol (ULTRAM) 50 MG tablet Take 1 tablet (50 mg) by mouth every 6 hours as needed for pain or moderate to severe pain (Patient not taking: Reported on 9/14/2021) 15 tablet 0     White Petrolatum-Mineral Oil (REFRESH P.M.) OINT Apply thin layer to both eyes at bedtime 3.5 g 3            Allergies:     Allergies   Allergen Reactions     No Clinical Screening - See Comments Nausea and Vomiting     Liver side effects from INH for TB              ROS:        ROS: 10 point ROS neg other than the symptoms noted above in the HPI.             Physical Examiniation:     VITAL SIGNS: BP (!) 157/82   Pulse 66   Ht 1.626 m (5' 4\")   Wt 86.2 kg (190 lb)   LMP  (LMP Unknown)   SpO2 98%   BMI 32.61 kg/m    BMI: Estimated body mass index is 32.61 kg/m  as calculated from the following:    Height as of this encounter: 1.626 m (5' 4\").    Weight as of this encounter: 86.2 kg (190 lb).    Gen: NAD, pleasant and cooperative   HEENT: NCAT, EOMI, no nystagmus, ANANDA, there is some reproducible headache and eye strain with VOMS. There is taut and tender cervical paraspinal muscles on L, no facial asymmetry   Cardio: 2+ radial pulse, well perfused  Pulm: non-labored breathing in room air, symmetrical chest rise  Abd: benign  Ext: WWP, no edema in BLE, no tenderness in calves  Neuro/MSK: 5/5 in c5-t1 and l2-s1 myotomes; however limited to pain in RUE.  There is tenderness along R wrist, scaphoid area also up her R forearm, + finlestein on R, SILT, negative ponce's b/l, CN 2-12 intact, AAOx3.  GAIT: WNFL               Laboratory/Imaging:     CT HEAD W/O CONTRAST 12/21/2021 2:30 PM     Provided " History: Headache, intracranial hemorrhage suspected;  Traumatic injury of head, initial encounter     Comparison: MR head 6/18/2016.     Technique: Using multidetector thin collimation helical acquisition  technique, axial, coronal and sagittal CT images from the skull base  to the vertex were obtained without intravenous contrast.      Findings:    No intracranial hemorrhage, mass effect, or midline shift. The  ventricles are proportionate to the cerebral sulci. Scattered patchy  foci of hypoattenuation in the periventricular and supraventricular  white matter, which is nonspecific, likely related to chronic small  vessel ischemic disease given the patient's age. Age appropriate  general parenchymal volume loss. No acute loss of gray-white matter  differentiation. Vascular atherosclerotic calcifications. The basal  cisterns are patent. Stable calcified focus along the anterior right  middle cranial fossa, likely representing benign dural calcification.     No acute osseous abnormality. The visualized paranasal sinuses are  clear. The mastoid air cells are clear. Bilateral pseudophakia.                                                                      Impression:   No acute intracranial pathology.           Assessment/Plan:     Yancy was seen today for head injury.    Diagnoses and all orders for this visit:    Concussion without loss of consciousness, initial encounter  -     Concussion  Referral; Future    Cervical myofascial strain, initial encounter  -     Concussion  Referral; Future  -     Lidocaine (LIDOCARE) 4 % Patch; Place 1 patch onto the skin daily as needed for moderate pain To prevent lidocaine toxicity, patient should be patch free for 12 hrs daily.    Light sensitivity    Acute post-traumatic headache, not intractable  -     butalbital-acetaminophen-caffeine (ESGIC) -40 MG tablet; Take 1 tablet by mouth daily as needed for headaches    Right wrist pain  -     XR Wrist  Right G/E 3 Views; Future  -     Orthopedic  Referral; Future  -     diclofenac (VOLTAREN) 1 % topical gel; Apply 2 g topically 4 times daily for 7 days          1. Patient education: In depth discussion and education was provided about the assessment and implications of each of the below recommendations for management. Patient indicated readiness to learn, all questions were answered and understanding of material presented was confirmed.    2. Work-up: Xray of R wrist     3. Therapy/equipment/braces: continue PT/OT program.  May benefit from R wrist steroid injection if no fracture.      4. Medications: above    5. Interventions: discussed recovery and starting progressive return to activity.     6. Referral / follow up with other providers: PCP, Sport Med    7. Follow up: 3 months for progress, if headaches persists may benefit from TCA vs inderal trial.         Thompson Carias, DO  Physical Medicine & Rehabilitation    I spent a total of 45 minutes face-to-face with Yancy Rivera during today's office visit. Over 50% of this time was spent counseling the patient and/or coordinating care. See note for details.     45 minutes spent on the date of the encounter doing chart review, history and exam, documentation and further activities as noted above

## 2022-01-10 NOTE — NURSING NOTE
"Chief Complaint   Patient presents with     Head Injury     concussion w/o LOC mechanical fall - head strike against refridgerator and second strike to wall.       Vitals:    01/10/22 1229   BP: (!) 157/82   Pulse: 66   SpO2: 98%   Weight: 86.2 kg (190 lb)   Height: 1.626 m (5' 4\")       Body mass index is 32.61 kg/m .                            "

## 2022-01-10 NOTE — LETTER
1/10/2022       RE: Yancy Rivera  [address]  [place] [address]     Dear Colleague,    Thank you for referring your patient, Yancy Rivera, to the [facility] PHYSICAL MEDICINE AND REHABILITATION CLINIC [place] at [facility]. Please see a copy of my visit note below.           PM&R Clinic Note     Patient Name: Yancy Rivera : 1955 Medical Record: [id number]     Requesting Physician/clinician: No att. providers found           History of Present Illness:     Yancy Rivera is a 66 year old female that per records and reporting patient had a fall on Left side 15 days ago, 21.  No LOC.  But hurt l side.  She also fell in 2020 and hurt her R hand wrist which has been hurting since her last fall.     Symptoms  CONCUSSION SYMPTOMS ASSESSMENT 1/10/2022   Headache or Pressure In Head 5 - severe   Upset Stomach or Throwing Up 0 - none   Problems with Balance 4 - moderate to severe   Feeling Dizzy 4 - moderate to severe   Sensitivity to Light 4 - moderate to severe   Sensitivity to Noise 0 - none   Mood Changes 0 - none   Feeling sluggish, hazy, or foggy 0 - none   Trouble Concentrating, Lack of Focus 0 - none   Motion Sickness 0 - none   Vision Changes 0 - none   Memory Problems 0 - none   Feeling Confused 0 - none   Neck Pain 5 - severe   Trouble Sleeping 0 - none   Total Number of Symptoms 5   Symptom Severity Score 22       Headache on L side, sometimes advil and APAP helps.  Neck is sore as well, muscles are tight.  She has not started any therapy.  Sensitive to light.  Gets dizzy with overexertion, getting upas well. She states that her R arm is sore as well, she injured it a year ago but since fall she uses R side more and seems to be in more pain, she may have hit it again when she fell 21.  No issues with going to bathroom.    She states she keeps BP journal.                Past Medical and Surgical History:      Past Medical History:   Diagnosis Date     Blepharitis      Esophageal reflux (aka GERD)      Glaucoma      Hyperlipidemia LDL goal < 130      Nonsenile cataract      Shingles     11/14/15 Left approximately C6 distribution     Past Surgical History:   Procedure Laterality Date     CATARACT IOL, RT/LT Right 10/16/2018    [facility]      CATARACT IOL, RT/LT Left 11/01/2018    [facility]      CHALAZION EXCISION  08/21/2015    Left lid     HERNIA REPAIR      abdominal hernia repair 2012            Social History:     Social History     Tobacco Use     Smoking status: Never Smoker     Smokeless tobacco: Never Used   Substance Use Topics     Alcohol use: No       Living situation: apartment  Family support:   Vocational History: retired  Recreational drug use: none         Functional history:     Yancy Rivera is independent with all aspects of life.    ADLs: I  Assistive devices: none   iADLs (medication management and finances): I  Hand dominance: R  Driving: no            Family History:     Family History   Problem Relation Age of Onset     Glaucoma Mother      Macular Degeneration No family hx of      Cancer No family hx of      Diabetes No family hx of      Hypertension No family hx of      Cerebrovascular Disease No family hx of      Thyroid Disease No family hx of      Eye Surgery No family hx of             Medications:     Current Outpatient Medications   Medication Sig Dispense Refill     atorvastatin (LIPITOR) 20 MG tablet Take 1 tablet by mouth once daily 90 tablet 0     carboxymethylcellulose PF (CARBOXYMETHYLCELLULOSE SODIUM) 0.5 % ophthalmic solution Place 1 drop into both eyes 4 times daily as needed for dry eyes 70 each 11     Cholecalciferol (VITAMIN D) 2000 UNITS tablet Take 1,000 Units by mouth daily 45 tablet 3     gabapentin (NEURONTIN) 100 MG capsule Take 1 capsule (100 mg) by mouth 3 times daily 90  capsule 1     gabapentin (NEURONTIN) 600 MG tablet Take 1 tablet (600 mg) by mouth 3 times daily 90 tablet 11     ibuprofen (ADVIL/MOTRIN) 200 MG tablet Take 400 mg by mouth        pantoprazole (PROTONIX) 40 MG EC tablet Take 1 tablet (40 mg) by mouth daily 90 tablet 1     atorvastatin (LIPITOR) 20 MG tablet Take 1 tablet (20 mg) by mouth daily 30 tablet 11     clotrimazole-betamethasone (LOTRISONE) 1-0.05 % external cream Apply topically 2 times daily (Patient not taking: Reported on 9/14/2021) 15 g 0     diclofenac (VOLTAREN) 1 % topical gel Place 2 g onto the skin 4 times daily To right wrist (Patient not taking: Reported on 9/14/2021) 100 g 3     diclofenac (VOLTAREN) 1 % topical gel Place 2 g onto the skin 4 times daily (Patient not taking: Reported on 9/14/2021) 100 g 3     doxycycline Monohydrate 100 MG TABS Take 100 mg by mouth daily (Patient not taking: Reported on 9/14/2021) 90 tablet 3     ferrous sulfate (IRON) 325 (65 FE) MG tablet Take 1 tablet (325 mg) by mouth daily (with breakfast) (Patient not taking: Reported on 9/14/2021) 30 tablet 11     gabapentin (NEURONTIN) 100 MG capsule Take 1 capsule (100 mg) by mouth 3 times daily 90 capsule 3     gabapentin (NEURONTIN) 300 MG capsule Take 1 capsule (300 mg) by mouth 3 times daily (Patient not taking: Reported on 9/14/2021) 270 capsule 1     methylPREDNISolone (MEDROL DOSEPAK) 4 MG tablet Take 1 tablet (4 mg) by mouth See Admin Instructions (Patient not taking: Reported on 1/10/2022) 21 tablet 0     Multiple Minerals (CALCIUM-MAGNESIUM-ZINC) TABS Take 1 tablet by mouth daily (Patient not taking: Reported on 9/14/2021) 90 tablet 0     omega 3 1000 MG CAPS Take 1 g by mouth daily (Patient not taking: Reported on 9/14/2021) 90 capsule 0     oxyCODONE (ROXICODONE) 5 MG immediate release tablet Take 1 tablet (5 mg) by mouth every 6 hours as needed for moderate to severe pain (Patient not taking: Reported on 9/14/2021) 20 tablet 0     piroxicam (FELDENE) 20 MG  "capsule Take 1 capsule (20 mg) by mouth daily (with breakfast) (Patient not taking: Reported on 9/14/2021) 30 capsule 11     Riboflavin 400 MG TABS Take 1 tablet by mouth daily (Patient not taking: Reported on 9/14/2021) 30 tablet 11     SUMAtriptan (IMITREX) 50 MG tablet Take 1 tablet (50 mg) by mouth at onset of headache for migraine (May repeat in one hour. Max 200 mg in 24 hours. limit use to no more than 2 days per week) May repeat dose within one hour. (Patient not taking: Reported on 9/14/2021) 12 tablet 6     tiZANidine (ZANAFLEX) 4 MG capsule Take 1 capsule (4 mg) by mouth 3 times daily as needed for muscle spasms (Patient not taking: Reported on 9/14/2021) 15 capsule 0     traMADol (ULTRAM) 50 MG tablet Take 1 tablet (50 mg) by mouth every 6 hours as needed for pain or moderate to severe pain (Patient not taking: Reported on 9/14/2021) 15 tablet 0     White Petrolatum-Mineral Oil (REFRESH P.M.) OINT Apply thin layer to both eyes at bedtime 3.5 g 3            Allergies:     Allergies   Allergen Reactions     No Clinical Screening - See Comments Nausea and Vomiting     Liver side effects from INH for TB              ROS:        ROS: 10 point ROS neg other than the symptoms noted above in the HPI.             Physical Examiniation:     VITAL SIGNS: BP (!) 157/82   Pulse 66   Ht 1.626 m (5' 4\")   Wt 86.2 kg (190 lb)   LMP  (LMP Unknown)   SpO2 98%   BMI 32.61 kg/m    BMI: Estimated body mass index is 32.61 kg/m  as calculated from the following:    Height as of this encounter: 1.626 m (5' 4\").    Weight as of this encounter: 86.2 kg (190 lb).    Gen: NAD, pleasant and cooperative   HEENT: NCAT, EOMI, no nystagmus, ANANDA, there is some reproducible headache and eye strain with VOMS. There is taut and tender cervical paraspinal muscles on L, no facial asymmetry   Cardio: 2+ radial pulse, well perfused  Pulm: non-labored breathing in room air, symmetrical chest rise  Abd: benign  Ext: WWP, no edema in BLE, " no tenderness in calves  Neuro/MSK: 5/5 in c5-t1 and l2-s1 myotomes; however limited to pain in RUE.  There is tenderness along R wrist, scaphoid area also up her R forearm, + finlestein on R, SILT, negative ponce's b/l, CN 2-12 intact, AAOx3.  GAIT: WNFL               Laboratory/Imaging:     CT HEAD W/O CONTRAST 12/21/2021 2:30 PM     Provided History: Headache, intracranial hemorrhage suspected;  Traumatic injury of head, initial encounter     Comparison: MR head 6/18/2016.     Technique: Using multidetector thin collimation helical acquisition  technique, axial, coronal and sagittal CT images from the skull base  to the vertex were obtained without intravenous contrast.      Findings:    No intracranial hemorrhage, mass effect, or midline shift. The  ventricles are proportionate to the cerebral sulci. Scattered patchy  foci of hypoattenuation in the periventricular and supraventricular  white matter, which is nonspecific, likely related to chronic small  vessel ischemic disease given the patient's age. Age appropriate  general parenchymal volume loss. No acute loss of gray-white matter  differentiation. Vascular atherosclerotic calcifications. The basal  cisterns are patent. Stable calcified focus along the anterior right  middle cranial fossa, likely representing benign dural calcification.     No acute osseous abnormality. The visualized paranasal sinuses are  clear. The mastoid air cells are clear. Bilateral pseudophakia.                                                                      Impression:   No acute intracranial pathology.           Assessment/Plan:     Yancy was seen today for head injury.    Diagnoses and all orders for this visit:    Concussion without loss of consciousness, initial encounter  -     Concussion  Referral; Future    Cervical myofascial strain, initial encounter  -     Concussion  Referral; Future  -     Lidocaine (LIDOCARE) 4 % Patch; Place 1 patch onto the  skin daily as needed for moderate pain To prevent lidocaine toxicity, patient should be patch free for 12 hrs daily.    Light sensitivity    Acute post-traumatic headache, not intractable  -     butalbital-acetaminophen-caffeine (ESGIC) -40 MG tablet; Take 1 tablet by mouth daily as needed for headaches    Right wrist pain  -     XR Wrist Right G/E 3 Views; Future  -     Orthopedic  Referral; Future  -     diclofenac (VOLTAREN) 1 % topical gel; Apply 2 g topically 4 times daily for 7 days          1. Patient education: In depth discussion and education was provided about the assessment and implications of each of the below recommendations for management. Patient indicated readiness to learn, all questions were answered and understanding of material presented was confirmed.    2. Work-up: Xray of R wrist     3. Therapy/equipment/braces: continue PT/OT program.  May benefit from R wrist steroid injection if no fracture.      4. Medications: above    5. Interventions: discussed recovery and starting progressive return to activity.     6. Referral / follow up with other providers: PCP, Sport Med    7. Follow up: 3 months for progress, if headaches persists may benefit from TCA vs inderal trial.         Thompson Carias, DO  Physical Medicine & Rehabilitation    I spent a total of 45 minutes face-to-face with Yancy Rivera during today's office visit. Over 50% of this time was spent counseling the patient and/or coordinating care. See note for details.

## 2022-01-11 NOTE — TELEPHONE ENCOUNTER
DIAGNOSIS: Right Wrist and Elbow Pain   APPOINTMENT DATE: 1/13/2022   NOTES STATUS DETAILS   OFFICE NOTE from referring provider [facility]:  1/10/22 - PMR OV with Dr. Carias   OFFICE NOTE from other specialist [facility]:  12/21/21 - SPORT OV with Dr. Rodriguez  8/31/20 - SPORT OV with Dr. Santiago  5/7/20 - PCC OV with Dr. Laura Aparicio [facility]:  8/31/20 - OT OV with Staci Chandler OT   DISCHARGE SUMMARY from hospital N/A    DISCHARGE REPORT from the ER N/A    OPERATIVE REPORT N/A    EMG report N/A    MEDICATION LIST Internal    MRI [facility]:  6/11/20 - MRI Wrist, RT   DEXA (osteoporosis/bone health) N/A    CT SCAN N/A    XRAYS (IMAGES & REPORTS) [facility]:  1/10/22 - XR Wrist, RT  5/14/20 - XR Wrist, RT  5/7/20 - XR Wrist, RT  12/31/14 - XR Wrist, Joss  12/31/14 - XR Hand, Joss

## 2022-01-13 ENCOUNTER — OFFICE VISIT (OUTPATIENT)
Dept: ORTHOPEDICS | Facility: CLINIC | Age: 67
End: 2022-01-13
Attending: PHYSICAL MEDICINE & REHABILITATION
Payer: MEDICARE

## 2022-01-13 ENCOUNTER — PRE VISIT (OUTPATIENT)
Dept: ORTHOPEDICS | Facility: CLINIC | Age: 67
End: 2022-01-13

## 2022-01-13 VITALS — WEIGHT: 190 LBS | HEIGHT: 64 IN | BODY MASS INDEX: 32.44 KG/M2

## 2022-01-13 DIAGNOSIS — M25.531 RIGHT WRIST PAIN: ICD-10-CM

## 2022-01-13 DIAGNOSIS — S69.91XA RIGHT WRIST INJURY, INITIAL ENCOUNTER: Primary | ICD-10-CM

## 2022-01-13 PROCEDURE — 99213 OFFICE O/P EST LOW 20 MIN: CPT | Performed by: FAMILY MEDICINE

## 2022-01-13 ASSESSMENT — MIFFLIN-ST. JEOR: SCORE: 1396.83

## 2022-01-13 NOTE — LETTER
1/13/2022      RE: Yancy Rivera  [address]  [place] [address]       Sports Medicine Clinic Visit    PCP: Mariaelena Schultz    Yancy Rivera is a 66 year old female who is seen  in consultation at the request of Dr. Carias presenting with right wrist pain.    Patient indicates that she fell 18 days ago and ever since then has persistent right-sided wrist pain.  She points to her distal radius and centralize the wrist as areas of specific discomfort.  She says it is difficult to  and grasp because of pain in the wrist and it has not improved in the last 2 weeks.  She is taking Tylenol and topical Voltaren for the discomfort.    1 year ago she also had a fall.  She had persistent right-sided wrist pain after that fall.  An MRI was done at that time that showed no occult fracture,, but did show some DJD at the base of the thumb and some DJD at the TFCC.    3 days ago she had an x-ray of the right wrist that showed no acute fracture and no significant degenerative change was noted.        Location of Pain: right general wrist   Duration of Pain: 18 day(s)  Rating of Pain: 8/10  Pain is better with: Voltaren, tylenol   Pain is worse with: general use   Additional Features: Swelling  Treatment so far consists of: topical Voltaren, tylenol   Prior History of related problems: previous right wrist injuy 1 year ago      3 views right wrist radiographs 1/10/2022 3:25 PM     History: Right wrist pain     Comparison: 5/14/2020     Findings:     PA, oblique and lateral view(s) of the right wrist were obtained.      No acute osseous abnormality.  No erosion. There is a small osseous  fragment just distal to the ulna styloid, also seen on prior  radiograph.     Mild to moderate degenerative changes of the first carpometacarpal and  triscaphe joints.       Soft tissue is unremarkable.                                                                      Impression:  1. No acute osseous  "abnormality.  2. Mild to moderate grade degenerative change of the first CMC joint.     JUTTA ELLERMANN, MD         MRI right wrist 6/11/2020  IMPRESSION:  1.  Tearing and degeneration of the central disc of the TFCC.  2.  Degenerative changes involving the triscaphe, first  carpometacarpal, radiocarpal and scapholunate joints. No acute osseous  abnormality  3.  Small joint effusion.  4.  Tendinosis and partial intrasubstance tearing of the extensor  carpi ulnaris.    Ht 1.626 m (5' 4\")   Wt 86.2 kg (190 lb)   LMP  (LMP Unknown)   BMI 32.61 kg/m       PMH:  Past Medical History:   Diagnosis Date     Blepharitis      Esophageal reflux (aka GERD)      Glaucoma      Hyperlipidemia LDL goal < 130      Nonsenile cataract      Shingles     11/14/15 Left approximately C6 distribution       Active problem list:  Patient Active Problem List   Diagnosis     Hyperlipidemia with target LDL less than 130     Prediabetes     Disorder of bursae and tendons in shoulder region     Shingles     Primary open angle glaucoma of both eyes, moderate stage     Senile nuclear sclerosis, bilateral     Right lumbar radiculopathy     Benign essential hypertension     Chronic midline low back pain without sciatica     Pseudophakia of both eyes       FH:  Family History   Problem Relation Age of Onset     Glaucoma Mother      Macular Degeneration No family hx of      Cancer No family hx of      Diabetes No family hx of      Hypertension No family hx of      Cerebrovascular Disease No family hx of      Thyroid Disease No family hx of      Eye Surgery No family hx of        SH:  Social History     Socioeconomic History     Marital status:      Spouse name: Not on file     Number of children: Not on file     Years of education: Not on file     Highest education level: Not on file   Occupational History     Not on file   Tobacco Use     Smoking status: Never Smoker     Smokeless tobacco: Never Used   Substance and Sexual Activity     " Alcohol use: No     Drug use: No     Sexual activity: Never   Other Topics Concern     Parent/sibling w/ CABG, MI or angioplasty before 65F 55M? Not Asked   Social History Narrative     Not on file     Social Determinants of Health     Financial Resource Strain: Not on file   Food Insecurity: Not on file   Transportation Needs: Not on file   Physical Activity: Not on file   Stress: Not on file   Social Connections: Not on file   Intimate Partner Violence: Not on file   Housing Stability: Not on file       MEDS:  See EMR, reviewed  ALL:  See EMR, reviewed    REVIEW OF SYSTEMS:  CONSTITUTIONAL:NEGATIVE for fever, chills, change in weight  INTEGUMENTARY/SKIN: NEGATIVE for worrisome rashes, moles or lesions  EYES: NEGATIVE for vision changes or irritation  ENT/MOUTH: NEGATIVE for ear, mouth and throat problems  RESP:NEGATIVE for significant cough or SOB  BREAST: NEGATIVE for masses, tenderness or discharge  CV: NEGATIVE for chest pain, palpitations or peripheral edema  GI: NEGATIVE for nausea, abdominal pain, heartburn, or change in bowel habits  :NEGATIVE for frequency, dysuria, or hematuria  :NEGATIVE for frequency, dysuria, or hematuria  NEURO: NEGATIVE for weakness, dizziness or paresthesias  ENDOCRINE: NEGATIVE for temperature intolerance, skin/hair changes  HEME/ALLERGY/IMMUNE: NEGATIVE for bleeding problems  PSYCHIATRIC: NEGATIVE for changes in mood or affect        Objective: She is nontender at the right elbow at the proximal radius and has full flexion and extension of the right elbow.  She is acutely tender over the distal radius and also persistently tender over the scaphoid and lunate had repeated points throughout the exam.  I do not palpate an effusion at the wrist.  The overlying skin is normal.  Her grasp strength is normal.  Her thumb strength is intact.  Sensation is intact.  I do not see any signs of swelling or discoloration about the forearm, wrist or hand.      Assessment persistent  right-sided wrist discomfort status post fall 18 days ago, negative x-ray, rule out occult fracture    Plan: She was given a thumb spica brace for comfort.  She has Tylenol and topical Voltaren available.  An MRI of the right wrist without contrast is pending.  She will follow-up after the MRI to go over results face-to-face.      Tony Harper MD

## 2022-01-13 NOTE — PROGRESS NOTES
Sports Medicine Clinic Visit    PCP: Mariaelena Schultz TU Rivera is a 66 year old female who is seen  in consultation at the request of Dr. Carias presenting with right wrist pain.    Patient indicates that she fell 18 days ago and ever since then has persistent right-sided wrist pain.  She points to her distal radius and centralize the wrist as areas of specific discomfort.  She says it is difficult to  and grasp because of pain in the wrist and it has not improved in the last 2 weeks.  She is taking Tylenol and topical Voltaren for the discomfort.    1 year ago she also had a fall.  She had persistent right-sided wrist pain after that fall.  An MRI was done at that time that showed no occult fracture,, but did show some DJD at the base of the thumb and some DJD at the TFCC.    3 days ago she had an x-ray of the right wrist that showed no acute fracture and no significant degenerative change was noted.        Location of Pain: right general wrist   Duration of Pain: 18 day(s)  Rating of Pain: 8/10  Pain is better with: Voltaren, tylenol   Pain is worse with: general use   Additional Features: Swelling  Treatment so far consists of: topical Voltaren, tylenol   Prior History of related problems: previous right wrist injuy 1 year ago      3 views right wrist radiographs 1/10/2022 3:25 PM     History: Right wrist pain     Comparison: 5/14/2020     Findings:     PA, oblique and lateral view(s) of the right wrist were obtained.      No acute osseous abnormality.  No erosion. There is a small osseous  fragment just distal to the ulna styloid, also seen on prior  radiograph.     Mild to moderate degenerative changes of the first carpometacarpal and  triscaphe joints.       Soft tissue is unremarkable.                                                                      Impression:  1. No acute osseous abnormality.  2. Mild to moderate grade degenerative change of the first CMC joint.     JUTTA ELLERMANN,  "MD         MRI right wrist 6/11/2020  IMPRESSION:  1.  Tearing and degeneration of the central disc of the TFCC.  2.  Degenerative changes involving the triscaphe, first  carpometacarpal, radiocarpal and scapholunate joints. No acute osseous  abnormality  3.  Small joint effusion.  4.  Tendinosis and partial intrasubstance tearing of the extensor  carpi ulnaris.    Ht 1.626 m (5' 4\")   Wt 86.2 kg (190 lb)   LMP  (LMP Unknown)   BMI 32.61 kg/m       PMH:  Past Medical History:   Diagnosis Date     Blepharitis      Esophageal reflux (aka GERD)      Glaucoma      Hyperlipidemia LDL goal < 130      Nonsenile cataract      Shingles     11/14/15 Left approximately C6 distribution       Active problem list:  Patient Active Problem List   Diagnosis     Hyperlipidemia with target LDL less than 130     Prediabetes     Disorder of bursae and tendons in shoulder region     Shingles     Primary open angle glaucoma of both eyes, moderate stage     Senile nuclear sclerosis, bilateral     Right lumbar radiculopathy     Benign essential hypertension     Chronic midline low back pain without sciatica     Pseudophakia of both eyes       FH:  Family History   Problem Relation Age of Onset     Glaucoma Mother      Macular Degeneration No family hx of      Cancer No family hx of      Diabetes No family hx of      Hypertension No family hx of      Cerebrovascular Disease No family hx of      Thyroid Disease No family hx of      Eye Surgery No family hx of        SH:  Social History     Socioeconomic History     Marital status:      Spouse name: Not on file     Number of children: Not on file     Years of education: Not on file     Highest education level: Not on file   Occupational History     Not on file   Tobacco Use     Smoking status: Never Smoker     Smokeless tobacco: Never Used   Substance and Sexual Activity     Alcohol use: No     Drug use: No     Sexual activity: Never   Other Topics Concern     Parent/sibling w/ CABG, " MI or angioplasty before 65F 55M? Not Asked   Social History Narrative     Not on file     Social Determinants of Health     Financial Resource Strain: Not on file   Food Insecurity: Not on file   Transportation Needs: Not on file   Physical Activity: Not on file   Stress: Not on file   Social Connections: Not on file   Intimate Partner Violence: Not on file   Housing Stability: Not on file       MEDS:  See EMR, reviewed  ALL:  See EMR, reviewed    REVIEW OF SYSTEMS:  CONSTITUTIONAL:NEGATIVE for fever, chills, change in weight  INTEGUMENTARY/SKIN: NEGATIVE for worrisome rashes, moles or lesions  EYES: NEGATIVE for vision changes or irritation  ENT/MOUTH: NEGATIVE for ear, mouth and throat problems  RESP:NEGATIVE for significant cough or SOB  BREAST: NEGATIVE for masses, tenderness or discharge  CV: NEGATIVE for chest pain, palpitations or peripheral edema  GI: NEGATIVE for nausea, abdominal pain, heartburn, or change in bowel habits  :NEGATIVE for frequency, dysuria, or hematuria  :NEGATIVE for frequency, dysuria, or hematuria  NEURO: NEGATIVE for weakness, dizziness or paresthesias  ENDOCRINE: NEGATIVE for temperature intolerance, skin/hair changes  HEME/ALLERGY/IMMUNE: NEGATIVE for bleeding problems  PSYCHIATRIC: NEGATIVE for changes in mood or affect        Objective: She is nontender at the right elbow at the proximal radius and has full flexion and extension of the right elbow.  She is acutely tender over the distal radius and also persistently tender over the scaphoid and lunate had repeated points throughout the exam.  I do not palpate an effusion at the wrist.  The overlying skin is normal.  Her grasp strength is normal.  Her thumb strength is intact.  Sensation is intact.  I do not see any signs of swelling or discoloration about the forearm, wrist or hand.      Assessment persistent right-sided wrist discomfort status post fall 18 days ago, negative x-ray, rule out occult fracture    Plan: She was  given a thumb spica brace for comfort.  She has Tylenol and topical Voltaren available.  An MRI of the right wrist without contrast is pending.  She will follow-up after the MRI to go over results face-to-face.

## 2022-02-01 ENCOUNTER — OFFICE VISIT (OUTPATIENT)
Dept: ORTHOPEDICS | Facility: CLINIC | Age: 67
End: 2022-02-01
Payer: MEDICARE

## 2022-02-01 ENCOUNTER — LAB (OUTPATIENT)
Dept: LAB | Facility: CLINIC | Age: 67
End: 2022-02-01
Payer: MEDICARE

## 2022-02-01 ENCOUNTER — OFFICE VISIT (OUTPATIENT)
Dept: DERMATOLOGY | Facility: CLINIC | Age: 67
End: 2022-02-01
Payer: MEDICARE

## 2022-02-01 VITALS — HEIGHT: 64 IN | WEIGHT: 190 LBS | BODY MASS INDEX: 32.44 KG/M2

## 2022-02-01 DIAGNOSIS — L29.9 PRURITUS: ICD-10-CM

## 2022-02-01 DIAGNOSIS — E55.9 VITAMIN D DEFICIENCY: ICD-10-CM

## 2022-02-01 DIAGNOSIS — L29.9 PRURITUS: Primary | ICD-10-CM

## 2022-02-01 DIAGNOSIS — S69.91XD RIGHT WRIST INJURY, SUBSEQUENT ENCOUNTER: ICD-10-CM

## 2022-02-01 DIAGNOSIS — R73.03 PREDIABETES: ICD-10-CM

## 2022-02-01 DIAGNOSIS — L28.1 PRURIGO NODULARIS: ICD-10-CM

## 2022-02-01 DIAGNOSIS — M25.531 RIGHT WRIST PAIN: Primary | ICD-10-CM

## 2022-02-01 LAB
BASOPHILS # BLD AUTO: 0 10E3/UL (ref 0–0.2)
BASOPHILS NFR BLD AUTO: 0 %
CRP SERPL-MCNC: 9.5 MG/L (ref 0–8)
EOSINOPHIL # BLD AUTO: 0.3 10E3/UL (ref 0–0.7)
EOSINOPHIL NFR BLD AUTO: 4 %
ERYTHROCYTE [DISTWIDTH] IN BLOOD BY AUTOMATED COUNT: 12.6 % (ref 10–15)
ERYTHROCYTE [SEDIMENTATION RATE] IN BLOOD BY WESTERGREN METHOD: 10 MM/HR (ref 0–30)
FERRITIN SERPL-MCNC: 76 NG/ML (ref 8–252)
HBA1C MFR BLD: 5.8 % (ref 0–5.6)
HCT VFR BLD AUTO: 40.7 % (ref 35–47)
HGB BLD-MCNC: 13.2 G/DL (ref 11.7–15.7)
IMM GRANULOCYTES # BLD: 0 10E3/UL
IMM GRANULOCYTES NFR BLD: 0 %
IRON SATN MFR SERPL: 11 % (ref 15–46)
IRON SERPL-MCNC: 38 UG/DL (ref 35–180)
LYMPHOCYTES # BLD AUTO: 2.4 10E3/UL (ref 0.8–5.3)
LYMPHOCYTES NFR BLD AUTO: 32 %
MCH RBC QN AUTO: 29 PG (ref 26.5–33)
MCHC RBC AUTO-ENTMCNC: 32.4 G/DL (ref 31.5–36.5)
MCV RBC AUTO: 90 FL (ref 78–100)
MONOCYTES # BLD AUTO: 0.6 10E3/UL (ref 0–1.3)
MONOCYTES NFR BLD AUTO: 7 %
NEUTROPHILS # BLD AUTO: 4.3 10E3/UL (ref 1.6–8.3)
NEUTROPHILS NFR BLD AUTO: 57 %
NRBC # BLD AUTO: 0 10E3/UL
NRBC BLD AUTO-RTO: 0 /100
PLATELET # BLD AUTO: 256 10E3/UL (ref 150–450)
RBC # BLD AUTO: 4.55 10E6/UL (ref 3.8–5.2)
TIBC SERPL-MCNC: 342 UG/DL (ref 240–430)
TOTAL PROTEIN SERUM FOR ELP: 7.6 G/DL (ref 6.8–8.8)
WBC # BLD AUTO: 7.6 10E3/UL (ref 4–11)

## 2022-02-01 PROCEDURE — 99204 OFFICE O/P NEW MOD 45 MIN: CPT | Mod: GC | Performed by: DERMATOLOGY

## 2022-02-01 PROCEDURE — 84165 PROTEIN E-PHORESIS SERUM: CPT | Mod: TC | Performed by: PATHOLOGY

## 2022-02-01 PROCEDURE — 99213 OFFICE O/P EST LOW 20 MIN: CPT | Performed by: FAMILY MEDICINE

## 2022-02-01 PROCEDURE — 84155 ASSAY OF PROTEIN SERUM: CPT | Performed by: DERMATOLOGY

## 2022-02-01 PROCEDURE — 86140 C-REACTIVE PROTEIN: CPT | Performed by: PATHOLOGY

## 2022-02-01 PROCEDURE — 84165 PROTEIN E-PHORESIS SERUM: CPT | Mod: 26 | Performed by: PATHOLOGY

## 2022-02-01 PROCEDURE — 85652 RBC SED RATE AUTOMATED: CPT | Performed by: PATHOLOGY

## 2022-02-01 PROCEDURE — 82728 ASSAY OF FERRITIN: CPT | Performed by: PATHOLOGY

## 2022-02-01 PROCEDURE — 36415 COLL VENOUS BLD VENIPUNCTURE: CPT | Performed by: PATHOLOGY

## 2022-02-01 PROCEDURE — 85025 COMPLETE CBC W/AUTO DIFF WBC: CPT | Performed by: PATHOLOGY

## 2022-02-01 PROCEDURE — 82306 VITAMIN D 25 HYDROXY: CPT | Performed by: DERMATOLOGY

## 2022-02-01 PROCEDURE — 83036 HEMOGLOBIN GLYCOSYLATED A1C: CPT | Performed by: PATHOLOGY

## 2022-02-01 PROCEDURE — 83550 IRON BINDING TEST: CPT | Performed by: PATHOLOGY

## 2022-02-01 RX ORDER — NAPROXEN 500 MG/1
500 TABLET ORAL 2 TIMES DAILY WITH MEALS
Qty: 60 TABLET | Refills: 1 | Status: SHIPPED | OUTPATIENT
Start: 2022-02-01 | End: 2022-03-30

## 2022-02-01 RX ORDER — TRIAMCINOLONE ACETONIDE 1 MG/G
OINTMENT TOPICAL 2 TIMES DAILY
Qty: 454 G | Refills: 3 | Status: SHIPPED | OUTPATIENT
Start: 2022-02-01 | End: 2022-08-16

## 2022-02-01 ASSESSMENT — PAIN SCALES - GENERAL: PAINLEVEL: EXTREME PAIN (9)

## 2022-02-01 ASSESSMENT — MIFFLIN-ST. JEOR: SCORE: 1396.83

## 2022-02-01 NOTE — PROGRESS NOTES
February 1, 2022: Yancy Rivera is a 66 year old female who is seen in f/u up for right wrist pain      S:  fup mri wrist.  DOI 12/26/21.  Pt s/p FOOSH injury, one month ago.  Pt using topical voltaren, tylenol.  Pt no-showed for recent wrist MRI appointment, as she forgot the appointment time and date.  Thumb spica brace provided at last visit.    She indicates the wrist is still uncomfortable. She would like a medicine to assist with pain. She points to the distal ulna, distal radius, and the base of her thumb at the areas that are uncomfortable. She has been using the brace. She has been using a Ziploc bag with ice to place it on the wrist, outside of the brace. She is looking for other icing options, and she wants a different pain medicine. Tylenol has not been helpful. She does have a history of GERD. She had recently normal kidney function tests and liver function test. She has no chart history of heart disease. She does have a history of mild hypertension. She has been able to tolerate ibuprofen in the past. She indicates that she has recently moved to [place],, is living with family there, and wants to use their pharmacy at [facility].      1 year ago she also had a fall.  She had persistent right-sided wrist pain after that fall.  An MRI was done at that time that showed no occult fracture,, but did show some DJD at the base of the thumb and some DJD at the TFCC.        3 views right wrist radiographs 1/10/2022 3:25 PM     History: Right wrist pain     Comparison: 5/14/2020     Findings:     PA, oblique and lateral view(s) of the right wrist were obtained.      No acute osseous abnormality.  No erosion. There is a small osseous  fragment just distal to the ulna styloid, also seen on prior  radiograph.     Mild to moderate degenerative changes of the first carpometacarpal and  triscaphe joints.       Soft tissue is  unremarkable.                                                                      Impression:  1. No acute osseous abnormality.  2. Mild to moderate grade degenerative change of the first CMC joint.     JUTTA ELLERMANN, MD         PMH:  Past Medical History:   Diagnosis Date     Blepharitis      Esophageal reflux (aka GERD)      Glaucoma      Hyperlipidemia LDL goal < 130      Nonsenile cataract      Shingles     11/14/15 Left approximately C6 distribution       Active problem list:  Patient Active Problem List   Diagnosis     Hyperlipidemia with target LDL less than 130     Prediabetes     Disorder of bursae and tendons in shoulder region     Shingles     Primary open angle glaucoma of both eyes, moderate stage     Senile nuclear sclerosis, bilateral     Right lumbar radiculopathy     Benign essential hypertension     Chronic midline low back pain without sciatica     Pseudophakia of both eyes       FH:  Family History   Problem Relation Age of Onset     Glaucoma Mother      Macular Degeneration No family hx of      Cancer No family hx of      Diabetes No family hx of      Hypertension No family hx of      Cerebrovascular Disease No family hx of      Thyroid Disease No family hx of      Eye Surgery No family hx of        SH:  Social History     Socioeconomic History     Marital status:      Spouse name: Not on file     Number of children: Not on file     Years of education: Not on file     Highest education level: Not on file   Occupational History     Not on file   Tobacco Use     Smoking status: Never Smoker     Smokeless tobacco: Never Used   Substance and Sexual Activity     Alcohol use: No     Drug use: No     Sexual activity: Never   Other Topics Concern     Parent/sibling w/ CABG, MI or angioplasty before 65F 55M? Not Asked   Social History Narrative     Not on file     Social Determinants of Health     Financial Resource Strain: Not on file   Food Insecurity: Not on file   Transportation Needs: Not  on file   Physical Activity: Not on file   Stress: Not on file   Social Connections: Not on file   Intimate Partner Violence: Not on file   Housing Stability: Not on file       MEDS:  See EMR, reviewed  ALL:  See EMR, reviewed    REVIEW OF SYSTEMS:  CONSTITUTIONAL:NEGATIVE for fever, chills, change in weight  INTEGUMENTARY/SKIN: NEGATIVE for worrisome rashes, moles or lesions  EYES: NEGATIVE for vision changes or irritation  ENT/MOUTH: NEGATIVE for ear, mouth and throat problems  RESP:NEGATIVE for significant cough or SOB  BREAST: NEGATIVE for masses, tenderness or discharge  CV: NEGATIVE for chest pain, palpitations or peripheral edema  GI: NEGATIVE for nausea, abdominal pain, heartburn, or change in bowel habits  :NEGATIVE for frequency, dysuria, or hematuria  :NEGATIVE for frequency, dysuria, or hematuria  NEURO: NEGATIVE for weakness, dizziness or paresthesias  ENDOCRINE: NEGATIVE for temperature intolerance, skin/hair changes  HEME/ALLERGY/IMMUNE: NEGATIVE for bleeding problems  PSYCHIATRIC: NEGATIVE for changes in mood or affect          Objective: The right wrist reveals no swelling. She is tender over the distal radius. She has tenderness over the scaphoid on exam. Her grasp strength is intact. Sensation is intact. Overlying skin is normal. Appropriate in conversation affect.    Assessment right-sided wrist discomfort after a fall 4 weeks ago, rule out occult fracture, including occult scaphoid fracture    Plan: I gave her a compressive wrap and options for over-the-counter icing as needed for pain. Naproxen 500 mg twice daily with food. Continue with thumb spica wrist brace. MRI will be rescheduled. Follow-up face-to-face visit after the MRI. We discussed the option of proceeding with physical therapy if the MRI shows no occult fracture.

## 2022-02-01 NOTE — LETTER
2/1/2022      RE: Yancy Rivera  [address]  [place] [address]       February 1, 2022: Yancy Rivera is a 66 year old female who is seen in f/u up for right wrist pain      S:  fup mri wrist.  DOI 12/26/21.  Pt s/p FOOSH injury, one month ago.  Pt using topical voltaren, tylenol.  Pt no-showed for recent wrist MRI appointment, as she forgot the appointment time and date.  Thumb spica brace provided at last visit.    She indicates the wrist is still uncomfortable. She would like a medicine to assist with pain. She points to the distal ulna, distal radius, and the base of her thumb at the areas that are uncomfortable. She has been using the brace. She has been using a Ziploc bag with ice to place it on the wrist, outside of the brace. She is looking for other icing options, and she wants a different pain medicine. Tylenol has not been helpful. She does have a history of GERD. She had recently normal kidney function tests and liver function test. She has no chart history of heart disease. She does have a history of mild hypertension. She has been able to tolerate ibuprofen in the past. She indicates that she has recently moved to [place],, is living with family there, and wants to use their pharmacy at [employer].      1 year ago she also had a fall.  She had persistent right-sided wrist pain after that fall.  An MRI was done at that time that showed no occult fracture,, but did show some DJD at the base of the thumb and some DJD at the TFCC.        3 views right wrist radiographs 1/10/2022 3:25 PM     History: Right wrist pain     Comparison: 5/14/2020     Findings:     PA, oblique and lateral view(s) of the right wrist were obtained.      No acute osseous abnormality.  No erosion. There is a small osseous  fragment just distal to the ulna styloid, also seen on prior  radiograph.     Mild to moderate degenerative changes of the first carpometacarpal and  triscaphe joints.       Soft tissue is  unremarkable.                                                                      Impression:  1. No acute osseous abnormality.  2. Mild to moderate grade degenerative change of the first CMC joint.     JUTTA ELLERMANN, MD         PMH:  Past Medical History:   Diagnosis Date     Blepharitis      Esophageal reflux (aka GERD)      Glaucoma      Hyperlipidemia LDL goal < 130      Nonsenile cataract      Shingles     11/14/15 Left approximately C6 distribution       Active problem list:  Patient Active Problem List   Diagnosis     Hyperlipidemia with target LDL less than 130     Prediabetes     Disorder of bursae and tendons in shoulder region     Shingles     Primary open angle glaucoma of both eyes, moderate stage     Senile nuclear sclerosis, bilateral     Right lumbar radiculopathy     Benign essential hypertension     Chronic midline low back pain without sciatica     Pseudophakia of both eyes       FH:  Family History   Problem Relation Age of Onset     Glaucoma Mother      Macular Degeneration No family hx of      Cancer No family hx of      Diabetes No family hx of      Hypertension No family hx of      Cerebrovascular Disease No family hx of      Thyroid Disease No family hx of      Eye Surgery No family hx of        SH:  Social History     Socioeconomic History     Marital status:      Spouse name: Not on file     Number of children: Not on file     Years of education: Not on file     Highest education level: Not on file   Occupational History     Not on file   Tobacco Use     Smoking status: Never Smoker     Smokeless tobacco: Never Used   Substance and Sexual Activity     Alcohol use: No     Drug use: No     Sexual activity: Never   Other Topics Concern     Parent/sibling w/ CABG, MI or angioplasty before 65F 55M? Not Asked   Social History Narrative     Not on file     Social Determinants of Health     Financial Resource Strain: Not on file   Food Insecurity: Not on file   Transportation Needs: Not  on file   Physical Activity: Not on file   Stress: Not on file   Social Connections: Not on file   Intimate Partner Violence: Not on file   Housing Stability: Not on file       MEDS:  See EMR, reviewed  ALL:  See EMR, reviewed    REVIEW OF SYSTEMS:  CONSTITUTIONAL:NEGATIVE for fever, chills, change in weight  INTEGUMENTARY/SKIN: NEGATIVE for worrisome rashes, moles or lesions  EYES: NEGATIVE for vision changes or irritation  ENT/MOUTH: NEGATIVE for ear, mouth and throat problems  RESP:NEGATIVE for significant cough or SOB  BREAST: NEGATIVE for masses, tenderness or discharge  CV: NEGATIVE for chest pain, palpitations or peripheral edema  GI: NEGATIVE for nausea, abdominal pain, heartburn, or change in bowel habits  :NEGATIVE for frequency, dysuria, or hematuria  :NEGATIVE for frequency, dysuria, or hematuria  NEURO: NEGATIVE for weakness, dizziness or paresthesias  ENDOCRINE: NEGATIVE for temperature intolerance, skin/hair changes  HEME/ALLERGY/IMMUNE: NEGATIVE for bleeding problems  PSYCHIATRIC: NEGATIVE for changes in mood or affect          Objective: The right wrist reveals no swelling. She is tender over the distal radius. She has tenderness over the scaphoid on exam. Her grasp strength is intact. Sensation is intact. Overlying skin is normal. Appropriate in conversation affect.    Assessment right-sided wrist discomfort after a fall 4 weeks ago, rule out occult fracture, including occult scaphoid fracture    Plan: I gave her a compressive wrap and options for over-the-counter icing as needed for pain. Naproxen 500 mg twice daily with food. Continue with thumb spica wrist brace. MRI will be rescheduled. Follow-up face-to-face visit after the MRI. We discussed the option of proceeding with physical therapy if the MRI shows no occult fracture.      Tony Harper MD

## 2022-02-01 NOTE — Clinical Note
2/1/2022       RE: Yancy Rivera  [address] [place] [address]     Dear Colleague,    Thank you for referring your patient, Yancy Rivera, to the [facility] [place] at [facility]. Please see a copy of my visit note below.    [facility] Dermatology Note  Encounter Date: Feb 1, 2022  Office Visit     Dermatology Problem List:  1. ***    ____________________________________________    Assessment & Plan:    # Prurtic papules symmetric distribution on upper extremities, lower extremities, abdomen and Mon pubic region.   {kkplans:200675}  Denies family history    - ***     # {Diagnosesderm:389459}.   {kkplans:781528}   - ***     Procedures Performed:   {kkprocedurenotes:889130}  {kkprocedurenotes:405751}    Follow-up: {kkfollowup:417528}    Staff and Scribe:     Scribe Disclosure:  I, Gerardo Oropeza, am serving as a scribe to document services personally performed by Tom Meyer MD based on data collection and the provider's statements to me.     ***  ____________________________________________    CC: No chief complaint on file.    HPI:  Ms. Yancy Rivera is a(n) 66 year old female who presents today as a new patient for ***    Discoloration, itchiness, burning   Abdomen, mon pubis.  upper extremity and lower extremity.    5 months.   She has not tried any medication.     Numular patch     Only her     Denies fever, chills, malaise.   Denies new medications or new creams.   She has started using       Gabapentin for pain after hernia surgery.     Herniated intervertebral disc of lumbar spine; Radicular pain  Not longer pain anymore.       Patient is otherwise feeling well, without additional skin concerns.    Labs Reviewed:  ***N/A    Physical Exam:  Vitals: LMP  (LMP Unknown)   SKIN: {kkSkinExam:379658}  - ***    - Hyperpigmented lesions patch in abdomen.   - Pruritus and scratching.   - {Skin Exam  Derm:966299}.   - {Skin Exam Derm:607679}.   - {Skin Exam Derm:044285}.   - No other lesions of concern on areas examined.     Medications:  Current Outpatient Medications   Medication     atorvastatin (LIPITOR) 20 MG tablet     atorvastatin (LIPITOR) 20 MG tablet     carboxymethylcellulose PF (CARBOXYMETHYLCELLULOSE SODIUM) 0.5 % ophthalmic solution     Cholecalciferol (VITAMIN D) 2000 UNITS tablet     clotrimazole-betamethasone (LOTRISONE) 1-0.05 % external cream     diclofenac (VOLTAREN) 1 % topical gel     diclofenac (VOLTAREN) 1 % topical gel     doxycycline Monohydrate 100 MG TABS     ferrous sulfate (IRON) 325 (65 FE) MG tablet     gabapentin (NEURONTIN) 100 MG capsule     gabapentin (NEURONTIN) 100 MG capsule     gabapentin (NEURONTIN) 300 MG capsule     gabapentin (NEURONTIN) 600 MG tablet     ibuprofen (ADVIL/MOTRIN) 200 MG tablet     Lidocaine (LIDOCARE) 4 % Patch     methylPREDNISolone (MEDROL DOSEPAK) 4 MG tablet     Multiple Minerals (CALCIUM-MAGNESIUM-ZINC) TABS     omega 3 1000 MG CAPS     oxyCODONE (ROXICODONE) 5 MG immediate release tablet     pantoprazole (PROTONIX) 40 MG EC tablet     piroxicam (FELDENE) 20 MG capsule     Riboflavin 400 MG TABS     SUMAtriptan (IMITREX) 50 MG tablet     tiZANidine (ZANAFLEX) 4 MG capsule     traMADol (ULTRAM) 50 MG tablet     White Petrolatum-Mineral Oil (REFRESH P.M.) OINT     Current Facility-Administered Medications   Medication     lidocaine (PF) (XYLOCAINE) 1 % injection 2 mL     lidocaine (PF) (XYLOCAINE) 1 % injection 2 mL     lidocaine (PF) (XYLOCAINE) 1 % injection 3 mL     triamcinolone (KENALOG-40) injection 20 mg     triamcinolone (KENALOG-40) injection 40 mg      Past Medical History:   Patient Active Problem List   Diagnosis     Hyperlipidemia with target LDL less than 130     Prediabetes     Disorder of bursae and tendons in shoulder region     Shingles     Primary open angle glaucoma of both eyes, moderate stage     Senile nuclear sclerosis,  bilateral     Right lumbar radiculopathy     Benign essential hypertension     Chronic midline low back pain without sciatica     Pseudophakia of both eyes     Past Medical History:   Diagnosis Date     Blepharitis      Esophageal reflux (aka GERD)      Glaucoma      Hyperlipidemia LDL goal < 130      Nonsenile cataract      Shingles     11/14/15 Left approximately C6 distribution        CC Melony Madison MD  [address] [address] on close of this encounter.      Again, thank you for allowing me to participate in the care of your patient.      Sincerely,    Tom Meyer MD

## 2022-02-01 NOTE — PROGRESS NOTES
[facility] Dermatology Note  Encounter Date: Feb 1, 2022  Office Visit     Dermatology Problem List:  1. Pruritus without primary lesion  -Presents with secondary lesions distributed symmetrically on upper/lower back with spare of the medial area, dorsum of forearms, proximal region of lower extremities and Mon pubic region for 5 months.   -Has normal CMP and TSH from 09/2021. No apparent past medical history for atopy.   -Started on Moisturizer, Triamcinolone ointment prn itchiness areas and encourage continue using gabapentin.   -General labs were ordered today.   ____________________________________________    Assessment & Plan:    #  Pruritus without primary lesions.   Patient presented with pruritic secondary lesions distributed symmetrically on upper/lower back with spare of the medial area, dorsum of forearms, proximal region of lower extremities and Mon pubic region. Duration of lesions are about 5 months.   Eventually lesions can be burning.   Denies changes on medications, topical creams, soap, lotions, prior onset of lesions.   Unclear if there was an primary lesions before onset of itchiness.   Has not tried any medication or creams/ointments for current lesions.   Has normal CMP and TSH from 09/2021. No apparent past medical history for atopy.   - Moisturize: Recommend good OTC moisturizer to full body, such as Cera-Ve, Marcia-cream, or Cetaphil. Advised best to apply after bathing to lock in moisture.  -Triamcinolone ointment prn itchiness areas.   -Continue using gabapentin 600 mg TID.   -Labs ordered for today: A1c, vitD, CBC, protein electrophoresis, iron panel, ferritin, CRP, ESR.     Procedures Performed:   None    Follow-up: 2 month(s) in-person, or earlier for new or changing lesions    Staff, Resident and Scribe:   Tom Meyer (Staff)   Zeenat Ni (IM resident)     Scribe Disclosure:  Gerardo BRICENO, am serving as a scribe to document services personally  performed by Tom Meyer MD based on data collection and the provider's statements to me.     Staff Physician Comments:   I saw and evaluated the patient with the resident and I agree with the assessment and plan.  I was present for the examination. I have made edits if needed.    Tom Meyer MD  Staff Dermatologist and Dermatopathologist  , Department of Dermatology    ____________________________________________    CC: No chief complaint on file.    HPI:  Ms. Yancy Rivera is a(n) 66 year old female who presents today as a new patient for persistent pruritus and skin lesions for 5 months.     Patient reports 5 months of pruritic lesions distributed symmetrically on upper/lower back, forearms, lower extremities and Mon pubic region. Eventually lesions can be burning. Patient describes discolorations on prior skin lesions.    Denies changes on medications, topical creams, soap, lotions, prior onset of lesions.   Unclear if there was an primary lesions before onset of itchiness.   No other realatives at home had experienced similar lesions.   Has not trial medication or creams for itchiness.     Denies fever, chills, malaise.   She is currently using gabapentin 100 mg TID  for radiated pain 2/2 hx of surgery for herniated intervertebral disc of lumbar spine.   Patient is otherwise feeling well, without additional skin concerns.    Labs Reviewed:  N/A    Physical Exam:  Vitals: LMP  (LMP Unknown)   SKIN: Full skin, which includes the head/face, both arms, chest, back, abdomen,both legs, genitalia and/or groin buttocks, digits and/or nails, was examined.  -Secondary lesions (papules with excoriations on top) symmetrically distributed on upper/lower back with spare of the medial area (butterfly sign), dorsum of forearms, proximal region of lower extremities and Mon pubic region.  - Multiple oval hyperpigmented patches on the abdomen. Not ichy without scales.   - Clean ulcerative lesions  on mons pubis.   - No other lesions of concern on areas examined.     Medications:  Current Outpatient Medications   Medication     atorvastatin (LIPITOR) 20 MG tablet     atorvastatin (LIPITOR) 20 MG tablet     carboxymethylcellulose PF (CARBOXYMETHYLCELLULOSE SODIUM) 0.5 % ophthalmic solution     Cholecalciferol (VITAMIN D) 2000 UNITS tablet     clotrimazole-betamethasone (LOTRISONE) 1-0.05 % external cream     diclofenac (VOLTAREN) 1 % topical gel     diclofenac (VOLTAREN) 1 % topical gel     doxycycline Monohydrate 100 MG TABS     ferrous sulfate (IRON) 325 (65 FE) MG tablet     gabapentin (NEURONTIN) 100 MG capsule     gabapentin (NEURONTIN) 100 MG capsule     gabapentin (NEURONTIN) 300 MG capsule     gabapentin (NEURONTIN) 600 MG tablet     ibuprofen (ADVIL/MOTRIN) 200 MG tablet     Lidocaine (LIDOCARE) 4 % Patch     methylPREDNISolone (MEDROL DOSEPAK) 4 MG tablet     Multiple Minerals (CALCIUM-MAGNESIUM-ZINC) TABS     omega 3 1000 MG CAPS     oxyCODONE (ROXICODONE) 5 MG immediate release tablet     pantoprazole (PROTONIX) 40 MG EC tablet     piroxicam (FELDENE) 20 MG capsule     Riboflavin 400 MG TABS     SUMAtriptan (IMITREX) 50 MG tablet     tiZANidine (ZANAFLEX) 4 MG capsule     traMADol (ULTRAM) 50 MG tablet     White Petrolatum-Mineral Oil (REFRESH P.M.) OINT     Current Facility-Administered Medications   Medication     lidocaine (PF) (XYLOCAINE) 1 % injection 2 mL     lidocaine (PF) (XYLOCAINE) 1 % injection 2 mL     lidocaine (PF) (XYLOCAINE) 1 % injection 3 mL     triamcinolone (KENALOG-40) injection 20 mg     triamcinolone (KENALOG-40) injection 40 mg      Past Medical History:   Patient Active Problem List   Diagnosis     Hyperlipidemia with target LDL less than 130     Prediabetes     Disorder of bursae and tendons in shoulder region     Shingles     Primary open angle glaucoma of both eyes, moderate stage     Senile nuclear sclerosis, bilateral     Right lumbar radiculopathy     Benign essential  hypertension     Chronic midline low back pain without sciatica     Pseudophakia of both eyes     Past Medical History:   Diagnosis Date     Blepharitis      Esophageal reflux (aka GERD)      Glaucoma      Hyperlipidemia LDL goal < 130      Nonsenile cataract      Shingles     11/14/15 Left approximately C6 distribution        CC Melony Madison MD  [address] [place] [address] on close of this encounter.

## 2022-02-01 NOTE — NURSING NOTE
Chief Complaint   Patient presents with     Derm Problem     [facility] for her pruritus,  she has had this probelm for the past 5 months with a burning sensation and itchiness.     Lexx Elias EMT

## 2022-02-02 LAB
ALBUMIN SERPL ELPH-MCNC: 4.7 G/DL (ref 3.7–5.1)
ALPHA1 GLOB SERPL ELPH-MCNC: 0.3 G/DL (ref 0.2–0.4)
ALPHA2 GLOB SERPL ELPH-MCNC: 0.6 G/DL (ref 0.5–0.9)
B-GLOBULIN SERPL ELPH-MCNC: 0.9 G/DL (ref 0.6–1)
DEPRECATED CALCIDIOL+CALCIFEROL SERPL-MC: 34 UG/L (ref 20–75)
GAMMA GLOB SERPL ELPH-MCNC: 1.1 G/DL (ref 0.7–1.6)
M PROTEIN SERPL ELPH-MCNC: 0 G/DL
PROT PATTERN SERPL ELPH-IMP: NORMAL

## 2022-02-15 ENCOUNTER — ANCILLARY PROCEDURE (OUTPATIENT)
Dept: MRI IMAGING | Facility: CLINIC | Age: 67
End: 2022-02-15
Attending: FAMILY MEDICINE
Payer: MEDICARE

## 2022-02-15 DIAGNOSIS — S69.91XA RIGHT WRIST INJURY, INITIAL ENCOUNTER: ICD-10-CM

## 2022-02-15 DIAGNOSIS — M25.531 RIGHT WRIST PAIN: ICD-10-CM

## 2022-02-15 PROCEDURE — G1004 CDSM NDSC: HCPCS | Performed by: RADIOLOGY

## 2022-02-15 PROCEDURE — 73221 MRI JOINT UPR EXTREM W/O DYE: CPT | Mod: RT | Performed by: RADIOLOGY

## 2022-02-15 PROCEDURE — T1013 SIGN LANG/ORAL INTERPRETER: HCPCS | Mod: U3 | Performed by: RADIOLOGY

## 2022-02-17 ENCOUNTER — THERAPY VISIT (OUTPATIENT)
Dept: OCCUPATIONAL THERAPY | Facility: CLINIC | Age: 67
End: 2022-02-17
Attending: FAMILY MEDICINE
Payer: MEDICARE

## 2022-02-17 ENCOUNTER — OFFICE VISIT (OUTPATIENT)
Dept: ORTHOPEDICS | Facility: CLINIC | Age: 67
End: 2022-02-17
Payer: MEDICARE

## 2022-02-17 VITALS — BODY MASS INDEX: 32.44 KG/M2 | HEIGHT: 64 IN | WEIGHT: 190 LBS

## 2022-02-17 DIAGNOSIS — S69.91XD RIGHT WRIST INJURY, SUBSEQUENT ENCOUNTER: ICD-10-CM

## 2022-02-17 DIAGNOSIS — S69.91XD SCAPHOLUNATE LIGAMENT INJURY WITH NO INSTABILITY, RIGHT, SUBSEQUENT ENCOUNTER: ICD-10-CM

## 2022-02-17 DIAGNOSIS — S69.91XD SCAPHOLUNATE LIGAMENT INJURY WITH NO INSTABILITY, RIGHT, SUBSEQUENT ENCOUNTER: Primary | ICD-10-CM

## 2022-02-17 DIAGNOSIS — M77.8 TENDINITIS OF EXTENSOR TENDON OF RIGHT HAND: ICD-10-CM

## 2022-02-17 PROBLEM — M25.531 RIGHT WRIST PAIN: Status: ACTIVE | Noted: 2020-05-07

## 2022-02-17 PROCEDURE — 99213 OFFICE O/P EST LOW 20 MIN: CPT | Performed by: FAMILY MEDICINE

## 2022-02-17 PROCEDURE — 97760 ORTHOTIC MGMT&TRAING 1ST ENC: CPT | Mod: GO | Performed by: OCCUPATIONAL THERAPIST

## 2022-02-17 PROCEDURE — T1013 SIGN LANG/ORAL INTERPRETER: HCPCS | Mod: U3 | Performed by: OCCUPATIONAL THERAPIST

## 2022-02-17 PROCEDURE — 97166 OT EVAL MOD COMPLEX 45 MIN: CPT | Mod: GO | Performed by: OCCUPATIONAL THERAPIST

## 2022-02-17 NOTE — LETTER
2022    Jaydekatie MAE Miguel  [address]  [place] [address]  [phone] (home)     :     1955      To Whom it May Concern:    This patient has an acute right wrist ligament injury, seen on wrist MRI, from a fall that occurred 21.  She will be using a custom molded right wrist splint from her hand therapist over the next four weeks.  This may result in the need for more frequent care from her current PCA, to assist with activities of daily living, over the next 4-6 weeks.      Sincerely,        Tony Harper MD

## 2022-02-17 NOTE — PROGRESS NOTES
[facility]    OUTPATIENT Occupational Therapy ORTHOPEDIC EVALUATION  PLAN OF TREATMENT FOR OUTPATIENT REHABILITATION  (COMPLETE FOR INITIAL CLAIMS ONLY)  Patient's Last Name, First Name, M.I.  YOB: 1955  Yancy Rivera    Provider s Name:  [facility]   Medical Record No.  [id number]   Start of Care Date:  02/17/22   Onset Date:   12/26/21 (2/17/2022 MD order date)   Type:     ___PT   _X__OT Medical Diagnosis:    Encounter Diagnosis   Name Primary?     Scapholunate ligament injury with no instability, right, subsequent encounter         Treatment Diagnosis:  Scapholunate ligament injury        Goals:     02/17/22 0500   Goal #1   Goal #1 eating   Previous Performance Level Independent   Current Functional Task Hold   Current Performance Level Extreme difficulty holding a utensil   STG Target Performance Utensil   STG Target Perform Level Moderate difficulty   Due Date 03/31/22   LTG Target Task/Performance No Difficulty   Due date 05/17/22       Therapy Frequency:  1x/week  Predicted Duration of Therapy Intervention:  8 weeks    Karen Beltran OT                 I CERTIFY THE NEED FOR THESE SERVICES FURNISHED UNDER        THIS PLAN OF TREATMENT AND WHILE UNDER MY CARE     (Physician attestation of this document indicates review and certification of the therapy plan).                       Certification Date From:  02/17/22   Certification Date To:  05/17/22    Referring Provider:  Tony Harper    Initial Assessment        See Epic Evaluation SOC Date: 02/17/22

## 2022-02-17 NOTE — PROGRESS NOTES
"Hand Therapy Initial Evaluation    Current Date:  2/17/2022    Diagnosis: Scapholunate ligament injury with no instability  DOI: 12/26/2021 (2/17/2022 MD order date)  Post:  7w 4d    Precautions: \"Custom molded wrist brace for scapholunate ligament sprain, 6 weeks ago, without complete disruption.  Extensor tenosynovitis.\"    Subjective:  Yancy Rivera is a 66 year old female.    Patient reports symptoms of the right wrist which occurred due to FOOSH. Since onset symptoms are Unchanged  General health as reported by patient is fair.  Pertinent medical history includes:arthritis  Medical allergies:none.  Surgical history: none.  Medication history: Muscle Relaxants, Pain.    Current occupation is None  Job Tasks: Lifting, Carrying, Prolonged Sitting, Prolonged Standing, Repetitive Tasks    Occupational Profile Information:  Right hand dominant  Prior functional level:  independent-shared household chores  Patient reports symptoms of pain, stiffness/loss of motion, weakness/loss of strength and edema  Special tests:  x-ray and MRI.    Previous treatment: OTC wrist brace  Barriers include:transportation  Mobility: No difficulty  Transportation: Unable to drive  Currently not working  Leisure activities/hobbies: Enjoys listening to [employer]  Other: [place]  present    Functional Outcome Measure:   Upper Extremity Functional Index Score:  SCORE:   Column Totals: /80: 3   (A lower score indicates greater disability.)    Objective:  Pain Level (Scale 0-10)   2/17/2022   At Rest 8/10   With Use 9/10     Pain Description  Date 2/17/2022   Location wrist and hand   Pain Quality Sharp and Shooting   Frequency constant     Pain is worst  daytime   Exacerbated by  use, movement   Relieved by rest and none   Progression Unchanged     Edema  Mild swelling present on dorsum of wrist over extensor retinaculum    Sensation   Decreased Median Nerve distribution per pt report and Decreased Dorsal Radial Sensory Nerve " distribution per pt report    ROM   Fingers appear WNL - Wrist is highly painful, so ROM measurements deferred    Strength  Deferred d/t high pain levels    Assessment:  Patient presents with symptoms consistent with diagnosis of right scapholunate injury,  with conservative intervention.     Patient's limitations or Problem List includes:  Pain, Decreased ROM/motion, Increased edema, Weakness and Sensory disturbance of the right wrist which interferes with the patient's ability to perform Self Care Tasks (dressing, eating, bathing, hygiene/toileting), Sleep Patterns, Recreational Activities, Household Chores and Driving  as compared to previous level of function.    Rehab Potential:  Excellent - Return to full activity, no limitations    Patient will benefit from skilled Occupational Therapy to increase ROM, overall strength and stability of wrist and decrease pain and edema to return to previous activity level and resume normal daily tasks and to reach their rehab potential.    Barriers to Learning:  Language    Communication Issues:  Patient appears to be able to clearly communicate and understand verbal and written communication and follow directions correctly.  Patient has an  for communication clarity.    Chart Review: Chart Review, Brief history including review of medical and/or therapy records relating to the presenting problem and Simple history review with patient    Identified Performance Deficits: bathing/showering, toileting, dressing, feeding, functional mobility, hygiene and grooming, driving and community mobility, health management and maintenance, home establishment and management, meal preparation and cleanup, shopping, sleep and leisure activities    Assessment of Occupational Performance:  5 or more Performance Deficits    Clinical Decision Making (Complexity): Moderate complexity    Treatment Explanation:  The following has been discussed with the patient:  RX ordered/plan of  care  Anticipated outcomes  Possible risks and side effects    Plan:  Frequency:  1 X week, once daily  Duration:  for 8 weeks    Treatment Plan:   Modalities:  US, Fluidotherapy and Paraffin  Therapeutic Exercise:  AROM, AAROM, PROM, Tendon Gliding, Blocking, Reverse Blocking, Place and Hold, Contract Relax, Extensor Tracking, Isotonics, Isometrics and Stabilization  Neuromuscular re-education:  Nerve Gliding, Coordination/Dexterity, Sensory re-education, Desensitization, Kinesthetic Training, Proprioceptive Training, Posture, Kinesiotaping, Strain Counter Strain, Isometrics, Stabilization  Manual Techniques:  Coordination/Dexterity, Joint mobilization, Scar mobilization, Friction massage, Myofascial release, Manual edema mobilization  Orthotic Fabrication:  Static and Forearm based  Self Care:  Self Care Tasks, Ergonomic Considerations    Discharge Plan:  Achieve all LTG.  Independent in home treatment program.  Reach maximal therapeutic benefit.    Home Exercise Program:  Icing  Forearm based thumb spica  Circumferential orficast wrist support adjusted underneath freedom thumbfit for wrist stability  Tendon gliding    Next Visit:  Check fit of orthoses  Initiate/progress HEP as tolerated

## 2022-02-17 NOTE — PROGRESS NOTES
S;   fup mri  Right wrist.   MRI suggested a new high-grade sprain of the dorsal and volar aspects of the scapholunate ligament without complete disruption. This was new compared to an MRI of the right wrist in 2020. She does have some degenerative changes at the TFCC that were present on the last MRI. No occult fracture. Second and third extensor compartment tenosynovitis.    She has had difficulty finding a brace that fits her well so she has only used the right over-the-counter wrist brace occasionally.  She presents without correct placement of the right wrist brace today.  She still has discomfort in the centralized wrist.    DOI 12/26/21.  Pt s/p FOOSH injury, 6 weeks ago.  Pt using topical voltaren, tylenol.     She indicates the wrist is still uncomfortable. She points to the distal ulna, distal radius, and the base of her thumb at the areas that are uncomfortable. She has been using an OTC brace. She has been using a Ziploc bag with ice to place it on the wrist, outside of the brace. Tylenol has not been helpful. She does have a history of GERD. She had recently normal kidney function tests and liver function test. She has no chart history of heart disease. She does have a history of mild hypertension. She has been able to tolerate ibuprofen in the past. She indicates that she has recently moved to [place],, is living with family there, and wants to use their pharmacy at [facility].     1 year ago she also had a fall.  She had persistent right-sided wrist pain after that fall.  An MRI was done at that time that showed no occult fracture,, but did show some DJD at the base of the thumb and some DJD at the TFCC.      Comparison: MRI 6/11/2020; x-ray 1/10/2022, 5/14/2020      Findings:  External marker placed over the scapholunate joint.     Triangular Fibrocartilage: Disruption of the styloid attachment and  central disc. No radial sided triangular cartilage perforation.     Interosseous  Ligaments:  Scapholunate ligament: High-grade sprain of the dorsal and volar  scapholunate ligament. High-grade tear of the membranous scapholunate  ligament, coronal image 8, sagittal image 17. Widening of the  scapholunate interosseous distance measuring 4.5 mm (series 5001,  image 8), new from prior.  Lunatotriquetral ligament: Intact.  Carpal alignment: Widening of scapholunate joint, otherwise  unremarkable.     Bones: Chronic changes of the distal ulna similar-appearing to prior  MR, presumably related to prior injury.     Articulations:  Joint effusion: None.  Cartilage: No hyaline cartilage defects.  Synovium: No synovial thickening.     Tendons:  Flexor tendons: Normal. No tear or tendon sheath effusion.  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     Miscellaneous soft tissues: Volar ganglion cyst originating from the  pisotriquetral  joint extending proximally towards the TFCC, axial  image 16. Increased fluid in the pisotriquetral recess.                                                                      IMPRESSION:  1. Directly underlying the external marker: Widening of the  scapholunate interosseous distance measuring 4.5 mm with high-grade  tearing of the membranous portion of the scapholunate ligament and  high-grade sprain of the dorsal/volar components. This is new from  prior.  2. In proximity to the external marker:  2a. Second and third extensor compartment tenosynovitis.  2b. Fluid in the pisotriquetral recess.   3. Redemonstration of chronic changes of the TFCC, predominantly of  the central disc and styloid attachment, similar to prior.     I have personally reviewed the examination and initial interpretation  and I agree with the findings.     JUTTA ELLERMANN, MD            [facility]:  Past Medical History:   Diagnosis Date     Blepharitis      Esophageal reflux (aka GERD)      Glaucoma      Hyperlipidemia LDL  goal < 130      Nonsenile cataract      Shingles     11/14/15 Left approximately C6 distribution       Active problem list:  Patient Active Problem List   Diagnosis     Hyperlipidemia with target LDL less than 130     Prediabetes     Disorder of bursae and tendons in shoulder region     Shingles     Primary open angle glaucoma of both eyes, moderate stage     Senile nuclear sclerosis, bilateral     Right lumbar radiculopathy     Benign essential hypertension     Chronic midline low back pain without sciatica     Pseudophakia of both eyes       FH:  Family History   Problem Relation Age of Onset     Glaucoma Mother      Macular Degeneration No family hx of      Cancer No family hx of      Diabetes No family hx of      Hypertension No family hx of      Cerebrovascular Disease No family hx of      Thyroid Disease No family hx of      Eye Surgery No family hx of        SH:  Social History     Socioeconomic History     Marital status:      Spouse name: Not on file     Number of children: Not on file     Years of education: Not on file     Highest education level: Not on file   Occupational History     Not on file   Tobacco Use     Smoking status: Never Smoker     Smokeless tobacco: Never Used   Substance and Sexual Activity     Alcohol use: No     Drug use: No     Sexual activity: Never   Other Topics Concern     Parent/sibling w/ CABG, MI or angioplasty before 65F 55M? Not Asked   Social History Narrative     Not on file     Social Determinants of Health     Financial Resource Strain: Not on file   Food Insecurity: Not on file   Transportation Needs: Not on file   Physical Activity: Not on file   Stress: Not on file   Social Connections: Not on file   Intimate Partner Violence: Not on file   Housing Stability: Not on file       MEDS:  See EMR, reviewed  ALL:  See EMR, reviewed    REVIEW OF SYSTEMS:   CONSTITUTIONAL:NEGATIVE for fever, chills, change in weight  INTEGUMENTARY/SKIN: NEGATIVE for worrisome rashes,  moles or lesions  EYES: NEGATIVE for vision changes or irritation  ENT/MOUTH: NEGATIVE for ear, mouth and throat problems  RESP:NEGATIVE for significant cough or SOB  BREAST: NEGATIVE for masses, tenderness or discharge  CV: NEGATIVE for chest pain, palpitations or peripheral edema  GI: NEGATIVE for nausea, abdominal pain, heartburn, or change in bowel habits  :NEGATIVE for frequency, dysuria, or hematuria  :NEGATIVE for frequency, dysuria, or hematuria  NEURO: NEGATIVE for weakness, dizziness or paresthesias  ENDOCRINE: NEGATIVE for temperature intolerance, skin/hair changes  HEME/ALLERGY/IMMUNE: NEGATIVE for bleeding problems  PSYCHIATRIC: NEGATIVE for changes in mood or affect        Objective: The right wrist reveals no swelling. She is tender over the distal radius. She has no tenderness over the scaphoid on exam. Her grasp strength is intact.  She can tolerate supination and pronation with some mild discomfort but through an adequate range of motion.  Sensation is intact. Overlying skin is normal. Appropriate in conversation affect    Assessment right-sided wrist scapholunate ligament sprain without ligament disruption.  Right wrist extensor tendinitis.  Chronic TFCC degenerative tearing.    Plan: Hand therapy will create a custom volar splint.  It does not need to be a thumb spica splint.  I would suggest that she use this over the next 3 to 4 weeks.  Hand therapy can progress her through some wrist exercises.  We discussed her MRI findings in detail.  I told her my hope would be that she improves with these conservative cares.  If this is not occurring she will return.

## 2022-02-17 NOTE — LETTER
2/17/2022      RE: Yancy Rivera  [address]  [facility] [address]       S;   fup mri  Right wrist.   MRI suggested a new high-grade sprain of the dorsal and volar aspects of the scapholunate ligament without complete disruption. This was new compared to an MRI of the right wrist in 2020. She does have some degenerative changes at the TFCC that were present on the last MRI. No occult fracture. Second and third extensor compartment tenosynovitis.    She has had difficulty finding a brace that fits her well so she has only used the right over-the-counter wrist brace occasionally.  She presents without correct placement of the right wrist brace today.  She still has discomfort in the centralized wrist.    DOI 12/26/21.  Pt s/p FOOSH injury, 6 weeks ago.  Pt using topical voltaren, tylenol.     She indicates the wrist is still uncomfortable. She points to the distal ulna, distal radius, and the base of her thumb at the areas that are uncomfortable. She has been using an OTC brace. She has been using a Ziploc bag with ice to place it on the wrist, outside of the brace. Tylenol has not been helpful. She does have a history of GERD. She had recently normal kidney function tests and liver function test. She has no chart history of heart disease. She does have a history of mild hypertension. She has been able to tolerate ibuprofen in the past. She indicates that she has recently moved to [place],, is living with family there, and wants to use their pharmacy at [facility].     1 year ago she also had a fall.  She had persistent right-sided wrist pain after that fall.  An MRI was done at that time that showed no occult fracture,, but did show some DJD at the base of the thumb and some DJD at the TFCC.      Comparison: MRI 6/11/2020; x-ray 1/10/2022, 5/14/2020      Findings:  External marker placed over the scapholunate joint.     Triangular Fibrocartilage: Disruption of the styloid attachment and  central disc. No  radial sided triangular cartilage perforation.     Interosseous Ligaments:  Scapholunate ligament: High-grade sprain of the dorsal and volar  scapholunate ligament. High-grade tear of the membranous scapholunate  ligament, coronal image 8, sagittal image 17. Widening of the  scapholunate interosseous distance measuring 4.5 mm (series 5001,  image 8), new from prior.  Lunatotriquetral ligament: Intact.  Carpal alignment: Widening of scapholunate joint, otherwise  unremarkable.     Bones: Chronic changes of the distal ulna similar-appearing to prior  MR, presumably related to prior injury.     Articulations:  Joint effusion: None.  Cartilage: No hyaline cartilage defects.  Synovium: No synovial thickening.     Tendons:  Flexor tendons: Normal. No tear or tendon sheath effusion.  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     Miscellaneous soft tissues: Volar ganglion cyst originating from the  pisotriquetral  joint extending proximally towards the TFCC, axial  image 16. Increased fluid in the pisotriquetral recess.                                                                      IMPRESSION:  1. Directly underlying the external marker: Widening of the  scapholunate interosseous distance measuring 4.5 mm with high-grade  tearing of the membranous portion of the scapholunate ligament and  high-grade sprain of the dorsal/volar components. This is new from  prior.  2. In proximity to the external marker:  2a. Second and third extensor compartment tenosynovitis.  2b. Fluid in the pisotriquetral recess.   3. Redemonstration of chronic changes of the TFCC, predominantly of  the central disc and styloid attachment, similar to prior.     I have personally reviewed the examination and initial interpretation  and I agree with the findings.     JUTTA ELLERMANN, MD            [facility]:  Past Medical History:   Diagnosis Date     Blepharitis       Esophageal reflux (aka GERD)      Glaucoma      Hyperlipidemia LDL goal < 130      Nonsenile cataract      Shingles     11/14/15 Left approximately C6 distribution       Active problem list:  Patient Active Problem List   Diagnosis     Hyperlipidemia with target LDL less than 130     Prediabetes     Disorder of bursae and tendons in shoulder region     Shingles     Primary open angle glaucoma of both eyes, moderate stage     Senile nuclear sclerosis, bilateral     Right lumbar radiculopathy     Benign essential hypertension     Chronic midline low back pain without sciatica     Pseudophakia of both eyes       FH:  Family History   Problem Relation Age of Onset     Glaucoma Mother      Macular Degeneration No family hx of      Cancer No family hx of      Diabetes No family hx of      Hypertension No family hx of      Cerebrovascular Disease No family hx of      Thyroid Disease No family hx of      Eye Surgery No family hx of        SH:  Social History     Socioeconomic History     Marital status:      Spouse name: Not on file     Number of children: Not on file     Years of education: Not on file     Highest education level: Not on file   Occupational History     Not on file   Tobacco Use     Smoking status: Never Smoker     Smokeless tobacco: Never Used   Substance and Sexual Activity     Alcohol use: No     Drug use: No     Sexual activity: Never   Other Topics Concern     Parent/sibling w/ CABG, MI or angioplasty before 65F 55M? Not Asked   Social History Narrative     Not on file     Social Determinants of Health     Financial Resource Strain: Not on file   Food Insecurity: Not on file   Transportation Needs: Not on file   Physical Activity: Not on file   Stress: Not on file   Social Connections: Not on file   Intimate Partner Violence: Not on file   Housing Stability: Not on file       MEDS:  See EMR, reviewed  ALL:  See EMR, reviewed    REVIEW OF SYSTEMS:   CONSTITUTIONAL:NEGATIVE for fever, chills,  change in weight  INTEGUMENTARY/SKIN: NEGATIVE for worrisome rashes, moles or lesions  EYES: NEGATIVE for vision changes or irritation  ENT/MOUTH: NEGATIVE for ear, mouth and throat problems  RESP:NEGATIVE for significant cough or SOB  BREAST: NEGATIVE for masses, tenderness or discharge  CV: NEGATIVE for chest pain, palpitations or peripheral edema  GI: NEGATIVE for nausea, abdominal pain, heartburn, or change in bowel habits  :NEGATIVE for frequency, dysuria, or hematuria  :NEGATIVE for frequency, dysuria, or hematuria  NEURO: NEGATIVE for weakness, dizziness or paresthesias  ENDOCRINE: NEGATIVE for temperature intolerance, skin/hair changes  HEME/ALLERGY/IMMUNE: NEGATIVE for bleeding problems  PSYCHIATRIC: NEGATIVE for changes in mood or affect        Objective: The right wrist reveals no swelling. She is tender over the distal radius. She has no tenderness over the scaphoid on exam. Her grasp strength is intact.  She can tolerate supination and pronation with some mild discomfort but through an adequate range of motion.  Sensation is intact. Overlying skin is normal. Appropriate in conversation affect    Assessment right-sided wrist scapholunate ligament sprain without ligament disruption.  Right wrist extensor tendinitis.  Chronic TFCC degenerative tearing.    Plan: Hand therapy will create a custom volar splint.  It does not need to be a thumb spica splint.  I would suggest that she use this over the next 3 to 4 weeks.  Hand therapy can progress her through some wrist exercises.  We discussed her MRI findings in detail.  I told her my hope would be that she improves with these conservative cares.  If this is not occurring she will return.        Tony Harper MD

## 2022-02-23 ENCOUNTER — THERAPY VISIT (OUTPATIENT)
Dept: OCCUPATIONAL THERAPY | Facility: CLINIC | Age: 67
End: 2022-02-23
Payer: MEDICARE

## 2022-02-23 DIAGNOSIS — S69.91XD SCAPHOLUNATE LIGAMENT INJURY WITH NO INSTABILITY, RIGHT, SUBSEQUENT ENCOUNTER: ICD-10-CM

## 2022-02-23 DIAGNOSIS — M25.531 RIGHT WRIST PAIN: ICD-10-CM

## 2022-02-23 PROCEDURE — 97763 ORTHC/PROSTC MGMT SBSQ ENC: CPT | Mod: GO | Performed by: OCCUPATIONAL THERAPIST

## 2022-02-23 PROCEDURE — 97110 THERAPEUTIC EXERCISES: CPT | Mod: GO | Performed by: OCCUPATIONAL THERAPIST

## 2022-02-23 NOTE — PROGRESS NOTES
"SOAP note objective information for 2/23/2022:    Diagnosis: Right wrist injury; scapholunate ligament injury with no instability  DOI: 12/26/2021 (2/17/2022 MD order date)    Precautions: \"Custom molded wrist brace for scapholunate ligament sprain, 6 weeks ago, without complete disruption.  Extensor tenosynovitis.\"    Pain Level (Scale 0-10)   2/17/2022 2/23/2022   At Rest 8 9   With Use 9 10     ROM  Pain Report: - none  + mild    ++ moderate    +++ severe   Patient very guarded with right hand and arm due to pain, full PROM but AROM finger flexion only 6-7 cm from DPC  Thumb 2/23/2022 2/23/2022   AROM  (PROM) Left Right   MP /55 /35   IP /50 /10   Kapandji Opposition Scale (0-10/10) 7/10 3/10     Wrist 2/23/2022 2/23/2022   AROM (PROM) Left Right   Extension 70 5   Flexion 65 25     Home Exercise Program:  Icing as needed for pain or swelling  Forearm based thumb spica orthosis full time  Tendon gliding with table top and full fisting as able, AA/PROM composite finger flexion  Gentle AROM thumb E/F and opposition  Gentle jog of wrist ROM with small DTM    Next Visit:  See in 1 week  Assess response to gentle AROM of thumb, fingers and wrist  Check fit of orthosis after refitting, decrease to wrist orthosis with thumb free as able (per MD note does not need to wear thumb spica)  Progress to wrist stabilization exercises as tolerated     Please refer to the daily flowsheet for treatment today, total treatment time and time spent performing 1:1 timed codes.   "

## 2022-03-03 ENCOUNTER — THERAPY VISIT (OUTPATIENT)
Dept: OCCUPATIONAL THERAPY | Facility: CLINIC | Age: 67
End: 2022-03-03
Payer: MEDICARE

## 2022-03-03 DIAGNOSIS — S69.91XD SCAPHOLUNATE LIGAMENT INJURY WITH NO INSTABILITY, RIGHT, SUBSEQUENT ENCOUNTER: ICD-10-CM

## 2022-03-03 DIAGNOSIS — M25.531 RIGHT WRIST PAIN: ICD-10-CM

## 2022-03-03 PROCEDURE — 97140 MANUAL THERAPY 1/> REGIONS: CPT | Mod: GO | Performed by: OCCUPATIONAL THERAPIST

## 2022-03-03 PROCEDURE — 97110 THERAPEUTIC EXERCISES: CPT | Mod: GO | Performed by: OCCUPATIONAL THERAPIST

## 2022-03-03 NOTE — PROGRESS NOTES
"SOAP note objective information for 3/3/2022:    Diagnosis: Right wrist injury; scapholunate ligament injury with no instability  DOI: 12/26/2021 (2/17/2022 MD order date)    Precautions: \"Custom molded wrist brace for scapholunate ligament sprain, 6 weeks ago, without complete disruption.  Extensor tenosynovitis.\"    Pain Level (Scale 0-10)   2/17/2022 2/23/2022 3/3/2022   At Rest 8 9 7   With Use 9 10 9-10     ROM  Pain Report: - none  + mild    ++ moderate    +++ severe   Patient very guarded with right hand and arm due to pain, full PROM but AROM finger flexion only  4 cm (previous 6-7 cm) from DPC  Thumb 2/23/2022 2/23/2022 3/3/2022   AROM  (PROM) Left Right Right   MP /55 /35 /40   IP /50 /10 /35   Kapandji Opposition Scale (0-10/10) 7/10 3/10 6/10     Wrist 2/23/2022 2/23/2022 3/3/2022   AROM (PROM) Left Right Right   Extension 70 5 15   Flexion 65 25 25     Home Exercise Program:  Icing as needed for pain or swelling  MFR to forearm extensors and flexors  Forearm based thumb spica orthosis full time  Tendon gliding with table top and full fisting as able, AA/PROM composite finger flexion  Gentle AROM thumb E/F and opposition  Gentle jog of wrist ROM with small DTM, progress to wrist E/F  Towel gathering, scrubbing and spreading for functional finger ROM  Gentle wrist isometrics in DTM wrist ext/RD and wrist flex/UD    Next Visit:  See in 1 week  Assess response to towel gathering and wrist isometrics   Decrease to wrist orthosis with thumb free as able (per MD note does not need to wear thumb spica)  Progress to wrist stabilization exercises as tolerated   To f/u with MD if not improving, pt would like to do f/u in [place] closer to home    Please refer to the daily flowsheet for treatment today, total treatment time and time spent performing 1:1 timed codes.   "

## 2022-03-08 ENCOUNTER — THERAPY VISIT (OUTPATIENT)
Dept: OCCUPATIONAL THERAPY | Facility: CLINIC | Age: 67
End: 2022-03-08
Payer: MEDICARE

## 2022-03-08 DIAGNOSIS — M25.531 RIGHT WRIST PAIN: ICD-10-CM

## 2022-03-08 DIAGNOSIS — S69.91XD SCAPHOLUNATE LIGAMENT INJURY WITH NO INSTABILITY, RIGHT, SUBSEQUENT ENCOUNTER: ICD-10-CM

## 2022-03-08 PROCEDURE — 97140 MANUAL THERAPY 1/> REGIONS: CPT | Mod: GO | Performed by: OCCUPATIONAL THERAPIST

## 2022-03-08 PROCEDURE — 97763 ORTHC/PROSTC MGMT SBSQ ENC: CPT | Mod: GO | Performed by: OCCUPATIONAL THERAPIST

## 2022-03-08 PROCEDURE — 97110 THERAPEUTIC EXERCISES: CPT | Mod: GO | Performed by: OCCUPATIONAL THERAPIST

## 2022-03-08 NOTE — PROGRESS NOTES
"SOAP note objective information for 3/8/2022:    Diagnosis: Right wrist injury; scapholunate ligament injury with no instability  DOI: 12/26/2021 (2/17/2022 MD order date)    Precautions: \"Custom molded wrist brace for scapholunate ligament sprain, 6 weeks ago, without complete disruption.  Extensor tenosynovitis.\"    Pain Level (Scale 0-10)   2/17/2022 2/23/2022 3/3/2022   At Rest 8 9 7   With Use 9 10 9-10     ROM    Patient very guarded with right hand and arm due to pain, full PROM finger flexion   Finger flexion to DPC  AROM 2/23/2022 3/3/2022 3/8/2022   Right 6-7 cm 4 cm 2-3 cm       Thumb 2/23/2022 2/23/2022 3/3/2022 3/8/2022   AROM  (PROM) Left Right Right Right   MP /55 /35 /40 /   IP /50 /10 /35 /   Kapandji Opposition Scale (0-10/10) 7/10 3/10 6/10 6.5/10     Wrist 2/23/2022 2/23/2022 3/3/2022 3/8/2022   AROM (PROM) Left Right Right Right   Extension 70 5 15 50   Flexion 65 25 25 25     Home Exercise Program:  Icing as needed for pain or swelling  MFR to forearm extensors and flexors  Forearm based thumb spica orthosis, decrease to wrist orthosis with thumb free  Tendon gliding with table top and full fisting as able, AA/PROM composite finger flexion  Gentle AROM thumb E/F and opposition  Gentle jog of wrist ROM with small DTM, progress to wrist E/F  Towel gathering, scrubbing and spreading for functional finger ROM  Gentle wrist isometrics in DTM wrist ext/RD and wrist flex/UD    Next Visit:  See in 1-2 weeks  Assess response to custom wrist orthosis with thumb free  Progress to wrist stabilization exercises as tolerated   To f/u with MD if not improving, pt would like to do f/u in [place] closer to home    Please refer to the daily flowsheet for treatment today, total treatment time and time spent performing 1:1 timed codes.   "

## 2022-03-21 ENCOUNTER — THERAPY VISIT (OUTPATIENT)
Dept: OCCUPATIONAL THERAPY | Facility: CLINIC | Age: 67
End: 2022-03-21
Payer: MEDICARE

## 2022-03-21 DIAGNOSIS — S69.91XD SCAPHOLUNATE LIGAMENT INJURY WITH NO INSTABILITY, RIGHT, SUBSEQUENT ENCOUNTER: ICD-10-CM

## 2022-03-21 DIAGNOSIS — M25.531 RIGHT WRIST PAIN: ICD-10-CM

## 2022-03-21 PROCEDURE — 97140 MANUAL THERAPY 1/> REGIONS: CPT | Mod: GO | Performed by: OCCUPATIONAL THERAPIST

## 2022-03-21 PROCEDURE — 97110 THERAPEUTIC EXERCISES: CPT | Mod: GO | Performed by: OCCUPATIONAL THERAPIST

## 2022-03-21 NOTE — PROGRESS NOTES
Hand Therapy Progress Note    Current Date:  3/21/2022    Reporting period is 2/17/2022 to 3/21/2022    Diagnosis: Right wrist injury; scapholunate ligament injury with no instability  DOI: 12/26/2021 (2/17/2022 MD order date)    Subjective:   Subjective changes noted by patient:  I like to new splint and I like having the thumb free. The wrist is moving better but the pain is no better.  Functional changes noted by patient:  No Change to Self Care Tasks (eating)  Patient has noted adverse reaction to:  None    Objective:  Pain Level (Scale 0-10)   2/17/2022 3/21/2022   At Rest 8 7   With Use 9 8-9     Pain Description  Date 2/17/2022 3/21/2022   Location wrist and hand Wrist and hand   Pain Quality Sharp and Shooting tender   Frequency constant   constant   Pain is worst  daytime daytime   Exacerbated by  use, movement Use of hand   Relieved by rest and none Support, splint   Progression Unchanged No significant change     Edema  Mild swelling present on dorsum of wrist     Sensation   Decreased intermittent in glove like distribution per pt report    ROM  Patient continues to be guarded with right hand and arm due to pain, full PROM finger flexion     Finger flexion to DPC  AROM 2/23/2022 3/21/2022   Right 6-7 cm 4 cm     Thumb 2/23/2022 2/23/2022 3/21/2022   AROM  (PROM) Left Right Right   MP /55 /35 /40   IP /50 /10 /25   Kapandji Opposition Scale (0-10/10) 7/10 3/10 4/10     Wrist 2/23/2022 2/23/2022 3/21/2022   AROM (PROM) Left Right Right   Extension 70 5 45   Flexion 65 25 10     Strength   (Measured in pounds)  Pain Report: - none  + mild    ++ moderate    +++ severe    3/21/2022 3/21/2022   Trials Left Right   1 50 8++     Lat Pinch 3/21/2022 3/21/2022   Trials Left Right   1 17 5++     3 Pt Pinch 3/21/2022 3/21/2022   Trials Left Right   1 16 2++     Please refer to the daily flowsheet for treatment provided today.     Assessment:  Response to therapy has been improvement to:  ROM of Wrist:  Ext    Thumb:  Flex  Fingers: Flex  Response to therapy has been lack of progress in:  Pain:  intensity of pain has not changed    Overall Assessment:  Patient would benefit from continued therapy to achieve rehab potential  STG/LTG:  STGoals have been reviewed and no progress has been made;  see goal sheet for details and changes.  I have re-evaluated this patient and find that the nature, scope, duration and intensity of the therapy is appropriate for the medical condition of the patient.    Plan:  Frequency/Duration:  Recommend continuing with the current treatment plan. 2 X a month, once daily  for 2 months    Recommendations for Continued Therapy  Modalities:  US and Paraffin  Therapeutic Exercise:  AROM, AAROM, PROM, Tendon Gliding, Blocking, Place and Hold, Isotonics, Isometrics and Stabilization  Neuromuscular re-education:  Desensitization, Proprioceptive Training, Posture, Kinesiotaping  Manual Techniques:  Joint mobilization, Friction massage, Myofascial release  Orthotic Fabrication:  Static Forearm based  Self Care:  Self Care Tasks    Home Exercise Program:  Icing as needed for pain or swelling  MFR to forearm extensors and flexors  Forearm based thumb spica orthosis, decrease to wrist orthosis with thumb free  Tendon gliding with table top and full fisting as able, AA/PROM composite finger flexion  Gentle AROM thumb E/F and opposition  Gentle jog of wrist ROM with small DTM, progress to wrist E/F  Towel gathering, scrubbing and spreading for functional finger ROM  Gentle wrist isometrics in DTM wrist ext/RD and wrist flex/UD   strengthening with yellow foam wedge, wear orthosis if needed    Next Visit:  See in 2 weeks  Assess response to  strengthening   Progress to wrist stabilization exercises as tolerated   Will recommend f/u with MD if pain not improving, pt would like to do f/u in [place] closer to home (message sent to Dr. Harper for recommendation)

## 2022-03-28 DIAGNOSIS — M25.531 RIGHT WRIST PAIN: ICD-10-CM

## 2022-03-28 DIAGNOSIS — S69.91XD RIGHT WRIST INJURY, SUBSEQUENT ENCOUNTER: ICD-10-CM

## 2022-03-30 RX ORDER — NAPROXEN 500 MG/1
TABLET ORAL
Qty: 60 TABLET | Refills: 1 | Status: SHIPPED | OUTPATIENT
Start: 2022-03-30 | End: 2022-06-09

## 2022-03-30 NOTE — TELEPHONE ENCOUNTER
: NAPROXEN 500 MG TABLET      Last Written Prescription Date:  2/1/22  Last Fill Quantity: 60,   # refills: 1  Last Office Visit : 2/17/22  Future Office visit:  PRN, if not improving with conservative cares    Routing refill request to provider for review/approval because:  Blood pressure out of range     01/10/22 (!) 157/82   09/14/21 (!) 152/82   06/07/21 130/70

## 2022-04-07 DIAGNOSIS — H40.1132 PRIMARY OPEN ANGLE GLAUCOMA OF BOTH EYES, MODERATE STAGE: Primary | ICD-10-CM

## 2022-04-11 ENCOUNTER — THERAPY VISIT (OUTPATIENT)
Dept: OCCUPATIONAL THERAPY | Facility: CLINIC | Age: 67
End: 2022-04-11
Payer: MEDICARE

## 2022-04-11 DIAGNOSIS — M25.531 RIGHT WRIST PAIN: ICD-10-CM

## 2022-04-11 DIAGNOSIS — S69.91XD SCAPHOLUNATE LIGAMENT INJURY WITH NO INSTABILITY, RIGHT, SUBSEQUENT ENCOUNTER: Primary | ICD-10-CM

## 2022-04-11 PROCEDURE — 97140 MANUAL THERAPY 1/> REGIONS: CPT | Mod: GO | Performed by: OCCUPATIONAL THERAPIST

## 2022-04-11 PROCEDURE — 97110 THERAPEUTIC EXERCISES: CPT | Mod: GO | Performed by: OCCUPATIONAL THERAPIST

## 2022-04-11 NOTE — PROGRESS NOTES
Hand Therapy Progress Note    Current Date:  4/11/2022    Reporting period is 3/21/2022 to 4/11/2022    Diagnosis: Right wrist injury; scapholunate ligament injury with no instability  DOI: 12/26/2021 (2/17/2022 MD order date)    Subjective:   Subjective changes noted by patient:  There is still bad pain.  Functional changes noted by patient:  No Change to Self Care Tasks (eating)  Patient has noted adverse reaction to:  None    Objective:  Pain Level (Scale 0-10)   2/17/2022 3/21/2022 4/11/2022   At Rest 8 7 9   With Use 9 8-9 9-10     ROM  Patient continues to be guarded with right hand and arm due to pain, full PROM finger flexion     Finger flexion to DPC  AROM 2/23/2022 3/21/2022 4/11/2022   Right 6-7 cm 4 cm 3 cm     Thumb 2/23/2022 2/23/2022 3/21/2022 4/11/2022   AROM  (PROM) Left Right Right Right   MP /55 /35 /40 /40   IP /50 /10 /25 /35   Kapandji Opposition Scale (0-10/10) 7/10 3/10 4/10 4/10     Wrist 2/23/2022 2/23/2022 3/21/2022 4/11/2022   AROM (PROM) Left Right Right Right   Extension 70 5 45 50   Flexion 65 25 10 20     Strength   (Measured in pounds)  Pain Report: - none  + mild    ++ moderate    +++ severe    3/21/2022 3/21/2022 4/11/2022   Trials Left Right Right   1 50 8++ 17++     Lat Pinch 3/21/2022 3/21/2022 4/11/2022   Trials Left Right Right   1 17 5++ 7++     3 Pt Pinch 3/21/2022 3/21/2022 4/11/2022   Trials Left Right Right   1 16 2++ 2++     Please refer to the daily flowsheet for treatment provided today.     Assessment:  Response to therapy has been improvement to:  ROM of Wrist:  Ext  and Flex  Thumb:  Flex  Fingers: Flex  Strength:   and pinch  Response to therapy has been lack of progress in:  Pain:  intensity of pain has increased    Overall Assessment:  Patient is not progressing as expected.  Patient would benefit from further evaluation of ongoing wrist pain.  STG/LTG:  STGoals have been reviewed and no progress has been made;  see goal sheet for details and changes.  I  have re-evaluated this patient and find that the nature, scope, duration and intensity of the therapy is appropriate for the medical condition of the patient.    Plan:  Patient to follow up with MD regarding ongoing wrist pain.    Frequency/Duration:  Recommend continuing with the current treatment plan. 2 X a month, once daily  for 1 month    Recommendations for Continued Therapy  Modalities:  US and Paraffin  Therapeutic Exercise:  AROM, AAROM, PROM, Tendon Gliding, Blocking, Place and Hold, Isotonics, Isometrics and Stabilization  Neuromuscular re-education:  Desensitization, Proprioceptive Training, Posture, Kinesiotaping  Manual Techniques:  Joint mobilization, Friction massage, Myofascial release  Orthotic Fabrication:  Static Forearm based  Self Care:  Self Care Tasks    Home Exercise Program:  Icing as needed for pain or swelling  MFR to forearm extensors and flexors  Forearm based thumb spica orthosis, decrease to wrist orthosis with thumb free  Tendon gliding with table top and full fisting as able, AA/PROM composite finger flexion  Gentle AROM thumb E/F and opposition  Gentle jog of wrist ROM with small DTM, progress to wrist E/F  Towel gathering, scrubbing and spreading for functional finger ROM  Gentle wrist isometrics in DTM wrist ext/RD and wrist flex/UD   strengthening with yellow foam wedge, wear orthosis if needed    Next Visit:  Recommend f/u with MD as pain is not improving, pt would like to do f/u in [place] closer to home, Dr. Harper recommended f/u with Dr. Nugent   Hold further therapy until MD f/u  Hold chart open for one month, if no contact is received from patient, discharge will occur at that time with this note serving as the discharge summary.

## 2022-04-11 NOTE — PROGRESS NOTES
[facility]    OUTPATIENT Occupational Therapy ORTHOPEDIC EVALUATION  PLAN OF TREATMENT FOR OUTPATIENT REHABILITATION  (COMPLETE FOR INITIAL CLAIMS ONLY)  Patient's Last Name, First Name, M.I.  YOB: 1955  Yancy Rivera    Provider s Name:  [facility]   Medical Record No.  [id number]   Start of Care Date:  02/17/22   Onset Date:   12/26/21 (2/17/2022 MD order date)   Type:     ___PT   __X_OT Medical Diagnosis:    Encounter Diagnoses   Name Primary?    Scapholunate ligament injury with no instability, right, subsequent encounter Yes    Right wrist pain         Treatment Diagnosis:  Scapholunate ligament injury        Goals:     04/11/22 0500   Goal #1   Goal #1 eating   Previous Performance Level Independent   Current Functional Task Hold   Current Performance Level cont extreme difficulty holding a utensil due to pain, goal extended   STG Target Performance Utensil   STG Target Perform Level Moderate difficulty   Due Date 05/01/22   If goal not met, Why? no change due to cont pain, rec MD f/u   LTG Target Task/Performance No Difficulty   Due date 05/17/22       Therapy Frequency:  2 x per month  Predicted Duration of Therapy Intervention:  1 month    Yolanda Thompson OT                 I CERTIFY THE NEED FOR THESE SERVICES FURNISHED UNDER        THIS PLAN OF TREATMENT AND WHILE UNDER MY CARE     (Physician attestation of this document indicates review and certification of the therapy plan).                     Certification Date From:  04/17/22   Certification Date To:  05/16/22    Referring Provider:  Tony Harper    Initial Assessment        See Epic Evaluation SOC Date: 02/17/22

## 2022-04-13 ENCOUNTER — OFFICE VISIT (OUTPATIENT)
Dept: OPHTHALMOLOGY | Facility: CLINIC | Age: 67
End: 2022-04-13
Attending: OPHTHALMOLOGY
Payer: MEDICARE

## 2022-04-13 DIAGNOSIS — H40.1132 PRIMARY OPEN ANGLE GLAUCOMA OF BOTH EYES, MODERATE STAGE: Primary | ICD-10-CM

## 2022-04-13 DIAGNOSIS — Z96.1 PSEUDOPHAKIA OF BOTH EYES: ICD-10-CM

## 2022-04-13 DIAGNOSIS — H04.123 DRY EYES, BILATERAL: ICD-10-CM

## 2022-04-13 PROCEDURE — 99214 OFFICE O/P EST MOD 30 MIN: CPT | Mod: GC | Performed by: OPHTHALMOLOGY

## 2022-04-13 PROCEDURE — 92133 CPTRZD OPH DX IMG PST SGM ON: CPT | Performed by: OPHTHALMOLOGY

## 2022-04-13 PROCEDURE — G0463 HOSPITAL OUTPT CLINIC VISIT: HCPCS

## 2022-04-13 PROCEDURE — 92083 EXTENDED VISUAL FIELD XM: CPT | Performed by: OPHTHALMOLOGY

## 2022-04-13 ASSESSMENT — VISUAL ACUITY
OS_PH_SC: 20/30
OD_SC: 20/30
OS_SC+: -2
OS_PH_SC+: -1
METHOD: SNELLEN - LINEAR
OD_PH_SC: 20/25
OD_PH_SC+: +1
OS_SC: 20/40

## 2022-04-13 ASSESSMENT — TONOMETRY
OD_IOP_MMHG: 14
OS_IOP_MMHG: 15
IOP_METHOD: APPLANATION
IOP_METHOD: TONOPEN
OD_IOP_MMHG: 14
OS_IOP_MMHG: 14

## 2022-04-13 ASSESSMENT — CUP TO DISC RATIO
OD_RATIO: 0.6
OS_RATIO: 0.8

## 2022-04-13 ASSESSMENT — EXTERNAL EXAM - RIGHT EYE: OD_EXAM: NORMAL

## 2022-04-13 ASSESSMENT — EXTERNAL EXAM - LEFT EYE: OS_EXAM: NORMAL

## 2022-04-13 ASSESSMENT — CONF VISUAL FIELD
OS_NORMAL: 1
OD_NORMAL: 1
METHOD: COUNTING FINGERS

## 2022-04-13 NOTE — NURSING NOTE
Chief Complaints and History of Present Illnesses   Patient presents with     Glaucoma Follow-Up     Pt here today for 6 mos Glaucoma follow up.  States eyes still dry and some light sensitivity.  Pt denies any eye pain.    No new concerns today.    Deanna FORBES, BRIANNA 12:11 PM 04/13/2022                Chief Complaint(s) and History of Present Illness(es)     Glaucoma Follow-Up     Laterality: both eyes    Associated symptoms: dryness and photophobia.  Negative for eye pain, flashes and floaters    Treatment side effects: none    Compliance with Treatment: always    Pain scale: 0/10    Comments: Pt here today for 6 mos Glaucoma follow up.  States eyes still dry and some light sensitivity.  Pt denies any eye pain.    No new concerns today.    Deanna FORBES, BRIANNA 12:11 PM 04/13/2022

## 2022-04-13 NOTE — PROGRESS NOTES
Chief Complaint(s) and History of Present Illness(es)     Glaucoma Follow-Up     Laterality: both eyes         Comments     Pt. States that she is doing well. No change in VA BE. No pain BE.  Colette Degroot COT 9:28 AM October 13, 2021         Review of systems for the eyes was negative other than the pertinent positives/negatives listed in the HPI.      Assessment & Plan      Yancy Rivera is a 66 year old female with the following diagnoses:   1. Primary open angle glaucoma of both eyes, moderate stage    2. Dry eyes, bilateral    3. Pseudophakia of both eyes         Doing well overall, no vision change  Eyes still dry, but artificial tears helping    S/P Selected laser trabeculoplasty (SLT) both eyes Spring 2021  Intraocular pressure acceptable since that time  Goal low teens both eyes     VF right eye stable, left eye with more dense sup nasal step  OCT RNFL stable each eye   Intraocular pressure borderline    Discussed.  Shorten interval for recheck  Low threshold to restart drops  Artificial tears four times a day    Artificial tear ointment at bedtime both eyes       Patient disposition:   Return in about 3 months (around 7/13/2022) for VT only, 24-2 Dynamic VF.            Armando Hernández MD  Ophthalmology PGY-4  [facility]    Attending Physician Attestation:  Complete documentation of historical and exam elements from today's encounter can be found in the full encounter summary report (not reduplicated in this progress note).  I personally obtained the chief complaint(s) and history of present illness.  I confirmed and edited as necessary the review of systems, past medical/surgical history, family history, social history, and examination findings as documented by others; and I examined the patient myself.  I personally reviewed the relevant tests, images, and reports as documented above.  I formulated and edited as necessary the assessment and plan and discussed the findings and management plan  with the patient and family. .Attending Physician Image/Tesing Attestation: I personally reviewed the ophthalmic test(s) associated with this encounter, agree with the interpretation(s) as documented by the resident/fellow, and have edited the corresponding report(s) as necessary.  I spent a total of 30 minutes face to face with the patient.  Over 50% of this time was spent counseling and coordinating care regarding their diagnosis and management.    - Constantin Rollins MD

## 2022-04-18 ENCOUNTER — OFFICE VISIT (OUTPATIENT)
Dept: PHYSICAL MEDICINE AND REHAB | Facility: CLINIC | Age: 67
End: 2022-04-18
Payer: MEDICARE

## 2022-04-18 VITALS
HEART RATE: 79 BPM | SYSTOLIC BLOOD PRESSURE: 134 MMHG | OXYGEN SATURATION: 97 % | BODY MASS INDEX: 33.29 KG/M2 | HEIGHT: 64 IN | WEIGHT: 195 LBS | DIASTOLIC BLOOD PRESSURE: 71 MMHG

## 2022-04-18 DIAGNOSIS — R68.89 LIGHT SENSITIVITY: ICD-10-CM

## 2022-04-18 DIAGNOSIS — S06.0X0A CONCUSSION WITHOUT LOSS OF CONSCIOUSNESS, INITIAL ENCOUNTER: Primary | ICD-10-CM

## 2022-04-18 DIAGNOSIS — S16.1XXA CERVICAL MYOFASCIAL STRAIN, INITIAL ENCOUNTER: ICD-10-CM

## 2022-04-18 PROCEDURE — 99215 OFFICE O/P EST HI 40 MIN: CPT | Performed by: PHYSICAL MEDICINE & REHABILITATION

## 2022-04-18 RX ORDER — LIDOCAINE 4 G/G
1 PATCH TOPICAL DAILY PRN
Qty: 30 PATCH | Refills: 3 | Status: SHIPPED | OUTPATIENT
Start: 2022-04-18 | End: 2022-05-18

## 2022-04-18 ASSESSMENT — ANXIETY QUESTIONNAIRES
7. FEELING AFRAID AS IF SOMETHING AWFUL MIGHT HAPPEN: NOT AT ALL
1. FEELING NERVOUS, ANXIOUS, OR ON EDGE: NOT AT ALL
GAD7 TOTAL SCORE: 0
5. BEING SO RESTLESS THAT IT IS HARD TO SIT STILL: NOT AT ALL
2. NOT BEING ABLE TO STOP OR CONTROL WORRYING: NOT AT ALL
6. BECOMING EASILY ANNOYED OR IRRITABLE: NOT AT ALL
3. WORRYING TOO MUCH ABOUT DIFFERENT THINGS: NOT AT ALL

## 2022-04-18 ASSESSMENT — PATIENT HEALTH QUESTIONNAIRE - PHQ9: 5. POOR APPETITE OR OVEREATING: NOT AT ALL

## 2022-04-18 ASSESSMENT — PAIN SCALES - GENERAL: PAINLEVEL: EXTREME PAIN (8)

## 2022-04-18 NOTE — LETTER
2022     RE: Yancy Rivera  [address] [facility] [address]     Dear Colleague,    Thank you for referring your patient, Yancy Rivera, to the [facility] [place] [facility]. Please see a copy of my visit note below.         PM&R Clinic Note     Patient Name: Yancy Rivera : 1955 Medical Record: [id number]     Requesting Physician/clinician: No att. providers found           History of Present Illness:     Yancy Rivera is a 66 year old female that per records and reporting patient had a fall on Left side on, 21.  No LOC.  But hurt l side.  She also fell in 2020 and hurt her R hand wrist which has been hurting since her last fall.     Symptoms  CONCUSSION SYMPTOMS ASSESSMENT 1/10/2022 2022   Headache or Pressure In Head 5 - severe 0 - none   Upset Stomach or Throwing Up 0 - none 0 - none   Problems with Balance 4 - moderate to severe 0 - none   Feeling Dizzy 4 - moderate to severe 0 - none   Sensitivity to Light 4 - moderate to severe 4 - moderate to severe   Sensitivity to Noise 0 - none 0 - none   Mood Changes 0 - none 0 - none   Feeling sluggish, hazy, or foggy 0 - none 0 - none   Trouble Concentrating, Lack of Focus 0 - none 0 - none   Motion Sickness 0 - none 0 - none   Vision Changes 0 - none 0 - none   Memory Problems 0 - none 0 - none   Feeling Confused 0 - none 0 - none   Neck Pain 5 - severe 5 - severe   Trouble Sleeping 0 - none 4 - moderate to severe   Total Number of Symptoms 5 3   Symptom Severity Score 22 13             Current:  Continues to do therapy for R hand.  But her neck still ges sore.  Pillow not comfortanle.  Musles are tight.  R side worse than .  Does not go down to hand.  She states she completed neck therapy already.               Past Medical and Surgical History:     Past Medical History:   Diagnosis Date     Blepharitis      Esophageal  reflux (aka GERD)      Glaucoma      Hyperlipidemia LDL goal < 130      Nonsenile cataract      Shingles     11/14/15 Left approximately C6 distribution     Past Surgical History:   Procedure Laterality Date     CATARACT IOL, RT/LT Right 10/16/2018    [facility]      CATARACT IOL, RT/LT Left 11/01/2018    [facility]      CHALAZION EXCISION  08/21/2015    Left lid     HERNIA REPAIR      abdominal hernia repair 2012            Social History:     Social History     Tobacco Use     Smoking status: Never Smoker     Smokeless tobacco: Never Used   Substance Use Topics     Alcohol use: No       Living situation: apartment  Family support:   Vocational History: retired  Recreational drug use: none         Functional history:     Yancy Rivera is independent with all aspects of life.    ADLs: I  Assistive devices: none   iADLs (medication management and finances): I  Hand dominance: R  Driving: no            Family History:     Family History   Problem Relation Age of Onset     Glaucoma Mother      Macular Degeneration No family hx of      Cancer No family hx of      Diabetes No family hx of      Hypertension No family hx of      Cerebrovascular Disease No family hx of      Thyroid Disease No family hx of      Eye Surgery No family hx of             Medications:     Current Outpatient Medications   Medication Sig Dispense Refill     atorvastatin (LIPITOR) 20 MG tablet Take 1 tablet by mouth once daily 90 tablet 0     carboxymethylcellulose PF (CARBOXYMETHYLCELLULOSE SODIUM) 0.5 % ophthalmic solution Place 1 drop into both eyes 4 times daily as needed for dry eyes 70 each 11     Cholecalciferol (VITAMIN D) 2000 UNITS tablet Take 1,000 Units by mouth daily 45 tablet 3     diclofenac (VOLTAREN) 1 % topical gel Place 2 g onto the skin 4 times daily To right wrist 100 g 3     diclofenac (VOLTAREN) 1 % topical gel Place 2 g onto the  skin 4 times daily 100 g 3     ferrous sulfate (IRON) 325 (65 FE) MG tablet Take 1 tablet (325 mg) by mouth daily (with breakfast) 30 tablet 11     Lidocaine (LIDOCARE) 4 % Patch Place 1 patch onto the skin daily as needed for moderate pain (apply to R neck) To prevent lidocaine toxicity, patient should be patch free for 12 hrs daily. 30 patch 3     naproxen (NAPROSYN) 500 MG tablet TAKE 1 TABLET BY MOUTH TWICE A DAY WITH MEALS 60 tablet 1     SUMAtriptan (IMITREX) 50 MG tablet Take 1 tablet (50 mg) by mouth at onset of headache for migraine (May repeat in one hour. Max 200 mg in 24 hours. limit use to no more than 2 days per week) May repeat dose within one hour. 12 tablet 6     triamcinolone (KENALOG) 0.1 % external ointment Apply topically 2 times daily 454 g 3     White Petrolatum-Mineral Oil (REFRESH P.M.) OINT Apply thin layer to both eyes at bedtime 3.5 g 3     atorvastatin (LIPITOR) 20 MG tablet Take 1 tablet (20 mg) by mouth daily 30 tablet 11     doxycycline Monohydrate 100 MG TABS Take 100 mg by mouth daily (Patient not taking: No sig reported) 90 tablet 3     gabapentin (NEURONTIN) 100 MG capsule Take 1 capsule (100 mg) by mouth 3 times daily 90 capsule 3     gabapentin (NEURONTIN) 100 MG capsule Take 1 capsule (100 mg) by mouth 3 times daily (Patient not taking: Reported on 4/18/2022) 90 capsule 1     gabapentin (NEURONTIN) 600 MG tablet Take 1 tablet (600 mg) by mouth 3 times daily (Patient not taking: Reported on 4/18/2022) 90 tablet 11     ibuprofen (ADVIL/MOTRIN) 200 MG tablet Take 400 mg by mouth  (Patient not taking: Reported on 4/18/2022)       methylPREDNISolone (MEDROL DOSEPAK) 4 MG tablet Take 1 tablet (4 mg) by mouth See Admin Instructions (Patient not taking: No sig reported) 21 tablet 0     Multiple Minerals (CALCIUM-MAGNESIUM-ZINC) TABS Take 1 tablet by mouth daily (Patient not taking: No sig reported) 90 tablet 0     omega 3 1000 MG CAPS Take 1 g by mouth daily (Patient not taking:  "Reported on 4/18/2022) 90 capsule 0     oxyCODONE (ROXICODONE) 5 MG immediate release tablet Take 1 tablet (5 mg) by mouth every 6 hours as needed for moderate to severe pain (Patient not taking: No sig reported) 20 tablet 0     pantoprazole (PROTONIX) 40 MG EC tablet Take 1 tablet (40 mg) by mouth daily (Patient not taking: Reported on 4/18/2022) 90 tablet 1     piroxicam (FELDENE) 20 MG capsule Take 1 capsule (20 mg) by mouth daily (with breakfast) (Patient not taking: No sig reported) 30 capsule 11     predniSONE (DELTASONE) 20 MG tablet Take 2 tablets (40 mg) by mouth daily 10 tablet 0     Riboflavin 400 MG TABS Take 1 tablet by mouth daily (Patient not taking: No sig reported) 30 tablet 11     tiZANidine (ZANAFLEX) 4 MG capsule Take 1 capsule (4 mg) by mouth 3 times daily as needed for muscle spasms (Patient not taking: No sig reported) 15 capsule 0     traMADol (ULTRAM) 50 MG tablet Take 1 tablet (50 mg) by mouth every 6 hours as needed for pain or moderate to severe pain (Patient not taking: No sig reported) 15 tablet 0            Allergies:     Allergies   Allergen Reactions     No Clinical Screening - See Comments Nausea and Vomiting     Liver side effects from INH for TB              ROS:        ROS: 10 point ROS neg other than the symptoms noted above in the HPI.             Physical Examiniation:     VITAL SIGNS: /71   Pulse 79   Ht 1.626 m (5' 4\")   Wt 88.5 kg (195 lb)   LMP  (LMP Unknown)   SpO2 97%   BMI 33.47 kg/m    BMI: Estimated body mass index is 33.47 kg/m  as calculated from the following:    Height as of this encounter: 1.626 m (5' 4\").    Weight as of this encounter: 88.5 kg (195 lb).    Gen: NAD, pleasant and cooperative   HEENT: NCAT, EOMI, no nystagmus, ANANDA, there is no reproducible headache and eye strain with VOMS. There is taut and tender cervical paraspinal muscles on L, no facial asymmetry   Cardio: 2+ radial pulse, well perfused  Pulm: non-labored breathing in room air, " symmetrical chest rise  Abd: benign  Ext: WWP, no edema in BLE, no tenderness in calves  Neuro/MSK: 5/5 in c5-t1 and l2-s1 myotomes; however limited to pain in RUE.  There is tenderness along R wrist, scaphoid area also up her R forearm,  SILT, negative ponce's b/l, CN 2-12 intact, AAOx3.  GAIT: WNFL               Laboratory/Imaging:     CT HEAD W/O CONTRAST 12/21/2021 2:30 PM     Provided History: Headache, intracranial hemorrhage suspected;  Traumatic injury of head, initial encounter     Comparison: MR head 6/18/2016.     Technique: Using multidetector thin collimation helical acquisition  technique, axial, coronal and sagittal CT images from the skull base  to the vertex were obtained without intravenous contrast.      Findings:    No intracranial hemorrhage, mass effect, or midline shift. The  ventricles are proportionate to the cerebral sulci. Scattered patchy  foci of hypoattenuation in the periventricular and supraventricular  white matter, which is nonspecific, likely related to chronic small  vessel ischemic disease given the patient's age. Age appropriate  general parenchymal volume loss. No acute loss of gray-white matter  differentiation. Vascular atherosclerotic calcifications. The basal  cisterns are patent. Stable calcified focus along the anterior right  middle cranial fossa, likely representing benign dural calcification.     No acute osseous abnormality. The visualized paranasal sinuses are  clear. The mastoid air cells are clear. Bilateral pseudophakia.                                                                      Impression:   No acute intracranial pathology.           Assessment/Plan:     Yancy was seen today for head injury.    Diagnoses and all orders for this visit:    Concussion without loss of consciousness, initial encounter  -     Concussion  Referral; Future  -     Concussion  Referral; Future    Light sensitivity  -     Concussion  Referral;  Future    Cervical myofascial strain, initial encounter  -     Concussion  Referral; Future  -     Concussion  Referral; Future  -     Lidocaine (LIDOCARE) 4 % Patch; Place 1 patch onto the skin daily as needed for moderate pain (apply to R neck) To prevent lidocaine toxicity, patient should be patch free for 12 hrs daily.  -     Cancel: MISCELLANEOUS DME SUPPLY OR ACCESSORY, NOT OTHERWISE SPECIFIED  -     MISCELLANEOUS DME SUPPLY OR ACCESSORY, NOT OTHERWISE SPECIFIED  -     MISCELLANEOUS DME SUPPLY OR ACCESSORY, NOT OTHERWISE SPECIFIED          1. Patient education: In depth discussion and education was provided about the assessment and implications of each of the below recommendations for management. Patient indicated readiness to learn, all questions were answered and understanding of material presented was confirmed.    2. Work-up: none      3. Therapy/equipment/braces: continue PT/OT program.     4. Medications: above    5. Interventions: discussed recovery and starting progressive return to activity.     6. Referral / follow up with other providers: PCP, Sport Med    7. Follow up: 3 months for progress        Thompson Carias, DO  Physical Medicine & Rehabilitation    I spent a total of 45 minutes face-to-face with Yancy Rivera during today's office visit. Over 50% of this time was spent counseling the patient and/or coordinating care. See note for details.     45 minutes spent on the date of the encounter doing chart review, history and exam, documentation and further activities as noted above    Again, thank you for allowing me to participate in the care of your patient.      Sincerely,    Thompson Carias, DO

## 2022-04-18 NOTE — PROGRESS NOTES
PM&R Clinic Note     Patient Name: Yancy Rivera : 1955 Medical Record: [id number]     Requesting Physician/clinician: No att. providers found           History of Present Illness:     Yancy Rivera is a 66 year old female that per records and reporting patient had a fall on Left side on, 21.  No LOC.  But hurt l side.  She also fell in 2020 and hurt her R hand wrist which has been hurting since her last fall.     Symptoms  CONCUSSION SYMPTOMS ASSESSMENT 1/10/2022 2022   Headache or Pressure In Head 5 - severe 0 - none   Upset Stomach or Throwing Up 0 - none 0 - none   Problems with Balance 4 - moderate to severe 0 - none   Feeling Dizzy 4 - moderate to severe 0 - none   Sensitivity to Light 4 - moderate to severe 4 - moderate to severe   Sensitivity to Noise 0 - none 0 - none   Mood Changes 0 - none 0 - none   Feeling sluggish, hazy, or foggy 0 - none 0 - none   Trouble Concentrating, Lack of Focus 0 - none 0 - none   Motion Sickness 0 - none 0 - none   Vision Changes 0 - none 0 - none   Memory Problems 0 - none 0 - none   Feeling Confused 0 - none 0 - none   Neck Pain 5 - severe 5 - severe   Trouble Sleeping 0 - none 4 - moderate to severe   Total Number of Symptoms 5 3   Symptom Severity Score 22 13             Current:  Continues to do therapy for R hand.  But her neck still ges sore.  Pillow not comfortanle.  Musles are tight.  R side worse than .  Does not go down to hand.  She states she completed neck therapy already.               Past Medical and Surgical History:     Past Medical History:   Diagnosis Date     Blepharitis      Esophageal reflux (aka GERD)      Glaucoma      Hyperlipidemia LDL goal < 130      Nonsenile cataract      Shingles     11/14/15 Left approximately C6 distribution     Past Surgical History:   Procedure Laterality Date     CATARACT IOL, RT/LT Right 10/16/2018    [place] [facility]      CATARACT IOL, RT/LT Left  11/01/2018    [place] [facility]      CHALAZION EXCISION  08/21/2015    Left lid     HERNIA REPAIR      abdominal hernia repair 2012            Social History:     Social History     Tobacco Use     Smoking status: Never Smoker     Smokeless tobacco: Never Used   Substance Use Topics     Alcohol use: No       Living situation: apartment  Family support:   Vocational History: retired  Recreational drug use: none         Functional history:     Yancy Rivera is independent with all aspects of life.    ADLs: I  Assistive devices: none   iADLs (medication management and finances): I  Hand dominance: R  Driving: no            Family History:     Family History   Problem Relation Age of Onset     Glaucoma Mother      Macular Degeneration No family hx of      Cancer No family hx of      Diabetes No family hx of      Hypertension No family hx of      Cerebrovascular Disease No family hx of      Thyroid Disease No family hx of      Eye Surgery No family hx of             Medications:     Current Outpatient Medications   Medication Sig Dispense Refill     atorvastatin (LIPITOR) 20 MG tablet Take 1 tablet by mouth once daily 90 tablet 0     carboxymethylcellulose PF (CARBOXYMETHYLCELLULOSE SODIUM) 0.5 % ophthalmic solution Place 1 drop into both eyes 4 times daily as needed for dry eyes 70 each 11     Cholecalciferol (VITAMIN D) 2000 UNITS tablet Take 1,000 Units by mouth daily 45 tablet 3     diclofenac (VOLTAREN) 1 % topical gel Place 2 g onto the skin 4 times daily To right wrist 100 g 3     diclofenac (VOLTAREN) 1 % topical gel Place 2 g onto the skin 4 times daily 100 g 3     ferrous sulfate (IRON) 325 (65 FE) MG tablet Take 1 tablet (325 mg) by mouth daily (with breakfast) 30 tablet 11     Lidocaine (LIDOCARE) 4 % Patch Place 1 patch onto the skin daily as needed for moderate pain (apply to R neck) To prevent lidocaine toxicity, patient should be patch free for 12 hrs daily. 30  patch 3     naproxen (NAPROSYN) 500 MG tablet TAKE 1 TABLET BY MOUTH TWICE A DAY WITH MEALS 60 tablet 1     SUMAtriptan (IMITREX) 50 MG tablet Take 1 tablet (50 mg) by mouth at onset of headache for migraine (May repeat in one hour. Max 200 mg in 24 hours. limit use to no more than 2 days per week) May repeat dose within one hour. 12 tablet 6     triamcinolone (KENALOG) 0.1 % external ointment Apply topically 2 times daily 454 g 3     White Petrolatum-Mineral Oil (REFRESH P.M.) OINT Apply thin layer to both eyes at bedtime 3.5 g 3     atorvastatin (LIPITOR) 20 MG tablet Take 1 tablet (20 mg) by mouth daily 30 tablet 11     doxycycline Monohydrate 100 MG TABS Take 100 mg by mouth daily (Patient not taking: No sig reported) 90 tablet 3     gabapentin (NEURONTIN) 100 MG capsule Take 1 capsule (100 mg) by mouth 3 times daily 90 capsule 3     gabapentin (NEURONTIN) 100 MG capsule Take 1 capsule (100 mg) by mouth 3 times daily (Patient not taking: Reported on 4/18/2022) 90 capsule 1     gabapentin (NEURONTIN) 600 MG tablet Take 1 tablet (600 mg) by mouth 3 times daily (Patient not taking: Reported on 4/18/2022) 90 tablet 11     ibuprofen (ADVIL/MOTRIN) 200 MG tablet Take 400 mg by mouth  (Patient not taking: Reported on 4/18/2022)       methylPREDNISolone (MEDROL DOSEPAK) 4 MG tablet Take 1 tablet (4 mg) by mouth See Admin Instructions (Patient not taking: No sig reported) 21 tablet 0     Multiple Minerals (CALCIUM-MAGNESIUM-ZINC) TABS Take 1 tablet by mouth daily (Patient not taking: No sig reported) 90 tablet 0     omega 3 1000 MG CAPS Take 1 g by mouth daily (Patient not taking: Reported on 4/18/2022) 90 capsule 0     oxyCODONE (ROXICODONE) 5 MG immediate release tablet Take 1 tablet (5 mg) by mouth every 6 hours as needed for moderate to severe pain (Patient not taking: No sig reported) 20 tablet 0     pantoprazole (PROTONIX) 40 MG EC tablet Take 1 tablet (40 mg) by mouth daily (Patient not taking: Reported on  "4/18/2022) 90 tablet 1     piroxicam (FELDENE) 20 MG capsule Take 1 capsule (20 mg) by mouth daily (with breakfast) (Patient not taking: No sig reported) 30 capsule 11     predniSONE (DELTASONE) 20 MG tablet Take 2 tablets (40 mg) by mouth daily 10 tablet 0     Riboflavin 400 MG TABS Take 1 tablet by mouth daily (Patient not taking: No sig reported) 30 tablet 11     tiZANidine (ZANAFLEX) 4 MG capsule Take 1 capsule (4 mg) by mouth 3 times daily as needed for muscle spasms (Patient not taking: No sig reported) 15 capsule 0     traMADol (ULTRAM) 50 MG tablet Take 1 tablet (50 mg) by mouth every 6 hours as needed for pain or moderate to severe pain (Patient not taking: No sig reported) 15 tablet 0            Allergies:     Allergies   Allergen Reactions     No Clinical Screening - See Comments Nausea and Vomiting     Liver side effects from INH for TB              ROS:        ROS: 10 point ROS neg other than the symptoms noted above in the HPI.             Physical Examiniation:     VITAL SIGNS: /71   Pulse 79   Ht 1.626 m (5' 4\")   Wt 88.5 kg (195 lb)   LMP  (LMP Unknown)   SpO2 97%   BMI 33.47 kg/m    BMI: Estimated body mass index is 33.47 kg/m  as calculated from the following:    Height as of this encounter: 1.626 m (5' 4\").    Weight as of this encounter: 88.5 kg (195 lb).    Gen: NAD, pleasant and cooperative   HEENT: NCAT, EOMI, no nystagmus, ANANDA, there is no reproducible headache and eye strain with VOMS. There is taut and tender cervical paraspinal muscles on L, no facial asymmetry   Cardio: 2+ radial pulse, well perfused  Pulm: non-labored breathing in room air, symmetrical chest rise  Abd: benign  Ext: WWP, no edema in BLE, no tenderness in calves  Neuro/MSK: 5/5 in c5-t1 and l2-s1 myotomes; however limited to pain in RUE.  There is tenderness along R wrist, scaphoid area also up her R forearm,  SILT, negative ponce's b/l, CN 2-12 intact, AAOx3.  GAIT: WNFL               Laboratory/Imaging: "     CT HEAD W/O CONTRAST 12/21/2021 2:30 PM     Provided History: Headache, intracranial hemorrhage suspected;  Traumatic injury of head, initial encounter     Comparison: MR head 6/18/2016.     Technique: Using multidetector thin collimation helical acquisition  technique, axial, coronal and sagittal CT images from the skull base  to the vertex were obtained without intravenous contrast.      Findings:    No intracranial hemorrhage, mass effect, or midline shift. The  ventricles are proportionate to the cerebral sulci. Scattered patchy  foci of hypoattenuation in the periventricular and supraventricular  white matter, which is nonspecific, likely related to chronic small  vessel ischemic disease given the patient's age. Age appropriate  general parenchymal volume loss. No acute loss of gray-white matter  differentiation. Vascular atherosclerotic calcifications. The basal  cisterns are patent. Stable calcified focus along the anterior right  middle cranial fossa, likely representing benign dural calcification.     No acute osseous abnormality. The visualized paranasal sinuses are  clear. The mastoid air cells are clear. Bilateral pseudophakia.                                                                      Impression:   No acute intracranial pathology.           Assessment/Plan:     Yancy was seen today for head injury.    Diagnoses and all orders for this visit:    Concussion without loss of consciousness, initial encounter  -     Concussion  Referral; Future  -     Concussion  Referral; Future    Light sensitivity  -     Concussion  Referral; Future    Cervical myofascial strain, initial encounter  -     Concussion  Referral; Future  -     Concussion  Referral; Future  -     Lidocaine (LIDOCARE) 4 % Patch; Place 1 patch onto the skin daily as needed for moderate pain (apply to R neck) To prevent lidocaine toxicity, patient should be patch free for 12 hrs  daily.  -     Cancel: MISCELLANEOUS DME SUPPLY OR ACCESSORY, NOT OTHERWISE SPECIFIED  -     MISCELLANEOUS DME SUPPLY OR ACCESSORY, NOT OTHERWISE SPECIFIED  -     MISCELLANEOUS DME SUPPLY OR ACCESSORY, NOT OTHERWISE SPECIFIED          1. Patient education: In depth discussion and education was provided about the assessment and implications of each of the below recommendations for management. Patient indicated readiness to learn, all questions were answered and understanding of material presented was confirmed.    2. Work-up: none      3. Therapy/equipment/braces: continue PT/OT program.     4. Medications: above    5. Interventions: discussed recovery and starting progressive return to activity.     6. Referral / follow up with other providers: PCP, Sport [facility]    7. Follow up: 3 months for progress        Thompson Carias, DO  Physical Medicine & Rehabilitation    I spent a total of 45 minutes face-to-face with Yancy Rivera during today's office visit. Over 50% of this time was spent counseling the patient and/or coordinating care. See note for details.     45 minutes spent on the date of the encounter doing chart review, history and exam, documentation and further activities as noted above

## 2022-04-19 ASSESSMENT — ANXIETY QUESTIONNAIRES: GAD7 TOTAL SCORE: 0

## 2022-04-21 ENCOUNTER — OFFICE VISIT (OUTPATIENT)
Dept: ORTHOPEDICS | Facility: CLINIC | Age: 67
End: 2022-04-21
Payer: MEDICARE

## 2022-04-21 VITALS
WEIGHT: 196.8 LBS | DIASTOLIC BLOOD PRESSURE: 70 MMHG | BODY MASS INDEX: 33.6 KG/M2 | HEIGHT: 64 IN | SYSTOLIC BLOOD PRESSURE: 138 MMHG

## 2022-04-21 DIAGNOSIS — M77.11 LATERAL EPICONDYLITIS OF BOTH ELBOWS: ICD-10-CM

## 2022-04-21 DIAGNOSIS — S69.91XD RIGHT WRIST INJURY, SUBSEQUENT ENCOUNTER: Primary | ICD-10-CM

## 2022-04-21 DIAGNOSIS — M77.11 LATERAL EPICONDYLITIS OF RIGHT ELBOW: ICD-10-CM

## 2022-04-21 DIAGNOSIS — M77.12 LATERAL EPICONDYLITIS OF BOTH ELBOWS: ICD-10-CM

## 2022-04-21 PROCEDURE — 99204 OFFICE O/P NEW MOD 45 MIN: CPT | Performed by: FAMILY MEDICINE

## 2022-04-21 RX ORDER — PREDNISONE 20 MG/1
40 TABLET ORAL DAILY
Qty: 10 TABLET | Refills: 0 | Status: SHIPPED | OUTPATIENT
Start: 2022-04-21 | End: 2022-04-21

## 2022-04-21 RX ORDER — PREDNISONE 20 MG/1
40 TABLET ORAL DAILY
Qty: 10 TABLET | Refills: 0 | Status: SHIPPED | OUTPATIENT
Start: 2022-04-21 | End: 2022-05-14

## 2022-04-21 NOTE — LETTER
4/21/2022         RE: Yancy Rivera  [address] [place] [address]        Dear Colleague,    Thank you for referring your patient, Yancy Rivera, to the [facility]. Please see a copy of my visit note below.    ASSESSMENT & PLAN    Yancy was seen today for pain, follow up and mri transcription.    Diagnoses and all orders for this visit:    Right wrist injury, subsequent encounter  -     Discontinue: predniSONE (DELTASONE) 20 MG tablet; Take 2 tablets (40 mg) by mouth daily for 5 days  -     predniSONE (DELTASONE) 20 MG tablet; Take 2 tablets (40 mg) by mouth daily    Lateral epicondylitis of both elbows    Lateral epicondylitis of right elbow  -     Discontinue: predniSONE (DELTASONE) 20 MG tablet; Take 2 tablets (40 mg) by mouth daily for 5 days  -     predniSONE (DELTASONE) 20 MG tablet; Take 2 tablets (40 mg) by mouth daily      This issue is chronic and Worsening.    # Right Hand/Arm Pain: Symptoms noted after a fall initially in December 2021.  She still having pain over the wrist and lateral elbow limiting her ability to move her arm.  She is also having some neck pain after a fall this year as well with symptoms going down her right arm.  She does have significant tenderness to palpation over the scapholunate joint as well as the lateral epicondyle.  There is some pain going down her arm with cervical nerve compression as well.  There is a concern that she has several things causing her pain right now including the sprain of the scapholunate ligament, lateral epicondylitis, as well as possible radiculopathy.  Counseled patient on treatment options including continued hand therapy, bracing, oral anti-inflammatories, steroid injections.  Given this plan to treat as below and follow-up in 2 weeks for repeat assessment.  Image Findings: Reviewed  Treatment: Activities as tolerated, home exercises given today  Medications/Injections: Prednisone 40 mg for 5  "days, defer steroid injection for now given having some neck pain as well.    Follow-up: 2 weeks for repeat evaluation, may consider steroid injections    Aristides Nugent MD  [facility]    SUBJECTIVE- Interim History April 21, 2022    Chief Complaint   Patient presents with     Right Wrist - Pain, Follow Up, MRI Transcription     Visit completed with native language phone     Yancy Rivera is a 66 year old female who is seen in f/u up for    Right wrist injury, subsequent encounter  Lateral epicondylitis of both elbows  Lateral epicondylitis of right elbow. Since last visit on 2/17/22 patient would like her right wrist re-evaluated.   She is unable to go back to Dr. Harper due to transportation.  - Now ~ December 2021           Initial injury was over a year ago, she fell in December 2021    Worsened by: nothing, she does not use the right hand, cannot squeeze  Better with: bracing, elevation, prescription   Treatments tried: elevation, ice, other medications: Naprosyn, physical therapy (5 visits), previous imaging (MRI right wrist and xray elbow) and casting/splinting/bracing, injections for the first injury 5/14/2020 by Dr. Santiago, 3 months of relief,   Associated symptoms:  swelling, numbness, tingling, warmth, weakness of right hand and feeling of instability    The patient is seen by themselves.  The patient is Right handed    Orthopedic/Surgical history: YES - Date: chronic right hand pain  Social History/Occupation: no    No family history pertinent to patient's problem today.      REVIEW OF SYSTEMS:  Review of Systems  Constitutional, HEENT, cardiovascular, pulmonary, GI, , musculoskeletal, neuro, skin, endocrine and psych systems are negative, except as otherwise noted.    OBJECTIVE:  /70   Ht 1.626 m (5' 4\")   Wt 89.3 kg (196 lb 12.8 oz)   LMP  (LMP Unknown)   BMI 33.78 kg/m       GENERAL APPEARANCE: healthy, alert and no distress   GAIT: " NORMAL  SKIN: no suspicious lesions or rashes  HEENT: Sclera clear, anicteric  CV: no lower extremity edema, good peripheral pulses  RESP: Breathing not labored  NEURO: Normal strength and tone, sensory exam grossly normal, mentation intact and speech normal  PSYCH:  mentation appears normal and affect normal/bright    RIGHT ELBOW  Inspection:    No swelling, bruising, discoloration, or obvious deformity or asymmetry  Palpation:    Tender about the lateral epicondyle. Remainder of bony, ligamentous and tendinous landmarks are nontender.    Crepitus is Absent  Range of Motion:   Range of motion grossly intact but patient and his significant mount of pain so exam is limited  Strength:  Intact strength in elbow flexion/extension as well as pronation/supination but painful  Special Tests:  Special testing limited secondary to significant pain patient is in but symptoms worse with resisted wrist extension    Cervical  Inspection: normal cervical lordosis  Tender:  right paracervical muscles  Non-tender:  spinous processes, left paracervical muscles  Range of Motion:  Full ROM of cervical spine  Strength: Full strength of all neck muscles  Special tests:  Spurling's - positive - right, Spurling's - negative - left    Hand/wrist (right):  No deformity noted.  No edema, ecchymosis.  Pain limiting wrist flexion/extension  Strength grossly intact but limited secondary to pain  Radial pulses normal, +2/4, capillary refill brisk.  Tenderness to palpation over the scapholunate joint  Finkelstein negative.  Intact and symmetric strength and sensation in the median/radial/unlar distributions bilaterally        RADIOLOGY:  Final results and radiologist's interpretation, available in the [facility] health record.  Images were reviewed with the patient in the office today.    Results for orders placed or performed in visit on 02/15/22   MR Wrist Right w/o Contrast    Narrative    MR right wrist without contrast 2/15/2022 11:52  AM    Techniques: Multiplanar multisequence imaging of the right wrist was  obtained without administration of intra-articular or intravenous  contrast using routing protocol.    History: Wrist fracture, tendon/ligament injury suspected, xray done;  fall two weeks ago, neg xray, persisent right scaphoid and lunate and  distal right radius tenderness on exam, r/o occult fx.; Right wrist  injury, initial encounter; Right wrist pain    Additional History from EMR: MRI 6/11/2020; x-ray 1/10/2022, 5/14/2020      Comparison: MRI 6/11/2020; x-ray 1/10/2022, 5/14/2020    Findings:  External marker placed over the scapholunate joint.    Triangular Fibrocartilage: Disruption of the styloid attachment and  central disc. No radial sided triangular cartilage perforation.    Interosseous Ligaments:  Scapholunate ligament: High-grade sprain of the dorsal and volar  scapholunate ligament. High-grade tear of the membranous scapholunate  ligament, coronal image 8, sagittal image 17. Widening of the  scapholunate interosseous distance measuring 4.5 mm (series 5001,  image 8), new from prior.  Lunatotriquetral ligament: Intact.  Carpal alignment: Widening of scapholunate joint, otherwise  unremarkable.    Bones: Chronic changes of the distal ulna similar-appearing to prior  MR, presumably related to prior injury.    Articulations:  Joint effusion: None.  Cartilage: No hyaline cartilage defects.  Synovium: No synovial thickening.    Tendons:  Flexor tendons: Normal. No tear or tendon sheath effusion.  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    Miscellaneous soft tissues: Volar ganglion cyst originating from the  pisotriquetral  joint extending proximally towards the TFCC, axial  image 16. Increased fluid in the pisotriquetral recess.      Impression    IMPRESSION:  1. Directly underlying the external marker: Widening of the  scapholunate interosseous  distance measuring 4.5 mm with high-grade  tearing of the membranous portion of the scapholunate ligament and  high-grade sprain of the dorsal/volar components. This is new from  prior.  2. In proximity to the external marker:  2a. Second and third extensor compartment tenosynovitis.  2b. Fluid in the pisotriquetral recess.   3. Redemonstration of chronic changes of the TFCC, predominantly of  the central disc and styloid attachment, similar to prior.    I have personally reviewed the examination and initial interpretation  and I agree with the findings.    JUTTA ELLERMANN, MD         SYSTEM ID:  [id number]       Review of external notes as documented elsewhere in note  Review of the result(s) of each unique test - right elbow x-ray, right wrist x-ray and MRI          Again, thank you for allowing me to participate in the care of your patient.        Sincerely,        Aristides Nugent MD

## 2022-04-21 NOTE — PATIENT INSTRUCTIONS
# Right Hand/Arm Pain: Symptoms noted after a fall initially in December 2021.  She still having pain over the wrist and lateral elbow limiting her ability to move her arm.  She is also having some neck pain after a fall this year as well with symptoms going down her right arm.  She does have significant tenderness to palpation over the scapholunate joint as well as the lateral epicondyle.  There is some pain going down her arm with cervical nerve compression as well.  There is a concern that she has several things causing her pain right now including the sprain of the scapholunate ligament, lateral epicondylitis, as well as possible radiculopathy.  Counseled patient on treatment options including continued hand therapy, bracing, oral anti-inflammatories, steroid injections.  Given this plan to treat as below and follow-up in 2 weeks for repeat assessment.  Image Findings: Reviewed  Treatment: Activities as tolerated, home exercises given today  Medications/Injections: Prednisone 40 mg for 5 days, defer steroid injection for now given having some neck pain as well.    Follow-up: 2 weeks for repeat evaluation, may consider steroid injections    Please call [phone]   Ask for my team if you have any questions or concerns    If you have not yet received the influenza vaccine but would like to get one, please call  [phone] or you can schedule via [employer]    It was great seeing you again today!    Aristides Nugent MD, [facility]

## 2022-04-21 NOTE — PROGRESS NOTES
ASSESSMENT & PLAN    Yancy was seen today for pain, follow up and mri transcription.    Diagnoses and all orders for this visit:    Right wrist injury, subsequent encounter  -     Discontinue: predniSONE (DELTASONE) 20 MG tablet; Take 2 tablets (40 mg) by mouth daily for 5 days  -     predniSONE (DELTASONE) 20 MG tablet; Take 2 tablets (40 mg) by mouth daily    Lateral epicondylitis of both elbows    Lateral epicondylitis of right elbow  -     Discontinue: predniSONE (DELTASONE) 20 MG tablet; Take 2 tablets (40 mg) by mouth daily for 5 days  -     predniSONE (DELTASONE) 20 MG tablet; Take 2 tablets (40 mg) by mouth daily      This issue is chronic and Worsening.    # Right Hand/Arm Pain: Symptoms noted after a fall initially in December 2021.  She still having pain over the wrist and lateral elbow limiting her ability to move her arm.  She is also having some neck pain after a fall this year as well with symptoms going down her right arm.  She does have significant tenderness to palpation over the scapholunate joint as well as the lateral epicondyle.  There is some pain going down her arm with cervical nerve compression as well.  There is a concern that she has several things causing her pain right now including the sprain of the scapholunate ligament, lateral epicondylitis, as well as possible radiculopathy.  Counseled patient on treatment options including continued hand therapy, bracing, oral anti-inflammatories, steroid injections.  Given this plan to treat as below and follow-up in 2 weeks for repeat assessment.  Image Findings: Reviewed  Treatment: Activities as tolerated, home exercises given today  Medications/Injections: Prednisone 40 mg for 5 days, defer steroid injection for now given having some neck pain as well.    Follow-up: 2 weeks for repeat evaluation, may consider steroid injections    Aristides Nugent MD  [facility]- Interim History April  "21, 2022    Chief Complaint   Patient presents with     Right Wrist - Pain, Follow Up, MRI Transcription     Visit completed with native language phone     Yancy Rivera is a 66 year old female who is seen in f/u up for    Right wrist injury, subsequent encounter  Lateral epicondylitis of both elbows  Lateral epicondylitis of right elbow. Since last visit on 2/17/22 patient would like her right wrist re-evaluated.   She is unable to go back to Dr. Harper due to transportation.  - Now ~ December 2021           Initial injury was over a year ago, she fell in December 2021    Worsened by: nothing, she does not use the right hand, cannot squeeze  Better with: bracing, elevation, prescription   Treatments tried: elevation, ice, other medications: Naprosyn, physical therapy (5 visits), previous imaging (MRI right wrist and xray elbow) and casting/splinting/bracing, injections for the first injury 5/14/2020 by Dr. Santiago, 3 months of relief,   Associated symptoms:  swelling, numbness, tingling, warmth, weakness of right hand and feeling of instability    The patient is seen by themselves.  The patient is Right handed    Orthopedic/Surgical history: YES - Date: chronic right hand pain  Social History/Occupation: no    No family history pertinent to patient's problem today.      REVIEW OF SYSTEMS:  Review of Systems  Constitutional, HEENT, cardiovascular, pulmonary, GI, , musculoskeletal, neuro, skin, endocrine and psych systems are negative, except as otherwise noted.    OBJECTIVE:  /70   Ht 1.626 m (5' 4\")   Wt 89.3 kg (196 lb 12.8 oz)   LMP  (LMP Unknown)   BMI 33.78 kg/m       GENERAL APPEARANCE: healthy, alert and no distress   GAIT: NORMAL  SKIN: no suspicious lesions or rashes  HEENT: Sclera clear, anicteric  CV: no lower extremity edema, good peripheral pulses  RESP: Breathing not labored  NEURO: Normal strength and tone, sensory exam grossly normal, mentation intact and speech normal  PSYCH:  " mentation appears normal and affect normal/bright    RIGHT ELBOW  Inspection:    No swelling, bruising, discoloration, or obvious deformity or asymmetry  Palpation:    Tender about the lateral epicondyle. Remainder of bony, ligamentous and tendinous landmarks are nontender.    Crepitus is Absent  Range of Motion:   Range of motion grossly intact but patient and his significant mount of pain so exam is limited  Strength:  Intact strength in elbow flexion/extension as well as pronation/supination but painful  Special Tests:  Special testing limited secondary to significant pain patient is in but symptoms worse with resisted wrist extension    Cervical  Inspection: normal cervical lordosis  Tender:  right paracervical muscles  Non-tender:  spinous processes, left paracervical muscles  Range of Motion:  Full ROM of cervical spine  Strength: Full strength of all neck muscles  Special tests:  Spurling's - positive - right, Spurling's - negative - left    Hand/wrist (right):  No deformity noted.  No edema, ecchymosis.  Pain limiting wrist flexion/extension  Strength grossly intact but limited secondary to pain  Radial pulses normal, +2/4, capillary refill brisk.  Tenderness to palpation over the scapholunate joint  Finkelstein negative.  Intact and symmetric strength and sensation in the median/radial/unlar distributions bilaterally        RADIOLOGY:  Final results and radiologist's interpretation, available in the [facility] health record.  Images were reviewed with the patient in the office today.    Results for orders placed or performed in visit on 02/15/22   MR Wrist Right w/o Contrast    Narrative    MR right wrist without contrast 2/15/2022 11:52 AM    Techniques: Multiplanar multisequence imaging of the right wrist was  obtained without administration of intra-articular or intravenous  contrast using routing protocol.    History: Wrist fracture, tendon/ligament injury suspected, xray done;  fall two weeks ago, neg xray,  persisent right scaphoid and lunate and  distal right radius tenderness on exam, r/o occult fx.; Right wrist  injury, initial encounter; Right wrist pain    Additional History from EMR: MRI 6/11/2020; x-ray 1/10/2022, 5/14/2020      Comparison: MRI 6/11/2020; x-ray 1/10/2022, 5/14/2020    Findings:  External marker placed over the scapholunate joint.    Triangular Fibrocartilage: Disruption of the styloid attachment and  central disc. No radial sided triangular cartilage perforation.    Interosseous Ligaments:  Scapholunate ligament: High-grade sprain of the dorsal and volar  scapholunate ligament. High-grade tear of the membranous scapholunate  ligament, coronal image 8, sagittal image 17. Widening of the  scapholunate interosseous distance measuring 4.5 mm (series 5001,  image 8), new from prior.  Lunatotriquetral ligament: Intact.  Carpal alignment: Widening of scapholunate joint, otherwise  unremarkable.    Bones: Chronic changes of the distal ulna similar-appearing to prior  MR, presumably related to prior injury.    Articulations:  Joint effusion: None.  Cartilage: No hyaline cartilage defects.  Synovium: No synovial thickening.    Tendons:  Flexor tendons: Normal. No tear or tendon sheath effusion.  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    Miscellaneous soft tissues: Volar ganglion cyst originating from the  pisotriquetral  joint extending proximally towards the TFCC, axial  image 16. Increased fluid in the pisotriquetral recess.      Impression    IMPRESSION:  1. Directly underlying the external marker: Widening of the  scapholunate interosseous distance measuring 4.5 mm with high-grade  tearing of the membranous portion of the scapholunate ligament and  high-grade sprain of the dorsal/volar components. This is new from  prior.  2. In proximity to the external marker:  2a. Second and third extensor compartment  tenosynovitis.  2b. Fluid in the pisotriquetral recess.   3. Redemonstration of chronic changes of the TFCC, predominantly of  the central disc and styloid attachment, similar to prior.    I have personally reviewed the examination and initial interpretation  and I agree with the findings.    JUTTA ELLERMANN, MD         SYSTEM ID:  [id number]       Review of external notes as documented elsewhere in note  Review of the result(s) of each unique test - right elbow x-ray, right wrist x-ray and MRI

## 2022-05-04 ENCOUNTER — APPOINTMENT (OUTPATIENT)
Dept: OPTOMETRY | Facility: CLINIC | Age: 67
End: 2022-05-04
Payer: MEDICARE

## 2022-05-04 ENCOUNTER — THERAPY VISIT (OUTPATIENT)
Dept: PHYSICAL THERAPY | Facility: CLINIC | Age: 67
End: 2022-05-04
Payer: MEDICARE

## 2022-05-04 DIAGNOSIS — M54.2 NECK PAIN: Primary | ICD-10-CM

## 2022-05-04 PROCEDURE — 97161 PT EVAL LOW COMPLEX 20 MIN: CPT | Mod: GP | Performed by: PHYSICAL THERAPIST

## 2022-05-04 PROCEDURE — 97110 THERAPEUTIC EXERCISES: CPT | Mod: GP | Performed by: PHYSICAL THERAPIST

## 2022-05-04 PROCEDURE — 92341 FIT SPECTACLES BIFOCAL: CPT | Performed by: OPTOMETRIST

## 2022-05-04 NOTE — PROGRESS NOTES
[facility]                                                                                   OUTPATIENT PHYSICAL THERAPY FUNCTIONAL EVALUATION  PLAN OF TREATMENT FOR OUTPATIENT REHABILITATION  (COMPLETE FOR INITIAL CLAIMS ONLY)  Patient's Last Name, First Name, M.I.  YOB: 1955  Yancy Rivera     Provider's Name   [facility]   Medical Record No.  [id number]     Start of Care Date:  05/04/22   Onset Date:  12/20/21   Type:     _X__PT   ____OT  ____SLP Medical Diagnosis:  Concussion without loss of consciousness, initial encounter (S06.0X0A)  - Primary; Cervical myofascial strain, initial encounter     PT Diagnosis:  neck pain with decreased ROM/strength Visits from SOC:  1                              __________________________________________________________________________________  Plan of Treatment/Functional Goals:  IADL retraining, joint mobilization, neuromuscular re-education, ROM, strengthening, stretching, manual therapy           GOALS  neck ROM  Yancy will demonstrate at least 70 degrees or > of neck rotation to B sides in order to work towards driving again  07/02/22    HEP  Ankita will participate in daily HEP for neck and shoulder strengthening and lengthening in order to improve her overall function and decrease her pain               Therapy Frequency:  1 time/week (may increase to 2 times per week)   Predicted Duration of Therapy Intervention:  up to 6-8 weeks    Carole Mckee, PT                                    I CERTIFY THE NEED FOR THESE SERVICES FURNISHED UNDER        THIS PLAN OF TREATMENT AND WHILE UNDER MY CARE     (Physician co-signature of this document indicates review and certification of the therapy plan).              Certification Date From:  05/04/22   Certification Date To:  08/01/22    Referring Provider:  Aretha  Thompson GONZALEZ, DO    Initial Assessment  See Epic Evaluation- Start of Care Date: 05/04/22

## 2022-05-04 NOTE — PROGRESS NOTES
05/04/22 1100   Quick Adds   Quick Adds Vestibular Eval   Type of Visit Initial OP PT Evaluation   General Information   Start of Care Date 05/04/22   Referring Physician Thompson Carias, DO   Orders Evaluate and Treat as Indicated   Order Date 04/18/22   Medical Diagnosis Concussion without loss of consciousness, initial encounter (S06.0X0A)  - Primary; Cervical myofascial strain, initial encounter   Onset of illness/injury or Date of Surgery 12/20/22   Surgical/Medical history reviewed Yes  (HTN, R wrist Fx, L wrist pain, concussion, pre diabetes, R lumbar radiculopathy)   Pertinent history of current problem (include personal factors and/or comorbidities that impact the POC) Fell in December in the kitchen and hit her head on the fridge; arm outstretch and suffered R scapholunate injury to wrist. Neck pain and headaches have persisted-- pain starting in posterior neck and moving B side of neck, R more than L and into R shoulder blade with movement. Uses heat at home but as not tried any stretches for this. She is no longer having dizziness. She feels HAs start when neck pain is increased.   Pertinent Visual History  glaucoma and cataracts   Prior level of function comment she was on disability prior to fall and is not working; was particpating in household duties including cooking, cleaning and driving   Patient role/Employment history Disabled  (2004 she went on this)   Home/Community Accessibility Comments live with spouse   Patient/Family Goals Statement improve her neck and shoulder pain   General Information Comments wearing splint at R wrist   Fall Risk Screen   Fall screen completed by PT   Have you fallen 2 or more times in the past year? No   Have you fallen and had an injury in the past year? No   Timed Up and Go score (seconds) not tested   Is patient a fall risk? No   Fall screen comments does not appear at fall risk today with gait assessment in clinic, she has hx of fall in kitchen but denies  many other falls in the last several years.   Pain   Patient currently in pain Yes   Pain location neck   Pain rating 6/10   Pain description Ache;Sharp   Pain description comment posterior neck, R more than L   Additional pain locations? Pain location 2   Pain location 2 shoulder blade, R   Pain rating 2 5/10   Pain comments sharp   Cognitive Status Examination   Orientation orientation to person, place and time   Level of Consciousness alert   Follows Commands and Answers Questions 100% of the time   Personal Safety and Judgment intact   Posture   Posture Comments forward shoulder posture, tension through neck with limited movement during session   Palpation   Palpation tenderness to palpation at suboccipital area, refers pain into forehead with sustained pressure   Range of Motion (ROM)   ROM Comment limited neck ROM-- 50% degrees in L rotation, 75% in R rotation with pain, SB decreased by 50%, pain in anterior neck with extension and flexion; pain will radiate into R scapula along medial border; R shoulder flexion with assist from L arm to ~90 degrees flexion with pain; L shoulder to 150 active flexion   Strength   Strength Comments difficulty with scapular retraction and depression, needed tactile cues at scapula   Musculoskeletal Special Tests   Musculoskeletal Special Tests attempted PIVM to cervical spine with patient in supine but patient very guarded in neck muscles so deferred today   Gait   Gait Comments ambulated independently in and out of clinic   Sensory Examination   Sensory Perception Comments reports tingling in R shoulder at times and R hand, did not reproduce today with exercises and assessment   Oculomotor Exam   Smooth Pursuit Normal   Saccades Normal   VOR Normal   VOR Comments denied dizziness today with VOR screen x 30 secs in each direction   Planned Therapy Interventions   Planned Therapy Interventions IADL retraining;joint mobilization;neuromuscular  re-education;ROM;strengthening;stretching;manual therapy   Clinical Impression   Criteria for Skilled Therapeutic Interventions Met yes, treatment indicated   PT Diagnosis neck pain with decreased ROM/strength   Influenced by the following impairments decreased neck ROM and strength, pain, decreased joint mobility, postural impairments   Functional limitations due to impairments unable to drive, decreased participation in iADLs and ADLs   Clinical Presentation Stable/Uncomplicated   Clinical Presentation Rationale stable medically, PT impairments, clinical judgement   Clinical Decision Making (Complexity) Low complexity   Therapy Frequency 1 time/week  (may increase to 2 times per week)   Predicted Duration of Therapy Intervention (days/wks) up to 6-8 weeks   Risk & Benefits of therapy have been explained Yes   Patient, Family & other staff in agreement with plan of care Yes   Clinical Impression Comments Patient with hx of fall suffering concussion in late december 2021. She is having neck and thoracic pain that is limiting her participation in daily activites and will benefit from skilled PT in order to address these areas of impairments. Denies any other concussion based symptoms today.   GOALS   PT Eval Goals 1;2   Goal 1   Goal Identifier neck ROM   Goal Description Yancy will demonstrate at least 70 degrees or > of neck rotation to B sides in order to work towards driving again   Target Date 07/02/22   Goal 2   Goal Identifier HEP   Goal Description Ankita will participate in daily HEP for neck and shoulder strengthening and lengthening in order to improve her overall function and decrease her pain   Total Evaluation Time   PT Althea Low Complexity Minutes ([id number]) 29

## 2022-05-11 ENCOUNTER — THERAPY VISIT (OUTPATIENT)
Dept: OCCUPATIONAL THERAPY | Facility: CLINIC | Age: 67
End: 2022-05-11
Payer: MEDICARE

## 2022-05-11 DIAGNOSIS — R68.89 LIGHT SENSITIVITY: Primary | ICD-10-CM

## 2022-05-11 DIAGNOSIS — S06.0X0A CONCUSSION WITHOUT LOSS OF CONSCIOUSNESS, INITIAL ENCOUNTER: ICD-10-CM

## 2022-05-11 PROCEDURE — 97166 OT EVAL MOD COMPLEX 45 MIN: CPT | Mod: GO

## 2022-05-11 PROCEDURE — 97535 SELF CARE MNGMENT TRAINING: CPT | Mod: GO

## 2022-05-11 ASSESSMENT — ACTIVITIES OF DAILY LIVING (ADL): IADL_QUICK_ADDS: MEAL PLANNING/PREPARATION;HOME/FINANCIAL/MANAGEMENT

## 2022-05-12 NOTE — PROGRESS NOTES
[facility]    Outpatient Occupational Therapy Discharge Note  Patient: Yancy Rivera  : 1955    Beginning/End Dates of Reporting Period:  22 to 22    Referring Provider: Thompson Carias,     Therapy Diagnosis: Impaired IADL participation s/p concussion    Client Self Report: (P) See eval report    Objective Measurements:  See eval report from 22 for additional details    Goals:     Goal Identifier Sensory tolerance   Goal Description Client will participate in education and successfully identify at least one method to manage light sensitivity to improve symptom management and participation in daily tasks   Target Date 22   Date Met   22   Progress (detail required for progress note): (P) Goal met with client education today     Plan:  Discharge from therapy.    Discharge:    Reason for Discharge: Patient has met all goals.    Equipment Issued: N/A, handout provided for tinted lenses    Discharge Plan: Continue to follow with PT to address neck pain

## 2022-05-12 NOTE — PROGRESS NOTES
[facility]          OUTPATIENT OCCUPATIONAL THERAPY  EVALUATION  PLAN OF TREATMENT FOR OUTPATIENT REHABILITATION  (COMPLETE FOR INITIAL CLAIMS ONLY)  Patient's Last Name, First Name, M.I.  YOB: 1955  Yancy Rivera                        Provider's Name  [facility] Medical Record No.  [id number]                               Onset Date:     12/21/21   Start of Care Date:     05/11/22   Type:     ___PT   _X_OT   ___SLP Medical Diagnosis:     Concussion without loss of consciousness, initial encounter (S06.0X0A)  - Primary     Light sensitivity (R68.89)     Cervical myofascial strain, initial encounter (S16.1XXA)                          OT Diagnosis:     (P) Impaired IADL participation s/p concussion Visits from SOC:  1   _________________________________________________________________________________  Plan of Treatment/Functional Goals:  (P) Self care/Home management        Goals  Goal Identifier: (P) Sensory tolerance  Goal Description: (P) Client will participate in education and successfully identify at least one method to manage light sensitivity to improve symptom management and participation in daily tasks  Target Date: (P) 08/03/22         Predicted Duration of Therapy Intervention (days/wks): (P) Client to d/c following visit today  STEFANI Rousseau          I CERTIFY THE NEED FOR THESE SERVICES FURNISHED UNDER        THIS PLAN OF TREATMENT AND WHILE UNDER MY CARE     (Physician co-signature of this document indicates review and certification of the therapy plan).              Referring Physician: Thompson Carias, DO     Initial Assessment        See Epic Evaluation      Start Of Care Date: 05/11/22 05/11/22 1000   Quick Adds   Quick Adds Concussion   Type of Visit Initial Outpatient Occupational Therapy Evaluation        Present No    Comment Client declines phone  multiple times, no in person  available, continue to request  for future visits as client benefits though does verbalize understanding throughout and communicates in English   General Information   Start Of Care Date 05/11/22   Referring Physician Thompson Carias, DO   Orders Evaluate and treat as indicated   Other Orders PT, hand therapy, concussion    Orders Date 04/18/22   Medical Diagnosis Concussion without loss of consciousness, initial encounter (S06.0X0A)  - Primary     Light sensitivity (R68.89)     Cervical myofascial strain, initial encounter (S16.1XXA)   Onset of Illness/Injury or Date of Surgery 12/21/21   Precautions/Limitations No known precautions/limitations   Special Instructions Light sensitivity   Surgical/Medical History Reviewed Yes   Additional Occupational Profile Info/Pertinent History of Current Problem Yancy is a 65 y/o living with her . Chart review reveals patient had a fall on Left side on, 12/21/21 with no LOC into the fridge hitting her head on the handle and injuring her side. She also fell in 6/2020 and hurt her R hand wrist which has been painful again since recent fall, notes intense neck pain which disrupts sleep schedule since fall. No acute intracranial pathology noted from head CT post injury. Reports light sensitivity with outdoor glare currently managed by sunglasses.   Comments/Observations Client relies on family for rides, some missed appointments, spent time orienting client to upcoming schedule and provided a printout for faciltiation of planning to improve attendence and maximize therapeutic benefits across disciplines   Role/Living Environment   Current Community Support Family/friend caregiver  (Friends/family for transportation, somewhat unreliable, seeking medical transportation through insurance, states does not need assistance, continue to  "follow up)   Patient role/Employment history Disabled  (Long term disability related to prior leg injury)   Community/Avocational Activities Reading the bible, singing songs   Current Living Environment House   Number of Stairs to Enter Home 0  (enters from the garage)   Number of Stairs Within Home 0  (Reports unable to do stairs, stairs hard from prior injury)   Primary Bathroom Location/Comments Main level   Primary Bathroom Set Up/Equipment Tub;Shower/tub chair   Home/Community Accessibility Comments Reports no concerns with home accessibility, concerns with stairs in community, utilizes help for community outing, right now not using DME for walking, takes walks outside frequently   Prior Level - Transfers Independent   Prior Level - Ambulation Independent   Prior Level - ADLS Independent   Vision Interview   Technology Used Smartphone   Technology Use Increases Symptoms No   Technology Use Increases Symptoms Comments doesn't use laptop or other technology, reports no issues with phone use   Do Glasses Help Comments Unsure, awaiting reading glasses, reports no changes to visual functioning at this time   Type of Glasses Single lens   Type of Glasses Comments reading glasses, waiting to get them shipped to her, has already seen doctor for prescription   Light Sensitivity/Glare  Outdoor   Light Sensitivity/Glare Comments Sunglasses are helpful   Impaired Vision Comments Glasses for reading, has since lost glasses, has ordered and waiting for glasses to come   Brings Print Close to Read yes - awaiting reading glasses   Unable to Read Small Print yes - awaiting reading glasses   Reported Reading Speed Comments Unable to assess at this time without reading glasses   Reading Endurance/Fatigue   Complaints of Visual Fatigue Comments No   Convergence Other (see comments)   Convergence Comments Client reports no convergence insufficiency wtih testing, reports no doubling or blurriness bringing marker within 1\" of eyes. " Client has hx of eye disease and recommend continued follow up and assessment through optometry with upcoming appointments.   Pursuits Other (see comments)   Pursuits Comments Smooth pursuits noted, increased headache/neck pain with eye testing   Additional Comments Continues to follow with optometry   Difficulties with IADL Performance: Increase in Symptoms with the Following   Difficulty Concentrating at School, Work or Home no   Difficulty Multi-Tasking/Planning no   Busy/Dynamic Environments no   Pathfinding in Busy Environments Reports no concerns   Sensory Tolerance Impaired light tolerance for outdoors   Startles Easily no   Mood Changes   Patient Mood Comments No concerns reported or identified   Is Patient Currently Receiving Treatment for Mental Health? No   Vestibular Symptoms   Is Patient Experiencing Vestibular Symptoms? No   Pain   Patient currently in pain Yes   Pain location Neck   Pain rating 9/10   Pain description Sharp;Ache   Pain description comment Consistent neck pain interrupting sleep, continues to be followed by PT   Fall Risk Screen   Fall screen completed by PT   Have you fallen 2 or more times in the past year? No   Have you fallen and had an injury in the past year? No   Timed Up and Go score (seconds) not tested   Is patient a fall risk? No   Fall screen comments does not appear at fall risk today with gait assessment in clinic, she has hx of fall in kitchen but denies many other falls in the last several years.   Abuse Screen (yes response referral indicated)   Feels Unsafe at Home or Work/School no   Feels Threatened by Someone no   Does Anyone Try to Keep You From Having Contact with Others or Doing Things Outside Your Home? no   Physical Signs of Abuse Present no   System Outcome Measures   Outcome Measures Concussion (see Concussion Symptom Assessment)  (27 severity score with 6 symptoms, see separate flowsheet for additional detail)   Cognitive Status Examination   Orientation  Orientation to person, place and time   Level of Consciousness Alert   Follows Commands and Answers Questions 100% of the time   Personal Safety and Judgment Intact   Memory Intact   Memory Comments SLUMS object recall in english 5/5   Attention No deficits were identified   Organization/Problem Solving No deficits were identified   Executive Function Cognitive flexibility impaired  (Potential impairments in cognitive flexibility seen in SLUMS; However, difficult to assess as screening completed in 2nd language and notable challenges with certain tasks based on language.)   Cognitive Comment [facility] cognitive screening completed in english without  per client preferences making results difficult to interpret, overall no major concerns noted, client performs at 24/30 just below normal range with notable language impact/barrier (3/3 orientation, 3/3 math, 1/3 animal naming with language barrier, 5/5 delayed recall, 0/2 digit span reversal, 2/4 clock draw with inaccurate time, 2/2 figures, 8/8 story). Impression is that client has adequate cognitive funcitioning to support daily living skills and current level of support/independence with no major concerns.   Visual Perception   Visual Perception Comments Client has notable vision impairments prior to head injury and continues to follow with optometry. Difficulty assessing vision without near-vision corrective lenses that client reports are coming soon.   Sensation   Sensation Comments Formal testing not completed d/t lack of time, see hand therapy note for additional details   Posture   Posture Not impaired   Range of Motion (ROM)   ROM Comments Formal testing not completed d/t lack of time, see hand therapy note for additional details   Strength   Strength Comments Formal testing not completed d/t lack of time, see hand therapy note for additional details   Hand Strength   Hand Strength Comments Formal testing not completed d/t lack of time, see hand therapy  note for additional details   Muscle Tone   Muscle Tone No deficits were identified   Coordination   Coordination Comments Formal testing not completed d/t lack of time, see hand therapy note for additional details   Balance   Balance Comments Formal testing not completed d/t lack of time, see physical therapy note for additional details, no concerns from assessment however client reports lack of confidence and fear of falls   Transfer Skill   Level of [place]: Transfers independent   Bathing   Level of [place] - Bathing independent   Assistive Device shower chair   Upper Body Dressing   Level of [place]: Dress Upper Body independent   Upper Body Dressing Comments Difficulty with buttons with injury and brace to R hand, continues to improve efficiency per report   Lower Body Dressing   Level of [place]: Dress Lower Body independent   Lower Body Dressing Comments Difficulty with buttons with injury and brace to R hand, continues to improve efficiency per report   Toileting   Level of [place]: Toilet independent   Grooming   Level of [place]: Grooming independent   Eating/Self-Feeding   Level of [place]: Eating independent   Eating/Self Feeding Comments Using L hand with injury to R hand, slowed but going okay per report   Activity Tolerance   Activity Tolerance Impaired tolerance with light sensitivity, neck pain, R hand injury   Instrumental Activities of Daily Living Assessment   IADL Assessment/Observations Client states she may seek PCA services for assistance   IADL Quick Adds Meal Planning/Preparation;Home/Financial/Management   Meal Planning/Preparation Impaired participation with poor symptom management, R hand injury, neck pain   Home/Financial Management Impaired participation with poor symptom management, R hand injury, neck pain; Slowly returning to activity participation   Planned Therapy Interventions   Planned Therapy Interventions Self care/Home management   Adult OT  Eval Goals   OT Eval Goals (Adult) 1    OT Goal 1   Goal Identifier Sensory tolerance   Goal Description Client will participate in education and successfully identify at least one method to manage light sensitivity to improve symptom management and participation in daily tasks   Target Date 08/03/22   Clinical Impression   Criteria for Skilled Therapeutic Interventions Met Yes, treatment indicated   OT Diagnosis Impaired IADL participation s/p concussion   Influenced by the following impairments Sensory intolerance, pain, dominant hand impairments   Assessment of Occupational Performance 3-5 Performance Deficits   Identified Performance Deficits home maintenence, cooking, texting/phone use, sleeping   Clinical Decision Making (Complexity) Moderate complexity   Predicted Duration of Therapy Intervention (days/wks) Client to d/c following visit today   Risks and Benefits of Treatment have been explained. Yes   Patient, Family & other staff in agreement with plan of care Yes   Clinical Impression Comments Client benefits from skilled OT services for education and facilitation of post-concussion recovery and symptom management to facilitate improved participation, ease, and efficiency of IADL tasks   Education Assessment   Barriers To Learning Language   Preferred Learning Style Listening;Demonstration;Pictures/video  (Client should be receiving reading glasses soon and could benefit from written material following obtainment)   Total Evaluation Time   OT Eval, Moderate Complexity Minutes (31053) 30

## 2022-05-13 ENCOUNTER — THERAPY VISIT (OUTPATIENT)
Dept: PHYSICAL THERAPY | Facility: CLINIC | Age: 67
End: 2022-05-13
Payer: MEDICARE

## 2022-05-13 DIAGNOSIS — M54.2 NECK PAIN: Primary | ICD-10-CM

## 2022-05-13 PROCEDURE — 97140 MANUAL THERAPY 1/> REGIONS: CPT | Mod: GP | Performed by: PHYSICAL THERAPIST

## 2022-05-13 PROCEDURE — 97110 THERAPEUTIC EXERCISES: CPT | Mod: GP | Performed by: PHYSICAL THERAPIST

## 2022-05-14 ENCOUNTER — OFFICE VISIT (OUTPATIENT)
Dept: ORTHOPEDICS | Facility: CLINIC | Age: 67
End: 2022-05-14
Payer: MEDICARE

## 2022-05-14 VITALS
HEIGHT: 64 IN | SYSTOLIC BLOOD PRESSURE: 134 MMHG | HEART RATE: 70 BPM | WEIGHT: 196 LBS | BODY MASS INDEX: 33.46 KG/M2 | DIASTOLIC BLOOD PRESSURE: 75 MMHG

## 2022-05-14 DIAGNOSIS — M54.12 RIGHT CERVICAL RADICULOPATHY: Primary | ICD-10-CM

## 2022-05-14 DIAGNOSIS — M25.531 RIGHT WRIST PAIN: ICD-10-CM

## 2022-05-14 DIAGNOSIS — M77.11 LATERAL EPICONDYLITIS OF RIGHT ELBOW: ICD-10-CM

## 2022-05-14 PROCEDURE — 20606 DRAIN/INJ JOINT/BURSA W/US: CPT | Mod: RT | Performed by: FAMILY MEDICINE

## 2022-05-14 PROCEDURE — 20551 NJX 1 TENDON ORIGIN/INSJ: CPT | Mod: RT | Performed by: FAMILY MEDICINE

## 2022-05-14 PROCEDURE — 76942 ECHO GUIDE FOR BIOPSY: CPT | Mod: 59 | Performed by: FAMILY MEDICINE

## 2022-05-14 PROCEDURE — 99214 OFFICE O/P EST MOD 30 MIN: CPT | Mod: 25 | Performed by: FAMILY MEDICINE

## 2022-05-14 RX ADMIN — ROPIVACAINE HYDROCHLORIDE 1 ML: 5 INJECTION, SOLUTION EPIDURAL; INFILTRATION; PERINEURAL at 11:01

## 2022-05-14 RX ADMIN — BETAMETHASONE SODIUM PHOSPHATE AND BETAMETHASONE ACETATE 6 MG: 3; 3 INJECTION, SUSPENSION INTRA-ARTICULAR; INTRALESIONAL; INTRAMUSCULAR; SOFT TISSUE at 10:55

## 2022-05-14 RX ADMIN — BETAMETHASONE SODIUM PHOSPHATE AND BETAMETHASONE ACETATE 6 MG: 3; 3 INJECTION, SUSPENSION INTRA-ARTICULAR; INTRALESIONAL; INTRAMUSCULAR; SOFT TISSUE at 11:01

## 2022-05-14 RX ADMIN — ROPIVACAINE HYDROCHLORIDE 2 ML: 5 INJECTION, SOLUTION EPIDURAL; INFILTRATION; PERINEURAL at 10:55

## 2022-05-14 NOTE — PROGRESS NOTES
ASSESSMENT & PLAN    Yancy was seen today for pain, follow up, pain and follow up.    Diagnoses and all orders for this visit:    Right cervical radiculopathy  -     MRI Cervical spine w/o contrast; Future    Lateral epicondylitis of right elbow  -     Medium Joint Injection/Arthrocentesis: R elbow    Right wrist pain  -     Medium Joint Injection/Arthrocentesis: R radiocarpal    Other orders  -     Cancel: Small Joint Injection/Arthrocentesis  -     Cancel: Medium Joint Injection/Arthrocentesis: R elbow      This issue is chronic and Worsening.    # Right Hand/Arm Pain: Symptoms noted after a fall initially in December 2021.  She still having pain over the wrist and lateral elbow limiting her ability to move her arm.  She is also having some neck pain after a fall this year as well with symptoms going down her right arm.  She does have significant tenderness to palpation over the scapholunate joint as well as the lateral epicondyle.  There is some pain going down her arm with cervical nerve compression as well.  There is a concern that she has several things causing her pain right now including the sprain of the scapholunate ligament, lateral epicondylitis, as well as possible radiculopathy.  Counseled patient on treatment options including continued hand therapy, bracing, oral anti-inflammatories, steroid injections.  Given this plan to treat as below and follow-up in 2 weeks for repeat assessment.  Image Findings: Reviewed  Treatment: Activities as tolerated, home exercises given today  Medications/Injections: Prednisone 40 mg for 5 days, defer steroid injection for now given having some neck pain as well.    Follow-up: 2 weeks for repeat evaluation, may consider steroid injections    Aristides Nugent MD  [facility] [place]    SUBJECTIVE- Interim History April 21, 2022    Chief Complaint   Patient presents with     Right Wrist - Pain, Follow Up     Right Hand - Pain, Follow Up  "    Visit completed with native language phone     Jaydekatie Rivera is a 66 year old female who is seen in f/u up for    Right cervical radiculopathy  Lateral epicondylitis of right elbow  Right wrist pain. Since last visit on 2/17/22 patient would like her right wrist re-evaluated.   She is unable to go back to Dr. Harper due to transportation.  - Now ~ December 2021           Initial injury was over a year ago, she fell in December 2021    Worsened by: nothing, she does not use the right hand, cannot squeeze  Better with: bracing, elevation, prescription   Treatments tried: elevation, ice, other medications: Naprosyn, physical therapy (5 visits), previous imaging (MRI right wrist and xray elbow) and casting/splinting/bracing, injections for the first injury 5/14/2020 by Dr. Santiago, 3 months of relief,   Associated symptoms:  swelling, numbness, tingling, warmth, weakness of right hand and feeling of instability    The patient is seen by themselves.  The patient is Right handed    Orthopedic/Surgical history: YES - Date: chronic right hand pain  Social History/Occupation: no    Interim History - May 14, 2022  Since last visit on 4/21/2022 patient has not been really getting better. She is having continued pain in her elbow and her wrist.  She reports feeling a numbness in her right arm, continues to have difficulty using the elbow and arm.  She does like the wrist brace, it does help her.  She is continuing to ice, OT, HEP, bracing.  Was waiting on considering injection today.  No interim injury.         No family history pertinent to patient's problem today.      REVIEW OF SYSTEMS:  Review of Systems  Constitutional, HEENT, cardiovascular, pulmonary, GI, , musculoskeletal, neuro, skin, endocrine and psych systems are negative, except as otherwise noted.    OBJECTIVE:  /75   Pulse 70   Ht 1.626 m (5' 4\")   Wt 88.9 kg (196 lb)   LMP  (LMP Unknown)   BMI 33.64 kg/m       GENERAL APPEARANCE: " healthy, alert and no distress   GAIT: NORMAL  SKIN: no suspicious lesions or rashes  HEENT: Sclera clear, anicteric  CV: no lower extremity edema, good peripheral pulses  RESP: Breathing not labored  NEURO: Normal strength and tone, sensory exam grossly normal, mentation intact and speech normal  PSYCH:  mentation appears normal and affect normal/bright    RIGHT ELBOW  Inspection:    No swelling, bruising, discoloration, or obvious deformity or asymmetry  Palpation:    Tender about the lateral epicondyle. Remainder of bony, ligamentous and tendinous landmarks are nontender.    Crepitus is Absent  Range of Motion:   Range of motion grossly intact but patient and his significant mount of pain so exam is limited  Strength:  Intact strength in elbow flexion/extension as well as pronation/supination but painful  Special Tests:  Special testing limited secondary to significant pain patient is in but symptoms worse with resisted wrist extension    Cervical  Inspection: normal cervical lordosis  Tender:  right paracervical muscles  Non-tender:  spinous processes, left paracervical muscles  Range of Motion:  Full ROM of cervical spine  Strength: Full strength of all neck muscles  Special tests:  Spurling's - positive - right, Spurling's - negative - left    Hand/wrist (right):  No deformity noted.  No edema, ecchymosis.  Pain limiting wrist flexion/extension  Strength grossly intact but limited secondary to pain  Radial pulses normal, +2/4, capillary refill brisk.  Tenderness to palpation over the scapholunate joint  Finkelstein negative.  Intact and symmetric strength and sensation in the median/radial/unlar distributions bilaterally        RADIOLOGY:  Final results and radiologist's interpretation, available in the [facility] health record.  Images were reviewed with the patient in the office today.    Results for orders placed or performed in visit on 02/15/22   MR Wrist Right w/o Contrast    Narrative    MR right wrist  without contrast 2/15/2022 11:52 AM    Techniques: Multiplanar multisequence imaging of the right wrist was  obtained without administration of intra-articular or intravenous  contrast using routing protocol.    History: Wrist fracture, tendon/ligament injury suspected, xray done;  fall two weeks ago, neg xray, persisent right scaphoid and lunate and  distal right radius tenderness on exam, r/o occult fx.; Right wrist  injury, initial encounter; Right wrist pain    Additional History from EMR: MRI 6/11/2020; x-ray 1/10/2022, 5/14/2020      Comparison: MRI 6/11/2020; x-ray 1/10/2022, 5/14/2020    Findings:  External marker placed over the scapholunate joint.    Triangular Fibrocartilage: Disruption of the styloid attachment and  central disc. No radial sided triangular cartilage perforation.    Interosseous Ligaments:  Scapholunate ligament: High-grade sprain of the dorsal and volar  scapholunate ligament. High-grade tear of the membranous scapholunate  ligament, coronal image 8, sagittal image 17. Widening of the  scapholunate interosseous distance measuring 4.5 mm (series 5001,  image 8), new from prior.  Lunatotriquetral ligament: Intact.  Carpal alignment: Widening of scapholunate joint, otherwise  unremarkable.    Bones: Chronic changes of the distal ulna similar-appearing to prior  MR, presumably related to prior injury.    Articulations:  Joint effusion: None.  Cartilage: No hyaline cartilage defects.  Synovium: No synovial thickening.    Tendons:  Flexor tendons: Normal. No tear or tendon sheath effusion.  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    Miscellaneous soft tissues: Volar ganglion cyst originating from the  pisotriquetral  joint extending proximally towards the TFCC, axial  image 16. Increased fluid in the pisotriquetral recess.      Impression    IMPRESSION:  1. Directly underlying the external marker: Widening  of the  scapholunate interosseous distance measuring 4.5 mm with high-grade  tearing of the membranous portion of the scapholunate ligament and  high-grade sprain of the dorsal/volar components. This is new from  prior.  2. In proximity to the external marker:  2a. Second and third extensor compartment tenosynovitis.  2b. Fluid in the pisotriquetral recess.   3. Redemonstration of chronic changes of the TFCC, predominantly of  the central disc and styloid attachment, similar to prior.    I have personally reviewed the examination and initial interpretation  and I agree with the findings.    JUTTA ELLERMANN, MD         SYSTEM ID:  [id number]     3 views right wrist radiographs 1/10/2022 3:25 PM     History: Right wrist pain     Comparison: 5/14/2020     Findings:     PA, oblique and lateral view(s) of the right wrist were obtained.      No acute osseous abnormality.  No erosion. There is a small osseous  fragment just distal to the ulna styloid, also seen on prior  radiograph.     Mild to moderate degenerative changes of the first carpometacarpal and  triscaphe joints.       Soft tissue is unremarkable.                                                                      Impression:  1. No acute osseous abnormality.  2. Mild to moderate grade degenerative change of the first CMC joint.     JUTTA ELLERMANN, MD             Review of external notes as documented elsewhere in note  Review of the result(s) of each unique test - right elbow x-ray, right wrist x-ray and MRI     Medium Joint Injection/Arthrocentesis: R elbow    Date/Time: 5/14/2022 10:55 AM  Performed by: Aristides Nugent MD  Authorized by: Aristides Nugent MD     Indications:  Pain  Needle Size:  25 G  Guidance: ultrasound    Approach:  Lateral  Approach comment:  R Lateral Epicondyle  Location:  Elbow  Site:  R elbow  Medications:  2 mL ropivacaine 5 MG/ML; 6 mg betamethasone acet & sod phos 6 (3-3) MG/ML  Outcome:  Tolerated well, no immediate  complications  Procedure discussed: discussed risks, benefits, and alternatives    Timeout: timeout called immediately prior to procedure    Prep: patient was prepped and draped in usual sterile fashion    Medium Joint Injection/Arthrocentesis: R radiocarpal    Date/Time: 5/14/2022 11:01 AM  Performed by: Aristides Nugent MD  Authorized by: Aristides Nugent MD     Indications:  Pain  Needle Size:  25 G  Guidance: ultrasound    Approach:  Dorsal  Location:  Wrist  Site:  R radiocarpal  Medications:  1 mL ropivacaine 5 MG/ML; 6 mg betamethasone acet & sod phos 6 (3-3) MG/ML  Outcome:  Tolerated well, no immediate complications  Procedure discussed: discussed risks, benefits, and alternatives    Timeout: timeout called immediately prior to procedure    Prep: patient was prepped and draped in usual sterile fashion     Patient reported significant improvement of pain after the numbing portion right radiocarpal and right lateral epicondyle steroid injection.  Ultrasound guided images were permanently stored.   Aftercare instructions given to patient.  Plan to follow-up as discussed above.     Aristides Nugent MD [facility]

## 2022-05-14 NOTE — LETTER
5/14/2022         RE: Yancy Rivera  [address] [facility] [address]        Dear Colleague,    Thank you for referring your patient, Yancy Rivera, to the [facility]. Please see a copy of my visit note below.    ASSESSMENT & PLAN    Yancy was seen today for pain, follow up, pain and follow up.    Diagnoses and all orders for this visit:    Right cervical radiculopathy  -     MRI Cervical spine w/o contrast; Future    Lateral epicondylitis of right elbow  -     Medium Joint Injection/Arthrocentesis: R elbow    Right wrist pain  -     Medium Joint Injection/Arthrocentesis: R radiocarpal    Other orders  -     Cancel: Small Joint Injection/Arthrocentesis  -     Cancel: Medium Joint Injection/Arthrocentesis: R elbow      This issue is chronic and Worsening.    # Right Hand/Arm Pain: Symptoms noted after a fall initially in December 2021.  She still having pain over the wrist and lateral elbow limiting her ability to move her arm.  She is also having some neck pain after a fall this year as well with symptoms going down her right arm.  She does have significant tenderness to palpation over the scapholunate joint as well as the lateral epicondyle.  There is some pain going down her arm with cervical nerve compression as well.  There is a concern that she has several things causing her pain right now including the sprain of the scapholunate ligament, lateral epicondylitis, as well as possible radiculopathy.  Counseled patient on treatment options including continued hand therapy, bracing, oral anti-inflammatories, steroid injections.  Given this plan to treat as below and follow-up in 2 weeks for repeat assessment.  Image Findings: Reviewed  Treatment: Activities as tolerated, home exercises given today  Medications/Injections: Prednisone 40 mg for 5 days, defer steroid injection for now given having some neck pain as well.    Follow-up: 2 weeks for repeat evaluation,  may consider steroid injections    Aristides Nugent MD  [facility]    SUBJECTIVE- Interim History April 21, 2022    Chief Complaint   Patient presents with     Right Wrist - Pain, Follow Up     Right Hand - Pain, Follow Up     Visit completed with native language phone     Yancy Rivera is a 66 year old female who is seen in f/u up for    Right cervical radiculopathy  Lateral epicondylitis of right elbow  Right wrist pain. Since last visit on 2/17/22 patient would like her right wrist re-evaluated.   She is unable to go back to Dr. Harper due to transportation.  - Now ~ December 2021           Initial injury was over a year ago, she fell in December 2021    Worsened by: nothing, she does not use the right hand, cannot squeeze  Better with: bracing, elevation, prescription   Treatments tried: elevation, ice, other medications: Naprosyn, physical therapy (5 visits), previous imaging (MRI right wrist and xray elbow) and casting/splinting/bracing, injections for the first injury 5/14/2020 by Dr. Santiago, 3 months of relief,   Associated symptoms:  swelling, numbness, tingling, warmth, weakness of right hand and feeling of instability    The patient is seen by themselves.  The patient is Right handed    Orthopedic/Surgical history: YES - Date: chronic right hand pain  Social History/Occupation: no    Interim History - May 14, 2022  Since last visit on 4/21/2022 patient has not been really getting better. She is having continued pain in her elbow and her wrist.  She reports feeling a numbness in her right arm, continues to have difficulty using the elbow and arm.  She does like the wrist brace, it does help her.  She is continuing to ice, OT, HEP, bracing.  Was waiting on considering injection today.  No interim injury.         No family history pertinent to patient's problem today.      REVIEW OF SYSTEMS:  Review of Systems  Constitutional, HEENT, cardiovascular,  "pulmonary, GI, , musculoskeletal, neuro, skin, endocrine and psych systems are negative, except as otherwise noted.    OBJECTIVE:  /75   Pulse 70   Ht 1.626 m (5' 4\")   Wt 88.9 kg (196 lb)   LMP  (LMP Unknown)   BMI 33.64 kg/m       GENERAL APPEARANCE: healthy, alert and no distress   GAIT: NORMAL  SKIN: no suspicious lesions or rashes  HEENT: Sclera clear, anicteric  CV: no lower extremity edema, good peripheral pulses  RESP: Breathing not labored  NEURO: Normal strength and tone, sensory exam grossly normal, mentation intact and speech normal  PSYCH:  mentation appears normal and affect normal/bright    RIGHT ELBOW  Inspection:    No swelling, bruising, discoloration, or obvious deformity or asymmetry  Palpation:    Tender about the lateral epicondyle. Remainder of bony, ligamentous and tendinous landmarks are nontender.    Crepitus is Absent  Range of Motion:   Range of motion grossly intact but patient and his significant mount of pain so exam is limited  Strength:  Intact strength in elbow flexion/extension as well as pronation/supination but painful  Special Tests:  Special testing limited secondary to significant pain patient is in but symptoms worse with resisted wrist extension    Cervical  Inspection: normal cervical lordosis  Tender:  right paracervical muscles  Non-tender:  spinous processes, left paracervical muscles  Range of Motion:  Full ROM of cervical spine  Strength: Full strength of all neck muscles  Special tests:  Spurling's - positive - right, Spurling's - negative - left    Hand/wrist (right):  No deformity noted.  No edema, ecchymosis.  Pain limiting wrist flexion/extension  Strength grossly intact but limited secondary to pain  Radial pulses normal, +2/4, capillary refill brisk.  Tenderness to palpation over the scapholunate joint  Finkelstein negative.  Intact and symmetric strength and sensation in the median/radial/unlar distributions bilaterally        RADIOLOGY:  Final " results and radiologist's interpretation, available in the [facility] health record.  Images were reviewed with the patient in the office today.    Results for orders placed or performed in visit on 02/15/22   MR Wrist Right w/o Contrast    Narrative    MR right wrist without contrast 2/15/2022 11:52 AM    Techniques: Multiplanar multisequence imaging of the right wrist was  obtained without administration of intra-articular or intravenous  contrast using routing protocol.    History: Wrist fracture, tendon/ligament injury suspected, xray done;  fall two weeks ago, neg xray, persisent right scaphoid and lunate and  distal right radius tenderness on exam, r/o occult fx.; Right wrist  injury, initial encounter; Right wrist pain    Additional History from EMR: MRI 6/11/2020; x-ray 1/10/2022, 5/14/2020      Comparison: MRI 6/11/2020; x-ray 1/10/2022, 5/14/2020    Findings:  External marker placed over the scapholunate joint.    Triangular Fibrocartilage: Disruption of the styloid attachment and  central disc. No radial sided triangular cartilage perforation.    Interosseous Ligaments:  Scapholunate ligament: High-grade sprain of the dorsal and volar  scapholunate ligament. High-grade tear of the membranous scapholunate  ligament, coronal image 8, sagittal image 17. Widening of the  scapholunate interosseous distance measuring 4.5 mm (series 5001,  image 8), new from prior.  Lunatotriquetral ligament: Intact.  Carpal alignment: Widening of scapholunate joint, otherwise  unremarkable.    Bones: Chronic changes of the distal ulna similar-appearing to prior  MR, presumably related to prior injury.    Articulations:  Joint effusion: None.  Cartilage: No hyaline cartilage defects.  Synovium: No synovial thickening.    Tendons:  Flexor tendons: Normal. No tear or tendon sheath effusion.  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    Miscellaneous  soft tissues: Volar ganglion cyst originating from the  pisotriquetral  joint extending proximally towards the TFCC, axial  image 16. Increased fluid in the pisotriquetral recess.      Impression    IMPRESSION:  1. Directly underlying the external marker: Widening of the  scapholunate interosseous distance measuring 4.5 mm with high-grade  tearing of the membranous portion of the scapholunate ligament and  high-grade sprain of the dorsal/volar components. This is new from  prior.  2. In proximity to the external marker:  2a. Second and third extensor compartment tenosynovitis.  2b. Fluid in the pisotriquetral recess.   3. Redemonstration of chronic changes of the TFCC, predominantly of  the central disc and styloid attachment, similar to prior.    I have personally reviewed the examination and initial interpretation  and I agree with the findings.    JUTTA ELLERMANN, MD         SYSTEM ID:  [id number]     3 views right wrist radiographs 1/10/2022 3:25 PM     History: Right wrist pain     Comparison: 5/14/2020     Findings:     PA, oblique and lateral view(s) of the right wrist were obtained.      No acute osseous abnormality.  No erosion. There is a small osseous  fragment just distal to the ulna styloid, also seen on prior  radiograph.     Mild to moderate degenerative changes of the first carpometacarpal and  triscaphe joints.       Soft tissue is unremarkable.                                                                      Impression:  1. No acute osseous abnormality.  2. Mild to moderate grade degenerative change of the first CMC joint.     JUTTA ELLERMANN, MD             Review of external notes as documented elsewhere in note  Review of the result(s) of each unique test - right elbow x-ray, right wrist x-ray and MRI     Medium Joint Injection/Arthrocentesis: R elbow    Date/Time: 5/14/2022 10:55 AM  Performed by: Aristides Nugent MD  Authorized by: Aristides Nugent MD     Indications:  Pain  Needle  Size:  25 G  Guidance: ultrasound    Approach:  Lateral  Approach comment:  R Lateral Epicondyle  Location:  Elbow  Site:  R elbow  Medications:  2 mL ropivacaine 5 MG/ML; 6 mg betamethasone acet & sod phos 6 (3-3) MG/ML  Outcome:  Tolerated well, no immediate complications  Procedure discussed: discussed risks, benefits, and alternatives    Timeout: timeout called immediately prior to procedure    Prep: patient was prepped and draped in usual sterile fashion    Medium Joint Injection/Arthrocentesis: R radiocarpal    Date/Time: 5/14/2022 11:01 AM  Performed by: Aristides Nugent MD  Authorized by: Aristides Nugent MD     Indications:  Pain  Needle Size:  25 G  Guidance: ultrasound    Approach:  Dorsal  Location:  Wrist  Site:  R radiocarpal  Medications:  1 mL ropivacaine 5 MG/ML; 6 mg betamethasone acet & sod phos 6 (3-3) MG/ML  Outcome:  Tolerated well, no immediate complications  Procedure discussed: discussed risks, benefits, and alternatives    Timeout: timeout called immediately prior to procedure    Prep: patient was prepped and draped in usual sterile fashion     Patient reported significant improvement of pain after the numbing portion right radiocarpal and right lateral epicondyle steroid injection.  Ultrasound guided images were permanently stored.   Aftercare instructions given to patient.  Plan to follow-up as discussed above.     Aristides Nugent MD [facility]                  Again, thank you for allowing me to participate in the care of your patient.        Sincerely,        Aristides Nugent MD

## 2022-05-14 NOTE — PATIENT INSTRUCTIONS
# Right Hand/Arm Pain: Symptoms noted after a fall initially in December 2021.  She still having pain over the wrist and lateral elbow limiting her ability to move her arm.  She is still having some neck pain after a fall this year as well with symptoms going down her right arm.  She does have tenderness to palpation over the scapholunate joint as well as the lateral epicondyle.  There is also pain going down her arm with cervical nerve compression as well.  There is a concern that she has several things causing her pain right now including the sprain of the scapholunate ligament, lateral epicondylitis, as well as possible radiculopathy.  Counseled patient on treatment options including continued hand therapy, bracing, oral anti-inflammatories, steroid injections.  Given this plan to treat as below and follow-up in 2 weeks for repeat assessment.  Image Findings: Reviewed  Treatment: Activities as tolerated, home exercises given today  Medications/Injections: Can take naproxen for pain, continue prontonix, right lateral epicondyle steroid injection, scapholunate steroid injection     Follow-up: after cervical MRI to go over the results.

## 2022-05-16 DIAGNOSIS — K21.9 GASTROESOPHAGEAL REFLUX DISEASE, UNSPECIFIED WHETHER ESOPHAGITIS PRESENT: ICD-10-CM

## 2022-05-16 RX ORDER — PANTOPRAZOLE SODIUM 40 MG/1
40 TABLET, DELAYED RELEASE ORAL DAILY
Qty: 90 TABLET | Refills: 0 | Status: SHIPPED | OUTPATIENT
Start: 2022-05-16 | End: 2022-09-01

## 2022-05-16 RX ORDER — ROPIVACAINE HYDROCHLORIDE 5 MG/ML
2 INJECTION, SOLUTION EPIDURAL; INFILTRATION; PERINEURAL
Status: SHIPPED | OUTPATIENT
Start: 2022-05-14

## 2022-05-16 RX ORDER — BETAMETHASONE SODIUM PHOSPHATE AND BETAMETHASONE ACETATE 3; 3 MG/ML; MG/ML
6 INJECTION, SUSPENSION INTRA-ARTICULAR; INTRALESIONAL; INTRAMUSCULAR; SOFT TISSUE
Status: SHIPPED | OUTPATIENT
Start: 2022-05-14

## 2022-05-16 RX ORDER — ROPIVACAINE HYDROCHLORIDE 5 MG/ML
1 INJECTION, SOLUTION EPIDURAL; INFILTRATION; PERINEURAL
Status: SHIPPED | OUTPATIENT
Start: 2022-05-14

## 2022-05-16 NOTE — TELEPHONE ENCOUNTER
pantoprazole (PROTONIX) 40 MG EC tablet  Last Written Prescription Date:   7/27/2021  Last Fill Quantity: 90,   # refills: 1  Last Office Visit : 4/30/2021  Future Office visit:  None    Routing refill request to provider for review/approval because:  Second Request  Over due office visit and Gaps in refills.  Please call Pt to schedule visit.       Vijaya Huston RN  Central Triage Red Flags/Med Refills

## 2022-05-19 ENCOUNTER — THERAPY VISIT (OUTPATIENT)
Dept: PHYSICAL THERAPY | Facility: CLINIC | Age: 67
End: 2022-05-19
Payer: MEDICARE

## 2022-05-19 DIAGNOSIS — M54.2 NECK PAIN: Primary | ICD-10-CM

## 2022-05-19 PROCEDURE — 97140 MANUAL THERAPY 1/> REGIONS: CPT | Mod: GP | Performed by: PHYSICAL THERAPIST

## 2022-05-19 PROCEDURE — 97110 THERAPEUTIC EXERCISES: CPT | Mod: GP | Performed by: PHYSICAL THERAPIST

## 2022-05-26 ENCOUNTER — ANCILLARY PROCEDURE (OUTPATIENT)
Dept: MRI IMAGING | Facility: CLINIC | Age: 67
End: 2022-05-26
Attending: FAMILY MEDICINE
Payer: MEDICARE

## 2022-05-26 ENCOUNTER — THERAPY VISIT (OUTPATIENT)
Dept: PHYSICAL THERAPY | Facility: CLINIC | Age: 67
End: 2022-05-26
Payer: MEDICARE

## 2022-05-26 DIAGNOSIS — M54.2 NECK PAIN: Primary | ICD-10-CM

## 2022-05-26 DIAGNOSIS — M54.12 RIGHT CERVICAL RADICULOPATHY: ICD-10-CM

## 2022-05-26 PROCEDURE — 72141 MRI NECK SPINE W/O DYE: CPT | Mod: TC | Performed by: RADIOLOGY

## 2022-05-26 PROCEDURE — G1004 CDSM NDSC: HCPCS | Mod: TC | Performed by: RADIOLOGY

## 2022-05-26 PROCEDURE — 97140 MANUAL THERAPY 1/> REGIONS: CPT | Mod: GP | Performed by: PHYSICAL THERAPIST

## 2022-05-27 ENCOUNTER — TELEPHONE (OUTPATIENT)
Dept: ORTHOPEDICS | Facility: CLINIC | Age: 67
End: 2022-05-27
Payer: MEDICARE

## 2022-06-07 ENCOUNTER — OFFICE VISIT (OUTPATIENT)
Dept: ORTHOPEDICS | Facility: CLINIC | Age: 67
End: 2022-06-07
Payer: MEDICARE

## 2022-06-07 VITALS
HEIGHT: 64 IN | BODY MASS INDEX: 33.64 KG/M2 | DIASTOLIC BLOOD PRESSURE: 85 MMHG | HEART RATE: 71 BPM | SYSTOLIC BLOOD PRESSURE: 143 MMHG

## 2022-06-07 DIAGNOSIS — M54.12 RIGHT CERVICAL RADICULOPATHY: Primary | ICD-10-CM

## 2022-06-07 DIAGNOSIS — M77.11 LATERAL EPICONDYLITIS OF RIGHT ELBOW: ICD-10-CM

## 2022-06-07 DIAGNOSIS — S69.91XD RIGHT WRIST INJURY, SUBSEQUENT ENCOUNTER: ICD-10-CM

## 2022-06-07 PROCEDURE — 99213 OFFICE O/P EST LOW 20 MIN: CPT | Performed by: FAMILY MEDICINE

## 2022-06-07 NOTE — LETTER
6/7/2022         RE: Yancy Rivera  [address] [facility] [address]        Dear Colleague,    Thank you for referring your patient, Yancy Rivera, to the [facility]. Please see a copy of my visit note below.    ASSESSMENT & PLAN    Yancy was seen today for pain.    Diagnoses and all orders for this visit:    Right cervical radiculopathy  -     Cancel: Pain Management Referral; Future  -     Pain Management Referral; Future    Lateral epicondylitis of right elbow    Right wrist injury, subsequent encounter        # Right Hand/Arm Pain: Symptoms noted after a fall initially in December 2021.  She still having pain over the wrist and lateral elbow limiting her ability to move her arm.  She is having neck pain after a fall this year as well with symptoms going down her right arm.  She does have pain over the lateral shoulder, elbow and wrist.  There is some pain going down her arm with cervical nerve compression as well.  There is a concern that she has several things causing her pain right now including the sprain of the scapholunate ligament, lateral epicondylitis, as well as possible radiculopathy.  Previous wrist and lateral elbow steroid injections helped some but not much.  Counseled patient on treatment options including continued hand therapy, bracing, oral anti-inflammatories, steroid injections.  Given this plan to treat as below and follow-up after cervical neck injection  Image Findings: Reviewed  Treatment: Activities as tolerated, continue home exercises,   Medications/Injections: Nabumetone, referral for cervical epidural steroid injection   Follow-up: 2 weeks after cervical epidural steroid injection for repeat evaluation, may consider steroid injections    -----    SUBJECTIVE:  Yancy Rivera is a 66 year old female who is seen in follow-up for cervical MRI results, DOS 5/26/22. They were last seen 5/14/2022.  The patient is seen by themselves and  "interpretor.    Since their last visit reports slightly improved pain.  They indicate that their current pain level is 7/10. They have tried Naproxen and steroid injection over right lateral epicondyle and right wrist joint steroid injections.  Pain is helping some but still having pain going down the arm.    She is travelling to visit her children for one month in July    Patient's past medical, surgical, social, and family histories were reviewed today and no changes are noted.    REVIEW OF SYSTEMS:  Constitutional: NEGATIVE for fever, chills, change in weight  Skin: NEGATIVE for worrisome rashes, moles or lesions  GI/: NEGATIVE for bowel or bladder changes  Neuro: NEGATIVE for weakness, dizziness or paresthesias    OBJECTIVE:  BP (!) 143/85   Pulse 71   Ht 1.626 m (5' 4\")   LMP  (LMP Unknown)   BMI 33.64 kg/m     General: healthy, alert and in no distress  HEENT: no scleral icterus or conjunctival erythema  Skin: no suspicious lesions or rash. No jaundice.  CV: regular rhythm by palpation, no pedal edema  Resp: normal respiratory effort without conversational dyspnea   Psych: normal mood and affect  Gait: normal steady gait with appropriate coordination and balance  Neuro: normal light touch sensory exam of the extremities.    MSK:    RIGHT ELBOW  Inspection:    No swelling, bruising, discoloration, or obvious deformity or asymmetry  Palpation:    Tender about the lateral epicondyle. Remainder of bony, ligamentous and tendinous landmarks are nontender.    Crepitus is Absent  Range of Motion:   Improved range of motion  Strength:  Intact strength in elbow flexion/extension as well as pronation/supination but painful  Special Tests:  Pain with resisted wrist extension     Cervical  Inspection: normal cervical lordosis  Tender:  right paracervical muscles  Non-tender:  spinous processes, left paracervical muscles  Range of Motion:  Full ROM of cervical spine  Strength: Full strength of all neck " muscles  Special tests:  Spurling's - positive - right, Spurling's - negative - left     Hand/wrist (right):  No deformity noted.  No edema, ecchymosis.  Pain limiting wrist flexion/extension  Strength grossly intact but limited secondary to pain  Radial pulses normal, +2/4, capillary refill brisk.  Tenderness to palpation over the scapholunate joint  Finkelstein negative.  Intact and symmetric strength and sensation in the median/radial/unlar distributions bilaterally      Independent visualization of the below image:  MRI OF THE CERVICAL SPINE WITHOUT CONTRAST 5/26/2022 12:30 PM     COMPARISON: None     HISTORY: Cervical radiculopathy, no red flags. Right cervical  radiculopathy after a fall 12/21. Right cervical radiculopathy.     TECHNIQUE: Multiplanar, multisequence MRI images of the cervical spine  were acquired without intravenous contrast.     FINDINGS: There is normal alignment of the cervical vertebrae;  however, there is reversal of normal cervical lordosis centered at the  C2-C3 level. Vertebral body heights of the cervical spine are normal.  Craniocervical alignment is normal. There are no fractures of the  cervical spine.      There is loss of disc height, disc desiccation and posterior disc  bulging to varying degrees at all levels of the cervical spine with  exception of the normal appearing C7-T1 disc.      The cervical spinal cord is normal in contour. There is no abnormal  signal in the cervical spinal cord. There is no evidence for  intrathecal abnormality.     Level by level:     C2-C3: There is facet arthropathy bilaterally, uncinate hypertrophy  bilaterally and a posterior broad-based disc-osteophyte complex. No  spinal canal stenosis. No foraminal stenosis on either side.     C3-C4: There is facet arthropathy bilaterally, uncinate hypertrophy  bilaterally and a posterior broad-based disc-osteophyte complex.  Minimal spinal canal narrowing. Mild bilateral neural  foraminal  narrowing.     C4-C5: There is facet arthropathy bilaterally, uncinate hypertrophy  bilaterally and a posterior broad-based disc-osteophyte complex.  Minimal spinal canal narrowing. Moderate right foraminal stenosis.  Mild left foraminal narrowing.     C5-C6: There is facet arthropathy bilaterally, uncinate hypertrophy  bilaterally and a posterior broad-based disc-osteophyte complex.  Minimal spinal canal narrowing. Mild bilateral neural foraminal  narrowing.     C6-C7: There is facet arthropathy bilaterally, uncinate hypertrophy  bilaterally and a posterior broad-based disc-osteophyte complex. No  spinal canal stenosis. Moderate right foraminal stenosis. Mild left  foraminal narrowing.     C7-T1: Normal disc height and contour. Mild facet arthropathy  bilaterally. No spinal canal stenosis. No foraminal stenosis on either  side.                                                                       IMPRESSION:  1. Diffuse degenerative change of the cervical spine as detailed  above.  2. No significant spinal canal stenosis of the cervical spine.  3. Moderate neural foraminal stenosis on the right at C4-C5 and on the  right at C6-C7.     MD Aristides RUANO MD, [facility]    Disclaimer: This note consists of symbols derived from keyboarding, dictation and/or voice recognition software. As a result, there may be errors in the script that have gone undetected. Please consider this when interpreting information found in this chart.          Again, thank you for allowing me to participate in the care of your patient.        Sincerely,        Aristides Nugent MD

## 2022-06-07 NOTE — PATIENT INSTRUCTIONS
# Right Hand/Arm Pain: Symptoms noted after a fall initially in December 2021.  She still having pain over the wrist and lateral elbow limiting her ability to move her arm.  She is having neck pain after a fall this year as well with symptoms going down her right arm.  She does have pain over the lateral shoulder, elbow and wrist.  There is some pain going down her arm with cervical nerve compression as well.  There is a concern that she has several things causing her pain right now including the sprain of the scapholunate ligament, lateral epicondylitis, as well as possible radiculopathy.  Previous wrist and lateral elbow steroid injections helped some but not much.  Counseled patient on treatment options including continued hand therapy, bracing, oral anti-inflammatories, steroid injections.  Given this plan to treat as below and follow-up after cervical neck injection  Image Findings: Reviewed  Treatment: Activities as tolerated, continue home exercises,   Medications/Injections: Nabumetone, referral for cervical epidural steroid injection   Follow-up: 2 weeks after cervical epidural steroid injection for repeat evaluation, may consider steroid injections    Please call [phone]   Ask for my team if you have any questions or concerns    If you have not yet received the influenza vaccine but would like to get one, please call  [phone] or you can schedule via [email or web address]    It was great seeing you again today!    Aristides Nugent MD, [facility]

## 2022-06-07 NOTE — PROGRESS NOTES
ASSESSMENT & PLAN    Yancy was seen today for pain.    Diagnoses and all orders for this visit:    Right cervical radiculopathy  -     Cancel: Pain Management Referral; Future  -     Pain Management Referral; Future    Lateral epicondylitis of right elbow    Right wrist injury, subsequent encounter        # Right Hand/Arm Pain: Symptoms noted after a fall initially in December 2021.  She still having pain over the wrist and lateral elbow limiting her ability to move her arm.  She is having neck pain after a fall this year as well with symptoms going down her right arm.  She does have pain over the lateral shoulder, elbow and wrist.  There is some pain going down her arm with cervical nerve compression as well.  There is a concern that she has several things causing her pain right now including the sprain of the scapholunate ligament, lateral epicondylitis, as well as possible radiculopathy.  Previous wrist and lateral elbow steroid injections helped some but not much.  Counseled patient on treatment options including continued hand therapy, bracing, oral anti-inflammatories, steroid injections.  Given this plan to treat as below and follow-up after cervical neck injection  Image Findings: Reviewed  Treatment: Activities as tolerated, continue home exercises,   Medications/Injections: Nabumetone, referral for cervical epidural steroid injection   Follow-up: 2 weeks after cervical epidural steroid injection for repeat evaluation, may consider steroid injections    -----    SUBJECTIVE:  Yancy Rivera is a 66 year old female who is seen in follow-up for cervical MRI results, DOS 5/26/22. They were last seen 5/14/2022.  The patient is seen by themselves and interpretor.    Since their last visit reports slightly improved pain.  They indicate that their current pain level is 7/10. They have tried Naproxen and steroid injection over right lateral epicondyle and right wrist joint steroid injections.  Pain is helping  "some but still having pain going down the arm.    She is travelling to visit her children for one month in July    Patient's past medical, surgical, social, and family histories were reviewed today and no changes are noted.    REVIEW OF SYSTEMS:  Constitutional: NEGATIVE for fever, chills, change in weight  Skin: NEGATIVE for worrisome rashes, moles or lesions  GI/: NEGATIVE for bowel or bladder changes  Neuro: NEGATIVE for weakness, dizziness or paresthesias    OBJECTIVE:  BP (!) 143/85   Pulse 71   Ht 1.626 m (5' 4\")   LMP  (LMP Unknown)   BMI 33.64 kg/m     General: healthy, alert and in no distress  HEENT: no scleral icterus or conjunctival erythema  Skin: no suspicious lesions or rash. No jaundice.  CV: regular rhythm by palpation, no pedal edema  Resp: normal respiratory effort without conversational dyspnea   Psych: normal mood and affect  Gait: normal steady gait with appropriate coordination and balance  Neuro: normal light touch sensory exam of the extremities.    MSK:    RIGHT ELBOW  Inspection:    No swelling, bruising, discoloration, or obvious deformity or asymmetry  Palpation:    Tender about the lateral epicondyle. Remainder of bony, ligamentous and tendinous landmarks are nontender.    Crepitus is Absent  Range of Motion:   Improved range of motion  Strength:  Intact strength in elbow flexion/extension as well as pronation/supination but painful  Special Tests:  Pain with resisted wrist extension     Cervical  Inspection: normal cervical lordosis  Tender:  right paracervical muscles  Non-tender:  spinous processes, left paracervical muscles  Range of Motion:  Full ROM of cervical spine  Strength: Full strength of all neck muscles  Special tests:  Spurling's - positive - right, Spurling's - negative - left     Hand/wrist (right):  No deformity noted.  No edema, ecchymosis.  Pain limiting wrist flexion/extension  Strength grossly intact but limited secondary to pain  Radial pulses normal, +2/4, " capillary refill brisk.  Tenderness to palpation over the scapholunate joint  Finkelstein negative.  Intact and symmetric strength and sensation in the median/radial/unlar distributions bilaterally      Independent visualization of the below image:  MRI OF THE CERVICAL SPINE WITHOUT CONTRAST 5/26/2022 12:30 PM     COMPARISON: None     HISTORY: Cervical radiculopathy, no red flags. Right cervical  radiculopathy after a fall 12/21. Right cervical radiculopathy.     TECHNIQUE: Multiplanar, multisequence MRI images of the cervical spine  were acquired without intravenous contrast.     FINDINGS: There is normal alignment of the cervical vertebrae;  however, there is reversal of normal cervical lordosis centered at the  C2-C3 level. Vertebral body heights of the cervical spine are normal.  Craniocervical alignment is normal. There are no fractures of the  cervical spine.      There is loss of disc height, disc desiccation and posterior disc  bulging to varying degrees at all levels of the cervical spine with  exception of the normal appearing C7-T1 disc.      The cervical spinal cord is normal in contour. There is no abnormal  signal in the cervical spinal cord. There is no evidence for  intrathecal abnormality.     Level by level:     C2-C3: There is facet arthropathy bilaterally, uncinate hypertrophy  bilaterally and a posterior broad-based disc-osteophyte complex. No  spinal canal stenosis. No foraminal stenosis on either side.     C3-C4: There is facet arthropathy bilaterally, uncinate hypertrophy  bilaterally and a posterior broad-based disc-osteophyte complex.  Minimal spinal canal narrowing. Mild bilateral neural foraminal  narrowing.     C4-C5: There is facet arthropathy bilaterally, uncinate hypertrophy  bilaterally and a posterior broad-based disc-osteophyte complex.  Minimal spinal canal narrowing. Moderate right foraminal stenosis.  Mild left foraminal narrowing.     C5-C6: There is facet arthropathy  bilaterally, uncinate hypertrophy  bilaterally and a posterior broad-based disc-osteophyte complex.  Minimal spinal canal narrowing. Mild bilateral neural foraminal  narrowing.     C6-C7: There is facet arthropathy bilaterally, uncinate hypertrophy  bilaterally and a posterior broad-based disc-osteophyte complex. No  spinal canal stenosis. Moderate right foraminal stenosis. Mild left  foraminal narrowing.     C7-T1: Normal disc height and contour. Mild facet arthropathy  bilaterally. No spinal canal stenosis. No foraminal stenosis on either  side.                                                                       IMPRESSION:  1. Diffuse degenerative change of the cervical spine as detailed  above.  2. No significant spinal canal stenosis of the cervical spine.  3. Moderate neural foraminal stenosis on the right at C4-C5 and on the  right at C6-C7.     MD Aristides RUANO MD, [facility]    Disclaimer: This note consists of symbols derived from keyboarding, dictation and/or voice recognition software. As a result, there may be errors in the script that have gone undetected. Please consider this when interpreting information found in this chart.

## 2022-07-12 DIAGNOSIS — H40.1132 PRIMARY OPEN ANGLE GLAUCOMA OF BOTH EYES, MODERATE STAGE: Primary | ICD-10-CM

## 2022-07-27 NOTE — PROGRESS NOTES
Subjective:  HPI  Physical Exam                    Objective:  System    Physical Exam    General     ROS    Assessment/Plan:    DISCHARGE REPORT    Progress reporting period is from 05/04/2022 to 07/26/2022.   Patient has not returned to therapy so current subjective and objective findings are unknown.  The subjective and objective information are from the last visit (05/26/2022) with this patient.    SUBJECTIVE  Subjective: Pt reports slightly decreased pain overall but still battling difficulty sleeping and pain from neck to elbow and hand.   Current Pain level:  (not rated numerically)     OBJECTIVE  Objective: Continued diffuse tenderness to palpation as previously noted.  Discomfort with cervical AROM all directions.  Splint on R wrist and pt tends to hold hand against body in elbow flexion.      ASSESSMENT/PLAN  Updated problem list and treatment plan: home program  STG/LTGs have been met or progress has been made towards goals:  N/A  Assessment of Progress: The patient has not returned to therapy. Current status is unknown.  Self Management Plans:  Patient has been instructed in a home treatment program.  [facility] to meet STG and LTG's: PT intervention is no longer required to meet STG/LTG.    Recommendations:  Pt last seen in PT 05/26/2022.  She has since seen Dr Nugent and cancelled remaining PT appts.  Discharge to [facility].

## 2022-08-08 ENCOUNTER — TELEPHONE (OUTPATIENT)
Dept: PHYSICAL MEDICINE AND REHAB | Facility: CLINIC | Age: 67
End: 2022-08-08

## 2022-08-16 ENCOUNTER — OFFICE VISIT (OUTPATIENT)
Dept: DERMATOLOGY | Facility: CLINIC | Age: 67
End: 2022-08-16
Payer: MEDICARE

## 2022-08-16 DIAGNOSIS — L28.1 PRURIGO NODULARIS: ICD-10-CM

## 2022-08-16 PROCEDURE — 99213 OFFICE O/P EST LOW 20 MIN: CPT | Performed by: DERMATOLOGY

## 2022-08-16 RX ORDER — TRIAMCINOLONE ACETONIDE 1 MG/G
OINTMENT TOPICAL 2 TIMES DAILY
Qty: 454 G | Refills: 11 | Status: SHIPPED | OUTPATIENT
Start: 2022-08-16 | End: 2023-08-09

## 2022-08-16 ASSESSMENT — PAIN SCALES - GENERAL: PAINLEVEL: NO PAIN (0)

## 2022-08-16 NOTE — PROGRESS NOTES
[facility] Dermatology Note  Encounter Date: Aug 16, 2022  Office Visit     Dermatology Problem List:  1. Pruritus without primary lesion  - Symmetrically on upper/lower back, dorsum of forearms, proximal region of lower extremities and mon pubic region  - Normal CMP and TSH from 09/2021. No apparent past medical history for atopy.   - Current Tx: Triamcinolone ointment prn to itchy areas, Aquaphor for moisturizer, and gabapentin.   ____________________________________________    Assessment & Plan:    #  Pruritus without primary lesions.   Presented with pruritis distributed symmetrically on upper/lower back with spare of the medial area, dorsum of forearms, proximal region of lower extremities and Mon pubic region. Duration about 12 months. Denies changes on medications, topical creams, soap, lotions, prior onset of lesions. Unclear if there were primary lesions before onset of itchiness.   Has normal CMP and TSH from 09/2021. No apparent past medical history for atopy.   - Continue using Aquaphor to moisturize  - Continue Triamcinolone ointment prn on itchy areas - refilled today   - Continue using gabapentin 600 mg TID    Procedures Performed:   None    Follow-up: 1 year(s) in-person, or earlier for new or changing lesions    Staff and Scribe:     Scribe Disclosure:  I, EMMANUEL MEDEIROS, am serving as a scribe to document services personally performed by Tom Meyer MD based on data collection and the provider's statements to me.     Gabby Bearden, MS3    I was present with the medical student who participated in the service and in the documentation of the note. I have verified the history and personally performed physical exam and medical decision making. I agree with the assessment and plan of care as documented in the note.    Tom Meyer MD  Staff Dermatologist and Dermatopathologist  , Department of  Dermatology    ____________________________________________    CC: Derm Problem (Yancy is here for follow up on pruritus.  Says everything is going good.  Ointment is helping but still has itchy on her back and below her abdomen.  )    HPI:  Ms. Yancy Rivera is a 67 year old female who presents today as a return patient for follow up. Last seen by myself on 2/1/22, at which time patient was started on triamcinolone ointment for treatment of pruritus.     Today, she is doing well and says the triamcinolone ointment has been helping. She has been using it two times a day but recently ran out. She still has some itchiness going on primarily on her lower abdomen, upper back/shoulders, and arms. Also uses Aquaphor to moisturize after showers. No rash, redness, or bumps that she has noticed. No allergies.     Patient is otherwise feeling well, without additional skin concerns.    Labs Reviewed:  Labs from 2/1/22 including Hgb A1c, VitD, CBC, iron studies, CRP, ESR were reviewed.     Physical Exam:  Vitals: LMP  (LMP Unknown)   SKIN: Focused examination of lower abdomen and upper back was performed.  - There is xerosis of the trunk with multifocal excoriations.   - No other lesions of concern on areas examined.     Medications:  Current Outpatient Medications   Medication     atorvastatin (LIPITOR) 20 MG tablet     carboxymethylcellulose PF (CARBOXYMETHYLCELLULOSE SODIUM) 0.5 % ophthalmic solution     Cholecalciferol (VITAMIN D) 2000 UNITS tablet     diclofenac (VOLTAREN) 1 % topical gel     diclofenac (VOLTAREN) 1 % topical gel     ferrous sulfate (IRON) 325 (65 FE) MG tablet     gabapentin (NEURONTIN) 100 MG capsule     gabapentin (NEURONTIN) 600 MG tablet     ibuprofen (ADVIL/MOTRIN) 200 MG tablet     methylPREDNISolone (MEDROL DOSEPAK) 4 MG tablet     Multiple Minerals (CALCIUM-MAGNESIUM-ZINC) TABS     naproxen (NAPROSYN) 500 MG tablet     omega 3 1000 MG CAPS     oxyCODONE (ROXICODONE) 5 MG immediate release  tablet     pantoprazole (PROTONIX) 40 MG EC tablet     piroxicam (FELDENE) 20 MG capsule     Riboflavin 400 MG TABS     SUMAtriptan (IMITREX) 50 MG tablet     tiZANidine (ZANAFLEX) 4 MG capsule     traMADol (ULTRAM) 50 MG tablet     triamcinolone (KENALOG) 0.1 % external ointment     White Petrolatum-Mineral Oil (REFRESH P.M.) OINT     atorvastatin (LIPITOR) 20 MG tablet     doxycycline Monohydrate 100 MG TABS     gabapentin (NEURONTIN) 100 MG capsule     Current Facility-Administered Medications   Medication     betamethasone acet & sod phos (CELESTONE) injection 6 mg     betamethasone acet & sod phos (CELESTONE) injection 6 mg     lidocaine (PF) (XYLOCAINE) 1 % injection 2 mL     lidocaine (PF) (XYLOCAINE) 1 % injection 2 mL     lidocaine (PF) (XYLOCAINE) 1 % injection 3 mL     ropivacaine (NAROPIN) injection 1 mL     ropivacaine (NAROPIN) injection 2 mL     triamcinolone (KENALOG-40) injection 20 mg     triamcinolone (KENALOG-40) injection 40 mg      Past Medical History:   Patient Active Problem List   Diagnosis     Hyperlipidemia with target LDL less than 130     Prediabetes     Disorder of bursae and tendons in shoulder region     Shingles     Primary open angle glaucoma of both eyes, moderate stage     Senile nuclear sclerosis, bilateral     Right lumbar radiculopathy     Benign essential hypertension     Right wrist pain     Chronic midline low back pain without sciatica     Pseudophakia of both eyes     Right wrist injury, subsequent encounter     Neck pain     Past Medical History:   Diagnosis Date     Blepharitis      Esophageal reflux (aka GERD)      Glaucoma      Hyperlipidemia LDL goal < 130      Nonsenile cataract      Shingles     11/14/15 Left approximately C6 distribution        CC Referred Self, MD  No address on file on close of this encounter.

## 2022-08-16 NOTE — NURSING NOTE
Dermatology Rooming Note    Yancy Rivera's goals for this visit include:   Chief Complaint   Patient presents with     Derm Problem     Gelywalt is here for follow up on pruritus.  Says everything is going good.  Ointment is helping but still has itchy on her back and below her abdomen.       Thelma Samaniego, CMA

## 2022-08-16 NOTE — LETTER
8/16/2022       RE: Yancy Rivera  [address] [id number]     Dear Colleague,    Thank you for referring your patient, Yancy Rivera, to the [facility] [place] [facility]. Please see a copy of my visit note below.    [facility] Dermatology Note  Encounter Date: Aug 16, 2022  Office Visit     Dermatology Problem List:  1. Pruritus without primary lesion  - Symmetrically on upper/lower back, dorsum of forearms, proximal region of lower extremities and mon pubic region  - Normal CMP and TSH from 09/2021. No apparent past medical history for atopy.   - Current Tx: Triamcinolone ointment prn to itchy areas, Aquaphor for moisturizer, and gabapentin.   ____________________________________________    Assessment & Plan:    #  Pruritus without primary lesions.   Presented with pruritis distributed symmetrically on upper/lower back with spare of the medial area, dorsum of forearms, proximal region of lower extremities and Mon pubic region. Duration about 12 months. Denies changes on medications, topical creams, soap, lotions, prior onset of lesions. Unclear if there were primary lesions before onset of itchiness.   Has normal CMP and TSH from 09/2021. No apparent past medical history for atopy.   - Continue using Aquaphor to moisturize  - Continue Triamcinolone ointment prn on itchy areas - refilled today   - Continue using gabapentin 600 mg TID    Procedures Performed:   None    Follow-up: 1 year(s) in-person, or earlier for new or changing lesions    Staff and Scribe:     Scribe Disclosure:  I, EMMANUEL MEEDIROS, am serving as a scribe to document services personally performed by Tom Meyer MD based on data collection and the provider's statements to me.     Gabby Bearden, MS3    I was present with the medical student who participated in the service and in the documentation of the note. I have verified  the history and personally performed physical exam and medical decision making. I agree with the assessment and plan of care as documented in the note.    Tom Meyer MD  Staff Dermatologist and Dermatopathologist  , Department of Dermatology    ____________________________________________    CC: Derm Problem (Yancy is here for follow up on pruritus.  Says everything is going good.  Ointment is helping but still has itchy on her back and below her abdomen.  )    HPI:  Ms. Yancy Rivera is a 67 year old female who presents today as a return patient for follow up. Last seen by myself on 2/1/22, at which time patient was started on triamcinolone ointment for treatment of pruritus.     Today, she is doing well and says the triamcinolone ointment has been helping. She has been using it two times a day but recently ran out. She still has some itchiness going on primarily on her lower abdomen, upper back/shoulders, and arms. Also uses Aquaphor to moisturize after showers. No rash, redness, or bumps that she has noticed. No allergies.     Patient is otherwise feeling well, without additional skin concerns.    Labs Reviewed:  Labs from 2/1/22 including Hgb A1c, VitD, CBC, iron studies, CRP, ESR were reviewed.     Physical Exam:  Vitals: LMP  (LMP Unknown)   SKIN: Focused examination of lower abdomen and upper back was performed.  - There is xerosis of the trunk with multifocal excoriations.   - No other lesions of concern on areas examined.     Medications:  Current Outpatient Medications   Medication     atorvastatin (LIPITOR) 20 MG tablet     carboxymethylcellulose PF (CARBOXYMETHYLCELLULOSE SODIUM) 0.5 % ophthalmic solution     Cholecalciferol (VITAMIN D) 2000 UNITS tablet     diclofenac (VOLTAREN) 1 % topical gel     diclofenac (VOLTAREN) 1 % topical gel     ferrous sulfate (IRON) 325 (65 FE) MG tablet     gabapentin (NEURONTIN) 100 MG capsule     gabapentin (NEURONTIN) 600 MG tablet      ibuprofen (ADVIL/MOTRIN) 200 MG tablet     methylPREDNISolone (MEDROL DOSEPAK) 4 MG tablet     Multiple Minerals (CALCIUM-MAGNESIUM-ZINC) TABS     naproxen (NAPROSYN) 500 MG tablet     omega 3 1000 MG CAPS     oxyCODONE (ROXICODONE) 5 MG immediate release tablet     pantoprazole (PROTONIX) 40 MG EC tablet     piroxicam (FELDENE) 20 MG capsule     Riboflavin 400 MG TABS     SUMAtriptan (IMITREX) 50 MG tablet     tiZANidine (ZANAFLEX) 4 MG capsule     traMADol (ULTRAM) 50 MG tablet     triamcinolone (KENALOG) 0.1 % external ointment     White Petrolatum-Mineral Oil (REFRESH P.M.) OINT     atorvastatin (LIPITOR) 20 MG tablet     doxycycline Monohydrate 100 MG TABS     gabapentin (NEURONTIN) 100 MG capsule     Current Facility-Administered Medications   Medication     betamethasone acet & sod phos (CELESTONE) injection 6 mg     betamethasone acet & sod phos (CELESTONE) injection 6 mg     lidocaine (PF) (XYLOCAINE) 1 % injection 2 mL     lidocaine (PF) (XYLOCAINE) 1 % injection 2 mL     lidocaine (PF) (XYLOCAINE) 1 % injection 3 mL     ropivacaine (NAROPIN) injection 1 mL     ropivacaine (NAROPIN) injection 2 mL     triamcinolone (KENALOG-40) injection 20 mg     triamcinolone (KENALOG-40) injection 40 mg      Past Medical History:   Patient Active Problem List   Diagnosis     Hyperlipidemia with target LDL less than 130     Prediabetes     Disorder of bursae and tendons in shoulder region     Shingles     Primary open angle glaucoma of both eyes, moderate stage     Senile nuclear sclerosis, bilateral     Right lumbar radiculopathy     Benign essential hypertension     Right wrist pain     Chronic midline low back pain without sciatica     Pseudophakia of both eyes     Right wrist injury, subsequent encounter     Neck pain     Past Medical History:   Diagnosis Date     Blepharitis      Esophageal reflux (aka GERD)      Glaucoma      Hyperlipidemia LDL goal < 130      Nonsenile cataract      Shingles     11/14/15 Left  approximately C6 distribution        CC Referred Self, MD  No address on file on close of this encounter.

## 2022-08-27 DIAGNOSIS — K21.9 GASTROESOPHAGEAL REFLUX DISEASE, UNSPECIFIED WHETHER ESOPHAGITIS PRESENT: ICD-10-CM

## 2022-09-01 ENCOUNTER — OFFICE VISIT (OUTPATIENT)
Dept: ORTHOPEDICS | Facility: CLINIC | Age: 67
End: 2022-09-01
Payer: MEDICARE

## 2022-09-01 VITALS — DIASTOLIC BLOOD PRESSURE: 78 MMHG | SYSTOLIC BLOOD PRESSURE: 118 MMHG | HEIGHT: 64 IN | BODY MASS INDEX: 33.64 KG/M2

## 2022-09-01 DIAGNOSIS — M54.12 RIGHT CERVICAL RADICULOPATHY: Primary | ICD-10-CM

## 2022-09-01 PROCEDURE — 99214 OFFICE O/P EST MOD 30 MIN: CPT | Performed by: FAMILY MEDICINE

## 2022-09-01 RX ORDER — PANTOPRAZOLE SODIUM 40 MG/1
40 TABLET, DELAYED RELEASE ORAL DAILY
Qty: 90 TABLET | Refills: 0 | Status: SHIPPED | OUTPATIENT
Start: 2022-09-01 | End: 2023-01-18

## 2022-09-01 NOTE — PATIENT INSTRUCTIONS
# Right Cervical radiculopathy: Symptoms noted after a fall initially in December 2021.  She still having pain over the wrist and lateral elbow limiting her ability to move her arm.  She is having neck pain after a fall this year as well with symptoms going down her right arm.  She was last seen on 6/7/22 with pain over right severe right neck pain. She did have prednisone on 4/21/22. Last visit it was recommended for cervical steroid injection but did not schedule it.  She is having pain with picking things up. Patient is always pain over the right neck worse with compression of the right cervical nerves with pain going down her arm. Reviewed previous cervical MRI showing narrowing as the nerves are coming on the right side. Likely cause her pain due to cervical radiculopathy. Counseled patient on nature of condition and treatment options.  Given this plan as below, follow-up 1 months    Pain likely due to irritated nerve in neck  I ordered a steroid injection in your neck to help with your pain in your neck and arm  Please start physical therapy  You can stop wearing the wrist brace as you feel comfortable    Image Findings: Reviewed cervical MRI  Treatment: Activities as tolerated, physical therapy order placed, can transition out of splint  Medications/Injections: Pain medications per primary care provider, cervical epidural steroid injection  Follow-up: 2 weeks after cervical epidural steroid injection for repeat evaluation, may consider other steroid injections    It was great seeing you again today!    Aristides Nugent

## 2022-09-01 NOTE — PROGRESS NOTES
ASSESSMENT & PLAN    Yancy was seen today for follow up and follow up.    Diagnoses and all orders for this visit:    Right cervical radiculopathy  -     Pain Management  Referral; Future  -     Physical Therapy Referral; Future      # Right Cervical radiculopathy: Symptoms noted after a fall initially in December 2021.  She still having pain over the wrist and lateral elbow limiting her ability to move her arm.  She is having neck pain after a fall this year as well with symptoms going down her right arm.  She was last seen on 6/7/22 with pain over right severe right neck pain. She did have prednisone on 4/21/22. Last visit it was recommended for cervical steroid injection but did not schedule it.  She is having pain with picking things up. Patient is always pain over the right neck worse with compression of the right cervical nerves with pain going down her arm. Reviewed previous cervical MRI showing narrowing as the nerves are coming on the right side. Likely cause her pain due to cervical radiculopathy. Counseled patient on nature of condition and treatment options.  Given this plan as below, follow-up 1 months    1. Pain likely due to irritated nerve in neck  2. I ordered a steroid injection in your neck to help with your pain in your neck and arm  3. Please start physical therapy  4. You can stop wearing the wrist brace as you feel comfortable    Image Findings: Reviewed cervical MRI  Treatment: Activities as tolerated, physical therapy order placed, can transition out of splint  Medications/Injections: Pain medications per primary care provider, cervical epidural steroid injection  Follow-up: 2 weeks after cervical epidural steroid injection for repeat evaluation, may consider other steroid injections    -----    SUBJECTIVE:  Yancy Rivera is a 67 year old female who is seen in follow-up for neck and right arm pain. They were last seen 6/7/22/2022.  The patient is seen by themselves.    Since  "their last visit reports persisting right arm pain.  They indicate that their current pain level is 9/10. They have tried custom wrist brace, steroid injections.  Patient is still having burning/numbness/tingling down the right arm into her hand that comes from her neck. Reviewed cervical and wrist MRIs, reviewed PCP notes      Patient's past medical, surgical, social, and family histories were reviewed today and no changes are noted.    REVIEW OF SYSTEMS:  Constitutional: NEGATIVE for fever, chills, change in weight  Skin: NEGATIVE for worrisome rashes, moles or lesions  GI/: NEGATIVE for bowel or bladder changes  Neuro: NEGATIVE for weakness, dizziness or paresthesias    OBJECTIVE:  /78   Ht 1.626 m (5' 4\")   LMP  (LMP Unknown)   BMI 33.64 kg/m     General: healthy, alert and in no distress  HEENT: no scleral icterus or conjunctival erythema  Skin: no suspicious lesions or rash. No jaundice.  CV: regular rhythm by palpation, no pedal edema  Resp: normal respiratory effort without conversational dyspnea   Psych: normal mood and affect  Gait: normal steady gait with appropriate coordination and balance  Neuro: normal light touch sensory exam of the extremities.    MSK:    RIGHT ELBOW  Inspection:    No swelling, bruising, discoloration, or obvious deformity or asymmetry  Palpation:    Tender about the lateral epicondyle. Remainder of bony, ligamentous and tendinous landmarks are nontender.    Crepitus is Absent  Range of Motion:   Improved range of motion  Strength:  Intact strength in elbow flexion/extension as well as pronation/supination but painful  Special Tests:  Pain with resisted wrist extension     Cervical  Inspection: normal cervical lordosis  Tender:  right paracervical muscles  Non-tender:  spinous processes, left paracervical muscles  Range of Motion:  Full ROM of cervical spine  Strength: Full strength of all neck muscles  Special tests:  Spurling's - positive - right, Spurling's - negative " - left     Hand/wrist (right):  No deformity noted.  No edema, ecchymosis.  Pain limiting wrist flexion/extension  Strength grossly intact but limited secondary to pain  Radial pulses normal, +2/4, capillary refill brisk.  Tenderness to palpation over the scapholunate joint  Finkelstein negative.  Intact and symmetric strength and sensation in the median/radial/unlar distributions bilaterally      Independent visualization of the below image:    IMPRESSION:  1. Diffuse degenerative change of the cervical spine as detailed  above.  2. No significant spinal canal stenosis of the cervical spine.  3. Moderate neural foraminal stenosis on the right at C4-C5 and on the  right at C6-C7.     MD Aristides RUANO MD, [facility]    Disclaimer: This note consists of symbols derived from keyboarding, dictation and/or voice recognition software. As a result, there may be errors in the script that have gone undetected. Please consider this when interpreting information found in this chart.    Patient's conditions were thoroughly discussed during today's visit with greater than 50% of the visit spent counseling the patient with total time spent face-to-face with the patient being 30 minutes.      MRI OF THE CERVICAL SPINE WITHOUT CONTRAST 5/26/2022 12:30 PM     COMPARISON: None     HISTORY: Cervical radiculopathy, no red flags. Right cervical  radiculopathy after a fall 12/21. Right cervical radiculopathy.     TECHNIQUE: Multiplanar, multisequence MRI images of the cervical spine  were acquired without intravenous contrast.     FINDINGS: There is normal alignment of the cervical vertebrae;  however, there is reversal of normal cervical lordosis centered at the  C2-C3 level. Vertebral body heights of the cervical spine are normal.  Craniocervical alignment is normal. There are no fractures of the  cervical spine.      There is loss of disc height, disc desiccation and posterior  disc  bulging to varying degrees at all levels of the cervical spine with  exception of the normal appearing C7-T1 disc.      The cervical spinal cord is normal in contour. There is no abnormal  signal in the cervical spinal cord. There is no evidence for  intrathecal abnormality.     Level by level:     C2-C3: There is facet arthropathy bilaterally, uncinate hypertrophy  bilaterally and a posterior broad-based disc-osteophyte complex. No  spinal canal stenosis. No foraminal stenosis on either side.     C3-C4: There is facet arthropathy bilaterally, uncinate hypertrophy  bilaterally and a posterior broad-based disc-osteophyte complex.  Minimal spinal canal narrowing. Mild bilateral neural foraminal  narrowing.     C4-C5: There is facet arthropathy bilaterally, uncinate hypertrophy  bilaterally and a posterior broad-based disc-osteophyte complex.  Minimal spinal canal narrowing. Moderate right foraminal stenosis.  Mild left foraminal narrowing.     C5-C6: There is facet arthropathy bilaterally, uncinate hypertrophy  bilaterally and a posterior broad-based disc-osteophyte complex.  Minimal spinal canal narrowing. Mild bilateral neural foraminal  narrowing.     C6-C7: There is facet arthropathy bilaterally, uncinate hypertrophy  bilaterally and a posterior broad-based disc-osteophyte complex. No  spinal canal stenosis. Moderate right foraminal stenosis. Mild left  foraminal narrowing.     C7-T1: Normal disc height and contour. Mild facet arthropathy  bilaterally. No spinal canal stenosis. No foraminal stenosis on either  side.                                                                       IMPRESSION:  1. Diffuse degenerative change of the cervical spine as detailed  above.  2. No significant spinal canal stenosis of the cervical spine.  3. Moderate neural foraminal stenosis on the right at C4-C5 and on the  right at C6-C7.     MANNIE RAMÍREZ MD         MR right wrist without contrast 2/15/2022 11:52  AM     Techniques: Multiplanar multisequence imaging of the right wrist was  obtained without administration of intra-articular or intravenous  contrast using routing protocol.     History: Wrist fracture, tendon/ligament injury suspected, xray done;  fall two weeks ago, neg xray, persisent right scaphoid and lunate and  distal right radius tenderness on exam, r/o occult fx.; Right wrist  injury, initial encounter; Right wrist pain     Additional History from EMR: MRI 6/11/2020; x-ray 1/10/2022, 5/14/2020        Comparison: MRI 6/11/2020; x-ray 1/10/2022, 5/14/2020     Findings:  External marker placed over the scapholunate joint.     Triangular Fibrocartilage: Disruption of the styloid attachment and  central disc. No radial sided triangular cartilage perforation.     Interosseous Ligaments:  Scapholunate ligament: High-grade sprain of the dorsal and volar  scapholunate ligament. High-grade tear of the membranous scapholunate  ligament, coronal image 8, sagittal image 17. Widening of the  scapholunate interosseous distance measuring 4.5 mm (series 5001,  image 8), new from prior.  Lunatotriquetral ligament: Intact.  Carpal alignment: Widening of scapholunate joint, otherwise  unremarkable.     Bones: Chronic changes of the distal ulna similar-appearing to prior  MR, presumably related to prior injury.     Articulations:  Joint effusion: None.  Cartilage: No hyaline cartilage defects.  Synovium: No synovial thickening.     Tendons:  Flexor tendons: Normal. No tear or tendon sheath effusion.  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     Miscellaneous soft tissues: Volar ganglion cyst originating from the  pisotriquetral  joint extending proximally towards the TFCC, axial  image 16. Increased fluid in the pisotriquetral recess.                                                                      IMPRESSION:  1. Directly underlying  the external marker: Widening of the  scapholunate interosseous distance measuring 4.5 mm with high-grade  tearing of the membranous portion of the scapholunate ligament and  high-grade sprain of the dorsal/volar components. This is new from  prior.  2. In proximity to the external marker:  2a. Second and third extensor compartment tenosynovitis.  2b. Fluid in the pisotriquetral recess.   3. Redemonstration of chronic changes of the TFCC, predominantly of  the central disc and styloid attachment, similar to prior.     I have personally reviewed the examination and initial interpretation  and I agree with the findings.     JUTTA ELLERMANN, MD

## 2022-09-01 NOTE — LETTER
9/1/2022         RE: Yancy Rivera  [address] [id number]        Dear Colleague,    Thank you for referring your patient, Yancy Rivera, to the [facility]. Please see a copy of my visit note below.    ASSESSMENT & PLAN    Yancy was seen today for follow up and follow up.    Diagnoses and all orders for this visit:    Right cervical radiculopathy  -     Pain Management  Referral; Future  -     Physical Therapy Referral; Future      # Right Cervical radiculopathy: Symptoms noted after a fall initially in December 2021.  She still having pain over the wrist and lateral elbow limiting her ability to move her arm.  She is having neck pain after a fall this year as well with symptoms going down her right arm.  She was last seen on 6/7/22 with pain over right severe right neck pain. She did have prednisone on 4/21/22. Last visit it was recommended for cervical steroid injection but did not schedule it.  She is having pain with picking things up. Patient is always pain over the right neck worse with compression of the right cervical nerves with pain going down her arm. Reviewed previous cervical MRI showing narrowing as the nerves are coming on the right side. Likely cause her pain due to cervical radiculopathy. Counseled patient on nature of condition and treatment options.  Given this plan as below, follow-up 1 months    1. Pain likely due to irritated nerve in neck  2. I ordered a steroid injection in your neck to help with your pain in your neck and arm  3. Please start physical therapy  4. You can stop wearing the wrist brace as you feel comfortable    Image Findings: Reviewed cervical MRI  Treatment: Activities as tolerated, physical therapy order placed, can transition out of splint  Medications/Injections: Pain medications per primary care provider, cervical epidural steroid injection  Follow-up: 2 weeks after cervical epidural steroid  "injection for repeat evaluation, may consider other steroid injections    -----    SUBJECTIVE:  Yancy Rivera is a 67 year old female who is seen in follow-up for neck and right arm pain. They were last seen 6/7/22/2022.  The patient is seen by themselves.    Since their last visit reports persisting right arm pain.  They indicate that their current pain level is 9/10. They have tried custom wrist brace, steroid injections.  Patient is still having burning/numbness/tingling down the right arm into her hand that comes from her neck. Reviewed cervical and wrist MRIs, reviewed PCP notes      Patient's past medical, surgical, social, and family histories were reviewed today and no changes are noted.    REVIEW OF SYSTEMS:  Constitutional: NEGATIVE for fever, chills, change in weight  Skin: NEGATIVE for worrisome rashes, moles or lesions  GI/: NEGATIVE for bowel or bladder changes  Neuro: NEGATIVE for weakness, dizziness or paresthesias    OBJECTIVE:  /78   Ht 1.626 m (5' 4\")   LMP  (LMP Unknown)   BMI 33.64 kg/m     General: healthy, alert and in no distress  HEENT: no scleral icterus or conjunctival erythema  Skin: no suspicious lesions or rash. No jaundice.  CV: regular rhythm by palpation, no pedal edema  Resp: normal respiratory effort without conversational dyspnea   Psych: normal mood and affect  Gait: normal steady gait with appropriate coordination and balance  Neuro: normal light touch sensory exam of the extremities.    MSK:    RIGHT ELBOW  Inspection:    No swelling, bruising, discoloration, or obvious deformity or asymmetry  Palpation:    Tender about the lateral epicondyle. Remainder of bony, ligamentous and tendinous landmarks are nontender.    Crepitus is Absent  Range of Motion:   Improved range of motion  Strength:  Intact strength in elbow flexion/extension as well as pronation/supination but painful  Special Tests:  Pain with resisted wrist extension     Cervical  Inspection: normal " cervical lordosis  Tender:  right paracervical muscles  Non-tender:  spinous processes, left paracervical muscles  Range of Motion:  Full ROM of cervical spine  Strength: Full strength of all neck muscles  Special tests:  Spurling's - positive - right, Spurling's - negative - left     Hand/wrist (right):  No deformity noted.  No edema, ecchymosis.  Pain limiting wrist flexion/extension  Strength grossly intact but limited secondary to pain  Radial pulses normal, +2/4, capillary refill brisk.  Tenderness to palpation over the scapholunate joint  Finkelstein negative.  Intact and symmetric strength and sensation in the median/radial/unlar distributions bilaterally      Independent visualization of the below image:    IMPRESSION:  1. Diffuse degenerative change of the cervical spine as detailed  above.  2. No significant spinal canal stenosis of the cervical spine.  3. Moderate neural foraminal stenosis on the right at C4-C5 and on the  right at C6-C7.     MD Aristides RUANO MD, [facility]    Disclaimer: This note consists of symbols derived from keyboarding, dictation and/or voice recognition software. As a result, there may be errors in the script that have gone undetected. Please consider this when interpreting information found in this chart.    Patient's conditions were thoroughly discussed during today's visit with greater than 50% of the visit spent counseling the patient with total time spent face-to-face with the patient being 30 minutes.      MRI OF THE CERVICAL SPINE WITHOUT CONTRAST 5/26/2022 12:30 PM     COMPARISON: None     HISTORY: Cervical radiculopathy, no red flags. Right cervical  radiculopathy after a fall 12/21. Right cervical radiculopathy.     TECHNIQUE: Multiplanar, multisequence MRI images of the cervical spine  were acquired without intravenous contrast.     FINDINGS: There is normal alignment of the cervical vertebrae;  however, there is  reversal of normal cervical lordosis centered at the  C2-C3 level. Vertebral body heights of the cervical spine are normal.  Craniocervical alignment is normal. There are no fractures of the  cervical spine.      There is loss of disc height, disc desiccation and posterior disc  bulging to varying degrees at all levels of the cervical spine with  exception of the normal appearing C7-T1 disc.      The cervical spinal cord is normal in contour. There is no abnormal  signal in the cervical spinal cord. There is no evidence for  intrathecal abnormality.     Level by level:     C2-C3: There is facet arthropathy bilaterally, uncinate hypertrophy  bilaterally and a posterior broad-based disc-osteophyte complex. No  spinal canal stenosis. No foraminal stenosis on either side.     C3-C4: There is facet arthropathy bilaterally, uncinate hypertrophy  bilaterally and a posterior broad-based disc-osteophyte complex.  Minimal spinal canal narrowing. Mild bilateral neural foraminal  narrowing.     C4-C5: There is facet arthropathy bilaterally, uncinate hypertrophy  bilaterally and a posterior broad-based disc-osteophyte complex.  Minimal spinal canal narrowing. Moderate right foraminal stenosis.  Mild left foraminal narrowing.     C5-C6: There is facet arthropathy bilaterally, uncinate hypertrophy  bilaterally and a posterior broad-based disc-osteophyte complex.  Minimal spinal canal narrowing. Mild bilateral neural foraminal  narrowing.     C6-C7: There is facet arthropathy bilaterally, uncinate hypertrophy  bilaterally and a posterior broad-based disc-osteophyte complex. No  spinal canal stenosis. Moderate right foraminal stenosis. Mild left  foraminal narrowing.     C7-T1: Normal disc height and contour. Mild facet arthropathy  bilaterally. No spinal canal stenosis. No foraminal stenosis on either  side.                                                                       IMPRESSION:  1. Diffuse degenerative change of the  cervical spine as detailed  above.  2. No significant spinal canal stenosis of the cervical spine.  3. Moderate neural foraminal stenosis on the right at C4-C5 and on the  right at C6-C7.     MANNIE RAMÍREZ MD         MR right wrist without contrast 2/15/2022 11:52 AM     Techniques: Multiplanar multisequence imaging of the right wrist was  obtained without administration of intra-articular or intravenous  contrast using routing protocol.     History: Wrist fracture, tendon/ligament injury suspected, xray done;  fall two weeks ago, neg xray, persisent right scaphoid and lunate and  distal right radius tenderness on exam, r/o occult fx.; Right wrist  injury, initial encounter; Right wrist pain     Additional History from EMR: MRI 6/11/2020; x-ray 1/10/2022, 5/14/2020        Comparison: MRI 6/11/2020; x-ray 1/10/2022, 5/14/2020     Findings:  External marker placed over the scapholunate joint.     Triangular Fibrocartilage: Disruption of the styloid attachment and  central disc. No radial sided triangular cartilage perforation.     Interosseous Ligaments:  Scapholunate ligament: High-grade sprain of the dorsal and volar  scapholunate ligament. High-grade tear of the membranous scapholunate  ligament, coronal image 8, sagittal image 17. Widening of the  scapholunate interosseous distance measuring 4.5 mm (series 5001,  image 8), new from prior.  Lunatotriquetral ligament: Intact.  Carpal alignment: Widening of scapholunate joint, otherwise  unremarkable.     Bones: Chronic changes of the distal ulna similar-appearing to prior  MR, presumably related to prior injury.     Articulations:  Joint effusion: None.  Cartilage: No hyaline cartilage defects.  Synovium: No synovial thickening.     Tendons:  Flexor tendons: Normal. No tear or tendon sheath effusion.  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     Miscellaneous soft tissues: Volar  ganglion cyst originating from the  pisotriquetral  joint extending proximally towards the TFCC, axial  image 16. Increased fluid in the pisotriquetral recess.                                                                      IMPRESSION:  1. Directly underlying the external marker: Widening of the  scapholunate interosseous distance measuring 4.5 mm with high-grade  tearing of the membranous portion of the scapholunate ligament and  high-grade sprain of the dorsal/volar components. This is new from  prior.  2. In proximity to the external marker:  2a. Second and third extensor compartment tenosynovitis.  2b. Fluid in the pisotriquetral recess.   3. Redemonstration of chronic changes of the TFCC, predominantly of  the central disc and styloid attachment, similar to prior.     I have personally reviewed the examination and initial interpretation  and I agree with the findings.     JUTTA ELLERMANN, MD       Again, thank you for allowing me to participate in the care of your patient.        Sincerely,        Aristides Nugent MD

## 2022-10-06 ENCOUNTER — RADIOLOGY INJECTION OFFICE VISIT (OUTPATIENT)
Dept: PALLIATIVE MEDICINE | Facility: CLINIC | Age: 67
End: 2022-10-06
Attending: FAMILY MEDICINE
Payer: MEDICARE

## 2022-10-06 VITALS — HEART RATE: 72 BPM | SYSTOLIC BLOOD PRESSURE: 159 MMHG | OXYGEN SATURATION: 97 % | DIASTOLIC BLOOD PRESSURE: 67 MMHG

## 2022-10-06 DIAGNOSIS — E78.5 HYPERLIPEMIA: Primary | ICD-10-CM

## 2022-10-06 DIAGNOSIS — M54.12 RIGHT CERVICAL RADICULOPATHY: ICD-10-CM

## 2022-10-06 DIAGNOSIS — M54.12 CERVICAL RADICULOPATHY: ICD-10-CM

## 2022-10-06 PROCEDURE — 62321 NJX INTERLAMINAR CRV/THRC: CPT | Performed by: PAIN MEDICINE

## 2022-10-06 RX ORDER — DEXAMETHASONE SODIUM PHOSPHATE 10 MG/ML
10 INJECTION, SOLUTION INTRAMUSCULAR; INTRAVENOUS ONCE
Status: COMPLETED | OUTPATIENT
Start: 2022-10-06 | End: 2022-10-06

## 2022-10-06 RX ADMIN — DEXAMETHASONE SODIUM PHOSPHATE 10 MG: 10 INJECTION, SOLUTION INTRAMUSCULAR; INTRAVENOUS at 14:30

## 2022-10-06 ASSESSMENT — PAIN SCALES - GENERAL: PAINLEVEL: EXTREME PAIN (9)

## 2022-10-06 NOTE — PATIENT INSTRUCTIONS
[facility]   Procedure Discharge Instructions    Today you saw:   Dr. Efren Forrest    You had an:  Epidural steroid injection   -cervical     Medications used:  Lidocaine   Bupivacaine   Dexamethasone Omnipaque  R  Normal saline        Be cautious as numbness and/or weakness in the upper extremities may occur for up to 6-8 hours after the procedure due to effect of the local anesthetic  Do not drive for 6 hours. The effect of the local anesthetic could slow your reflexes.   You may resume your regular activities after 24 hours  Avoid strenuous activity for the first 24 hours  You may shower, however avoid swimming, tub baths or hot tubs for 24 hours following your procedure  You may have a mild to moderate increase in pain for several days following the injection.  It may take up to 14 days for the steroid medication to start working although you may feel the effect as early as a few days after the procedure.     You may use ice packs for 10-15 minutes, 3 to 4 times a day at the injection site for comfort  Do not use heat to painful areas for 6 to 8 hours. This will give the local anesthetic time to wear off and prevent you from accidentally burning your skin.   Unless you have been directed to avoid the use of anti-inflammatory medications (NSAIDS), you may use medications such as ibuprofen, Aleve or Tylenol for pain control if needed.   If you were fasting, you may resume your normal diet and medications after the procedure  If you have diabetes, check your blood sugar more frequently than usual as your blood sugar may be higher than normal for 10-14 days following a steroid injection. Contact your doctor who manages your diabetes if your blood sugar is higher than usual  Possible side effects of steroids that you may experience include flushing, elevated blood pressure, increased appetite, mild headaches and restlessness.  All of these symptoms will get better with time.  If you  experience any of the following, call the Pain Clinic during work hours (Mon-Friday 8-4:30 pm) at [phone] or the Provider Line after hours at [phone]:  -Fever over 100 degree F  -Swelling, bleeding, redness, drainage, warmth at the injection site  -Progressive weakness or numbness in your legs or arms  -Loss of bowel or bladder function  -Unusual headache that is not relieved by Tylenol or other pain reliever  -Unusual new onset of pain that is not improving

## 2022-10-06 NOTE — NURSING NOTE
Pre-procedure Intake  If YES to any questions or NO to having a   Please complete laminated checklist and leave on the computer keyboard for Provider, verbally inform provider if able.    For SCS Trial, RFA's or any sedation procedure:  Have you been fasting? NA    If yes, for how long?     Are you taking any any blood thinners such as Coumadin, Warfarin, Jantoven, Pradaxa Xarelto, Eliquis, Edoxaban, Enoxaparin, Lovenox, Heparin, Arixtra, Fondaparinux, or Fragmin? OR Antiplatelet medication such as Plavix, Brilinta, or Effient?   No     If yes, when did you take your last dose?     Do you take aspirin?  No    If cervical procedure, have you held aspirin for 6 days?     Do you have any allergies to contrast dye, iodine, steroid and/or numbing medications?  NO    Are you currently taking antibiotics or have an active infection?  NO    Have you had a fever/elevated temperature within the past week? NO    Are you currently taking oral steroids? NO    Do you have a ? Yes    Are you pregnant or breastfeeding?  NO    Have you received the COVID-19 vaccine? Yes    If yes, was it your 1st, 2nd or only dose needed?     Date of most recent vaccine: 9/21/22    Notify provider and RNs if systolic BP >170, diastolic BP >100, P >100 or O2 sats < 90%

## 2022-10-06 NOTE — PROGRESS NOTES
[facility] - Procedure Note    Date of Visit: 10/6/2022    Procedure performed: C7-T1 interlaminar epidural steroid injection with fluoroscopic guidance  Diagnosis: Cervical radiculitis/radiculopathy  : Efren Forrest MD  Anesthesia: none    Indications: Yancy Rivera is a 67 year old female who is seen for cervical epidural steroid injection. The patient describes right neck pain radiating to her arm. The patient has been exhibiting symptoms consistent with cervical intraspinal inflammation and radiculopathy. Symptoms have been persistent, disabling, and intermittently severe. The patient reports minimal improvement with conservative treatment, including meds/pt.    Cervical MRI reviewed    Allergies:      Allergies   Allergen Reactions     No Clinical Screening - See Comments Nausea and Vomiting     Liver side effects from INH for TB        Vitals:  BP (!) 159/67   Pulse 72   LMP  (LMP Unknown)   SpO2 97%     Review of Systems: The patient denies recent fever, chills, illness, use of antibiotics or anticoagulants. All other 10-point review of systems negative.     Procedure: The procedure and risks were explained, and informed written consent was obtained from the patient. Risks include but are not limited to: infection, bleeding, increased pain, and damage to soft tissue, nerve, muscle, and vasculature structures. After getting informed consent, patient was brought into the procedure suite and was placed in a prone position on the procedure table. A Pause for the Cause was performed. Patient was prepped and draped in sterile fashion.     The C7-T1 interspace was identified with use of fluoroscopy in AP view. A 25-gauge, 1.5 inch needle was used to anesthetize the skin and subcutaneous tissue entry site with a total of 2 ml of 1% lidocaine. Under fluoroscopic visualization, a 22-gauge, 3.5 inch Tuohy epidural needle was slowly advanced towards the epidural space a few  millimeters right of midline. The latter part of the needle advancement was guided with fluoroscopy in the lateral view. The epidural space was identified using loss of resistance technique. After negative aspiration for heme and cerebrospinal fluid, a total of 1 mL of Omnipaque was injected to confirm needle placement. 9 mL of contrast was wasted. Epidurogram confirmed spread within the posterior epidural space. 2 ml of  NS,1 ml 10mg/ml of dexamethasone, and 1 ml of preservative free 0.25% bupivacaine was injected. The needle was removed.  Images were saved to PACS.    The patient tolerated the procedure well, and there was no evidence of procedural complications. No new sensory or motor deficits were noted following the procedure. The patient was stable and able to ambulate on discharge home. Post-procedure instructions were provided.     Pre-procedure pain score: 9/10 in the neck, 9/10 in the arm  Post-procedure pain score: 9/10 in the neck, 9/10 in the arm    Assessment/Plan: Yancy Rivera is a 67 year old female s/p cervical interlaminar epidural steroid injection today for cervical spondylosis and radiculitis/radiculopathy.     Following today's procedure, the patient was advised to contact the [facility] for any of the following:   Fever, chills, or night sweats   New onset of pain, numbness, or weakness   Any questions/concerns regarding the procedure  If unable to contact the Pain Center, the patient was instructed to go to a local Emergency Room for any complications.     Follow-up with provider in 2 weeks for post-procedure evaluation.    Efren Forrest MD   Pain Management    [facility]

## 2022-10-06 NOTE — NURSING NOTE
Discharge Information    IV Discontiued Time:  NA    Amount of Fluid Infused:  NA    Discharge Criteria = When patient returns to baseline or as per MD order    Consciousness:  Pt is fully awake    Circulation:  BP +/- 20% of pre-procedure level    Respiration:  Patient is able to breathe deeply    O2 Sat:  Patient is able to maintain O2 Sat >92% on room air    Activity:  Moves 4 extremities on command    Ambulation:  Patient is able to stand and walk or stand and pivot into wheelchair    Dressing:  Clean/dry or No Dressing    Notes:   Discharge instructions and AVS given to patient    Patient meets criteria for discharge?  YES    Admitted to PCU?  No    Responsible adult present to accompany patient home?  Yes    Signature/Title:    ernie thorpe RN  RN Care Coordinator  [facility]

## 2022-10-11 ENCOUNTER — TELEPHONE (OUTPATIENT)
Dept: INTERNAL MEDICINE | Facility: CLINIC | Age: 67
End: 2022-10-11

## 2022-10-11 RX ORDER — ATORVASTATIN CALCIUM 20 MG/1
20 TABLET, FILM COATED ORAL DAILY
Qty: 30 TABLET | Refills: 2 | Status: SHIPPED | OUTPATIENT
Start: 2022-10-11 | End: 2023-01-12

## 2022-11-08 ENCOUNTER — OFFICE VISIT (OUTPATIENT)
Dept: INTERNAL MEDICINE | Facility: CLINIC | Age: 67
End: 2022-11-08
Payer: MEDICARE

## 2022-11-08 ENCOUNTER — ANCILLARY PROCEDURE (OUTPATIENT)
Dept: CT IMAGING | Facility: CLINIC | Age: 67
End: 2022-11-08
Attending: NURSE PRACTITIONER
Payer: MEDICARE

## 2022-11-08 VITALS
HEART RATE: 78 BPM | BODY MASS INDEX: 33.25 KG/M2 | SYSTOLIC BLOOD PRESSURE: 162 MMHG | DIASTOLIC BLOOD PRESSURE: 84 MMHG | WEIGHT: 193.7 LBS | OXYGEN SATURATION: 94 % | TEMPERATURE: 97.8 F

## 2022-11-08 DIAGNOSIS — H65.91 OME (OTITIS MEDIA WITH EFFUSION), RIGHT: Primary | ICD-10-CM

## 2022-11-08 DIAGNOSIS — H65.91 OME (OTITIS MEDIA WITH EFFUSION), RIGHT: ICD-10-CM

## 2022-11-08 PROCEDURE — 99214 OFFICE O/P EST MOD 30 MIN: CPT | Performed by: NURSE PRACTITIONER

## 2022-11-08 PROCEDURE — 70480 CT ORBIT/EAR/FOSSA W/O DYE: CPT | Mod: MG | Performed by: RADIOLOGY

## 2022-11-08 PROCEDURE — G1010 CDSM STANSON: HCPCS | Mod: GC | Performed by: RADIOLOGY

## 2022-11-08 RX ORDER — AMOXICILLIN 500 MG/1
500 CAPSULE ORAL 3 TIMES DAILY
Qty: 12 CAPSULE | Refills: 0 | Status: SHIPPED | OUTPATIENT
Start: 2022-11-08 | End: 2023-03-28

## 2022-11-08 RX ORDER — METRONIDAZOLE 250 MG/1
TABLET ORAL
COMMUNITY
Start: 2022-11-02 | End: 2023-04-14

## 2022-11-08 RX ORDER — IBUPROFEN 800 MG/1
800 TABLET, FILM COATED ORAL EVERY 6 HOURS PRN
Qty: 16 TABLET | Refills: 0 | Status: SHIPPED | OUTPATIENT
Start: 2022-11-08 | End: 2023-04-28

## 2022-11-08 RX ORDER — CHLORHEXIDINE GLUCONATE ORAL RINSE 1.2 MG/ML
SOLUTION DENTAL
COMMUNITY
Start: 2022-10-03 | End: 2023-03-28

## 2022-11-08 NOTE — NURSING NOTE
Yancy Rivera is a 67 year old female patient that presents today in clinic for the following:    Chief Complaint   Patient presents with     Ear Problem     Right ear pain per pt, inflamed gums per pt, film was placed in gum causing ear pain.      The patient's allergies and medications were reviewed as noted. A set of vitals were recorded as noted without incident: BP (!) 162/84 (BP Location: Left arm, Patient Position: Sitting, Cuff Size: Adult Large)   Pulse 78   Wt 87.9 kg (193 lb 11.2 oz)   LMP  (LMP Unknown)   SpO2 94%   BMI 33.25 kg/m  . The patient does not have any other questions for the provider.    Lluvia Solis, EMT at 11:54 AM on 11/8/2022

## 2022-11-09 NOTE — PROGRESS NOTES
S: Yancy Rivera is a 67 year old female complaining of acute right sided facial and ear pain.  She was at the dentist on October 3,2022 and states she was fine prior to that appt.  She had to have xrays and states she had to bite down on an xray bite wings on both sides.  She also had gum scraping done for gum disease. Since then she has notes acute right ear fullness and pain which radiates to her right forehead and anterior maxilla area and tori-auricular region.  Denies nasal drainage or fever. Her right face feels congested. She did not notify the dentist regarding this reaction.  She states the dentist did prescribe metronidazole 250 mg , a product called Rincinol for her gum disease, and Parodontax.    She rates the pain as a 9/10 and it keeps her awake.  She has been taking 400 mg of ibuprofen every 8 hours but the medication wears off after 4 hours.     She was seen with an [facility] virtual .        Patient Active Problem List   Diagnosis     Hyperlipidemia with target LDL less than 130     Prediabetes     Disorder of bursae and tendons in shoulder region     Shingles     Primary open angle glaucoma of both eyes, moderate stage     Senile nuclear sclerosis, bilateral     Right lumbar radiculopathy     Benign essential hypertension     Right wrist pain     Chronic midline low back pain without sciatica     Pseudophakia of both eyes     Right wrist injury, subsequent encounter     Neck pain            Past Medical History:   Diagnosis Date     Blepharitis      Esophageal reflux (aka GERD)      Glaucoma      Hyperlipidemia LDL goal < 130      Nonsenile cataract      Shingles     11/14/15 Left approximately C6 distribution            Past Surgical History:   Procedure Laterality Date     CATARACT IOL, RT/LT Right 10/16/2018    [place] [facility]      CATARACT IOL, RT/LT Left 11/01/2018    [place] [facility]      CHALAZION EXCISION   08/21/2015    Left lid     HERNIA REPAIR      abdominal hernia repair 2012            Social History     Tobacco Use     Smoking status: Never     Smokeless tobacco: Never   Substance Use Topics     Alcohol use: No            Family History   Problem Relation Age of Onset     Glaucoma Mother      Macular Degeneration No family hx of      Cancer No family hx of      Diabetes No family hx of      Hypertension No family hx of      Cerebrovascular Disease No family hx of      Thyroid Disease No family hx of      Eye Surgery No family hx of                Allergies   Allergen Reactions     No Clinical Screening - See Comments Nausea and Vomiting     Liver side effects from INH for TB            Current Outpatient Medications   Medication Sig Dispense Refill     amoxicillin (AMOXIL) 500 MG capsule Take 1 capsule (500 mg) by mouth 3 times daily 12 capsule 0     atorvastatin (LIPITOR) 20 MG tablet Take 1 tablet (20 mg) by mouth daily 30 tablet 2     atorvastatin (LIPITOR) 20 MG tablet Take 1 tablet by mouth once daily 90 tablet 0     carboxymethylcellulose PF (CARBOXYMETHYLCELLULOSE SODIUM) 0.5 % ophthalmic solution Place 1 drop into both eyes 4 times daily as needed for dry eyes 70 each 11     chlorhexidine (PERIDEX) 0.12 % solution SWISH IN MOUTH FOR 30 SECONDS WITH 15MLS AND SPIT. USE TWO TIMES A DAY. DO NOT SWALLOW       Cholecalciferol (VITAMIN D) 2000 UNITS tablet Take 1,000 Units by mouth daily 45 tablet 3     diclofenac (VOLTAREN) 1 % topical gel Place 2 g onto the skin 4 times daily To right wrist 100 g 3     diclofenac (VOLTAREN) 1 % topical gel Place 2 g onto the skin 4 times daily 100 g 3     ferrous sulfate (IRON) 325 (65 FE) MG tablet Take 1 tablet (325 mg) by mouth daily (with breakfast) 30 tablet 11     gabapentin (NEURONTIN) 100 MG capsule Take 1 capsule (100 mg) by mouth 3 times daily 90 capsule 1     gabapentin (NEURONTIN) 600 MG tablet Take 1 tablet (600 mg) by mouth 3 times daily 90 tablet 11      ibuprofen (ADVIL/MOTRIN) 200 MG tablet Take 400 mg by mouth       ibuprofen (ADVIL/MOTRIN) 800 MG tablet Take 1 tablet (800 mg) by mouth every 6 hours as needed for moderate pain 16 tablet 0     metroNIDAZOLE (FLAGYL) 250 MG tablet        Multiple Minerals (CALCIUM-MAGNESIUM-ZINC) TABS Take 1 tablet by mouth daily 90 tablet 0     naproxen (NAPROSYN) 500 MG tablet Take 1 tablet (500 mg) by mouth 2 times daily as needed for moderate pain (with meals) 60 tablet 1     omega 3 1000 MG CAPS Take 1 g by mouth daily 90 capsule 0     oxyCODONE (ROXICODONE) 5 MG immediate release tablet Take 1 tablet (5 mg) by mouth every 6 hours as needed for moderate to severe pain 20 tablet 0     pantoprazole (PROTONIX) 40 MG EC tablet Take 1 tablet (40 mg) by mouth daily For additional refills, please schedule a follow-up appointment at [phone] 90 tablet 0     piroxicam (FELDENE) 20 MG capsule Take 1 capsule (20 mg) by mouth daily (with breakfast) 30 capsule 11     Riboflavin 400 MG TABS Take 1 tablet by mouth daily 30 tablet 11     SUMAtriptan (IMITREX) 50 MG tablet Take 1 tablet (50 mg) by mouth at onset of headache for migraine (May repeat in one hour. Max 200 mg in 24 hours. limit use to no more than 2 days per week) May repeat dose within one hour. 12 tablet 6     tiZANidine (ZANAFLEX) 4 MG capsule Take 1 capsule (4 mg) by mouth 3 times daily as needed for muscle spasms 15 capsule 0     triamcinolone (KENALOG) 0.1 % external ointment Apply topically 2 times daily 454 g 11     White Petrolatum-Mineral Oil (REFRESH P.M.) OINT Apply thin layer to both eyes at bedtime 3.5 g 3     atorvastatin (LIPITOR) 20 MG tablet Take 1 tablet (20 mg) by mouth daily 30 tablet 11     doxycycline Monohydrate 100 MG TABS Take 100 mg by mouth daily (Patient not taking: Reported on 9/14/2021) 90 tablet 3     gabapentin (NEURONTIN) 100 MG capsule Take 1 capsule (100 mg) by mouth 3 times daily 90 capsule 3       REVIEW OF SYSTEMS:  See above.    O:   BP  (!) 162/84 (BP Location: Left arm, Patient Position: Sitting, Cuff Size: Adult Large)   Pulse 78   Temp 97.8  F (36.6  C)   Wt 87.9 kg (193 lb 11.2 oz)   LMP  (LMP Unknown)   SpO2 94%   BMI 33.25 kg/m    GENERAL APPEARANCE:alert and  acute distress. Cradling her right cheek with her right hand.  EYES: grossly normal.   HENT: ear canals: left  TM's normal.  Right TM flat, slightly convexed.  Tori-auricular area tender to palpation. and nose and mouth without ulcers or lesions.  She is tender to palpation over the maxillary area, and tori-auricular area.  RESP: lungs clear to auscultation - no rales, rhonchi or wheezes  CV: regular rates and rhythm, normal S1 S2, no S3 or S4 and no murmur, click or rub   NEURO: alert and oriented.  MUSK: ambulatory w/o aides.  PSYCHE: normal.     A/P:  Right tori-auricular and right sided facial pain which is aggravated by palpation.  Yancy was seen today for ear problem.    Diagnoses and all orders for this visit:    OME (otitis media with effusion), right   CT Sinus w/o Contrast today.   -     amoxicillin (AMOXIL) 500 MG capsule; Take 1 capsule (500 mg) by mouth 3 times daily  -     ibuprofen (ADVIL/MOTRIN) 800 MG tablet; Take 1 tablet (800 mg) by mouth every 6 hours as needed for moderate pain.  Advised to take with water and food.     She will be called with results.     Impression:   1. Right temporal bone: Small-volume soft tissue density within the  inferior Prussak's space without bony erosion and a sharp scutum. This  is non specific, could be fluid, debris or less likely developing  cholesteatoma.  2. Left temporal bone: no abnormality is identified  3. Findings suggestive of acute sinusitis involving the right  maxillary and sphenoid sinus in the correct clinical setting     I have personally reviewed the examination and initial interpretation  and I agree with the findings.     SHAVON GALLEGO MD     The patient voiced understanding of the information discussed and  all questions were answered.     I spent a total of 30  minutes in the care of this pt during today's office visit. This time includes reviewing the patient's chart and prior history, obtaining a history, performing an examination and evaluation and counseling the patient. This time also includes ordering medications or tests necessary in addition to communication to other member's of the patient's health care team. Time spent in documentation and care coordination is included.     Karin DICKENS, CNP

## 2022-11-11 ENCOUNTER — TELEPHONE (OUTPATIENT)
Dept: INTERNAL MEDICINE | Facility: CLINIC | Age: 67
End: 2022-11-11

## 2022-11-11 ENCOUNTER — OFFICE VISIT (OUTPATIENT)
Dept: INTERNAL MEDICINE | Facility: CLINIC | Age: 67
End: 2022-11-11
Payer: MEDICARE

## 2022-11-11 VITALS
HEART RATE: 74 BPM | DIASTOLIC BLOOD PRESSURE: 94 MMHG | SYSTOLIC BLOOD PRESSURE: 170 MMHG | HEIGHT: 64 IN | OXYGEN SATURATION: 96 % | TEMPERATURE: 98.1 F | BODY MASS INDEX: 33.41 KG/M2 | WEIGHT: 195.7 LBS

## 2022-11-11 DIAGNOSIS — K08.89 PAIN, DENTAL: Primary | ICD-10-CM

## 2022-11-11 DIAGNOSIS — H92.01 RIGHT EAR PAIN: ICD-10-CM

## 2022-11-11 PROCEDURE — 99215 OFFICE O/P EST HI 40 MIN: CPT | Performed by: INTERNAL MEDICINE

## 2022-11-11 ASSESSMENT — PAIN SCALES - GENERAL: PAINLEVEL: EXTREME PAIN (9)

## 2022-11-11 NOTE — PATIENT INSTRUCTIONS
Dentist Appointment is scheduled for:  Tuesday, November 15th at 1:15PM  [address] [address]  [facility]  [phone]

## 2022-11-11 NOTE — TELEPHONE ENCOUNTER
[facility] Call Center    Phone Message    May a detailed message be left on voicemail: yes     Reason for Call: Other: Pt is requesting call back to discuss getting a different dental referral. The dental clinic at the  is not accepting new patients and the pt does not want to go to the dental school

## 2022-11-11 NOTE — PROGRESS NOTES
"  Assessment & Plan     Pain, dental  Discussed possible dental pain as the cause of symptoms.  Recommend urgent consultation with a dentist.  Referral to dental clinic was placed.  Patient may opt to see community dental clinic covered by her insurance plan.  - Dental Referral; Future  - ORTHOPANTOGRAM    Right ear pain  Reviewed possible causes ear pain.  Exam is reassuring, however CT was somewhat concerning.  Recommend consultation with ENT.  Referral was placed today.  - Adult ENT  Referral; Future      I spent a total of 40 minutes on the day of the visit.   Time spent doing chart review, history and exam, documentation and further activities per the note       BMI:   Estimated body mass index is 33.59 kg/m  as calculated from the following:    Height as of this encounter: 1.626 m (5' 4\").    Weight as of this encounter: 88.8 kg (195 lb 11.2 oz).           No follow-ups on file.    Alanna Davalos MD  [facility]   Yancy is a 67 year old, presenting for the following health issues:  Otalgia (Right ear pain), Recheck Medication, and Results (Pt would like to discuss CT scan)      HPI     Patient eports that she was recently seen by her dentist in October. She reports that she had a dental exam. She was seen by a dentist again because she was in a lot of pain after the exam. She is not sure of the dates and interventions that were done by the dentist. She was seen by Dr. eLvin and was started on antibiotics. She reports that the medication did not help.   Patient continues to have severe pain localized to the right ear.  She denies fever or chills.  She reports that pain radiates to the right side of her face as well.  She does have gum irritation in the area of pain as well as lower left jaw.  She has not been seen by dentist and follow-up.    Review of Systems   Constitutional, HEENT, cardiovascular, pulmonary, GI, , " "musculoskeletal, neuro, skin, endocrine and psych systems are negative, except as otherwise noted.      Objective    BP (!) 170/94 (BP Location: Left arm, Patient Position: Sitting, Cuff Size: Adult Regular)   Pulse 74   Temp 98.1  F (36.7  C) (Oral)   Ht 1.626 m (5' 4\")   Wt 88.8 kg (195 lb 11.2 oz)   LMP  (LMP Unknown)   SpO2 96%   BMI 33.59 kg/m    Body mass index is 33.59 kg/m .  Physical Exam   GENERAL: healthy, alert and mild distress  EYES: Eyes grossly normal to inspection, PERRL and conjunctivae and sclerae normal  HENT: normal cephalic/atraumatic, ear canals and TM's normal, oropharynx clear, oral mucous membranes moist and erythma - 2nd upper right molar, right lower lateral gum line  RESP: normal effort, no wheezin  CV: regular rates and rhythm and no peripheral edema  MS: no gross musculoskeletal defects noted, no edema                    "

## 2022-11-11 NOTE — NURSING NOTE
Yancy Rivera is a 67 year old female patient that presents today in clinic for the following:    Chief Complaint   Patient presents with     Otalgia     Right ear pain     Recheck Medication     Results     Pt would like to discuss CT scan     The patient's allergies and medications were reviewed as noted. A set of vitals were recorded as noted without incident. The patient does not have any other questions for the provider.    Eneida Thomas, EMT at 10:09 AM on 11/11/2022

## 2022-11-14 NOTE — TELEPHONE ENCOUNTER
Called patient with [facility]  and left VM.  PCC returning pt call.  We don't know any other dental clinics besides what we referred patient to or at the  of  dental school.  Recommend patient to call her dental insurance to see which community dental clinic she can be seen within network.  (no referral is needed for this.  Only specialty dental clinics requires referral. )        Nic Chinchilla CMA ([facility]) at 10:13 AM on 11/14/2022

## 2022-11-23 NOTE — TELEPHONE ENCOUNTER
FUTURE VISIT INFORMATION      FUTURE VISIT INFORMATION:    Date: 2/16/23    Time: 10:30    Location: [facility]  REFERRAL INFORMATION:    Referring provider:  dr Davalos     Referring providers clinic:  [facility]     Reason for visit/diagnosis  pt calling ear pain ref by dr Davalos [facility] location verified, med rec in epic    RECORDS REQUESTED FROM:       Clinic name Comments Records Status Imaging Status   [facility]  11/11/22 note from dr Davalos  Epic

## 2022-11-29 ENCOUNTER — OFFICE VISIT (OUTPATIENT)
Dept: INTERNAL MEDICINE | Facility: CLINIC | Age: 67
End: 2022-11-29
Payer: MEDICARE

## 2022-11-29 VITALS
HEART RATE: 77 BPM | BODY MASS INDEX: 33.19 KG/M2 | TEMPERATURE: 97.8 F | SYSTOLIC BLOOD PRESSURE: 146 MMHG | DIASTOLIC BLOOD PRESSURE: 93 MMHG | HEIGHT: 64 IN | WEIGHT: 194.4 LBS | OXYGEN SATURATION: 96 %

## 2022-11-29 DIAGNOSIS — H92.01 RIGHT EAR PAIN: Primary | ICD-10-CM

## 2022-11-29 PROCEDURE — 99214 OFFICE O/P EST MOD 30 MIN: CPT | Performed by: NURSE PRACTITIONER

## 2022-11-29 RX ORDER — AMOXICILLIN AND CLAVULANATE POTASSIUM 500; 125 MG/1; MG/1
1 TABLET, FILM COATED ORAL 2 TIMES DAILY
Qty: 30 TABLET | Refills: 0 | Status: SHIPPED | OUTPATIENT
Start: 2022-11-29 | End: 2023-03-28

## 2022-11-29 NOTE — TELEPHONE ENCOUNTER
FUTURE VISIT INFORMATION      FUTURE VISIT INFORMATION:    Date: 12/8/22    Time: 2pm    Location: [facility]  REFERRAL INFORMATION:    Referring provider:  Karin Kim APRN CNP    Referring providers clinic:  [facility]    Reason for visit/diagnosis  persistent right facial pain after Amoxicillin therapy. CT looked like sinusitis. Also TMJ crepitus. Pain level 8/10, referred by Tila Kim,    RECORDS REQUESTED FROM:       Clinic name Comments Records Status Imaging Status   [facility]  11/29/22, 11/8/22- note from Karin Kim APRN CNP  11/11/22- note from Alanna Davalos MD Epic     image 11/8/22- ct temporal  12/21/21- ct head  Epic  pacs

## 2022-11-29 NOTE — NURSING NOTE
Yancy Rivera is a 67 year old female that presents in clinic today for the following:     Chief Complaint   Patient presents with     Ear Problem     R ear issue, appointments with ENT and Audiology in February      Jaw Pain       The patient's allergies and medications were reviewed. The patient's vitals were obtained without incident. The patient does not have any other questions for the provider.     Marlo Layne, EMT at 8:38 AM on 11/29/2022.  Primary Care Clinic: [phone]

## 2022-11-30 NOTE — PROGRESS NOTES
S:   Ms. Rivera presents with recurrence of right sided facial pain. She was seen in clinic on 11/8/22 and started on amoxicillin for right sinus infection 9SEE CT SCAN REPORT)  (see note 11/8/22). She states she felt much better but after five days her pain recurred. She has exacerbations with swallowing, chewing. Pain involves her right face and head midline to ear.  She rates the pain as an average of 8/10.  Ibuprofen helps.     She still has gingivitis after her dental work.  She states she called to see the dentist but they will not see her because no one accepts her insurance.     She has an ENT appt in February.         Patient Active Problem List   Diagnosis     Hyperlipidemia with target LDL less than 130     Prediabetes     Disorder of bursae and tendons in shoulder region     Shingles     Primary open angle glaucoma of both eyes, moderate stage     Senile nuclear sclerosis, bilateral     Right lumbar radiculopathy     Benign essential hypertension     Right wrist pain     Chronic midline low back pain without sciatica     Pseudophakia of both eyes     Right wrist injury, subsequent encounter     Neck pain            Past Medical History:   Diagnosis Date     Blepharitis      Esophageal reflux (aka GERD)      Glaucoma      Hyperlipidemia LDL goal < 130      Nonsenile cataract      Shingles     11/14/15 Left approximately C6 distribution            Past Surgical History:   Procedure Laterality Date     CATARACT IOL, RT/LT Right 10/16/2018    [place] [facility]      CATARACT IOL, RT/LT Left 11/01/2018    [place] [facility]      CHALAZION EXCISION  08/21/2015    Left lid     HERNIA REPAIR      abdominal hernia repair 2012            Social History     Tobacco Use     Smoking status: Never     Smokeless tobacco: Never   Substance Use Topics     Alcohol use: No            Family History   Problem Relation Age of Onset     Glaucoma Mother       Macular Degeneration No family hx of      Cancer No family hx of      Diabetes No family hx of      Hypertension No family hx of      Cerebrovascular Disease No family hx of      Thyroid Disease No family hx of      Eye Surgery No family hx of                Allergies   Allergen Reactions     No Clinical Screening - See Comments Nausea and Vomiting     Liver side effects from INH for TB            Current Outpatient Medications   Medication Sig Dispense Refill     amoxicillin (AMOXIL) 500 MG capsule Take 1 capsule (500 mg) by mouth 3 times daily 12 capsule 0     amoxicillin-clavulanate (AUGMENTIN) 500-125 MG tablet Take 1 tablet by mouth 2 times daily 30 tablet 0     atorvastatin (LIPITOR) 20 MG tablet Take 1 tablet (20 mg) by mouth daily 30 tablet 2     atorvastatin (LIPITOR) 20 MG tablet Take 1 tablet by mouth once daily 90 tablet 0     carboxymethylcellulose PF (CARBOXYMETHYLCELLULOSE SODIUM) 0.5 % ophthalmic solution Place 1 drop into both eyes 4 times daily as needed for dry eyes 70 each 11     chlorhexidine (PERIDEX) 0.12 % solution SWISH IN MOUTH FOR 30 SECONDS WITH 15MLS AND SPIT. USE TWO TIMES A DAY. DO NOT SWALLOW       Cholecalciferol (VITAMIN D) 2000 UNITS tablet Take 1,000 Units by mouth daily 45 tablet 3     diclofenac (VOLTAREN) 1 % topical gel Place 2 g onto the skin 4 times daily To right wrist 100 g 3     diclofenac (VOLTAREN) 1 % topical gel Place 2 g onto the skin 4 times daily 100 g 3     ferrous sulfate (IRON) 325 (65 FE) MG tablet Take 1 tablet (325 mg) by mouth daily (with breakfast) 30 tablet 11     gabapentin (NEURONTIN) 100 MG capsule Take 1 capsule (100 mg) by mouth 3 times daily 90 capsule 1     gabapentin (NEURONTIN) 600 MG tablet Take 1 tablet (600 mg) by mouth 3 times daily 90 tablet 11     ibuprofen (ADVIL/MOTRIN) 200 MG tablet Take 400 mg by mouth       ibuprofen (ADVIL/MOTRIN) 800 MG tablet Take 1 tablet (800 mg) by mouth every 6 hours as needed for moderate pain 16 tablet 0      "metroNIDAZOLE (FLAGYL) 250 MG tablet        Multiple Minerals (CALCIUM-MAGNESIUM-ZINC) TABS Take 1 tablet by mouth daily 90 tablet 0     naproxen (NAPROSYN) 500 MG tablet Take 1 tablet (500 mg) by mouth 2 times daily as needed for moderate pain (with meals) 60 tablet 1     omega 3 1000 MG CAPS Take 1 g by mouth daily 90 capsule 0     oxyCODONE (ROXICODONE) 5 MG immediate release tablet Take 1 tablet (5 mg) by mouth every 6 hours as needed for moderate to severe pain 20 tablet 0     pantoprazole (PROTONIX) 40 MG EC tablet Take 1 tablet (40 mg) by mouth daily For additional refills, please schedule a follow-up appointment at [phone] 90 tablet 0     piroxicam (FELDENE) 20 MG capsule Take 1 capsule (20 mg) by mouth daily (with breakfast) 30 capsule 11     Riboflavin 400 MG TABS Take 1 tablet by mouth daily 30 tablet 11     SUMAtriptan (IMITREX) 50 MG tablet Take 1 tablet (50 mg) by mouth at onset of headache for migraine (May repeat in one hour. Max 200 mg in 24 hours. limit use to no more than 2 days per week) May repeat dose within one hour. 12 tablet 6     tiZANidine (ZANAFLEX) 4 MG capsule Take 1 capsule (4 mg) by mouth 3 times daily as needed for muscle spasms 15 capsule 0     triamcinolone (KENALOG) 0.1 % external ointment Apply topically 2 times daily 454 g 11     White Petrolatum-Mineral Oil (REFRESH P.M.) OINT Apply thin layer to both eyes at bedtime 3.5 g 3     atorvastatin (LIPITOR) 20 MG tablet Take 1 tablet (20 mg) by mouth daily 30 tablet 11     doxycycline Monohydrate 100 MG TABS Take 100 mg by mouth daily (Patient not taking: Reported on 11/29/2022) 90 tablet 3     gabapentin (NEURONTIN) 100 MG capsule Take 1 capsule (100 mg) by mouth 3 times daily 90 capsule 3       REVIEW OF SYSTEMS:  See above.    O:   BP (!) 146/93 (BP Location: Left arm, Patient Position: Sitting, Cuff Size: Adult Large)   Pulse 77   Temp 97.8  F (36.6  C) (Oral)   Ht 1.626 m (5' 4\")   Wt 88.2 kg (194 lb 6.4 oz)   LMP  " (LMP Unknown)   SpO2 96%   BMI 33.37 kg/m    GENERAL APPEARANCE: healthy, alert and no distress  HENT: ear canals and TM's normal and mouth indicates gingivitis and inflammation.   RESP: no distress.  CV: see VS   NEURO:normal.MUSK:  Marked crepitous over the right TMJ with jaw extension and flexion.  SKIN: nl  LYMPH: neg cervical, supra and infraclavicular nodes.  PSYCHE: pleasant but appears  To be in pain by holding her right hand over the right jaw.    A/P:  Yancy was seen today for ear problem and jaw pain.    Diagnoses and all orders for this visit:    Right ear pain  -     amoxicillin-clavulanate (AUGMENTIN) 500-125 MG tablet; Take 1 tablet by mouth 2 times daily  -     Adult ENT  Referral; Future This needs to be sooner than February.     The patient voiced understanding of the information discussed and all questions were answered.     I spent a total of 30 minutes in the care of this pt during today's office visit. This time includes reviewing the patient's chart and prior history, obtaining a history, performing an examination and evaluation and counseling the patient. This time also includes ordering medications or tests necessary in addition to communication to other member's of the patient's health care team. Time spent in documentation and care coordination is included.     Karin DICKENS, CNP

## 2022-12-08 ENCOUNTER — PRE VISIT (OUTPATIENT)
Dept: OTOLARYNGOLOGY | Facility: CLINIC | Age: 67
End: 2022-12-08

## 2022-12-23 DIAGNOSIS — Z01.10 ENCOUNTER FOR HEARING TEST: Primary | ICD-10-CM

## 2023-01-10 DIAGNOSIS — E78.5 HYPERLIPEMIA: ICD-10-CM

## 2023-01-12 NOTE — TELEPHONE ENCOUNTER
ATORVASTATIN 20 MG TABLET  Last Written Prescription Date:   10/11/2022  Last Fill Quantity: 30,   # refills: 2  Last Office Visit :  11/29/2022  Future Office visit:  None    Routing refill request to provider for review/approval because:  Second Request  Needing updated LIPID Profile   Please call Pt to schedule.     Recent Labs   Lab Test 04/30/21  1256   LDL 85       Vijaya Huston RN  Central Triage Red Flags/Med Refills

## 2023-01-13 RX ORDER — ATORVASTATIN CALCIUM 20 MG/1
20 TABLET, FILM COATED ORAL DAILY
Qty: 90 TABLET | Refills: 0 | Status: SHIPPED | OUTPATIENT
Start: 2023-01-13 | End: 2023-01-18

## 2023-01-18 ENCOUNTER — OFFICE VISIT (OUTPATIENT)
Dept: OTOLARYNGOLOGY | Facility: CLINIC | Age: 68
End: 2023-01-18
Attending: INTERNAL MEDICINE
Payer: MEDICARE

## 2023-01-18 ENCOUNTER — OFFICE VISIT (OUTPATIENT)
Dept: AUDIOLOGY | Facility: CLINIC | Age: 68
End: 2023-01-18
Attending: INTERNAL MEDICINE
Payer: MEDICARE

## 2023-01-18 VITALS
WEIGHT: 191.7 LBS | BODY MASS INDEX: 32.73 KG/M2 | SYSTOLIC BLOOD PRESSURE: 175 MMHG | OXYGEN SATURATION: 95 % | HEIGHT: 64 IN | HEART RATE: 67 BPM | DIASTOLIC BLOOD PRESSURE: 78 MMHG

## 2023-01-18 DIAGNOSIS — H92.01 RIGHT EAR PAIN: ICD-10-CM

## 2023-01-18 DIAGNOSIS — K21.9 GASTROESOPHAGEAL REFLUX DISEASE, UNSPECIFIED WHETHER ESOPHAGITIS PRESENT: ICD-10-CM

## 2023-01-18 DIAGNOSIS — E78.5 HYPERLIPEMIA: ICD-10-CM

## 2023-01-18 DIAGNOSIS — H92.01 EAR PAIN, RIGHT: Primary | ICD-10-CM

## 2023-01-18 PROCEDURE — 92565 STENGER TEST PURE TONE: CPT | Performed by: AUDIOLOGIST

## 2023-01-18 PROCEDURE — 92557 COMPREHENSIVE HEARING TEST: CPT | Performed by: AUDIOLOGIST

## 2023-01-18 PROCEDURE — 92550 TYMPANOMETRY & REFLEX THRESH: CPT | Performed by: AUDIOLOGIST

## 2023-01-18 PROCEDURE — 99213 OFFICE O/P EST LOW 20 MIN: CPT | Performed by: REGISTERED NURSE

## 2023-01-18 RX ORDER — PANTOPRAZOLE SODIUM 40 MG/1
40 TABLET, DELAYED RELEASE ORAL DAILY
Qty: 90 TABLET | Refills: 3 | Status: SHIPPED | OUTPATIENT
Start: 2023-01-18 | End: 2023-08-09

## 2023-01-18 RX ORDER — ATORVASTATIN CALCIUM 20 MG/1
20 TABLET, FILM COATED ORAL DAILY
Qty: 90 TABLET | Refills: 0 | Status: SHIPPED | OUTPATIENT
Start: 2023-01-18 | End: 2023-03-28

## 2023-01-18 ASSESSMENT — PAIN SCALES - GENERAL: PAINLEVEL: NO PAIN (0)

## 2023-01-18 NOTE — PROGRESS NOTES
AUDIOLOGY REPORT    SUMMARY: Audiology visit completed. See audiogram for results.      RECOMMENDATIONS: Follow-up with ENT.    Sharan Trotter.  Licensed Audiologist  [place] # [id number]

## 2023-01-18 NOTE — TELEPHONE ENCOUNTER
Pt arrived at [facility] clinic wanting atorvastatin and pantoprazole refilled.     Routed to [facility], Karin Kim's nurse.     PRO BLACK RN on 1/18/2023 at 1:14 PM

## 2023-01-18 NOTE — LETTER
1/18/2023       RE: Yancy Rivera  [address] [facility] [address]     Dear Colleague,    Thank you for referring your patient, Yancy Rivera, to the [facility] [place] at [facility]. Please see a copy of my visit note below.      Otolaryngology Clinic  January 18, 2023    Chief Complaint:   Right ear/neck pain       History of Present Illness:   Yancy Rivera is a 67 year old female who presents today for evaluation of right ear/neck pain. Patient's history is obtained with assistance of an [place] .  Patient reports a history of recurrent right facial pain.  This was severe in November 2022.  CT completed at that time demonstrating acute sinusitis.  Patient was treated with Augmentin for the sinusitis with good relief.  Patient was referred to otolaryngology to further assess findings of the right ear which had reported soft volume soft tissue density within the middle ear.    Today, patient reports that she is doing well.  Patient's right facial pain has resolved with use of antibiotics.  Patient does have inflammation of her gingiva bilaterally and is awaiting a dental appointment in February 2023.  Patient reports most bothersome symptom today is right neck pain that radiates up the head and down the shoulder.  Patient denies any difficulty hearing, tinnitus, drainage, vertigo, history of frequent ear infections, perforations, or history of ear surgeries.    Past Medical History:  Past Medical History:   Diagnosis Date     Blepharitis      Esophageal reflux (aka GERD)      Glaucoma      Hyperlipidemia LDL goal < 130      Nonsenile cataract      Shingles     11/14/15 Left approximately C6 distribution       Past Surgical History:  Past Surgical History:   Procedure Laterality Date     CATARACT IOL, RT/LT Right 10/16/2018    [place] [facility]      CATARACT IOL, RT/LT Left  11/01/2018    [place] [facility] EXCISION  08/21/2015    Left lid     HERNIA REPAIR      abdominal hernia repair 2012       Medications:  Current Outpatient Medications   Medication Sig Dispense Refill     amoxicillin (AMOXIL) 500 MG capsule Take 1 capsule (500 mg) by mouth 3 times daily 12 capsule 0     amoxicillin-clavulanate (AUGMENTIN) 500-125 MG tablet Take 1 tablet by mouth 2 times daily 30 tablet 0     atorvastatin (LIPITOR) 20 MG tablet Take 1 tablet by mouth once daily 90 tablet 0     carboxymethylcellulose PF (CARBOXYMETHYLCELLULOSE SODIUM) 0.5 % ophthalmic solution Place 1 drop into both eyes 4 times daily as needed for dry eyes 70 each 11     chlorhexidine (PERIDEX) 0.12 % solution SWISH IN MOUTH FOR 30 SECONDS WITH 15MLS AND SPIT. USE TWO TIMES A DAY. DO NOT SWALLOW       Cholecalciferol (VITAMIN D) 2000 UNITS tablet Take 1,000 Units by mouth daily 45 tablet 3     diclofenac (VOLTAREN) 1 % topical gel Place 2 g onto the skin 4 times daily To right wrist 100 g 3     diclofenac (VOLTAREN) 1 % topical gel Place 2 g onto the skin 4 times daily 100 g 3     ferrous sulfate (IRON) 325 (65 FE) MG tablet Take 1 tablet (325 mg) by mouth daily (with breakfast) 30 tablet 11     gabapentin (NEURONTIN) 100 MG capsule Take 1 capsule (100 mg) by mouth 3 times daily 90 capsule 1     gabapentin (NEURONTIN) 600 MG tablet Take 1 tablet (600 mg) by mouth 3 times daily 90 tablet 11     ibuprofen (ADVIL/MOTRIN) 200 MG tablet Take 400 mg by mouth       ibuprofen (ADVIL/MOTRIN) 800 MG tablet Take 1 tablet (800 mg) by mouth every 6 hours as needed for moderate pain 16 tablet 0     metroNIDAZOLE (FLAGYL) 250 MG tablet        Multiple Minerals (CALCIUM-MAGNESIUM-ZINC) TABS Take 1 tablet by mouth daily 90 tablet 0     naproxen (NAPROSYN) 500 MG tablet Take 1 tablet (500 mg) by mouth 2 times daily as needed for moderate pain (with meals) 60 tablet 1     omega 3 1000 MG CAPS Take 1 g  "by mouth daily 90 capsule 0     oxyCODONE (ROXICODONE) 5 MG immediate release tablet Take 1 tablet (5 mg) by mouth every 6 hours as needed for moderate to severe pain 20 tablet 0     piroxicam (FELDENE) 20 MG capsule Take 1 capsule (20 mg) by mouth daily (with breakfast) 30 capsule 11     Riboflavin 400 MG TABS Take 1 tablet by mouth daily 30 tablet 11     SUMAtriptan (IMITREX) 50 MG tablet Take 1 tablet (50 mg) by mouth at onset of headache for migraine (May repeat in one hour. Max 200 mg in 24 hours. limit use to no more than 2 days per week) May repeat dose within one hour. 12 tablet 6     tiZANidine (ZANAFLEX) 4 MG capsule Take 1 capsule (4 mg) by mouth 3 times daily as needed for muscle spasms 15 capsule 0     triamcinolone (KENALOG) 0.1 % external ointment Apply topically 2 times daily 454 g 11     White Petrolatum-Mineral Oil (REFRESH P.M.) OINT Apply thin layer to both eyes at bedtime 3.5 g 3     atorvastatin (LIPITOR) 20 MG tablet Take 1 tablet (20 mg) by mouth daily Patient needs to have labs for further refills. 90 tablet 0     doxycycline Monohydrate 100 MG TABS Take 100 mg by mouth daily (Patient not taking: Reported on 11/29/2022) 90 tablet 3     pantoprazole (PROTONIX) 40 MG EC tablet Take 1 tablet (40 mg) by mouth daily 90 tablet 3       Allergies:  Allergies   Allergen Reactions     No Clinical Screening - See Comments Nausea and Vomiting     Liver side effects from INH for TB        Social History:  Social History     Tobacco Use     Smoking status: Never     Smokeless tobacco: Never   Substance Use Topics     Alcohol use: No     Drug use: No       ROS: 10 point ROS neg other than the symptoms noted above in the HPI.    Physical Exam:    BP (!) 175/78 (BP Location: Left arm, Patient Position: Sitting, Cuff Size: Adult Large)   Pulse 67   Ht 1.626 m (5' 4\")   Wt 87 kg (191 lb 11.2 oz)   LMP  (LMP Unknown)   SpO2 95%   BMI 32.91 kg/m       Constitutional:  The patient was unaccompanied, " well-groomed, and in no acute distress.     Neurologic: Alert and oriented x 3.  CN's III-XII within normal limits.  Voice normal.   Psychiatric: The patient's affect was calm, cooperative, and appropriate.    Communication:  Normal; communicates verbally, normal voice quality.    Respiratory: Breathing comfortably without stridor or exertion of accessory muscles.    Ears: Pinnae and tragus non-tender.     Neck: Supple with normal laryngeal and tracheal landmarks. No palpable thyroid.  Normal range of motion. Muscle tension of the SCM on palpation.   Lymphatic: There is no palpable lymphadenopathy in the neck.      Otologic microscope exam:    Right ear was examined under the microscope.  Normal appearing TM, nicely aerated middle ear space.   Left ear was also examined under the microscope.  Normal appearing TM, nicely aerated middle ear space.    Audiogram: 1/18/2023 - data independently reviewed  Normal sloping to mild sensorineural hearing loss bilaterally  WR right: 96% left: 88% at 50 dB  Acoustic Reflexes:Present in all conditions   Tympanograms: type A bilaterally       Imaging reviewed:   11/8/22  CT temporal bone  Impression:   1. Right temporal bone: Small-volume soft tissue density within the  inferior Prussak's space without bony erosion and a sharp scutum. This  is non specific, could be fluid, debris or less likely developing  cholesteatoma.  2. Left temporal bone: no abnormality is identified  3. Findings suggestive of acute sinusitis involving the right  maxillary and sphenoid sinus in the correct clinical setting    Assessment and Plan:  1. Right ear pain  Patient with history of right ear and facial pain that has almost completely resolved after antibiotic use for maxillary sinusitis. There is no evidence of infection, effusion, or retraction on ear exam today. Hearing is normal without any conductive loss to indicate middle ear pathology.     Patient does have right neck pain that is being  managed by the Pain Clinic with injections.  She may also benefit from PT for neck ROM exercises. Will continue to monitor.     Also encouraged patient to keep dental appointment as dental disease can cause referred pain to the ears.        Patient will follow up as needed for any new change in hearing or worsening ear pain.    Ashley Nesbitt DNP, APRN, CNP  Otolaryngology  Head & Neck Surgery  [phone]    30 minutes spent on the date of the encounter doing chart review, history and exam, documentation and further activities per the note

## 2023-01-22 NOTE — PROGRESS NOTES
Otolaryngology Clinic  January 18, 2023    Chief Complaint:   Right ear/neck pain       History of Present Illness:   Yancy Rivera is a 67 year old female who presents today for evaluation of right ear/neck pain. Patient's history is obtained with assistance of an [place] .  Patient reports a history of recurrent right facial pain.  This was severe in November 2022.  CT completed at that time demonstrating acute sinusitis.  Patient was treated with Augmentin for the sinusitis with good relief.  Patient was referred to otolaryngology to further assess findings of the right ear which had reported soft volume soft tissue density within the middle ear.    Today, patient reports that she is doing well.  Patient's right facial pain has resolved with use of antibiotics.  Patient does have inflammation of her gingiva bilaterally and is awaiting a dental appointment in February 2023.  Patient reports most bothersome symptom today is right neck pain that radiates up the head and down the shoulder.  Patient denies any difficulty hearing, tinnitus, drainage, vertigo, history of frequent ear infections, perforations, or history of ear surgeries.    Past Medical History:  Past Medical History:   Diagnosis Date     Blepharitis      Esophageal reflux (aka GERD)      Glaucoma      Hyperlipidemia LDL goal < 130      Nonsenile cataract      Shingles     11/14/15 Left approximately C6 distribution       Past Surgical History:  Past Surgical History:   Procedure Laterality Date     CATARACT IOL, RT/LT Right 10/16/2018    [facility]      CATARACT IOL, RT/LT Left 11/01/2018    [facility]      CHALAZION EXCISION  08/21/2015    Left lid     HERNIA REPAIR      abdominal hernia repair 2012       Medications:  Current Outpatient Medications   Medication Sig Dispense Refill     amoxicillin (AMOXIL) 500 MG capsule Take 1 capsule (500 mg) by mouth  3 times daily 12 capsule 0     amoxicillin-clavulanate (AUGMENTIN) 500-125 MG tablet Take 1 tablet by mouth 2 times daily 30 tablet 0     atorvastatin (LIPITOR) 20 MG tablet Take 1 tablet by mouth once daily 90 tablet 0     carboxymethylcellulose PF (CARBOXYMETHYLCELLULOSE SODIUM) 0.5 % ophthalmic solution Place 1 drop into both eyes 4 times daily as needed for dry eyes 70 each 11     chlorhexidine (PERIDEX) 0.12 % solution SWISH IN MOUTH FOR 30 SECONDS WITH 15MLS AND SPIT. USE TWO TIMES A DAY. DO NOT SWALLOW       Cholecalciferol (VITAMIN D) 2000 UNITS tablet Take 1,000 Units by mouth daily 45 tablet 3     diclofenac (VOLTAREN) 1 % topical gel Place 2 g onto the skin 4 times daily To right wrist 100 g 3     diclofenac (VOLTAREN) 1 % topical gel Place 2 g onto the skin 4 times daily 100 g 3     ferrous sulfate (IRON) 325 (65 FE) MG tablet Take 1 tablet (325 mg) by mouth daily (with breakfast) 30 tablet 11     gabapentin (NEURONTIN) 100 MG capsule Take 1 capsule (100 mg) by mouth 3 times daily 90 capsule 1     gabapentin (NEURONTIN) 600 MG tablet Take 1 tablet (600 mg) by mouth 3 times daily 90 tablet 11     ibuprofen (ADVIL/MOTRIN) 200 MG tablet Take 400 mg by mouth       ibuprofen (ADVIL/MOTRIN) 800 MG tablet Take 1 tablet (800 mg) by mouth every 6 hours as needed for moderate pain 16 tablet 0     metroNIDAZOLE (FLAGYL) 250 MG tablet        Multiple Minerals (CALCIUM-MAGNESIUM-ZINC) TABS Take 1 tablet by mouth daily 90 tablet 0     naproxen (NAPROSYN) 500 MG tablet Take 1 tablet (500 mg) by mouth 2 times daily as needed for moderate pain (with meals) 60 tablet 1     omega 3 1000 MG CAPS Take 1 g by mouth daily 90 capsule 0     oxyCODONE (ROXICODONE) 5 MG immediate release tablet Take 1 tablet (5 mg) by mouth every 6 hours as needed for moderate to severe pain 20 tablet 0     piroxicam (FELDENE) 20 MG capsule Take 1 capsule (20 mg) by mouth daily (with breakfast) 30 capsule 11     Riboflavin 400 MG TABS Take 1  "tablet by mouth daily 30 tablet 11     SUMAtriptan (IMITREX) 50 MG tablet Take 1 tablet (50 mg) by mouth at onset of headache for migraine (May repeat in one hour. Max 200 mg in 24 hours. limit use to no more than 2 days per week) May repeat dose within one hour. 12 tablet 6     tiZANidine (ZANAFLEX) 4 MG capsule Take 1 capsule (4 mg) by mouth 3 times daily as needed for muscle spasms 15 capsule 0     triamcinolone (KENALOG) 0.1 % external ointment Apply topically 2 times daily 454 g 11     White Petrolatum-Mineral Oil (REFRESH P.M.) OINT Apply thin layer to both eyes at bedtime 3.5 g 3     atorvastatin (LIPITOR) 20 MG tablet Take 1 tablet (20 mg) by mouth daily Patient needs to have labs for further refills. 90 tablet 0     doxycycline Monohydrate 100 MG TABS Take 100 mg by mouth daily (Patient not taking: Reported on 11/29/2022) 90 tablet 3     pantoprazole (PROTONIX) 40 MG EC tablet Take 1 tablet (40 mg) by mouth daily 90 tablet 3       Allergies:  Allergies   Allergen Reactions     No Clinical Screening - See Comments Nausea and Vomiting     Liver side effects from [facility] for TB        Social History:  Social History     Tobacco Use     Smoking status: Never     Smokeless tobacco: Never   Substance Use Topics     Alcohol use: No     Drug use: No       ROS: 10 point ROS neg other than the symptoms noted above in the HPI.    Physical Exam:    BP (!) 175/78 (BP Location: Left arm, Patient Position: Sitting, Cuff Size: Adult Large)   Pulse 67   Ht 1.626 m (5' 4\")   Wt 87 kg (191 lb 11.2 oz)   LMP  (LMP Unknown)   SpO2 95%   BMI 32.91 kg/m       Constitutional:  The patient was unaccompanied, well-groomed, and in no acute distress.     Neurologic: Alert and oriented x 3.  CN's III-XII within normal limits.  Voice normal.   Psychiatric: The patient's affect was calm, cooperative, and appropriate.    Communication:  Normal; communicates verbally, normal voice quality.    Respiratory: Breathing comfortably without " stridor or exertion of accessory muscles.    Ears: Pinnae and tragus non-tender.     Neck: Supple with normal laryngeal and tracheal landmarks. No palpable thyroid.  Normal range of motion. Muscle tension of the SCM on palpation.   Lymphatic: There is no palpable lymphadenopathy in the neck.      Otologic microscope exam:    Right ear was examined under the microscope.  Normal appearing TM, nicely aerated middle ear space.   Left ear was also examined under the microscope.  Normal appearing TM, nicely aerated middle ear space.    Audiogram: 1/18/2023 - data independently reviewed  Normal sloping to mild sensorineural hearing loss bilaterally  WR right: 96% left: 88% at 50 dB  Acoustic Reflexes:Present in all conditions   Tympanograms: type A bilaterally       Imaging reviewed:   11/8/22  CT temporal bone  Impression:   1. Right temporal bone: Small-volume soft tissue density within the  inferior Prussak's space without bony erosion and a sharp scutum. This  is non specific, could be fluid, debris or less likely developing  cholesteatoma.  2. Left temporal bone: no abnormality is identified  3. Findings suggestive of acute sinusitis involving the right  maxillary and sphenoid sinus in the correct clinical setting    Assessment and Plan:  1. Right ear pain  Patient with history of right ear and facial pain that has almost completely resolved after antibiotic use for maxillary sinusitis. There is no evidence of infection, effusion, or retraction on ear exam today. Hearing is normal without any conductive loss to indicate middle ear pathology.     Patient does have right neck pain that is being managed by the Pain Clinic with injections.  She may also benefit from PT for neck ROM exercises. Will continue to monitor.     Also encouraged patient to keep dental appointment as dental disease can cause referred pain to the ears.        Patient will follow up as needed for any new change in hearing or worsening ear  pain.    Ashley Nesbitt DNP, APRN, CNP  Otolaryngology  Head & Neck Surgery  [phone]    30 minutes spent on the date of the encounter doing chart review, history and exam, documentation and further activities per the note

## 2023-02-16 ENCOUNTER — PRE VISIT (OUTPATIENT)
Dept: OTOLARYNGOLOGY | Facility: CLINIC | Age: 68
End: 2023-02-16

## 2023-02-27 ENCOUNTER — OFFICE VISIT (OUTPATIENT)
Dept: FAMILY MEDICINE | Facility: CLINIC | Age: 68
End: 2023-02-27
Payer: MEDICARE

## 2023-02-27 ENCOUNTER — ANCILLARY PROCEDURE (OUTPATIENT)
Dept: GENERAL RADIOLOGY | Facility: CLINIC | Age: 68
End: 2023-02-27
Attending: PHYSICIAN ASSISTANT
Payer: MEDICARE

## 2023-02-27 ENCOUNTER — ANCILLARY PROCEDURE (OUTPATIENT)
Dept: CT IMAGING | Facility: CLINIC | Age: 68
End: 2023-02-27
Attending: PHYSICIAN ASSISTANT
Payer: MEDICARE

## 2023-02-27 VITALS
DIASTOLIC BLOOD PRESSURE: 68 MMHG | HEIGHT: 64 IN | WEIGHT: 187.6 LBS | SYSTOLIC BLOOD PRESSURE: 144 MMHG | OXYGEN SATURATION: 98 % | BODY MASS INDEX: 32.03 KG/M2 | TEMPERATURE: 97.9 F | RESPIRATION RATE: 16 BRPM | HEART RATE: 79 BPM

## 2023-02-27 DIAGNOSIS — S09.93XA FACIAL INJURY, INITIAL ENCOUNTER: ICD-10-CM

## 2023-02-27 DIAGNOSIS — M79.641 PAIN OF RIGHT HAND: ICD-10-CM

## 2023-02-27 DIAGNOSIS — G44.311 INTRACTABLE ACUTE POST-TRAUMATIC HEADACHE: ICD-10-CM

## 2023-02-27 DIAGNOSIS — M54.6 ACUTE RIGHT-SIDED THORACIC BACK PAIN: ICD-10-CM

## 2023-02-27 DIAGNOSIS — M79.631 PAIN OF RIGHT FOREARM: ICD-10-CM

## 2023-02-27 DIAGNOSIS — M25.511 ACUTE PAIN OF RIGHT SHOULDER: ICD-10-CM

## 2023-02-27 DIAGNOSIS — G44.311 INTRACTABLE ACUTE POST-TRAUMATIC HEADACHE: Primary | ICD-10-CM

## 2023-02-27 PROCEDURE — 73060 X-RAY EXAM OF HUMERUS: CPT | Mod: TC | Performed by: RADIOLOGY

## 2023-02-27 PROCEDURE — 99214 OFFICE O/P EST MOD 30 MIN: CPT | Performed by: PHYSICIAN ASSISTANT

## 2023-02-27 PROCEDURE — 73130 X-RAY EXAM OF HAND: CPT | Mod: TC | Performed by: RADIOLOGY

## 2023-02-27 PROCEDURE — 71046 X-RAY EXAM CHEST 2 VIEWS: CPT | Mod: TC | Performed by: RADIOLOGY

## 2023-02-27 PROCEDURE — 73090 X-RAY EXAM OF FOREARM: CPT | Mod: TC | Performed by: RADIOLOGY

## 2023-02-27 PROCEDURE — 70486 CT MAXILLOFACIAL W/O DYE: CPT | Mod: TC | Performed by: RADIOLOGY

## 2023-02-27 PROCEDURE — 70450 CT HEAD/BRAIN W/O DYE: CPT | Mod: TC | Performed by: RADIOLOGY

## 2023-02-27 PROCEDURE — G1010 CDSM STANSON: HCPCS | Performed by: RADIOLOGY

## 2023-02-27 ASSESSMENT — PAIN SCALES - GENERAL: PAINLEVEL: EXTREME PAIN (9)

## 2023-02-27 NOTE — PROGRESS NOTES
Assessment & Plan   Problem List Items Addressed This Visit    None  Visit Diagnoses     Intractable acute post-traumatic headache    -  Primary    Relevant Orders    CT Head w/o Contrast (Completed)    Facial injury, initial encounter        Relevant Orders    CT Facial Bones without Contrast (Completed)    Acute right-sided thoracic back pain        Relevant Orders    XR Chest 2 Views (Completed)    Pain of right forearm        Relevant Orders    XR Forearm Right 2 Views (Completed)    Acute pain of right shoulder        Relevant Orders    XR Humerus Right G/E 2 Views (Completed)    Pain of right hand        Relevant Orders    XR Hand Right G/E 3 Views (Completed)    Orthopedic  Referral          Yancy Rivera is a 67 year old female with no significant relevant PMH presents c/o facial pain, headache, back pain and RUE tenderness status post mechanical fall. DDx sprain/strain/fracture/contusion/dislocation/others. XRs and CTs negative for fractures, intracranial pathology, pneumothorax or other worrisome acute issues. Given wrist splint for prn use. Impression is contusions and strains. Will tx with analgesics otc, RICE/heat, and ortho/sports med f/u in 1 week if not improving for re-evaluation.     Complete history and physical exam as below. Afebrile with normal vital signs except for elevated bp, which they will monitor at home and contact us if >140/90mmHg greater than 50% of the time.    DDx and Dx discussed with and explained to the pt to their satisfaction.  All questions were answered at this time. Pt expressed understanding of and agreement with this dx, tx, and plan. No further workup warranted and standard medication warnings given. I have given the patient a list of pertinent indications for re-evaluation. Will go to the Emergency Department if symptoms worsen or new concerning symptoms arise. Patient left in no apparent distress.     Ordering of each unique test  31 minutes spent on the  "date of the encounter doing chart review, history and exam, documentation and further activities per the note     BMI:   Estimated body mass index is 32.11 kg/m  as calculated from the following:    Height as of this encounter: 1.628 m (5' 4.09\").    Weight as of this encounter: 85.1 kg (187 lb 9.6 oz).     See Patient Instructions    Return in about 1 week (around 3/6/2023) for a recheck of your symptoms if not improving, or call 911/go to an ER anytime if worsening.    DAVID Gongora  [facility] YUNI Haines is a 67 year old presenting for the following health issues:  No chief complaint on file.      HPI     Patient declined . Slipped on ice yesterday and struck her right forehead and face and right hand. Right handed. Has pain in both areas. Previous fracture of RUE. Associated nausea. 9/10 headache. Took ibu last night.     Pain History:  When did you first notice your pain? - Acute Pain   Have you seen anyone else for your pain? No  Where in your body do you have pain? Head and right hand    Review of Systems   Constitutional, HEENT, cardiovascular, pulmonary, gi and gu systems are negative, except as otherwise noted.      Objective    LMP  (LMP Unknown)   There is no height or weight on file to calculate BMI.  Physical Exam  Vitals and nursing note reviewed.   Constitutional:       General: She is not in acute distress.     Appearance: She is not ill-appearing or diaphoretic.   HENT:      Head: Normocephalic.      Comments: Abrasions and ecchymosis to the right maxillary and supraorbital regions without bony step offs or open wounds. These areas are tender. Remainder of skull and face non-tender. No castillo sign, raccoon eyes or hemotympanum. Skull and facial bones non-tender. No nasal septal hematoma.     Mouth/Throat:      Mouth: Mucous membranes are moist.   Eyes:      Conjunctiva/sclera: Conjunctivae normal.   Neck:      Comments: No midline spinal " tenderness.  Cardiovascular:      Rate and Rhythm: Normal rate and regular rhythm.      Heart sounds: Normal heart sounds. No murmur heard.    No friction rub. No gallop.   Pulmonary:      Effort: Pulmonary effort is normal. No respiratory distress.      Breath sounds: Normal breath sounds. No stridor. No wheezing, rhonchi or rales.   Chest:      Chest wall: Tenderness (posterior right thoracic tenderness without other signs of trauma) present.   Abdominal:      General: Bowel sounds are normal. There is no distension.      Palpations: Abdomen is soft. There is no mass.      Tenderness: There is no abdominal tenderness. There is no guarding or rebound.      Hernia: No hernia is present.   Musculoskeletal:      Comments: RUE: diffuse tenderness to the extremity with  No overlying signs of trauma aside from erythema, edema and focal tenderness to the dorsum of the wrist and hand. Distal CMS intact. Normal ROM in joints of the extremity. Scaphoid non-tender.   Skin:     General: Skin is warm and dry.   Neurological:      General: No focal deficit present.      Mental Status: She is alert. Mental status is at baseline.   Psychiatric:         Mood and Affect: Mood normal.         Behavior: Behavior normal.          Results for orders placed or performed in visit on 02/27/23   CT Head w/o Contrast     Status: None    Narrative    CT SCAN OF THE HEAD WITHOUT CONTRAST   2/27/2023 8:54 AM     HISTORY: Trauma. Intractable acute post-traumatic headache.    TECHNIQUE:  Axial images of the head and coronal reformations without  IV contrast material. Radiation dose for this scan was reduced using  automated exposure control, adjustment of the mA and/or kV according  to patient size, or iterative reconstruction technique.    COMPARISON: CT of the head 12/21/2021.    FINDINGS: There is no evidence of intracranial hemorrhage, mass, acute  infarct or anomaly. The ventricles are normal in size, shape and  configuration. Mild age  commensurate diffuse parenchymal volume loss.  Mild patchy periventricular white matter hypodensities which are  nonspecific, but likely related to chronic microvascular ischemic  disease.     Bilateral lens implants. Moderate mucosal thickening in the right  maxillary sinus with mild scattered mucosal thickening elsewhere in  the ethmoid sinuses. The mastoid and middle ear cavities are clear.  The bony calvarium and bones of the skull base appear intact.       Impression    IMPRESSION:     1. No evidence of acute intracranial hemorrhage, mass, or herniation.  2. Mild diffuse parenchymal volume loss and white matter changes  likely due to chronic microvascular ischemic disease.    RONN MARTÍNEZ MD         SYSTEM ID:  [id number]   Results for orders placed or performed in visit on 02/27/23   XR Humerus Right G/E 2 Views     Status: None    Narrative    RIGHT HUMERUS TWO OR MORE VIEWS   2/27/2023 8:15 AM     HISTORY:  Acute pain of right shoulder.    COMPARISON: None.      Impression    IMPRESSION: Normal.    NATHAN TOVAR MD         SYSTEM ID:  [id number]   Results for orders placed or performed in visit on 02/27/23   XR Forearm Right 2 Views     Status: None    Narrative    FOREARM RIGHT TWO VIEWS   2/27/2023 8:15 AM     HISTORY:  Pain of right forearm.    COMPARISON: None.      Impression    IMPRESSION:  1. 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  2. Diffuse bone demineralization.  3. Mild to moderate osteoarthrosis of the STT joint and first CMC  joint.  4. There is no evidence of an acute or subacute radius/ulna fracture.    NATHAN TOVAR MD         SYSTEM ID:  [id number]   Results for orders placed or performed in visit on 02/27/23   CT Facial Bones without Contrast     Status: None    Narrative    CT SCAN OF THE FACE WITHOUT CONTRAST 2/27/2023 8:54 AM     HISTORY: Trauma. Facial injury, initial encounter.     TECHNIQUE: Axial CT images of the facial bones were completed with  sagittal and coronal  reformations. Radiation dose for this scan was  reduced using automated exposure control, adjustment of the mA and/or  kV according to patient size, or iterative reconstruction technique.     COMPARISON: CT head of same day.     FINDINGS: The pterygoid plates are intact. The bilateral zygomatic  arches, sphenotemporal buttresses, the walls of both orbits, and the  walls of the maxillary sinuses appear intact. No displaced acute nasal  arch or nasal septal fracture is identified. The anterior skull base  appears intact. The mandible appears intact. The temporomandibular  joints are normally located.    Bilateral lens implants. Visualized orbits otherwise appear  unremarkable. Moderate polypoid mucosal thickening in the right  maxillary sinus and mild to moderate mucosal thickening in the  bilateral ethmoid and left maxillary sinuses. Scattered aerated  secretions/layering fluid in the bilateral ethmoid and maxillary  sinuses. Layering fluid within the right maxillary sinus is somewhat  hyperdense. Partially visualized multilevel degenerative changes of  the cervical spine.      Impression    IMPRESSION:  1. No acute maxillofacial fracture.  2. Paranasal sinus disease, as described, presumably inflammatory.  Please correlate clinically for possible symptoms/signs of acute  sinusitis.     RONN MARTÍNEZ MD         SYSTEM ID:  [id number]   Results for orders placed or performed in visit on 02/27/23   XR Hand Right G/E 3 Views     Status: None    Narrative    RIGHT HAND THREE OR MORE VIEWS   2/27/2023 8:15 AM     HISTORY:  Pain of right hand.    COMPARISON: None.      Impression    IMPRESSION:  1. Small old nonunited ulnar styloid process fracture.  2. Mild osteoarthrosis of the STT joint and first CMC joint as well as  several of the MCP and interphalangeal joints.  3. Diffuse bone demineralization.   4. There is no evidence of an acute or subacute fracture or  inflammatory arthropathy.    NATHAN TOVAR MD          SYSTEM ID:  [id number]   Results for orders placed or performed in visit on 02/27/23   XR Chest 2 Views     Status: None    Narrative    CHEST TWO VIEWS  2/27/2023 8:14 AM       INDICATION: Acute right-sided thoracic back pain.    COMPARISON: 6/7/2021       Impression    IMPRESSION: Mild elevation of the right hemidiaphragm, unchanged.  Otherwise negative chest. No pleural effusion or pneumothorax. No  visible rib fracture.    NATALY FIGUEROA MD         SYSTEM ID:  [id number]

## 2023-03-07 ENCOUNTER — OFFICE VISIT (OUTPATIENT)
Dept: ORTHOPEDICS | Facility: CLINIC | Age: 68
End: 2023-03-07
Attending: PHYSICIAN ASSISTANT
Payer: MEDICARE

## 2023-03-07 ENCOUNTER — ANCILLARY PROCEDURE (OUTPATIENT)
Dept: GENERAL RADIOLOGY | Facility: CLINIC | Age: 68
End: 2023-03-07
Attending: PEDIATRICS
Payer: MEDICARE

## 2023-03-07 VITALS
SYSTOLIC BLOOD PRESSURE: 138 MMHG | WEIGHT: 187 LBS | DIASTOLIC BLOOD PRESSURE: 78 MMHG | BODY MASS INDEX: 31.92 KG/M2 | HEIGHT: 64 IN

## 2023-03-07 DIAGNOSIS — M79.641 PAIN OF RIGHT HAND: ICD-10-CM

## 2023-03-07 DIAGNOSIS — M79.641 PAIN OF RIGHT HAND: Primary | ICD-10-CM

## 2023-03-07 DIAGNOSIS — S69.91XA INJURY OF RIGHT WRIST, INITIAL ENCOUNTER: ICD-10-CM

## 2023-03-07 PROCEDURE — 73130 X-RAY EXAM OF HAND: CPT | Mod: TC | Performed by: RADIOLOGY

## 2023-03-07 PROCEDURE — 99213 OFFICE O/P EST LOW 20 MIN: CPT | Performed by: PEDIATRICS

## 2023-03-07 NOTE — PATIENT INSTRUCTIONS
We discussed right hand and wrist pain flared from injury, in the setting of previously noted hand and wrist issues.  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  More recent x-rays after falling do not demonstrate obvious fracture, which is good.  Pain may simply be flared from the previously known issues in the hand and wrist.  For now, okay to continue with bracing for comfort.  May use over-the-counter medication for comfort as well.  We discussed potential use of Voltaren gel, which is available over-the-counter and can be used topically.  For now, may work on home exercises from previous hand therapy.  Future considerations could include additional imaging with MRI (note previously had 2 MRIs over the last few years, related to wrist pain), referral back to hand therapy, and referral for imaging guided wrist joint steroid injection.  Plan leave follow-up open-ended.  Contact clinic for any questions or concerns.    If you have any further questions for your physician or physician s care team you can contact them thru [employer] or by calling  [phone] and use option 3 to leave a voice message.   Messages received during business hours will be returned same day.

## 2023-03-13 ENCOUNTER — NURSE TRIAGE (OUTPATIENT)
Dept: NURSING | Facility: CLINIC | Age: 68
End: 2023-03-13
Payer: MEDICARE

## 2023-03-13 NOTE — TELEPHONE ENCOUNTER
Patient calling. She wants to make an appointment for a general examination, she's had dizziness and headache before with low hemoglobin, but wants a wellness visit. Patient was offered a  by scheduling, but declined.    Dizziness occurs without precipitating occurrence. States it's not bad, but she just wants a check-up. States that it doesn't make her have any problems with standing or walking. Denies cardiac symptoms    Care advice given for patient to be seen within 3 days. Patient states she wants to go to an annual wellness visit, but if there are no openings, she will make an appointment for a wellness visit and go to urgent care for the dizziness.     Attempted to transfer patient back to scheduling, and the connection was lost. Attempted to call patient back x3, and it went to voicemail each time. Patient reminded that she was to call the scheduling line and make an appointment, and that if she could not get in for an appointment within 3 days, she also needs to go to urgent care.     Emily Ng RN  [facility]  March 13, 2023, 11:43 AM    Reason for Disposition    MILD dizziness (e.g., walking normally) and has NOT been evaluated by physician for this (Exception: dizziness caused by heat exposure, sudden standing, or poor fluid intake)    Additional Information    Negative: SEVERE difficulty breathing (e.g., struggling for each breath, speaks in single words)    Negative: Shock suspected (e.g., cold/pale/clammy skin, too weak to stand, low BP, rapid pulse)    Negative: Difficult to awaken or acting confused (e.g., disoriented, slurred speech)    Negative: Fainted, and still feels dizzy afterwards    Negative: Overdose (accidental or intentional) of medications    Negative: New neurologic deficit that is present now: * Weakness of the face, arm, or leg on one side of the body * Numbness of the face, arm, or leg on one side of the body * Loss of speech or garbled speech    Negative:  Heart beating < 50 beats per minute OR > 140 beats per minute    Negative: Sounds like a life-threatening emergency to the triager    Negative: Chest pain    Negative: Rectal bleeding, bloody stool, or tarry-black stool    Negative: Vomiting is main symptom    Negative: Diarrhea is main symptom    Negative: Headache is main symptom    Negative: Heat exhaustion suspected (i.e., dehydration from heat exposure)    Negative: Patient states that they are having an anxiety or panic attack    Negative: Dizziness from low blood sugar (i.e., < 60 mg/dl or 3.5 mmol/l)    Negative: SEVERE dizziness (e.g., unable to stand, requires support to walk, feels like passing out now)    Negative: SEVERE headache or neck pain    Negative: Spinning or tilting sensation (vertigo) present now and one or more stroke risk factors (i.e., hypertension, diabetes mellitus, prior stroke/TIA, heart attack, age over 60) (Exception: prior physician evaluation for this AND no different/worse than usual)    Negative: Neurologic deficit that was brief (now gone), ANY of the following:* Weakness of the face, arm, or leg on one side of the body* Numbness of the face, arm, or leg on one side of the body* Loss of speech or garbled speech    Negative: Loss of vision or double vision  (Exception: Similar to previous migraines.)    Negative: Extra heart beats OR irregular heart beating (i.e., 'palpitations')    Negative: Difficulty breathing    Negative: Drinking very little and has signs of dehydration (e.g., no urine > 12 hours, very dry mouth, very lightheaded)    Negative: Follows bleeding (e.g., stomach, rectum, vagina)  (Exception: Became dizzy from sight of small amount blood.)    Negative: Patient sounds very sick or weak to the triager    Negative: Lightheadedness (dizziness) present now, after 2 hours of rest and fluids    Negative: Spinning or tilting sensation (vertigo) present now    Negative: Fever > 103 F (39.4 C)    Negative: Fever > 100.0 F  (37.8 C) and has diabetes mellitus or a weak immune system (e.g., HIV positive, cancer chemotherapy, organ transplant, splenectomy, chronic steroids)    Negative: MODERATE dizziness (e.g., interferes with normal activities) (Exception: dizziness caused by heat exposure, sudden standing, or poor fluid intake)    Negative: Vomiting occurs with dizziness    Negative: Patient wants to be seen    Negative: Taking a medicine that could cause dizziness (e.g., blood pressure medications, diuretics)    Protocols used: DIZZINESS-A-OH

## 2023-03-28 ENCOUNTER — LAB (OUTPATIENT)
Dept: LAB | Facility: CLINIC | Age: 68
End: 2023-03-28
Payer: MEDICARE

## 2023-03-28 ENCOUNTER — OFFICE VISIT (OUTPATIENT)
Dept: INTERNAL MEDICINE | Facility: CLINIC | Age: 68
End: 2023-03-28
Payer: MEDICARE

## 2023-03-28 VITALS
DIASTOLIC BLOOD PRESSURE: 90 MMHG | BODY MASS INDEX: 32.68 KG/M2 | HEART RATE: 64 BPM | OXYGEN SATURATION: 97 % | WEIGHT: 190.4 LBS | SYSTOLIC BLOOD PRESSURE: 160 MMHG

## 2023-03-28 DIAGNOSIS — E61.1 LOW IRON: ICD-10-CM

## 2023-03-28 DIAGNOSIS — E78.5 HYPERLIPIDEMIA, UNSPECIFIED HYPERLIPIDEMIA TYPE: Primary | ICD-10-CM

## 2023-03-28 DIAGNOSIS — Z12.31 ENCOUNTER FOR SCREENING MAMMOGRAM FOR BREAST CANCER: ICD-10-CM

## 2023-03-28 DIAGNOSIS — E74.39 GLUCOSE INTOLERANCE: ICD-10-CM

## 2023-03-28 DIAGNOSIS — E78.5 HYPERLIPIDEMIA, UNSPECIFIED HYPERLIPIDEMIA TYPE: ICD-10-CM

## 2023-03-28 DIAGNOSIS — Z13.29 SCREENING FOR HYPOTHYROIDISM: ICD-10-CM

## 2023-03-28 DIAGNOSIS — Z12.11 SCREENING FOR COLON CANCER: ICD-10-CM

## 2023-03-28 DIAGNOSIS — Z13.0 SCREENING FOR DEFICIENCY ANEMIA: ICD-10-CM

## 2023-03-28 DIAGNOSIS — Z01.00 EXAMINATION OF EYES AND VISION: ICD-10-CM

## 2023-03-28 DIAGNOSIS — E78.2 MIXED HYPERLIPIDEMIA: ICD-10-CM

## 2023-03-28 DIAGNOSIS — M85.9 LOW BONE DENSITY: ICD-10-CM

## 2023-03-28 DIAGNOSIS — K05.20 ACUTE PERIODONTITIS: ICD-10-CM

## 2023-03-28 DIAGNOSIS — H65.91 OME (OTITIS MEDIA WITH EFFUSION), RIGHT: ICD-10-CM

## 2023-03-28 DIAGNOSIS — Z23 NEED FOR PNEUMOCOCCAL VACCINE: ICD-10-CM

## 2023-03-28 DIAGNOSIS — Z13.1 SCREENING FOR DIABETES MELLITUS: ICD-10-CM

## 2023-03-28 DIAGNOSIS — Z13.820 SCREENING FOR OSTEOPOROSIS: ICD-10-CM

## 2023-03-28 DIAGNOSIS — I10 BENIGN ESSENTIAL HYPERTENSION: ICD-10-CM

## 2023-03-28 DIAGNOSIS — E56.9 VITAMIN DEFICIENCY: ICD-10-CM

## 2023-03-28 LAB
ALBUMIN SERPL BCG-MCNC: 4.7 G/DL (ref 3.5–5.2)
ALP SERPL-CCNC: 115 U/L (ref 35–104)
ALT SERPL W P-5'-P-CCNC: 20 U/L (ref 10–35)
ANION GAP SERPL CALCULATED.3IONS-SCNC: 10 MMOL/L (ref 7–15)
AST SERPL W P-5'-P-CCNC: 21 U/L (ref 10–35)
BILIRUB SERPL-MCNC: 0.3 MG/DL
BUN SERPL-MCNC: 7.5 MG/DL (ref 8–23)
CALCIUM SERPL-MCNC: 10.2 MG/DL (ref 8.8–10.2)
CHLORIDE SERPL-SCNC: 105 MMOL/L (ref 98–107)
CHOLEST SERPL-MCNC: 171 MG/DL
CREAT SERPL-MCNC: 0.61 MG/DL (ref 0.51–0.95)
DEPRECATED CALCIDIOL+CALCIFEROL SERPL-MC: 28 UG/L (ref 20–75)
DEPRECATED HCO3 PLAS-SCNC: 23 MMOL/L (ref 22–29)
ERYTHROCYTE [DISTWIDTH] IN BLOOD BY AUTOMATED COUNT: 12.2 % (ref 10–15)
GFR SERPL CREATININE-BSD FRML MDRD: >90 ML/MIN/1.73M2
GLUCOSE SERPL-MCNC: 105 MG/DL (ref 70–99)
HCT VFR BLD AUTO: 40.8 % (ref 35–47)
HDLC SERPL-MCNC: 81 MG/DL
HGB BLD-MCNC: 13.4 G/DL (ref 11.7–15.7)
IRON BINDING CAPACITY (ROCHE): 335 UG/DL (ref 240–430)
IRON SATN MFR SERPL: 27 % (ref 15–46)
IRON SERPL-MCNC: 90 UG/DL (ref 37–145)
LDLC SERPL CALC-MCNC: 77 MG/DL
MCH RBC QN AUTO: 29.3 PG (ref 26.5–33)
MCHC RBC AUTO-ENTMCNC: 32.8 G/DL (ref 31.5–36.5)
MCV RBC AUTO: 89 FL (ref 78–100)
NONHDLC SERPL-MCNC: 90 MG/DL
PLATELET # BLD AUTO: 263 10E3/UL (ref 150–450)
POTASSIUM SERPL-SCNC: 4.2 MMOL/L (ref 3.4–5.3)
PROT SERPL-MCNC: 7.7 G/DL (ref 6.4–8.3)
RBC # BLD AUTO: 4.57 10E6/UL (ref 3.8–5.2)
SODIUM SERPL-SCNC: 138 MMOL/L (ref 136–145)
TRIGL SERPL-MCNC: 66 MG/DL
TSH SERPL DL<=0.005 MIU/L-ACNC: 1.01 UIU/ML (ref 0.3–4.2)
WBC # BLD AUTO: 5.5 10E3/UL (ref 4–11)

## 2023-03-28 PROCEDURE — 83540 ASSAY OF IRON: CPT | Performed by: PATHOLOGY

## 2023-03-28 PROCEDURE — 80053 COMPREHEN METABOLIC PANEL: CPT | Performed by: PATHOLOGY

## 2023-03-28 PROCEDURE — 36415 COLL VENOUS BLD VENIPUNCTURE: CPT | Performed by: PATHOLOGY

## 2023-03-28 PROCEDURE — 99215 OFFICE O/P EST HI 40 MIN: CPT | Mod: 25 | Performed by: NURSE PRACTITIONER

## 2023-03-28 PROCEDURE — 85027 COMPLETE CBC AUTOMATED: CPT | Performed by: PATHOLOGY

## 2023-03-28 PROCEDURE — G0009 ADMIN PNEUMOCOCCAL VACCINE: HCPCS | Performed by: NURSE PRACTITIONER

## 2023-03-28 PROCEDURE — 90677 PCV20 VACCINE IM: CPT | Performed by: NURSE PRACTITIONER

## 2023-03-28 PROCEDURE — 83550 IRON BINDING TEST: CPT | Performed by: PATHOLOGY

## 2023-03-28 PROCEDURE — 82306 VITAMIN D 25 HYDROXY: CPT | Performed by: NURSE PRACTITIONER

## 2023-03-28 PROCEDURE — 80061 LIPID PANEL: CPT | Performed by: PATHOLOGY

## 2023-03-28 PROCEDURE — 84443 ASSAY THYROID STIM HORMONE: CPT | Performed by: PATHOLOGY

## 2023-03-28 RX ORDER — ATORVASTATIN CALCIUM 20 MG/1
20 TABLET, FILM COATED ORAL DAILY
Qty: 90 TABLET | Refills: 0 | Status: SHIPPED | OUTPATIENT
Start: 2023-03-28 | End: 2023-05-04

## 2023-03-28 RX ORDER — CALCIUM CARBONATE/VITAMIN D3 500MG-5MCG
1 TABLET ORAL 2 TIMES DAILY
Qty: 240 TABLET | Refills: 3 | Status: SHIPPED | OUTPATIENT
Start: 2023-03-28 | End: 2023-04-28

## 2023-03-28 RX ORDER — AMOXICILLIN 500 MG/1
500 CAPSULE ORAL 3 TIMES DAILY
Qty: 21 CAPSULE | Refills: 0 | Status: SHIPPED | OUTPATIENT
Start: 2023-03-28 | End: 2023-04-14

## 2023-03-28 RX ORDER — LISINOPRIL 10 MG/1
10 TABLET ORAL DAILY
Qty: 90 TABLET | Refills: 3 | Status: SHIPPED | OUTPATIENT
Start: 2023-03-28 | End: 2023-04-28

## 2023-03-28 RX ORDER — CHLORHEXIDINE GLUCONATE ORAL RINSE 1.2 MG/ML
SOLUTION DENTAL
Qty: 473 ML | Refills: 3 | Status: SHIPPED | OUTPATIENT
Start: 2023-03-28 | End: 2023-08-09

## 2023-03-28 NOTE — PATIENT INSTRUCTIONS
To schedule your appointment with imaging for your mammogram and dexascan, please call [phone].

## 2023-03-28 NOTE — NURSING NOTE
Yancy Rivera is a 67 year old female that presents in clinic today for the following:     Chief Complaint   Patient presents with     Physical     Pt has been having headaches, dizziness, and nausea; pt would like hemoglobin checked; Pt has been having severe dizziness, especially at night persisting 2 months       The patient's allergies and medications were reviewed. The patient's vitals were obtained without incident. The patient does not have any other questions for the provider.     Shantal Nieves, EMT at 9:29 AM on 3/28/2023.  Primary Care Clinic: [phone]

## 2023-03-28 NOTE — PROGRESS NOTES
S: Yancy Rivera is a 67 year old female here for her annual exam and labs. She has had recurrence of gum tenderness especially top anterior left side.  She has run out of Peridex solution.She has dental insurance but can't find a dentist who accepts it.     She is due for a mammo which she hasn't had for several years.     She needs colon cancer screening but declines a colonoscopy.  She would like to tested for pre-diabetes, and have her lipids re-checked.   She would like her vitamin D tested and requests calcium supplements.        Patient Active Problem List   Diagnosis     Hyperlipidemia with target LDL less than 130     Prediabetes     Disorder of bursae and tendons in shoulder region     Shingles     Primary open angle glaucoma of both eyes, moderate stage     Senile nuclear sclerosis, bilateral     Right lumbar radiculopathy     Benign essential hypertension     Right wrist pain     Chronic midline low back pain without sciatica     Pseudophakia of both eyes     Right wrist injury, subsequent encounter     Neck pain            Past Medical History:   Diagnosis Date     Blepharitis      Esophageal reflux (aka GERD)      Glaucoma      Hyperlipidemia LDL goal < 130      Nonsenile cataract      Shingles     11/14/15 Left approximately C6 distribution            Past Surgical History:   Procedure Laterality Date     CATARACT IOL, RT/LT Right 10/16/2018    [facility]      CATARACT IOL, RT/LT Left 11/01/2018    [facility]      CHALAZION EXCISION  08/21/2015    Left lid     HERNIA REPAIR      abdominal hernia repair 2012            Social History     Tobacco Use     Smoking status: Never     Smokeless tobacco: Never   Substance Use Topics     Alcohol use: No            Family History   Problem Relation Age of Onset     Glaucoma Mother      Macular Degeneration No family hx of      Cancer No family hx of      Diabetes No family  hx of      Hypertension No family hx of      Cerebrovascular Disease No family hx of      Thyroid Disease No family hx of      Eye Surgery No family hx of                Allergies   Allergen Reactions     No Clinical Screening - See Comments Nausea and Vomiting     Liver side effects from INH for TB            Current Outpatient Medications   Medication Sig Dispense Refill     atorvastatin (LIPITOR) 20 MG tablet Take 1 tablet (20 mg) by mouth daily  90 tablet 0     carboxymethylcellulose PF (CARBOXYMETHYLCELLULOSE SODIUM) 0.5 % ophthalmic solution Place 1 drop into both eyes 4 times daily as needed for dry eyes 70 each 11     chlorhexidine (PERIDEX) 0.12 % solution SWISH IN MOUTH FOR 30 SECONDS WITH 15MLS AND SPIT. USE TWO TIMES A DAY. DO NOT SWALLOW       Cholecalciferol (VITAMIN D) 2000 UNITS tablet Take 1,000 Units by mouth daily 45 tablet 3     diclofenac (VOLTAREN) 1 % topical gel Place 2 g onto the skin 4 times daily To right wrist 100 g 3     ferrous sulfate (IRON) 325 (65 FE) MG tablet Take 1 tablet (325 mg) by mouth daily (with breakfast) 30 tablet 11     ibuprofen (ADVIL/MOTRIN) 800 MG tablet Take 1 tablet (800 mg) by mouth every 6 hours as needed for moderate pain 16 tablet 0     metroNIDAZOLE (FLAGYL) 250 MG tablet        Multiple Minerals (CALCIUM-MAGNESIUM-ZINC) TABS Take 1 tablet by mouth daily 90 tablet 0     pantoprazole (PROTONIX) 40 MG EC tablet Take 1 tablet (40 mg) by mouth daily 90 tablet 3     triamcinolone (KENALOG) 0.1 % external ointment Apply topically 2 times daily 454 g 11     White Petrolatum-Mineral Oil (REFRESH P.M.) OINT Apply thin layer to both eyes at bedtime 3.5 g 3              REVIEW OF SYSTEMS:  See above.    O:   BP (!) 165/82 (BP Location: Right arm, Patient Position: Sitting, Cuff Size: Adult Regular)   Pulse 64   Wt 86.4 kg (190 lb 6.4 oz)   LMP  (LMP Unknown)   SpO2 97%   BMI 32.68 kg/m    GENERAL APPEARANCE: healthy, alert and no distress.  Her English is very good.    EYES: EOMI,  PERRL, sclera white, conjunctiva normal.  HENT: ear canals and TM's normal and mouth without ulcers or lesions. She had redness over the left upper exterior gums.  Several missing teeth.   RESP: lungs clear to auscultation - no rales, rhonchi or wheezes  CV: regular rates and rhythm, normal S1 S2, no S3 or S4 and no murmur, click or rub   VS w/ IP 3/28/2023 3/28/2023 3/28/2023 3/28/2023   SYSTOLIC 160 165 179    DIASTOLIC 90 82 81    PULSE   64      ABDOMEN:  soft, nontender, no HSM or masses and bowel sounds normal  NEURO: alert and oriented.  Good historian.  MUSK: ambulatory.  SKIN: normal.  BREASTS: no masses.   LYMPH: neg axillary, cervical, supra and infraclavicular nodes.  EXT: warm.  Edema : no  PSYCHE: normal.    The 10-year ASCVD risk score (Kate MARCOS, et al., 2019) is: 7.6%    Values used to calculate the score:      Age: 67 years      Sex: Female      Is Non- : No      Diabetic: No      Tobacco smoker: No      Systolic Blood Pressure: 165 mmHg      Is BP treated: No      HDL Cholesterol: 70 mg/dL      Total Cholesterol: 176 mg/dL    A/P:  Yancy was seen today for physical.    Diagnoses and all orders for this visit:    Hyperlipidemia, unspecified hyperlipidemia type  -     Lipid panel reflex to direct LDL Fasting; Future  -     atorvastatin (LIPITOR) 20 MG tablet; Take 1 tablet (20 mg) by mouth daily .    Benign essential hypertension  -     lisinopril (ZESTRIL) 10 MG tablet; Take 1 tablet (10 mg) by mouth daily. Thus is a new medication. I would ask her to f/u with labs in 2 weeks: K, and Cr.  -     Miscellaneous Order for DME - ONLY FOR DME Home BP cuff.     Glucose intolerance  -     Comprehensive metabolic panel; Future    Screening for deficiency anemia  -     CBC with platelets; Future    Screening for hypothyroidism  -     TSH; Future    Screening for diabetes mellitus      Acute periodontitis  -     amoxicillin (AMOXIL) 500 MG capsule; Take 1 capsule (500  mg) by mouth 3 times daily  -     chlorhexidine (PERIDEX) 0.12 % solution; SWISH IN MOUTH FOR 30 SECONDS WITH 15MLS AND SPIT. USE TWO TIMES A DAY. DO NOT SWALLOW    Hyperlipemia  -     atorvastatin (LIPITOR) 20 MG tablet; Take 1 tablet (20 mg) by mouth daily Patient needs to have labs for further refills.    Encounter for screening mammogram for breast cancer  -     MA Screening Digital Bilateral; Future    Low iron  -     Iron and iron binding capacity; Future    Low bone density  -     Vitamin D Deficiency; Future  -     Calcium Carb-Cholecalciferol (CALCIUM 500 + D) 500-5 MG-MCG TABS; Take 1 tablet by mouth 2 times daily    Need for pneumococcal vaccine  -     PNEUMOCOCCAL 20 VALENT CONJUGATE (PREVNAR 20)    Screening for colon cancer  -     Fecal colorectal cancer screen (FIT); Future    Examination of eyes and vision  -     Adult Eye  Referral; Future    She will return for f/u to re-check BP and discuss labs in 2-4 weeks.   The patient voiced understanding of the information discussed and all questions were answered.     I spent a total of 55 minutes in the care of this pt during today's office visit. This time includes reviewing the patient's chart and prior history, obtaining a history, performing an examination and evaluation and counseling the patient. This time also includes ordering medications or tests necessary in addition to communication to other member's of the patient's health care team. Time spent in documentation and care coordination is included.     Karin DICKENS, CNP

## 2023-03-29 ENCOUNTER — APPOINTMENT (OUTPATIENT)
Dept: INTERPRETER SERVICES | Facility: CLINIC | Age: 68
End: 2023-03-29
Payer: MEDICARE

## 2023-03-30 ENCOUNTER — ANCILLARY PROCEDURE (OUTPATIENT)
Dept: MAMMOGRAPHY | Facility: CLINIC | Age: 68
End: 2023-03-30
Attending: NURSE PRACTITIONER
Payer: MEDICARE

## 2023-03-30 DIAGNOSIS — Z12.31 ENCOUNTER FOR SCREENING MAMMOGRAM FOR BREAST CANCER: ICD-10-CM

## 2023-03-30 LAB — HEMOCCULT STL QL IA: NEGATIVE

## 2023-03-30 PROCEDURE — 82274 ASSAY TEST FOR BLOOD FECAL: CPT | Performed by: NURSE PRACTITIONER

## 2023-03-30 PROCEDURE — 77067 SCR MAMMO BI INCL CAD: CPT | Mod: GC | Performed by: RADIOLOGY

## 2023-04-14 ENCOUNTER — VIRTUAL VISIT (OUTPATIENT)
Dept: FAMILY MEDICINE | Facility: CLINIC | Age: 68
End: 2023-04-14
Payer: MEDICARE

## 2023-04-14 DIAGNOSIS — Z86.59 HISTORY OF BIPOLAR DISORDER: ICD-10-CM

## 2023-04-14 DIAGNOSIS — M06.9 RHEUMATOID ARTHRITIS INVOLVING MULTIPLE SITES, UNSPECIFIED WHETHER RHEUMATOID FACTOR PRESENT (H): ICD-10-CM

## 2023-04-14 DIAGNOSIS — F39 MOOD DISORDER (H): ICD-10-CM

## 2023-04-14 DIAGNOSIS — F41.0 ANXIETY ATTACK: Primary | ICD-10-CM

## 2023-04-14 DIAGNOSIS — D35.2 PITUITARY ADENOMA (H): ICD-10-CM

## 2023-04-14 PROCEDURE — 99214 OFFICE O/P EST MOD 30 MIN: CPT | Mod: VID | Performed by: STUDENT IN AN ORGANIZED HEALTH CARE EDUCATION/TRAINING PROGRAM

## 2023-04-14 RX ORDER — LORAZEPAM 0.5 MG/1
0.5 TABLET ORAL EVERY 6 HOURS PRN
Qty: 20 TABLET | Refills: 0 | Status: SHIPPED | OUTPATIENT
Start: 2023-04-14 | End: 2023-04-24

## 2023-04-14 NOTE — PROGRESS NOTES
Yancy is a 67 year old who is being evaluated via a billable video visit.      How would you like to obtain your AVS? MyChart  If the video visit is dropped, the invitation should be resent by: Text to cell phone: [phone]  Will anyone else be joining your video visit? No        Assessment & Plan   1. Anxiety attack  Medical record reviewed, records show history of anxiety brief psychotic disorder and depression.  He She was on Zyprexa and  Lorazepam in 2017.  I advised patient to make a clinic appointment as it was challenging get HPI over the phone  - Adult Mental Health  Referral; Future  - LORazepam (ATIVAN) 0.5 MG tablet; Take 1 tablet (0.5 mg) by mouth every 6 hours as needed for anxiety or sleep  Dispense: 20 tablet; Refill: 0    2. Mood disorder (H)    - Adult Mental Health  Referral; Future    3. History of bipolar disorder    - Adult Mental Health  Referral; Future  4. RA   Not on DMARD    Nalini Mcleod MD  [facility] YUNI Haines is a 67 year old, presenting for the following health issues:  Headache        4/14/2023    10:13 AM   Additional Questions   Roomed by CLEMENTINA ROCHA     HPI     Interp. services were used.  Completed pvp by phone.  Rooming process took a total of 25 minutes.     Patient states that she would like this visit to discuss her headache and dizziness she has had for the past 5 days. She is rating her pain at 6 out of 10 and reporting she has not been evaluated for this in the last 5 days.   Dizziness is intermittent, occurs when going from a sitting to standing position.  Will become lightheaded, states she does not become faint, and has not passed out but did fall about month ago.     Patient told the nurse she has dizziness and headache. Patient and daughter who is the POA told me  following when I spoke with them. She denied dizziness and headache  Anxiety and depression.She presents with one week  increased anxiety, poor sleep, leading to disorganized thoughts.  She denies any suicidal or homicidal ideation.  Denies hallucination.  Daughter states patient has a history of anxiety, depression and psychosis.  She was on antipsychotic in 2017.    Review of Systems   Constitutional, HEENT, cardiovascular, pulmonary, gi and gu systems are negative, except as otherwise noted.      Objective           Vitals:  No vitals were obtained today due to virtual visit.    Physical Exam   GENERAL: Healthy, alert and no distress        20 minutes spent for this telephone encounter

## 2023-04-20 ENCOUNTER — APPOINTMENT (OUTPATIENT)
Dept: INTERPRETER SERVICES | Facility: CLINIC | Age: 68
End: 2023-04-20
Payer: MEDICARE

## 2023-04-20 ENCOUNTER — HOSPITAL ENCOUNTER (OUTPATIENT)
Facility: CLINIC | Age: 68
Setting detail: OBSERVATION
Discharge: HOME | End: 2023-04-22
Attending: EMERGENCY MEDICINE | Admitting: PHYSICIAN ASSISTANT
Payer: MEDICARE

## 2023-04-20 DIAGNOSIS — R51.9 NONINTRACTABLE HEADACHE, UNSPECIFIED CHRONICITY PATTERN, UNSPECIFIED HEADACHE TYPE: ICD-10-CM

## 2023-04-20 DIAGNOSIS — R27.0 ATAXIA: ICD-10-CM

## 2023-04-20 DIAGNOSIS — I10 BENIGN ESSENTIAL HYPERTENSION: Primary | ICD-10-CM

## 2023-04-20 DIAGNOSIS — F32.A DEPRESSION, UNSPECIFIED DEPRESSION TYPE: ICD-10-CM

## 2023-04-20 DIAGNOSIS — R55 SYNCOPE AND COLLAPSE: ICD-10-CM

## 2023-04-20 DIAGNOSIS — F32.9 MAJOR DEPRESSIVE DISORDER WITH SINGLE EPISODE, REMISSION STATUS UNSPECIFIED: ICD-10-CM

## 2023-04-20 DIAGNOSIS — E87.1 HYPONATREMIA: ICD-10-CM

## 2023-04-20 DIAGNOSIS — R55 SYNCOPE, UNSPECIFIED SYNCOPE TYPE: ICD-10-CM

## 2023-04-20 DIAGNOSIS — E78.5 HYPERLIPIDEMIA, UNSPECIFIED HYPERLIPIDEMIA TYPE: ICD-10-CM

## 2023-04-20 DIAGNOSIS — R53.1 WEAKNESS: ICD-10-CM

## 2023-04-20 DIAGNOSIS — I10 ESSENTIAL HYPERTENSION, BENIGN: ICD-10-CM

## 2023-04-20 DIAGNOSIS — R42 DIZZINESS: ICD-10-CM

## 2023-04-20 LAB
ANION GAP SERPL CALCULATED.3IONS-SCNC: 13 MMOL/L (ref 7–15)
ATRIAL RATE - MUSE: 80 BPM
BASOPHILS # BLD AUTO: 0 10E3/UL (ref 0–0.2)
BASOPHILS NFR BLD AUTO: 0 %
BUN SERPL-MCNC: 11.6 MG/DL (ref 8–23)
CALCIUM SERPL-MCNC: 9.8 MG/DL (ref 8.8–10.2)
CHLORIDE SERPL-SCNC: 97 MMOL/L (ref 98–107)
CREAT SERPL-MCNC: 0.72 MG/DL (ref 0.51–0.95)
DEPRECATED HCO3 PLAS-SCNC: 19 MMOL/L (ref 22–29)
DIASTOLIC BLOOD PRESSURE - MUSE: NORMAL MMHG
EOSINOPHIL # BLD AUTO: 0.3 10E3/UL (ref 0–0.7)
EOSINOPHIL NFR BLD AUTO: 3 %
ERYTHROCYTE [DISTWIDTH] IN BLOOD BY AUTOMATED COUNT: 12.2 % (ref 10–15)
GFR SERPL CREATININE-BSD FRML MDRD: >90 ML/MIN/1.73M2
GLUCOSE SERPL-MCNC: 128 MG/DL (ref 70–99)
HCT VFR BLD AUTO: 43.1 % (ref 35–47)
HGB BLD-MCNC: 14.2 G/DL (ref 11.7–15.7)
IMM GRANULOCYTES # BLD: 0 10E3/UL
IMM GRANULOCYTES NFR BLD: 0 %
INTERPRETATION ECG - MUSE: NORMAL
LYMPHOCYTES # BLD AUTO: 1.5 10E3/UL (ref 0.8–5.3)
LYMPHOCYTES NFR BLD AUTO: 17 %
MCH RBC QN AUTO: 29.8 PG (ref 26.5–33)
MCHC RBC AUTO-ENTMCNC: 32.9 G/DL (ref 31.5–36.5)
MCV RBC AUTO: 90 FL (ref 78–100)
MONOCYTES # BLD AUTO: 0.7 10E3/UL (ref 0–1.3)
MONOCYTES NFR BLD AUTO: 8 %
NEUTROPHILS # BLD AUTO: 6.7 10E3/UL (ref 1.6–8.3)
NEUTROPHILS NFR BLD AUTO: 72 %
NRBC # BLD AUTO: 0 10E3/UL
NRBC BLD AUTO-RTO: 0 /100
P AXIS - MUSE: 52 DEGREES
PLATELET # BLD AUTO: 327 10E3/UL (ref 150–450)
POTASSIUM SERPL-SCNC: 4 MMOL/L (ref 3.4–5.3)
PR INTERVAL - MUSE: 140 MS
QRS DURATION - MUSE: 70 MS
QT - MUSE: 396 MS
QTC - MUSE: 456 MS
R AXIS - MUSE: -13 DEGREES
RBC # BLD AUTO: 4.77 10E6/UL (ref 3.8–5.2)
SODIUM SERPL-SCNC: 129 MMOL/L (ref 136–145)
SYSTOLIC BLOOD PRESSURE - MUSE: NORMAL MMHG
T AXIS - MUSE: 4 DEGREES
TROPONIN T SERPL HS-MCNC: 7 NG/L
TROPONIN T SERPL HS-MCNC: <6 NG/L
VENTRICULAR RATE- MUSE: 80 BPM
WBC # BLD AUTO: 9.2 10E3/UL (ref 4–11)

## 2023-04-20 PROCEDURE — 85004 AUTOMATED DIFF WBC COUNT: CPT | Performed by: EMERGENCY MEDICINE

## 2023-04-20 PROCEDURE — 84484 ASSAY OF TROPONIN QUANT: CPT | Performed by: EMERGENCY MEDICINE

## 2023-04-20 PROCEDURE — G0378 HOSPITAL OBSERVATION PER HR: HCPCS

## 2023-04-20 PROCEDURE — 99285 EMERGENCY DEPT VISIT HI MDM: CPT | Mod: 25 | Performed by: EMERGENCY MEDICINE

## 2023-04-20 PROCEDURE — 99222 1ST HOSP IP/OBS MODERATE 55: CPT | Performed by: PHYSICIAN ASSISTANT

## 2023-04-20 PROCEDURE — 93010 ELECTROCARDIOGRAM REPORT: CPT | Performed by: EMERGENCY MEDICINE

## 2023-04-20 PROCEDURE — 250N000013 HC RX MED GY IP 250 OP 250 PS 637: Performed by: PHYSICIAN ASSISTANT

## 2023-04-20 PROCEDURE — 84295 ASSAY OF SERUM SODIUM: CPT | Performed by: PHYSICIAN ASSISTANT

## 2023-04-20 PROCEDURE — 80048 BASIC METABOLIC PNL TOTAL CA: CPT | Performed by: EMERGENCY MEDICINE

## 2023-04-20 PROCEDURE — 99285 EMERGENCY DEPT VISIT HI MDM: CPT | Performed by: EMERGENCY MEDICINE

## 2023-04-20 PROCEDURE — 36415 COLL VENOUS BLD VENIPUNCTURE: CPT | Performed by: EMERGENCY MEDICINE

## 2023-04-20 PROCEDURE — 36415 COLL VENOUS BLD VENIPUNCTURE: CPT | Performed by: PHYSICIAN ASSISTANT

## 2023-04-20 PROCEDURE — 93005 ELECTROCARDIOGRAM TRACING: CPT | Performed by: EMERGENCY MEDICINE

## 2023-04-20 PROCEDURE — 999N000248 HC STATISTIC IV INSERT WITH US BY RN

## 2023-04-20 PROCEDURE — 84484 ASSAY OF TROPONIN QUANT: CPT | Performed by: PHYSICIAN ASSISTANT

## 2023-04-20 RX ORDER — LIDOCAINE 40 MG/G
CREAM TOPICAL
Status: DISCONTINUED | OUTPATIENT
Start: 2023-04-20 | End: 2023-04-22 | Stop reason: HOSPADM

## 2023-04-20 RX ORDER — ACETAMINOPHEN 650 MG/1
650 SUPPOSITORY RECTAL EVERY 4 HOURS PRN
Status: DISCONTINUED | OUTPATIENT
Start: 2023-04-20 | End: 2023-04-22 | Stop reason: HOSPADM

## 2023-04-20 RX ORDER — ARIPIPRAZOLE 15 MG/1
15 TABLET ORAL DAILY
Status: DISCONTINUED | OUTPATIENT
Start: 2023-04-21 | End: 2023-04-21

## 2023-04-20 RX ORDER — ATORVASTATIN CALCIUM 20 MG/1
20 TABLET, FILM COATED ORAL DAILY
Status: DISCONTINUED | OUTPATIENT
Start: 2023-04-21 | End: 2023-04-22 | Stop reason: HOSPADM

## 2023-04-20 RX ORDER — LORAZEPAM 0.5 MG/1
0.5 TABLET ORAL EVERY 6 HOURS PRN
Status: DISCONTINUED | OUTPATIENT
Start: 2023-04-20 | End: 2023-04-22 | Stop reason: HOSPADM

## 2023-04-20 RX ORDER — MIRTAZAPINE 7.5 MG/1
7.5 TABLET, FILM COATED ORAL AT BEDTIME
Status: DISCONTINUED | OUTPATIENT
Start: 2023-04-20 | End: 2023-04-21

## 2023-04-20 RX ORDER — CARVEDILOL 6.25 MG/1
6.25 TABLET ORAL 2 TIMES DAILY WITH MEALS
Status: DISCONTINUED | OUTPATIENT
Start: 2023-04-20 | End: 2023-04-22 | Stop reason: HOSPADM

## 2023-04-20 RX ORDER — PANTOPRAZOLE SODIUM 40 MG/1
40 TABLET, DELAYED RELEASE ORAL DAILY
Status: DISCONTINUED | OUTPATIENT
Start: 2023-04-21 | End: 2023-04-22 | Stop reason: HOSPADM

## 2023-04-20 RX ORDER — ONDANSETRON 2 MG/ML
4 INJECTION INTRAMUSCULAR; INTRAVENOUS EVERY 6 HOURS PRN
Status: DISCONTINUED | OUTPATIENT
Start: 2023-04-20 | End: 2023-04-22 | Stop reason: HOSPADM

## 2023-04-20 RX ORDER — ONDANSETRON 4 MG/1
4 TABLET, ORALLY DISINTEGRATING ORAL EVERY 6 HOURS PRN
Status: DISCONTINUED | OUTPATIENT
Start: 2023-04-20 | End: 2023-04-22 | Stop reason: HOSPADM

## 2023-04-20 RX ORDER — AMLODIPINE BESYLATE 5 MG/1
5 TABLET ORAL 2 TIMES DAILY
Status: DISCONTINUED | OUTPATIENT
Start: 2023-04-20 | End: 2023-04-22 | Stop reason: HOSPADM

## 2023-04-20 RX ORDER — ACETAMINOPHEN 325 MG/1
650 TABLET ORAL EVERY 4 HOURS PRN
Status: DISCONTINUED | OUTPATIENT
Start: 2023-04-20 | End: 2023-04-22 | Stop reason: HOSPADM

## 2023-04-20 RX ADMIN — AMLODIPINE BESYLATE 5 MG: 5 TABLET ORAL at 20:36

## 2023-04-20 RX ADMIN — CARVEDILOL 6.25 MG: 6.25 TABLET, FILM COATED ORAL at 18:56

## 2023-04-20 RX ADMIN — MIRTAZAPINE 7.5 MG: 7.5 TABLET, FILM COATED ORAL at 21:31

## 2023-04-20 ASSESSMENT — ACTIVITIES OF DAILY LIVING (ADL)
ADLS_ACUITY_SCORE: 35
ADLS_ACUITY_SCORE: 31
ADLS_ACUITY_SCORE: 35
ADLS_ACUITY_SCORE: 35

## 2023-04-20 NOTE — H&P
[facility]    History and Physical - ED Observation Service       Date of Admission:  4/20/2023    Assessment & Plan    Yancy Rivera is a 67 year old female with PMH of chronic low back pain, HTN, HLD, RA, depression, anxiety who presented to the ED on 4/20/23 for evaluation of syncope.    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 She had a syncopal episode today while walking to her laundry room. It was preceded by lightheadedness. Denies hitting her head. No associated chest pain, palpitations, dyspnea. She does get dizziness from time to time, last fall was about 1 month ago. She states she was compliant with the fluid restriction. Recent had adjustments to her BP meds. In the ED, VSS with /82. CBC unremarkable. BMP with sodium 129. Troponin 7. EKG NSR, no acute ischemic changes.  -Continuous telemetry  -Repeat troponin  -Echocardiogram  -Check orthostatics before considering IV fluids    #Hyponatremia  Patient was recently hospitalized at [facility] from 4/14 to 4/19 for evaluation of hyponatremia as low as 116, associated with confusion. She was initially given NS with overly rapid correction. Then was seen by nephrology and given D5. When that did not correct her sodium quickly enough she was given DDAVP. She was on a 1.5 L fluid restriction while hospitalized. Sodium was 135 by the time of discharge. 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 Her Lisinopril was discontinued (concern that it may have been contributing to hyponatremia) and she was switched to Amlodipine and Coreg. Today, her sodium level is 129. Reports compliance to 2 L fluid restriction recommended on discharge. She denies any current headache or nausea. There is no AMS.  -Free water restriction of 1.5 L  -Strict I&O  -Repeat sodium tonight and tomorrow morning    #HTN  -Continue PTA Norvasc,  "Carvedilol    #Major depressive disorder  [facility] and was started on Mirtazapine. She has a follow up in place scheduled 5/23/23.  -Continue PTA Mirtazapine, Abilify, Ativan    #HLD  -Continue PTA Atorvastatin     Diet: Regular Diet Adult  Fluid restriction 1500 ML FLUID  DVT Prophylaxis: Low Risk/Ambulatory with no VTE prophylaxis indicated and Ambulate every shift  Patrick Catheter: Not present  Lines: None     Cardiac Monitoring: ACTIVE order. Indication: Syncope- low cardiac risk (24 hours)  Code Status: Full Code    Clinically Significant Risk Factors Present on Admission         # Hyponatremia: Lowest Na = 129 mmol/L in last 2 days, will monitor as appropriate          # Hypertension: home medication list includes antihypertensive(s)      # Obesity: Estimated body mass index is 31.93 kg/m  as calculated from the following:    Height as of this encounter: 1.626 m (5' 4\").    Weight as of this encounter: 84.4 kg (186 lb).           Disposition Plan      Expected Discharge Date: 04/21/2023                The patient's care was discussed with the Patient and ED provider.    Melonie Estrada PA-C  ED Observation Service  [facility]  Securely message with [employer] (more info)  Text page via [facility] Paging/Directory     ______________________________________________________________________    Chief Complaint   Syncope    History is obtained from the patient    History of Present Illness   Per ED: \"Yancy Rivera is a 67 year old female, with a PMHx of chronic low back pain, hypertension, hyperlipidemia, rheumatoid arthritis, depression, anxiety, who presents to the ED via EMS for evaluation of a syncopal episode. The patient was previously admitted from 4/14 to 4/19 at [facility] for hyponatremia and diagnosed with psychogenic polydipsia. She was started on a 2L fluid restriction and Abilify. Her lisinopril was changed with a combination of " "carvedilol and amlodipine. She was discharged yesterday in stable condition with plans to follow up with her PCP and psychiatry (appointment scheduled for 5/23/23 with ADITYA Ramos).      The patient is accompanied by her daughter. She is non-English speaking. She was offered ininterpreting services, but declined and prefers to have her daughter translate instead. \"    Past Medical History    Past Medical History:   Diagnosis Date     Blepharitis      Esophageal reflux (aka GERD)      Glaucoma      Hyperlipidemia LDL goal < 130      Nonsenile cataract      Shingles     11/14/15 Left approximately C6 distribution       Past Surgical History   Past Surgical History:   Procedure Laterality Date     CATARACT IOL, RT/LT Right 10/16/2018    [facility] [facility]      CATARACT IOL, RT/LT Left 11/01/2018    [place] [facility]      CHALAZION EXCISION  08/21/2015    Left lid     HERNIA REPAIR      abdominal hernia repair 2012       Prior to Admission Medications   Prior to Admission Medications   Prescriptions Last Dose Informant Patient Reported? Taking?   Calcium Carb-Cholecalciferol (CALCIUM 500 + D) 500-5 MG-MCG TABS   No No   Sig: Take 1 tablet by mouth 2 times daily   Patient not taking: Reported on 4/14/2023   Cholecalciferol (VITAMIN D) 2000 UNITS tablet   No No   Sig: Take 1,000 Units by mouth daily   Patient not taking: Reported on 4/14/2023   LORazepam (ATIVAN) 0.5 MG tablet   No No   Sig: Take 1 tablet (0.5 mg) by mouth every 6 hours as needed for anxiety or sleep   Multiple Minerals (CALCIUM-MAGNESIUM-ZINC) TABS   No No   Sig: Take 1 tablet by mouth daily   Patient not taking: Reported on 4/14/2023   White Petrolatum-Mineral Oil (REFRESH P.M.) OINT   No No   Sig: Apply thin layer to both eyes at bedtime   atorvastatin (LIPITOR) 20 MG tablet   No No   Sig: Take 1 tablet (20 mg) by mouth daily Patient needs to have labs for further " refills.   carboxymethylcellulose PF (CARBOXYMETHYLCELLULOSE SODIUM) 0.5 % ophthalmic solution   No No   Sig: Place 1 drop into both eyes 4 times daily as needed for dry eyes   chlorhexidine (PERIDEX) 0.12 % solution   No No   Sig: SWISH IN MOUTH FOR 30 SECONDS WITH 15MLS AND SPIT. USE TWO TIMES A DAY. DO NOT SWALLOW   diclofenac (VOLTAREN) 1 % topical gel   No No   Sig: Place 2 g onto the skin 4 times daily To right wrist   Patient not taking: Reported on 4/14/2023   ferrous sulfate (IRON) 325 (65 FE) MG tablet   No No   Sig: Take 1 tablet (325 mg) by mouth daily (with breakfast)   ibuprofen (ADVIL/MOTRIN) 800 MG tablet   No No   Sig: Take 1 tablet (800 mg) by mouth every 6 hours as needed for moderate pain   Patient not taking: Reported on 4/14/2023   lisinopril (ZESTRIL) 10 MG tablet   No No   Sig: Take 1 tablet (10 mg) by mouth daily   pantoprazole (PROTONIX) 40 MG EC tablet   No No   Sig: Take 1 tablet (40 mg) by mouth daily   triamcinolone (KENALOG) 0.1 % external ointment   No No   Sig: Apply topically 2 times daily      Facility-Administered Medications Last Administration Doses Remaining   betamethasone acet & sod phos (CELESTONE) injection 6 mg 5/14/2022 10:55 AM    betamethasone acet & sod phos (CELESTONE) injection 6 mg 5/14/2022 11:01 AM    lidocaine (PF) (XYLOCAINE) 1 % injection 2 mL 5/14/2020 12:39 PM    lidocaine (PF) (XYLOCAINE) 1 % injection 2 mL 7/31/2020  2:43 PM    lidocaine (PF) (XYLOCAINE) 1 % injection 3 mL 7/31/2020  2:43 PM    ropivacaine (NAROPIN) injection 1 mL 5/14/2022 11:01 AM    ropivacaine (NAROPIN) injection 2 mL 5/14/2022 10:55 AM    triamcinolone (KENALOG-40) injection 20 mg 5/14/2020 12:39 PM    triamcinolone (KENALOG-40) injection 40 mg 7/31/2020  2:43 PM            Review of Systems    The 10 point Review of Systems is negative other than noted in the HPI or here.      Physical Exam   Vital Signs: Temp: 98.5  F (36.9  C) Temp src: Oral BP: (!) 140/80 Pulse: 97   Resp: 16 SpO2:  99 % O2 Device: None (Room air)    Weight: 186 lbs 0 oz  Exam:  Constitutional: alert, no distress, and cooperative  Head: normocephalic, atraumatic  Neck: no asymmetry, masses, or scars  ENT: throat normal without erythema or exudate  Cardiovascular: RRR  Respiratory: diminished, respirations unlabored  Gastrointestinal: (+) BS, non tender, soft  Musculoskeletal: normal muscle tone, no pitting edema  Skin: no suspicious lesions or rashes  Neurologic: oriented x 3, moves all extremities, no slurred speech  Psychiatric: normal affect and mood    Medical Decision Making             Data     I have personally reviewed the following data over the past 24 hrs:    9.2  \   14.2   / 327     129 (L) 97 (L) 11.6 /  128 (H)   4.0 19 (L) 0.72 \       Trop: 7 BNP: N/A       Imaging results reviewed over the past 24 hrs:   No results found for this or any previous visit (from the past 24 hour(s)).

## 2023-04-20 NOTE — PROGRESS NOTES
Report rec'd from PEGGY Raza at [facility].  Patient arrived to unit via w/c /c family. ASHLEE Notified.

## 2023-04-20 NOTE — ED PROVIDER NOTES
ED Provider Note  [facility]      History     Chief Complaint   Patient presents with     Syncope     HPI  Yancy Rivera is a 67 year old female, with a PMHx of chronic low back pain, hypertension, hyperlipidemia, rheumatoid arthritis, depression, anxiety, who presents to the ED via EMS for evaluation of a syncopal episode. The patient was previously admitted from 4/14 to 4/19 at [facility] for hyponatremia and diagnosed with psychogenic polydipsia. She was started on a 2L fluid restriction and Abilify. Her lisinopril was discontinued and she was started on a combination of carvedilol and amlodipine instead. She was discharged yesterday in stable condition with plans to follow up with her PCP and psychiatry (appointment scheduled for 5/23/23 with ADITYA Ramos).     The patient is accompanied by her daughter. She is non-English speaking. She was offered ininterpreting services, but declined and prefered to have her daughter translate instead. She reports the patient experienced a syncopal episode this morning, proceeded by dizziness and light-headedness. She was caught by her , and did not suffer a fall or subsequent (head) injury. The patient has since been fatigued. She denies any modifying factors. She denies any fevers, headaches, cough, sore throat, shortness of breath, chest pains, palpitations, abdominal pain, N/V/D, BM or urinary changes, vaginal bleeding or discharge, or new extremity swelling. Of note, the patient only took her dose of Abilify this morning.     There are no additional medical concerns at this time.      Past Medical History  Past Medical History:   Diagnosis Date     Blepharitis      Esophageal reflux (aka GERD)      Glaucoma      Hyperlipidemia LDL goal < 130      Nonsenile cataract      Shingles     11/14/15 Left approximately C6 distribution     Past Surgical History:   Procedure Laterality Date     CATARACT IOL, RT/LT Right 10/16/2018     "[facility]      CATARACT IOL, RT/LT Left 11/01/2018    [facility]      CHALAZION EXCISION  08/21/2015    Left lid     HERNIA REPAIR      abdominal hernia repair 2012     No current outpatient medications on file.    Allergies   Allergen Reactions     No Clinical Screening - See Comments Nausea and Vomiting     Liver side effects from INH for TB     Family History  Family History   Problem Relation Age of Onset     Glaucoma Mother      Macular Degeneration No family hx of      Cancer No family hx of      Diabetes No family hx of      Hypertension No family hx of      Cerebrovascular Disease No family hx of      Thyroid Disease No family hx of      Eye Surgery No family hx of      Social History   Social History     Tobacco Use     Smoking status: Never     Smokeless tobacco: Never   Vaping Use     Vaping status: Never Used   Substance Use Topics     Alcohol use: No     Drug use: No      Past medical history, past surgical history, medications, allergies, family history, and social history were reviewed with the patient. No additional pertinent items.      A complete review of systems was performed with pertinent positives and negatives noted in the HPI, and all other systems negative.    Physical Exam   BP: 128/82  Pulse: 80  Temp: 98  F (36.7  C)  Resp: 20  Height: 162.6 cm (5' 4\")  Weight: 84.4 kg (186 lb)  SpO2: 97 %  Physical Exam  Constitutional:       General: She is not in acute distress.     Appearance: Normal appearance. She is well-groomed. She is not ill-appearing, toxic-appearing or diaphoretic.   HENT:      Head: Normocephalic and atraumatic.      Mouth/Throat:      Mouth: Mucous membranes are moist.   Eyes:      General: No scleral icterus.     Extraocular Movements: Extraocular movements intact.      Conjunctiva/sclera: Conjunctivae normal.      Pupils: Pupils are equal, round, and reactive to light.   Cardiovascular: "      Rate and Rhythm: Normal rate and regular rhythm.      Pulses: Normal pulses.      Heart sounds: Normal heart sounds. No murmur heard.     No friction rub. No gallop.   Pulmonary:      Effort: Pulmonary effort is normal. No respiratory distress.      Breath sounds: Normal breath sounds. No stridor. No wheezing, rhonchi or rales.   Abdominal:      General: There is no distension.      Palpations: Abdomen is soft.      Tenderness: There is no abdominal tenderness. There is no guarding or rebound.   Musculoskeletal:         General: Normal range of motion.      Cervical back: Neck supple.   Skin:     General: Skin is warm.   Neurological:      General: No focal deficit present.      Mental Status: She is alert and oriented to person, place, and time. Mental status is at baseline.      Cranial Nerves: Cranial nerves 2-12 are intact.      Sensory: Sensation is intact.      Motor: Motor function is intact.      Coordination: Coordination is intact.   Psychiatric:         Mood and Affect: Mood normal.         Behavior: Behavior normal. Behavior is cooperative.         Thought Content: Thought content normal.         ED Course, Procedures, & Data      Procedures       ED Course Selections:        EKG Interpretation:      Interpreted by Kaz Griggs & Dr. Garrido  Time reviewed: 1241  Symptoms at time of EKG: syncope        Rhythm: normal sinus   Rate: normal  Axis: normal  Ectopy: none  Conduction: LAD  ST Segments/ T Waves: No ST-T wave changes  Q Waves: none  Comparison to prior: No old EKG available    Clinical Impression: NSR, LAD, no acute ischemia        Results for orders placed or performed during the hospital encounter of 04/20/23   Basic metabolic panel     Status: Abnormal   Result Value Ref Range    Sodium 129 (L) 136 - 145 mmol/L    Potassium 4.0 3.4 - 5.3 mmol/L    Chloride 97 (L) 98 - 107 mmol/L    Carbon Dioxide (CO2) 19 (L) 22 - 29 mmol/L    Anion Gap 13 7 - 15 mmol/L    Urea Nitrogen 11.6 8.0 -  23.0 mg/dL    Creatinine 0.72 0.51 - 0.95 mg/dL    Calcium 9.8 8.8 - 10.2 mg/dL    Glucose 128 (H) 70 - 99 mg/dL    GFR Estimate >90 >60 mL/min/1.73m2   Troponin T, High Sensitivity     Status: Normal   Result Value Ref Range    Troponin T, High Sensitivity 7 <=14 ng/L   CBC with platelets and differential     Status: None   Result Value Ref Range    WBC Count 9.2 4.0 - 11.0 10e3/uL    RBC Count 4.77 3.80 - 5.20 10e6/uL    Hemoglobin 14.2 11.7 - 15.7 g/dL    Hematocrit 43.1 35.0 - 47.0 %    MCV 90 78 - 100 fL    MCH 29.8 26.5 - 33.0 pg    MCHC 32.9 31.5 - 36.5 g/dL    RDW 12.2 10.0 - 15.0 %    Platelet Count 327 150 - 450 10e3/uL    % Neutrophils 72 %    % Lymphocytes 17 %    % Monocytes 8 %    % Eosinophils 3 %    % Basophils 0 %    % Immature Granulocytes 0 %    NRBCs per 100 WBC 0 <1 /100    Absolute Neutrophils 6.7 1.6 - 8.3 10e3/uL    Absolute Lymphocytes 1.5 0.8 - 5.3 10e3/uL    Absolute Monocytes 0.7 0.0 - 1.3 10e3/uL    Absolute Eosinophils 0.3 0.0 - 0.7 10e3/uL    Absolute Basophils 0.0 0.0 - 0.2 10e3/uL    Absolute Immature Granulocytes 0.0 <=0.4 10e3/uL    Absolute NRBCs 0.0 10e3/uL   EKG 12-lead, tracing only     Status: None   Result Value Ref Range    Systolic Blood Pressure  mmHg    Diastolic Blood Pressure  mmHg    Ventricular Rate 80 BPM    Atrial Rate 80 BPM    TN Interval 140 ms    QRS Duration 70 ms     ms    QTc 456 ms    P Axis 52 degrees    R AXIS -13 degrees    T Axis 4 degrees    Interpretation ECG       Sinus rhythm  Possible Left atrial enlargement  Borderline ECG  Unconfirmed report - interpretation of this ECG is computer generated - see medical record for final interpretation  Confirmed by - EMERGENCY ROOM, PHYSICIAN ([id number]),  KATHY BUTLER ([id number]) on 4/20/2023 2:57:47 PM     CBC with platelets differential     Status: None    Narrative    The following orders were created for panel order CBC with platelets differential.  Procedure                               Abnormality          Status                     ---------                               -----------         ------                     CBC with platelets and d...[[id number]]                      Final result                 Please view results for these tests on the individual orders.     Medications - No data to display  Labs Ordered and Resulted from Time of ED Arrival to Time of ED Departure   BASIC METABOLIC PANEL - Abnormal       Result Value    Sodium 129 (*)     Potassium 4.0      Chloride 97 (*)     Carbon Dioxide (CO2) 19 (*)     Anion Gap 13      Urea Nitrogen 11.6      Creatinine 0.72      Calcium 9.8      Glucose 128 (*)     GFR Estimate >90     TROPONIN T, HIGH SENSITIVITY - Normal    Troponin T, High Sensitivity 7     CBC WITH PLATELETS AND DIFFERENTIAL    WBC Count 9.2      RBC Count 4.77      Hemoglobin 14.2      Hematocrit 43.1      MCV 90      MCH 29.8      MCHC 32.9      RDW 12.2      Platelet Count 327      % Neutrophils 72      % Lymphocytes 17      % Monocytes 8      % Eosinophils 3      % Basophils 0      % Immature Granulocytes 0      NRBCs per 100 WBC 0      Absolute Neutrophils 6.7      Absolute Lymphocytes 1.5      Absolute Monocytes 0.7      Absolute Eosinophils 0.3      Absolute Basophils 0.0      Absolute Immature Granulocytes 0.0      Absolute NRBCs 0.0       No orders to display          Critical care was not performed.     Medical Decision Making  Deferred. Refer to Dr. Garrido's attestation.     Assessment & Plan    Yancy Rivera is a 67 year old female, with a PMHx of chronic low back pain, hypertension, hyperlipidemia, rheumatoid arthritis, depression, anxiety, who presents to the ED via EMS for evaluation of a syncopal episode.     Initial differential diagnosis consists of hyponatremia secondary to psychogenic polydipsia given her recent admission and extensive work up. Her differential remains broad, however, and also includes arrhythmia, MI, and additional electrolyte abnormality. BMP  demonstrated hyponatremia of 129, a reduction from 135 obtained during yesterday's admission and discharge. Achlorhydria of 97 was also noted. The remainder of her BMP, in addition to her CBC, troponin, and EKG, were relatively unremarkable. Her differential is now more concerning for hyponatremia secondary to psychogenic polydipsia. Plan to admit the patient to ED observation for monitored sodium replacement and overnight cardiac monitoring, with additional plans to discharge the patient home with a zio patch pending he condition. The patient understands and agrees with the plan.     I have reviewed the nursing notes. I have reviewed the findings, diagnosis, plan and need for follow up with the patient.    Current Discharge Medication List          Final diagnoses:   Hyponatremia   Syncope, unspecified syncope type   Kaz Griggs  Medical Student, MS3  [employer]    The patient was seen and discussed with Dr. Garrido, who agrees with the assessment and plan.    --    ED Attending Physician Attestation    I Jayy Garrido MD, cared for this patient with the Medical Student. I performed, or re-performed, the physical exam and medical decision-making. I have verified the accuracy of all the medical student findings and documentation above, and have edited as necessary.    Summary of HPI, PE, ED Course   Patient is a 67 year old female evaluated in the emergency department for syncope. Exam and ED course notable for hyponatremia with sodium 129, which is approximately 6 points lower than the value from yesterday prior to hospital discharge. After the completion of care in the emergency department, the patient was admitted to observation.    Critical Care & Procedures  Not applicable.        Medical Decision Making  The patient's presentation was of high complexity (an acute health issue posing potential threat to life or bodily function).    The patient's evaluation involved:  review of  external note(s) from 1 sources (see separate area of note for details)  ordering and/or review of 3+ test(s) in this encounter (see separate area of note for details)  discussion of management or test interpretation with another health professional (see separate area of note for details)    The patient's management necessitated high risk (a decision regarding hospitalization).          Jayy Garrido MD  Emergency Medicine       Jayy Garrido MD  [facility] EMERGENCY DEPARTMENT  4/20/2023     Jayy Garrido MD  04/20/23 1517

## 2023-04-20 NOTE — PLAN OF CARE
"Outpatient/Observation goals to be met before discharge home:  List all  goals to be met before discharge:   - Diagnostic tests and consults completed and resulted - Not Met  - No further episodes of syncope and any new arrhythmia addressed with controlled heart rates - Not Met  - Vital signs normal or at patient baseline and orthostatic vitals are normal and patient not lightheaded with standing - In process, Ortho BP negative, no dizziness, lightheadedness noted.  BP (!) 140/80 (BP Location: Right arm)   Pulse 84   Temp 98.5  F (36.9  C) (Oral)   Resp 14   Ht 1.626 m (5' 4\")   Wt 84.4 kg (186 lb)   LMP  (LMP Unknown)   SpO2 99%   BMI 31.93 kg/m    - Tolerating oral intake to maintain hydration - In process, diminished appetite  - Safe disposition plan has been identified - Not Met    Nurse to notify provider when observation goals have been met and patient is ready for discharge  "

## 2023-04-20 NOTE — ED TRIAGE NOTES
Arrives wcb to triage with family. Recent admission to OSH for hyponatremia. Had episode of syncope this morning around 0900. No head impact. Hx anxiety, depression, not currently taking meds for this. Restless in triage. Generalized aches, pains, otherwise no acute distress.     Triage Assessment     Row Name 04/20/23 1120       Triage Assessment (Adult)    Airway WDL WDL       Respiratory WDL    Respiratory WDL WDL       Skin Circulation/Temperature WDL    Skin Circulation/Temperature WDL WDL       Cardiac WDL    Cardiac WDL WDL       Peripheral/Neurovascular WDL    Peripheral Neurovascular WDL WDL       Cognitive/Neuro/Behavioral WDL    Cognitive/Neuro/Behavioral WDL X;mood/behavior    Level of Consciousness confused

## 2023-04-21 ENCOUNTER — APPOINTMENT (OUTPATIENT)
Dept: MRI IMAGING | Facility: CLINIC | Age: 68
End: 2023-04-21
Payer: MEDICARE

## 2023-04-21 ENCOUNTER — APPOINTMENT (OUTPATIENT)
Dept: CARDIOLOGY | Facility: CLINIC | Age: 68
End: 2023-04-21
Payer: MEDICARE

## 2023-04-21 LAB
ANION GAP SERPL CALCULATED.3IONS-SCNC: 13 MMOL/L (ref 7–15)
ATRIAL RATE - MUSE: 80 BPM
BASOPHILS # BLD AUTO: 0 10E3/UL (ref 0–0.2)
BASOPHILS NFR BLD AUTO: 0 %
BUN SERPL-MCNC: 11.7 MG/DL (ref 8–23)
CALCIUM SERPL-MCNC: 10.2 MG/DL (ref 8.8–10.2)
CHLORIDE SERPL-SCNC: 103 MMOL/L (ref 98–107)
CREAT SERPL-MCNC: 0.79 MG/DL (ref 0.51–0.95)
DEPRECATED HCO3 PLAS-SCNC: 20 MMOL/L (ref 22–29)
DIASTOLIC BLOOD PRESSURE - MUSE: NORMAL MMHG
EOSINOPHIL # BLD AUTO: 0.5 10E3/UL (ref 0–0.7)
EOSINOPHIL NFR BLD AUTO: 7 %
ERYTHROCYTE [DISTWIDTH] IN BLOOD BY AUTOMATED COUNT: 12.3 % (ref 10–15)
GFR SERPL CREATININE-BSD FRML MDRD: 83 ML/MIN/1.73M2
GLUCOSE SERPL-MCNC: 128 MG/DL (ref 70–99)
HCT VFR BLD AUTO: 43.2 % (ref 35–47)
HGB BLD-MCNC: 14.1 G/DL (ref 11.7–15.7)
IMM GRANULOCYTES # BLD: 0 10E3/UL
IMM GRANULOCYTES NFR BLD: 0 %
INTERPRETATION ECG - MUSE: NORMAL
LVEF ECHO: NORMAL
LYMPHOCYTES # BLD AUTO: 2.5 10E3/UL (ref 0.8–5.3)
LYMPHOCYTES NFR BLD AUTO: 34 %
MCH RBC QN AUTO: 29.6 PG (ref 26.5–33)
MCHC RBC AUTO-ENTMCNC: 32.6 G/DL (ref 31.5–36.5)
MCV RBC AUTO: 91 FL (ref 78–100)
MONOCYTES # BLD AUTO: 0.7 10E3/UL (ref 0–1.3)
MONOCYTES NFR BLD AUTO: 10 %
NEUTROPHILS # BLD AUTO: 3.5 10E3/UL (ref 1.6–8.3)
NEUTROPHILS NFR BLD AUTO: 49 %
NRBC # BLD AUTO: 0 10E3/UL
NRBC BLD AUTO-RTO: 0 /100
P AXIS - MUSE: 63 DEGREES
PLATELET # BLD AUTO: 331 10E3/UL (ref 150–450)
POTASSIUM SERPL-SCNC: 4 MMOL/L (ref 3.4–5.3)
PR INTERVAL - MUSE: 150 MS
QRS DURATION - MUSE: 78 MS
QT - MUSE: 392 MS
QTC - MUSE: 452 MS
R AXIS - MUSE: 0 DEGREES
RBC # BLD AUTO: 4.77 10E6/UL (ref 3.8–5.2)
SODIUM SERPL-SCNC: 130 MMOL/L (ref 136–145)
SODIUM SERPL-SCNC: 136 MMOL/L (ref 136–145)
SYSTOLIC BLOOD PRESSURE - MUSE: NORMAL MMHG
T AXIS - MUSE: 22 DEGREES
VENTRICULAR RATE- MUSE: 80 BPM
WBC # BLD AUTO: 7.2 10E3/UL (ref 4–11)

## 2023-04-21 PROCEDURE — 93005 ELECTROCARDIOGRAM TRACING: CPT

## 2023-04-21 PROCEDURE — 250N000013 HC RX MED GY IP 250 OP 250 PS 637: Performed by: PHYSICIAN ASSISTANT

## 2023-04-21 PROCEDURE — 99221 1ST HOSP IP/OBS SF/LOW 40: CPT | Mod: GC | Performed by: PSYCHIATRY & NEUROLOGY

## 2023-04-21 PROCEDURE — 99231 SBSQ HOSP IP/OBS SF/LOW 25: CPT | Performed by: PHYSICIAN ASSISTANT

## 2023-04-21 PROCEDURE — 999N000175 HC STATISTIC STROKE CODE W/ ACCESS

## 2023-04-21 PROCEDURE — 93010 ELECTROCARDIOGRAM REPORT: CPT | Performed by: INTERNAL MEDICINE

## 2023-04-21 PROCEDURE — 999N000202 HC STATISTICAL VASC ACCESS NURSE TIME, 1-15 MINUTES

## 2023-04-21 PROCEDURE — 93306 TTE W/DOPPLER COMPLETE: CPT

## 2023-04-21 PROCEDURE — G0378 HOSPITAL OBSERVATION PER HR: HCPCS

## 2023-04-21 PROCEDURE — G1010 CDSM STANSON: HCPCS | Mod: GC | Performed by: RADIOLOGY

## 2023-04-21 PROCEDURE — 250N000013 HC RX MED GY IP 250 OP 250 PS 637

## 2023-04-21 PROCEDURE — 36415 COLL VENOUS BLD VENIPUNCTURE: CPT | Performed by: PHYSICIAN ASSISTANT

## 2023-04-21 PROCEDURE — 999N000248 HC STATISTIC IV INSERT WITH US BY RN

## 2023-04-21 PROCEDURE — 70544 MR ANGIOGRAPHY HEAD W/O DYE: CPT | Mod: MG

## 2023-04-21 PROCEDURE — 258N000003 HC RX IP 258 OP 636: Performed by: EMERGENCY MEDICINE

## 2023-04-21 PROCEDURE — 250N000013 HC RX MED GY IP 250 OP 250 PS 637: Performed by: NURSE PRACTITIONER

## 2023-04-21 PROCEDURE — G1010 CDSM STANSON: HCPCS

## 2023-04-21 PROCEDURE — 99233 SBSQ HOSP IP/OBS HIGH 50: CPT

## 2023-04-21 PROCEDURE — 80048 BASIC METABOLIC PNL TOTAL CA: CPT | Performed by: PHYSICIAN ASSISTANT

## 2023-04-21 PROCEDURE — 70549 MR ANGIOGRAPH NECK W/O&W/DYE: CPT | Mod: 26 | Performed by: RADIOLOGY

## 2023-04-21 PROCEDURE — 999N000128 HC STATISTIC PERIPHERAL IV START W/O US GUIDANCE

## 2023-04-21 PROCEDURE — 255N000002 HC RX 255 OP 636: Performed by: EMERGENCY MEDICINE

## 2023-04-21 PROCEDURE — 999N000147 HC STATISTIC PT IP EVAL DEFER

## 2023-04-21 PROCEDURE — 85025 COMPLETE CBC W/AUTO DIFF WBC: CPT | Performed by: PHYSICIAN ASSISTANT

## 2023-04-21 PROCEDURE — 93306 TTE W/DOPPLER COMPLETE: CPT | Mod: 26 | Performed by: INTERNAL MEDICINE

## 2023-04-21 PROCEDURE — A9585 GADOBUTROL INJECTION: HCPCS | Performed by: EMERGENCY MEDICINE

## 2023-04-21 RX ORDER — ARIPIPRAZOLE 15 MG/1
15 TABLET ORAL DAILY
Status: DISCONTINUED | OUTPATIENT
Start: 2023-04-21 | End: 2023-04-22

## 2023-04-21 RX ORDER — ARIPIPRAZOLE 15 MG/1
15 TABLET ORAL DAILY
Status: CANCELLED | DISCHARGE
Start: 2023-04-21

## 2023-04-21 RX ORDER — MIRTAZAPINE 7.5 MG/1
7.5 TABLET, FILM COATED ORAL AT BEDTIME
Status: CANCELLED | DISCHARGE
Start: 2023-04-21

## 2023-04-21 RX ORDER — GADOBUTROL 604.72 MG/ML
10 INJECTION INTRAVENOUS ONCE
Status: COMPLETED | OUTPATIENT
Start: 2023-04-21 | End: 2023-04-21

## 2023-04-21 RX ORDER — CARBOXYMETHYLCELLULOSE SODIUM 5 MG/ML
1 SOLUTION/ DROPS OPHTHALMIC 4 TIMES DAILY PRN
Status: DISCONTINUED | OUTPATIENT
Start: 2023-04-21 | End: 2023-04-22 | Stop reason: HOSPADM

## 2023-04-21 RX ADMIN — CARBOXYMETHYLCELLULOSE SODIUM 1 DROP: 5 SOLUTION/ DROPS OPHTHALMIC at 21:55

## 2023-04-21 RX ADMIN — CARVEDILOL 6.25 MG: 6.25 TABLET, FILM COATED ORAL at 08:31

## 2023-04-21 RX ADMIN — AMLODIPINE BESYLATE 5 MG: 5 TABLET ORAL at 08:31

## 2023-04-21 RX ADMIN — AMLODIPINE BESYLATE 5 MG: 5 TABLET ORAL at 20:22

## 2023-04-21 RX ADMIN — GADOBUTROL 10 ML: 604.72 INJECTION INTRAVENOUS at 19:26

## 2023-04-21 RX ADMIN — SODIUM CHLORIDE 100 ML: 9 INJECTION, SOLUTION INTRAVENOUS at 19:26

## 2023-04-21 RX ADMIN — ACETAMINOPHEN 650 MG: 325 TABLET, FILM COATED ORAL at 08:31

## 2023-04-21 RX ADMIN — ARIPIPRAZOLE 15 MG: 15 TABLET ORAL at 08:31

## 2023-04-21 RX ADMIN — ARIPIPRAZOLE 15 MG: 15 TABLET ORAL at 20:22

## 2023-04-21 RX ADMIN — ATORVASTATIN CALCIUM 20 MG: 20 TABLET, FILM COATED ORAL at 08:31

## 2023-04-21 RX ADMIN — CARVEDILOL 6.25 MG: 6.25 TABLET, FILM COATED ORAL at 20:22

## 2023-04-21 RX ADMIN — PANTOPRAZOLE SODIUM 40 MG: 40 TABLET, DELAYED RELEASE ORAL at 16:44

## 2023-04-21 ASSESSMENT — ACTIVITIES OF DAILY LIVING (ADL)
ADLS_ACUITY_SCORE: 31

## 2023-04-21 NOTE — PROGRESS NOTES
Yancy Rivera is a 67 year old female with PMH of chronic low back pain, HTN, HLD, RA, depression, anxiety who presented to the ED on 4/20/23 for evaluation of syncope.  She was admitted to ed obs unit for dizziness and sodium of 129.      She continues to feel dizziness today with sodium corrected to 136.  She was placed on 1500cc/day fluid restriction.  Recent admission at [facility] felt her low sodium was due to lisinopril and also polydipsia.  Her lisinopril was stopped and she was placed on coreg and amlodipine.  We will have her been seen by PT today.  Her dizziness seems positional.  She had echocardiogram that shows small pfo.  Cards felt she can f/u once in clinic but usually not treated unless patient has stroke.     She was also seen by psychiatry at [facility] and family is questioning what mental health meds she should be taking.  Reviewing [facility] psychiatry notes it appears they were recommended remeron 7.5 mg for 2 weeks and if tolerated well increase then to abilify.  She was discharged on abilify and here was given both last night.  We will not continue abilify and will place her on remeron 7.5 mg for 2 weeks and then increase to 15mg.  This could be adding to her not feeling well.     Plan to discharge home after PT evaluation.  She will need close sodium level follow up, education regarding what 1500 ml fluid restriction means, pmd follow up for psychiatry meds.            Include Z78.9 (Other Specified Conditions Influencing Health Status) As An Associated Diagnosis?: No Medical Necessity Clause: This procedure was medically necessary because the lesions that were treated were: Medical Necessity Information: It is in your best interest to select a reason for this procedure from the list below. All of these items fulfill various CMS LCD requirements except the new and changing color options. Consent: The patient's consent was obtained including but not limited to risks of crusting, scabbing, blistering, scarring, darker or lighter pigmentary change, recurrence, incomplete removal and infection. Detail Level: Detailed Post-Care Instructions: I reviewed with the patient in detail post-care instructions. Patient is to wear sunprotection, and avoid picking at any of the treated lesions. Pt may apply Vaseline to crusted or scabbing areas.

## 2023-04-21 NOTE — CONSULTS
"Patient admitted for dizziness found to have hyponatremia.  A RRT was called for progressive vertigo and presyncope.  An RRT was called and then a stroke code may have been activated.  There is report of right hemiparesis which the patient denies.  She is now at her baseline.    Stroke Code cancelled on patients way to imaging given return to baseline imaging and low sensitive of CT to evaluate vertigo.  Will order MRI/MRA with coronal DWI.  Discussed with primary team who will page me when MRI is completed.  Full note to follow.    Dewey Santana MD, MS  Vascular Neurology    To page me or covering stroke neurology team member, click here: [email or web address]   Choose \"On Call\" tab at top, then search dropdown box for \"Neurology Adult\", select location, press Enter, then look for stroke/neuro ICU/telestroke.    "

## 2023-04-21 NOTE — PROGRESS NOTES
Care Management Follow Up    Length of Stay (days): 0    Expected Discharge Date: 04/21/2023     Concerns to be Addressed:       Patient plan of care discussed at interdisciplinary rounds: Yes    Anticipated Discharge Disposition:       Anticipated Discharge Services:    Anticipated Discharge DME:      Patient/family educated on Medicare website which has current facility and service quality ratings:    Education Provided on the Discharge Plan:    Patient/Family in Agreement with the Plan:      Referrals Placed by CM/SW:    Private pay costs discussed: went over MOON form, with interpretation, faxed to [facility] ([phone]).     Additional Information:  RNCC was notified to set up new PCP appointment, with interpretation, for hospital follow up in 1 week's time. RNCC calling clinic to set up PCP appointment.     PCP follow up appt made with Karin Kim on 5/4/23 at 11:45 a.m. to include BMP lab.     GRAHAM BradleyN, BA, RN, CMSRN, RNCC  Covering Units 6D/OBS  Pager: [phone]  Phone: [phone]  6th floor Weekend/Holiday Pager: [phone]  Observation weekend/after hours: [phone]

## 2023-04-21 NOTE — PLAN OF CARE
"Goal Outcome Evaluation:  /68 (BP Location: Right arm)   Pulse 84   Temp 98.4  F (36.9  C) (Oral)   Resp 22   Ht 1.626 m (5' 4\")   Wt 84.4 kg (186 lb)   LMP  (LMP Unknown)   SpO2 97%   BMI 31.93 kg/m    - Diagnostic tests and consults completed and resulted Not met   - No further episodes of syncope and any new arrhythmia addressed with controlled heart rates In progress  - Vital signs normal or at patient baseline and orthostatic vitals are normal and patient not lightheaded with standing In progress  - Tolerating oral intake to maintain hydration Not met  - Safe disposition plan has been identified Not met    "

## 2023-04-21 NOTE — PROGRESS NOTES
Pt off unit via bed to CT 1.   Neuro MDs on unit, no CT, change to MRI. Patient returned to room. MRI checklist in process.

## 2023-04-21 NOTE — CODE/RAPID RESPONSE
Rapid Response Team Note    Assessment   In assessment a rapid response was called on Yancy Rivera due to dizziness and pre-syncope.  Patient had another episode after completion of RRT and a code stroke was called.  Unclear etiology of her symptoms.    Plan   -  Initially planned for head CT, later changed to MRI/MRA brain after neurology evaluation  -  Check orthostatic vitals  -  Consider medication adjustments.  She is on multiple medications that could cause dizziness.  -  The ED OBS primary team was present at bedside.  -  Disposition: The patient will remain on the current unit. We will continue to monitor this patient closely.  -  Reassessment and plan follow-up will be performed by the primary team      Irene Morales PA-C  [facility] RRT [facility] Job Code Contact #[phone]  [facility] Paging/Directory    Hospital Course   Brief Summary of events leading to rapid response:   Rapid response was called for dizziness and pre-syncope.  The patient was recently hospitalized last week for hyponatremia felt to be secondary to psychogenic polydipsia.  She then presented after a syncopal episode that was preceded by some lightheadedness.  Echocardiogram and EKG were unrevealing.  Orthostatic vitals were negative yesterday.     She was working with PT this morning when she suddenly started to feel dizzy, had some right-sided weakness and paresthesias, and felt like she was going to pass out.  She did not lose consciousness or fall.    Upon my arrival patient found laying supine in bed, no apparent distress, systolic blood pressure in the 150s, heart rate in the 70s, saturating in the high 90s on room air.  Primary team performed a thorough neurologic examination which was benign.  Primary team updated daughter via phone.  We decided to proceed with a noncontrast head CT and recheck orthostatic vitals.  Shortly after I left the room the patient had another very similar episode and a code stroke was called.  Neurology  attending evaluated the patient and is recommending a MRI/MRA of the brain instead.  Code stroke was canceled.    Admission Diagnosis:   Hyponatremia [E87.1]  Syncope, unspecified syncope type [R55]    Physical Exam   Temp: 98.6  F (37  C) Temp  Min: 98.4  F (36.9  C)  Max: 98.7  F (37.1  C)  Resp: 20 Resp  Min: 14  Max: 25  SpO2: 98 % SpO2  Min: 94 %  Max: 99 %  Pulse: 78 Pulse  Min: 74  Max: 97    No data recorded  BP: (!) 156/81 Systolic (24hrs), Av , Min:123 , Max:163   Diastolic (24hrs), Av, Min:61, Max:81     I/Os: I/O last 3 completed shifts:  In: 300 [P.O.:300]  Out: -      Exam:   General: in no acute distress  Mental Status: baseline mental status.      Significant Results and Procedures   Lactic Acid: No results for input(s): LACT, LACTS in the last 09767 hours.  CBC:   Recent Labs   Lab Test 23  0718 23  1256 23  1109   WBC 7.2 9.2 5.5   HGB 14.1 14.2 13.4   HCT 43.2 43.1 40.8    327 263        Sepsis Evaluation   The patient is not known to have an infection.  NO EVIDENCE OF SEPSIS at this time.  Vital sign, physical exam, and lab findings are due to unknown etiology, possible neurologic event.

## 2023-04-21 NOTE — PROGRESS NOTES
04/21/23 1200   Call Information   Date of Call 04/21/23   Time of Call 1210   Name of person requesting the team Sarah   Title of person requesting team RN   RRT Arrival time 1213   Time RRT ended 1225   Reason for call   Type of RRT Adult   Primary reason for call Neurological   Neurological Other (describe)  (dizziness with activity)   Was patient transferred from the ED, ICU, or PACU within last 24 hours prior to RRT call? No   SBAR   Situation Dizziness with movement   Background PMH of chronic low back pain, HTN, HLD, RA, depression, anxiety who presented to the ED on 4/20/23 for evaluation of syncope.   Notable History/Conditions Hypertension   Assessment A/OX3, VSS on R/A except HTN while lying in bed. denies other s/s, PT reported short episode of unresponsivity and arm weakness.   Interventions Other (describe)  (Head CT)   Patient Outcome   Patient Outcome Stabilized on unit   RRT Team   Attending/Primary/Covering Physician ED OBS   Date Attending Physician notified 04/21/23   Time Attending Physician notified 1210   Physician(s) Irene Morales   Lead RN Madisyn Smith   RT none   Post RRT Intervention Assessment   Post RRT Assessment Stable/Improved   Date Follow Up Done 04/21/23   Time Follow Up Done 1720   Comments Bedside RN reports no episodes of dizziness since

## 2023-04-21 NOTE — PROGRESS NOTES
"[facility]    ED Observation Progress Note - ED Observation  Date of Admission:  4/20/2023    Assessment & Plan      Yancy Rivera is a 67 year old female with PMH of chronic low back pain, HTN, HLD, RA, depression, anxiety who presented to the ED on 4/20/23 for evaluation of syncope.     #Syncope  Patient was recently hospitalized at [facility] from 4/14 to 4/19 for psychogenic polydipsia and discharged home on a fluid restriction. She had a syncopal episode today while walking to her laundry room. It was preceded by lightheadedness. Denies hitting her head. No associated chest pain, palpitations, dyspnea. She does get dizziness from time to time, last fall was about 1 month ago. She states she was compliant with the fluid restriction. Recent had adjustments to her BP meds. In the ED, VSS with /82. CBC unremarkable. BMP with sodium 129. Troponin 7. EKG NSR, no acute ischemic changes. No events overnight. Patient reports she is feeling better today. Reports she has a mild headache and mild dizziness with movement.  Plan for today is and ECHO, nephrology and PT consult. Echo shows:\"Global and regional left ventricular function is normal with an EF of 60-65%. Right ventricular function, chamber size, wall motion, and thickness are normal. Small left to right shunt noted at atrial level consistent with PFO. Mild aortic insufficiency is present. Pulmonary artery systolic pressure is normal. Small collapsed IVC noted. No pericardial effusion is present.There is no prior study for direct comparison.\" I spoke with cardiology who reviewed these findings and reported there is no further workup indicated but a referral to cardiology could be placed at discharge for routine exam. Around 1145, writer was made aware that during PT session patient began to experience right sided weakness with increase in BP, truncal ataxia, frontal headache, and right sided weakness. A stroke code " was called via nursing staff. I assess the patient at that time and neuros were intact. AxO and patient id denying any symptoms. Patient reports only generalized dizziness. Strength equal bilaterally and patient denies nay confusion or weakness. Neurology stroke team recommended a MRA/MRI brain which was ordered. Neurology will follow. At the this time, physically assessment WNL.  Orthostatics negative at this time  -Continuous telemetry  -PT consult  -Discharge with haroon  -Neurology stroke team following  -Cardiology referral at discharge  -PCP follow up (appt on May 4th at 11:45 per CC)  -MRA/MRI at 1700, follow results    #Hyponatremia  Patient was recently hospitalized at [facility] from 4/14 to 4/19 for evaluation of hyponatremia as low as 116, associated with confusion. She was initially given NS with overly rapid correction. Then was seen by nephrology and given D5. When that did not correct her sodium quickly enough she was given DDAVP. She was on a 1.5 L fluid restriction while hospitalized. Sodium was 135 by the time of discharge. 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 Her Lisinopril was discontinued (concern that it may have been contributing to hyponatremia) and she was switched to Amlodipine and Coreg. Today, her sodium level is 129. Reports compliance to 2 L fluid restriction recommended on discharge. She denies any current headache or nausea. There is no AMS. Sodium last night was 130. Sodium improved to 136 this morning  -Free water restriction of 1.5 L  -Strict I&O  -PCP follow up (appt on May 4th at 11:45 per CC- this was earliest available)    #HTN  -Continue PTA Norvasc, Carvedilol     #Major depressive disorder  Seen by psychiatry during her [facility] admission and was started on Mirtazapine. She has a follow up in place scheduled 5/23/23. Spoke with daughter and they report they do not want her to take mirtazapine and would like her to resume Abilify as it  Has worked for her in the past. She  "was discharged from [facility] with 15mg daily of Abilify.   -Continue PTA Ambilify 15mg, discontinue Remeron  -Ativan PRN     #HLD  -Continue PTA Atorvastatin       Diet: Regular Diet Adult  Fluid restriction 1500 ML FLUID    DVT Prophylaxis: Low Risk/Ambulatory with no VTE prophylaxis indicated and Ambulate every shift  Patrick Catheter: Not present  Lines: None     Cardiac Monitoring: ACTIVE order. Indication: Syncope- low cardiac risk (24 hours)  Code Status: Full Code      Clinically Significant Risk Factors Present on Admission         # Hyponatremia: Lowest Na = 129 mmol/L in last 2 days, will monitor as appropriate          # Hypertension: home medication list includes antihypertensive(s)      # Obesity: Estimated body mass index is 31.93 kg/m  as calculated from the following:    Height as of this encounter: 1.626 m (5' 4\").    Weight as of this encounter: 84.4 kg (186 lb).           Disposition Plan      Expected Discharge Date: 04/21/2023                The patient's care was discussed with the Attending Physician, Dr. Smith, Bedside Nurse, Patient, Patient's Family and nephrology Team.    CHRISSIE Jones CNP  ED Observation   [facility]  Securely message with [employer] (more info)  Text page via [facility] Paging/Directory   ______________________________________________________________________    Interval History   No events overnight. Patient reports she is feeling better today. Reports she has a mild headache and mild dizziness with movement.  Plan for today is and ECHO, nephrology and PT consult    Physical Exam   Vital Signs: Temp: (P) 98.5  F (36.9  C) Temp src: Oral BP: (!) (P) 152/79 Pulse: (P) 74   Resp: 16 SpO2: 97 % O2 Device: None (Room air)    Weight: 186 lbs 0 oz    Constitutional: alert, no distress, and cooperative  Head: normocephalic, atraumatic  Neck: no asymmetry, masses, or scars  ENT: throat normal without erythema or " exudate  Cardiovascular: RRR  Respiratory: diminished, respirations unlabored  Gastrointestinal: (+) BS, non tender, soft  Musculoskeletal: normal muscle tone, no pitting edema  Skin: no suspicious lesions or rashes  Neurologic: oriented x 3, moves all extremities, no slurred speech  Psychiatric: normal affect and mood    Medical Decision Making     60 MINUTES SPENT BY ME on the date of service doing chart review, history, exam, documentation & further activities per the note.      Data     I have personally reviewed the following data over the past 24 hrs:    9.2  \   14.2   / 327     130 (L) 97 (L) 11.6 /  128 (H)   4.0 19 (L) 0.72 \       Trop: <6 BNP: N/A       Imaging results reviewed over the past 24 hrs:   No results found for this or any previous visit (from the past 24 hour(s)).

## 2023-04-21 NOTE — PROGRESS NOTES
Stroke Code Nurse-Responder Note    Arrival Time to Stroke Code: 1238    Stroke Code Team interventions:   - canceled by stroke team; they suggest MRI    ED/Bedside Nurse providing handoff: Sarah Post RN

## 2023-04-21 NOTE — PROGRESS NOTES
MRI checklist completed. Patient consented and ready for MRI, checklist tubed to MRI. Ring removed and given to family, tele wires and patches removed, patient voided, placed in gown with no snaps.      MRI called, estimated time for MRI is 5pm. - pt will be placed back on tele until ready to go.

## 2023-04-21 NOTE — PLAN OF CARE
OBS PT Contact: Initiated contact for vestibular consult. Patient greeted supine in bed clearly declining use of  stating that provider on the phone was using a dialect of [place] that she is not familiar with.    Upon assessment, patient denying any dizziness at rest but with noted delays in horizontal tracking (R eye delayed) and with R sided facial droop. With test of skew, patient with slight horizontal deviation bilaterally. Difficulty assessing VOR and saccades. Upon transfer to seated patient with abrupt onset of vertigo in absence of nystagmus. BP assessed and with ~30 mmHg systolic increase from supine. Patient with truncal ataxia in seated position and endorses severe frontal headache with R sided UE/LE numbness. This progressed to whole body tremor and inability to speak. Patient quickly returned to supine dependently and RN made aware immediately.       Harjeet Nieves, PT, DPT  Pager #[phone]

## 2023-04-21 NOTE — PROGRESS NOTES
Brief Nephrology Note        Consulted for hyponatremia at 129, has already corrected to 135 with fluid restriction.  Will sign off from nephrology standpoint but would be happy to revisit if issues arise again.     CHRISSIE Baldwin CNS  Clinical Nurse Specialist  [phone]

## 2023-04-21 NOTE — PLAN OF CARE
"Goal Outcome Evaluation:      BP (!) (P) 152/79 (BP Location: Right arm)   Pulse (P) 74   Temp (P) 98.5  F (36.9  C) (Oral)   Resp 16   Ht 1.626 m (5' 4\")   Wt 84.4 kg (186 lb)   LMP  (LMP Unknown)   SpO2 97%   BMI 31.93 kg/m    - Diagnostic tests and consults completed and resulted Not met, repeat labs in the AM  - No further episodes of syncope and any new arrhythmia addressed with controlled heart rates In progress, no complaints of dizziness overnight  - Vital signs normal or at patient baseline and orthostatic vitals are normal and patient not lightheaded with standing In progress, orthostatic negative  - Tolerating oral intake to maintain hydration Not met, 1500 L fluid restriction  - Safe disposition plan has been identified Not met  "

## 2023-04-22 ENCOUNTER — APPOINTMENT (OUTPATIENT)
Dept: PHYSICAL THERAPY | Facility: CLINIC | Age: 68
End: 2023-04-22
Payer: MEDICARE

## 2023-04-22 ENCOUNTER — APPOINTMENT (OUTPATIENT)
Dept: CARDIOLOGY | Facility: CLINIC | Age: 68
End: 2023-04-22
Payer: MEDICARE

## 2023-04-22 VITALS
DIASTOLIC BLOOD PRESSURE: 71 MMHG | TEMPERATURE: 98.3 F | RESPIRATION RATE: 18 BRPM | SYSTOLIC BLOOD PRESSURE: 140 MMHG | HEIGHT: 64 IN | BODY MASS INDEX: 31.76 KG/M2 | HEART RATE: 70 BPM | OXYGEN SATURATION: 99 % | WEIGHT: 186 LBS

## 2023-04-22 PROCEDURE — G0378 HOSPITAL OBSERVATION PER HR: HCPCS

## 2023-04-22 PROCEDURE — 93246 EXT ECG>7D<15D RECORDING: CPT

## 2023-04-22 PROCEDURE — 250N000013 HC RX MED GY IP 250 OP 250 PS 637: Performed by: PHYSICIAN ASSISTANT

## 2023-04-22 PROCEDURE — 97161 PT EVAL LOW COMPLEX 20 MIN: CPT | Mod: GP

## 2023-04-22 PROCEDURE — 99238 HOSP IP/OBS DSCHRG MGMT 30/<: CPT | Mod: FS | Performed by: FAMILY MEDICINE

## 2023-04-22 PROCEDURE — 250N000013 HC RX MED GY IP 250 OP 250 PS 637: Performed by: NURSE PRACTITIONER

## 2023-04-22 PROCEDURE — 93248 EXT ECG>7D<15D REV&INTERPJ: CPT | Performed by: INTERNAL MEDICINE

## 2023-04-22 RX ORDER — AMLODIPINE BESYLATE 5 MG/1
5 TABLET ORAL 2 TIMES DAILY
DISCHARGE
Start: 2023-04-22 | End: 2023-05-04

## 2023-04-22 RX ORDER — CARVEDILOL 6.25 MG/1
6.25 TABLET ORAL 2 TIMES DAILY WITH MEALS
DISCHARGE
Start: 2023-04-22 | End: 2023-05-04

## 2023-04-22 RX ORDER — ACETAMINOPHEN 325 MG/1
650 TABLET ORAL EVERY 4 HOURS PRN
COMMUNITY
Start: 2023-04-22 | End: 2023-08-09

## 2023-04-22 RX ORDER — ARIPIPRAZOLE 10 MG/1
10 TABLET ORAL DAILY
Status: DISCONTINUED | OUTPATIENT
Start: 2023-04-22 | End: 2023-04-22

## 2023-04-22 RX ORDER — ARIPIPRAZOLE 5 MG/1
5 TABLET ORAL EVERY EVENING
Status: DISCONTINUED | OUTPATIENT
Start: 2023-04-22 | End: 2023-04-22 | Stop reason: HOSPADM

## 2023-04-22 RX ORDER — ARIPIPRAZOLE 5 MG/1
5 TABLET ORAL DAILY
Status: DISCONTINUED | OUTPATIENT
Start: 2023-04-23 | End: 2023-04-22

## 2023-04-22 RX ORDER — ARIPIPRAZOLE 5 MG/1
5 TABLET ORAL EVERY EVENING
Qty: 21 TABLET | Refills: 0 | Status: SHIPPED | OUTPATIENT
Start: 2023-04-22 | End: 2023-04-24

## 2023-04-22 RX ADMIN — CARBOXYMETHYLCELLULOSE SODIUM 1 DROP: 5 SOLUTION/ DROPS OPHTHALMIC at 03:06

## 2023-04-22 RX ADMIN — CARVEDILOL 6.25 MG: 6.25 TABLET, FILM COATED ORAL at 08:20

## 2023-04-22 RX ADMIN — PANTOPRAZOLE SODIUM 40 MG: 40 TABLET, DELAYED RELEASE ORAL at 08:20

## 2023-04-22 RX ADMIN — ATORVASTATIN CALCIUM 20 MG: 20 TABLET, FILM COATED ORAL at 08:20

## 2023-04-22 RX ADMIN — AMLODIPINE BESYLATE 5 MG: 5 TABLET ORAL at 08:20

## 2023-04-22 ASSESSMENT — ACTIVITIES OF DAILY LIVING (ADL)
ADLS_ACUITY_SCORE: 31

## 2023-04-22 NOTE — PLAN OF CARE
"Goal Outcome Evaluation:BP (!) 158/75 (BP Location: Left arm)   Pulse 82   Temp 98  F (36.7  C) (Oral)   Resp 18   Ht 1.626 m (5' 4\")   Wt 84.4 kg (186 lb)   LMP  (LMP Unknown)   SpO2 99%   BMI 31.93 kg/m      Diagnostic tests and consults completed and resulted :Not met   - No further episodes of syncope and any new arrhythmia addressed with controlled heart rates :Not met   - Vital signs normal or at patient baseline and orthostatic vitals are normal and patient not lightheaded with standing :Met   - Tolerating oral intake to maintain hydration :Met   - Safe disposition plan has been identified :Not met                       "

## 2023-04-22 NOTE — PROGRESS NOTES
"[facility]    ED Observation Progress Note - ED Observation  Date of Admission:  4/20/2023    Assessment & Plan     Yancy Rivera is a 67 year old female with PMH of chronic low back pain, HTN, HLD, RA, depression, anxiety who presented to the ED on 4/20/23 for evaluation of syncope.     #Syncope  Patient was recently hospitalized at [facility] from 4/14 to 4/19 for psychogenic polydipsia and discharged home on a fluid restriction. She had a syncopal episode today while walking to her laundry room. It was preceded by lightheadedness. Denies hitting her head. No associated chest pain, palpitations, dyspnea. She does get dizziness from time to time, last fall was about 1 month ago. She states she was compliant with the fluid restriction. Recent had adjustments to her BP meds. In the ED, VSS with /82. CBC unremarkable. BMP with sodium 129. Troponin 7. EKG NSR, no acute ischemic changes. No events overnight. Patient reports she is feeling better today. Reports she has a mild headache and mild dizziness with movement.  Plan for today is and ECHO, nephrology and PT consult. Echo shows:\"Global and regional left ventricular function is normal with an EF of 60-65%. Right ventricular function, chamber size, wall motion, and thickness are normal. Small left to right shunt noted at atrial level consistent with PFO. Mild aortic insufficiency is present. Pulmonary artery systolic pressure is normal. Small collapsed IVC noted. No pericardial effusion is present.There is no prior study for direct comparison.\" I spoke with cardiology who reviewed these findings and reported there is no further workup indicated but a referral to cardiology could be placed at discharge for routine exam. Around 1145, writer was made aware that during PT session patient began to experience right sided weakness with increase in BP, truncal ataxia, frontal headache, and right sided weakness. A stroke code " was called via nursing staff. I assess the patient at that time and neuros were intact. AxO and patient id denying any symptoms. Patient reports only generalized dizziness. Strength equal bilaterally and patient denies nay confusion or weakness. Neurology stroke team recommended a MRA/MRI brain which was ordered. Neurology will follow. At the this time, physically assessment WNL.  Orthostatics negative at this time. No events overnight. Patient reports she feels better and the dizziness is gone and is requesting to go home. MRI/MRA head and neck negative for any acute findings. We will have PT see today for safe disposition recommendations.   -Continuous telemetry  -PT consult  -Discharge with haroon  -Neurology stroke team following  -Cardiology referral at discharge  -PCP follow up (appt on May 4th at 11:45 per CC)  -MRA/MRI at 1700, follow results     #Hyponatremia  Patient was recently hospitalized at [facility] from 4/14 to 4/19 for evaluation of hyponatremia as low as 116, associated with confusion. She was initially given NS with overly rapid correction. Then was seen by nephrology and given D5. When that did not correct her sodium quickly enough she was given DDAVP. She was on a 1.5 L fluid restriction while hospitalized. Sodium was 135 by the time of discharge. 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 Her Lisinopril was discontinued (concern that it may have been contributing to hyponatremia) and she was switched to Amlodipine and Coreg. Today, her sodium level is 129. Reports compliance to 2 L fluid restriction recommended on discharge. She denies any current headache or nausea. There is no AMS. Sodium last night was 130. Sodium improved to 136 this morning  -Free water restriction of 1.5 L  -Strict I&O  -PCP follow up (appt on May 4th at 11:45 per CC- this was earliest available)     #HTN  -Continue PTA Norvasc, Carvedilol     #Major depressive disorder  Seen by psychiatry during her [facility] admission and  "was started on Mirtazapine. She has a follow up in place scheduled 5/23/23. Spoke with daughter and they report they do not want her to take mirtazapine and would like her to resume Abilify as it  Has worked for her in the past. She was discharged from [facility] with 15mg daily of Abilify. Family is requesting patient take a lower dose of Abilify. We will order 5 mg with plan to follow up on 5/23 at scheduled psych appointment.   -Continue PTA Ambilify 5mg, discontinue Remeron  -Ativan PRN     #HLD  -Continue PTA Atorvastatin       Diet: Regular Diet Adult  Fluid restriction 1500 ML FLUID    DVT Prophylaxis: Ambulate every shift  Patrick Catheter: Not present  Lines: None     Cardiac Monitoring: ACTIVE order. Indication: Syncope- low cardiac risk (24 hours)  Code Status: Full Code      Clinically Significant Risk Factors Present on Admission         # Hyponatremia: Lowest Na = 129 mmol/L in last 2 days, will monitor as appropriate   # Hypercalcemia: Highest Ca = 10.2 mg/dL in last 2 days, will monitor as appropriate        # Hypertension: home medication list includes antihypertensive(s)      # Obesity: Estimated body mass index is 31.93 kg/m  as calculated from the following:    Height as of this encounter: 1.626 m (5' 4\").    Weight as of this encounter: 84.4 kg (186 lb).           Disposition Plan    Discharge today pending PT evaluation    The patient's care was discussed with the Attending Physician, Dr. Hair, Bedside Nurse, Care Coordinator/, Neurology and PT Team and neurology.    CHRISSIE Jones CNP  ED Observation   [facility]  Securely message with [employer] (more info)  Text page via [facility] Paging/Directory   ______________________________________________________________________    Interval History   No events overnight. Patient reports she feels better and the dizziness is gone and is requesting to go home. MRI/MRA head and neck " negative for any acute findings. We will have PT see today for safe disposition recommendations.     Physical Exam   Vital Signs: Temp: 98  F (36.7  C) Temp src: Oral BP: (!) 142/74 Pulse: 97   Resp: 19 SpO2: 99 % O2 Device: None (Room air)    Weight: 186 lbs 0 oz    Constitutional: alert, no distress, and cooperative  Head: normocephalic, atraumatic  Neck: no asymmetry, masses, or scars  ENT: throat normal without erythema or exudate  Cardiovascular: RRR  Respiratory: diminished, respirations unlabored  Gastrointestinal: (+) BS, non tender, soft  Musculoskeletal: normal muscle tone, no pitting edema  Skin: no suspicious lesions or rashes  Neurologic: oriented x 3, moves all extremities, no slurred speech  Psychiatric: normal affect and mood    Medical Decision Making     30 MINUTES SPENT BY ME on the date of service doing chart review, history, exam, documentation & further activities per the note.      Data   ------------------------- PAST 24 HR DATA REVIEWED -----------------------------------------------    I have personally reviewed the following data over the past 24 hrs:    N/A  \   N/A   / N/A     N/A N/A N/A /  N/A   N/A N/A N/A \       Imaging results reviewed over the past 24 hrs:   Recent Results (from the past 24 hour(s))   Echocardiogram Complete   Result Value    LVEF  60-65%    Narrative    [id number]  [id number]  [id number]  [id number]^VESTA^CIERA     [facility],[place]  Echocardiography Laboratory  [address] [place] [id number]     Name: ROSA DOUGHERTY  MRN: [id number]  : 1955  Study Date: 2023 09:14 AM  Age: 67 yrs  Gender: Female  Patient Location: [facility]  Reason For Study: Syncope  Ordering Physician: CIERA LEMONS  Performed By: Jose Eduardo Knox RDCS     BSA: 1.9 m2  Height: 64 in  Weight: 186 lb  HR: 75  BP: 140/80 mmHg  ______________________________________________________________________________  Procedure  Complete Portable Echo  Adult.  ______________________________________________________________________________  Interpretation Summary  Global and regional left ventricular function is normal with an EF of 60-65%.  Right ventricular function, chamber size, wall motion, and thickness are  normal.  Small left to right shunt noted at atrial level consistent with PFO.  Mild aortic insufficiency is present.  Pulmonary artery systolic pressure is normal.  Small collapsed IVC noted.  No pericardial effusion is present.  There is no prior study for direct comparison.  ______________________________________________________________________________  Left Ventricle  Global and regional left ventricular function is normal with an EF of 60-65%.  Left ventricular wall thickness is normal. Left ventricular size is normal.  Left ventricular diastolic function is normal. No regional wall motion  abnormalities are seen.     Right Ventricle  Right ventricular function, chamber size, wall motion, and thickness are  normal.     Atria  Both atria appear normal. Small left to right shunt noted at atrial level  consistent with PFO.     Mitral Valve  The mitral valve is normal.     Aortic Valve  The aortic valve is tricuspid. Mild aortic insufficiency is present.     Tricuspid Valve  The tricuspid valve is normal. Trace tricuspid insufficiency is present. The  right ventricular systolic pressure is approximated at 25.8 mmHg plus the  right atrial pressure. Pulmonary artery systolic pressure is normal.     Pulmonic Valve  The pulmonic valve is normal.     Vessels  The thoracic aorta is normal. The pulmonary artery and bifurcation cannot be  assessed. Small collapsed IVC noted.     Pericardium  No pericardial effusion is present.     Compared to Previous Study  There is no prior study for direct comparison.  ______________________________________________________________________________  MMode/2D Measurements & Calculations     IVSd: 1.1 cm  LVIDd: 3.8 cm  LVIDs: 2.7  cm  LVPWd: 0.99 cm  FS: 29.5 %  LV mass(C)d: 123.0 grams  LV mass(C)dI: 64.9 grams/m2  asc Aorta Diam: 3.1 cm  LVOT diam: 2.0 cm  LVOT area: 3.1 cm2  RWT: 0.52     Doppler Measurements & Calculations  MV E max austen: 51.5 cm/sec  MV A max austen: 67.4 cm/sec  MV E/A: 0.77  MV dec slope: 281.3 cm/sec2  MV dec time: 0.18 sec  AI P1/2t: 511.8 msec  PA acc time: 0.09 sec  TR max austen: 253.7 cm/sec  TR max P.8 mmHg  E/E' av.5  Lateral E/e': 6.6     Medial E/e': 6.3     ______________________________________________________________________________  Report approved by: Arjun Galvez 2023 09:48 AM         MR Brain w/o & w Contrast    Narrative    MRI brain without and with contrast  MRA of the head without contrast  Neck MRA without and with contrast    Provided History:  Rule out any neurological findings.  ICD-10:    Comparison:  MR brain 2016. CT head 2023      Technique:   Brain MRI:  Axial diffusion, FLAIR, T2-weighted, susceptibility, and  coronal T1-weighted images were obtained without intravenous contrast.  Following intravenous gadolinium-based contrast administration, axial  and coronal T1-weighted images were obtained.    Head MRA: 3D time-of-flight MRA of the [place] was performed  without intravenous contrast.  Neck MRA:  Limited non contrast 2DTOF images were obtained of the  mid-cervical region. Following intravenous gadolinium-based contrast  administration, a contrast enhanced MRA of the neck/cervical vessels  was performed.  Three-dimensional reconstructions of the neck and head MRA were  created, which were reviewed by the radiologist.    Dose: 10mL Gadavist    Findings:   Brain MRI: Axial diffusion weighted images demonstrate no definite  acute infarct. On the FLAIR images, there are minimal periventricular  T2 hyperintense signal changes, which are most likely related to  chronic small vessel ischemic disease. There is no definite  intracranial hemorrhage on  susceptibility images. Ventricles are  proportionate to the cerebral sulci. Contrast-enhanced images of the  brain demonstrate no abnormal intra- or extra-axial enhancement. The  orbits are grossly normal. Mild mucosal thickening of the maxillary  sinuses with layering fluid level in the left maxillary sinus.  Scattered mucosal thickening in the ethmoid air cells and frontal  sinuses. Trace mastoid effusions.    Head MRA demonstrates no definite aneurysm or stenosis of the major  intracranial arteries. The right vertebral artery terminates early as  the posterior inferior cerebellar artery. Fetal origin of the left and  right posterior cerebral arteries.    Neck MRA demonstrates patent major cervical arteries. The normal  distal right internal carotid artery measures 5 mm. The normal distal  left internal carotid artery measures 5 mm. Antegrade flow in the  major cervical vasculature.      Impression    Impression:  1. No evidence of acute infarction or intracranial hemorrhage.  2. No abnormal enhancing lesions intracranially.  3. Head MRA demonstrates no definite aneurysm or stenosis of the major  intracranial arteries.  4. Neck MRA demonstrates patent major cervical arteries.    I have personally reviewed the examination and initial interpretation  and I agree with the findings.    NATALY CARSON MD         SYSTEM ID:  [id number]   MRA Neck (Carotids) wo & w Contrast    Narrative    MRI brain without and with contrast  MRA of the head without contrast  Neck MRA without and with contrast    Provided History:  Rule out any neurological findings.  ICD-10:    Comparison:  MR brain 6/18/2016. CT head 2/27/2023      Technique:   Brain MRI:  Axial diffusion, FLAIR, T2-weighted, susceptibility, and  coronal T1-weighted images were obtained without intravenous contrast.  Following intravenous gadolinium-based contrast administration, axial  and coronal T1-weighted images were obtained.    Head MRA: 3D time-of-flight MRA  of the [place] was performed  without intravenous contrast.  Neck MRA:  Limited non contrast 2DTOF images were obtained of the  mid-cervical region. Following intravenous gadolinium-based contrast  administration, a contrast enhanced MRA of the neck/cervical vessels  was performed.  Three-dimensional reconstructions of the neck and head MRA were  created, which were reviewed by the radiologist.    Dose: 10mL Gadavist    Findings:   Brain MRI: Axial diffusion weighted images demonstrate no definite  acute infarct. On the FLAIR images, there are minimal periventricular  T2 hyperintense signal changes, which are most likely related to  chronic small vessel ischemic disease. There is no definite  intracranial hemorrhage on susceptibility images. Ventricles are  proportionate to the cerebral sulci. Contrast-enhanced images of the  brain demonstrate no abnormal intra- or extra-axial enhancement. The  orbits are grossly normal. Mild mucosal thickening of the maxillary  sinuses with layering fluid level in the left maxillary sinus.  Scattered mucosal thickening in the ethmoid air cells and frontal  sinuses. Trace mastoid effusions.    Head MRA demonstrates no definite aneurysm or stenosis of the major  intracranial arteries. The right vertebral artery terminates early as  the posterior inferior cerebellar artery. Fetal origin of the left and  right posterior cerebral arteries.    Neck MRA demonstrates patent major cervical arteries. The normal  distal right internal carotid artery measures 5 mm. The normal distal  left internal carotid artery measures 5 mm. Antegrade flow in the  major cervical vasculature.      Impression    Impression:  1. No evidence of acute infarction or intracranial hemorrhage.  2. No abnormal enhancing lesions intracranially.  3. Head MRA demonstrates no definite aneurysm or stenosis of the major  intracranial arteries.  4. Neck MRA demonstrates patent major cervical arteries.    I have  personally reviewed the examination and initial interpretation  and I agree with the findings.    NATALY CARSON MD         SYSTEM ID:  [id number]   MRA Brain ([place]) wo Contrast    Narrative    MRI brain without and with contrast  MRA of the head without contrast  Neck MRA without and with contrast    Provided History:  Rule out any neurological findings.  ICD-10:    Comparison:  MR brain 6/18/2016. CT head 2/27/2023      Technique:   Brain MRI:  Axial diffusion, FLAIR, T2-weighted, susceptibility, and  coronal T1-weighted images were obtained without intravenous contrast.  Following intravenous gadolinium-based contrast administration, axial  and coronal T1-weighted images were obtained.    Head MRA: 3D time-of-flight MRA of the [place] of [place] was performed  without intravenous contrast.  Neck MRA:  Limited non contrast 2DTOF images were obtained of the  mid-cervical region. Following intravenous gadolinium-based contrast  administration, a contrast enhanced MRA of the neck/cervical vessels  was performed.  Three-dimensional reconstructions of the neck and head MRA were  created, which were reviewed by the radiologist.    Dose: 10mL Gadavist    Findings:   Brain MRI: Axial diffusion weighted images demonstrate no definite  acute infarct. On the FLAIR images, there are minimal periventricular  T2 hyperintense signal changes, which are most likely related to  chronic small vessel ischemic disease. There is no definite  intracranial hemorrhage on susceptibility images. Ventricles are  proportionate to the cerebral sulci. Contrast-enhanced images of the  brain demonstrate no abnormal intra- or extra-axial enhancement. The  orbits are grossly normal. Mild mucosal thickening of the maxillary  sinuses with layering fluid level in the left maxillary sinus.  Scattered mucosal thickening in the ethmoid air cells and frontal  sinuses. Trace mastoid effusions.    Head MRA demonstrates no definite aneurysm or  stenosis of the major  intracranial arteries. The right vertebral artery terminates early as  the posterior inferior cerebellar artery. Fetal origin of the left and  right posterior cerebral arteries.    Neck MRA demonstrates patent major cervical arteries. The normal  distal right internal carotid artery measures 5 mm. The normal distal  left internal carotid artery measures 5 mm. Antegrade flow in the  major cervical vasculature.      Impression    Impression:  1. No evidence of acute infarction or intracranial hemorrhage.  2. No abnormal enhancing lesions intracranially.  3. Head MRA demonstrates no definite aneurysm or stenosis of the major  intracranial arteries.  4. Neck MRA demonstrates patent major cervical arteries.    I have personally reviewed the examination and initial interpretation  and I agree with the findings.    NATALY CARSON MD         SYSTEM ID:  [id number]

## 2023-04-22 NOTE — PLAN OF CARE
Outpatient/Observation goals to be met before discharge home:  Goal Outcome Evaluation:  - Diagnostic tests and consults completed and resulted - Not met  - No further episodes of syncope and any new arrhythmia addressed with controlled heart rates - In progress  - Vital signs normal or at patient baseline and orthostatic vitals are normal and patient not lightheaded with standing - In progress, orthostatic negative  - Tolerating oral intake to maintain hydration - Not met, 1500 L free water fluid restriction  - Safe disposition plan has been identified - Not met

## 2023-04-22 NOTE — PLAN OF CARE
DISCHARGE                        4/22/2023  ----------------------------------------------------------------------------  Discharged to: Home  Via: private transportation  Accompanied by: Family  Discharge Instructions: regular diet, activity as tolerated, medications, follow up appointments, when to call the MD, aftercare instructions discussed with pt and family. Pt and family verbalize understanding.  Prescriptions: To be filled by discharge pharmacy; medication list reviewed & sent with pt.  Follow Up Appointments: arranged; information given and highlighted on AVS.  Belongings: All sent with pt  IV: d/c'd  Pt and family exhibit understanding of above discharge instructions; all questions answered.    Discharge Paperwork: Signed, copied, and sent home with patient.

## 2023-04-22 NOTE — PLAN OF CARE
Outpatient/Observation goals to be met before discharge home:  Goal Outcome Evaluation:  - Diagnostic tests and consults completed and resulted - Not met, plan for MRI Brain  - No further episodes of syncope and any new arrhythmia addressed with controlled heart rates - In progress, pt had episode of dizziness, change in mental status /c PT. Rapid Response called, Neuro MD ordered MRI Brain.   - Vital signs normal or at patient baseline and orthostatic vitals are normal and patient not lightheaded with standing - In progress, repeat orthostatic negative  - Tolerating oral intake to maintain hydration - Met, 1500 L free water fluid restriction, pt prefers Gatorade.   - Safe disposition plan has been identified - Not met

## 2023-04-22 NOTE — PROGRESS NOTES
"S: Patient is a 67-year-old female who was recently admitted to the [place] I believe for marked hyponatremia that was rapidly corrected felt more likely due to psychogenic polydipsia.  Patient now had a syncopal episode recently she had resumed her Abilify which had a higher dose according to family also restarted some Remeron which had not been on before.  Patient has been evaluated here with extensive work-up even had PT evaluate patient initially were concerned about some strokelike symptoms but MRI MRA of the head and neck were negative neuro did agree at this point did not sound like any strokelike symptoms.  Patient been otherwise stable dizziness improved.  Patient had an echo showed a small PFO.  Sodium yesterday was 136 now.  Patient is feeling better family is present also has some questions but would like to go home today.  O:BP (!) 142/74 (BP Location: Left arm)   Pulse 97   Temp 98  F (36.7  C)   Resp 19   Ht 1.626 m (5' 4\")   Wt 84.4 kg (186 lb)   LMP  (LMP Unknown)   SpO2 99%   BMI 31.93 kg/m    Patient generally weak otherwise but feeling better at this point no distress denies chest pain no abdominal pain no nausea vomiting diarrhea etc.  Lungs are clear at this point cardiac exam regular rate rhythm without murmurs rubs blood pressures been relatively stable.  Neurologically intact otherwise.  A: Hyponatremia now improved we will recheck today      Syncopal questionable etiology unclear could have been reflective of hyponatremia versus medication effect.     Patient had some chronic dizziness evaluated MRI/MRA was negative for any stroke etc.      Echo been done with a small PFO also     Medications patient's Abilify will consider decreasing to 10 mg per family's request.  P: At this point rechecking sodium today.  We will plan for Zio patch also if patient goes home also outpatient follow-up with cardiology for the small PFO was recommended we will decrease Abilify continue 1500 cc fluid " restriction per day and close follow-up with MD for checking labs etc.

## 2023-04-22 NOTE — PROGRESS NOTES
[facility]  OUTPATIENT PHYSICAL THERAPY EVALUATION  PLAN OF TREATMENT FOR OUTPATIENT REHABILITATION  (COMPLETE FOR INITIAL CLAIMS ONLY)  Patient's Last Name, First Name, M.I.  YOB: 1955  Yancy Rivera                        Provider's Name  [facility] Medical Record No.  [id number]                             Onset Date:  04/20/23   Start of Care Date:      Type:     _X_PT   ___OT   ___SLP Medical Diagnosis:                 PT Diagnosis:  Impaired higher level balance Visits from SOC:  1     See note for plan of treatment, functional goals and certification details    I CERTIFY THE NEED FOR THESE SERVICES FURNISHED UNDER        THIS PLAN OF TREATMENT AND WHILE UNDER MY CARE     (Physician co-signature of this document indicates review and certification of the therapy plan).

## 2023-04-22 NOTE — PLAN OF CARE
Outpatient/Observation goals to be met before discharge home:  Goal Outcome Evaluation:  - Diagnostic tests and consults completed and resulted - Not met  - No further episodes of syncope and any new arrhythmia addressed with controlled heart rates - In progress  - Vital signs normal or at patient baseline and orthostatic vitals are normal and patient not lightheaded with standing - In progress, orthostatic negative, plan to repeat   - Tolerating oral intake to maintain hydration - Not met, 1500 L free water fluid restriction  - Safe disposition plan has been identified - Not met

## 2023-04-22 NOTE — CONSULTS
[facility]    Stroke Consult Note    Reason for Consult:  Reported R hemiparesis    Chief Complaint: Syncope       HPI  Yancy Rivera is a 67 year old female chronic low back pain, HTN, HLD, RA, depression, anxiety was admitted on April 28 for evaluation of syncope.  Stroke code was called for reported right hemiparesis that was not seen by the stroke provider and stroke code was canceled since patient was at baseline and a CT scan would have low yield.  Speaking with the patient and her daughter who helped with translation, the patient states that she was trying to get out of bed and felt dizzy described as feeling lightheaded or the feeling of close to passing out she states that this was almost a second and then it resolved.  She feels that her dizziness resolves when she lies down or sits down.  She denies double vision, loss of vision, blurry vision, loss of vision, weakness, numbness, speech difficulty, swallowing difficulty, urinary or stool incontinence, headache, nausea, vomiting.  The patient denies right-sided weakness as well as her daughter.  The patient underwent an MRI brain that is without stroke.    She  was previously admitted from April 14 to 19 at [facility] for hyponatremia and was diagnosed with psychogenic polydipsia.  She was given IV fluids per report was also compliant with fluid restriction and was started on Abilify.  She now presented to the ED on April 28 for evaluation of syncope.  Per chart review the patient has had syncopal episodes which are preceded by lightheadedness and is undergoing work-up for it.  On April 20 Rapid response was called for dizziness and presyncope.    The patient states that she has had multiple falls in the past few weeks all of which are in the setting of trying to get up or walking and feeling dizzy after that.  She states that her last fall was on March 26 when she was walking into [employer] and felt dizzy,  "does not recall double vision, loss of vision, blurry vision, vertigo, weakness or speech or swallow difficulty.  Her daughter says that she did not witness that event.  She denies losing consciousness.  The daughter denies episode of convulsive activity.  The patient denies prior history of seizures, childhood seizures, febrile seizures, history of meningitis, TBI.      Impression  # Pre-syncope  67-year-old female with past medical history of hypertension, hyperlipidemia, rheumatoid arthritis, depression who was admitted for evaluation of presyncope in the setting of hyponatremia likely secondary to psychogenic polydipsia.  Stroke code was called for possible right-sided weakness in the setting of likely presyncope, the patient and daughter denied right-sided weakness, the patient has nonfocal neurological exam with NIHSS of 0.  MRI brain was pursued nonurgently that revealed no stroke.  Would recommend ongoing work-up for presyncope including cardiogenic work-up as well as orthostatics per primary team and management of hyponatremia.      Recommendations  - MRI brain as above    Thank you for this consult. No further stroke evaluation is recommended, so we will sign off. Please contact us with any additional questions.    The patient was discussed with Stroke Fellow, Dr. Winters.  The Stroke Staff is Dr. Santana.    Sumeet Venegas MD  Neurology Resident  Pager:  [phone]  _____________________________________________________    Clinically Significant Risk Factors Present on Admission         # Hyponatremia: Lowest Na = 129 mmol/L in last 2 days, will monitor as appropriate   # Hypercalcemia: Highest Ca = 10.2 mg/dL in last 2 days, will monitor as appropriate        # Hypertension: home medication list includes antihypertensive(s)      # Obesity: Estimated body mass index is 31.93 kg/m  as calculated from the following:    Height as of this encounter: 1.626 m (5' 4\").    Weight as of this encounter: 84.4 kg (186 " lb).         Past Medical History   Past Medical History:   Diagnosis Date     Blepharitis      Esophageal reflux (aka GERD)      Glaucoma      Hyperlipidemia LDL goal < 130      Nonsenile cataract      Shingles     11/14/15 Left approximately C6 distribution     Past Surgical History   Past Surgical History:   Procedure Laterality Date     CATARACT IOL, RT/LT Right 10/16/2018    [facility]      CATARACT IOL, RT/LT Left 11/01/2018    [facility]      CHALAZION EXCISION  08/21/2015    Left lid     HERNIA REPAIR      abdominal hernia repair 2012     Medications   Home Meds  Prior to Admission medications    Medication Sig Start Date End Date Taking? Authorizing Provider   atorvastatin (LIPITOR) 20 MG tablet Take 1 tablet (20 mg) by mouth daily Patient needs to have labs for further refills. 3/28/23   Karin Kim APRN CNP   Calcium Carb-Cholecalciferol (CALCIUM 500 + D) 500-5 MG-MCG TABS Take 1 tablet by mouth 2 times daily  Patient not taking: Reported on 4/14/2023 3/28/23   Karin Kim APRN CNP   carboxymethylcellulose PF (CARBOXYMETHYLCELLULOSE SODIUM) 0.5 % ophthalmic solution Place 1 drop into both eyes 4 times daily as needed for dry eyes 10/13/21   Constantin Rollins MD   chlorhexidine (PERIDEX) 0.12 % solution SWISH IN MOUTH FOR 30 SECONDS WITH 15MLS AND SPIT. USE TWO TIMES A DAY. DO NOT SWALLOW 3/28/23   Karin Kim APRN CNP   Cholecalciferol (VITAMIN D) 2000 UNITS tablet Take 1,000 Units by mouth daily  Patient not taking: Reported on 4/14/2023 6/21/16   Citlaly Baker MD   diclofenac (VOLTAREN) 1 % topical gel Place 2 g onto the skin 4 times daily To right wrist  Patient not taking: Reported on 4/14/2023 8/31/20   Basil Santiago,    ferrous sulfate (IRON) 325 (65 FE) MG tablet Take 1 tablet (325 mg) by mouth daily (with breakfast) 11/20/15   Citlaly Baker MD   ibuprofen (ADVIL/MOTRIN)  800 MG tablet Take 1 tablet (800 mg) by mouth every 6 hours as needed for moderate pain  Patient not taking: Reported on 4/14/2023 11/8/22   Karin Kim APRN CNP   lisinopril (ZESTRIL) 10 MG tablet Take 1 tablet (10 mg) by mouth daily 3/28/23   Karin Kim APRN CNP   LORazepam (ATIVAN) 0.5 MG tablet Take 1 tablet (0.5 mg) by mouth every 6 hours as needed for anxiety or sleep 4/14/23 5/14/23  Nalini Mcleod MD   Multiple Minerals (CALCIUM-MAGNESIUM-ZINC) TABS Take 1 tablet by mouth daily  Patient not taking: Reported on 4/14/2023 6/21/16   Citlaly Baker MD   pantoprazole (PROTONIX) 40 MG EC tablet Take 1 tablet (40 mg) by mouth daily 1/18/23   Karin Kim APRN CNP   triamcinolone (KENALOG) 0.1 % external ointment Apply topically 2 times daily 8/16/22   Tom Meyer MD   White Petrolatum-Mineral Oil (REFRESH P.M.) OINT Apply thin layer to both eyes at bedtime 10/14/21   Edouard Chiang MD       Scheduled Meds    amLODIPine  5 mg Oral BID     ARIPiprazole  15 mg Oral Daily     atorvastatin  20 mg Oral Daily     carvedilol  6.25 mg Oral BID w/meals     pantoprazole  40 mg Oral Daily     sodium chloride (PF)  3 mL Intracatheter Q8H       Infusion Meds      PRN Meds  acetaminophen, acetaminophen, lidocaine 4%, lidocaine (buffered or not buffered), LORazepam, ondansetron **OR** ondansetron, sodium chloride (PF)    Allergies   Allergies   Allergen Reactions     No Clinical Screening - See Comments Nausea and Vomiting     Liver side effects from INH for TB     Family History   Family History   Problem Relation Age of Onset     Glaucoma Mother      Macular Degeneration No family hx of      Cancer No family hx of      Diabetes No family hx of      Hypertension No family hx of      Cerebrovascular Disease No family hx of      Thyroid Disease No family hx of      Eye Surgery No family hx of      Social History   Social History     Tobacco Use     Smoking status: Never     Smokeless  tobacco: Never   Vaping Use     Vaping status: Never Used   Substance Use Topics     Alcohol use: No     Drug use: No       Review of Systems   The 10 point Review of Systems is negative other than noted in the HPI or here.        PHYSICAL EXAMINATION   Temp:  [98  F (36.7  C)-98.7  F (37.1  C)] 98  F (36.7  C)  Pulse:  [74-87] 87  Resp:  [16-25] 18  BP: (123-166)/(61-98) 160/98  SpO2:  [94 %-99 %] 99 %    Neurologic  Mental Status:  alert, oriented x 3, follows commands, speech clear and fluent, naming and repetition normal  Cranial Nerves:  visual fields intact, PERRL, EOMI with normal smooth pursuit, facial sensation intact and symmetric, facial movements symmetric, hearing not formally tested but intact to conversation, palate elevation symmetric and uvula midline, no dysarthria, shoulder shrug strong bilaterally, tongue protrusion midline  Motor:  normal muscle tone and bulk, no abnormal movements, able to move all limbs spontaneously, strength 5/5 throughout upper and lower extremities, no pronator drift  Reflexes:  toes down-going  Sensory:  light touch sensation intact and symmetric throughout upper and lower extremities, no extinction on double simultaneous stimulation   Coordination:  normal finger-to-nose and heel-to-shin bilaterally without dysmetria, rapid alternating movements symmetric  Station/Gait:  deferred    Dysphagia Screen  Per Nursing    Stroke Scales    NIHSS  1a. Level of Consciousness 0-->Alert, keenly responsive   1b. LOC Questions 0-->Answers both questions correctly   1c. LOC Commands 0-->Performs both tasks correctly   2.   Best Gaze 0-->Normal   3.   Visual 0-->No visual loss   4.   Facial Palsy 0-->Normal symmetrical movements   5a. Motor Arm, Left 0-->No drift, limb holds 90 (or 45) degrees for full 10 secs   5b. Motor Arm, Right 0-->No drift, limb holds 90 (or 45) degrees for full 10 secs   6a. Motor Leg, Left 0-->No drift, leg holds 30 degree position for full 5 secs   6b. Motor  Leg, right 0-->No drift, leg holds 30 degree position for full 5 secs   7.   Limb Ataxia 0-->Absent   8.   Sensory 0-->Normal, no sensory loss   9.   Best Language 0-->No aphasia, normal   10. Dysarthria 0-->Normal   11. Extinction and Inattention  0-->No abnormality   Total 0 (04/21/23 2124)       Modified [place] Score (Pre-morbid)  0    Imaging  I personally reviewed all imaging; relevant findings per HPI.    MRI brain, MRA head and neck  Brain MRI: Axial diffusion weighted images demonstrate no definite  acute infarct. On the FLAIR images, there are minimal periventricular  T2 hyperintense signal changes, which are most likely related to  chronic small vessel ischemic disease. There is no definite  intracranial hemorrhage on susceptibility images. Ventricles are  proportionate to the cerebral sulci. Contrast-enhanced images of the  brain demonstrate no abnormal intra- or extra-axial enhancement. The  orbits are grossly normal. Mild mucosal thickening of the maxillary  sinuses with layering fluid level in the left maxillary sinus.  Scattered mucosal thickening in the ethmoid air cells and frontal  sinuses. Trace mastoid effusions.     Head MRA demonstrates no definite aneurysm or stenosis of the major  intracranial arteries. The right vertebral artery terminates early as  the posterior inferior cerebellar artery. Fetal origin of the left and  right posterior cerebral arteries.     Neck MRA demonstrates patent major cervical arteries. The normal  distal right internal carotid artery measures 5 mm. The normal distal  left internal carotid artery measures 5 mm. Antegrade flow in the  major cervical vasculature.                                                                      Impression:  1. No evidence of acute infarction or intracranial hemorrhage.  2. No abnormal enhancing lesions intracranially.  3. Head MRA demonstrates no definite aneurysm or stenosis of the major  intracranial arteries.  4. Neck MRA  demonstrates patent major cervical arteries.    Labs Data   CBC  Recent Labs   Lab 04/21/23  0718 04/20/23  1256   WBC 7.2 9.2   RBC 4.77 4.77   HGB 14.1 14.2   HCT 43.2 43.1    327     Basic Metabolic Panel   Recent Labs   Lab 04/21/23  0718 04/20/23  2059 04/20/23  1256    130* 129*   POTASSIUM 4.0  --  4.0   CHLORIDE 103  --  97*   CO2 20*  --  19*   BUN 11.7  --  11.6   CR 0.79  --  0.72   *  --  128*   SUZANNA 10.2  --  9.8     Liver Panel  No results for input(s): PROTTOTAL, ALBUMIN, BILITOTAL, ALKPHOS, AST, ALT, BILIDIRECT in the last 168 hours.  INR    Recent Labs   Lab Test 06/07/21  1054 10/28/16  0825   INR 0.99 1.12      Lipid Profile    Recent Labs   Lab Test 03/28/23  1109 04/30/21  1256 02/21/20  1100   CHOL 171 176 173   HDL 81 70 63   LDL 77 85 93   TRIG 66 103 82     A1C    Recent Labs   Lab Test 02/01/22  1529 04/30/21  1256   A1C 5.8* 5.7*     Troponin    Recent Labs   Lab 04/20/23  1639 04/20/23  1256   CTROPT <6 7          Stroke Consult Data Data   This was a non-emergent, non-telestroke consult.

## 2023-04-22 NOTE — PLAN OF CARE
Goal Outcome Evaluation:      -Diagnostic tests and consults completed and resulted :met   - No further episodes of syncope and any new arrhythmia addressed with controlled heart rates :met   - Vital signs normal or at patient baseline and orthostatic vitals are normal and patient not lightheaded with standing :Met   - Tolerating oral intake to maintain hydration :Met   - Safe disposition plan has been identified : met

## 2023-04-22 NOTE — DISCHARGE SUMMARY
[facility]  ED Observation Discharge Summary      Date of Admission:  4/20/2023  Date of Discharge:  4/22/2023  Discharging Provider: CHRISSIE Jones CNP  Discharge Service: ED Observation  Discharge Diagnoses   Hyponatremia  Dizziness    Follow-ups Needed After Discharge   Follow-up Appointments     Follow Up and recommended labs and tests      Follow up with primary care provider, Karin Kim, within 7 days   for hospital follow- up.  The following labs/tests are recommended: BMP.   We recommend you discuss further Abilify dosing. An appointment has been   made by our care coordinator for May 4th at 11:45.    We recommend you follow up with cardiology for evaluation. A referral has   been made and they should call you to schedule.    We recommend you follow up with PT. A referral has been placed and they   should call you to schedule.              Discharge Disposition   Discharged to home  Condition at discharge: Stable      Hospital Course     Yancy Rivera is a 67 year old female with PMH of chronic low back pain, HTN, HLD, RA, depression, anxiety who presented to the ED on 4/20/23 for evaluation of syncope.     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 She had a syncopal episode today while walking to her laundry room. It was preceded by lightheadedness. Denies hitting her head. No associated chest pain, palpitations, dyspnea. She does get dizziness from time to time, last fall was about 1 month ago. She states she was compliant with the fluid restriction. Recent had adjustments to her BP meds. In the ED, VSS with /82. CBC unremarkable. BMP with sodium 129. Troponin 7. EKG NSR, no acute ischemic changes. No events overnight. Patient reports she is feeling better today. Reports she has a mild headache and mild dizziness with movement.  " Plan for today is and ECHO, nephrology and PT consult. Echo shows:\"Global and regional left ventricular function is normal with an EF of 60-65%. Right ventricular function, chamber size, wall motion, and thickness are normal. Small left to right shunt noted at atrial level consistent with PFO. Mild aortic insufficiency is present. Pulmonary artery systolic pressure is normal. Small collapsed IVC noted. No pericardial effusion is present.There is no prior study for direct comparison.\" I spoke with cardiology who reviewed these findings and reported there is no further workup indicated but a referral to cardiology could be placed at discharge for routine exam. Around 1145, writer was made aware that during PT session patient began to experience right sided weakness with increase in BP, truncal ataxia, frontal headache, and right sided weakness. A stroke code was called via nursing staff. I assess the patient at that time and neuros were intact. AxO and patient id denying any symptoms. Patient reports only generalized dizziness. Strength equal bilaterally and patient denies nay confusion or weakness. Neurology stroke team recommended a MRA/MRI brain which was ordered. Neurology will follow. At the this time, physically assessment WNL.  Orthostatics negative at this time. No events overnight. Patient reports she feels better and the dizziness is gone and is requesting to go home. MRI/MRA head and neck negative for any acute findings. Patient was evaluated with PT and patient was able to ambulate and do the stairs with no dizziness. Recommended outpatient PT. A referral was placed. Patient discharged with zio patch. Family and patient verbalize understanding of importance of follow up for sodium recheck and Abilify follow up. At the time of discharge patients VSS, sodium WNL, and patient denies any symptoms. Ambulating independently.      #Hyponatremia  Patient was recently hospitalized at [facility] from 4/14 to 4/19 for " evaluation of hyponatremia as low as 116, associated with confusion. She was initially given NS with overly rapid correction. Then was seen by nephrology and given D5. When that did not correct her sodium quickly enough she was given DDAVP. She was on a 1.5 L fluid restriction while hospitalized. Sodium was 135 by the time of discharge. 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 Her Lisinopril was discontinued (concern that it may have been contributing to hyponatremia) and she was switched to Amlodipine and Coreg. Today, her sodium level is 129. Reports compliance to 2 L fluid restriction recommended on discharge. She denies any current headache or nausea. There is no AMS. Sodium last night was 130. Sodium improved to 136. Patient was edcuated on the importance of 1500 fluid restriction and importance of follow up recheck with her PCP within 1 week. Family and patient verbalize understanding.     #HTN  -Continue PTA Norvasc, Carvedilol     #Major depressive disorder  Seen by psychiatry during her [facility] admission and was started on Mirtazapine. She has a follow up in place scheduled 5/23/23. Spoke with daughter and they report they do not want her to take mirtazapine and would like her to resume Abilify as it  Has worked for her in the past. She was discharged from [facility] with 15mg daily of Abilify. Family is requesting patient take a lower dose of Abilify. We will order 5 mg with plan to follow up on 5/23 at scheduled psych appointment.   -Continue PTA Ambilify 5mg, discontinue Remeron  -Ativan PRN  -Follow up with PCP on 5/4 regarding further dosing and need to titrate     #HLD  -Continue PTA Atorvastatin    Consultations This Hospital Stay   NURSING TO CONSULT FOR VASCULAR ACCESS CARE IP CONSULT  NEPHROLOGY GENERAL ADULT IP CONSULT  PHYSICAL THERAPY ADULT IP CONSULT  NEPHROLOGY GENERAL ADULT IP CONSULT  NURSING TO CONSULT FOR VASCULAR ACCESS CARE IP CONSULT  MEDICATION HISTORY IP PHARMACY  CONSULT  PHYSICAL THERAPY ADULT IP CONSULT    Code Status   Full Code    Time Spent on this Encounter   I, CHRISSIE Jones CNP, personally saw the patient today and spent less than or equal to 30 minutes discharging this patient.       CHRISSIE Jones CNP  [facility] [address] [address]  Phone: [phone]  Fax: [phone]  ______________________________________________________________________    Physical Exam   Vital Signs: Temp: 98  F (36.7  C) Temp src: Oral BP: 136/75 Pulse: 82   Resp: 19 SpO2: 99 % O2 Device: None (Room air)    Weight: 186 lbs 0 oz    Constitutional: alert, no distress, and cooperative  Head: normocephalic, atraumatic  Neck: no asymmetry, masses, or scars  ENT: throat normal without erythema or exudate  Cardiovascular: RRR  Respiratory: diminished, respirations unlabored  Gastrointestinal: (+) BS, non tender, soft  Musculoskeletal: normal muscle tone, no pitting edema  Skin: no suspicious lesions or rashes  Neurologic: oriented x 3, moves all extremities, no slurred speech  Psychiatric: normal affect and mood       Primary Care Physician   Karin Kim    Discharge Orders      Adult Cardiology Eval  Referral      Physical Therapy Referral      Reason for your hospital stay    Syncope  Hyponatremia     Activity    Your activity upon discharge: activity as tolerated     Follow Up and recommended labs and tests    Follow up with primary care provider, Karin Kim, within 7 days for hospital follow- up.  The following labs/tests are recommended: BMP. We recommend you discuss further Abilify dosing. An appointment has been made by our care coordinator for May 4th at 11:45.    We recommend you follow up with cardiology for evaluation. A referral has been made and they should call you to schedule.    We recommend you follow up with PT. A referral has been placed and they should  "call you to schedule.     When to contact your care team    Return to the ED with fever, uncontrolled nausea, vomiting, unrelieved pain, bleeding not relieved with pressure, dizziness, chest pain, shortness of breath, loss of consciousness, and any new or concerning symptoms.     Discharge Instructions    You were admitted to ED observation for syncope and low salt levels. In the ED, your vital signs were stable. You had unremarkable CBC. CMP was normal with the exception of low sodium. This improved with fluid restriction. You had an ECHO which showed \"Global and regional left ventricular function is normal with an EF of 60-65%. Right ventricular function, chamber size, wall motion, and thickness are normal. Small left to right shunt noted at atrial level consistent with PFO. Mild aortic insufficiency is present. Pulmonary artery systolic pressure is normal. Small collapsed IVC noted. No pericardial effusion is present.There is no prior study for direct comparison.\" We discussed these findings with cardiology and no new recommendations were made however they did recommend that you follow up with cardiology for a routine evaluation. A referral has been placed and they should call you to schedule.  At discharge we recommend you continue taking your prior to admission medications as previously prescribed. In addition, you should follow the following diet: 1500cc fluid restriction. This is extremely important that you don't take in too many fluids as this can cause low sodium levels.  We also sent you home with a zio patch. You should follow the return instructions given to you prior to discharge. PT was consulted and recommended outpatient PT. A referral has been placed and they should call you to schedule. You also had a MRI/MRA of your brain and neck which showed normal results. We recommend you start taking 5mg of Ambilify daily and follow up with your PCP at your appointment on 5/4 to discuss further dosing. "     Adult Leadless EKG Monitor 8 to 14 Days     Diet    Follow this diet upon discharge: Regular       Significant Results and Procedures   Most Recent 3 CBC's:Recent Labs   Lab Test 04/21/23  0718 04/20/23  1256 03/28/23  1109   WBC 7.2 9.2 5.5   HGB 14.1 14.2 13.4   MCV 91 90 89    327 263     Most Recent 3 BMP's:Recent Labs   Lab Test 04/21/23  0718 04/20/23  2059 04/20/23  1256 03/28/23  1109    130* 129* 138   POTASSIUM 4.0  --  4.0 4.2   CHLORIDE 103  --  97* 105   CO2 20*  --  19* 23   BUN 11.7  --  11.6 7.5*   CR 0.79  --  0.72 0.61   ANIONGAP 13  --  13 10   SUZANNA 10.2  --  9.8 10.2   *  --  128* 105*     Most Recent 2 LFT's:Recent Labs   Lab Test 03/28/23  1109 09/14/21  1428   AST 21 18   ALT 20 27   ALKPHOS 115* 119   BILITOTAL 0.3 0.3     Most Recent 3 INR's:Recent Labs   Lab Test 06/07/21  1054 10/28/16  0825   INR 0.99 1.12     Most Recent INR's and Anticoagulation Dosing History:  Anticoagulation Dose History         Latest Ref Rng & Units 10/28/2016 6/7/2021   Recent Dosing and Labs   INR 0.86 - 1.14 1.12   0.99                Most Recent 3 Creatinines:Recent Labs   Lab Test 04/21/23  0718 04/20/23  1256 03/28/23  1109   CR 0.79 0.72 0.61     Most Recent 3 Hemoglobins:Recent Labs   Lab Test 04/21/23  0718 04/20/23  1256 03/28/23  1109   HGB 14.1 14.2 13.4     Most Recent 3 Troponin's:Recent Labs   Lab Test 06/07/21  1054   TROPI <0.015     Most Recent 3 BNP's:Recent Labs   Lab Test 06/07/21  1054   NTBNPI 55     Most Recent D-dimer:Recent Labs   Lab Test 06/07/21  1054   DD 4.3*     Most Recent Cholesterol Panel:Recent Labs   Lab Test 03/28/23  1109   CHOL 171   LDL 77   HDL 81   TRIG 66     7-Day Micro Results     No results found for the last 168 hours.        Most Recent TSH and T4:Recent Labs   Lab Test 03/28/23  1109   TSH 1.01   ,   Results for orders placed or performed during the hospital encounter of 04/20/23   MR Brain w/o & w Contrast    Narrative    MRI brain without  and with contrast  MRA of the head without contrast  Neck MRA without and with contrast    Provided History:  Rule out any neurological findings.  ICD-10:    Comparison:  MR brain 6/18/2016. CT head 2/27/2023      Technique:   Brain MRI:  Axial diffusion, FLAIR, T2-weighted, susceptibility, and  coronal T1-weighted images were obtained without intravenous contrast.  Following intravenous gadolinium-based contrast administration, axial  and coronal T1-weighted images were obtained.    Head MRA: 3D time-of-flight MRA of the [place] was performed  without intravenous contrast.  Neck MRA:  Limited non contrast 2DTOF images were obtained of the  mid-cervical region. Following intravenous gadolinium-based contrast  administration, a contrast enhanced MRA of the neck/cervical vessels  was performed.  Three-dimensional reconstructions of the neck and head MRA were  created, which were reviewed by the radiologist.    Dose: 10mL Gadavist    Findings:   Brain MRI: Axial diffusion weighted images demonstrate no definite  acute infarct. On the FLAIR images, there are minimal periventricular  T2 hyperintense signal changes, which are most likely related to  chronic small vessel ischemic disease. There is no definite  intracranial hemorrhage on susceptibility images. Ventricles are  proportionate to the cerebral sulci. Contrast-enhanced images of the  brain demonstrate no abnormal intra- or extra-axial enhancement. The  orbits are grossly normal. Mild mucosal thickening of the maxillary  sinuses with layering fluid level in the left maxillary sinus.  Scattered mucosal thickening in the ethmoid air cells and frontal  sinuses. Trace mastoid effusions.    Head MRA demonstrates no definite aneurysm or stenosis of the major  intracranial arteries. The right vertebral artery terminates early as  the posterior inferior cerebellar artery. Fetal origin of the left and  right posterior cerebral arteries.    Neck MRA demonstrates  patent major cervical arteries. The normal  distal right internal carotid artery measures 5 mm. The normal distal  left internal carotid artery measures 5 mm. Antegrade flow in the  major cervical vasculature.      Impression    Impression:  1. No evidence of acute infarction or intracranial hemorrhage.  2. No abnormal enhancing lesions intracranially.  3. Head MRA demonstrates no definite aneurysm or stenosis of the major  intracranial arteries.  4. Neck MRA demonstrates patent major cervical arteries.    I have personally reviewed the examination and initial interpretation  and I agree with the findings.    NATALY CARSON MD         SYSTEM ID:  [id number]   MRA Neck (Carotids) wo & w Contrast    Narrative    MRI brain without and with contrast  MRA of the head without contrast  Neck MRA without and with contrast    Provided History:  Rule out any neurological findings.  ICD-10:    Comparison:  MR brain 6/18/2016. CT head 2/27/2023      Technique:   Brain MRI:  Axial diffusion, FLAIR, T2-weighted, susceptibility, and  coronal T1-weighted images were obtained without intravenous contrast.  Following intravenous gadolinium-based contrast administration, axial  and coronal T1-weighted images were obtained.    Head MRA: 3D time-of-flight MRA of the [place] was performed  without intravenous contrast.  Neck MRA:  Limited non contrast 2DTOF images were obtained of the  mid-cervical region. Following intravenous gadolinium-based contrast  administration, a contrast enhanced MRA of the neck/cervical vessels  was performed.  Three-dimensional reconstructions of the neck and head MRA were  created, which were reviewed by the radiologist.    Dose: 10mL Gadavist    Findings:   Brain MRI: Axial diffusion weighted images demonstrate no definite  acute infarct. On the FLAIR images, there are minimal periventricular  T2 hyperintense signal changes, which are most likely related to  chronic small vessel ischemic disease.  There is no definite  intracranial hemorrhage on susceptibility images. Ventricles are  proportionate to the cerebral sulci. Contrast-enhanced images of the  brain demonstrate no abnormal intra- or extra-axial enhancement. The  orbits are grossly normal. Mild mucosal thickening of the maxillary  sinuses with layering fluid level in the left maxillary sinus.  Scattered mucosal thickening in the ethmoid air cells and frontal  sinuses. Trace mastoid effusions.    Head MRA demonstrates no definite aneurysm or stenosis of the major  intracranial arteries. The right vertebral artery terminates early as  the posterior inferior cerebellar artery. Fetal origin of the left and  right posterior cerebral arteries.    Neck MRA demonstrates patent major cervical arteries. The normal  distal right internal carotid artery measures 5 mm. The normal distal  left internal carotid artery measures 5 mm. Antegrade flow in the  major cervical vasculature.      Impression    Impression:  1. No evidence of acute infarction or intracranial hemorrhage.  2. No abnormal enhancing lesions intracranially.  3. Head MRA demonstrates no definite aneurysm or stenosis of the major  intracranial arteries.  4. Neck MRA demonstrates patent major cervical arteries.    I have personally reviewed the examination and initial interpretation  and I agree with the findings.    NATALY CARSON MD         SYSTEM ID:  [id number]   MRA Brain ([place]) wo Contrast    Narrative    MRI brain without and with contrast  MRA of the head without contrast  Neck MRA without and with contrast    Provided History:  Rule out any neurological findings.  ICD-10:    Comparison:  MR brain 6/18/2016. CT head 2/27/2023      Technique:   Brain MRI:  Axial diffusion, FLAIR, T2-weighted, susceptibility, and  coronal T1-weighted images were obtained without intravenous contrast.  Following intravenous gadolinium-based contrast administration, axial  and coronal T1-weighted  images were obtained.    Head MRA: 3D time-of-flight MRA of the [place] was performed  without intravenous contrast.  Neck MRA:  Limited non contrast 2DTOF images were obtained of the  mid-cervical region. Following intravenous gadolinium-based contrast  administration, a contrast enhanced MRA of the neck/cervical vessels  was performed.  Three-dimensional reconstructions of the neck and head MRA were  created, which were reviewed by the radiologist.    Dose: 10mL Gadavist    Findings:   Brain MRI: Axial diffusion weighted images demonstrate no definite  acute infarct. On the FLAIR images, there are minimal periventricular  T2 hyperintense signal changes, which are most likely related to  chronic small vessel ischemic disease. There is no definite  intracranial hemorrhage on susceptibility images. Ventricles are  proportionate to the cerebral sulci. Contrast-enhanced images of the  brain demonstrate no abnormal intra- or extra-axial enhancement. The  orbits are grossly normal. Mild mucosal thickening of the maxillary  sinuses with layering fluid level in the left maxillary sinus.  Scattered mucosal thickening in the ethmoid air cells and frontal  sinuses. Trace mastoid effusions.    Head MRA demonstrates no definite aneurysm or stenosis of the major  intracranial arteries. The right vertebral artery terminates early as  the posterior inferior cerebellar artery. Fetal origin of the left and  right posterior cerebral arteries.    Neck MRA demonstrates patent major cervical arteries. The normal  distal right internal carotid artery measures 5 mm. The normal distal  left internal carotid artery measures 5 mm. Antegrade flow in the  major cervical vasculature.      Impression    Impression:  1. No evidence of acute infarction or intracranial hemorrhage.  2. No abnormal enhancing lesions intracranially.  3. Head MRA demonstrates no definite aneurysm or stenosis of the major  intracranial arteries.  4. Neck MRA  demonstrates patent major cervical arteries.    I have personally reviewed the examination and initial interpretation  and I agree with the findings.    NATALY CARSON MD         SYSTEM ID:  [id number]   Echocardiogram Complete     Value    LVEF  60-65%    Narrative    [id number]  [id number]  [id number]  [id number]^VESTA^CIERA     [facility],[place]  Echocardiography Laboratory  [address] [place] [address]     Name: ROSA DOUGHERTY  MRN: [id number]  : 1955  Study Date: 2023 09:14 AM  Age: 67 yrs  Gender: Female  Patient [facility]: [facility]  Reason For Study: Syncope  Ordering Physician: CIERA LEMONS  Performed By: Jose Eduardo Knox RDCS     BSA: 1.9 m2  Height: 64 in  Weight: 186 lb  HR: 75  BP: 140/80 mmHg  ______________________________________________________________________________  Procedure  Complete Portable Echo Adult.  ______________________________________________________________________________  Interpretation Summary  Global and regional left ventricular function is normal with an EF of 60-65%.  Right ventricular function, chamber size, wall motion, and thickness are  normal.  Small left to right shunt noted at atrial level consistent with PFO.  Mild aortic insufficiency is present.  Pulmonary artery systolic pressure is normal.  Small collapsed IVC noted.  No pericardial effusion is present.  There is no prior study for direct comparison.  ______________________________________________________________________________  Left Ventricle  Global and regional left ventricular function is normal with an EF of 60-65%.  Left ventricular wall thickness is normal. Left ventricular size is normal.  Left ventricular diastolic function is normal. No regional wall motion  abnormalities are seen.     Right Ventricle  Right ventricular function, chamber size, wall motion, and thickness are  normal.     Atria  Both atria appear normal. Small left to right  shunt noted at atrial level  consistent with PFO.     Mitral Valve  The mitral valve is normal.     Aortic Valve  The aortic valve is tricuspid. Mild aortic insufficiency is present.     Tricuspid Valve  The tricuspid valve is normal. Trace tricuspid insufficiency is present. The  right ventricular systolic pressure is approximated at 25.8 mmHg plus the  right atrial pressure. Pulmonary artery systolic pressure is normal.     Pulmonic Valve  The pulmonic valve is normal.     Vessels  The thoracic aorta is normal. The pulmonary artery and bifurcation cannot be  assessed. Small collapsed IVC noted.     Pericardium  No pericardial effusion is present.     Compared to Previous Study  There is no prior study for direct comparison.  ______________________________________________________________________________  MMode/2D Measurements & Calculations     IVSd: 1.1 cm  LVIDd: 3.8 cm  LVIDs: 2.7 cm  LVPWd: 0.99 cm  FS: 29.5 %  LV mass(C)d: 123.0 grams  LV mass(C)dI: 64.9 grams/m2  asc Aorta Diam: 3.1 cm  LVOT diam: 2.0 cm  LVOT area: 3.1 cm2  RWT: 0.52     Doppler Measurements & Calculations  MV E max austen: 51.5 cm/sec  MV A max austen: 67.4 cm/sec  MV E/A: 0.77  MV dec slope: 281.3 cm/sec2  MV dec time: 0.18 sec  AI P1/2t: 511.8 msec  PA acc time: 0.09 sec  TR max austen: 253.7 cm/sec  TR max P.8 mmHg  E/E' av.5  Lateral E/e': 6.6     Medial E/e': 6.3     ______________________________________________________________________________  Report approved by: Arjun Galvez 2023 09:48 AM               Discharge Medications   Current Discharge Medication List      START taking these medications    Details   acetaminophen (TYLENOL) 325 MG tablet Take 2 tablets (650 mg) by mouth every 4 hours as needed for mild pain    Associated Diagnoses: Syncope, unspecified syncope type      amLODIPine (NORVASC) 5 MG tablet Take 1 tablet (5 mg) by mouth 2 times daily    Associated Diagnoses: Benign essential hypertension       ARIPiprazole (ABILIFY) 5 MG tablet Take 1 tablet (5 mg) by mouth every evening  Qty: 21 tablet, Refills: 0    Associated Diagnoses: Depression, unspecified depression type      carvedilol (COREG) 6.25 MG tablet Take 1 tablet (6.25 mg) by mouth 2 times daily (with meals)    Associated Diagnoses: Benign essential hypertension         CONTINUE these medications which have NOT CHANGED    Details   atorvastatin (LIPITOR) 20 MG tablet Take 1 tablet (20 mg) by mouth daily Patient needs to have labs for further refills.  Qty: 90 tablet, Refills: 0    Associated Diagnoses: Hyperlipidemia, unspecified hyperlipidemia type; Mixed hyperlipidemia      Calcium Carb-Cholecalciferol (CALCIUM 500 + D) 500-5 MG-MCG TABS Take 1 tablet by mouth 2 times daily  Qty: 240 tablet, Refills: 3    Associated Diagnoses: Low bone density      carboxymethylcellulose PF (CARBOXYMETHYLCELLULOSE SODIUM) 0.5 % ophthalmic solution Place 1 drop into both eyes 4 times daily as needed for dry eyes  Qty: 70 each, Refills: 11    Associated Diagnoses: Dry eyes, bilateral      chlorhexidine (PERIDEX) 0.12 % solution SWISH IN MOUTH FOR 30 SECONDS WITH 15MLS AND SPIT. USE TWO TIMES A DAY. DO NOT SWALLOW  Qty: 473 mL, Refills: 3    Associated Diagnoses: Acute periodontitis      Cholecalciferol (VITAMIN D) 2000 UNITS tablet Take 1,000 Units by mouth daily  Qty: 45 tablet, Refills: 3    Associated Diagnoses: Vitamin D deficiency      diclofenac (VOLTAREN) 1 % topical gel Place 2 g onto the skin 4 times daily To right wrist  Qty: 100 g, Refills: 3    Associated Diagnoses: Primary osteoarthritis, right wrist      ferrous sulfate (IRON) 325 (65 FE) MG tablet Take 1 tablet (325 mg) by mouth daily (with breakfast)  Qty: 30 tablet, Refills: 11    Associated Diagnoses: Iron deficiency      ibuprofen (ADVIL/MOTRIN) 800 MG tablet Take 1 tablet (800 mg) by mouth every 6 hours as needed for moderate pain  Qty: 16 tablet, Refills: 0    Associated Diagnoses: OME (otitis  media with effusion), right      lisinopril (ZESTRIL) 10 MG tablet Take 1 tablet (10 mg) by mouth daily  Qty: 90 tablet, Refills: 3    Associated Diagnoses: Benign essential hypertension      LORazepam (ATIVAN) 0.5 MG tablet Take 1 tablet (0.5 mg) by mouth every 6 hours as needed for anxiety or sleep  Qty: 20 tablet, Refills: 0    Associated Diagnoses: Anxiety attack      Multiple Minerals (CALCIUM-MAGNESIUM-ZINC) TABS Take 1 tablet by mouth daily  Qty: 90 tablet, Refills: 0    Associated Diagnoses: Nutritional deficiency      pantoprazole (PROTONIX) 40 MG EC tablet Take 1 tablet (40 mg) by mouth daily  Qty: 90 tablet, Refills: 3    Associated Diagnoses: Gastroesophageal reflux disease, unspecified whether esophagitis present      triamcinolone (KENALOG) 0.1 % external ointment Apply topically 2 times daily  Qty: 454 g, Refills: 11    Associated Diagnoses: Prurigo nodularis      White Petrolatum-Mineral Oil (REFRESH P.M.) OINT Apply thin layer to both eyes at bedtime  Qty: 3.5 g, Refills: 3    Associated Diagnoses: Dry eyes, bilateral           Allergies   Allergies   Allergen Reactions     No Clinical Screening - See Comments Nausea and Vomiting     Liver side effects from INH for TB

## 2023-04-22 NOTE — PROGRESS NOTES
"   04/22/23 0919   Appointment Info   Signing Clinician's Name / Credentials (PT) Dulce Richardson, PT, DPT   Living Environment   People in Home spouse   Current Living Arrangements house   Living Environment Comments No stairs to enter home; split level with 5-6 stairs up/down. All needs met upper level. Has railings/ \"something to hold onto\" on both sides   Self-Care   Usual Activity Tolerance good   Current Activity Tolerance good   Fall history within last six months yes   Number of times patient has fallen within last six months   (1 fall in March going up stairs into [employer] (does not remember how it happened, if mechanical or syncopal), only one fall in past 6 months but per family pt falls \"every so often\")   Activity/Exercise/Self-Care Comment Patient is independent with mobility wtihout AD at baseline   General Information   Onset of Illness/Injury or Date of Surgery 04/20/23   Referring Physician Kristie Haq, CNP   Patient/Family Therapy Goals Statement (PT) not stated   Pertinent History of Current Problem (include personal factors and/or comorbidities that impact the POC) Per chart: Yancy Rivera is a 67 year old female with PMH of chronic low back pain, HTN, HLD, RA, depression, anxiety who presented to the ED on 4/20/23 for evaluation of syncope. Found to have hyponatremia. Stroke code was called & cancelled 4/21; pt did have MRI which revealed no stroke. Today 4/22, pt denies dizziness at rest & with mobility   Existing Precautions/Restrictions fall   General Observations Denies pain, numbness/tingling/dizziness at rest   Cognition   Orientation Status (Cognition) oriented x 4   Pain Assessment   Patient Currently in Pain   (pt did not state)   Bed Mobility   Comment, (Bed Mobility) IND supine<>sit   Transfers   Comment, (Transfers) IND   Gait/Stairs (Locomotion)   Negotiation (Stairs) stairs independence;handrail location;ascending technique;descending technique   [place] Level (Stairs) " supervision   Handrail Location (Stairs) left side (ascending);right side (descending)   Ascending Technique (Stairs) step-to-step   Descending Technique (Stairs) step-to-step   Comment, (Gait/Stairs) Patient ambulated without AD >200 ft with SBA-S, fairly steady with occaisional step crossing midline but maintaining overall balance ind'lly.   Balance   Balance Comments Fair+/ Good minus, able to maintain balance standing & ambulating without AD, slightly slow pace & wider SELENA, occaisional minor missteps but no overt LOB   Clinical Impression   Criteria for Skilled Therapeutic Intervention Evaluation only   PT Diagnosis (PT) Impaired higher level balance   Influenced by the following impairments impaired higher level balance   Functional limitations due to impairments decreased independence with higher balance tasks such as ambulation on uneven surfaces   Clinical Presentation (PT Evaluation Complexity) Stable/Uncomplicated   Clinical Presentation Rationale clinical judgement   Clinical Decision Making (Complexity) low complexity   Risk & Benefits of therapy have been explained evaluation/treatment results reviewed;care plan/treatment goals reviewed;participants voiced agreement with care plan;participants included;patient;daughter   Clinical Impression Comments Patient is mobilizing well overall, denies dizziness throughout session and able to perform ambulation and stairs with rail with out AD with S. She does demonstrate some evidence of higher level balance impairment with ambulation and has history of falls & thus will benefit from OP PT follow up to optimize mobility and reduce risk of falls. No further IP PT needs currently. PT will sign off.   PT Total Evaluation Time   PT Eval, Low Complexity Minutes (18058) 26   PT Discharge Planning   PT Discharge Recommendation (DC Rec) home with outpatient physical therapy   PT Rationale for DC Rec Patient mobilizing well currently, able to ambulate without AD with  fairly good stability, completed full flight of stairs with 1 rail support. Does have some evidence of higher level balance deficit and history of falls per pt/family, thus will benefit from OP PT for balance training & to reduce risk of falls. Pt/family in agreement with this plan.   PT Brief overview of current status SBA

## 2023-04-22 NOTE — PLAN OF CARE
"Goal Outcome Evaluation:BP (!) 158/75 (BP Location: Left arm)   Pulse 82   Temp 98  F (36.7  C) (Oral)   Resp 18   Ht 1.626 m (5' 4\")   Wt 84.4 kg (186 lb)   LMP  (LMP Unknown)   SpO2 99%   BMI 31.93 kg/m        -Diagnostic tests and consults completed and resulted :Not met   - No further episodes of syncope and any new arrhythmia addressed with controlled heart rates :Not met   - Vital signs normal or at patient baseline and orthostatic vitals are normal and patient not lightheaded with standing :Met   - Tolerating oral intake to maintain hydration :Met   - Safe disposition plan has been identified :Not met                     "

## 2023-04-22 NOTE — PLAN OF CARE
"Goal Outcome Evaluation:BP (!) 158/75 (BP Location: Left arm)   Pulse 82   Temp 98  F (36.7  C) (Oral)   Resp 20   Ht 1.626 m (5' 4\")   Wt 84.4 kg (186 lb)   LMP  (LMP Unknown)   SpO2 99%   BMI 31.93 kg/m          -Diagnostic tests and consults completed and resulted :Not met   - No further episodes of syncope and any new arrhythmia addressed with controlled heart rates :Not met   - Vital signs normal or at patient baseline and orthostatic vitals are normal and patient not lightheaded with standing :Not met;Ortho BP +ve;ASHLEE notified.   - Tolerating oral intake to maintain hydration :Met   - Safe disposition plan has been identified :Not met                           "

## 2023-04-24 ENCOUNTER — TELEPHONE (OUTPATIENT)
Dept: INTERNAL MEDICINE | Facility: CLINIC | Age: 68
End: 2023-04-24

## 2023-04-24 ENCOUNTER — OFFICE VISIT (OUTPATIENT)
Dept: FAMILY MEDICINE | Facility: CLINIC | Age: 68
End: 2023-04-24
Payer: MEDICARE

## 2023-04-24 ENCOUNTER — PATIENT OUTREACH (OUTPATIENT)
Dept: CARE COORDINATION | Facility: CLINIC | Age: 68
End: 2023-04-24

## 2023-04-24 VITALS
BODY MASS INDEX: 30.25 KG/M2 | OXYGEN SATURATION: 97 % | WEIGHT: 177.2 LBS | RESPIRATION RATE: 16 BRPM | SYSTOLIC BLOOD PRESSURE: 122 MMHG | TEMPERATURE: 99.1 F | DIASTOLIC BLOOD PRESSURE: 68 MMHG | HEIGHT: 64 IN | HEART RATE: 86 BPM

## 2023-04-24 DIAGNOSIS — E87.1 HYPONATREMIA: Primary | ICD-10-CM

## 2023-04-24 DIAGNOSIS — F39 MOOD DISORDER (H): ICD-10-CM

## 2023-04-24 DIAGNOSIS — Z71.89 ADVANCED CARE PLANNING/COUNSELING DISCUSSION: ICD-10-CM

## 2023-04-24 PROCEDURE — 99215 OFFICE O/P EST HI 40 MIN: CPT | Performed by: PHYSICIAN ASSISTANT

## 2023-04-24 PROCEDURE — 36415 COLL VENOUS BLD VENIPUNCTURE: CPT | Performed by: PHYSICIAN ASSISTANT

## 2023-04-24 PROCEDURE — 80048 BASIC METABOLIC PNL TOTAL CA: CPT | Performed by: PHYSICIAN ASSISTANT

## 2023-04-24 RX ORDER — OLANZAPINE 2.5 MG/1
2.5 TABLET, FILM COATED ORAL
Qty: 30 TABLET | Refills: 0 | Status: SHIPPED | OUTPATIENT
Start: 2023-04-24 | End: 2023-04-28

## 2023-04-24 RX ORDER — OLANZAPINE 2.5 MG/1
2.5 TABLET, FILM COATED ORAL
Qty: 30 TABLET | Status: CANCELLED | OUTPATIENT
Start: 2023-04-24

## 2023-04-24 ASSESSMENT — PAIN SCALES - GENERAL: PAINLEVEL: NO PAIN (0)

## 2023-04-24 NOTE — LETTER
April 25, 2023      Yancy Rivera  [address] [address]        Dear ,    We are writing to inform you of your test results.    Your test results fall within the expected range(s) or remain unchanged from previous results.  Please continue with current treatment plan.    Resulted Orders   Basic metabolic panel  (Ca, Cl, CO2, Creat, Gluc, K, Na, BUN)   Result Value Ref Range    Sodium 136 133 - 144 mmol/L    Potassium 4.2 3.4 - 5.3 mmol/L    Chloride 103 94 - 109 mmol/L    Carbon Dioxide (CO2) 23 20 - 32 mmol/L    Anion Gap 10 3 - 14 mmol/L    Urea Nitrogen 9 7 - 30 mg/dL    Creatinine 0.70 0.52 - 1.04 mg/dL    Calcium 9.8 8.5 - 10.1 mg/dL    Glucose 115 (H) 70 - 99 mg/dL    GFR Estimate >90 >60 mL/min/1.73m2      Comment:      eGFR calculated using 2021 CKD-EPI equation.       If you have any questions or concerns, please call the clinic at the number listed above.       Sincerely,      DAVID Gongora

## 2023-04-24 NOTE — PROGRESS NOTES
[facility]    Background: Transitional Care Management program identified per system criteria and reviewed by [facility] team for possible outreach.    Assessment: Upon chart review, [facility] Team member will not proceed with patient outreach related to this episode of Transitional Care Management program due to reason below:    Patient has a follow up appointment with an appropriate provider today for hospital discharge     Patient has an appointment today with a provider for hospital discharge and follow-up at [facility]. No [facility] outreach call needed at this time.     Plan: Transitional Care Management episode addressed appropriately per reason noted above.      PREETHI Scruggs  [phone]  [facility]    *Connected Care Resource Team does NOT follow patient ongoing. Referrals are identified based on internal discharge reports and the outreach is to ensure patient has an understanding of their discharge instructions.

## 2023-04-24 NOTE — PROGRESS NOTES
Assessment & Plan   Problem List Items Addressed This Visit        Endocrine    Hyponatremia - Primary    Relevant Orders    Basic metabolic panel  (Ca, Cl, CO2, Creat, Gluc, K, Na, BUN)   Other Visit Diagnoses     Advanced care planning/counseling discussion        Mood disorder (H)        Relevant Medications    OLANZapine (ZYPREXA) 2.5 MG tablet         Fluids restriction has gone well and she is keeping to 1.5L daily or less. Will recheck BMP for persistent hyponatremia. Patient and daughter wishing to discontinue Abilify and lorazepam as they feel they are not helping her symptoms and leaving her to somnolent during the day. Will transition to olanzapine which should be well tolerated as she may or may not have been on Abilify the last 3.  Daughter reports that patient had been on olanzapine in 2017 when she lived in [place], [place] and this was very helpful for her symptoms. We will trial a low-dose 2.5 mg for sleep for the next few days and she has follow-up with her primary care provider next week as well as psychology evaluation.  Could consider going back on Abilify at a lower dose if the olanzapine is not helpful. They will monitor for side effects.    Complete history and physical exam as below. Afebrile with normal vital signs.    DDx and Dx discussed with and explained to the pt to their satisfaction.  All questions were answered at this time. Pt expressed understanding of and agreement with this dx, tx, and plan. No further workup warranted and standard medication warnings given. I have given the patient a list of pertinent indications for re-evaluation. Will go to the Emergency Department if symptoms worsen or new concerning symptoms arise. Patient left in no apparent distress.     Ordering of each unique test  Prescription drug management  40 minutes spent by me on the date of the encounter doing chart review, history and exam, documentation and further activities per the note    MED REC  "REQUIRED  Post Medication Reconciliation Status: discharge medications reconciled and changed, per note/orders  BMI:   Estimated body mass index is 30.07 kg/m  as calculated from the following:    Height as of this encounter: 1.635 m (5' 4.37\").    Weight as of this encounter: 80.4 kg (177 lb 3.2 oz).     See Patient Instructions    DAVID Gongora  [facility] YUNI Haines is a 67 year old, presenting for the following health issues:  Hospital F/U        4/24/2023    10:44 AM   Additional Questions   Roomed by lorenzo dalal   Accompanied by , daughter         4/24/2023    10:44 AM   Patient Reported Additional Medications   Patient reports taking the following new medications none     HPI     Daughter in the room to interpret  Had Lorazepam in 2018. Daughter wants her to get back on it  Hospital Follow-up Visit:    [facility]ospital[facility]Nursing Home[facility]   Date of Admission: 4/20/23  Date of Discharge: 4/22/23  Reason(s) for Admission: nausea, confussion    Was your hospitalization related to COVID-19? No   Problems taking medications regularly:  None  Medication changes since discharge: they stopped the Lisinopril  Problems adhering to non-medication therapy:  None    Summary of hospitalization:  [facility] discharge summary reviewed  Patient was hospitalized twice earlier this month and was discharged 2 days ago.  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  On 4/20/2023 she felt presyncopal and fainted.  She continued to have mild hyponatremia.  She had a reassuring echocardiogram.  During physical therapy stroke code was called and she developed right-sided weakness, truncal ataxia, frontal headache.  She had dizziness and elevated blood pressure.  MRI/MRA of head/neck showed no CVA or other worrisome process.  Fluid restriction of 1.5 L daily. Discharged on 5mg of Abilify daily and had received prn " "lorazepam prior to this.    Diagnostic Tests/Treatments reviewed.  Follow up needed: Recheck basic metabolic panel and follow-up on Abilify.  Other Healthcare Providers Involved in Patient s Care:         primary and psych  Update since discharge: improved. Daughter believes that she may have been taking lorazepam instead of her Abilify. They are unsure. She has been more somnolent since discharge.    Plan of care communicated with patient and family. Daughter and spouse here. They declined a professional .    Review of Systems   Constitutional, HEENT, cardiovascular, pulmonary, gi and gu systems are negative, except as otherwise noted.      Objective    /68   Pulse 86   Temp 99.1  F (37.3  C) (Tympanic)   Resp 16   Ht 1.635 m (5' 4.37\")   Wt 80.4 kg (177 lb 3.2 oz)   LMP  (LMP Unknown)   SpO2 97%   Breastfeeding No   BMI 30.07 kg/m    Body mass index is 30.07 kg/m .  Physical Exam  Vitals and nursing note reviewed.   Constitutional:       General: She is not in acute distress.     Appearance: She is not ill-appearing or diaphoretic.   HENT:      Head: Normocephalic and atraumatic.      Mouth/Throat:      Mouth: Mucous membranes are moist.   Eyes:      Conjunctiva/sclera: Conjunctivae normal.   Cardiovascular:      Rate and Rhythm: Normal rate and regular rhythm.      Heart sounds: Normal heart sounds. No murmur heard.     No friction rub. No gallop.   Pulmonary:      Effort: Pulmonary effort is normal. No respiratory distress.      Breath sounds: Normal breath sounds. No stridor. No wheezing, rhonchi or rales.   Skin:     General: Skin is warm and dry.   Neurological:      General: No focal deficit present.      Mental Status: She is alert. Mental status is at baseline.   Psychiatric:         Mood and Affect: Mood normal.         Behavior: Behavior normal.          BMP pending            "

## 2023-04-24 NOTE — TELEPHONE ENCOUNTER
Pt calling in to get the results of her labs today.     Pt lab still in process at this time.    Kristi De Santiago RN  [facility]

## 2023-04-24 NOTE — PATIENT INSTRUCTIONS
Ramana Haines,    Thank you for allowing [facility] to manage your care.    Stop the Abilify (aripiprazole) and Ativan (lorazepam) and begin olanzapine at bedtime as needed.    If you develop worsening/changing symptoms at any time, please call 911 or go to the emergency department for evaluation.    I ordered some lab work, please go to the laboratory to get your studies.    I sent your prescriptions to your pharmacy.    Please allow 1-2 business days for our office to contact you in regards to your laboratory/radiological studies.  If not done so, I encourage you to login into [employer] ([email or web address] to review your results as well.     Continue to limit your fluid intake to 1.5 liters per day.    If you have any questions or concerns, please feel free to call us at [phone]    Sincerely,    Farzad Dupree PA-C    Did you know?      You can schedule a video visit for follow-up appointments as well as future appointments for certain conditions.  Please see the below link.     [email or web address]    If you have not already done so,  I encourage you to sign up for [employer] ([email or web address]).  This will allow you to review your results, securely communicate with a provider, and schedule virtual visits as well.

## 2023-04-25 LAB
ANION GAP SERPL CALCULATED.3IONS-SCNC: 10 MMOL/L (ref 3–14)
BUN SERPL-MCNC: 9 MG/DL (ref 7–30)
CALCIUM SERPL-MCNC: 9.8 MG/DL (ref 8.5–10.1)
CHLORIDE BLD-SCNC: 103 MMOL/L (ref 94–109)
CO2 SERPL-SCNC: 23 MMOL/L (ref 20–32)
CREAT SERPL-MCNC: 0.7 MG/DL (ref 0.52–1.04)
GFR SERPL CREATININE-BSD FRML MDRD: >90 ML/MIN/1.73M2
GLUCOSE BLD-MCNC: 115 MG/DL (ref 70–99)
POTASSIUM BLD-SCNC: 4.2 MMOL/L (ref 3.4–5.3)
SODIUM SERPL-SCNC: 136 MMOL/L (ref 133–144)

## 2023-04-27 ENCOUNTER — TELEPHONE (OUTPATIENT)
Dept: INTERNAL MEDICINE | Facility: CLINIC | Age: 68
End: 2023-04-27

## 2023-04-27 DIAGNOSIS — F29 PSYCHOSIS, UNSPECIFIED PSYCHOSIS TYPE (H): Primary | ICD-10-CM

## 2023-04-27 NOTE — TELEPHONE ENCOUNTER
Called and gave below information .     Pt had additional questions about her BP meds , deffered her to PCP office for questions. Gave contact information for PCP as they have an appt next week there and her primary is the best to manage her meds.     Kristi De Santiago RN  [facility]

## 2023-04-27 NOTE — TELEPHONE ENCOUNTER
4/28/23    Spoke with daughter again and pt's BP was 129/77 today.    Karin,    Do you want this pt to have dosage adjustment on amlodipine or carvedilol and order the labs prior to the appt on 5/4/23 due to pt's fatigue, or you just want to see her to have evaluation on 5/4/23?    -------------------------------------------------------------------      4/27/23  Spoke with pt's daughter. Pt was discharged from the hospital on 4/22/23, discharged with new Rx of amlodipine 5 mg BID and carvedilol 6.25 mg BID along existing Rx of with lisinopril. Pt feel tired after she takes amlodipne and carvedilol and daughter is wondering if the dosage could be adjusted and have the lab check before the appt on 5/4/23.  I told her we need home BP readings before dosage adjustment. She says BP has been OK after the discharge and she will write down, then I will call her tomorrow.

## 2023-04-27 NOTE — TELEPHONE ENCOUNTER
[facility] will call you to coordinate your care as prescribed by your provider. If you don't hear from a representative within 2 business days, please call [phone].     Referral placed to psychiatry. Please give them the contact info above. They could also seek care with [facility] and associates if there is a long wait with [facility]. Thanks.

## 2023-04-27 NOTE — TELEPHONE ENCOUNTER
Pt and daughter calling in regarding wanting a mental health referral for her mother to see a psychiatry    Daughter states that you did discuss this with them when you prescribe the Zyprexa.     I see there was a mental health referral placed on 4/14/2023 and daughter states this is only for therapy  Not for seeing a psychiatry .     Are you willing to place referral to psychiatry?    Kristi De Santiago RN  [facility]

## 2023-04-27 NOTE — TELEPHONE ENCOUNTER
[facility] Call Center    Phone Message    May a detailed message be left on voicemail: yes     Reason for Call: Other: Patient has questions regarding he BP meds, is unsure of the name of the medication. Please contact her back.     Action Taken: Message routed to:  [facility] pcc    Travel Screening: Not Applicable

## 2023-04-28 ENCOUNTER — ANCILLARY PROCEDURE (OUTPATIENT)
Dept: GENERAL RADIOLOGY | Facility: CLINIC | Age: 68
End: 2023-04-28
Attending: PHYSICIAN ASSISTANT
Payer: MEDICARE

## 2023-04-28 ENCOUNTER — OFFICE VISIT (OUTPATIENT)
Dept: FAMILY MEDICINE | Facility: CLINIC | Age: 68
End: 2023-04-28
Payer: MEDICARE

## 2023-04-28 VITALS
WEIGHT: 178.6 LBS | HEART RATE: 100 BPM | TEMPERATURE: 96.4 F | DIASTOLIC BLOOD PRESSURE: 60 MMHG | BODY MASS INDEX: 30.49 KG/M2 | RESPIRATION RATE: 16 BRPM | HEIGHT: 64 IN | OXYGEN SATURATION: 100 % | SYSTOLIC BLOOD PRESSURE: 136 MMHG

## 2023-04-28 DIAGNOSIS — R53.83 FATIGUE, UNSPECIFIED TYPE: ICD-10-CM

## 2023-04-28 DIAGNOSIS — M62.81 GENERALIZED MUSCLE WEAKNESS: Primary | ICD-10-CM

## 2023-04-28 DIAGNOSIS — F41.9 ANXIETY: ICD-10-CM

## 2023-04-28 LAB
ALBUMIN SERPL-MCNC: 3.9 G/DL (ref 3.4–5)
ALBUMIN UR-MCNC: NEGATIVE MG/DL
ALP SERPL-CCNC: 105 U/L (ref 40–150)
ALT SERPL W P-5'-P-CCNC: 39 U/L (ref 0–50)
ANION GAP SERPL CALCULATED.3IONS-SCNC: 8 MMOL/L (ref 3–14)
APPEARANCE UR: CLEAR
AST SERPL W P-5'-P-CCNC: 21 U/L (ref 0–45)
BACTERIA #/AREA URNS HPF: ABNORMAL /HPF
BASOPHILS # BLD AUTO: 0 10E3/UL (ref 0–0.2)
BASOPHILS NFR BLD AUTO: 0 %
BILIRUB SERPL-MCNC: 0.3 MG/DL (ref 0.2–1.3)
BILIRUB UR QL STRIP: NEGATIVE
BUN SERPL-MCNC: 10 MG/DL (ref 7–30)
CALCIUM SERPL-MCNC: 9.9 MG/DL (ref 8.5–10.1)
CHLORIDE BLD-SCNC: 106 MMOL/L (ref 94–109)
CK SERPL-CCNC: 44 U/L (ref 30–225)
CO2 SERPL-SCNC: 22 MMOL/L (ref 20–32)
COLOR UR AUTO: YELLOW
CREAT SERPL-MCNC: 0.64 MG/DL (ref 0.52–1.04)
EOSINOPHIL # BLD AUTO: 0.2 10E3/UL (ref 0–0.7)
EOSINOPHIL NFR BLD AUTO: 2 %
ERYTHROCYTE [DISTWIDTH] IN BLOOD BY AUTOMATED COUNT: 11.8 % (ref 10–15)
GFR SERPL CREATININE-BSD FRML MDRD: >90 ML/MIN/1.73M2
GLUCOSE BLD-MCNC: 119 MG/DL (ref 70–99)
GLUCOSE UR STRIP-MCNC: NEGATIVE MG/DL
HCT VFR BLD AUTO: 42.3 % (ref 35–47)
HGB BLD-MCNC: 14 G/DL (ref 11.7–15.7)
HGB UR QL STRIP: NEGATIVE
IMM GRANULOCYTES # BLD: 0 10E3/UL
IMM GRANULOCYTES NFR BLD: 0 %
KETONES UR STRIP-MCNC: NEGATIVE MG/DL
LEUKOCYTE ESTERASE UR QL STRIP: ABNORMAL
LYMPHOCYTES # BLD AUTO: 2.3 10E3/UL (ref 0.8–5.3)
LYMPHOCYTES NFR BLD AUTO: 20 %
MCH RBC QN AUTO: 29.7 PG (ref 26.5–33)
MCHC RBC AUTO-ENTMCNC: 33.1 G/DL (ref 31.5–36.5)
MCV RBC AUTO: 90 FL (ref 78–100)
MONOCYTES # BLD AUTO: 0.6 10E3/UL (ref 0–1.3)
MONOCYTES NFR BLD AUTO: 6 %
NEUTROPHILS # BLD AUTO: 8.5 10E3/UL (ref 1.6–8.3)
NEUTROPHILS NFR BLD AUTO: 73 %
NITRATE UR QL: NEGATIVE
PH UR STRIP: 6.5 [PH] (ref 5–7)
PLATELET # BLD AUTO: 340 10E3/UL (ref 150–450)
POTASSIUM BLD-SCNC: 4.2 MMOL/L (ref 3.4–5.3)
PROT SERPL-MCNC: 7.5 G/DL (ref 6.8–8.8)
RBC # BLD AUTO: 4.71 10E6/UL (ref 3.8–5.2)
RBC #/AREA URNS AUTO: ABNORMAL /HPF
SODIUM SERPL-SCNC: 136 MMOL/L (ref 133–144)
SP GR UR STRIP: 1.02 (ref 1–1.03)
TSH SERPL DL<=0.005 MIU/L-ACNC: 1.23 MU/L (ref 0.4–4)
UROBILINOGEN UR STRIP-ACNC: 0.2 E.U./DL
WBC # BLD AUTO: 11.7 10E3/UL (ref 4–11)
WBC #/AREA URNS AUTO: ABNORMAL /HPF

## 2023-04-28 PROCEDURE — 99214 OFFICE O/P EST MOD 30 MIN: CPT | Performed by: PHYSICIAN ASSISTANT

## 2023-04-28 PROCEDURE — 71046 X-RAY EXAM CHEST 2 VIEWS: CPT | Mod: TC | Performed by: RADIOLOGY

## 2023-04-28 PROCEDURE — 87186 SC STD MICRODIL/AGAR DIL: CPT | Performed by: PHYSICIAN ASSISTANT

## 2023-04-28 PROCEDURE — 87086 URINE CULTURE/COLONY COUNT: CPT | Performed by: PHYSICIAN ASSISTANT

## 2023-04-28 PROCEDURE — 87088 URINE BACTERIA CULTURE: CPT | Performed by: PHYSICIAN ASSISTANT

## 2023-04-28 PROCEDURE — 82550 ASSAY OF CK (CPK): CPT | Performed by: PHYSICIAN ASSISTANT

## 2023-04-28 PROCEDURE — 80050 GENERAL HEALTH PANEL: CPT | Performed by: PHYSICIAN ASSISTANT

## 2023-04-28 PROCEDURE — 36415 COLL VENOUS BLD VENIPUNCTURE: CPT | Performed by: PHYSICIAN ASSISTANT

## 2023-04-28 PROCEDURE — 81001 URINALYSIS AUTO W/SCOPE: CPT | Performed by: PHYSICIAN ASSISTANT

## 2023-04-28 PROCEDURE — 93000 ELECTROCARDIOGRAM COMPLETE: CPT | Performed by: PHYSICIAN ASSISTANT

## 2023-04-28 RX ORDER — HYDROXYZINE HYDROCHLORIDE 10 MG/1
10-20 TABLET, FILM COATED ORAL EVERY 6 HOURS PRN
Qty: 60 TABLET | Refills: 0 | Status: SHIPPED | OUTPATIENT
Start: 2023-04-28 | End: 2023-08-09

## 2023-04-28 ASSESSMENT — ANXIETY QUESTIONNAIRES
GAD7 TOTAL SCORE: 17
7. FEELING AFRAID AS IF SOMETHING AWFUL MIGHT HAPPEN: SEVERAL DAYS
IF YOU CHECKED OFF ANY PROBLEMS ON THIS QUESTIONNAIRE, HOW DIFFICULT HAVE THESE PROBLEMS MADE IT FOR YOU TO DO YOUR WORK, TAKE CARE OF THINGS AT HOME, OR GET ALONG WITH OTHER PEOPLE: VERY DIFFICULT
3. WORRYING TOO MUCH ABOUT DIFFERENT THINGS: NEARLY EVERY DAY
6. BECOMING EASILY ANNOYED OR IRRITABLE: SEVERAL DAYS
4. TROUBLE RELAXING: NEARLY EVERY DAY
1. FEELING NERVOUS, ANXIOUS, OR ON EDGE: NEARLY EVERY DAY
8. IF YOU CHECKED OFF ANY PROBLEMS, HOW DIFFICULT HAVE THESE MADE IT FOR YOU TO DO YOUR WORK, TAKE CARE OF THINGS AT HOME, OR GET ALONG WITH OTHER PEOPLE?: VERY DIFFICULT
GAD7 TOTAL SCORE: 17
2. NOT BEING ABLE TO STOP OR CONTROL WORRYING: NEARLY EVERY DAY
7. FEELING AFRAID AS IF SOMETHING AWFUL MIGHT HAPPEN: SEVERAL DAYS
5. BEING SO RESTLESS THAT IT IS HARD TO SIT STILL: NEARLY EVERY DAY
GAD7 TOTAL SCORE: 17

## 2023-04-28 ASSESSMENT — PATIENT HEALTH QUESTIONNAIRE - PHQ9
SUM OF ALL RESPONSES TO PHQ QUESTIONS 1-9: 19
SUM OF ALL RESPONSES TO PHQ QUESTIONS 1-9: 19
10. IF YOU CHECKED OFF ANY PROBLEMS, HOW DIFFICULT HAVE THESE PROBLEMS MADE IT FOR YOU TO DO YOUR WORK, TAKE CARE OF THINGS AT HOME, OR GET ALONG WITH OTHER PEOPLE: VERY DIFFICULT

## 2023-04-28 ASSESSMENT — PAIN SCALES - GENERAL: PAINLEVEL: NO PAIN (0)

## 2023-04-28 NOTE — TELEPHONE ENCOUNTER
Daughter was informed the recommendation.    Soon-Mi  -------------------------------------------------------------------------    She could reduce her amlodipine to 1/2 tab of amlodipine bid and continue to check BPs.  Thanks.     Karin DICKENS, CNP

## 2023-04-28 NOTE — PATIENT INSTRUCTIONS
Ramana Haines,    Thank you for allowing [facility] to manage your care.    I am unsure of the cause of your symptoms, but we will see what our workup shows.     If you develop worsening/changing symptoms at any time, please call 911 or go to the emergency department for evaluation.    I ordered some lab work, please go to the laboratory to get your studies.    I ordered some xrays, please go to our radiology department to get your xrays.    I sent your prescriptions to your pharmacy.    For anxiety, restlessness and difficulty sleeping, please use hydroxyzine as prescribed. Do not use this medication while driving, operating machinery, with other sedating medications, or while drinking alcohol as it will make you drowsy.    Please allow 1-2 business days for our office to contact you in regards to your laboratory/radiological studies.  If not done so, I encourage you to login into [employer] ([email or web address] to review your results as well.     If you have any questions or concerns, please feel free to call us at [phone]    Sincerely,    Farzad Dupree PA-C    Did you know?      You can schedule a video visit for follow-up appointments as well as future appointments for certain conditions.  Please see the below link.     [email or web address]    If you have not already done so,  I encourage you to sign up for [employer] ([email or web address]).  This will allow you to review your results, securely communicate with a provider, and schedule virtual visits as well.

## 2023-04-28 NOTE — PROGRESS NOTES
Pt declined EKG and stated that they have to leave. Provider informed.  ROXANA De Jesus      Assessment & Plan   Problem List Items Addressed This Visit    None  Visit Diagnoses     Generalized muscle weakness    -  Primary    Relevant Orders    Comprehensive metabolic panel (BMP + Alb, Alk Phos, ALT, AST, Total. Bili, TP) (Completed)    CBC with platelets and differential (Completed)    CK total (Completed)    Anxiety        Relevant Medications    hydrOXYzine (ATARAX) 10 MG tablet    Other Relevant Orders    TSH with free T4 reflex (Completed)    Fatigue, unspecified type        Relevant Orders    UA Macroscopic with reflex to Microscopic and Culture (Completed)    Comprehensive metabolic panel (BMP + Alb, Alk Phos, ALT, AST, Total. Bili, TP) (Completed)    XR Chest 2 Views (Completed)    EKG 12-lead complete w/read - Clinics (Completed)    EKG 12-lead complete w/read - Clinics (Completed)    UA Microscopic with Reflex to Culture (Completed)    Urine Culture Aerobic Bacterial - lab collect (Completed)         Significant depression symptoms, likely with anxiety as well. History largely from daughter. Also has UTI per UA. Treated with amoxicillin. Remainder of workup not concerning. Will hold Zyprexa for now as I would like them to discuss with her primary and will treat with hydroxyzine for now as an anxiolytic and sleep aid. MH referral also sent.    Complete history and physical exam as below. Afebrile with normal vital signs.    DDx and Dx discussed with and explained to the pt to their satisfaction.  All questions were answered at this time. Pt expressed understanding of and agreement with this dx, tx, and plan. No further workup warranted and standard medication warnings given. I have given the patient a list of pertinent indications for re-evaluation. Will go to the Emergency Department if symptoms worsen or new concerning symptoms arise. Patient left in no apparent distress.     Ordering of each unique  "test  Prescription drug management  37 minutes spent by me on the date of the encounter doing chart review, history and exam, documentation and further activities per the note     MED REC REQUIRED  Post Medication Reconciliation Status: discharge medications reconciled and changed, per note/orders  BMI:   Estimated body mass index is 30.49 kg/m  as calculated from the following:    Height as of this encounter: 1.63 m (5' 4.17\").    Weight as of this encounter: 81 kg (178 lb 9.6 oz).     Depression Screening Follow Up        4/28/2023    12:53 PM   PHQ   PHQ-9 Total Score 19   Q9: Thoughts of better off dead/self-harm past 2 weeks Not at all     Follow Up Actions Taken  Crisis resource information provided in After Visit Summary  Mental Health Referral placed     See Patient Instructions    DAVID Gongora  [facility] YUNI Haines is a 67 year old, presenting for the following health issues:  Fatigue (x2 weeks) and Anxiety        4/28/2023     1:03 PM   Additional Questions   Roomed by Glen SCHMIDT   Accompanied by NA         4/28/2023     1:03 PM   Patient Reported Additional Medications   Patient reports taking the following new medications lorazepam     HPI     Generalized weakness for 2 weeks. Intermittent confusion. Some forgetfulness per daughter. No other symptoms.       Review of Systems   Constitutional, HEENT, cardiovascular, pulmonary, gi and gu systems are negative, except as otherwise noted.      Objective    /60   Pulse 100   Temp (!) 96.4  F (35.8  C) (Temporal)   Resp 16   Ht 1.63 m (5' 4.17\")   Wt 81 kg (178 lb 9.6 oz)   LMP  (LMP Unknown)   SpO2 100%   BMI 30.49 kg/m    Body mass index is 30.49 kg/m .  Physical Exam     Results for orders placed or performed in visit on 04/28/23   XR Chest 2 Views     Status: None    Narrative    XR CHEST 2 VIEWS 4/28/2023 2:39 PM    HISTORY: Fatigue, unspecified type    COMPARISON: None.      Impression    " IMPRESSION: Mechanical device noted overlying the left lower lung.  Heart normal in size. Possible trace left effusion. Lungs are  otherwise clear.    MONICA DAMON MD         SYSTEM ID:  [id number]   Results for orders placed or performed in visit on 04/28/23   UA Macroscopic with reflex to Microscopic and Culture     Status: Abnormal    Specimen: Urine, Clean Catch   Result Value Ref Range    Color Urine Yellow Colorless, Straw, Light Yellow, Yellow    Appearance Urine Clear Clear    Glucose Urine Negative Negative mg/dL    Bilirubin Urine Negative Negative    Ketones Urine Negative Negative mg/dL    Specific Gravity Urine 1.020 1.003 - 1.035    Blood Urine Negative Negative    pH Urine 6.5 5.0 - 7.0    Protein Albumin Urine Negative Negative mg/dL    Urobilinogen Urine 0.2 0.2, 1.0 E.U./dL    Nitrite Urine Negative Negative    Leukocyte Esterase Urine Small (A) Negative   Comprehensive metabolic panel (BMP + Alb, Alk Phos, ALT, AST, Total. Bili, TP)     Status: Abnormal   Result Value Ref Range    Sodium 136 133 - 144 mmol/L    Potassium 4.2 3.4 - 5.3 mmol/L    Chloride 106 94 - 109 mmol/L    Carbon Dioxide (CO2) 22 20 - 32 mmol/L    Anion Gap 8 3 - 14 mmol/L    Urea Nitrogen 10 7 - 30 mg/dL    Creatinine 0.64 0.52 - 1.04 mg/dL    Calcium 9.9 8.5 - 10.1 mg/dL    Glucose 119 (H) 70 - 99 mg/dL    Alkaline Phosphatase 105 40 - 150 U/L    AST 21 0 - 45 U/L    ALT 39 0 - 50 U/L    Protein Total 7.5 6.8 - 8.8 g/dL    Albumin 3.9 3.4 - 5.0 g/dL    Bilirubin Total 0.3 0.2 - 1.3 mg/dL    GFR Estimate >90 >60 mL/min/1.73m2   TSH with free T4 reflex     Status: Normal   Result Value Ref Range    TSH 1.23 0.40 - 4.00 mU/L   CK total     Status: Normal   Result Value Ref Range    CK 44 30 - 225 U/L   CBC with platelets and differential     Status: Abnormal   Result Value Ref Range    WBC Count 11.7 (H) 4.0 - 11.0 10e3/uL    RBC Count 4.71 3.80 - 5.20 10e6/uL    Hemoglobin 14.0 11.7 - 15.7 g/dL    Hematocrit 42.3 35.0 - 47.0 %     MCV 90 78 - 100 fL    MCH 29.7 26.5 - 33.0 pg    MCHC 33.1 31.5 - 36.5 g/dL    RDW 11.8 10.0 - 15.0 %    Platelet Count 340 150 - 450 10e3/uL    % Neutrophils 73 %    % Lymphocytes 20 %    % Monocytes 6 %    % Eosinophils 2 %    % Basophils 0 %    % Immature Granulocytes 0 %    Absolute Neutrophils 8.5 (H) 1.6 - 8.3 10e3/uL    Absolute Lymphocytes 2.3 0.8 - 5.3 10e3/uL    Absolute Monocytes 0.6 0.0 - 1.3 10e3/uL    Absolute Eosinophils 0.2 0.0 - 0.7 10e3/uL    Absolute Basophils 0.0 0.0 - 0.2 10e3/uL    Absolute Immature Granulocytes 0.0 <=0.4 10e3/uL   UA Microscopic with Reflex to Culture     Status: Abnormal   Result Value Ref Range    Bacteria Urine Many (A) None Seen /HPF    RBC Urine None Seen 0-2 /HPF /HPF    WBC Urine 5-10 (A) 0-5 /HPF /HPF    Narrative    Urine Culture not indicated   Urine Culture Aerobic Bacterial - lab collect     Status: Abnormal    Specimen: Urine, Clean Catch   Result Value Ref Range    Culture >100,000 CFU/mL Enterococcus faecalis (A)     Culture <10,000 CFU/mL Urogenital nakul        Susceptibility    Enterococcus faecalis - NBA     Penicillin 4 Susceptible ug/mL     Ampicillin <=2 Susceptible ug/mL     Vancomycin 1 Susceptible ug/mL     Nitrofurantoin <=16 Susceptible ug/mL   CBC with platelets and differential     Status: Abnormal    Narrative    The following orders were created for panel order CBC with platelets and differential.  Procedure                               Abnormality         Status                     ---------                               -----------         ------                     CBC with platelets and d...[[id number]]  Abnormal            Final result                 Please view results for these tests on the individual orders.       Answers for HPI/ROS submitted by the patient on 4/28/2023  If you checked off any problems, how difficult have these problems made it for you to do your work, take care of things at home, or get along with other people?:  Very difficult  PHQ9 TOTAL SCORE: 19  RICHARD 7 TOTAL SCORE: 17

## 2023-04-28 NOTE — TELEPHONE ENCOUNTER
Spoke with daughter again and pt's BP was 129/77 today.    Karin,    Do you want this pt to have dosage adjustment on amlodipine or carvedilol and order the labs prior to the appt on 5/4/23 due to pt's fatigue, or you just want to see her to have evaluation on 5/4/23?

## 2023-04-29 NOTE — RESULT ENCOUNTER NOTE
Team - please call patient's daughter with results (home phone). Verbal permission given to communicate with her by patient. Labs reassuring, though we are culturing her urine to see if a UTI is present. Continue with current plan. Thanks.

## 2023-04-30 DIAGNOSIS — N39.0 URINARY TRACT INFECTION WITHOUT HEMATURIA, SITE UNSPECIFIED: Primary | ICD-10-CM

## 2023-04-30 RX ORDER — AMOXICILLIN 875 MG
875 TABLET ORAL 2 TIMES DAILY
Qty: 20 TABLET | Refills: 0 | Status: SHIPPED | OUTPATIENT
Start: 2023-04-30 | End: 2023-05-10

## 2023-05-01 ENCOUNTER — TELEPHONE (OUTPATIENT)
Dept: INTERNAL MEDICINE | Facility: CLINIC | Age: 68
End: 2023-05-01
Payer: MEDICARE

## 2023-05-01 LAB
BACTERIA UR CULT: ABNORMAL
BACTERIA UR CULT: ABNORMAL

## 2023-05-01 NOTE — RESULT ENCOUNTER NOTE
Team -UC grew Enterococcus. I'd like to start her on amoxicillin. This was sent to the [facility] in [employer] in [place] off [place]. Please call daughter (home phone # listed) as patient gave permission during our last visit. Thanks.

## 2023-05-04 ENCOUNTER — OFFICE VISIT (OUTPATIENT)
Dept: INTERNAL MEDICINE | Facility: CLINIC | Age: 68
End: 2023-05-04
Payer: MEDICARE

## 2023-05-04 ENCOUNTER — LAB (OUTPATIENT)
Dept: LAB | Facility: CLINIC | Age: 68
End: 2023-05-04
Payer: MEDICARE

## 2023-05-04 VITALS
DIASTOLIC BLOOD PRESSURE: 71 MMHG | SYSTOLIC BLOOD PRESSURE: 124 MMHG | WEIGHT: 178.2 LBS | BODY MASS INDEX: 30.42 KG/M2 | OXYGEN SATURATION: 94 % | HEART RATE: 88 BPM

## 2023-05-04 DIAGNOSIS — E78.2 MIXED HYPERLIPIDEMIA: ICD-10-CM

## 2023-05-04 DIAGNOSIS — E78.5 HYPERLIPIDEMIA, UNSPECIFIED HYPERLIPIDEMIA TYPE: ICD-10-CM

## 2023-05-04 DIAGNOSIS — E87.1 HYPONATREMIA: ICD-10-CM

## 2023-05-04 DIAGNOSIS — F99 PSYCHIATRIC DISTURBANCE: Primary | ICD-10-CM

## 2023-05-04 DIAGNOSIS — I10 BENIGN ESSENTIAL HYPERTENSION: ICD-10-CM

## 2023-05-04 LAB
ANION GAP SERPL CALCULATED.3IONS-SCNC: 10 MMOL/L (ref 7–15)
BUN SERPL-MCNC: 6.2 MG/DL (ref 8–23)
CALCIUM SERPL-MCNC: 10 MG/DL (ref 8.8–10.2)
CHLORIDE SERPL-SCNC: 102 MMOL/L (ref 98–107)
CREAT SERPL-MCNC: 0.72 MG/DL (ref 0.51–0.95)
DEPRECATED HCO3 PLAS-SCNC: 24 MMOL/L (ref 22–29)
GFR SERPL CREATININE-BSD FRML MDRD: >90 ML/MIN/1.73M2
GLUCOSE SERPL-MCNC: 116 MG/DL (ref 70–99)
POTASSIUM SERPL-SCNC: 4 MMOL/L (ref 3.4–5.3)
SODIUM SERPL-SCNC: 136 MMOL/L (ref 136–145)

## 2023-05-04 PROCEDURE — 36415 COLL VENOUS BLD VENIPUNCTURE: CPT | Performed by: PATHOLOGY

## 2023-05-04 PROCEDURE — 99215 OFFICE O/P EST HI 40 MIN: CPT | Performed by: NURSE PRACTITIONER

## 2023-05-04 PROCEDURE — 80048 BASIC METABOLIC PNL TOTAL CA: CPT | Performed by: PATHOLOGY

## 2023-05-04 RX ORDER — CARVEDILOL 6.25 MG/1
6.25 TABLET ORAL 2 TIMES DAILY WITH MEALS
Qty: 60 TABLET | Refills: 0 | Status: SHIPPED | OUTPATIENT
Start: 2023-05-04 | End: 2023-06-16

## 2023-05-04 RX ORDER — OLANZAPINE 5 MG/1
5 TABLET ORAL AT BEDTIME
Qty: 20 TABLET | Refills: 0 | Status: SHIPPED | OUTPATIENT
Start: 2023-05-04 | End: 2023-08-09

## 2023-05-04 RX ORDER — TRAZODONE HYDROCHLORIDE 50 MG/1
50 TABLET, FILM COATED ORAL 2 TIMES DAILY
Qty: 20 TABLET | Refills: 1 | Status: SHIPPED | OUTPATIENT
Start: 2023-05-04 | End: 2023-08-09

## 2023-05-04 RX ORDER — CLOTRIMAZOLE 10 MG/1
LOZENGE ORAL
COMMUNITY
Start: 2023-04-30 | End: 2023-08-09

## 2023-05-04 RX ORDER — AMLODIPINE BESYLATE 2.5 MG/1
2.5 TABLET ORAL 2 TIMES DAILY
Qty: 60 TABLET | Refills: 0 | Status: SHIPPED | OUTPATIENT
Start: 2023-05-04 | End: 2023-06-06

## 2023-05-04 RX ORDER — ATORVASTATIN CALCIUM 20 MG/1
20 TABLET, FILM COATED ORAL DAILY
Qty: 90 TABLET | Refills: 3 | Status: SHIPPED | OUTPATIENT
Start: 2023-05-04 | End: 2023-08-09

## 2023-05-04 NOTE — NURSING NOTE
Yancy Rivera is a 67 year old female that presents in clinic today for the following:     Chief Complaint   Patient presents with     Hospital F/U     Pt here for hospital follow up; BMP labs; pt experiencing fatigue; anxiety; bp medication       The patient's allergies and medications were reviewed. The patient's vitals were obtained without incident. The patient does not have any other questions for the provider.     Shantal Nieves, EMT at 12:35 PM on 5/4/2023.  Primary Care Clinic: [phone]

## 2023-05-04 NOTE — PROGRESS NOTES
S: .Yancy Rivera is a 67 year old female . Accompanied by her daughter and spouse for anxiety and agitation.  She is not sleeping.  SHe is constantly agitated and cannot maintain a position for more than a minute or two. Not eating well. She was noted to have low sodium when hospitalized.mmary of hospitalization:  [facility] discharge summary reviewed  Patient was hospitalized twice earlier this month and was discharged 2 days ago.  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  On 4/20/2023 she felt presyncopal and fainted.  She continued to have mild hyponatremia.  She had a reassuring echocardiogram.  During physical therapy stroke code was called and she developed right-sided weakness, truncal ataxia, frontal headache.  She had dizziness and elevated blood pressure.  MRI/MRA of head/neck showed no CVA or other worrisome process.  Fluid restriction of 1.5 L daily. Discharged on 5mg of Abilify daily and had received prn lorazepam prior to this.     Daughter believed that she may have been taking lorazepam instead of her Abilify after going home She has been more somnolent since discharge.. .  Lorazepam was therefore held but Abilify not re-instated..      Daughter wants to know if medication doses can be reduced.         Patient Active Problem List   Diagnosis     Hyperlipidemia with target LDL less than 130     Prediabetes     Disorder of bursae and tendons in shoulder region     Shingles     Primary open angle glaucoma of both eyes, moderate stage     Senile nuclear sclerosis, bilateral     Right lumbar radiculopathy     Benign essential hypertension     Right wrist pain     Chronic midline low back pain without sciatica     Pseudophakia of both eyes     Right wrist injury, subsequent encounter     Neck pain     Rheumatoid arthritis involving multiple sites, unspecified whether rheumatoid factor present (H)     Pituitary adenoma (H)     Hyponatremia      Syncope, unspecified syncope type            Past Medical History:   Diagnosis Date     Bipolar disorder (H)      Blepharitis      Esophageal reflux (aka GERD)      Glaucoma      Hyperlipidemia LDL goal < 130      Nonsenile cataract      Shingles     11/14/15 Left approximately C6 distribution            Past Surgical History:   Procedure Laterality Date     CATARACT IOL, RT/LT Right 10/16/2018    [facility]      CATARACT IOL, RT/LT Left 11/01/2018    [facility]      CHALAZION EXCISION  08/21/2015    Left lid     HERNIA REPAIR      abdominal hernia repair 2012            Social History     Tobacco Use     Smoking status: Never     Passive exposure: Never     Smokeless tobacco: Never   Vaping Use     Vaping status: Never Used   Substance Use Topics     Alcohol use: No            Family History   Problem Relation Age of Onset     Glaucoma Mother      Macular Degeneration No family hx of      Cancer No family hx of      Diabetes No family hx of      Hypertension No family hx of      Cerebrovascular Disease No family hx of      Thyroid Disease No family hx of      Eye Surgery No family hx of                Allergies   Allergen Reactions     No Clinical Screening - See Comments Nausea and Vomiting     Liver side effects from INH for TB            Current Outpatient Medications   Medication Sig Dispense Refill     acetaminophen (TYLENOL) 325 MG tablet Take 2 tablets (650 mg) by mouth every 4 hours as needed for mild pain       amLODIPine (NORVASC) 2.5 MG tablet Take 1 tablet (2.5 mg) by mouth 2 times daily 60 tablet 0     amoxicillin (AMOXIL) 875 MG tablet Take 1 tablet (875 mg) by mouth 2 times daily for 10 days 20 tablet 0     atorvastatin (LIPITOR) 20 MG tablet Take 1 tablet (20 mg) by mouth daily Patient needs to have labs for further refills. 90 tablet 3     carboxymethylcellulose PF (CARBOXYMETHYLCELLULOSE SODIUM) 0.5 %  ophthalmic solution Place 1 drop into both eyes 4 times daily as needed for dry eyes 70 each 11     carvedilol (COREG) 6.25 MG tablet Take 1 tablet (6.25 mg) by mouth 2 times daily (with meals) 60 tablet 0     chlorhexidine (PERIDEX) 0.12 % solution SWISH IN MOUTH FOR 30 SECONDS WITH 15MLS AND SPIT. USE TWO TIMES A DAY. DO NOT SWALLOW 473 mL 3     diclofenac (VOLTAREN) 1 % topical gel Place 2 g onto the skin 4 times daily To right wrist 100 g 3     hydrOXYzine (ATARAX) 10 MG tablet Take 1-2 tablets (10-20 mg) by mouth every 6 hours as needed (For anxiety, restlessness and/or sleep) 60 tablet 0      20 tablet 0      20 tablet 1     triamcinolone (KENALOG) 0.1 % external ointment Apply topically 2 times daily 454 g 11     White Petrolatum-Mineral Oil (REFRESH P.M.) OINT Apply thin layer to both eyes at bedtime 3.5 g 3     clotrimazole (MYCELEX) 10 MG lozenge DISSOLVE 1 TABLET IN MOUTH AND SWALLOW ROUTE FIVE TIMES A DAY.       ferrous sulfate (IRON) 325 (65 FE) MG tablet Take 1 tablet (325 mg) by mouth daily (with breakfast) (Patient not taking: Reported on 5/4/2023) 30 tablet 11     pantoprazole (PROTONIX) 40 MG EC tablet Take 1 tablet (40 mg) by mouth daily (Patient not taking: Reported on 5/4/2023) 90 tablet 3       REVIEW OF SYSTEMS:  See above.    O:   /71 (BP Location: Right arm, Patient Position: Sitting, Cuff Size: Adult Regular)   Pulse 88   Wt 80.8 kg (178 lb 3.2 oz)   LMP  (LMP Unknown)   SpO2 94%   BMI 30.42 kg/m       Weight is stable.  GENERAL APPEARANCE: healthy, alert and no distress  EYES: sclera white.  RESP: lungs clear to auscultation - no rales, rhonchi or wheezes  CV: regular rates and rhythm, normal S1 S2, no S3 or S4 and no murmur, click or rub   NEURO: she appears agitated, attempting to partially rise from her seat but sitting back down frequently  MUSK: ambulatory.  EXT: warm.  Edema:no  PSYCHE: fairly non-verbal during this encounter. Daughter serving as  .    A/P:  Yancy was seen today for hospital f/u.    Diagnoses and all orders for this visit:    Psychiatric disturbance  -     OLANZapine (ZYPREXA) 5 MG tablet; Take 1 tablet (5 mg) by mouth At Bedtime  -     traZODone (DESYREL) 50 MG tablet; Take 1 tablet (50 mg) by mouth 2 times daily    RTC 4 weeks for follow-up.    Benign essential hypertension  -     amLODIPine (NORVASC) 2.5 MG tablet; Take 1 tablet (2.5 mg) by mouth 2 times daily.  This a reduced dose.   -     carvedilol (COREG) 6.25 MG tablet; Take 1 tablet (6.25 mg) by mouth 2 times daily (with meals)    Hyperlipidemia, unspecified hyperlipidemia type  -     atorvastatin (LIPITOR) 20 MG tablet; Take 1 tablet (20 mg) by mouth daily Patient needs to have labs for further refills.    Mixed hyperlipidemia  -     atorvastatin (LIPITOR) 20 MG tablet; Take 1 tablet (20 mg) by mouth daily Patient needs to have labs for further refills.    Hyponatremia  -     Basic metabolic panel  (Ca, Cl, CO2, Creat, Gluc, K, Na, BUN); Future      Results for orders placed or performed in visit on 05/04/23   Basic metabolic panel  (Ca, Cl, CO2, Creat, Gluc, K, Na, BUN)     Status: Abnormal   Result Value Ref Range    Sodium 136 136 - 145 mmol/L    Potassium 4.0 3.4 - 5.3 mmol/L    Chloride 102 98 - 107 mmol/L    Carbon Dioxide (CO2) 24 22 - 29 mmol/L    Anion Gap 10 7 - 15 mmol/L    Urea Nitrogen 6.2 (L) 8.0 - 23.0 mg/dL    Creatinine 0.72 0.51 - 0.95 mg/dL    Calcium 10.0 8.8 - 10.2 mg/dL    Glucose 116 (H) 70 - 99 mg/dL    GFR Estimate >90 >60 mL/min/1.73m2     Labs WNL.    The patient voiced understanding of the information discussed and all questions were answered.     I spent a total of 40 minutes in the care of this pt during today's office visit. This time includes reviewing the patient's chart and prior history, obtaining a history, performing an examination and evaluation and counseling the patient. This time also includes ordering medications or tests  necessary in addition to communication to other member's of the patient's health care team. Time spent in documentation and care coordination is included.     Karin Kim APRN, CNP

## 2023-05-05 ENCOUNTER — PSYCHE (OUTPATIENT)
Dept: PSYCHOLOGY | Facility: CLINIC | Age: 68
End: 2023-05-05
Attending: STUDENT IN AN ORGANIZED HEALTH CARE EDUCATION/TRAINING PROGRAM
Payer: MEDICARE

## 2023-05-05 DIAGNOSIS — F39 MOOD DISORDER (H): ICD-10-CM

## 2023-05-05 DIAGNOSIS — F41.0 ANXIETY ATTACK: ICD-10-CM

## 2023-05-05 DIAGNOSIS — Z86.59 HISTORY OF BIPOLAR DISORDER: ICD-10-CM

## 2023-05-05 PROCEDURE — 90837 PSYTX W PT 60 MINUTES: CPT | Performed by: SOCIAL WORKER

## 2023-05-05 NOTE — PROGRESS NOTES
[facility]: Integrated Behavioral Health  May 5, 2023      Behavioral Health Clinician Progress Note    Patient Name: Yancy Rivera           Service Type:  Individual      Service Location:   Face to Face in Clinic     Session Start Time: 11:00  Session End Time: 12:00      Session Length: 53 - 60      Attendees: Client, Spouse / Significant Other and daughter     Service Modality:  In-person    Visit Activities (Refresh list every visit): NEW, [facility] [place] Only and Referral - Mental Health    Diagnostic Assessment Date: next session  Treatment Plan Review Date: 5/5/23  See Flowsheets for today's PHQ-9 and RICHARD-7 results  Previous PHQ-9:     10/6/2010     1:30 PM 10/19/2010    11:30 AM 4/28/2023    12:53 PM   PHQ-9 SCORE   PHQ-9 Total Score 6 7    PHQ-9 Total Score MyChart   19 (Moderately severe depression)   PHQ-9 Total Score   19     Previous RICHARD-7:       4/18/2022     2:04 PM 4/28/2023    12:55 PM   RICHARD-7 SCORE   Total Score  17 (severe anxiety)   Total Score 0 17       MADDIE LEVEL:       View : No data to display.                DATA  Extended Session (60+ minutes): No  Interactive Complexity: No  Crisis: No  [facility] Patient: No    Treatment Objective(s) Addressed in This Session:  Target Behavior(s): depression and anxiety    Depressed Mood: Increase interest, engagement, and pleasure in doing things  Decrease frequency and intensity of feeling down, depressed, hopeless  Anxiety: will experience a reduction in anxiety, will develop more effective coping skills to manage anxiety symptoms, will develop healthy cognitive patterns and beliefs and will increase ability to function adaptively    Current Stressors / Issues:  Writer met with PT, her  and her daughter. PT primary language is Oromo but can speak and understand most English. Writer spoke mostly with the daughter as PT was fidgety, appeared nervous, and would not respond to most questions. PT reports she arrived  in [place] with her  and 8 kids in 1999. 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 PT is from [place] and likes being in [place]. PT still has a lot of family in [place]. PT and writer spoke about her anxiety and depressions talking about symptoms and writer helped PT understand what it was. PT and writer spoke about what therapy is and help pt understand what I could do for.       Progress on Treatment Objective(s) / Homework:  New Objective established this session - PRECONTEMPLATION (Not seeing need for change); Intervened by educating the patient about the effects of current behavior on health.  Evoked information about reasons to continue behavior, express concern / recommendations, and explored any change talk    Motivational Interviewing    MI Intervention: Expressed Empathy/Understanding, Supported Autonomy, Collaboration, Evocation, Open-ended questions and Reflections: simple and complex     Change Talk Expressed by the Patient: NA - Precontemplative    Provider Response to Change Talk: E - Evoked more info from patient about behavior change and A - Affirmed patient's thoughts, decisions, or attempts at behavior change      Care Plan review completed: Yes    Medication Review:  No changes to current psychiatric medication(s)    Medication Compliance:  Yes    Changes in Health Issues:   None reported    Chemical Use Review:   Substance Use: Chemical use reviewed, no active concerns identified      Tobacco Use: No current tobacco use.      Assessment: Current Emotional / Mental Status (status of significant symptoms):  Risk status (Self / Other harm or suicidal ideation)  Patient denies a history of suicidal ideation, suicide attempts, self-injurious behavior, homicidal ideation, homicidal behavior and and other safety concerns  Patient denies current fears or concerns for personal safety.  Patient denies current or recent suicidal ideation or behaviors.  Patient denies current or recent  homicidal ideation or behaviors.  Patient denies current or recent self injurious behavior or ideation.  Patient denies other safety concerns.  A safety and risk management plan has not been developed at this time, however patient was encouraged to call [place] [phone] should there be a change in any of these risk factors.    Appearance:   Appropriate   Eye Contact:   Fair   Psychomotor Behavior: Agitated  Restless   Attitude:   Uncooperative   Orientation:   All  Speech   Rate / Production: Pressured  Slow  Mumbled   Volume:  Soft   Mood:    Anxious  Depressed   Affect:    Flat  Restricted  Worrisome   Thought Content:  Clear   Thought Form:  Coherent  Logical   Insight:    Good  and Poor     Diagnoses:  1. Anxiety attack    2. Mood disorder (H)    3. History of bipolar disorder        Collateral Reports Completed:  Not Applicable    Plan: (Homework, other):  Patient was given information about behavioral services and encouraged to schedule a follow up appointment with the clinic [facility] in 2 weeks.  She was also given information about mental health symptoms and treatment options .  CD Recommendations: No indications of CD issues.  Aimee Frausto MSW, [facility]      ______________________________________________________________________    Integrated Primary Care Behavioral Health Treatment Plan    Patient's Name: Yancy Rivera  YOB: 1955    Date of Creation: 5/5/23  Date Treatment Plan Last Reviewed/Revised: 5/5/23    DSM5 Diagnoses: 296.32 (F33.1) Major Depressive Disorder, Recurrent Episode, Moderate _ or 300.02 (F41.1) Generalized Anxiety Disorder  Psychosocial / Contextual Factors: ESL, anxiety is high,   PROMIS (reviewed every 90 days):     Referral / Collaboration:  The following referral(s) will be initiated: therapy.    Anticipated number of session for this episode of care: 10  Anticipation frequency of session: Every other week  Anticipated Duration of each session: 38-52 minutes  Treatment  plan will be reviewed in 90 days or when goals have been changed.       MeasurableTreatment Goal(s) related to diagnosis / functional impairment(s)  Goal 1: Patient will know what anxiety is    I will know I've met my goal when I know what anxiety is.      Objective #A (Patient Action)    Patient will understand the symptoms of anxiety.  Status: New - Date: 5/5/23     Intervention(s)  Therapist will teach teach patient what anxiety is.    Objective #B  Patient will identify 5 fears / thoughts that contribute to feeling anxious.  Status: New - Date: 5/5/23     Intervention(s)  Therapist will teach distraction skills. coping skills.    Objective #C  Patient will use at least 3 coping skills for anxiety management in the next 3 weeks.  Status: New - Date: 5/5/23     Intervention(s)  Therapist will teach emotional regulation skills. DBT.      Goal 2: Patient will know more about depression    I will know I've met my goal when I know more about depression.      Objective #A (Patient Action)    Status: New - Date: 5/5/23     Patient will Decrease frequency and intensity of feeling down, depressed, hopeless.    Intervention(s)  Therapist will teach rules for taking a timeout. 5 mins.    Objective #B  Patient will know more about depression.    Status: New - Date: 5/5/23     Intervention(s)  Therapist will teach patient about depression.    Objective #C  Patient will Feel less fidgety, restless or slow in daily activities / interpersonal interactions.  Status: New - Date: 5/5/23     Intervention(s)  Therapist will teach distraction skills. coping skills.          Patient has reviewed and agreed to the above plan.      Aimee Frausto, [facility]  May 5, 2023

## 2023-05-09 DIAGNOSIS — H40.1132 PRIMARY OPEN ANGLE GLAUCOMA OF BOTH EYES, MODERATE STAGE: Primary | ICD-10-CM

## 2023-05-25 ENCOUNTER — TELEPHONE (OUTPATIENT)
Dept: OPHTHALMOLOGY | Facility: CLINIC | Age: 68
End: 2023-05-25

## 2023-05-25 NOTE — TELEPHONE ENCOUNTER
RECORDS RECEIVED FROM:    DATE RECEIVED:    NOTES STATUS DETAILS   OFFICE NOTE from referring provider  Internal Hospital f/u    OFFICE NOTE from other cardiologists  N/A    RECORDS from hospital/ED Internal 4-20-23   MEDICATION LIST Internal    GENERAL CARDIO RECORDS   (ALL APPOINTMENT TYPES)     LABS (CBC,BMP,CMP, TSH) Internal    EKG (STRIPS & REPORTS) Internal 4-28-23   MONITORS (STRIPS & REPORTS) Internal 4-22-23   ECHOS (IMAGES AND REPORTS) Internal 4-21-23   STRESS TESTS (IMAGES AND REPORTS) N/A    cMRI (IMAGES AND REPORTS) N/A    CT/CTA (IMAGES AND REPORTS) Internal 6-7-21

## 2023-05-25 NOTE — TELEPHONE ENCOUNTER
"Spoke to niece at 1350    Pt with pink color to each eye and one eye 'peeling' up    No eye pain    Pt reporting some blurrier vision    Symptoms past couple days    Offered appt tomorrow with Dr. Echeverria at [facility] location and pt/neice michelle Freitas RN 1:53 PM 05/25/23          [facility] Call Center    Phone Message    May a detailed message be left on voicemail: yes     Reason for Call: Symptoms or Concerns     If patient has red-flag symptoms, warm transfer to triage line    Current symptom or concern: Whites of both eyes appear to be \"peeling up\".  Please call.  Thank you.    Symptoms have been present for:  2 day(s)    Has patient previously been seen for this? No    By :     Date:     Are there any new or worsening symptoms? Yes: Getting red also.      Action Taken: Message routed to:  [facility]hthalmology    Travel Screening: Not Applicable                                                                      "

## 2023-05-26 ENCOUNTER — OFFICE VISIT (OUTPATIENT)
Dept: OPHTHALMOLOGY | Facility: CLINIC | Age: 68
End: 2023-05-26
Attending: OPTOMETRIST
Payer: MEDICARE

## 2023-05-26 DIAGNOSIS — Z96.1 PSEUDOPHAKIA OF BOTH EYES: ICD-10-CM

## 2023-05-26 DIAGNOSIS — H04.123 DRY EYES, BILATERAL: ICD-10-CM

## 2023-05-26 DIAGNOSIS — H02.135 SENILE ECTROPION OF LEFT LOWER EYELID: ICD-10-CM

## 2023-05-26 DIAGNOSIS — H16.212 EXPOSURE KERATOCONJUNCTIVITIS OF LEFT EYE: Primary | ICD-10-CM

## 2023-05-26 DIAGNOSIS — H40.1132 PRIMARY OPEN ANGLE GLAUCOMA OF BOTH EYES, MODERATE STAGE: ICD-10-CM

## 2023-05-26 PROCEDURE — G0463 HOSPITAL OUTPT CLINIC VISIT: HCPCS | Performed by: OPTOMETRIST

## 2023-05-26 PROCEDURE — 99203 OFFICE O/P NEW LOW 30 MIN: CPT | Performed by: OPTOMETRIST

## 2023-05-26 RX ORDER — ERYTHROMYCIN 5 MG/G
0.5 OINTMENT OPHTHALMIC 3 TIMES DAILY
Qty: 5 G | Refills: 1 | Status: SHIPPED | OUTPATIENT
Start: 2023-05-26 | End: 2023-06-07

## 2023-05-26 ASSESSMENT — CONF VISUAL FIELD
OS_INFERIOR_NASAL_RESTRICTION: 0
OD_INFERIOR_TEMPORAL_RESTRICTION: 0
OS_NORMAL: 1
OD_NORMAL: 1
OD_SUPERIOR_TEMPORAL_RESTRICTION: 0
OS_INFERIOR_TEMPORAL_RESTRICTION: 0
OD_INFERIOR_NASAL_RESTRICTION: 0
OS_SUPERIOR_TEMPORAL_RESTRICTION: 0
METHOD: COUNTING FINGERS
OS_SUPERIOR_NASAL_RESTRICTION: 0
OD_SUPERIOR_NASAL_RESTRICTION: 0

## 2023-05-26 ASSESSMENT — VISUAL ACUITY
OS_SC: 20/60
OD_SC: 20/30
OS_PH_SC: 20/40
METHOD: SNELLEN - LINEAR

## 2023-05-26 ASSESSMENT — TONOMETRY
OD_IOP_MMHG: 14
IOP_METHOD: TONOPEN
OS_IOP_MMHG: 14

## 2023-05-26 ASSESSMENT — SLIT LAMP EXAM - LIDS
COMMENTS: ECTROPION
COMMENTS: NORMAL

## 2023-05-26 NOTE — PATIENT INSTRUCTIONS
POAG controlled cont with Dr. Rollins  IOL clear and centered each eye   Dry eyes cont lubricants   Ectropion left eye LL consult oculo plastics  Ointment left eye 3 X per day

## 2023-05-26 NOTE — PROGRESS NOTES
Assessment & Plan       Yancy Rivera is a 67 year old female with the following diagnoses:   1. Primary open angle glaucoma of both eyes, moderate stage - Both Eyes    2. Pseudophakia of both eyes - Both Eyes    3. Dry eyes, bilateral - Both Eyes    4. Senile ectropion of left lower eyelid - Left Eye    5. Exposure keratoconjunctivitis of left eye - Left Eye       POAG controlled cont with Dr. Rollins  IOL clear and centered each eye   Dry eyes cont lubricants   Ectropion left eye LL consult oculo plastics  Ointment left eye 3 X per day   Patient disposition:   No follow-ups on file.          Complete documentation of historical and exam elements from today's encounter can be found in the full encounter summary report (not reduplicated in this progress note). I personally obtained the chief complaint(s) and history of present illness.  I confirmed and edited as necessary the review of systems, past medical/surgical history, family history, social history, and examination findings as documented by others; and I examined the patient myself. I personally reviewed the relevant tests, images, and reports as documented above. I formulated and edited as necessary the assessment and plan and discussed the findings and management plan with the patient and family.  Dr. John GLOVER

## 2023-05-26 NOTE — NURSING NOTE
Chief Complaints and History of Present Illnesses   Patient presents with     Blurred Vision Evaluation     New patient blurred vision and redness left eye.  History of : POAG each eye, dry eyes, IOL each eye       Chief Complaint(s) and History of Present Illness(es)     Blurred Vision Evaluation            Laterality: left eye    Onset: gradual    Quality: blurred vision    Associated symptoms: redness, itching, swelling and foreign body sensation    Comments: New patient blurred vision and redness left eye.  History of : POAG each eye, dry eyes, IOL each eye           Comments    Eye meds: systane PF each eye   Left eye redness an something inside the eye.  Left lower eyelid swelling that started after redness.  Started with redness 3-4 days ago.  Some matter and itching today of left eye.  Not around anyone with pink eye or eye infection.    JAZMINE Shepherd 5/26/2023 12:51 PM

## 2023-06-01 ENCOUNTER — OFFICE VISIT (OUTPATIENT)
Dept: INTERNAL MEDICINE | Facility: CLINIC | Age: 68
End: 2023-06-01
Payer: MEDICARE

## 2023-06-01 VITALS
WEIGHT: 174 LBS | OXYGEN SATURATION: 95 % | BODY MASS INDEX: 29.71 KG/M2 | HEART RATE: 78 BPM | SYSTOLIC BLOOD PRESSURE: 116 MMHG | HEIGHT: 64 IN | DIASTOLIC BLOOD PRESSURE: 69 MMHG

## 2023-06-01 DIAGNOSIS — R11.0 NAUSEA: ICD-10-CM

## 2023-06-01 DIAGNOSIS — E78.5 HYPERLIPIDEMIA, UNSPECIFIED HYPERLIPIDEMIA TYPE: Primary | ICD-10-CM

## 2023-06-01 PROCEDURE — 99215 OFFICE O/P EST HI 40 MIN: CPT | Performed by: NURSE PRACTITIONER

## 2023-06-01 RX ORDER — ONDANSETRON 4 MG/1
4 TABLET, FILM COATED ORAL EVERY 8 HOURS PRN
Qty: 60 TABLET | Refills: 0 | Status: SHIPPED | OUTPATIENT
Start: 2023-06-01 | End: 2023-08-09

## 2023-06-01 NOTE — PROGRESS NOTES
S: Yancy Rivera is a 67 year old female accompanied by her spouse, her daughter, Javier, and another gentleman.  Her daughter states she has been to [facility].  They are requesting more information about past medications she was treated with in 2017 and 2018 in [facility].    PT primary language is Oromo but can speak and understand most English.    According to records pt was on Ativan in 2017 and olanzapine.      Patient Active Problem List   Diagnosis     Hyperlipidemia with target LDL less than 130     Prediabetes     Disorder of bursae and tendons in shoulder region     Shingles     Primary open angle glaucoma of both eyes, moderate stage     Senile nuclear sclerosis, bilateral     Right lumbar radiculopathy     Benign essential hypertension     Right wrist pain     Chronic midline low back pain without sciatica     Pseudophakia of both eyes     Right wrist injury, subsequent encounter     Neck pain     Rheumatoid arthritis involving multiple sites, unspecified whether rheumatoid factor present (H)     Pituitary adenoma (H)     Hyponatremia     Syncope, unspecified syncope type       Past Medical History:   Diagnosis Date     Bipolar disorder (H)      Blepharitis      Esophageal reflux (aka GERD)      Glaucoma      Hyperlipidemia LDL goal < 130      Nonsenile cataract      Shingles     11/14/15 Left approximately C6 distribution          Past Surgical History:   Procedure Laterality Date     CATARACT IOL, RT/LT Right 10/16/2018    [facility]      CATARACT IOL, RT/LT Left 11/01/2018    [facility]      CHALAZION EXCISION  08/21/2015    Left lid     HERNIA REPAIR      abdominal hernia repair 2012          Social History     Tobacco Use     Smoking status: Never     Passive exposure: Never     Smokeless tobacco: Never   Vaping Use     Vaping status: Never Used   Substance Use Topics     Alcohol use: No             Family History   Problem Relation Age of Onset     Glaucoma Mother      Macular Degeneration No family hx of      Cancer No family hx of      Diabetes No family hx of      Hypertension No family hx of      Cerebrovascular Disease No family hx of      Thyroid Disease No family hx of      Eye Surgery No family hx of                Allergies   Allergen Reactions     No Clinical Screening - See Comments Nausea and Vomiting     Liver side effects from INH for TB            Current Outpatient Medications   Medication Sig Dispense Refill     acetaminophen (TYLENOL) 325 MG tablet Take 2 tablets (650 mg) by mouth every 4 hours as needed for mild pain       amLODIPine (NORVASC) 2.5 MG tablet Take 1 tablet (2.5 mg) by mouth 2 times daily 60 tablet 0     atorvastatin (LIPITOR) 20 MG tablet Take 1 tablet (20 mg) by mouth daily Patient needs to have labs for further refills. 90 tablet 3     carboxymethylcellulose PF (CARBOXYMETHYLCELLULOSE SODIUM) 0.5 % ophthalmic solution Place 1 drop into both eyes 4 times daily as needed for dry eyes 70 each 11     carvedilol (COREG) 6.25 MG tablet Take 1 tablet (6.25 mg) by mouth 2 times daily (with meals) 60 tablet 0     diclofenac (VOLTAREN) 1 % topical gel Place 2 g onto the skin 4 times daily To right wrist 100 g 3     erythromycin (ROMYCIN) 5 MG/GM ophthalmic ointment Place 0.5 inches Into the left eye 3 times daily 5 g 1     hydrOXYzine (ATARAX) 10 MG tablet Take 1-2 tablets (10-20 mg) by mouth every 6 hours as needed (For anxiety, restlessness and/or sleep) 60 tablet 0     OLANZapine (ZYPREXA) 5 MG tablet Take 1 tablet (5 mg) by mouth At Bedtime (Patient taking differently: Take 5 mg by mouth 2 times daily) 20 tablet 0     pantoprazole (PROTONIX) 40 MG EC tablet Take 1 tablet (40 mg) by mouth daily 90 tablet 3     traZODone (DESYREL) 50 MG tablet Take 1 tablet (50 mg) by mouth 2 times daily (Patient taking differently: Take 100 mg by mouth At Bedtime) 20 tablet 1     triamcinolone  "(KENALOG) 0.1 % external ointment Apply topically 2 times daily 454 g 11     White Petrolatum-Mineral Oil (REFRESH P.M.) OINT Apply thin layer to both eyes at bedtime 3.5 g 3     chlorhexidine (PERIDEX) 0.12 % solution SWISH IN MOUTH FOR 30 SECONDS WITH 15MLS AND SPIT. USE TWO TIMES A DAY. DO NOT SWALLOW (Patient not taking: Reported on 6/1/2023) 473 mL 3     clotrimazole (MYCELEX) 10 MG lozenge DISSOLVE 1 TABLET IN MOUTH AND SWALLOW ROUTE FIVE TIMES A DAY. (Patient not taking: Reported on 6/1/2023)       ferrous sulfate (IRON) 325 (65 FE) MG tablet Take 1 tablet (325 mg) by mouth daily (with breakfast) (Patient not taking: Reported on 6/1/2023) 30 tablet 11       REVIEW OF SYSTEMS:  See above.    O:   /69 (BP Location: Right arm, Patient Position: Sitting, Cuff Size: Adult Regular)   Pulse 78   Ht 1.63 m (5' 4.17\")   Wt 78.9 kg (174 lb)   LMP  (LMP Unknown)   SpO2 95%   BMI 29.71 kg/m    GENERAL APPEARANCE: healthy, alert and no distress  EYES: sclera white, conjunctiva normal.  RESP: lungs clear to auscultation - no rales, rhonchi or wheezes.  CV: regular rates and rhythm, normal S1 S2, no S3 or S4 and no murmur, click or rub   NEURO:  Writer spoke mostly with the daughter as PT was fidgety, appeared nervous, and would not respond to most questions.    MUSK: ambulatory.   PSYCHE: See neuro.    The 10-year ASCVD risk score (Kate MARCOS, et al., 2019) is: 5.8%    Values used to calculate the score:      Age: 67 years      Sex: Female      Is Non- : Yes      Diabetic: No      Tobacco smoker: No      Systolic Blood Pressure: 116 mmHg      Is BP treated: Yes      HDL Cholesterol: 81 mg/dL      Total Cholesterol: 171 mg/dL    A/P:  Yancy was seen today for recheck.    Diagnoses and all orders for this visit:    Hyperlipidemia, unspecified hyperlipidemia type  -     Lipid panel reflex to direct LDL Fasting; Future    Nausea  -     ondansetron (ZOFRAN) 4 MG tablet; Take 1 tablet (4 mg) " by mouth every 8 hours as needed for nausea    The patient voiced understanding of the information discussed and all questions were answered.     I spent a total of 40 minutes in the care of this pt during today's office visit. This time includes reviewing the patient's chart and prior history, obtaining a history, performing an examination and evaluation and counseling the patient. This time also includes ordering medications or tests necessary in addition to communication to other member's of the patient's health care team. Time spent in documentation and care coordination is included.     Karin DICKENS, CNP

## 2023-06-01 NOTE — NURSING NOTE
Yancy Rivera is a 67 year old female patient that presents today in clinic for the following:    Chief Complaint   Patient presents with     RECHECK     Eye follow up, nausea, poor appetite, low sodium     The patient's allergies and medications were reviewed as noted. A set of vitals were recorded as noted without incident. The patient does not have any other questions for the provider.    Óscar Georges, EMT at 10:58 AM on 6/1/2023

## 2023-06-02 ENCOUNTER — TELEPHONE (OUTPATIENT)
Dept: INTERNAL MEDICINE | Facility: CLINIC | Age: 68
End: 2023-06-02
Payer: MEDICARE

## 2023-06-02 DIAGNOSIS — R25.1 TREMOR: Primary | ICD-10-CM

## 2023-06-02 DIAGNOSIS — I10 BENIGN ESSENTIAL HYPERTENSION: ICD-10-CM

## 2023-06-02 NOTE — TELEPHONE ENCOUNTER
[facility] Call Center    Phone Message    May a detailed message be left on voicemail: yes     Reason for Call: Other: Patient has decided she would like the referral to Neurology, please order and call, thank you!      Action Taken: Other: PCC    Travel Screening: Not Applicable

## 2023-06-05 DIAGNOSIS — H40.1132 PRIMARY OPEN ANGLE GLAUCOMA OF BOTH EYES, MODERATE STAGE: Primary | ICD-10-CM

## 2023-06-05 NOTE — TELEPHONE ENCOUNTER
Spoke to patient and tried to obtain more information of what the referral to neurology was for.  Pt would not give me more information besides that neurology was discussed at her visit with provider and she should know.    Notify patient provider is out on vacation and will not return until 06/27/23.  She is fine waiting until then.      4-5 weeks follow up schedule with ROSALINDA Frazier CMA ([facility]) at 10:52 AM on 6/5/2023

## 2023-06-06 RX ORDER — AMLODIPINE BESYLATE 2.5 MG/1
2.5 TABLET ORAL 2 TIMES DAILY
Qty: 180 TABLET | Refills: 3 | Status: SHIPPED | OUTPATIENT
Start: 2023-06-06 | End: 2023-08-31

## 2023-06-07 ENCOUNTER — OFFICE VISIT (OUTPATIENT)
Dept: OPHTHALMOLOGY | Facility: CLINIC | Age: 68
End: 2023-06-07
Attending: OPHTHALMOLOGY
Payer: MEDICARE

## 2023-06-07 DIAGNOSIS — Z01.00 EXAMINATION OF EYES AND VISION: ICD-10-CM

## 2023-06-07 DIAGNOSIS — H16.212 EXPOSURE KERATOCONJUNCTIVITIS OF LEFT EYE: ICD-10-CM

## 2023-06-07 DIAGNOSIS — H40.1132 PRIMARY OPEN ANGLE GLAUCOMA OF BOTH EYES, MODERATE STAGE: ICD-10-CM

## 2023-06-07 DIAGNOSIS — H02.135 SENILE ECTROPION OF LEFT LOWER EYELID: ICD-10-CM

## 2023-06-07 DIAGNOSIS — H04.123 DRY EYES, BILATERAL: ICD-10-CM

## 2023-06-07 PROCEDURE — G0463 HOSPITAL OUTPT CLINIC VISIT: HCPCS | Performed by: OPHTHALMOLOGY

## 2023-06-07 PROCEDURE — 92133 CPTRZD OPH DX IMG PST SGM ON: CPT | Performed by: OPHTHALMOLOGY

## 2023-06-07 PROCEDURE — 99214 OFFICE O/P EST MOD 30 MIN: CPT | Performed by: OPHTHALMOLOGY

## 2023-06-07 PROCEDURE — 92083 EXTENDED VISUAL FIELD XM: CPT | Performed by: OPHTHALMOLOGY

## 2023-06-07 RX ORDER — CARBOXYMETHYLCELLULOSE SODIUM 5 MG/ML
1 SOLUTION/ DROPS OPHTHALMIC 4 TIMES DAILY PRN
Qty: 70 EACH | Refills: 11 | Status: SHIPPED | OUTPATIENT
Start: 2023-06-07 | End: 2023-08-09

## 2023-06-07 RX ORDER — ERYTHROMYCIN 5 MG/G
0.5 OINTMENT OPHTHALMIC 3 TIMES DAILY
Qty: 5 G | Refills: 1 | Status: SHIPPED | OUTPATIENT
Start: 2023-06-07 | End: 2023-08-09

## 2023-06-07 RX ORDER — TIMOLOL MALEATE 5 MG/ML
1 SOLUTION/ DROPS OPHTHALMIC EVERY MORNING
Qty: 5 ML | Refills: 11 | Status: SHIPPED | OUTPATIENT
Start: 2023-06-07 | End: 2023-08-09

## 2023-06-07 ASSESSMENT — VISUAL ACUITY
OS_PH_SC: 20/40
OD_SC: 20/30
OS_SC+: -2
OS_SC: 20/40
METHOD: SNELLEN - LINEAR

## 2023-06-07 ASSESSMENT — CONF VISUAL FIELD
OS_NORMAL: 1
OS_SUPERIOR_NASAL_RESTRICTION: 0
OS_INFERIOR_NASAL_RESTRICTION: 0
OS_SUPERIOR_TEMPORAL_RESTRICTION: 0
OS_INFERIOR_TEMPORAL_RESTRICTION: 0

## 2023-06-07 ASSESSMENT — TONOMETRY
IOP_METHOD: APPLANATION
OD_IOP_MMHG: 20
OS_IOP_MMHG: 14

## 2023-06-07 ASSESSMENT — EXTERNAL EXAM - LEFT EYE: OS_EXAM: NORMAL

## 2023-06-07 ASSESSMENT — EXTERNAL EXAM - RIGHT EYE: OD_EXAM: NORMAL

## 2023-06-07 ASSESSMENT — CUP TO DISC RATIO
OS_RATIO: 0.8
OD_RATIO: 0.6

## 2023-06-07 NOTE — NURSING NOTE
Chief Complaints and History of Present Illnesses   Patient presents with     Glaucoma Follow Up     POAG follow up   No change since last visit  Art tears bid BE  Oint tid GRACE Richa Hoang [facility] 8:25 AM June 7, 2023        Chief Complaint(s) and History of Present Illness(es)     Glaucoma Follow Up            Laterality: both eyes    Associated symptoms: eye pain, photophobia and floaters.  Negative for flashes    Pain scale: 8/10    Comments: POAG follow up   No change since last visit  Art tears bid BE  Oint tid GRACE Hughesnda Jackelyni [facility] 8:25 AM June 7, 2023

## 2023-06-07 NOTE — PROGRESS NOTES
Chief Complaint(s) and History of Present Illness(es)     Glaucoma Follow Up            Laterality: both eyes    Associated symptoms: eye pain, photophobia and floaters.  Negative for   flashes    Pain scale: 8/10    Comments: POAG follow up   No change since last visit  Art tears bid BE  Oint tid GRACE [facility] 8:25 AM June 7, 2023               Review of systems for the eyes was negative other than the pertinent positives/negatives listed in the HPI.      Assessment & Plan      Yancy Rivera is a 67 year old female with the following diagnoses:   1. Examination of eyes and vision    2. Primary open angle glaucoma of both eyes, moderate stage         Lost to follow up since 7/2022  S/P Selected laser trabeculoplasty (SLT) both eyes Spring 2021    Intraocular pressure right eye too high today  Goal low teens both eyes   OCT Nerve fiber layer looks stable both eyes   Visual field with worsened superior vision loss right eye.  Suspect eyelid artifact.  Dermatochalsis visually significant right upper lid   Also having troubles due to left lower lid ectropion  Recommend surgery eval with oculoplastics for right upper lid blepharoplasty and left lower lid tarsal strip/ectopion repair  Return to [facility] for recheck visual field and refraction after   Start timolol every morning right eye for now      Continue artificial tears four times a day both eyes   ESS agustin to left eye three times a day as needed until lid surgery            Attending Physician Attestation:  Complete documentation of historical and exam elements from today's encounter can be found in the full encounter summary report (not reduplicated in this progress note).  I personally obtained the chief complaint(s) and history of present illness.  I confirmed and edited as necessary the review of systems, past medical/surgical history, family history, social history, and examination findings as documented by others; and I examined the patient myself.  I  personally reviewed the relevant tests, images, and reports as documented above.  I formulated and edited as necessary the assessment and plan and discussed the findings and management plan with the patient and family. . - Constantin Rollins MD

## 2023-06-13 DIAGNOSIS — I10 BENIGN ESSENTIAL HYPERTENSION: ICD-10-CM

## 2023-06-16 RX ORDER — CARVEDILOL 6.25 MG/1
TABLET ORAL
Qty: 180 TABLET | Refills: 3 | Status: SHIPPED | OUTPATIENT
Start: 2023-06-16 | End: 2024-01-11

## 2023-06-16 NOTE — TELEPHONE ENCOUNTER
CARVEDILOL 6.25 MG TABLET      Last Written Prescription Date:  5-4-23  Last Fill Quantity: 60,   # refills: 0  Last Office Visit : 6-1-23  Future Office visit:  7-20-23    Routing refill request to provider for review/approval because:  Last rx limited quantity, 0 RF

## 2023-06-27 ENCOUNTER — PRE VISIT (OUTPATIENT)
Dept: CARDIOLOGY | Facility: CLINIC | Age: 68
End: 2023-06-27

## 2023-06-27 NOTE — TELEPHONE ENCOUNTER
Action 6/27/23 MV 2.32p   Action Taken Imaging request faxed to [facility]     Action 7/17/23 MV3p   Action Taken Images resolved in PACS         RECORDS RECEIVED FROM: internal   REASON FOR VISIT: tremors   Date of Appt: 8/8/23   NOTES (FOR ALL VISITS) STATUS DETAILS   OFFICE NOTE from referring provider Internal Karin Kim NP @ [facility] Internal Med:  6/2/23 telephone encounter  6/1/22   MEDICATION LIST Internal    IMAGING  (FOR ALL VISITS)     MRI (HEAD, NECK, SPINE) PACS [facility]:  MRA Brain COW 4/21/23  MRA Neck Carotids 4/21/23  MRI Brain 4/21/23  MRI Cervical Spine 5/26/22   CT (HEAD, NECK, SPINE) PACS [facility]:  CT Head 5/25/23    [facility]:  CT Head 2/27/23  CT Head 12/21/21

## 2023-07-03 ENCOUNTER — TRANSFERRED RECORDS (OUTPATIENT)
Dept: HEALTH INFORMATION MANAGEMENT | Facility: CLINIC | Age: 68
End: 2023-07-03
Payer: MEDICARE

## 2023-07-05 ENCOUNTER — OFFICE VISIT (OUTPATIENT)
Dept: OPHTHALMOLOGY | Facility: CLINIC | Age: 68
End: 2023-07-05
Payer: MEDICARE

## 2023-07-05 DIAGNOSIS — H02.834 DERMATOCHALASIS OF BOTH UPPER EYELIDS: ICD-10-CM

## 2023-07-05 DIAGNOSIS — H02.135 SENILE ECTROPION OF LEFT LOWER EYELID: Primary | ICD-10-CM

## 2023-07-05 DIAGNOSIS — H02.403 INVOLUTIONAL PTOSIS, ACQUIRED, BILATERAL: ICD-10-CM

## 2023-07-05 DIAGNOSIS — H02.831 DERMATOCHALASIS OF BOTH UPPER EYELIDS: ICD-10-CM

## 2023-07-05 DIAGNOSIS — H02.135 SENILE ECTROPION OF LEFT LOWER EYELID: ICD-10-CM

## 2023-07-05 DIAGNOSIS — H02.132 SENILE ECTROPION OF RIGHT LOWER EYELID: ICD-10-CM

## 2023-07-05 PROCEDURE — 92081 LIMITED VISUAL FIELD XM: CPT | Performed by: OPHTHALMOLOGY

## 2023-07-05 PROCEDURE — 92285 EXTERNAL OCULAR PHOTOGRAPHY: CPT | Performed by: OPHTHALMOLOGY

## 2023-07-05 PROCEDURE — 99214 OFFICE O/P EST MOD 30 MIN: CPT | Performed by: OPHTHALMOLOGY

## 2023-07-05 RX ORDER — SULFAMETHOXAZOLE/TRIMETHOPRIM 800-160 MG
160 TABLET ORAL
COMMUNITY
Start: 2023-07-01 | End: 2023-07-06

## 2023-07-05 RX ORDER — BUSPIRONE HYDROCHLORIDE 10 MG/1
10 TABLET ORAL
COMMUNITY
Start: 2023-06-20 | End: 2023-08-09

## 2023-07-05 RX ORDER — HYDROXYZINE PAMOATE 25 MG/1
CAPSULE ORAL
COMMUNITY
Start: 2023-07-04 | End: 2023-08-09

## 2023-07-05 RX ORDER — QUETIAPINE FUMARATE 100 MG/1
TABLET, FILM COATED ORAL
COMMUNITY
Start: 2023-07-03 | End: 2023-08-31

## 2023-07-05 ASSESSMENT — TONOMETRY
OD_IOP_MMHG: 13
IOP_METHOD: ICARE
OS_IOP_MMHG: 10

## 2023-07-05 ASSESSMENT — VISUAL ACUITY
OD_SC: 20/30
OS_SC: 20/40
METHOD: SNELLEN - LINEAR
OS_SC+: -2

## 2023-07-05 ASSESSMENT — EXTERNAL EXAM - RIGHT EYE: OD_EXAM: BROW PTOSIS, WITH FRONTALIS RELAXED, BROW IS BELOW SUPERIOR ORBITAL RIM AND LATERALLY INLINE WITH UPPER LASHES

## 2023-07-05 ASSESSMENT — EXTERNAL EXAM - LEFT EYE: OS_EXAM: NORMAL

## 2023-07-05 NOTE — LETTER
" 2023         RE:  :  MRN: Yancy Rivera  1955  [id number]     Dear Dr. Rollins,    Thank you for asking me to see your patient, Yancy Rivera, for an oculoplastic   consultation.  My assessment and plan are below.  For further details, please see my attached clinic note.           HPI:     Chief Complaint(s) and History of Present Illness(es)     Droopy Eye Lid Evaluation            Laterality: right upper lid          Ectropion Evaluation            Laterality: left lower lid          Comments    Patient referred by Dr Rollins for eyelid evaluation, Bleph and ectropion   evaluation.   Lots of excess tearing, especially in the left eye. Also feeling some   \"tingling\" in the left lower lid. Some burning sensation in the left eye.   Right upper lid drooping to the point off touching her lashes and folding   over the top of her lashes. Noticing reduced vision due to the drooping.        Patient here with her daughter who is also helping with the history.    Yancy Rivera is a 68 year old female who has noted gradual onset of droopy eyelids over the past years. The droopy eyelid is interfering with activities of daily living including driving, and reading. The patient denies double vision, variability of the eyelid position. She does have dry eyes, and uses artificial tears once or twice a day, but has decreased lately.     EXAM:     MRD1: 0 mm right eye and 1 mm left eye   Dermatochalasis with excess skin touching eyelashes and aponeurotic ptosis, with aponeurotic ptosis worse on the left  Brow ptosis with brow resting below superior orbital rim worse on the right     VISUAL FIELD:  Right eye untaped:6 degrees Right eye taped:42 degrees  Left eye untaped:25 degrees Left eye taped:50 degrees    Assessment & Plan     Yancy Rivera is a 68 year old female with the following diagnoses:   1. 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 Senile ectropion of right lower eyelid    3. Dermatochalasis " of both upper eyelids    4. Involutional ptosis, acquired, bilateral       Consider:      Both upper eyelid blepharoplasty 98252  Bilateral ptosis repair external levator approach (start left eye) 67891  Internal browpexy right 78437  Both lower eyelid ectropion repair 93136    Due to significant brow ptosis, blepharoplasty alone would be unsuccesfull in addressing the lateral hooding which is obstructing vision. Blepharoplasty alone would result in sewing very thin eyelid skin to thick sub-brow skin, and even with that, fail to address lateral hooding which is obstructing vision.          Again, thank you for allowing me to participate in the care of your patient.      Sincerely,    Mine Munoz MD  Department of Ophthalmology and Visual Neurosciences  [facility]    CC: Constantin Rollins MD  [address] [place] [id number]  Via In Basket

## 2023-07-05 NOTE — PROGRESS NOTES
"Oculoplastic Clinic New Patient    Patient: Yancy Rivera MRN# [id number]   YOB: 1955 Age: 68 year old   Date of Visit: Jul 5, 2023    CC: Droopy eyelids obstructing vision.              HPI:     Chief Complaint(s) and History of Present Illness(es)     Droopy Eye Lid Evaluation            Laterality: right upper lid          Ectropion Evaluation            Laterality: left lower lid          Comments    Patient referred by Dr Rollins for eyelid evaluation, Bleph and ectropion   evaluation.   Lots of excess tearing, especially in the left eye. Also feeling some   \"tingling\" in the left lower lid. Some burning sensation in the left eye.   Right upper lid drooping to the point off touching her lashes and folding   over the top of her lashes. Noticing reduced vision due to the drooping.        Patient here with her daughter who is also helping with the history.    Yancy Rivera is a 68 year old female who has noted gradual onset of droopy eyelids over the past years. The droopy eyelid is interfering with activities of daily living including driving, and reading. The patient denies double vision, variability of the eyelid position. She does have dry eyes, and uses artificial tears once or twice a day, but has decreased lately.     EXAM:     MRD1: 0 mm right eye and 1 mm left eye   Dermatochalasis with excess skin touching eyelashes and aponeurotic ptosis, with aponeurotic ptosis worse on the left  Brow ptosis with brow resting below superior orbital rim worse on the right     VISUAL FIELD:  Right eye untaped:6 degrees Right eye taped:42 degrees  Left eye untaped:25 degrees Left eye taped:50 degrees    Assessment & Plan     Yancy Rivera is a 68 year old female with the following diagnoses:   1. 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 Senile ectropion of right lower eyelid    3. Dermatochalasis of both upper eyelids    4. Involutional ptosis, acquired, bilateral       Consider:      Both " upper eyelid blepharoplasty 72694  Bilateral ptosis repair external levator approach (start left eye) 44869  Internal browpexy right 27219  Both lower eyelid ectropion repair 86687    Due to significant brow ptosis on the right side, blepharoplasty alone would be unsuccesfull in addressing the lateral hooding which is obstructing vision. Blepharoplasty alone would result in sewing very thin eyelid skin to thick sub-brow skin, and even with that, fail to address lateral hooding which is obstructing vision.           PHOTOS DEMONSTRATE:      Significant dermatochalasis with lids resting on eyelashes and obstructing visual axis bilateral  Blepharoptosis bilateral  Brow ptosis with thicker brow skin and hairs below the lateral superior orbital rim on the right side  Both lower eyelid ectropion   Attending Physician Attestation: Complete documentation of historical and exam elements from today's encounter can be found in the full encounter summary report (not reduplicated in this progress note). I personally obtained the chief complaint(s) and history of present illness. I confirmed and edited as necessary the review of systems, past medical/surgical history, family history, social history, and examination findings as documented by others; and I examined the patient myself. I personally reviewed the relevant tests, images, and reports as documented above. I formulated and edited as necessary the assessment and plan and discussed the findings and management plan with the patient. Mine Munoz MD      Today with Yancy Rivera I reviewed the indications, risks, benefits, and alternatives of the proposed surgical procedure including, but not limited to, failure obtain the desired result  and need for additional surgery, bleeding, infection, loss of vision, or injury to the eye, dry eyes, and the remote possibility of permanent damage to any organ system or death with the use of anesthesia.  I provided multiple  opportunities for the questions, answered all questions to the best of my ability, and confirmed that my answers and my discussion were understood.

## 2023-07-05 NOTE — NURSING NOTE
"Chief Complaints and History of Present Illnesses   Patient presents with     Droopy Eye Lid Evaluation     Ectropion Evaluation       Chief Complaint(s) and History of Present Illness(es)     Droopy Eye Lid Evaluation            Laterality: right upper lid          Ectropion Evaluation            Laterality: left lower lid          Comments    Patient referred by Dr Rollins for eyelid evaluation, Bleph and ectropion evaluation.   Lots of excess tearing, especially in the left eye. Also feeling some \"tingling\" in the left lower lid. Some burning sensation in the left eye.   Right upper lid drooping to the point off touching her lashes and folding over the top of her lashes. Noticing reduced vision due to the drooping.                       Donnie Salcedo, Ophthalmic Assistant   "

## 2023-07-13 ENCOUNTER — OFFICE VISIT (OUTPATIENT)
Dept: FAMILY MEDICINE | Facility: CLINIC | Age: 68
End: 2023-07-13
Payer: MEDICARE

## 2023-07-13 ENCOUNTER — TELEPHONE (OUTPATIENT)
Dept: FAMILY MEDICINE | Facility: CLINIC | Age: 68
End: 2023-07-13

## 2023-07-13 VITALS
BODY MASS INDEX: 29.23 KG/M2 | HEIGHT: 64 IN | SYSTOLIC BLOOD PRESSURE: 112 MMHG | RESPIRATION RATE: 18 BRPM | HEART RATE: 88 BPM | OXYGEN SATURATION: 96 % | DIASTOLIC BLOOD PRESSURE: 60 MMHG | WEIGHT: 171.2 LBS | TEMPERATURE: 97.5 F

## 2023-07-13 DIAGNOSIS — F32.A DEPRESSION, UNSPECIFIED DEPRESSION TYPE: ICD-10-CM

## 2023-07-13 DIAGNOSIS — R30.0 DYSURIA: Primary | ICD-10-CM

## 2023-07-13 DIAGNOSIS — G47.09 OTHER INSOMNIA: ICD-10-CM

## 2023-07-13 DIAGNOSIS — E87.6 HYPOKALEMIA: ICD-10-CM

## 2023-07-13 LAB
ALBUMIN UR-MCNC: NEGATIVE MG/DL
ANION GAP SERPL CALCULATED.3IONS-SCNC: 12 MMOL/L (ref 7–15)
APPEARANCE UR: CLEAR
BACTERIA #/AREA URNS HPF: ABNORMAL /HPF
BILIRUB UR QL STRIP: NEGATIVE
BUN SERPL-MCNC: 11.8 MG/DL (ref 8–23)
CALCIUM SERPL-MCNC: 10 MG/DL (ref 8.8–10.2)
CHLORIDE SERPL-SCNC: 103 MMOL/L (ref 98–107)
COLOR UR AUTO: YELLOW
CREAT SERPL-MCNC: 0.72 MG/DL (ref 0.51–0.95)
DEPRECATED HCO3 PLAS-SCNC: 22 MMOL/L (ref 22–29)
GFR SERPL CREATININE-BSD FRML MDRD: >90 ML/MIN/1.73M2
GLUCOSE SERPL-MCNC: 141 MG/DL (ref 70–99)
GLUCOSE UR STRIP-MCNC: NEGATIVE MG/DL
HGB UR QL STRIP: NEGATIVE
KETONES UR STRIP-MCNC: NEGATIVE MG/DL
LEUKOCYTE ESTERASE UR QL STRIP: ABNORMAL
NITRATE UR QL: NEGATIVE
PH UR STRIP: 7 [PH] (ref 5–7)
POTASSIUM SERPL-SCNC: 4.4 MMOL/L (ref 3.4–5.3)
RBC #/AREA URNS AUTO: ABNORMAL /HPF
SODIUM SERPL-SCNC: 137 MMOL/L (ref 136–145)
SP GR UR STRIP: 1.01 (ref 1–1.03)
SQUAMOUS #/AREA URNS AUTO: ABNORMAL /LPF
UROBILINOGEN UR STRIP-ACNC: 0.2 E.U./DL
WBC #/AREA URNS AUTO: ABNORMAL /HPF

## 2023-07-13 PROCEDURE — 80048 BASIC METABOLIC PNL TOTAL CA: CPT | Performed by: FAMILY MEDICINE

## 2023-07-13 PROCEDURE — 81001 URINALYSIS AUTO W/SCOPE: CPT | Performed by: FAMILY MEDICINE

## 2023-07-13 PROCEDURE — 36415 COLL VENOUS BLD VENIPUNCTURE: CPT | Performed by: FAMILY MEDICINE

## 2023-07-13 PROCEDURE — 99213 OFFICE O/P EST LOW 20 MIN: CPT | Performed by: FAMILY MEDICINE

## 2023-07-13 RX ORDER — QUETIAPINE FUMARATE 50 MG/1
50 TABLET, FILM COATED ORAL AT BEDTIME
Qty: 30 TABLET | Refills: 1 | Status: SHIPPED | OUTPATIENT
Start: 2023-07-13 | End: 2023-08-15

## 2023-07-13 RX ORDER — CLONAZEPAM 0.5 MG/1
TABLET ORAL
COMMUNITY
Start: 2023-07-06 | End: 2024-07-08

## 2023-07-13 RX ORDER — QUETIAPINE FUMARATE 25 MG/1
25 TABLET, FILM COATED ORAL AT BEDTIME
COMMUNITY
Start: 2023-07-03 | End: 2023-07-13

## 2023-07-13 ASSESSMENT — ENCOUNTER SYMPTOMS
CHILLS: 0
SORE THROAT: 0
DIZZINESS: 0
ABDOMINAL PAIN: 0
HEMATOCHEZIA: 0
NAUSEA: 0
HEARTBURN: 0
DIARRHEA: 0
SHORTNESS OF BREATH: 0
COUGH: 0
HEADACHES: 0
NERVOUS/ANXIOUS: 0
CONSTIPATION: 0
ARTHRALGIAS: 0
EYE PAIN: 0
PARESTHESIAS: 0
HEMATURIA: 0
WEAKNESS: 0
FREQUENCY: 0
PALPITATIONS: 0
JOINT SWELLING: 0
DYSURIA: 0
FEVER: 0
BREAST MASS: 0
MYALGIAS: 0

## 2023-07-13 ASSESSMENT — PATIENT HEALTH QUESTIONNAIRE - PHQ9
10. IF YOU CHECKED OFF ANY PROBLEMS, HOW DIFFICULT HAVE THESE PROBLEMS MADE IT FOR YOU TO DO YOUR WORK, TAKE CARE OF THINGS AT HOME, OR GET ALONG WITH OTHER PEOPLE: EXTREMELY DIFFICULT
SUM OF ALL RESPONSES TO PHQ QUESTIONS 1-9: 17
SUM OF ALL RESPONSES TO PHQ QUESTIONS 1-9: 17

## 2023-07-13 ASSESSMENT — PAIN SCALES - GENERAL: PAINLEVEL: MODERATE PAIN (4)

## 2023-07-13 NOTE — TELEPHONE ENCOUNTER
That is correct, no medications were sent.  Patient is followed by psychiatry through [facility] and advised to keep their upcoming appointment with [facility] to discuss medications to treat her depression.  I am evaluating her urinary and laboratory issues at today's visit.

## 2023-07-13 NOTE — TELEPHONE ENCOUNTER
Received call from pt's niece (no consent to communicate on file). She states that patient was seen today by Dr. Winters and they were expecting a medication at the pharmacy for depression and the pharmacy said that they did not receive anything.    Carole Anton RN   [facility]

## 2023-07-13 NOTE — RESULT ENCOUNTER NOTE
Left message on answering machine for patient's daughter to call back for test results below from Dr. Winters.  Recent labs show show the following:  -Urine does not show any acute findings.    This is reassuring news.    See telephone encounter 7/13/23.        Karley Diop MA on 7/13/2023 at 10:59 AM

## 2023-07-13 NOTE — TELEPHONE ENCOUNTER
Called to inform daughter of test results from Dr. Winters below.       Recent labs show show the following:  -Urine does not show any acute findings.    This is reassuring news.      Karley Diop MA on 7/13/2023 at 10:58 AM

## 2023-07-13 NOTE — PATIENT INSTRUCTIONS
Paul Haines,    Thank you for allowing [facility] to manage your care.    I ordered some blood work, please go to the laboratory to get your laboratory studies.    For her insomnia and paranoid thoughts, I am increasing seroquel 50 mg daily.     I made a therapy referral, they will be calling you.     For your convenience, test results are released as soon as they are available  Please allow 1-2 business days for me to send you a comment about your results.  If not done so, I encourage you to login into [employer] ([email or web address] to review your results in real time.     If you have any questions or concerns, please feel free to call us at [phone].    Sincerely,    Dr. Winters    Did you know?      You can schedule a video visit for follow-up appointments as well as future appointments for certain conditions.  Please see the below link.     [email or web address]    If you have not already done so,  I encourage you to sign up for [employer] ([email or web address]).  This will allow you to review your results, securely communicate with a provider, and schedule virtual visits as well.

## 2023-07-13 NOTE — TELEPHONE ENCOUNTER
Will need to talk to patient, no consent to communicate on file.    The consent scanned into Epic is not valid, no names listed.    I called   Services, placed call to patient with assistance of [facility]  #[phone].    I also see Dr. Winters's MA spoke to patient's daughter earlier to today to let her know patient's UA did not show infection.    Daughter answered, she is trying to reach her mom on the phone to talk with me.    Unable to reach her, I did briefly advise her that Dr. Winters wants the to follow up with psych at [facility] for her mental health issues.  No Rx's were sent or intended.    Daughter verbalized understanding and agreement.    Lisa Gasca RN  [facility]

## 2023-07-13 NOTE — PROGRESS NOTES
1. Dysuria  S/P completion of Bactrim.  Stressed the importance of increasing fluids  - UA with Microscopic reflex to Culture - Clinic Collect  - UA Microscopic with Reflex to Culture    2. Depression, unspecified depression type  Will obtain HEBERT from [facility].    - Adult Mental Health  Referral; Future    3. Hypokalemia  - Basic metabolic panel  (Ca, Cl, CO2, Creat, Gluc, K, Na, BUN); Future    4. Other insomnia  - QUEtiapine (SEROQUEL) 50 MG tablet; Take 1 tablet (50 mg) by mouth At Bedtime  Dispense: 30 tablet; Refill: 1      Lexy Haines is a 68 year old, presenting for the following health issues:  RECHECK (UTI) and Hospital F/U        7/13/2023     8:31 AM   Additional Questions   Roomed by Karley   Accompanied by Daughter, and      [facility]       Hospital Follow-up Visit:    Hospital/Nursing Home/IP Rehab Facility: [facility]  Date of Admission: 7/1/23  Date of Discharge: 7/1/23  Reason(s) for Admission: UTI    Was your hospitalization related to COVID-19? No   Problems taking medications regularly:  None  Medication changes since discharge: None  Problems adhering to non-medication therapy:  None    Summary of hospitalization:  CareEverywhere information obtained and reviewed  Diagnostic Tests/Treatments reviewed.  Follow up needed: none  Other Healthcare Providers Involved in Patient s Care:         None  Update since discharge: improved.         Plan of care communicated with patient           1. UTI: Patient was seen on July 1st.  Patient was treated with Bactrim.  States of abdominal pain and urinary frequency.  Patient completed antibiotics course.  No dysuria.  Patient states of urinary frequency.  Denies any abdominal pain.  Patient wipes front to back.    2. Paranoid thoughts: Per daughter, patient has history of depression and paranoid thoughts.     Review of Systems   Constitutional: Negative for chills and fever.   HENT: Negative for congestion, ear pain,  "hearing loss and sore throat.    Eyes: Negative for pain and visual disturbance.   Respiratory: Negative for cough and shortness of breath.    Cardiovascular: Negative for chest pain, palpitations and peripheral edema.   Gastrointestinal: Negative for abdominal pain, constipation, diarrhea, heartburn, hematochezia and nausea.   Breasts:  Negative for tenderness, breast mass and discharge.   Genitourinary: Negative for dysuria, frequency, genital sores, hematuria, pelvic pain, urgency, vaginal bleeding and vaginal discharge.   Musculoskeletal: Negative for arthralgias, joint swelling and myalgias.   Skin: Negative for rash.   Neurological: Negative for dizziness, weakness, headaches and paresthesias.   Psychiatric/Behavioral: Negative for mood changes. The patient is not nervous/anxious.             Objective    /60   Pulse 88   Temp 97.5  F (36.4  C) (Temporal)   Resp 18   Ht 1.626 m (5' 4\")   Wt 77.7 kg (171 lb 3.2 oz)   LMP  (LMP Unknown)   SpO2 96%   BMI 29.39 kg/m    Body mass index is 29.39 kg/m .  Physical Exam  Constitutional:       General: She is not in acute distress.     Appearance: She is well-developed.   HENT:      Head: Normocephalic and atraumatic.      Nose: Nose normal.   Eyes:      Conjunctiva/sclera: Conjunctivae normal.   Neck:      Trachea: No tracheal deviation.   Cardiovascular:      Rate and Rhythm: Normal rate and regular rhythm.      Heart sounds: Normal heart sounds.   Pulmonary:      Effort: Pulmonary effort is normal. No respiratory distress.      Breath sounds: Normal breath sounds.   Musculoskeletal:         General: Normal range of motion.      Cervical back: Normal range of motion.   Skin:     General: Skin is warm.   Neurological:      Mental Status: She is alert and oriented to person, place, and time.   Psychiatric:         Behavior: Behavior normal.            Answers for HPI/ROS submitted by the patient on 7/13/2023  If you checked off any problems, how difficult " have these problems made it for you to do your work, take care of things at home, or get along with other people?: Extremely difficult  PHQ9 TOTAL SCORE: 17

## 2023-07-14 ENCOUNTER — TELEPHONE (OUTPATIENT)
Dept: FAMILY MEDICINE | Facility: CLINIC | Age: 68
End: 2023-07-14
Payer: MEDICARE

## 2023-07-14 DIAGNOSIS — Z13.1 SCREENING FOR DIABETES MELLITUS: Primary | ICD-10-CM

## 2023-07-14 DIAGNOSIS — R73.09 OTHER ABNORMAL GLUCOSE: ICD-10-CM

## 2023-07-14 NOTE — TELEPHONE ENCOUNTER
"Requires [employer] , does not have valid consent to communicate on file.  I called   Services, placed call to patient with assistance of [employer]  #[phone].    Patient answered.   Advised her of provider's note.   Patient is already scheduled for a lab visit on 7/18/23.    I put not on lab appt note indicating FLP and A1C to be drawn.    Patient has a question, says she was told one of her meds, quetiapine, would increase her sugar levels.   Routed phone encounter to provider to address.       I see the following in my online \"[employer]\" reference regarding quetiapine:      Endocrine and metabolic: Dyslipidemia (ie, increased total cholesterol, triglycerides, and LDL cholesterol, and decreased HDL cholesterol) has been reported [13][14].    Endocrine and metabolic: Hyperglycemia, including extreme cases associated with ketoacidosis or hyperosmolar coma or death has been reported; screening recommended for patients with risk factors for diabetes mellitus, and monitoring required in all patients      Will call patient back with response.    Lisa Gasca RN  [facility]  "

## 2023-07-14 NOTE — TELEPHONE ENCOUNTER
I would still like to screen for diabetes.  Keep laboratory appointment.  Seroquel (Quetiapine) is managed by her psychiatrist and they will need to make adjustments since they are the original prescriber.  Keep upcoming appointment with [facility].  May continue with Seroquel until she is followed with [facility] psychiatrist.

## 2023-07-14 NOTE — TELEPHONE ENCOUNTER
----- Message from Abundio Winters DO sent at 7/14/2023  8:24 AM CDT -----  Please call patient.  Recent labs show that glucose levels are elevated (141).  I would like to rule out diabetes with checking a HgA1c.  Please advise patient to schedule a laboratory only appointment.  In the meanwhile, Please decrease carbohydrate intake (bread, potato, pasta, and sweets).

## 2023-07-14 NOTE — TELEPHONE ENCOUNTER
I called   Services, placed call to patient with assistance of [facility]  #[phone].      Patient answered, advised her of provider's advice.  Advised her we'll call with the lab results next week, likely Weds or Thursday.    She saw her psychiatrist yesterday.   She says she will wait for us to call with results and will schedule another visit with her psychiatrist if Dr. Winters thinks is needed.    Routed to Dr. Winters as FYI and to keep this in mind when results are back next week.    Lisa Gasca RN  [facility]

## 2023-07-17 PROBLEM — H02.132 SENILE ECTROPION OF RIGHT LOWER EYELID: Status: ACTIVE | Noted: 2023-07-05

## 2023-07-17 PROBLEM — H02.834 DERMATOCHALASIS OF BOTH UPPER EYELIDS: Status: ACTIVE | Noted: 2023-07-05

## 2023-07-17 PROBLEM — H02.403 INVOLUTIONAL PTOSIS, ACQUIRED, BILATERAL: Status: ACTIVE | Noted: 2023-07-05

## 2023-07-17 PROBLEM — H02.831 DERMATOCHALASIS OF BOTH UPPER EYELIDS: Status: ACTIVE | Noted: 2023-07-05

## 2023-07-17 PROBLEM — H02.135 SENILE ECTROPION OF LEFT LOWER EYELID: Status: ACTIVE | Noted: 2023-07-05

## 2023-07-18 ENCOUNTER — LAB (OUTPATIENT)
Dept: LAB | Facility: CLINIC | Age: 68
End: 2023-07-18
Payer: MEDICARE

## 2023-07-18 DIAGNOSIS — R73.09 OTHER ABNORMAL GLUCOSE: ICD-10-CM

## 2023-07-18 DIAGNOSIS — E78.5 HYPERLIPIDEMIA, UNSPECIFIED HYPERLIPIDEMIA TYPE: ICD-10-CM

## 2023-07-18 DIAGNOSIS — Z13.1 SCREENING FOR DIABETES MELLITUS: ICD-10-CM

## 2023-07-18 LAB
CHOLEST SERPL-MCNC: 235 MG/DL
HBA1C MFR BLD: 5.9 % (ref 0–5.6)
HDLC SERPL-MCNC: 59 MG/DL
LDLC SERPL CALC-MCNC: 159 MG/DL
NONHDLC SERPL-MCNC: 176 MG/DL
TRIGL SERPL-MCNC: 84 MG/DL

## 2023-07-18 PROCEDURE — 36415 COLL VENOUS BLD VENIPUNCTURE: CPT

## 2023-07-18 PROCEDURE — 83036 HEMOGLOBIN GLYCOSYLATED A1C: CPT

## 2023-07-18 PROCEDURE — 80061 LIPID PANEL: CPT

## 2023-07-21 NOTE — TELEPHONE ENCOUNTER
I see a lab result letter was mailed to patient 2 days ago.    Closing this encounter.    Lisa Gasca RN  [facility]

## 2023-07-27 ENCOUNTER — PSYCHE (OUTPATIENT)
Dept: PSYCHOLOGY | Facility: CLINIC | Age: 68
End: 2023-07-27
Payer: MEDICARE

## 2023-07-27 DIAGNOSIS — F41.1 GENERALIZED ANXIETY DISORDER: Primary | ICD-10-CM

## 2023-07-27 PROCEDURE — 90837 PSYTX W PT 60 MINUTES: CPT | Performed by: SOCIAL WORKER

## 2023-07-28 NOTE — PROGRESS NOTES
[employer] and [facility] [place]: Integrated Behavioral Health  May 5, 2023      Behavioral Health Clinician Progress Note    Patient Name: Yancy Rivera           Service Type:  Individual      Service Location:   Face to Face in Clinic     Session Start Time: 2:00pm  Session End Time: 2:55pm      Session Length: 53 - 60      Attendees: Client, Spouse / Significant Other and daughter     Service Modality:  In-person    Visit Activities (Refresh list every visit): [facility] Only and Referral - Mental Health    Diagnostic Assessment Date: next session  Treatment Plan Review Date: 5/5/23  See Flowsheets for today's PHQ-9 and RICHARD-7 results  Previous PHQ-9:       10/19/2010    11:30 AM 4/28/2023    12:53 PM 7/13/2023     8:26 AM   PHQ-9 SCORE   PHQ-9 Total Score 7     PHQ-9 Total Score MyChart  19 (Moderately severe depression) 17 (Moderately severe depression)   PHQ-9 Total Score  19 17     Previous RICHARD-7:       4/18/2022     2:04 PM 4/28/2023    12:55 PM   RICHARD-7 SCORE   Total Score  17 (severe anxiety)   Total Score 0 17       MADDIE LEVEL:       No data to display                DATA  Extended Session (60+ minutes): No  Interactive Complexity: No  Crisis: No  [facility] Patient: No    Treatment Objective(s) Addressed in This Session:  Target Behavior(s):  depression and anxiety    Depressed Mood: Increase interest, engagement, and pleasure in doing things  Decrease frequency and intensity of feeling down, depressed, hopeless  Anxiety: will experience a reduction in anxiety, will develop more effective coping skills to manage anxiety symptoms, will develop healthy cognitive patterns and beliefs and will increase ability to function adaptively    Current Stressors / Issues:  Writer met with PT, her  and her daughter. PT primary language is Oromo but can speak and understand most English. PT daughter reported that PT has had a bought of bad anxiety and they would like for patient to get help. The  daughter stated that her mom has had 3 bought's of bad anxiety but this is the worst. PT reports that she is worried that she and her kids will go back home to [place] and she does not want to because ppl don't have the freedom they have here in [place]. Writer educated PT and asked questions to help better understand her anxiety. Writer taught PT some coping skills she can use to help her anxiety. PT reports it is hard to do anything since she is taking Seroquel in the morning and it makes her sleepy.       Progress on Treatment Objective(s) / Homework:  New Objective established this session - PRECONTEMPLATION (Not seeing need for change); Intervened by educating the patient about the effects of current behavior on health.  Evoked information about reasons to continue behavior, express concern / recommendations, and explored any change talk    Motivational Interviewing    MI Intervention: Expressed Empathy/Understanding, Supported Autonomy, Collaboration, Evocation, Open-ended questions, Reflections: simple and complex, Change talk (evoked), and Reframe     Change Talk Expressed by the Patient: NA - Precontemplative    Provider Response to Change Talk: E - Evoked more info from patient about behavior change and A - Affirmed patient's thoughts, decisions, or attempts at behavior change      Care Plan review completed: Yes    Medication Review:  No changes to current psychiatric medication(s)    Medication Compliance:  Yes    Changes in Health Issues:   None reported    Chemical Use Review:   Substance Use: Chemical use reviewed, no active concerns identified      Tobacco Use: No current tobacco use.      Assessment: Current Emotional / Mental Status (status of significant symptoms):  Risk status (Self / Other harm or suicidal ideation)  Patient denies a history of suicidal ideation, suicide attempts, self-injurious behavior, homicidal ideation, homicidal behavior and and other safety concerns  Patient denies  current fears or concerns for personal safety.  Patient denies current or recent suicidal ideation or behaviors.  Patient denies current or recent homicidal ideation or behaviors.  Patient denies current or recent self injurious behavior or ideation.  Patient denies other safety concerns.  A safety and risk management plan has not been developed at this time, however patient was encouraged to call [place] [phone] should there be a change in any of these risk factors.    Appearance:   Appropriate   Eye Contact:   Fair   Psychomotor Behavior: Agitated  Restless   Attitude:   Uncooperative   Orientation:   All  Speech   Rate / Production: Pressured  Slow  Mumbled   Volume:  Soft   Mood:    Anxious  Depressed   Affect:    Flat  Restricted  Worrisome   Thought Content:  Clear   Thought Form:  Coherent  Logical   Insight:    Good  and Poor     Diagnoses:  1. Generalized anxiety disorder        Collateral Reports Completed:  Not Applicable    Plan: (Homework, other):  Patient was given information about behavioral services and encouraged to schedule a follow up appointment with the clinic [facility] in 2 weeks.  She was also given information about mental health symptoms and treatment options .  CD Recommendations: No indications of CD issues.   Aimee Frausto MSW, LICSW      ______________________________________________________________________    Integrated Primary Care Behavioral Health Treatment Plan    Patient's Name: Yancy Rivera  YOB: 1955    Date of Creation: 5/5/23  Date Treatment Plan Last Reviewed/Revised: 5/5/23    DSM5 Diagnoses: 296.32 (F33.1) Major Depressive Disorder, Recurrent Episode, Moderate _ or 300.02 (F41.1) Generalized Anxiety Disorder  Psychosocial / Contextual Factors: ESL, anxiety is high,   PROMIS (reviewed every 90 days):     Referral / Collaboration:  The following referral(s) will be initiated: therapy .    Anticipated number of session for this episode of care: 10  Anticipation  frequency of session: Every other week  Anticipated Duration of each session: 38-52 minutes  Treatment plan will be reviewed in 90 days or when goals have been changed.       MeasurableTreatment Goal(s) related to diagnosis / functional impairment(s)  Goal 1: Patient will know what anxiety is    I will know I've met my goal when I know what anxiety is.      Objective #A (Patient Action)    Patient will  understand the symptoms of anxiety .  Status: New - Date: 5/5/23      Intervention(s)  Therapist will teach  teach patient what anxiety is .    Objective #B  Patient will identify 5 fears / thoughts that contribute to feeling anxious.  Status: New - Date: 5/5/23      Intervention(s)  Therapist will teach distraction skills. coping skills .    Objective #C  Patient will use at least 3 coping skills for anxiety management in the next 3 weeks.  Status: New - Date: 5/5/23      Intervention(s)  Therapist will teach emotional regulation skills. DBT .      Goal 2: Patient will know more about depression    I will know I've met my goal when I know more about depression.      Objective #A (Patient Action)    Status: New - Date: 5/5/23      Patient will Decrease frequency and intensity of feeling down, depressed, hopeless.    Intervention(s)  Therapist will teach rules for taking a timeout. 5 mins .    Objective #B  Patient will  know more about depression .    Status: New - Date: 5/5/23      Intervention(s)  Therapist will teach patient about depression .    Objective #C  Patient will Feel less fidgety, restless or slow in daily activities / interpersonal interactions.  Status: New - Date: 5/5/23      Intervention(s)  Therapist will teach distraction skills. coping skills .          Patient has reviewed and agreed to the above plan.      Aimee Frausto, [facility]  May 5, 2023

## 2023-08-08 ENCOUNTER — PRE VISIT (OUTPATIENT)
Dept: NEUROLOGY | Facility: CLINIC | Age: 68
End: 2023-08-08

## 2023-08-09 ENCOUNTER — OFFICE VISIT (OUTPATIENT)
Dept: PSYCHIATRY | Facility: CLINIC | Age: 68
End: 2023-08-09
Attending: PHYSICIAN ASSISTANT
Payer: MEDICARE

## 2023-08-09 VITALS
HEART RATE: 72 BPM | DIASTOLIC BLOOD PRESSURE: 66 MMHG | OXYGEN SATURATION: 98 % | WEIGHT: 171 LBS | SYSTOLIC BLOOD PRESSURE: 108 MMHG | HEIGHT: 64 IN | BODY MASS INDEX: 29.19 KG/M2

## 2023-08-09 DIAGNOSIS — F29 PSYCHOSIS, UNSPECIFIED PSYCHOSIS TYPE (H): ICD-10-CM

## 2023-08-09 PROCEDURE — 99204 OFFICE O/P NEW MOD 45 MIN: CPT | Performed by: PSYCHIATRY & NEUROLOGY

## 2023-08-09 RX ORDER — QUETIAPINE FUMARATE 50 MG/1
50 TABLET, EXTENDED RELEASE ORAL
Status: CANCELLED | OUTPATIENT
Start: 2023-08-09

## 2023-08-09 RX ORDER — QUETIAPINE FUMARATE 50 MG/1
50 TABLET, EXTENDED RELEASE ORAL AT BEDTIME
COMMUNITY
Start: 2023-07-13 | End: 2023-10-10

## 2023-08-09 NOTE — PROGRESS NOTES
Mental Health and Collaborative Care Psychiatry Service Rooming Note      Most pressing mental health concern at this time: depression, anxiety,      Any new physical health conditions or diagnoses affecting you that we should be aware of: border line diabetes, and high cholesterol, low sodium.       Side effects related to medications patient would like to discuss with the provider:  Yes      Are you taking your medications as prescribed?  yes  If not, why? N/A      Do you need refills of any of the medications?  yes  If so, which ones? See pended medications      Are you taking any recreational substances? NO          Care team has reviewed attendance agreement with patient. Patient advised that two failed appointments within 6 months may lead to termination of current episode of care.             Fransisca Adams CMA  August 9, 2023  9:16 AM

## 2023-08-09 NOTE — Clinical Note
Karin,  Thank you for your consult and care of the patient.  Patient evaluated, provided suggestions on options for medications.  Patient will maintain with psychiatric provider at [facility] rather than consultation with this provider.  Sincerely, Viktor Albrecht M.D. Consultative Psychiatrist Program Medical Director, Lead Collaborative Care Psychiatry Service

## 2023-08-09 NOTE — PROGRESS NOTES
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 She is on quetiaine XR started 23 which has efficacy. Sx improved with anxiety, but still has some fears. General fear of something bad happening to her or family. No current paranoia however niece reports she has had paranoia of being taken away. Pt reports herself no concerns. Current sx - lacks motivation and energy.     Brother in law had passed away in April which seems to have led to recent recurrence.       PHQ-9 scores:       10/19/2010    11:30 AM 2023    12:53 PM 2023     8:26 AM   PHQ-9 SCORE   PHQ-9 Total Score 7     PHQ-9 Total Score MyChart  19 (Moderately severe depression) 17 (Moderately severe depression)   PHQ-9 Total Score  19 17     RICHARD-7 scores:        2022     2:04 PM 2023    12:55 PM   RICHARD-7 SCORE   Total Score  17 (severe anxiety)   Total Score 0 17         Vital Signs:   LMP  (LMP Unknown)        No data to display                           REVIEW OF SYSTEMS:   Constitutional: overall weight loss, relatively steady   Skin: negative  Eyes: reports eye pain, has appt upcoming  Ears/Nose/Throat: negative  Respiratory: No shortness of breath, dyspnea on exertion, cough, or hemoptysis  Cardiovascular: reports no sx, has Cardiology eval; blood pressure may be higher after quetiapine initiation  Gastrointestinal: negative  Genitourinary: negative noted prior history of UTI  Musculoskeletal: joint  pain  Neurologic: reports leg numbness sometimes  Seizures or Head Injury: No  Hematologic/Lymphatic/Immunologic: negative  Endocrine: negative       PAST PSYCHIATRIC HISTORY:   Saw inpatient Psychiatric consultant Dr. 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   Niece reports there have been at times multiple year periods of depression; one epside recurred in context of not taking medications  Med Trials:  Citalopram  Sertraline  Mirtazapine - intolerable - ended up in ER  Trazodone  Cogentin  Lorazepam as needed  Olanzapine - restlessness though with efficacy  Risperidone-akathisia  Self-Directed Violence: None      PAST MEDICAL HISTORY:     Past Medical History:   Diagnosis Date    Bipolar disorder (H)     Blepharitis     Esophageal reflux (aka GERD)     Glaucoma     Hyperlipidemia LDL goal < 130     Nonsenile cataract     Shingles     11/14/15 Left approximately C6 distribution      has a past medical history of Bipolar disorder (H), Blepharitis, Esophageal reflux (aka GERD), Glaucoma, Hyperlipidemia LDL goal < 130, Nonsenile cataract, and Shingles.    Current medications include:   Current Outpatient Medications   Medication Sig    acetaminophen (TYLENOL) 325 MG tablet Take 2 tablets (650 mg) by mouth every 4 hours as needed for mild pain (Patient not taking: Reported on 7/13/2023)    amLODIPine (NORVASC) 2.5 MG tablet Take 1 tablet (2.5 mg) by mouth 2 times daily     atorvastatin (LIPITOR) 20 MG tablet Take 1 tablet (20 mg) by mouth daily Patient needs to have labs for further refills. (Patient not taking: Reported on 7/13/2023)    busPIRone (BUSPAR) 10 MG tablet Take 10 mg by mouth (Patient not taking: Reported on 7/13/2023)    carboxymethylcellulose PF (CARBOXYMETHYLCELLULOSE SODIUM) 0.5 % ophthalmic solution Place 1 drop into both eyes 4 times daily as needed for dry eyes (Patient not taking: Reported on 7/13/2023)    carvedilol (COREG) 6.25 MG tablet TAKE 1 TABLET BY MOUTH TWICE A DAY WITH MEALS    chlorhexidine (PERIDEX) 0.12 % solution SWISH IN MOUTH FOR 30 SECONDS WITH 15MLS AND SPIT. USE TWO TIMES A DAY. DO NOT SWALLOW (Patient not taking: Reported on 7/13/2023)    clonazePAM (KLONOPIN) 0.5 MG tablet TAKE 1 TABLET DAILY AS NEEDED FOR ANXIETY. MAX DAILY DOSE:1 MG    clotrimazole (MYCELEX) 10 MG lozenge DISSOLVE 1 TABLET IN MOUTH AND SWALLOW ROUTE FIVE TIMES A DAY. (Patient not taking: Reported on 7/13/2023)    diclofenac (VOLTAREN) 1 % topical gel Place 2 g onto the skin 4 times daily To right wrist (Patient not taking: Reported on 7/13/2023)    erythromycin (ROMYCIN) 5 MG/GM ophthalmic ointment Place 0.5 inches Into the left eye 3 times daily (Patient not taking: Reported on 7/13/2023)    ferrous sulfate (IRON) 325 (65 FE) MG tablet Take 1 tablet (325 mg) by mouth daily (with breakfast) (Patient not taking: Reported on 7/13/2023)    hydrOXYzine (ATARAX) 10 MG tablet Take 1-2 tablets (10-20 mg) by mouth every 6 hours as needed (For anxiety, restlessness and/or sleep) (Patient not taking: Reported on 7/13/2023)    hydrOXYzine (VISTARIL) 25 MG capsule  (Patient not taking: Reported on 7/13/2023)    OLANZapine (ZYPREXA) 5 MG tablet Take 1 tablet (5 mg) by mouth At Bedtime (Patient not taking: Reported on 7/13/2023)    ondansetron (ZOFRAN) 4 MG tablet Take 1 tablet (4 mg) by mouth every 8 hours as needed for nausea (Patient not taking: Reported on 7/13/2023)    pantoprazole  (PROTONIX) 40 MG EC tablet Take 1 tablet (40 mg) by mouth daily (Patient not taking: Reported on 7/13/2023)    QUEtiapine (SEROQUEL) 100 MG tablet  (Patient not taking: Reported on 7/13/2023)    QUEtiapine (SEROQUEL) 50 MG tablet Take 1 tablet (50 mg) by mouth At Bedtime    timolol maleate (TIMOPTIC) 0.5 % ophthalmic solution Place 1 drop into the right eye every morning (Patient not taking: Reported on 7/13/2023)    traZODone (DESYREL) 50 MG tablet Take 1 tablet (50 mg) by mouth 2 times daily (Patient not taking: Reported on 7/13/2023)    triamcinolone (KENALOG) 0.1 % external ointment Apply topically 2 times daily (Patient not taking: Reported on 7/13/2023)    White Petrolatum-Mineral Oil (REFRESH P.M.) OINT Apply thin layer to both eyes at bedtime (Patient not taking: Reported on 7/13/2023)     Current Facility-Administered Medications   Medication    betamethasone acet & sod phos (CELESTONE) injection 6 mg    betamethasone acet & sod phos (CELESTONE) injection 6 mg    lidocaine (PF) (XYLOCAINE) 1 % injection 2 mL    lidocaine (PF) (XYLOCAINE) 1 % injection 2 mL    lidocaine (PF) (XYLOCAINE) 1 % injection 3 mL    ropivacaine (NAROPIN) injection 1 mL    ropivacaine (NAROPIN) injection 2 mL    triamcinolone (KENALOG-40) injection 20 mg    triamcinolone (KENALOG-40) injection 40 mg         FAMILY HISTORY:   Time insufficient to review    SOCIAL HISTORY:   Good support from family.  .    Substance Use History:  Time insufficient to review, no history of    MENTAL STATUS EXAMINATION:   Appearance: Good attention to grooming and hygiene  Attitude: Cooperative  Eye Contact: Limited  Gait and Station: Sitting  Psychomotor Behavior: Overall relatively reduced activity  Oriented to: Grossly person place and time  Attention Span and Concentration: Grossly fair  Speech: Depressed tone  Language: Did speak English, primary language Oromo  Mood:   Fair  Affect: Constricted  Associations:  no loose associations  Thought  Process:  logical, linear and goal oriented  Thought Content: No evidence of delusions or suicidal or homicidal ideation plan or intent  Memory: grossly intact  Fund of Knowledge: grossly fair  Insight:  fair  Judgment:  intact, adequate for safety  Impulse Control:  intact        DIAGNOSES:   Major depressive disorder, moderate, recurrent, in partial remission  Unspecified anxiety disorder  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  Episodes of mental health recurrence occurred in context of not continuing medications or duress.  Patient is undergoing active psychiatric care and will resume care with active provider.    Today Yancy Rivera reports no suicidal ideations. In addition, she has notable risk factors for self-harm, including age and anxiety. However, risk is mitigated by commitment to family, Hinduism beliefs, and absence of past attempts. Therefore, based on all available evidence including the factors cited above, she does not appear to be at imminent risk for self-harm, does not meet criteria for a 72-hr hold, and therefore remains appropriate for ongoing outpatient level of care.       PLAN:     Consult completed.  Patient will be maintaining care with psychiatry provider at [facility]-  Does not meet criteria for involuntary treatment or hospitalization  On Seroquel  mg -advised of metabolic effects, tardive dyskinesia small chance of early mentality  Ongoing care from [facility] psychiatry provider Marina Albrecht      Administrative Billing:   Time spent with patient was greater than 50% of time and/or significant time was spent in counseling and coordination of care regarding above diagnoses and treatment plan. Pre charting time and post charting time/documentation/coordination are done on date of service.     Signed:   Viktor Albrecht  M.D.  [facility] Psychiatry Service    Disclaimer: This note consists of symbols derived from keyboarding, dictation and/or voice recognition software. As a result, there may be errors in the script that have gone undetected. Please consider this when interpreting information found in this chart.

## 2023-08-09 NOTE — PATIENT INSTRUCTIONS
"Med options to consider:  Selective serotonin reuptake inhibitor's  Sertraline (tried before)  Escitalopram  -these are for depression and anxiety and longer term might be better fit but need to discuss with [facility].    Newer medications like vortioxetine, vilazodone are generally good for anxiety and depression, with a fairly low risk of side effects, and heart safe    Antipsychotics are good short term like the seroquel but can cause metabolic effects and occasionally movement issues like tardive dyskinesia. I would wonder about a reduction and switch to antidepressant      Patient Education   Collaborative Care Psychiatry Service  What to Expect  Here's what to expect from your Collaborative Care Psychiatry Service (CCPS).   About CCPS  CCPS means 2 people work together to help you get better. You'll meet with a behavioral health clinician and a psychiatric doctor. A behavioral health clinician helps people with mental health problems by talking with them. A psychiatric doctor helps people by giving them medicine.  How it works  At every visit, you'll see the behavioral health clinician (BHC) first. They'll talk with you about how you're doing and teach you how to feel better.   Then you'll see the psychiatric doctor. This doctor can help you deal with troubling thoughts and feelings by giving you medicine. They'll make sure you know the plan for your care.   CCPS usually takes 3 to 6 visits. If you need more visits, we may have you start seeing a different psychiatric doctor for ongoing care.  If you have any questions or concerns, we'll be glad to talk with you.  About visits  Be open  At your visits, please talk openly about your problems. It may feel hard, but it's the best way for us to help you.  Cancelling visits  If you can't come to your visit, please call us right away at [phone]. If you don't cancel at least 24 hours (1 full day) before your visit, that's \"late cancellation.\"  Being late to " "visits  Being very late is the same as not showing up. You will be a \"no show\" if:  Your appointment starts with a C, and you're more than 15 minutes late for a 30-minute (half hour) visit. This will also cancel your appointment with the psychiatric doctor.  Your appointment is with a psychiatric doctor only, and you're more than 15 minutes late for a 30-minute (half hour) visit.  Your appointment is with a psychiatric doctor only, and you're more than 30 minutes late for a 60-minute (full hour) visit.  If you cancel late or don't show up 2 times within 6 months, we may end your care.   Getting help between visits  If you need help between visits, you can call us Monday to Friday from 8 a.m. to 4:30 p.m. at [phone].  Emergency care  Call 911 or go to the nearest emergency department if your life or someone else's life is in danger.  Call 988 anytime to reach the national Suicide and Crisis hotline.  Medicine refills  To refill your medicine, call your pharmacy. You can also call [facility]'s Behavioral Access at [phone], Monday to Friday, 8 a.m. to 4:30 p.m. It can take 1 to 3 business days to get a refill.   Forms, letters, and tests  You may have papers to fill out, like FMLA, short-term disability, and workability. We can help you with these forms at your visits, but you must have an appointment. You may need more than 1 visit for this, to be in an intensive therapy program, or both.  Before we can give you medicine for ADHD, we may refer you to get tested for it or confirm it another way.  We may not be able to give you an emotional support animal letter.  We don't do mental health checks ordered by the court.   We don't do mental health testing, but we can refer you to get tested.   Thank you for choosing us for your care.  For informational purposes only. Not to replace the advice of your health care provider. Copyright   2022 [facility]. All rights reserved. [employer] " 910271 - 12/22.

## 2023-08-15 ENCOUNTER — OFFICE VISIT (OUTPATIENT)
Dept: FAMILY MEDICINE | Facility: CLINIC | Age: 68
End: 2023-08-15
Attending: FAMILY MEDICINE
Payer: MEDICARE

## 2023-08-15 VITALS
OXYGEN SATURATION: 96 % | HEIGHT: 64 IN | WEIGHT: 173.2 LBS | TEMPERATURE: 97.3 F | RESPIRATION RATE: 18 BRPM | BODY MASS INDEX: 29.57 KG/M2 | DIASTOLIC BLOOD PRESSURE: 68 MMHG | SYSTOLIC BLOOD PRESSURE: 110 MMHG | HEART RATE: 78 BPM

## 2023-08-15 DIAGNOSIS — B00.1 COLD SORE: ICD-10-CM

## 2023-08-15 DIAGNOSIS — F32.A DEPRESSION, UNSPECIFIED DEPRESSION TYPE: Primary | ICD-10-CM

## 2023-08-15 DIAGNOSIS — E78.5 HYPERLIPIDEMIA LDL GOAL <160: ICD-10-CM

## 2023-08-15 DIAGNOSIS — I10 BENIGN ESSENTIAL HYPERTENSION: ICD-10-CM

## 2023-08-15 DIAGNOSIS — R73.03 BORDERLINE DIABETES: ICD-10-CM

## 2023-08-15 PROBLEM — D35.2 PITUITARY ADENOMA (H): Status: RESOLVED | Noted: 2023-04-14 | Resolved: 2023-08-15

## 2023-08-15 PROBLEM — M06.9 RHEUMATOID ARTHRITIS INVOLVING MULTIPLE SITES, UNSPECIFIED WHETHER RHEUMATOID FACTOR PRESENT (H): Status: RESOLVED | Noted: 2023-04-14 | Resolved: 2023-08-15

## 2023-08-15 PROCEDURE — 99214 OFFICE O/P EST MOD 30 MIN: CPT | Performed by: FAMILY MEDICINE

## 2023-08-15 RX ORDER — VALACYCLOVIR HYDROCHLORIDE 500 MG/1
500 TABLET, FILM COATED ORAL 2 TIMES DAILY
Qty: 6 TABLET | Refills: 0 | Status: SHIPPED | OUTPATIENT
Start: 2023-08-15 | End: 2023-10-12

## 2023-08-15 ASSESSMENT — ENCOUNTER SYMPTOMS
HEMATOCHEZIA: 0
SHORTNESS OF BREATH: 0
EYE PAIN: 0
ABDOMINAL PAIN: 0
DIARRHEA: 0
PARESTHESIAS: 0
JOINT SWELLING: 0
COUGH: 0
WEAKNESS: 0
HEARTBURN: 0
NAUSEA: 0
ARTHRALGIAS: 0
HEADACHES: 0
MYALGIAS: 0
FEVER: 0
NERVOUS/ANXIOUS: 0
DIZZINESS: 0
BREAST MASS: 0
DYSURIA: 0
PALPITATIONS: 0
FREQUENCY: 0
CONSTIPATION: 0
CHILLS: 0
SORE THROAT: 0
HEMATURIA: 0

## 2023-08-15 ASSESSMENT — PATIENT HEALTH QUESTIONNAIRE - PHQ9: SUM OF ALL RESPONSES TO PHQ QUESTIONS 1-9: 12

## 2023-08-15 ASSESSMENT — PAIN SCALES - GENERAL: PAINLEVEL: NO PAIN (0)

## 2023-08-15 NOTE — PATIENT INSTRUCTIONS
Paul Haines,    Thank you for allowing [facility] to manage your care.    I sent your prescriptions to your pharmacy.    Please discontinue amlodipine 3 months.     If you have any questions or concerns, please feel free to call us at [phone].    Sincerely,    Dr. Winters    Did you know?      You can schedule a video visit for follow-up appointments as well as future appointments for certain conditions.  Please see the below link.     [email or web address]    If you have not already done so,  I encourage you to sign up for [email or web address] ([email or web address]).  This will allow you to review your results, securely communicate with a provider, and schedule virtual visits as well.

## 2023-08-15 NOTE — PROGRESS NOTES
1. Depression, unspecified depression type  With psychotic episode.  Continue with Seroquel (will need to monitor for diabetes).  Continue to follow-up with [facility].     2. Hyperlipidemia LDL goal <160  Stressed the importance of diet and exercise.  Currently not on any medications.  Repeat again in 3 months.     3. Borderline diabetes  Please decrease carbohydrate intake (bread, potato, pasta, and sweets).  Lifestyle modification.     4. Benign essential hypertension  States of some intermittent leg swelling.  Will discontinue Amlodipine.     5. Cold sore  - valACYclovir (VALTREX) 500 MG tablet; Take 1 tablet (500 mg) by mouth 2 times daily for 3 days  Dispense: 6 tablet; Refill: 0      Lexy Haines is a 68 year old, presenting for the following health issues:  Depression and Results        8/15/2023     9:45 AM   Additional Questions   Roomed by Karley   Accompanied by niece       History of Present Illness       CKD: She is not using over the counter pain medicine.     Hyperlipidemia:  She presents for follow up of hyperlipidemia.   She is not taking medication to lower cholesterol. She is not having myalgia or other side effects to statin medications.    Hypertension: She presents for follow up of hypertension.  She does check blood pressure  regularly outside of the clinic. Outpatient blood pressures have not been over 140/90. She does not follow a low salt diet.     Vascular Disease:  She presents for follow up of vascular disease.     She never takes nitroglycerin. She is not taking daily aspirin.    Reason for visit:  Follow up    She eats 0-1 servings of fruits and vegetables daily.She consumes 0 sweetened beverage(s) daily.She exercises with enough effort to increase her heart rate 9 or less minutes per day.    She is taking medications regularly.       Depression Followup  How are you doing with your depression since your last visit? Improved   Are you having other symptoms that might be  associated with depression? No  Have you had a significant life event?  No   Are you feeling anxious or having panic attacks?   Yes:     Do you have any concerns with your use of alcohol or other drugs? No    Social History     Tobacco Use    Smoking status: Never     Passive exposure: Never    Smokeless tobacco: Never   Vaping Use    Vaping Use: Never used   Substance Use Topics    Alcohol use: No    Drug use: Yes         4/28/2023    12:53 PM 7/13/2023     8:26 AM 8/15/2023     9:52 AM   PHQ   PHQ-9 Total Score 19 17 12   Q9: Thoughts of better off dead/self-harm past 2 weeks Not at all Nearly every day Not at all   F/U: Thoughts of suicide or self-harm  No    F/U: Safety concerns  No          4/18/2022     2:04 PM 4/28/2023    12:55 PM   RICHARD-7 SCORE   Total Score  17 (severe anxiety)   Total Score 0 17         8/15/2023     9:52 AM   Last PHQ-9   1.  Little interest or pleasure in doing things 0   2.  Feeling down, depressed, or hopeless 2   3.  Trouble falling or staying asleep, or sleeping too much 1   4.  Feeling tired or having little energy 3   5.  Poor appetite or overeating 3   6.  Feeling bad about yourself 1   7.  Trouble concentrating 2   8.  Moving slowly or restless 0   Q9: Thoughts of better off dead/self-harm past 2 weeks 0   PHQ-9 Total Score 12   Difficulty at work, home, or with people Somewhat difficult       Suicide Assessment Five-step Evaluation and Treatment (SAFE-T)          Borderline hyperlipidemia: Does not eat fatty foods.  Currently not on any medications.     2. Paranoid thoughts: Currently being followed by Seroquel.  Less paranoid thoughts.  Patient is currently being followed by [facility].  Patient is also dealing with depression.  Patient was recently seen also by psychiatry and advised to continue with seroquel.     3. Hypertension: Currently on carvedilol and amlodipine.  States of intermittent leg swelling.     4. Cold sores: involving her gums.  Ongoing for the past week.  "    Review of Systems   Constitutional:  Negative for chills and fever.   HENT:  Negative for congestion, ear pain, hearing loss and sore throat.         Sores involving bottom left gums   Eyes:  Negative for pain and visual disturbance.   Respiratory:  Negative for cough and shortness of breath.    Cardiovascular:  Negative for chest pain, palpitations and peripheral edema.   Gastrointestinal:  Negative for abdominal pain, constipation, diarrhea, heartburn, hematochezia and nausea.   Breasts:  Negative for tenderness, breast mass and discharge.   Genitourinary:  Negative for dysuria, frequency, genital sores, hematuria, pelvic pain, urgency, vaginal bleeding and vaginal discharge.   Musculoskeletal:  Negative for arthralgias, joint swelling and myalgias.   Skin:  Negative for rash.   Neurological:  Negative for dizziness, weakness, headaches and paresthesias.   Psychiatric/Behavioral:  Negative for mood changes. The patient is not nervous/anxious.             Objective    /68   Pulse 78   Temp 97.3  F (36.3  C) (Temporal)   Resp 18   Ht 1.635 m (5' 4.37\")   Wt 78.6 kg (173 lb 3.2 oz)   LMP  (LMP Unknown)   SpO2 96%   BMI 29.39 kg/m    Body mass index is 29.39 kg/m .  Physical Exam  Constitutional:       General: She is not in acute distress.     Appearance: She is well-developed.   HENT:      Head: Normocephalic and atraumatic.      Nose: Nose normal.      Mouth/Throat:      Comments: Well circumferential ulceration involving the left inferior gum  Eyes:      Conjunctiva/sclera: Conjunctivae normal.   Neck:      Trachea: No tracheal deviation.   Cardiovascular:      Rate and Rhythm: Normal rate and regular rhythm.      Heart sounds: Normal heart sounds.   Pulmonary:      Effort: Pulmonary effort is normal. No respiratory distress.      Breath sounds: Normal breath sounds.   Musculoskeletal:         General: Normal range of motion.      Cervical back: Normal range of motion.   Skin:     General: Skin " is warm.   Neurological:      Mental Status: She is alert and oriented to person, place, and time.   Psychiatric:         Behavior: Behavior normal.

## 2023-08-31 ENCOUNTER — OFFICE VISIT (OUTPATIENT)
Dept: FAMILY MEDICINE | Facility: CLINIC | Age: 68
End: 2023-08-31
Payer: MEDICARE

## 2023-08-31 VITALS
OXYGEN SATURATION: 97 % | DIASTOLIC BLOOD PRESSURE: 54 MMHG | WEIGHT: 175.6 LBS | TEMPERATURE: 97.8 F | HEIGHT: 64 IN | SYSTOLIC BLOOD PRESSURE: 110 MMHG | BODY MASS INDEX: 29.98 KG/M2 | HEART RATE: 77 BPM | RESPIRATION RATE: 18 BRPM

## 2023-08-31 DIAGNOSIS — L30.9 ECZEMA, UNSPECIFIED TYPE: ICD-10-CM

## 2023-08-31 DIAGNOSIS — Z01.818 PREOP GENERAL PHYSICAL EXAM: Primary | ICD-10-CM

## 2023-08-31 DIAGNOSIS — Z13.228 SCREENING FOR METABOLIC DISORDER: ICD-10-CM

## 2023-08-31 DIAGNOSIS — K21.00 GASTROESOPHAGEAL REFLUX DISEASE WITH ESOPHAGITIS WITHOUT HEMORRHAGE: ICD-10-CM

## 2023-08-31 DIAGNOSIS — H02.834 DERMATOCHALASIS OF BOTH UPPER EYELIDS: ICD-10-CM

## 2023-08-31 DIAGNOSIS — H02.831 DERMATOCHALASIS OF BOTH UPPER EYELIDS: ICD-10-CM

## 2023-08-31 LAB — DEPRECATED CALCIDIOL+CALCIFEROL SERPL-MC: 20 UG/L (ref 20–75)

## 2023-08-31 PROCEDURE — 99214 OFFICE O/P EST MOD 30 MIN: CPT | Performed by: FAMILY MEDICINE

## 2023-08-31 PROCEDURE — 82306 VITAMIN D 25 HYDROXY: CPT | Mod: GZ | Performed by: FAMILY MEDICINE

## 2023-08-31 PROCEDURE — 36415 COLL VENOUS BLD VENIPUNCTURE: CPT | Performed by: FAMILY MEDICINE

## 2023-08-31 RX ORDER — QUETIAPINE FUMARATE 50 MG/1
25 TABLET, FILM COATED ORAL AT BEDTIME
COMMUNITY
Start: 2023-08-31 | End: 2023-09-22

## 2023-08-31 RX ORDER — PANTOPRAZOLE SODIUM 40 MG/1
40 TABLET, DELAYED RELEASE ORAL DAILY
Qty: 90 TABLET | Refills: 3 | Status: SHIPPED | OUTPATIENT
Start: 2023-08-31 | End: 2024-01-11

## 2023-08-31 RX ORDER — TRIAMCINOLONE ACETONIDE 1 MG/G
CREAM TOPICAL 2 TIMES DAILY PRN
Qty: 453.6 G | Refills: 1 | Status: SHIPPED | OUTPATIENT
Start: 2023-08-31 | End: 2024-01-11

## 2023-08-31 RX ORDER — PANTOPRAZOLE SODIUM 40 MG/1
40 TABLET, DELAYED RELEASE ORAL DAILY
COMMUNITY
End: 2023-08-31

## 2023-08-31 RX ORDER — TRIAMCINOLONE ACETONIDE 1 MG/G
OINTMENT TOPICAL 2 TIMES DAILY
COMMUNITY
End: 2024-07-08

## 2023-08-31 ASSESSMENT — ENCOUNTER SYMPTOMS
HEADACHES: 0
ARTHRALGIAS: 0
CHILLS: 0
MYALGIAS: 0
HEMATURIA: 0
EYE PAIN: 0
WEAKNESS: 0
SHORTNESS OF BREATH: 0
DYSURIA: 0
PARESTHESIAS: 0
BREAST MASS: 0
NERVOUS/ANXIOUS: 0
HEMATOCHEZIA: 0
HEARTBURN: 0
DIARRHEA: 0
NAUSEA: 0
FREQUENCY: 0
PALPITATIONS: 0
JOINT SWELLING: 0
DIZZINESS: 0
ABDOMINAL PAIN: 0
SORE THROAT: 0
COUGH: 0
CONSTIPATION: 0
FEVER: 0

## 2023-08-31 ASSESSMENT — PAIN SCALES - GENERAL: PAINLEVEL: NO PAIN (0)

## 2023-08-31 NOTE — PATIENT INSTRUCTIONS
Paul Haines,    Thank you for allowing [facility] to manage your care.    I ordered some blood work, please go to the laboratory to get your laboratory studies.    I sent your prescriptions to your pharmacy.    For your convenience, test results are released as soon as they are available  Please allow 1-2 business days for me to send you a comment about your results.  If not done so, I encourage you to login into [employer] ([email or web address] to review your results in real time.     If you have any questions or concerns, please feel free to call us at [phone].    Sincerely,    Dr. Winters    Did you know?      You can schedule a video visit for follow-up appointments as well as future appointments for certain conditions.  Please see the below link.     [email or web address]    If you have not already done so,  I encourage you to sign up for [employer] ([email or web address]).  This will allow you to review your results, securely communicate with a provider, and schedule virtual visits as well.      For informational purposes only. Not to replace the advice of your health care provider. Copyright   2003, 2019 [facility]. All rights reserved. Clinically reviewed by Carrie Cheatham MD. [employer] [id number] - REV 12/22.  Preparing for Your Surgery  Getting started  A nurse will call you to review your health history and instructions. They will give you an arrival time based on your scheduled surgery time. Please be ready to share:  Your doctor's clinic name and phone number  Your medical, surgical, and anesthesia history  A list of allergies and sensitivities  A list of medicines, including herbal treatments and over-the-counter drugs  Whether the patient has a legal guardian (ask how to send us the papers in advance)  Please tell us if you're pregnant--or if there's any chance you might be pregnant. Some surgeries may injure a fetus (unborn  baby), so they require a pregnancy test. Surgeries that are safe for a fetus don't always need a test, and you can choose whether to have one.   If you have a child who's having surgery, please ask for a copy of Preparing for Your Child's Surgery.    Preparing for surgery  Within 10 to 30 days of surgery: Have a pre-op exam (sometimes called an H&P, or History and Physical). This can be done at a clinic or pre-operative center.  If you're having a , you may not need this exam. Talk to your care team.  At your pre-op exam, talk to your care team about all medicines you take. If you need to stop any medicines before surgery, ask when to start taking them again.  We do this for your safety. Many medicines can make you bleed too much during surgery. Some change how well surgery (anesthesia) drugs work.  Call your insurance company to let them know you're having surgery. (If you don't have insurance, call [phone].)  Call your clinic if there's any change in your health. This includes signs of a cold or flu (sore throat, runny nose, cough, rash, fever). It also includes a scrape or scratch near the surgery site.  If you have questions on the day of surgery, call your hospital or surgery center.  Eating and drinking guidelines  For your safety: Unless your surgeon tells you otherwise, follow the guidelines below.  Eat and drink as usual until 8 hours before you arrive for surgery. After that, no food or milk.  Drink clear liquids until 2 hours before you arrive. These are liquids you can see through, like water, Gatorade, and Propel Water. They also include plain black coffee and tea (no cream or milk), candy, and breath mints. You can spit out gum when you arrive.  If you drink alcohol: Stop drinking it the night before surgery.  If your care team tells you to take medicine on the morning of surgery, it's okay to take it with a sip of water.  Preventing infection  Shower or bathe the night before and morning  of your surgery. Follow the instructions your clinic gave you. (If no instructions, use regular soap.)  Don't shave or clip hair near your surgery site. We'll remove the hair if needed.  Don't smoke or vape the morning of surgery. You may chew nicotine gum up to 2 hours before surgery. A nicotine patch is okay.  Note: Some surgeries require you to completely quit smoking and nicotine. Check with your surgeon.  Your care team will make every effort to keep you safe from infection. We will:  Clean our hands often with soap and water (or an alcohol-based hand rub).  Clean the skin at your surgery site with a special soap that kills germs.  Give you a special gown to keep you warm. (Cold raises the risk of infection.)  Wear special hair covers, masks, gowns and gloves during surgery.  Give antibiotic medicine, if prescribed. Not all surgeries need antibiotics.  What to bring on the day of surgery  Photo ID and insurance card  Copy of your health care directive, if you have one  Glasses and hearing aids (bring cases)  You can't wear contacts during surgery  Inhaler and eye drops, if you use them (tell us about these when you arrive)  CPAP machine or breathing device, if you use them  A few personal items, if spending the night  If you have . . .  A pacemaker, ICD (cardiac defibrillator) or other implant: Bring the ID card.  An implanted stimulator: Bring the remote control.  A legal guardian: Bring a copy of the certified (court-stamped) guardianship papers.  Please remove any jewelry, including body piercings. Leave jewelry and other valuables at home.  If you're going home the day of surgery  You must have a responsible adult drive you home. They should stay with you overnight as well.  If you don't have someone to stay with you, and you aren't safe to go home alone, we may keep you overnight. Insurance often won't pay for this.  After surgery  If it's hard to control your pain or you need more pain medicine, please  call your surgeon's office.  Questions?   If you have any questions for your care team, list them here: _________________________________________________________________________________________________________________________________________________________________________ ____________________________________ ____________________________________ ____________________________________    How to Take Your Medication Before Surgery  - Take all of your medications before surgery as usual

## 2023-08-31 NOTE — LETTER
September 5, 2023      Yancy Rivera  [address] [address]        Dear ,    Recent labs show show the following:  -Vitamin D level is normal and getting 1000 IU daily in your diet or supplements is recommended.    This is reassuring news    Resulted Orders   Vitamin D Deficiency   Result Value Ref Range    Vitamin D, Total (25-Hydroxy) 20 20 - 75 ug/L    Narrative    Season, race, dietary intake, and treatment affect the concentration of 25-hydroxy-Vitamin D. Values may decrease during winter months and increase during summer months. Values 20-29 ug/L may indicate Vitamin D insufficiency and values <20 ug/L may indicate Vitamin D deficiency.    Vitamin D determination is routinely performed by an immunoassay specific for 25 hydroxyvitamin D3.  If an individual is on vitamin D2(ergocalciferol) supplementation, please specify 25 OH vitamin D2 and D3 level determination by LCMSMS test VITD23.         If you have any questions or concerns, please call the clinic at the number listed above.       Sincerely,      Abundio Winters, DO

## 2023-08-31 NOTE — PROGRESS NOTES
[facility]  [address] [address] [facility]  [place] [address]  Phone: [phone]  Primary Provider: Abundio Winters  Pre-op Performing Provider:    ABUNDIO WINTERS  VIDEO,       PREOPERATIVE EVALUATION:  Today's date: 8/31/2023    Yancy Rivera is a 68 year old female who presents for a preoperative evaluation.      8/31/2023     8:45 AM   Additional Questions   Roomed by Karley   Accompanied by , and niece       Surgical Information:  Surgery/Procedure: Both upper eyelid blepharoplasty, ptosis repair, right internal brow pexy, Both lower eyelid ectropion repair   [place] [facility] [place]geon: Mine Munoz MD   Surgery Date: 9/7/23  Time of Surgery: 7:50AM  Where patient plans to recover: At home with family  Fax number for surgical facility: Note does not need to be faxed, will be available electronically in Epic.    Assessment & Plan     The proposed surgical procedure is considered INTERMEDIATE risk.    1. Preop general physical exam    2. Dermatochalasis of both upper eyelids    3. Gastroesophageal reflux disease with esophagitis without hemorrhage  - pantoprazole (PROTONIX) 40 MG EC tablet; Take 1 tablet (40 mg) by mouth daily  Dispense: 90 tablet; Refill: 3    4. Eczema, unspecified type  - triamcinolone (KENALOG) 0.1 % external cream; Apply topically 2 times daily as needed for irritation  Dispense: 453.6 g; Refill: 1    5. Screening for metabolic disorder  - Vitamin D Deficiency; Future       - No identified additional risk factors other than previously addressed    Antiplatelet or Anticoagulation Medication Instructions:   - Patient is on no antiplatelet or anticoagulation medications.    Additional Medication Instructions:   - SSRIs, SNRIs, TCAs, Antipsychotics: Continue without modification.     RECOMMENDATION:  APPROVAL GIVEN to proceed with proposed procedure, without further diagnostic evaluation.    Subjective       HPI related to upcoming  procedure: 67 yo F with history bilateral upper eyelid blepharoplasty          8/31/2023     8:47 AM   Preop Questions   1. Have you ever had a heart attack or stroke? No   2. Have you ever had surgery on your heart or blood vessels, such as a stent placement, a coronary artery bypass, or surgery on an artery in your head, neck, heart, or legs? No   3. Do you have chest pain with activity? No   4. Do you have a history of  heart failure? No   5. Do you currently have a cold, bronchitis or symptoms of other infection? No   6. Do you have a cough, shortness of breath, or wheezing? No   7. Do you or anyone in your family have previous history of blood clots? No   8. Do you or does anyone in your family have a serious bleeding problem such as prolonged bleeding following surgeries or cuts? No   9. Have you ever had problems with anemia or been told to take iron pills? No   10. Have you had any abnormal blood loss such as black, tarry or bloody stools, or abnormal vaginal bleeding? No   11. Have you ever had a blood transfusion? No   12. Are you willing to have a blood transfusion if it is medically needed before, during, or after your surgery? Yes   13. Have you or any of your relatives ever had problems with anesthesia? No   14. Do you have sleep apnea, excessive snoring or daytime drowsiness? No   15. Do you have any artifical heart valves or other implanted medical devices like a pacemaker, defibrillator, or continuous glucose monitor? No   16. Do you have artificial joints? No   17. Are you allergic to latex? No     Health Care Directive:  Patient does not have a Health Care Directive or Living Will: Discussed advance care planning with patient; information given to patient to review.    Preoperative Review of :   reviewed - no record of controlled substances prescribed.      Status of Chronic Conditions:  HYPERTENSION - Patient has longstanding history of HTN , currently denies any symptoms referable to  elevated blood pressure. Specifically denies chest pain, palpitations, dyspnea, orthopnea, PND or peripheral edema. Blood pressure readings have been in normal range. Current medication regimen is as listed below. Patient denies any side effects of medication.     BIPOLAR: Currently on Seroquel.  States of that she hears less voices.  Controlling her symptoms well.     Preop    2. GERD: Patient states of heart burn medications controlled well with omeprazole.    3. Eczeama: Patient is requesting a refill on her triamcinolone.    4. Bipolar: Currently on seroquel.  Currently being followed by [facility].     Review of Systems   Constitutional:  Negative for chills and fever.   HENT:  Negative for congestion, ear pain, hearing loss and sore throat.    Eyes:  Negative for pain and visual disturbance.   Respiratory:  Negative for cough and shortness of breath.    Cardiovascular:  Negative for chest pain, palpitations and peripheral edema.   Gastrointestinal:  Negative for abdominal pain, constipation, diarrhea, heartburn, hematochezia and nausea.   Breasts:  Negative for tenderness, breast mass and discharge.   Genitourinary:  Negative for dysuria, frequency, genital sores, hematuria, pelvic pain, urgency, vaginal bleeding and vaginal discharge.   Musculoskeletal:  Negative for arthralgias, joint swelling and myalgias.   Skin:  Negative for rash.   Neurological:  Negative for dizziness, weakness, headaches and paresthesias.   Psychiatric/Behavioral:  Negative for mood changes. The patient is not nervous/anxious.          Patient Active Problem List    Diagnosis Date Noted    Senile ectropion of left lower eyelid 07/05/2023     Priority: Medium    Senile ectropion of right lower eyelid 07/05/2023     Priority: Medium    Dermatochalasis of both upper eyelids 07/05/2023     Priority: Medium    Involutional ptosis, acquired, bilateral 07/05/2023     Priority: Medium    Hyponatremia 04/20/2023     Priority: Medium    Syncope,  unspecified syncope type 04/20/2023     Priority: Medium    Neck pain 05/04/2022     Priority: Medium    Right wrist injury, subsequent encounter 02/17/2022     Priority: Medium    Pseudophakia of both eyes 06/09/2021     Priority: Medium    Chronic midline low back pain without sciatica 05/31/2021     Priority: Medium    Right wrist pain 05/07/2020     Priority: Medium    Benign essential hypertension 12/19/2016     Priority: Medium    Right lumbar radiculopathy 11/10/2016     Priority: Medium    Primary open angle glaucoma of both eyes, moderate stage 09/02/2016     Priority: Medium    Senile nuclear sclerosis, bilateral 09/02/2016     Priority: Medium    Shingles      Priority: Medium     11/14/15 Left approximately C6 distribution      Disorder of bursae and tendons in shoulder region 03/11/2015     Priority: Medium     Problem list name updated by automated process. Provider to review      Hyperlipidemia with target LDL less than 130 09/15/2014     Priority: Medium     Diagnosis updated by automated process. Provider to review and confirm.      Prediabetes 09/15/2014     Priority: Medium      Past Medical History:   Diagnosis Date    Bipolar disorder (H)     Blepharitis     Esophageal reflux (aka GERD)     Glaucoma     Hyperlipidemia LDL goal < 130     Nonsenile cataract     Shingles     11/14/15 Left approximately C6 distribution     Past Surgical History:   Procedure Laterality Date    CATARACT IOL, RT/LT Right 10/16/2018    [place] [facility]     CATARACT IOL, RT/LT Left 11/01/2018    [place] [facility]     CHALAZION EXCISION  08/21/2015    Left lid    HERNIA REPAIR      abdominal hernia repair 2012     Current Outpatient Medications   Medication Sig Dispense Refill    amLODIPine (NORVASC) 2.5 MG tablet Take 1 tablet (2.5 mg) by mouth 2 times daily 180 tablet 3    carvedilol (COREG) 6.25 MG tablet TAKE 1 TABLET BY MOUTH TWICE A DAY WITH  MEALS 180 tablet 3    clonazePAM (KLONOPIN) 0.5 MG tablet TAKE 1 TABLET DAILY AS NEEDED FOR ANXIETY. MAX DAILY DOSE:1 MG      QUEtiapine (SEROQUEL XR) 50 MG TB24 24 hr tablet Take 1 tablet by mouth daily at 2 pm      QUEtiapine (SEROQUEL) 100 MG tablet       valACYclovir (VALTREX) 500 MG tablet Take 1 tablet (500 mg) by mouth 2 times daily for 3 days 6 tablet 0       Allergies   Allergen Reactions    No Clinical Screening - See Comments Nausea and Vomiting     Liver side effects from [facility] for TB        Social History     Tobacco Use    Smoking status: Never     Passive exposure: Never    Smokeless tobacco: Never   Substance Use Topics    Alcohol use: No     Family History   Problem Relation Age of Onset    Glaucoma Mother     Macular Degeneration No family hx of     Cancer No family hx of     Diabetes No family hx of     Hypertension No family hx of     Cerebrovascular Disease No family hx of     Thyroid Disease No family hx of     Eye Surgery No family hx of      History   Drug Use         Objective     LMP  (LMP Unknown)     Physical Exam  Constitutional:       General: She is not in acute distress.     Appearance: She is well-developed.   HENT:      Head: Normocephalic and atraumatic.      Nose: Nose normal.   Eyes:      Conjunctiva/sclera: Conjunctivae normal.   Neck:      Trachea: No tracheal deviation.   Cardiovascular:      Rate and Rhythm: Normal rate and regular rhythm.      Heart sounds: Normal heart sounds.   Pulmonary:      Effort: Pulmonary effort is normal. No respiratory distress.      Breath sounds: Normal breath sounds.   Musculoskeletal:         General: Normal range of motion.      Cervical back: Normal range of motion.   Skin:     General: Skin is warm.   Neurological:      Mental Status: She is alert and oriented to person, place, and time.   Psychiatric:         Behavior: Behavior normal.           Recent Labs   Lab Test 07/18/23  0721 07/13/23  0934 05/04/23  1414 04/28/23  1422 04/24/23  1142  04/21/23  0718 03/28/23  1109 02/01/22  1529   HGB  --   --   --  14.0  --  14.1   < > 13.2   PLT  --   --   --  340  --  331   < > 256   NA  --  137 136 136   < > 136   < >  --    POTASSIUM  --  4.4 4.0 4.2   < > 4.0   < >  --    CR  --  0.72 0.72 0.64   < > 0.79   < >  --    A1C 5.9*  --   --   --   --   --   --  5.8*    < > = values in this interval not displayed.        Diagnostics:  No labs were ordered during this visit.   No EKG required, no history of coronary heart disease, significant arrhythmia, peripheral arterial disease or other structural heart disease.    Revised Cardiac Risk Index (RCRI):  The patient has the following serious cardiovascular risks for perioperative complications:   - No serious cardiac risks = 0 points     RCRI Interpretation: 0 points: Class I (very low risk - 0.4% complication rate)         Signed Electronically by: Abundio Winters DO  Copy of this evaluation report is provided to requesting physician.

## 2023-09-06 ENCOUNTER — TELEPHONE (OUTPATIENT)
Dept: OPHTHALMOLOGY | Facility: CLINIC | Age: 68
End: 2023-09-06
Payer: MEDICARE

## 2023-09-06 DIAGNOSIS — H02.135 SENILE ECTROPION OF LEFT LOWER EYELID: ICD-10-CM

## 2023-09-06 DIAGNOSIS — H40.1132 PRIMARY OPEN ANGLE GLAUCOMA OF BOTH EYES, MODERATE STAGE: Primary | ICD-10-CM

## 2023-09-06 RX ORDER — TIMOLOL MALEATE 5 MG/ML
1 SOLUTION/ DROPS OPHTHALMIC EVERY MORNING
Qty: 10 ML | Refills: 5 | Status: SHIPPED | OUTPATIENT
Start: 2023-09-06 | End: 2024-01-24

## 2023-09-06 RX ORDER — ERYTHROMYCIN 5 MG/G
OINTMENT OPHTHALMIC
Qty: 3.5 G | Refills: 5 | Status: SHIPPED | OUTPATIENT
Start: 2023-09-06 | End: 2023-10-24

## 2023-09-06 NOTE — TELEPHONE ENCOUNTER
Spoke to pt at 1010    Rx for timolol sent and also sent Rx for Erythromcyin ointment for use until lid surgery in December.    Sb Freitas RN 10:11 AM 09/06/23        [facility] Call Center    Phone Message    May a detailed message be left on voicemail: yes     Reason for Call: Medication Question or concern regarding medication   Prescription Clarification  Name of Medication: Timolol Maleate and Erythromycin  Prescribing Provider:    Pharmacy: n/a   What on the order needs clarification? Pt states her pharmacy is refusing to give her the medication due to  canceling them. Pt would like to know if  canceled her order and why.       Action Taken: Message routed to:  [facility] ([facility] eye    Travel Screening: Not Applicable

## 2023-09-12 ENCOUNTER — PATIENT OUTREACH (OUTPATIENT)
Dept: GERIATRIC MEDICINE | Facility: CLINIC | Age: 68
End: 2023-09-12

## 2023-09-12 NOTE — PROGRESS NOTES
[facility] Care Coordination Contact    Member became effective with  [facility] on 9/1/23 with [facility] MSC+.  Previous Health Plan: [place]   Previous Care System: [facility]  Previous care coordinators name and number: ELYSE Thomason Type: EW  Last MMIS Entry: Date 5/1/23, and Type 07 Admin Act  MMIS visit date (and type) if different from above: 4/19/23  Services Listed in MMIS: None  UTF received: Yes: Received and saved to member file  Address/Phone discrepancy: None    Josep Contreras  Care Management Specialist  [facility]  [phone]

## 2023-09-12 NOTE — LETTER
September 12, 2023    YANCY DOUGHERTY  [address]  [place] [address]    Dear  Yancy,    Welcome to [facility] s MSC+ health program. My name is PRINCESS Jacobson. I am your MSC+ care coordinator. You are eligible for Care Coordination through [facility] MSC+ plan.    As your care coordinator, we ll:  Meet to go over your care coordination benefits  Talk about your physical and mental health care needs   Review your preventative care needs  Create a plan that meets your needs with the services you choose    What happens next?  I ll call you soon to introduce myself and tell you more about my role. We ll then plan time to go over your health and safety needs. Our goal is to keep you as healthy and independent as possible.    Soon, you will receive a new MSC+ member identification (ID) card from [facility]. When you receive it, please use this card along with your [place] [facility] card and Prescription Drug Coverage Program card. When you receive, it please use this card where you get your health services. If you have Medicare, you will need to show your Medicare card when you get health services.    The [facility] care coordination program is voluntary and offered to you at no cost. If you wish to stop being in the care coordination program or have questions, call me at [phone]. If you reach my voicemail, leave a message and your phone number. TTY users, call the [place] [employer] at 056 or [phone] (dxnkyc-hb-vkeocd relay service).    Sincerely,        PRINCESS Jacobson  [phone]  [email or web address]    [id number] accepted   [id number]       [id number] (07/2022)

## 2023-09-22 ENCOUNTER — OFFICE VISIT (OUTPATIENT)
Dept: CARDIOLOGY | Facility: CLINIC | Age: 68
End: 2023-09-22
Payer: MEDICARE

## 2023-09-22 VITALS
RESPIRATION RATE: 16 BRPM | OXYGEN SATURATION: 98 % | WEIGHT: 175.6 LBS | HEART RATE: 54 BPM | SYSTOLIC BLOOD PRESSURE: 125 MMHG | BODY MASS INDEX: 30.02 KG/M2 | DIASTOLIC BLOOD PRESSURE: 71 MMHG

## 2023-09-22 DIAGNOSIS — I10 BENIGN ESSENTIAL HYPERTENSION: ICD-10-CM

## 2023-09-22 DIAGNOSIS — Q21.12 PFO (PATENT FORAMEN OVALE): Primary | ICD-10-CM

## 2023-09-22 DIAGNOSIS — I47.10 PAROXYSMAL SUPRAVENTRICULAR TACHYCARDIA (H): ICD-10-CM

## 2023-09-22 DIAGNOSIS — E78.5 DYSLIPIDEMIA: ICD-10-CM

## 2023-09-22 DIAGNOSIS — I49.3 PVC'S (PREMATURE VENTRICULAR CONTRACTIONS): ICD-10-CM

## 2023-09-22 PROCEDURE — 99204 OFFICE O/P NEW MOD 45 MIN: CPT | Performed by: INTERNAL MEDICINE

## 2023-09-22 RX ORDER — QUETIAPINE FUMARATE 25 MG/1
25 TABLET, FILM COATED ORAL DAILY
COMMUNITY
Start: 2023-09-18 | End: 2024-01-11

## 2023-09-22 NOTE — LETTER
9/22/2023    Abundio Winters   [address]  [place] [address]    RE: Yancy Rivera       Dear Colleague,     I had the pleasure of seeing Yancy Rivera in the [facility].      Click to link to [facility] NOTE    Thank you, Dr. Rutherford, for asking me to see Yancy Rivera in consultation at [facility] to evaluate PFO.      Assessment/Plan:   PFO: The patient has small size of PFO.  She did not have a medical history of a TIA or stroke.  We discussed evaluation and management of a PFO.  There is no indication of PFO close for her.  Continue to monitor her.  Benign essential hypertension: Her blood pressure has been controlled well with carvedilol 6.25 mg twice a day.  Paroxysmal supraventricular tachycardia, PVCs: No palpitations, controlled well with carvedilol 6.25 mg twice a day.  Dyslipidemia: Her LDL level was 150.  She is not sure if she should be on statins.  Coronary calcium score is recommended for risk stratification.  If she has elevated coronary calcium score, start statin for treatment for dyslipidemia.  If she has 0 coronary calcium score, continue lifestyle modification.    Thank you for the opportunity to be involved in the care of Yancy Rivera. If you have any questions, please feel free to contact me.  I will see the patient again in 6 months and as needed    Much or all of the text in this note was generated through the use of Dragon Dictate voice-to-text software. Errors in spelling or words which seem out of context are unintentional.   Sound alike errors, in particular, may have escaped editing.       History of Present Illness:   It is my pleasure to see Yancy Rivera at the [facility] for evaluation of PFO. Yancy Rivera is a 68 year old female with a medical history of benign essential hypertension, dyslipidemia, anxiety, history of  hyponatremia.    The patient was admitted to hospital in April 2023 due to dizziness and hyponatremia.  Extensive brain MRI and CT study demonstrated no stroke or TIA.  The patient states that she has no chest pain, shortness of breath, palpitations, orthopnea, PND or leg edema.  She has occasional lightheadedness.  She had no syncopal episode.  Her blood pressure and heart rate are controlled.    Her echocardiogram in April 2023 was reported with normal LV and RV size and systolic function, no obvious valvular disease.  There is a small left to right shunt noted due to PFO.      Past Medical History:     Patient Active Problem List   Diagnosis    Hyperlipidemia with target LDL less than 130    Prediabetes    Disorder of bursae and tendons in shoulder region    Shingles    Primary open angle glaucoma of both eyes, moderate stage    Senile nuclear sclerosis, bilateral    Right lumbar radiculopathy    Benign essential hypertension    Right wrist pain    Chronic midline low back pain without sciatica    Pseudophakia of both eyes    Right wrist injury, subsequent encounter    Neck pain    Hyponatremia    Syncope, unspecified syncope type    Senile ectropion of left lower eyelid    Senile ectropion of right lower eyelid    Dermatochalasis of both upper eyelids    Involutional ptosis, acquired, bilateral       Past Surgical History:     Past Surgical History:   Procedure Laterality Date    CATARACT IOL, RT/LT Right 10/16/2018    [place] [facility]     CATARACT IOL, RT/LT Left 11/01/2018    [place] [facility]     CHALAZION EXCISION  08/21/2015    Left lid    HERNIA REPAIR      abdominal hernia repair 2012       Family History:     Family History   Problem Relation Age of Onset    Glaucoma Mother     Macular Degeneration No family hx of     Cancer No family hx of     Diabetes No family hx of     Hypertension No family hx of     Cerebrovascular Disease No  family hx of     Thyroid Disease No family hx of     Eye Surgery No family hx of        Social History:    reports that she has never smoked. She has never been exposed to tobacco smoke. She has never used smokeless tobacco. She reports current drug use. She reports that she does not drink alcohol.    Review of Systems:   12 systems are reviewed negative except for in HPI.    Meds:     Current Outpatient Medications:     carvedilol (COREG) 6.25 MG tablet, TAKE 1 TABLET BY MOUTH TWICE A DAY WITH MEALS, Disp: 180 tablet, Rfl: 3    clonazePAM (KLONOPIN) 0.5 MG tablet, TAKE 1 TABLET DAILY AS NEEDED FOR ANXIETY. MAX DAILY DOSE:1 MG, Disp: , Rfl:     erythromycin (ROMYCIN) 5 MG/GM ophthalmic ointment, 0.5 inch strip in left eye 3/day day until eyelid surgery in December., Disp: 3.5 g, Rfl: 5    pantoprazole (PROTONIX) 40 MG EC tablet, Take 1 tablet (40 mg) by mouth daily, Disp: 90 tablet, Rfl: 3    QUEtiapine (SEROQUEL XR) 50 MG TB24 24 hr tablet, Take 50 mg by mouth At Bedtime, Disp: , Rfl:     QUEtiapine (SEROQUEL) 25 MG tablet, Take 25 mg by mouth daily, Disp: , Rfl:     timolol maleate (TIMOPTIC) 0.5 % ophthalmic solution, Place 1 drop into the right eye every morning, Disp: 10 mL, Rfl: 5    triamcinolone (KENALOG) 0.1 % external cream, Apply topically 2 times daily as needed for irritation, Disp: 453.6 g, Rfl: 1    triamcinolone (KENALOG) 0.1 % external ointment, Apply topically 2 times daily, Disp: , Rfl:     QUEtiapine (SEROQUEL) 50 MG tablet, Take 25 mg by mouth At Bedtime (Patient not taking: Reported on 9/22/2023), Disp: , Rfl:     valACYclovir (VALTREX) 500 MG tablet, Take 1 tablet (500 mg) by mouth 2 times daily for 3 days, Disp: 6 tablet, Rfl: 0    Current Facility-Administered Medications:     betamethasone acet & sod phos (CELESTONE) injection 6 mg, 6 mg, , , Aristides Nugent MD, 6 mg at 05/14/22 1055    betamethasone acet & sod phos (CELESTONE) injection 6 mg, 6 mg, , , Aristides Nugent MD, 6 mg at  05/14/22 1101    lidocaine (PF) (XYLOCAINE) 1 % injection 2 mL, 2 mL, , , Supik, Solomon, DO, 2 mL at 07/31/20 1443    lidocaine (PF) (XYLOCAINE) 1 % injection 2 mL, 2 mL, , , Supik, Solomon, DO, 2 mL at 05/14/20 1239    lidocaine (PF) (XYLOCAINE) 1 % injection 3 mL, 3 mL, , , Supik, Solomon, DO, 3 mL at 07/31/20 1443    ropivacaine (NAROPIN) injection 1 mL, 1 mL, , , Aristides Nugent MD, 1 mL at 05/14/22 1101    ropivacaine (NAROPIN) injection 2 mL, 2 mL, , , Aristides Nugent MD, 2 mL at 05/14/22 1055    triamcinolone (KENALOG-40) injection 20 mg, 20 mg, , , Supik, Solomon, DO, 20 mg at 05/14/20 1239    triamcinolone (KENALOG-40) injection 40 mg, 40 mg, , , Supik, Solomon, DO, 40 mg at 07/31/20 1443     Allergies:   No clinical screening - see comments    Objective:      Physical Exam  79.7 kg (175 lb 9.6 oz)     Body mass index is 30.02 kg/m .  /71 (BP Location: Left arm, Patient Position: Sitting, Cuff Size: Adult Large)   Pulse 54   Resp 16   Wt 79.7 kg (175 lb 9.6 oz)   LMP  (LMP Unknown)   SpO2 98%   BMI 30.02 kg/m      General Appearance:   Awake, Alert, No acute distress.   HEENT:  Pupil equal, reactive to light. No scleral icterus; the mucous membranes were moist. No oral ulcers or thrush.    Neck: No cervical bruits. No JVD. No thyromegaly. No lymph node enlargement or tenderness.   Chest: The spine was straight. The chest was symmetric.   Lungs:   Respirations unlabored. Lungs are clear to auscultation. No crackles. No wheezing.   Cardiovascular:   RRR, normal first and second heart sounds with no murmurs. No rubs or gallops.    Abdomen:  Obese. Soft. No tenderness. Non-distended. Bowels sounds are present   Extremities: Equal posterior tibial pulses. No leg edema.   Skin: No rashes or ulcers. Warm, Dry.   Musculoskeletal: No tenderness. No deformity.   Neurologic: Mood and affect are appropriate. No focal deficits.         EKG:  Personally reivewed  Sinus rhythm   Normal ECG   When  compared with ECG of 20-APR-2023 12:35,   No significant change was found     Cardiac Imaging Studies  Echocardiogram on April 21, 2023:  Global and regional left ventricular function is normal with an EF of 60-65%.  Right ventricular function, chamber size, wall motion, and thickness are normal.  Small left to right shunt noted at atrial level consistent with PFO.  Mild aortic insufficiency is present.  Pulmonary artery systolic pressure is normal.  Small collapsed IVC noted.  No pericardial effusion is present.  There is no prior study for direct comparison.    Leadless ECG monitor April 22, 2023:  Rare runs of nonsustained SVT, low burden of PVCs around 5%, symptomatic episodes corresponded to sinus rhythm up with the PVCs.    Lab Review   Lab Results   Component Value Date     07/13/2023     06/07/2021    CO2 22 07/13/2023    CO2 22 04/28/2023    CO2 24 06/07/2021    BUN 11.8 07/13/2023    BUN 10 04/28/2023    BUN 9 06/07/2021     Lab Results   Component Value Date    WBC 11.7 04/28/2023    WBC 7.6 06/07/2021    HGB 14.0 04/28/2023    HGB 12.8 06/07/2021    HCT 42.3 04/28/2023    HCT 39.9 06/07/2021    MCV 90 04/28/2023    MCV 90 06/07/2021     04/28/2023     06/07/2021     Lab Results   Component Value Date    CHOL 235 07/18/2023    CHOL 176 04/30/2021    TRIG 84 07/18/2023    TRIG 103 04/30/2021    HDL 59 07/18/2023    HDL 70 04/30/2021     07/18/2023    LDL 85 04/30/2021     LDL Cholesterol Calculated   Date Value Ref Range Status   07/18/2023 159 (H) <=100 mg/dL Final   04/30/2021 85 <100 mg/dL Final     Comment:     Desirable:       <100 mg/dl     Lab Results   Component Value Date    TSH 1.23 04/28/2023    TSH 2.40 02/25/2015                   Thank you for allowing me to participate in the care of your patient.      Sincerely,     Ar Marquez MD     [facility]  cc:   CHRISSIE Robertson CNP  [address] [place] [address]

## 2023-09-22 NOTE — PROGRESS NOTES
Click to link to [facility] HEART [facility] NOTE    Thank you, Dr. Rutherford, for asking me to see Yancy Rivera in consultation at [facility] to evaluate PFO.      Assessment/Plan:   PFO: The patient has small size of PFO.  She did not have a medical history of a TIA or stroke.  We discussed evaluation and management of a PFO.  There is no indication of PFO close for her.  Continue to monitor her.  Benign essential hypertension: Her blood pressure has been controlled well with carvedilol 6.25 mg twice a day.  Paroxysmal supraventricular tachycardia, PVCs: No palpitations, controlled well with carvedilol 6.25 mg twice a day.  Dyslipidemia: Her LDL level was 150.  She is not sure if she should be on statins.  Coronary calcium score is recommended for risk stratification.  If she has elevated coronary calcium score, start statin for treatment for dyslipidemia.  If she has 0 coronary calcium score, continue lifestyle modification.    Thank you for the opportunity to be involved in the care of Yancy Rivera. If you have any questions, please feel free to contact me.  I will see the patient again in 6 months and as needed    Much or all of the text in this note was generated through the use of Dragon Dictate voice-to-text software. Errors in spelling or words which seem out of context are unintentional.   Sound alike errors, in particular, may have escaped editing.       History of Present Illness:   It is my pleasure to see Yancy Rivera at the [facility] for evaluation of PFO. Yancy Rivera is a 68 year old female with a medical history of benign essential hypertension, dyslipidemia, anxiety, history of hyponatremia.    The patient was admitted to hospital in April 2023 due to dizziness and hyponatremia.  Extensive brain MRI and CT study demonstrated no stroke or TIA.  The patient states that she has no chest pain, shortness of breath,  palpitations, orthopnea, PND or leg edema.  She has occasional lightheadedness.  She had no syncopal episode.  Her blood pressure and heart rate are controlled.    Her echocardiogram in April 2023 was reported with normal LV and RV size and systolic function, no obvious valvular disease.  There is a small left to right shunt noted due to PFO.      Past Medical History:     Patient Active Problem List   Diagnosis    Hyperlipidemia with target LDL less than 130    Prediabetes    Disorder of bursae and tendons in shoulder region    Shingles    Primary open angle glaucoma of both eyes, moderate stage    Senile nuclear sclerosis, bilateral    Right lumbar radiculopathy    Benign essential hypertension    Right wrist pain    Chronic midline low back pain without sciatica    Pseudophakia of both eyes    Right wrist injury, subsequent encounter    Neck pain    Hyponatremia    Syncope, unspecified syncope type    Senile ectropion of left lower eyelid    Senile ectropion of right lower eyelid    Dermatochalasis of both upper eyelids    Involutional ptosis, acquired, bilateral       Past Surgical History:     Past Surgical History:   Procedure Laterality Date    CATARACT IOL, RT/LT Right 10/16/2018    [place] cataract and laser [facility]     CATARACT IOL, RT/LT Left 11/01/2018    [place] cataract and laser [facility]     CHALAZION EXCISION  08/21/2015    Left lid    HERNIA REPAIR      abdominal hernia repair 2012       Family History:     Family History   Problem Relation Age of Onset    Glaucoma Mother     Macular Degeneration No family hx of     Cancer No family hx of     Diabetes No family hx of     Hypertension No family hx of     Cerebrovascular Disease No family hx of     Thyroid Disease No family hx of     Eye Surgery No family hx of        Social History:    reports that she has never smoked. She has never been exposed to tobacco smoke. She has never used smokeless tobacco. She reports  current drug use. She reports that she does not drink alcohol.    Review of Systems:   12 systems are reviewed negative except for in HPI.    Meds:     Current Outpatient Medications:     carvedilol (COREG) 6.25 MG tablet, TAKE 1 TABLET BY MOUTH TWICE A DAY WITH MEALS, Disp: 180 tablet, Rfl: 3    clonazePAM (KLONOPIN) 0.5 MG tablet, TAKE 1 TABLET DAILY AS NEEDED FOR ANXIETY. MAX DAILY DOSE:1 MG, Disp: , Rfl:     erythromycin (ROMYCIN) 5 MG/GM ophthalmic ointment, 0.5 inch strip in left eye 3/day day until eyelid surgery in December., Disp: 3.5 g, Rfl: 5    pantoprazole (PROTONIX) 40 MG EC tablet, Take 1 tablet (40 mg) by mouth daily, Disp: 90 tablet, Rfl: 3    QUEtiapine (SEROQUEL XR) 50 MG TB24 24 hr tablet, Take 50 mg by mouth At Bedtime, Disp: , Rfl:     QUEtiapine (SEROQUEL) 25 MG tablet, Take 25 mg by mouth daily, Disp: , Rfl:     timolol maleate (TIMOPTIC) 0.5 % ophthalmic solution, Place 1 drop into the right eye every morning, Disp: 10 mL, Rfl: 5    triamcinolone (KENALOG) 0.1 % external cream, Apply topically 2 times daily as needed for irritation, Disp: 453.6 g, Rfl: 1    triamcinolone (KENALOG) 0.1 % external ointment, Apply topically 2 times daily, Disp: , Rfl:     QUEtiapine (SEROQUEL) 50 MG tablet, Take 25 mg by mouth At Bedtime (Patient not taking: Reported on 9/22/2023), Disp: , Rfl:     valACYclovir (VALTREX) 500 MG tablet, Take 1 tablet (500 mg) by mouth 2 times daily for 3 days, Disp: 6 tablet, Rfl: 0    Current Facility-Administered Medications:     betamethasone acet & sod phos (CELESTONE) injection 6 mg, 6 mg, , , Aristides Nugent MD, 6 mg at 05/14/22 1055    betamethasone acet & sod phos (CELESTONE) injection 6 mg, 6 mg, , , Aristides Nugent MD, 6 mg at 05/14/22 1101    lidocaine (PF) (XYLOCAINE) 1 % injection 2 mL, 2 mL, , , Basil Santiago, , 2 mL at 07/31/20 1443    lidocaine (PF) (XYLOCAINE) 1 % injection 2 mL, 2 mL, , , Basil Santiago DO, 2 mL at 05/14/20 1239    lidocaine (PF)  (XYLOCAINE) 1 % injection 3 mL, 3 mL, , , Basil Santiago, DO, 3 mL at 07/31/20 1443    ropivacaine (NAROPIN) injection 1 mL, 1 mL, , , Aristides Nugent MD, 1 mL at 05/14/22 1101    ropivacaine (NAROPIN) injection 2 mL, 2 mL, , , Aristides Nugent MD, 2 mL at 05/14/22 1055    triamcinolone (KENALOG-40) injection 20 mg, 20 mg, , , Basil Santiago, DO, 20 mg at 05/14/20 1239    triamcinolone (KENALOG-40) injection 40 mg, 40 mg, , , Basil Santiago, DO, 40 mg at 07/31/20 1443     Allergies:   No clinical screening - see comments    Objective:      Physical Exam  79.7 kg (175 lb 9.6 oz)     Body mass index is 30.02 kg/m .  /71 (BP Location: Left arm, Patient Position: Sitting, Cuff Size: Adult Large)   Pulse 54   Resp 16   Wt 79.7 kg (175 lb 9.6 oz)   LMP  (LMP Unknown)   SpO2 98%   BMI 30.02 kg/m      General Appearance:   Awake, Alert, No acute distress.   HEENT:  Pupil equal, reactive to light. No scleral icterus; the mucous membranes were moist. No oral ulcers or thrush.    Neck: No cervical bruits. No JVD. No thyromegaly. No lymph node enlargement or tenderness.   Chest: The spine was straight. The chest was symmetric.   Lungs:   Respirations unlabored. Lungs are clear to auscultation. No crackles. No wheezing.   Cardiovascular:   RRR, normal first and second heart sounds with no murmurs. No rubs or gallops.    Abdomen:  Obese. Soft. No tenderness. Non-distended. Bowels sounds are present   Extremities: Equal posterior tibial pulses. No leg edema.   Skin: No rashes or ulcers. Warm, Dry.   Musculoskeletal: No tenderness. No deformity.   Neurologic: Mood and affect are appropriate. No focal deficits.         EKG:  Personally reivewed  Sinus rhythm   Normal ECG   When compared with ECG of 20-APR-2023 12:35,   No significant change was found     Cardiac Imaging Studies  Echocardiogram on April 21, 2023:  Global and regional left ventricular function is normal with an EF of 60-65%.  Right ventricular  function, chamber size, wall motion, and thickness are normal.  Small left to right shunt noted at atrial level consistent with PFO.  Mild aortic insufficiency is present.  Pulmonary artery systolic pressure is normal.  Small collapsed IVC noted.  No pericardial effusion is present.  There is no prior study for direct comparison.    Leadless ECG monitor April 22, 2023:  Rare runs of nonsustained SVT, low burden of PVCs around 5%, symptomatic episodes corresponded to sinus rhythm up with the PVCs.    Lab Review   Lab Results   Component Value Date     07/13/2023     06/07/2021    CO2 22 07/13/2023    CO2 22 04/28/2023    CO2 24 06/07/2021    BUN 11.8 07/13/2023    BUN 10 04/28/2023    BUN 9 06/07/2021     Lab Results   Component Value Date    WBC 11.7 04/28/2023    WBC 7.6 06/07/2021    HGB 14.0 04/28/2023    HGB 12.8 06/07/2021    HCT 42.3 04/28/2023    HCT 39.9 06/07/2021    MCV 90 04/28/2023    MCV 90 06/07/2021     04/28/2023     06/07/2021     Lab Results   Component Value Date    CHOL 235 07/18/2023    CHOL 176 04/30/2021    TRIG 84 07/18/2023    TRIG 103 04/30/2021    HDL 59 07/18/2023    HDL 70 04/30/2021     07/18/2023    LDL 85 04/30/2021     LDL Cholesterol Calculated   Date Value Ref Range Status   07/18/2023 159 (H) <=100 mg/dL Final   04/30/2021 85 <100 mg/dL Final     Comment:     Desirable:       <100 mg/dl     Lab Results   Component Value Date    TSH 1.23 04/28/2023    TSH 2.40 02/25/2015

## 2023-09-29 ENCOUNTER — PATIENT OUTREACH (OUTPATIENT)
Dept: GERIATRIC MEDICINE | Facility: CLINIC | Age: 68
End: 2023-09-29
Payer: MEDICARE

## 2023-09-29 NOTE — PROGRESS NOTES
[facility] Care Coordination Contact    [facility] Care System Change (Transfer)    Member is new enrollee to [facility] effective 9/1/23 with [facility] health plan. Member transferred from [facility].    No home visit required because this care coordinator (CC) has received all required documentation from the previous CC.    Writer t/c to member, introduced self as member's new CC. Confirmed with member that the welcome letter with writer's name and contact information has been received.  Reviewed LTCC/Health Risk Assessment (HRA) and POC with member. No changes noted.  Transitional HRA completed. Care Plan Summary updated and reflects current services.  Required referral authorization information communicated to CMS: Yes    Writer reviewed the following with member:    ER visits: No  Hospitalizations: No  TCU stays: No  Significant health status changes: None  Falls/Injuries: No  ADL/IADL changes: No  Changes in services: No    Follow-Up Plan: Member informed of future contact, plan to f/u with member with at next regularly scheduled contact.  Contact information shared with member and family, encouraged member to call with any questions or concerns.    PRINCESS Jacobson  [facility]  [phone]

## 2023-10-02 ENCOUNTER — PATIENT OUTREACH (OUTPATIENT)
Dept: GERIATRIC MEDICINE | Facility: CLINIC | Age: 68
End: 2023-10-02
Payer: MEDICARE

## 2023-10-02 NOTE — PROGRESS NOTES
[facility] Care Coordination Contact    Completed following tasks: Submitted referrals/auths for ADC with [facility], and Updated services in Database    Josep Contreras  Care Management Specialist  [facility]  [phone]

## 2023-10-08 DIAGNOSIS — E78.5 HYPERLIPEMIA: ICD-10-CM

## 2023-10-10 ENCOUNTER — OFFICE VISIT (OUTPATIENT)
Dept: FAMILY MEDICINE | Facility: CLINIC | Age: 68
End: 2023-10-10
Payer: MEDICARE

## 2023-10-10 VITALS
TEMPERATURE: 96.6 F | DIASTOLIC BLOOD PRESSURE: 68 MMHG | HEART RATE: 65 BPM | RESPIRATION RATE: 12 BRPM | OXYGEN SATURATION: 99 % | HEIGHT: 65 IN | BODY MASS INDEX: 29.76 KG/M2 | SYSTOLIC BLOOD PRESSURE: 132 MMHG | WEIGHT: 178.6 LBS

## 2023-10-10 DIAGNOSIS — H02.135 SENILE ECTROPION OF LEFT LOWER EYELID: ICD-10-CM

## 2023-10-10 DIAGNOSIS — Z01.818 PREOP GENERAL PHYSICAL EXAM: Primary | ICD-10-CM

## 2023-10-10 DIAGNOSIS — I10 BENIGN ESSENTIAL HYPERTENSION: ICD-10-CM

## 2023-10-10 DIAGNOSIS — H02.403 INVOLUTIONAL PTOSIS, ACQUIRED, BILATERAL: ICD-10-CM

## 2023-10-10 DIAGNOSIS — R73.03 PREDIABETES: ICD-10-CM

## 2023-10-10 DIAGNOSIS — E78.5 HYPERLIPIDEMIA WITH TARGET LDL LESS THAN 130: ICD-10-CM

## 2023-10-10 DIAGNOSIS — Z75.8 TELEPHONE LANGUAGE INTERPRETER SERVICE REQUIRED: ICD-10-CM

## 2023-10-10 DIAGNOSIS — H02.132 SENILE ECTROPION OF RIGHT LOWER EYELID: ICD-10-CM

## 2023-10-10 PROCEDURE — 99214 OFFICE O/P EST MOD 30 MIN: CPT | Performed by: NURSE PRACTITIONER

## 2023-10-10 RX ORDER — ATORVASTATIN CALCIUM 20 MG/1
20 TABLET, FILM COATED ORAL DAILY
Qty: 90 TABLET | Refills: 0 | OUTPATIENT
Start: 2023-10-10

## 2023-10-10 ASSESSMENT — PAIN SCALES - GENERAL: PAINLEVEL: NO PAIN (0)

## 2023-10-10 NOTE — PATIENT INSTRUCTIONS
[facility] [place]   Preparing for Your Surgery  Getting started  A nurse will call you to review your health history and instructions. They will give you an arrival time based on your scheduled surgery time. Please be ready to share:  Your doctor's clinic name and phone number  Your medical, surgical, and anesthesia history  A list of allergies and sensitivities  A list of medicines, including herbal treatments and over-the-counter drugs  Whether the patient has a legal guardian (ask how to send us the papers in advance)  Please tell us if you're pregnant--or if there's any chance you might be pregnant. Some surgeries may injure a fetus (unborn baby), so they require a pregnancy test. Surgeries that are safe for a fetus don't always need a test, and you can choose whether to have one.   If you have a child who's having surgery, please ask for a copy of Preparing for Your Child's Surgery.    Preparing for surgery  Within 10 to 30 days of surgery: Have a pre-op exam (sometimes called an H&P, or History and Physical). This can be done at a clinic or pre-operative center.  If you're having a , you may not need this exam. Talk to your care team.  At your pre-op exam, talk to your care team about all medicines you take. If you need to stop any medicines before surgery, ask when to start taking them again.  We do this for your safety. Many medicines can make you bleed too much during surgery. Some change how well surgery (anesthesia) drugs work.  Call your insurance company to let them know you're having surgery. (If you don't have insurance, call [phone].)  Call your clinic if there's any change in your health. This includes signs of a cold or flu (sore throat, runny nose, cough, rash, fever). It also includes a scrape or scratch near the surgery site.  If you have questions on the day of surgery, call your hospital or surgery center.  Eating and drinking  guidelines  For your safety: Unless your surgeon tells you otherwise, follow the guidelines below.  Eat and drink as usual until 8 hours before you arrive for surgery. After that, no food or milk.  Drink clear liquids until 2 hours before you arrive. These are liquids you can see through, like water, Gatorade, and Propel Water. They also include plain black coffee and tea (no cream or milk), candy, and breath mints. You can spit out gum when you arrive.  If you drink alcohol: Stop drinking it the night before surgery.  If your care team tells you to take medicine on the morning of surgery, it's okay to take it with a sip of water.  Preventing infection  Shower or bathe the night before and morning of your surgery. Follow the instructions your clinic gave you. (If no instructions, use regular soap.)  Don't shave or clip hair near your surgery site. We'll remove the hair if needed.  Don't smoke or vape the morning of surgery. You may chew nicotine gum up to 2 hours before surgery. A nicotine patch is okay.  Note: Some surgeries require you to completely quit smoking and nicotine. Check with your surgeon.  Your care team will make every effort to keep you safe from infection. We will:  Clean our hands often with soap and water (or an alcohol-based hand rub).  Clean the skin at your surgery site with a special soap that kills germs.  Give you a special gown to keep you warm. (Cold raises the risk of infection.)  Wear special hair covers, masks, gowns and gloves during surgery.  Give antibiotic medicine, if prescribed. Not all surgeries need antibiotics.  What to bring on the day of surgery  Photo ID and insurance card  Copy of your health care directive, if you have one  Glasses and hearing aids (bring cases)  You can't wear contacts during surgery  Inhaler and eye drops, if you use them (tell us about these when you arrive)  CPAP machine or breathing device, if you use them  A few personal items, if spending the  night  If you have . . .  A pacemaker, ICD (cardiac defibrillator) or other implant: Bring the ID card.  An implanted stimulator: Bring the remote control.  A legal guardian: Bring a copy of the certified (court-stamped) guardianship papers.  Please remove any jewelry, including body piercings. Leave jewelry and other valuables at home.  If you're going home the day of surgery  You must have a responsible adult drive you home. They should stay with you overnight as well.  If you don't have someone to stay with you, and you aren't safe to go home alone, we may keep you overnight. Insurance often won't pay for this.  After surgery  If it's hard to control your pain or you need more pain medicine, please call your surgeon's office.  Questions?   If you have any questions for your care team, list them here: _________________________________________________________________________________________________________________________________________________________________________ ____________________________________ ____________________________________ ____________________________________  For informational purposes only. Not to replace the advice of your health care provider. Copyright   2003, 2019 [employer]. All rights reserved. Clinically reviewed by Carrie Cheatham MD. Classroom IQ [id number] - REV 12/22.    How to Take Your Medication Before Surgery  - do not take blood thinners- aspirin, ibuprofen, aleve, fish oil

## 2023-10-10 NOTE — LETTER
My Depression Action Plan  Name: Yancy Rivera   Date of Birth 1955  Date: 10/10/2023    My doctor: Abundio Winters   My clinic: [facility] [place]  [address]  [place] [address]  [phone]            GREEN    ZONE   Good Control    What it looks like:   Things are going generally well. You have normal ups and downs. You may even feel depressed from time to time, but bad moods usually last less than a day.   What you need to do:  Continue to care for yourself (see self care plan)  Check your depression survival kit and update it as needed  Follow your physician s recommendations including any medication.  Do not stop taking medication unless you consult with your physician first.             YELLOW         ZONE Getting Worse    What it looks like:   Depression is starting to interfere with your life.   It may be hard to get out of bed; you may be starting to isolate yourself from others.  Symptoms of depression are starting to last most all day and this has happened for several days.   You may have suicidal thoughts but they are not constant.   What you need to do:     Call your care team. Your response to treatment will improve if you keep your care team informed of your progress. Yellow periods are signs an adjustment may need to be made.     Continue your self-care.  Just get dressed and ready for the day.  Don't give yourself time to talk yourself out of it.    Talk to someone in your support network.    Open up your Depression Self-Care Plan/Wellness Kit.             RED    ZONE Medical Alert - Get Help    What it looks like:   Depression is seriously interfering with your life.   You may experience these or other symptoms: You can t get out of bed most days, can t work or engage in other necessary activities, you have trouble taking care of basic hygiene, or basic responsibilities, thoughts of suicide or death that will not go away, self-injurious  behavior.     What you need to do:  Call your care team and request a same-day appointment. If they are not available (weekends or after hours) call your local crisis line, emergency room or 911.          Depression Self-Care Plan / Wellness Kit    Many people find that medication and therapy are helpful treatments for managing depression. In addition, making small changes to your everyday life can help to boost your mood and improve your wellbeing. Below are some tips for you to consider. Be sure to talk with your medical provider and/or behavioral health consultant if your symptoms are worsening or not improving.     Sleep   Sleep hygiene  means all of the habits that support good, restful sleep. It includes maintaining a consistent bedtime and wake time, using your bedroom only for sleeping or sex, and keeping the bedroom dark and free of distractions like a computer, smartphone, or television.     Develop a Healthy Routine  Maintain good hygiene. Get out of bed in the morning, make your bed, brush your teeth, take a shower, and get dressed. Don t spend too much time viewing media that makes you feel stressed. Find time to relax each day.    Exercise  Get some form of exercise every day. This will help reduce pain and release endorphins, the  feel good  chemicals in your brain. It can be as simple as just going for a walk or doing some gardening, anything that will get you moving.      Diet  Strive to eat healthy foods, including fruits and vegetables. Drink plenty of water. Avoid excessive sugar, caffeine, alcohol, and other mood-altering substances.     Stay Connected with Others  Stay in touch with friends and family members.    Manage Your Mood  Try deep breathing, massage therapy, biofeedback, or meditation. Take part in fun activities when you can. Try to find something to smile about each day.     Psychotherapy  Be open to working with a therapist if your provider recommends it.     Medication  Be sure to  take your medication as prescribed. Most anti-depressants need to be taken every day. It usually takes several weeks for medications to work. Not all medicines work for all people. It is important to follow-up with your provider to make sure you have a treatment plan that is working for you. Do not stop your medication abruptly without first discussing it with your provider.    Crisis Resources   These hotlines are for both adults and children. They and are open 24 hours a day, 7 days a week unless noted otherwise.    National Suicide Prevention Lifeline   988 or 1-000-641-BULX (0666)    Crisis Text Line    www.crisistextline.org  Text HOME to 465643 from anywhere in the [place], anytime, about any type of crisis. A live, trained crisis counselor will receive the text and respond quickly.    [employer] Lifeline for LGBTQ Youth  A national crisis intervention and suicide lifeline for LGBTQ youth under 25. Provides a safe place to talk without judgement. Call 1-313.128.9800; text START to 245032 or visit www.thetrevorproject.org to talk to a trained counselor.    For [facility] crisis numbers, visit the [facility] website at:  https://[place].gov/dhs/people-we-serve/adults/health-care/mental-health/resources/crisis-contacts.jsp

## 2023-10-10 NOTE — H&P (VIEW-ONLY)
[facility] [place]  [address]  [place] [address]  Phone: [phone]  Primary Provider: Abundio Winters  Pre-op Performing Provider:    SCOT ALLEN  VIDEO,     PREOPERATIVE EVALUATION:  Today's date: 10/10/2023    Yancy is a 68 year old female who presents for a preoperative evaluation.      Surgical Information:  Surgery/Procedure: B  Procedure Laterality Anesthesia   Both upper eyelid blepharoplasty, ptosis repair, right internal browpexy Bilateral MAC with Local   Both lower eyelid ectropion repair        Surgery Location: [facility] [place]   Surgeon: Mine Munoz MD   Surgery Date: 10/12/2023  Time of Surgery: 9:40  Where patient plans to recover: At home with family  Fax number for surgical facility: Note does not need to be faxed, will be available electronically in Epic.    Assessment & Plan     The proposed surgical procedure is considered LOW risk.    Preop general physical exam      Senile ectropion of right lower eyelid      Senile ectropion of left lower eyelid      Hyperlipidemia with target LDL less than 130      Prediabetes      Benign essential hypertension      Involutional ptosis, acquired, bilateral       - No identified additional risk factors other than previously addressed    Antiplatelet or Anticoagulation Medication Instructions:   - Patient is on no antiplatelet or anticoagulation medications.    Additional Medication Instructions:   - Benzodiazepines: Continue without modification.   - SSRIs, SNRIs, TCAs, Antipsychotics: Continue without modification.     RECOMMENDATION:  APPROVAL GIVEN to proceed with proposed procedure, without further diagnostic evaluation.    Review of external notes as documented elsewhere in note  30 minutes spent by me on the date of the encounter doing chart review, history and exam, documentation and further activities per the note    Subjective       HPI  related to upcoming procedure: pt unsure why having surgery- good for dry eye and vision          8/31/2023     8:47 AM   Preop Questions   1. Have you ever had a heart attack or stroke? No   2. Have you ever had surgery on your heart or blood vessels, such as a stent placement, a coronary artery bypass, or surgery on an artery in your head, neck, heart, or legs? No   3. Do you have chest pain with activity? No   4. Do you have a history of heart failure? No   5. Do you currently have a cold, bronchitis or symptoms of other infection? No   6. Do you have a cough, shortness of breath, or wheezing? No   7. Do you or anyone in your family have previous history of blood clots? No   8. Do you or does anyone in your family have a serious bleeding problem such as prolonged bleeding following surgeries or cuts? No   9. Have you ever had problems with anemia or been told to take iron pills? No   10. Have you had any abnormal blood loss such as black, tarry or bloody stools, or abnormal vaginal bleeding? No   11. Have you ever had a blood transfusion? No   12. Are you willing to have a blood transfusion if it is medically needed before, during, or after your surgery? Yes   13. Have you or any of your relatives ever had problems with anesthesia? No   14. Do you have sleep apnea, excessive snoring or daytime drowsiness? No   15. Do you have any artifical heart valves or other implanted medical devices like a pacemaker, defibrillator, or continuous glucose monitor? No   16. Do you have artificial joints? No   17. Are you allergic to latex? No     Health Care Directive:  Patient does not have a Health Care Directive or Living Will: Discussed advance care planning with patient; however, patient declined at this time.    Preoperative Review of :   reviewed - controlled substances reflected in medication list.      Status of Chronic Conditions:  DEPRESSION - Patient has a long history of Depression of moderate severity  requiring medication for control with recent symptoms being stable..Current symptoms of depression include none.     Review of Systems  CONSTITUTIONAL: NEGATIVE for fever, chills, change in weight  INTEGUMENTARY/SKIN: NEGATIVE for worrisome rashes, moles or lesions  EYES: NEGATIVE for irritation  EYES: POSITIVE for vision change  ENT/MOUTH: NEGATIVE for ear, mouth and throat problems  RESP: NEGATIVE for significant cough or SOB  CV: NEGATIVE for chest pain, palpitations or peripheral edema  GI: NEGATIVE for nausea, abdominal pain, heartburn, or change in bowel habits  : NEGATIVE for frequency, dysuria, or hematuria  MUSCULOSKELETAL: NEGATIVE for significant arthralgias or myalgia  NEURO: NEGATIVE for weakness, dizziness or paresthesias  ENDOCRINE: NEGATIVE for temperature intolerance, skin/hair changes  HEME: NEGATIVE for bleeding problems  PSYCHIATRIC: NEGATIVE for changes in mood or affect    Patient Active Problem List    Diagnosis Date Noted    Telephone  service required 10/10/2023     Priority: Medium    Senile ectropion of left lower eyelid 07/05/2023     Priority: Medium    Senile ectropion of right lower eyelid 07/05/2023     Priority: Medium    Dermatochalasis of both upper eyelids 07/05/2023     Priority: Medium    Involutional ptosis, acquired, bilateral 07/05/2023     Priority: Medium    Hyponatremia 04/20/2023     Priority: Medium    Syncope, unspecified syncope type 04/20/2023     Priority: Medium    Neck pain 05/04/2022     Priority: Medium    Right wrist injury, subsequent encounter 02/17/2022     Priority: Medium    Pseudophakia of both eyes 06/09/2021     Priority: Medium    Chronic midline low back pain without sciatica 05/31/2021     Priority: Medium    Right wrist pain 05/07/2020     Priority: Medium    Benign essential hypertension 12/19/2016     Priority: Medium    Right lumbar radiculopathy 11/10/2016     Priority: Medium    Primary open angle glaucoma of both eyes,  moderate stage 09/02/2016     Priority: Medium    Senile nuclear sclerosis, bilateral 09/02/2016     Priority: Medium    Shingles      Priority: Medium     11/14/15 Left approximately C6 distribution      Disorder of bursae and tendons in shoulder region 03/11/2015     Priority: Medium     Problem list name updated by automated process. Provider to review      Hyperlipidemia with target LDL less than 130 09/15/2014     Priority: Medium     Diagnosis updated by automated process. Provider to review and confirm.      Prediabetes 09/15/2014     Priority: Medium      Past Medical History:   Diagnosis Date    Bipolar disorder (H)     Blepharitis     Esophageal reflux (aka GERD)     Glaucoma     Hyperlipidemia LDL goal < 130     Nonsenile cataract     Shingles     11/14/15 Left approximately C6 distribution     Past Surgical History:   Procedure Laterality Date    CATARACT IOL, RT/LT Right 10/16/2018    [place] [facility]     CATARACT IOL, RT/LT Left 11/01/2018    [place] [facility]     CHALAZION EXCISION  08/21/2015    Left lid    HERNIA REPAIR      abdominal hernia repair 2012     Current Outpatient Medications   Medication Sig Dispense Refill    carvedilol (COREG) 6.25 MG tablet TAKE 1 TABLET BY MOUTH TWICE A DAY WITH MEALS 180 tablet 3    clonazePAM (KLONOPIN) 0.5 MG tablet TAKE 1 TABLET DAILY AS NEEDED FOR ANXIETY. MAX DAILY DOSE:1 MG      erythromycin (ROMYCIN) 5 MG/GM ophthalmic ointment 0.5 inch strip in left eye 3/day day until eyelid surgery in December. 3.5 g 5    pantoprazole (PROTONIX) 40 MG EC tablet Take 1 tablet (40 mg) by mouth daily 90 tablet 3    QUEtiapine (SEROQUEL) 25 MG tablet Take 25 mg by mouth daily      timolol maleate (TIMOPTIC) 0.5 % ophthalmic solution Place 1 drop into the right eye every morning 10 mL 5    triamcinolone (KENALOG) 0.1 % external cream Apply topically 2 times daily as needed for irritation 453.6 g 1     "triamcinolone (KENALOG) 0.1 % external ointment Apply topically 2 times daily      valACYclovir (VALTREX) 500 MG tablet Take 1 tablet (500 mg) by mouth 2 times daily for 3 days 6 tablet 0       Allergies   Allergen Reactions    No Clinical Screening - See Comments Nausea and Vomiting     Liver side effects from INH for TB        Social History     Tobacco Use    Smoking status: Never     Passive exposure: Never    Smokeless tobacco: Never   Substance Use Topics    Alcohol use: No     Family History   Problem Relation Age of Onset    Glaucoma Mother     Macular Degeneration No family hx of     Cancer No family hx of     Diabetes No family hx of     Hypertension No family hx of     Cerebrovascular Disease No family hx of     Thyroid Disease No family hx of     Eye Surgery No family hx of      History   Drug Use         Objective     /68   Pulse 65   Temp (!) 96.6  F (35.9  C) (Temporal)   Resp 12   Ht 1.655 m (5' 5.16\")   Wt 81 kg (178 lb 9.6 oz)   LMP  (LMP Unknown)   SpO2 99%   BMI 29.58 kg/m      Physical Exam    GENERAL APPEARANCE: healthy, alert and no distress     EYES: EOMI, PERRL     HENT: ear canals and TM's normal and nose and mouth without ulcers or lesions     NECK: no adenopathy, no asymmetry, masses, or scars and thyroid normal to palpation     RESP: lungs clear to auscultation - no rales, rhonchi or wheezes     CV: regular rates and rhythm, normal S1 S2, no S3 or S4 and no murmur, click or rub     ABDOMEN:  soft, nontender, no HSM or masses and bowel sounds normal     MS: extremities normal- no gross deformities noted, no evidence of inflammation in joints, FROM in all extremities.     SKIN: no suspicious lesions or rashes     NEURO: Normal strength and tone, sensory exam grossly normal, mentation intact and speech normal     PSYCH: mentation appears normal. and affect normal/bright     LYMPHATICS: No cervical adenopathy    Recent Labs   Lab Test 07/18/23  0721 07/13/23  0934 " 05/04/23  1414 04/28/23  1422 04/24/23  1142 04/21/23  0718 03/28/23  1109 02/01/22  1529   HGB  --   --   --  14.0  --  14.1   < > 13.2   PLT  --   --   --  340  --  331   < > 256   NA  --  137 136 136   < > 136   < >  --    POTASSIUM  --  4.4 4.0 4.2   < > 4.0   < >  --    CR  --  0.72 0.72 0.64   < > 0.79   < >  --    A1C 5.9*  --   --   --   --   --   --  5.8*    < > = values in this interval not displayed.        Diagnostics:  No labs were ordered during this visit.   No EKG required for low risk surgery (cataract, skin procedure, breast biopsy, etc).    Revised Cardiac Risk Index (RCRI):  The patient has the following serious cardiovascular risks for perioperative complications:   - No serious cardiac risks = 0 points     RCRI Interpretation: 0 points: Class I (very low risk - 0.4% complication rate)      Signed Electronically by: MAUDE WALL  Copy of this evaluation report is provided to requesting physician.

## 2023-10-10 NOTE — PROGRESS NOTES
[facility] [place]  [address]  [place] [address]  Phone: [phone]  Primary Provider: Abundio Winters  Pre-op Performing Provider:    SCOT ALLEN  VIDEO,     PREOPERATIVE EVALUATION:  Today's date: 10/10/2023    Yancy is a 68 year old female who presents for a preoperative evaluation.      Surgical Information:  Surgery/Procedure: B  Procedure Laterality Anesthesia   Both upper eyelid blepharoplasty, ptosis repair, right internal browpexy Bilateral MAC with Local   Both lower eyelid ectropion repair        Surgery Location: [facility] [place]   Surgeon: Mine Munoz MD   Surgery Date: 10/12/2023  Time of Surgery: 9:40  Where patient plans to recover: At home with family  Fax number for surgical facility: Note does not need to be faxed, will be available electronically in Epic.    Assessment & Plan     The proposed surgical procedure is considered LOW risk.    Preop general physical exam      Senile ectropion of right lower eyelid      Senile ectropion of left lower eyelid      Hyperlipidemia with target LDL less than 130      Prediabetes      Benign essential hypertension      Involutional ptosis, acquired, bilateral       - No identified additional risk factors other than previously addressed    Antiplatelet or Anticoagulation Medication Instructions:   - Patient is on no antiplatelet or anticoagulation medications.    Additional Medication Instructions:   - Benzodiazepines: Continue without modification.   - SSRIs, SNRIs, TCAs, Antipsychotics: Continue without modification.     RECOMMENDATION:  APPROVAL GIVEN to proceed with proposed procedure, without further diagnostic evaluation.    Review of external notes as documented elsewhere in note  30 minutes spent by me on the date of the encounter doing chart review, history and exam, documentation and further activities per the note    Subjective       HPI  related to upcoming procedure: pt unsure why having surgery- good for dry eye and vision          8/31/2023     8:47 AM   Preop Questions   1. Have you ever had a heart attack or stroke? No   2. Have you ever had surgery on your heart or blood vessels, such as a stent placement, a coronary artery bypass, or surgery on an artery in your head, neck, heart, or legs? No   3. Do you have chest pain with activity? No   4. Do you have a history of heart failure? No   5. Do you currently have a cold, bronchitis or symptoms of other infection? No   6. Do you have a cough, shortness of breath, or wheezing? No   7. Do you or anyone in your family have previous history of blood clots? No   8. Do you or does anyone in your family have a serious bleeding problem such as prolonged bleeding following surgeries or cuts? No   9. Have you ever had problems with anemia or been told to take iron pills? No   10. Have you had any abnormal blood loss such as black, tarry or bloody stools, or abnormal vaginal bleeding? No   11. Have you ever had a blood transfusion? No   12. Are you willing to have a blood transfusion if it is medically needed before, during, or after your surgery? Yes   13. Have you or any of your relatives ever had problems with anesthesia? No   14. Do you have sleep apnea, excessive snoring or daytime drowsiness? No   15. Do you have any artifical heart valves or other implanted medical devices like a pacemaker, defibrillator, or continuous glucose monitor? No   16. Do you have artificial joints? No   17. Are you allergic to latex? No     Health Care Directive:  Patient does not have a Health Care Directive or Living Will: Discussed advance care planning with patient; however, patient declined at this time.    Preoperative Review of :   reviewed - controlled substances reflected in medication list.      Status of Chronic Conditions:  DEPRESSION - Patient has a long history of Depression of moderate severity  requiring medication for control with recent symptoms being stable..Current symptoms of depression include none.     Review of Systems  CONSTITUTIONAL: NEGATIVE for fever, chills, change in weight  INTEGUMENTARY/SKIN: NEGATIVE for worrisome rashes, moles or lesions  EYES: NEGATIVE for irritation  EYES: POSITIVE for vision change  ENT/MOUTH: NEGATIVE for ear, mouth and throat problems  RESP: NEGATIVE for significant cough or SOB  CV: NEGATIVE for chest pain, palpitations or peripheral edema  GI: NEGATIVE for nausea, abdominal pain, heartburn, or change in bowel habits  : NEGATIVE for frequency, dysuria, or hematuria  MUSCULOSKELETAL: NEGATIVE for significant arthralgias or myalgia  NEURO: NEGATIVE for weakness, dizziness or paresthesias  ENDOCRINE: NEGATIVE for temperature intolerance, skin/hair changes  HEME: NEGATIVE for bleeding problems  PSYCHIATRIC: NEGATIVE for changes in mood or affect    Patient Active Problem List    Diagnosis Date Noted    Telephone  service required 10/10/2023     Priority: Medium    Senile ectropion of left lower eyelid 07/05/2023     Priority: Medium    Senile ectropion of right lower eyelid 07/05/2023     Priority: Medium    Dermatochalasis of both upper eyelids 07/05/2023     Priority: Medium    Involutional ptosis, acquired, bilateral 07/05/2023     Priority: Medium    Hyponatremia 04/20/2023     Priority: Medium    Syncope, unspecified syncope type 04/20/2023     Priority: Medium    Neck pain 05/04/2022     Priority: Medium    Right wrist injury, subsequent encounter 02/17/2022     Priority: Medium    Pseudophakia of both eyes 06/09/2021     Priority: Medium    Chronic midline low back pain without sciatica 05/31/2021     Priority: Medium    Right wrist pain 05/07/2020     Priority: Medium    Benign essential hypertension 12/19/2016     Priority: Medium    Right lumbar radiculopathy 11/10/2016     Priority: Medium    Primary open angle glaucoma of both eyes,  moderate stage 09/02/2016     Priority: Medium    Senile nuclear sclerosis, bilateral 09/02/2016     Priority: Medium    Shingles      Priority: Medium     11/14/15 Left approximately C6 distribution      Disorder of bursae and tendons in shoulder region 03/11/2015     Priority: Medium     Problem list name updated by automated process. Provider to review      Hyperlipidemia with target LDL less than 130 09/15/2014     Priority: Medium     Diagnosis updated by automated process. Provider to review and confirm.      Prediabetes 09/15/2014     Priority: Medium      Past Medical History:   Diagnosis Date    Bipolar disorder (H)     Blepharitis     Esophageal reflux (aka GERD)     Glaucoma     Hyperlipidemia LDL goal < 130     Nonsenile cataract     Shingles     11/14/15 Left approximately C6 distribution     Past Surgical History:   Procedure Laterality Date    CATARACT IOL, RT/LT Right 10/16/2018    [place] [facility]     CATARACT IOL, RT/LT Left 11/01/2018    [place] [facility]     CHALAZION EXCISION  08/21/2015    Left lid    HERNIA REPAIR      abdominal hernia repair 2012     Current Outpatient Medications   Medication Sig Dispense Refill    carvedilol (COREG) 6.25 MG tablet TAKE 1 TABLET BY MOUTH TWICE A DAY WITH MEALS 180 tablet 3    clonazePAM (KLONOPIN) 0.5 MG tablet TAKE 1 TABLET DAILY AS NEEDED FOR ANXIETY. MAX DAILY DOSE:1 MG      erythromycin (ROMYCIN) 5 MG/GM ophthalmic ointment 0.5 inch strip in left eye 3/day day until eyelid surgery in December. 3.5 g 5    pantoprazole (PROTONIX) 40 MG EC tablet Take 1 tablet (40 mg) by mouth daily 90 tablet 3    QUEtiapine (SEROQUEL) 25 MG tablet Take 25 mg by mouth daily      timolol maleate (TIMOPTIC) 0.5 % ophthalmic solution Place 1 drop into the right eye every morning 10 mL 5    triamcinolone (KENALOG) 0.1 % external cream Apply topically 2 times daily as needed for irritation 453.6 g 1     "triamcinolone (KENALOG) 0.1 % external ointment Apply topically 2 times daily      valACYclovir (VALTREX) 500 MG tablet Take 1 tablet (500 mg) by mouth 2 times daily for 3 days 6 tablet 0       Allergies   Allergen Reactions    No Clinical Screening - See Comments Nausea and Vomiting     Liver side effects from INH for TB        Social History     Tobacco Use    Smoking status: Never     Passive exposure: Never    Smokeless tobacco: Never   Substance Use Topics    Alcohol use: No     Family History   Problem Relation Age of Onset    Glaucoma Mother     Macular Degeneration No family hx of     Cancer No family hx of     Diabetes No family hx of     Hypertension No family hx of     Cerebrovascular Disease No family hx of     Thyroid Disease No family hx of     Eye Surgery No family hx of      History   Drug Use         Objective     /68   Pulse 65   Temp (!) 96.6  F (35.9  C) (Temporal)   Resp 12   Ht 1.655 m (5' 5.16\")   Wt 81 kg (178 lb 9.6 oz)   LMP  (LMP Unknown)   SpO2 99%   BMI 29.58 kg/m      Physical Exam    GENERAL APPEARANCE: healthy, alert and no distress     EYES: EOMI, PERRL     HENT: ear canals and TM's normal and nose and mouth without ulcers or lesions     NECK: no adenopathy, no asymmetry, masses, or scars and thyroid normal to palpation     RESP: lungs clear to auscultation - no rales, rhonchi or wheezes     CV: regular rates and rhythm, normal S1 S2, no S3 or S4 and no murmur, click or rub     ABDOMEN:  soft, nontender, no HSM or masses and bowel sounds normal     MS: extremities normal- no gross deformities noted, no evidence of inflammation in joints, FROM in all extremities.     SKIN: no suspicious lesions or rashes     NEURO: Normal strength and tone, sensory exam grossly normal, mentation intact and speech normal     PSYCH: mentation appears normal. and affect normal/bright     LYMPHATICS: No cervical adenopathy    Recent Labs   Lab Test 07/18/23  0721 07/13/23  0934 " 05/04/23  1414 04/28/23  1422 04/24/23  1142 04/21/23  0718 03/28/23  1109 02/01/22  1529   HGB  --   --   --  14.0  --  14.1   < > 13.2   PLT  --   --   --  340  --  331   < > 256   NA  --  137 136 136   < > 136   < >  --    POTASSIUM  --  4.4 4.0 4.2   < > 4.0   < >  --    CR  --  0.72 0.72 0.64   < > 0.79   < >  --    A1C 5.9*  --   --   --   --   --   --  5.8*    < > = values in this interval not displayed.        Diagnostics:  No labs were ordered during this visit.   No EKG required for low risk surgery (cataract, skin procedure, breast biopsy, etc).    Revised Cardiac Risk Index (RCRI):  The patient has the following serious cardiovascular risks for perioperative complications:   - No serious cardiac risks = 0 points     RCRI Interpretation: 0 points: Class I (very low risk - 0.4% complication rate)      Signed Electronically by: MAUDE WALL  Copy of this evaluation report is provided to requesting physician.

## 2023-10-11 ENCOUNTER — ANESTHESIA EVENT (OUTPATIENT)
Dept: SURGERY | Facility: AMBULATORY SURGERY CENTER | Age: 68
End: 2023-10-11
Payer: MEDICARE

## 2023-10-11 NOTE — ANESTHESIA PREPROCEDURE EVALUATION
Anesthesia Pre-Procedure Evaluation    Patient: Yancy Rivera   MRN: [id number] : 1955        Procedure : Procedure(s):  Both upper eyelid blepharoplasty, ptosis repair, right internal browpexy  Both lower eyelid ectropion repair          Past Medical History:   Diagnosis Date    Bipolar disorder (H)     Blepharitis     Esophageal reflux (aka GERD)     Glaucoma     Hyperlipidemia LDL goal < 130     Nonsenile cataract     Shingles     11/14/15 Left approximately C6 distribution      Past Surgical History:   Procedure Laterality Date    CATARACT IOL, RT/LT Right 10/16/2018    [facility]     CATARACT IOL, RT/LT Left 2018    [facility]     CHALAZION EXCISION  2015    Left lid    HERNIA REPAIR      abdominal hernia repair       Allergies   Allergen Reactions    No Clinical Screening - See Comments Nausea and Vomiting     Liver side effects from [facility] for TB      Social History     Tobacco Use    Smoking status: Never     Passive exposure: Never    Smokeless tobacco: Never   Substance Use Topics    Alcohol use: No      Wt Readings from Last 1 Encounters:   10/10/23 81 kg (178 lb 9.6 oz)        Anesthesia Evaluation            ROS/MED HX  ENT/Pulmonary:       Neurologic:       Cardiovascular:     (+) Dyslipidemia hypertension- -   -  - -                                      METS/Exercise Tolerance:     Hematologic:       Musculoskeletal:       GI/Hepatic:     (+) GERD,                   Renal/Genitourinary:       Endo:       Psychiatric/Substance Use:     (+) psychiatric history bipolar and depression       Infectious Disease:       Malignancy:       Other:            Physical Exam    Airway  airway exam normal           Respiratory Devices and Support         Dental       (+) Modest Abnormalities - crowns, retainers, 1 or 2 missing teeth      Cardiovascular   cardiovascular exam normal          Pulmonary    "pulmonary exam normal                OUTSIDE LABS:  CBC:   Lab Results   Component Value Date    WBC 11.7 (H) 04/28/2023    WBC 7.2 04/21/2023    HGB 14.0 04/28/2023    HGB 14.1 04/21/2023    HCT 42.3 04/28/2023    HCT 43.2 04/21/2023     04/28/2023     04/21/2023     BMP:   Lab Results   Component Value Date     07/13/2023     05/04/2023    POTASSIUM 4.4 07/13/2023    POTASSIUM 4.0 05/04/2023    CHLORIDE 103 07/13/2023    CHLORIDE 102 05/04/2023    CO2 22 07/13/2023    CO2 24 05/04/2023    BUN 11.8 07/13/2023    BUN 6.2 (L) 05/04/2023    CR 0.72 07/13/2023    CR 0.72 05/04/2023     (H) 07/13/2023     (H) 05/04/2023     COAGS:   Lab Results   Component Value Date    PTT 26 06/07/2021    INR 0.99 06/07/2021     POC: No results found for: \"BGM\", \"HCG\", \"HCGS\"  HEPATIC:   Lab Results   Component Value Date    ALBUMIN 3.9 04/28/2023    PROTTOTAL 7.5 04/28/2023    ALT 39 04/28/2023    AST 21 04/28/2023    ALKPHOS 105 04/28/2023    BILITOTAL 0.3 04/28/2023     OTHER:   Lab Results   Component Value Date    A1C 5.9 (H) 07/18/2023    SUZANNA 10.0 07/13/2023    PHOS 2.7 10/28/2016    LIPASE 174 04/13/2021    TSH 1.23 04/28/2023    CRP 9.5 (H) 02/01/2022    SED 10 02/01/2022       Anesthesia Plan    ASA Status:  2    NPO Status:  NPO Appropriate    Anesthesia Type: MAC.     - Reason for MAC: straight local not clinically adequate              Consents    Anesthesia Plan(s) and associated risks, benefits, and realistic alternatives discussed. Questions answered and patient/representative(s) expressed understanding.     - Discussed: Risks, Benefits and Alternatives for the PROCEDURE were discussed     - Discussed with:  Patient            Postoperative Care    Pain management: IV analgesics, Oral pain medications, Multi-modal analgesia.   PONV prophylaxis: Ondansetron (or other 5HT-3), Dexamethasone or Solumedrol, Background Propofol Infusion     Comments:                Nicolás Rueda MD  "

## 2023-10-12 ENCOUNTER — ANESTHESIA (OUTPATIENT)
Dept: SURGERY | Facility: AMBULATORY SURGERY CENTER | Age: 68
End: 2023-10-12
Payer: MEDICARE

## 2023-10-12 ENCOUNTER — HOSPITAL ENCOUNTER (OUTPATIENT)
Facility: AMBULATORY SURGERY CENTER | Age: 68
Discharge: HOME | End: 2023-10-12
Attending: OPHTHALMOLOGY
Payer: MEDICARE

## 2023-10-12 VITALS
HEIGHT: 64 IN | SYSTOLIC BLOOD PRESSURE: 131 MMHG | TEMPERATURE: 98.5 F | BODY MASS INDEX: 29.88 KG/M2 | OXYGEN SATURATION: 96 % | RESPIRATION RATE: 16 BRPM | HEART RATE: 54 BPM | DIASTOLIC BLOOD PRESSURE: 66 MMHG | WEIGHT: 175 LBS

## 2023-10-12 DIAGNOSIS — H02.834 DERMATOCHALASIS OF BOTH UPPER EYELIDS: ICD-10-CM

## 2023-10-12 DIAGNOSIS — H02.831 DERMATOCHALASIS OF BOTH UPPER EYELIDS: ICD-10-CM

## 2023-10-12 DIAGNOSIS — H02.403 INVOLUTIONAL PTOSIS, ACQUIRED, BILATERAL: Primary | ICD-10-CM

## 2023-10-12 PROCEDURE — 67900 REPAIR BROW DEFECT: CPT | Mod: RT | Performed by: OPHTHALMOLOGY

## 2023-10-12 PROCEDURE — 67917 REPAIR EYELID DEFECT: CPT | Mod: E2 | Performed by: OPHTHALMOLOGY

## 2023-10-12 PROCEDURE — 67904 REPAIR EYELID DEFECT: CPT | Mod: RT

## 2023-10-12 PROCEDURE — 67917 REPAIR EYELID DEFECT: CPT | Mod: E4

## 2023-10-12 PROCEDURE — 67904 REPAIR EYELID DEFECT: CPT | Mod: 50 | Performed by: OPHTHALMOLOGY

## 2023-10-12 PROCEDURE — 67900 REPAIR BROW DEFECT: CPT | Mod: RT

## 2023-10-12 RX ORDER — ONDANSETRON 4 MG/1
4 TABLET, ORALLY DISINTEGRATING ORAL EVERY 30 MIN PRN
Status: DISCONTINUED | OUTPATIENT
Start: 2023-10-12 | End: 2023-10-13 | Stop reason: HOSPADM

## 2023-10-12 RX ORDER — PROPOFOL 10 MG/ML
INJECTION, EMULSION INTRAVENOUS PRN
Status: DISCONTINUED | OUTPATIENT
Start: 2023-10-12 | End: 2023-10-12

## 2023-10-12 RX ORDER — NEOMYCIN POLYMYXIN B SULFATES AND DEXAMETHASONE 3.5; 10000; 1 MG/ML; [USP'U]/ML; MG/ML
SUSPENSION/ DROPS OPHTHALMIC
Qty: 10 ML | Refills: 0 | Status: SHIPPED | OUTPATIENT
Start: 2023-10-12 | End: 2023-10-24

## 2023-10-12 RX ORDER — TETRACAINE HYDROCHLORIDE 5 MG/ML
SOLUTION OPHTHALMIC PRN
Status: DISCONTINUED | OUTPATIENT
Start: 2023-10-12 | End: 2023-10-12 | Stop reason: HOSPADM

## 2023-10-12 RX ORDER — ONDANSETRON 2 MG/ML
4 INJECTION INTRAMUSCULAR; INTRAVENOUS EVERY 30 MIN PRN
Status: DISCONTINUED | OUTPATIENT
Start: 2023-10-12 | End: 2023-10-13 | Stop reason: HOSPADM

## 2023-10-12 RX ORDER — ERYTHROMYCIN 5 MG/G
OINTMENT OPHTHALMIC
Qty: 7 G | Refills: 0 | Status: SHIPPED | OUTPATIENT
Start: 2023-10-12 | End: 2023-10-24

## 2023-10-12 RX ORDER — OXYCODONE HYDROCHLORIDE 5 MG/1
5 TABLET ORAL
Status: COMPLETED | OUTPATIENT
Start: 2023-10-12 | End: 2023-10-12

## 2023-10-12 RX ORDER — ONDANSETRON 2 MG/ML
INJECTION INTRAMUSCULAR; INTRAVENOUS PRN
Status: DISCONTINUED | OUTPATIENT
Start: 2023-10-12 | End: 2023-10-12

## 2023-10-12 RX ORDER — OXYCODONE HYDROCHLORIDE 5 MG/1
10 TABLET ORAL
Status: DISCONTINUED | OUTPATIENT
Start: 2023-10-12 | End: 2023-10-13 | Stop reason: HOSPADM

## 2023-10-12 RX ORDER — ACETAMINOPHEN 325 MG/1
975 TABLET ORAL ONCE
Status: COMPLETED | OUTPATIENT
Start: 2023-10-12 | End: 2023-10-12

## 2023-10-12 RX ORDER — LIDOCAINE 40 MG/G
CREAM TOPICAL
Status: DISCONTINUED | OUTPATIENT
Start: 2023-10-12 | End: 2023-10-12 | Stop reason: HOSPADM

## 2023-10-12 RX ORDER — FENTANYL CITRATE 50 UG/ML
INJECTION, SOLUTION INTRAMUSCULAR; INTRAVENOUS PRN
Status: DISCONTINUED | OUTPATIENT
Start: 2023-10-12 | End: 2023-10-12

## 2023-10-12 RX ORDER — ERYTHROMYCIN 5 MG/G
OINTMENT OPHTHALMIC PRN
Status: DISCONTINUED | OUTPATIENT
Start: 2023-10-12 | End: 2023-10-12 | Stop reason: HOSPADM

## 2023-10-12 RX ORDER — SODIUM CHLORIDE, SODIUM LACTATE, POTASSIUM CHLORIDE, CALCIUM CHLORIDE 600; 310; 30; 20 MG/100ML; MG/100ML; MG/100ML; MG/100ML
INJECTION, SOLUTION INTRAVENOUS CONTINUOUS
Status: DISCONTINUED | OUTPATIENT
Start: 2023-10-12 | End: 2023-10-12 | Stop reason: HOSPADM

## 2023-10-12 RX ADMIN — ONDANSETRON 4 MG: 2 INJECTION INTRAMUSCULAR; INTRAVENOUS at 09:40

## 2023-10-12 RX ADMIN — ACETAMINOPHEN 975 MG: 325 TABLET ORAL at 09:20

## 2023-10-12 RX ADMIN — OXYCODONE HYDROCHLORIDE 5 MG: 5 TABLET ORAL at 10:51

## 2023-10-12 RX ADMIN — FENTANYL CITRATE 25 MCG: 50 INJECTION, SOLUTION INTRAMUSCULAR; INTRAVENOUS at 09:49

## 2023-10-12 RX ADMIN — FENTANYL CITRATE 25 MCG: 50 INJECTION, SOLUTION INTRAMUSCULAR; INTRAVENOUS at 09:46

## 2023-10-12 RX ADMIN — SODIUM CHLORIDE, SODIUM LACTATE, POTASSIUM CHLORIDE, CALCIUM CHLORIDE: 600; 310; 30; 20 INJECTION, SOLUTION INTRAVENOUS at 09:36

## 2023-10-12 RX ADMIN — PROPOFOL 50 MG: 10 INJECTION, EMULSION INTRAVENOUS at 09:41

## 2023-10-12 NOTE — OP NOTE
PREOPERATIVE DIAGNOSES:   1. Bilateral upper eyelid dermatochalasis.   2. Bilateral upper eyelid ptosis.   3. Right brow ptosis.  4. Both lower eyelid ectropion.  POSTOPERATIVE DIAGNOSES:   1. Bilateral upper eyelid dermatochalasis.   2. Bilateral upper eyelid ptosis.   3. Right brow ptosis.  PROCEDURES:  1. Bilateral upper eyelid ptosis repair by external levator resection.  2. Bilateral upper eyelid blepharoplasty.  3. Right brow ptosis repair.  4. Both lower eyelid ectropion repair  SURGEON: Mine Munoz MD   ASSISTANT: Selena Milner MD and Gael Javier MD  ANESTHESIA: Monitored with local infiltration of 1% lidocaine with epinephrine [id number] and 0.5% Marcaine  COMPLICATIONS: None.   ESTIMATED BLOOD LOSS: 3 mL.     HISTORY: Yancy Rivera presented with upper eyelid drooping secondary to dermatochalasis and ptosis of the upper eyelids leading to blockage of the superior visual field and interfering with activities of daily living. Additionally she has both lower eyelid ectropion causing corneal irritation and epiphora. After the risks, benefits and alternatives to the proposed procedure were explained, informed consent was obtained.     PROCEDURE: The patient was brought to the operating room and placed supine on the operating table. IV sedation was given. Each upper lid crease and the excess upper eyelid skin  was marked in a blepharoplasty ellipse with a marking pen.  These areas were all infiltrated with local anesthetic as was the lateral canthal angle and the lower eyelids laterally. She  was prepped and draped in the typical sterile fashion for oculoplastic surgery. Attention was directed to the right side. The skin was incised following the marked lines. The skin flap was excised Jeison scissors. Hemostasis was obtained. The orbicularis and orbital septum were opened horizontally and levator aponeurosis identified and dissected from the superior tarsal border. A strip of pretarsal orbicularis was  removed. A 6-0 Vicryl suture was used to advance the levator aponeurosis. Attention was directed to the left side where the same procedure was performed. Nasal fat was conservatively debulked on both side. The sutures were adjusted for symmetry then tied in a permanent fashion.    Attention was directed to the right brow.  Dissection was carried in the suborbicularis plane over the orbital rim superolaterally. Dissection was carried out in a preperiosteal plane until the lateral aspect of the brow was nicely mobilized.  A 4-0 PDS suture was passed through the frontal bone periosteum 1 cm above the orbital rim.  This suture was then passed back through the orbicularis in line with the marking which was tied in a permanent fashion. This nicely elevated the tail of the brow about 3 mm.     Attention was directed to the right lower eyelid An 8 mm lateral canthal incision was made with a 15 blade and dissection carried down to the orbicularis with high temperature cautery. Lateral canthotomy and inferior cantholysis was performed with the cautery. A lateral tarsal strip was fashioned with the high temperature cautery and the Jeison scissors. The tarsal strip was secured to the lateral orbital rim periosteum with a double-armed 5-0 Vicryl  suture in a horizontal mattress fashion. Lateral canthal angle was closed with a gray line to gray line suture of 5-0 Vicryl tied internally. Skin was closed with interrupted 6-0 plain gut sutures. The same was performed on the left lower eyelid.     Each skin incision was closed with a running 6-0 plain gut suture. Ophthalmic antibiotic ointment was applied to the incisions and into both eyes. The patient tolerated the procedure well and left the operating room in stable condition.       Mine Munoz MD

## 2023-10-12 NOTE — BRIEF OP NOTE
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]  Senile ectropion of right lower eyelid [H02.132]  Dermatochalasis of both upper eyelids [H02.831, H02.834]  Involutional ptosis, acquired, bilateral [H02.403]   Post-operative diagnosis Same as above     Procedure: Procedure(s):  Both upper eyelid blepharoplasty, ptosis repair, right internal browpexy  Both lower eyelid ectropion repair   Surgeon(s): Surgeon(s) and Role:     * Mine Munoz MD - Primary     * Selena Milner MD - Fellow - Assisting     * Gael Javier MD - Fellow - Assisting   Estimated blood loss: * No values recorded between 10/12/2023  9:52 AM and 10/12/2023 10:36 AM *    Specimens: * No specimens in log *   Findings: None

## 2023-10-12 NOTE — ANESTHESIA CARE TRANSFER NOTE
Patient: Yancy Rivera    Procedure: Procedure(s):  Both upper eyelid blepharoplasty, ptosis repair, right internal browpexy  Both lower eyelid ectropion repair       Diagnosis: Senile ectropion of left lower eyelid [H02.135]  Senile ectropion of right lower eyelid [H02.132]  Dermatochalasis of both upper eyelids [H02.831, H02.834]  Involutional ptosis, acquired, bilateral [H02.403]  Diagnosis Additional Information: No value filed.    Anesthesia Type:   MAC     Note:    Oropharynx: spontaneously breathing and oropharynx clear of all foreign objects  Level of Consciousness: awake  Oxygen Supplementation: room air    Independent Airway: airway patency satisfactory and stable  Dentition: dentition unchanged  Vital Signs Stable: post-procedure vital signs reviewed and stable  Report to RN Given: handoff report given  Patient transferred to: Phase II  Comments: Uneventful transport   Report to RN - Alessia  Exchanging well; color natl  Pt responds appropriately to command  IV patent  Lips/teeth/dentition as preop status  Questions answered      Handoff Report: Identifed the Patient, Identified the Reponsible Provider, Reviewed the pertinent medical history, Discussed the surgical course, Reviewed Intra-OP anesthesia mangement and issues during anesthesia, Set expectations for post-procedure period and Allowed opportunity for questions and acknowledgement of understanding      Vitals:  Vitals Value Taken Time   /76 10/12/23 1038   Temp 36.1  C (97  F) 10/12/23 1038   Pulse 52    Resp 16 10/12/23 1038   SpO2 97 % 10/12/23 1038       Electronically Signed By: CHRISSIE PALM CRNA  October 12, 2023  10:41 AM

## 2023-10-12 NOTE — ANESTHESIA POSTPROCEDURE EVALUATION
Patient: Yancy Rivera    Procedure: Procedure(s):  Both upper eyelid blepharoplasty, ptosis repair, right internal browpexy  Both lower eyelid ectropion repair       Anesthesia Type:  MAC    Note:  Disposition: Outpatient   Postop Pain Control: Uneventful            Sign Out: Well controlled pain   PONV: No   Neuro/Psych: Uneventful            Sign Out: Acceptable/Baseline neuro status   Airway/Respiratory: Uneventful            Sign Out: Acceptable/Baseline resp. status   CV/Hemodynamics: Uneventful            Sign Out: Acceptable CV status; No obvious hypovolemia; No obvious fluid overload   Other NRE: NONE   DID A NON-ROUTINE EVENT OCCUR?            Last vitals:  Vitals Value Taken Time   /66 10/12/23 1125   Temp 36.9  C (98.5  F) 10/12/23 1125   Pulse 54 10/12/23 1125   Resp 16 10/12/23 1125   SpO2 96 % 10/12/23 1125       Electronically Signed By: Nicolás Rueda MD  October 12, 2023  1:20 PM

## 2023-10-12 NOTE — DISCHARGE INSTRUCTIONS
Post-operative Instructions  Ophthalmic Plastic and Reconstructive Surgery    Mine Munoz M.D.     All instructions apply to the operated eye(s) or eyelid(s).    Wound care and personal care  ? Apply ice compresses and gentle pressure 15 minutes on, 15 minutes off, for 2 days. If you are sleeping, you don't need to wake up to ice. As long as there is no further bleeding, after two days, switch to warm water compresses for five minutes, four times a day until seen by your physician.   ? You may shower or wash your hair the day after surgery. Do not go swimming for at least 2 weeks to prevent contamination of your wounds.  ? You may go for walks and light activity is ok, but no heavy (over 15 pounds) lifting, bending or excessive straining for one week.   ? Do not apply make-up to the eyes or eyelids for 2 weeks after surgery.  ? Expect some swelling, bruising, black eye (even into the lower eyelids and cheeks). Also expect serum caking, crusting and discharge from the eye and/or incisions. A small amount of surface bleeding, and depending on the type of surgery, bleeding from the inside of the eyelid, is normal for the first 48 hours.  ? Avoid straining, bending at the waist, or lifting more than 15 pounds for 1 week. Sleeping with your head elevated, such as in a recliner, for the first several days can help swelling resolve more quickly.   ? Do continue to ambulate (walk) as you normally would - being sedentary after surgery can cause blood clots.   ? Your eye(s) and eyelid(s) may be painful and tender. This is normal after surgery.      Contact information and follow-up  ? Return to the Eye Clinic for a follow-up appointment with your physician as scheduled. If no appointment has been scheduled:   - [facility] eye clinic: [phone] for an appointment with Dr. Munoz within 2 to 3 weeks from your date of surgery.    After hours or on weekends and holidays, call [phone] and ask to  speak with the ophthalmologist on call.    An on call person can be reached after hours for concerns. The on call doctor should not call in medication refill requests after hours or on weekends, so please plan accordingly. An effort has been made to provide adequate pain medications following every surgery, and refills will not be provided in most instances.     Medications  ? Restart all regular home medications and eye drops. If you take Plavix or Aspirin on a regular basis, wait for 72 hours after your surgery before restarting these in order to decrease the risk of bleeding complications.  ? Avoid aspirin and aspirin-like medications (Motrin, Aleve, Ibuprofen, Pricilla-Elliott etc) for 72 hours to reduce the risk of bleeding. You may take Tylenol (acetaminophen) for pain.  ? In addition to your home medications, take the following post-operative medications as prescribed by your physician.    ? Apply antibiotic ointment to all sutures three times a day, and into the operated eye(s) at night.   Once you run out, you can apply Vaseline or Aquaphor (over the counter) to the incisions. Don't put the Vaseline or Aquaphor into your eyes.   ? Instill prescribed eye drops 3 times a day for 10 days.   ? If you have ocular irritation, you can use over the counter artificial tears such as Refresh, Systane, or Blink. Do not use Visine, Clear Eyes, or any other drop that gets the red out.         [facility]  Home Care Following Anesthesia  For 24 hours after surgery:  Get plenty of rest.  A responsible adult must stay with you for at least 24 hours after you leave the surgery center.  Do not drive or use heavy equipment.  If you have weakness or tingling, don't drive or use heavy equipment until this feeling goes away.   Do not drink alcohol.   Avoid strenuous or risky activities.  Ask for help when climbing stairs.  You may feel lightheaded.  IF so, sit for a few minutes before standing.   "Have someone help you get up.   If you have nausea (feel sick to your stomach): Drink only clear liquids such as apple juice, ginger ale, broth or 7-Up.  Rest may also help.  Be sure to drink enough fluids.  Move to a regular diet as you feel able.   You may have a slight fever.  Call the doctor if your fever is over 100 F (37.7 C) (taken under the tongue) or lasts longer than 24 hours.  You may have a dry mouth, a sore throat, muscle aches or trouble sleeping. These should go away after 24 hours.  Do not make important or legal decisions.   It is recommended to avoid smoking.        Today you received a Marcaine or bupivacaine block to numb the nerves near your surgery site.  This is a block using local anesthetic or \"numbing\" medication injected around the nerves to anesthetize or \"numb\" the area supplied by those nerves.  This block is injected into the muscle layer near your surgical site.  The medication may numb the location where you had surgery for 6-18 hours, but may last up to 24 hours.  If your surgical site is an arm or leg you should be careful with your affected limb, since it is possible to injure your limb without being aware of it due to the numbing.  Until full feeling returns, you should guard against bumping or hitting your limb, and avoid extreme hot or cold temperatures on the skin.  As the block wears off, the feeling will return as a tingling or prickly sensation near your surgical site.  You will experience more discomfort from your incision as the feeling returns.  You may want to take a pain pill (a narcotic or Tylenol if this was prescribed by your surgeon) when you start to experience mild pain before the pain beccomes more severe.  If your pain medications do not control your pain you should notifiy your surgeon.    Tips for taking pain medications  To get the best pain relief possible, remember these points:  Take pain medications as directed, before pain becomes severe.  Pain " medication can upset your stomach: taking it with food may help.  Constipation is a common side effect of pain medication. Drink plenty of  fluids.  Eat foods high in fiber. Take a stool softener if recommended by your doctor or pharmacist.  Do not drink alcohol, drive or operate machinery while taking pain medications.  Ask about other ways to control pain, such as with heat, ice or relaxation.    Tylenol/Acetaminophen Consumption    If you feel your pain relief is insufficient, you may take Tylenol/Acetaminophen in addition to your narcotic pain medication.   Be careful not to exceed 4,000 mg of Tylenol/Acetaminophen in a 24 hour period from all sources.  If you are taking extra strength Tylenol/acetaminophen (500 mg), the maximum dose is 8 tablets in 24 hours.  If you are taking regular strength acetaminophen (325 mg), the maximum dose is 12 tablets in 24 hours.  975 mg of Tylenol given at 9:20 am next dose may be given after 3:20 pm    Call a doctor for any of the following:  Signs of infection (fever, growing tenderness at the surgery site, a large amount of drainage or bleeding, severe pain, foul-smelling drainage, redness, swelling).  It has been over 8 to 10 hours since surgery and you are still not able to urinate (pass water).  Headache for over 24 hours.  Signs of Covid-19 infection (temperature over 100 degrees, shortness of breath, cough, loss of taste/smell, generalized body aches, persistent headache, chills, sore throat, nausea/vomiting/diarrhea)  Your doctor is:       Dr. Mine Munoz, Ophthalmology: [phone]               Or dial [phone] and ask for the resident on call for:  Ophthalmology  For emergency care, call the:  [facility]:  [phone] (TTY for hearing impaired: [phone])

## 2023-10-20 ENCOUNTER — NURSE TRIAGE (OUTPATIENT)
Dept: NURSING | Facility: CLINIC | Age: 68
End: 2023-10-20
Payer: MEDICARE

## 2023-10-20 DIAGNOSIS — H02.834 DERMATOCHALASIS OF BOTH UPPER EYELIDS: Primary | ICD-10-CM

## 2023-10-20 DIAGNOSIS — H02.831 DERMATOCHALASIS OF BOTH UPPER EYELIDS: Primary | ICD-10-CM

## 2023-10-20 RX ORDER — CARBOXYMETHYLCELLULOSE SODIUM 5 MG/ML
1 SOLUTION/ DROPS OPHTHALMIC 3 TIMES DAILY PRN
Qty: 15 ML | Refills: 5 | Status: SHIPPED | OUTPATIENT
Start: 2023-10-20 | End: 2024-01-24

## 2023-10-20 NOTE — TELEPHONE ENCOUNTER
[phone]      Reviewed with on call eye provider    Ok for warm compresses    Recommend exam with PCP-- new angioedema on cheek would not be consistent with recent lid surgery.    Updated pt with daughter at 1740    Reviewed ok to use warm soaks or ice packs-- whichever felt more comforting/helpful.    Reviewed if swelling continues over weekend and concerned-- ok to go to [address] [place] [address] ED/[facility]    Reviewed Oculoplastics team to reach out on Monday to review symptoms/scheduling on Tuesday if indicated at call.    Pt seemed comfortable with plan.    Sb Freitas, RN 5:43 PM 10/20/23

## 2023-10-20 NOTE — TELEPHONE ENCOUNTER
Image received     S/p bilateral lid surgery 10-    Bilateral swelling under eyes/over cheek bones.    Spoke to pt and daughter at 1615 ph: [phone]    Pt stopped warm soaks yesterday and now new swelling below eyes with burning sensation.  Pt has started warm soaks again now.    No warm to touch    No fever    Eye with some burning    Bruising under eye since surgery--now new    Recommended refresh 3-4 day and reviewed may want to start ice packs for 24 hours before resuming warm soaks again.    Reviewed would ask on call eye provider to reach out to review/confirm plan    Sb Freitas RN 4:35 PM 10/20/23

## 2023-10-24 ENCOUNTER — OFFICE VISIT (OUTPATIENT)
Dept: OPHTHALMOLOGY | Facility: CLINIC | Age: 68
End: 2023-10-24
Payer: MEDICARE

## 2023-10-24 DIAGNOSIS — H04.123 DRY EYES, BILATERAL: Primary | ICD-10-CM

## 2023-10-24 DIAGNOSIS — H02.831 DERMATOCHALASIS OF BOTH UPPER EYELIDS: ICD-10-CM

## 2023-10-24 DIAGNOSIS — H02.834 DERMATOCHALASIS OF BOTH UPPER EYELIDS: ICD-10-CM

## 2023-10-24 DIAGNOSIS — H02.403 INVOLUTIONAL PTOSIS, ACQUIRED, BILATERAL: ICD-10-CM

## 2023-10-24 DIAGNOSIS — Z98.890 POSTOPERATIVE STATE: ICD-10-CM

## 2023-10-24 PROCEDURE — 99024 POSTOP FOLLOW-UP VISIT: CPT | Mod: GC | Performed by: OPHTHALMOLOGY

## 2023-10-24 RX ORDER — ERYTHROMYCIN 5 MG/G
OINTMENT OPHTHALMIC
Qty: 7 G | Refills: 0 | Status: SHIPPED | OUTPATIENT
Start: 2023-10-24 | End: 2023-11-20

## 2023-10-24 RX ORDER — CARBOXYMETHYLCELLULOSE SODIUM 10 MG/ML
1 GEL OPHTHALMIC 4 TIMES DAILY
Qty: 30 EACH | Refills: 11 | Status: SHIPPED | OUTPATIENT
Start: 2023-10-24 | End: 2024-01-24

## 2023-10-24 ASSESSMENT — VISUAL ACUITY
OD_SC+: -1
OS_SC: 20/60
OS_SC+: -2
OD_SC: 20/40
METHOD: SNELLEN - LINEAR

## 2023-10-24 ASSESSMENT — EXTERNAL EXAM - LEFT EYE: OS_EXAM: NORMAL

## 2023-10-24 ASSESSMENT — TONOMETRY
IOP_METHOD: ICARE
OD_IOP_MMHG: 8
OS_IOP_MMHG: 8

## 2023-10-24 ASSESSMENT — EXTERNAL EXAM - RIGHT EYE: OD_EXAM: BROW PTOSIS, WITH FRONTALIS RELAXED, BROW IS BELOW SUPERIOR ORBITAL RIM AND LATERALLY INLINE WITH UPPER LASHES

## 2023-10-24 NOTE — PATIENT INSTRUCTIONS
Really lubricate your eyes!   Use artificial tear gel (like Refresh Celluvisc) at least 4 - 5 x daily and use the ointment at bedtime.    Use warm compresses 2-3 x daily

## 2023-10-24 NOTE — NURSING NOTE
Chief Complaints and History of Present Illnesses   Patient presents with    Post Op (Ophthalmology) Both Eyes     2 week post op both upper eyelid blepharoplasty, ptosis repair, right internal browpexy and both lower eyelid ectropion repair     Chief Complaint(s) and History of Present Illness(es)       Post Op (Ophthalmology) Both Eyes              Associated symptoms: eye pain and floaters.  Negative for redness and swelling    Pain scale: 8/10    Comments: 2 week post op both upper eyelid blepharoplasty, ptosis repair, right internal browpexy and both lower eyelid ectropion repair              Comments    DOS 10/12/2023. Pt states eyes are painful, worse RE, rates it at 8/10. Pt states she has had a headache on the right side and behind her eyes since the surgery. Pt states since the surgery she has a black floater in RE. Pt states eyes were swollen last week but it's gone down.   Pt states she is using Aquaphor on her top lids    April Acuña OA 1:38 PM October 24, 2023

## 2023-10-24 NOTE — PROGRESS NOTES
Chief Complaint(s) and History of Present Illness(es)     Post Op (Ophthalmology) Both Eyes            Associated symptoms: eye pain and floaters.  Negative for redness and   swelling    Pain scale: 8/10    Comments: 2 week post op both upper eyelid blepharoplasty, ptosis repair,   right internal browpexy and both lower eyelid ectropion repair          Comments    DOS 10/12/2023. Pt states eyes are painful, worse RE, rates it at 8/10. Pt   states she has had a headache on the right side and behind her eyes since   the surgery. Pt states since the surgery she has a black floater in RE. Pt   states eyes were swollen last week but it's gone down.   Pt states she is using Aquaphor on her top lids    April Acuña OA 1:38 PM October 24, 2023        Pleased with surgery over all. Has noticed some burning sensation and pain in both eyes, R > L. Finished the erythromycin ointment. Has been using aquaphor on top lids and PF artificial tears 4 times daily.             Assessment & Plan     Yancy Rivera is a 68 year old female with the following diagnoses:   Encounter Diagnoses   Name Primary?    Dry eyes, bilateral Yes    Postoperative state      S/p bulateral upper eyelid ptosis repair   S/p BULB  S/p brow ptosis repair  S/p bilateral lower eyelid ectropion repair  - POW2, incision site healing well   - MRD1 5 mm OU  - Levator function 16 mm OU     Dry eyes  - diffuse 2+ PEE in right eye and 1+ PEE in the left eye   - likely contributor to the burning sensation    Plan  - Increase artificial tears 6x times a day to both eyes  - Start warm compresses   - return to clinic in 2 months      Angie Nance MD   PGY-2  Department of Ophthalmology   Agree; very dry eyes. Good lid position. MRD1 about 4-5 mm both eyes. No lagophthalmos.    Patient Instructions   Really lubricate your eyes!   Use artificial tear gel (like Refresh Celluvisc) at least 4 - 5 x daily and use the ointment at bedtime.    Use warm compresses 2-3 x  daily        Attending Physician Attestation: Complete documentation of historical and exam elements from today's encounter can be found in the full encounter summary report (not reduplicated in this progress note). I personally obtained the chief complaint(s) and history of present illness. I confirmed and edited as necessary the review of systems, past medical/surgical history, family history, social history, and examination findings as documented by others; and I examined the patient myself. I personally reviewed the relevant tests, images, and reports as documented above. I formulated and edited as necessary the assessment and plan and discussed the findings and management plan with the patient.  -Mine Munoz MD

## 2023-11-10 DIAGNOSIS — E78.5 HYPERLIPEMIA: ICD-10-CM

## 2023-11-14 RX ORDER — ATORVASTATIN CALCIUM 20 MG/1
20 TABLET, FILM COATED ORAL DAILY
Qty: 90 TABLET | Refills: 0 | OUTPATIENT
Start: 2023-11-14

## 2023-11-19 DIAGNOSIS — H02.831 DERMATOCHALASIS OF BOTH UPPER EYELIDS: ICD-10-CM

## 2023-11-19 DIAGNOSIS — H02.403 INVOLUTIONAL PTOSIS, ACQUIRED, BILATERAL: ICD-10-CM

## 2023-11-19 DIAGNOSIS — H02.834 DERMATOCHALASIS OF BOTH UPPER EYELIDS: ICD-10-CM

## 2023-11-20 RX ORDER — ERYTHROMYCIN 5 MG/G
OINTMENT OPHTHALMIC
Qty: 7 G | Refills: 1 | Status: SHIPPED | OUTPATIENT
Start: 2023-11-20 | End: 2024-01-24

## 2023-12-01 ENCOUNTER — PATIENT OUTREACH (OUTPATIENT)
Dept: GERIATRIC MEDICINE | Facility: CLINIC | Age: 68
End: 2023-12-01
Payer: MEDICARE

## 2023-12-01 NOTE — PROGRESS NOTES
[facility] Care Coordination Contact    Called member to schedule annual HRA home visit. HRA has been scheduled for Friday, December 8 at 11:30am at [facility].    PRINCESS Jacobson  [facility]  [phone]

## 2023-12-08 ENCOUNTER — PATIENT OUTREACH (OUTPATIENT)
Dept: GERIATRIC MEDICINE | Facility: CLINIC | Age: 68
End: 2023-12-08
Payer: MEDICARE

## 2023-12-08 ASSESSMENT — ACTIVITIES OF DAILY LIVING (ADL)
DEPENDENT_IADLS:: CLEANING;COOKING;LAUNDRY;SHOPPING;MEAL PREPARATION;MEDICATION MANAGEMENT;MONEY MANAGEMENT;TRANSPORTATION;INCONTINENCE

## 2023-12-08 NOTE — PROGRESS NOTES
[facility] Care Coordination Contact    [facility] Home Visit Assessment     Home visit for Health Risk Assessment with Yancy Rivera completed on December 8, 2023    Type of residence:: Private home - no stairs  Current living arrangement:: I live in a private home with spouse     Assessment completed with:: Patient    Current Care Plan  Member currently receiving the following home care services:     Member currently receiving the following community resources: [facility] Programs, Day Care, PCA       Medication Review  Medication reconciliation completed in Epic: Yes  Medication set-up & administration: Family/informal caregiver sets up weekly.  Family caregiver administers medications.  Medication Risk Assessment Medication (1 or more, place referral to MTM): N/A: No risk factors identified  MTM Referral Placed: No: No risk factors idenified    Mental/Behavioral Health   Depression Screening:   PHQ-2 Total Score (Adult) - Positive if 3 or more points; Administer PHQ-9 if positive: 2       Mental health DX:: No        Falls Assessment:   Fallen 2 or more times in the past year?: No   Any fall with injury in the past year?: Yes    ADL/IADL Dependencies:   Dependent ADLs:: Bathing, Dressing  Dependent IADLs:: Cleaning, Cooking, Laundry, Shopping, Meal Preparation, Medication Management, Money Management, Transportation, Incontinence    [facility] sponsored benefits: Shared information re: Silver Sneakers/gym memberships, ASA, Calcium +D.    PCA Assessment completed at visit: Yes Annual PCA assessment indicated 1.25 hours per day of PCA. This is a decrease from the  previous assessment.     Elderly Waiver Eligibility: Yes-will continue on EW    Care Plan & Recommendations: Annual reassessment completed face to face with Yancy and  at [facility]'s adult day care center.  Yancy shared that she had a bad fall in March 2023 and now suffer from wrist pain, back pain and also has arthritis  in her knees which causes pain.  Care Coordinator completed a PCA reassessment and she qualifies for 1.25 hours per day which is a decrease from last year.  Yancy has requested Hom[employer]aking Services to help with cleaning, laundry and shopping due to her wrist pain/weakness.  She also requested a lifeline unit due to fear/risk of falls.  Care Coordinator will add these services and let her know how many HM hours she can get based on her budget.  Yancy enjoys going to adult day care and wants to continue to go 5 days per week.  No further questions, concerns or needs at this time.    See [facility] for detailed assessment information.    Follow-Up Plan: Member informed of future contact, plan to f/u with member with a 6 month telephone assessment.  Contact information shared with member and family, encouraged member to call with any questions or concerns at any time.    [place] care continuum providers: Please see Snapshot and Care Management Flowsheets for Specific details of care plan.    This CC note routed to PCP, Abundio Winters.    PRINCESS Jacobson  [facility]  [phone]

## 2023-12-15 ENCOUNTER — PATIENT OUTREACH (OUTPATIENT)
Dept: GERIATRIC MEDICINE | Facility: CLINIC | Age: 68
End: 2023-12-15
Payer: MEDICARE

## 2023-12-15 NOTE — LETTER
[facility]  [address], [address]  [place] [address]  Phone:  [phone]  Fax:  [phone]      December 15, 2023    YANCY DOUGHERTY  [address] [address]    Dear Yancy,    Enclosed is a copy of your completed PCA Assessment and Service Plan.  This is for your records and no action is required by you.  If you have additional questions regarding your assessment please contact me at [phone]. If you feel that your needs are not being met, please contact the Clinical Supervisor at [phone].    Sincerely,      PRINCESS Jacobson  [phone]  [email or web address]            Enclosure:  Completed PCA assessment

## 2023-12-15 NOTE — PROGRESS NOTES
[facility] Care Coordination Contact    [facility]:  Emailed required PCA documents to [facility].  Faxed copy of PCA assessment to PCA Agency and mailed copy to member.  Faxed MD Communication to PCP.     Josep Contreras  Care Management Specialist  [facility]  [phone]

## 2023-12-19 ENCOUNTER — OFFICE VISIT (OUTPATIENT)
Dept: OPHTHALMOLOGY | Facility: CLINIC | Age: 68
End: 2023-12-19
Payer: MEDICARE

## 2023-12-19 DIAGNOSIS — H04.123 DRY EYES, BILATERAL: ICD-10-CM

## 2023-12-19 DIAGNOSIS — H02.132 SENILE ECTROPION OF RIGHT LOWER EYELID: ICD-10-CM

## 2023-12-19 DIAGNOSIS — H02.135 SENILE ECTROPION OF LEFT LOWER EYELID: ICD-10-CM

## 2023-12-19 DIAGNOSIS — H02.831 DERMATOCHALASIS OF BOTH UPPER EYELIDS: ICD-10-CM

## 2023-12-19 DIAGNOSIS — Z98.890 POSTOPERATIVE STATE: Primary | ICD-10-CM

## 2023-12-19 DIAGNOSIS — H02.834 DERMATOCHALASIS OF BOTH UPPER EYELIDS: ICD-10-CM

## 2023-12-19 DIAGNOSIS — H02.403 INVOLUTIONAL PTOSIS, ACQUIRED, BILATERAL: ICD-10-CM

## 2023-12-19 PROCEDURE — 99024 POSTOP FOLLOW-UP VISIT: CPT | Mod: GC | Performed by: OPHTHALMOLOGY

## 2023-12-19 RX ORDER — CARBOXYMETHYLCELLULOSE SODIUM 10 MG/ML
1 GEL OPHTHALMIC 4 TIMES DAILY
Qty: 60 EACH | Refills: 11 | Status: SHIPPED | OUTPATIENT
Start: 2023-12-19 | End: 2024-01-11

## 2023-12-19 ASSESSMENT — CONF VISUAL FIELD
OS_NORMAL: 1
OD_INFERIOR_NASAL_RESTRICTION: 0
OD_SUPERIOR_TEMPORAL_RESTRICTION: 0
OS_SUPERIOR_TEMPORAL_RESTRICTION: 0
OS_SUPERIOR_NASAL_RESTRICTION: 0
METHOD: COUNTING FINGERS
OD_SUPERIOR_NASAL_RESTRICTION: 0
OS_INFERIOR_TEMPORAL_RESTRICTION: 0
OS_INFERIOR_NASAL_RESTRICTION: 0
OD_INFERIOR_TEMPORAL_RESTRICTION: 0
OD_NORMAL: 1

## 2023-12-19 ASSESSMENT — EXTERNAL EXAM - LEFT EYE: OS_EXAM: NORMAL

## 2023-12-19 ASSESSMENT — TONOMETRY
OD_IOP_MMHG: 08
IOP_METHOD: ICARE
OS_IOP_MMHG: 08

## 2023-12-19 ASSESSMENT — EXTERNAL EXAM - RIGHT EYE: OD_EXAM: BROW PTOSIS, WITH FRONTALIS RELAXED, BROW IS BELOW SUPERIOR ORBITAL RIM AND LATERALLY INLINE WITH UPPER LASHES

## 2023-12-19 ASSESSMENT — VISUAL ACUITY
OS_SC: 20/40
OD_SC: 20/30
OD_SC+: -2
OD_PH_SC+: -2
METHOD: SNELLEN - LINEAR
OD_PH_SC: 20/20

## 2023-12-19 ASSESSMENT — CUP TO DISC RATIO
OS_RATIO: 0.8
OD_RATIO: 0.6

## 2023-12-19 NOTE — PATIENT INSTRUCTIONS
- Please use the Preservative Free artificial tears FOUR TIMES A DAY IN BOTH EYES   - Please use the artificial tear ointment at bedtime in both eyes  - Please follow up with Dr. Rollins at next available

## 2023-12-19 NOTE — NURSING NOTE
Chief Complaints and History of Present Illnesses   Patient presents with    Follow Up For Surgery Of Eye     Chief Complaint(s) and History of Present Illness(es)       Follow Up For Surgery Of Eye              Laterality: both eyes              Comments    S/P BULB lower ectropion repair. Patient states dryness each eye. Patient states light sensitivity each eye.   ANDREI ePrez December 19, 2023 12:39 PM

## 2023-12-19 NOTE — PROGRESS NOTES
"     Chief Complaint(s) and History of Present Illness(es)     Follow Up For Surgery Of Eye            Laterality: both eyes          Comments    S/P BULB lower ectropion repair. Patient states dryness each eye. Patient   states light sensitivity each eye.   ANDREI Perez December 19, 2023 12:39 PM         Assessment & Plan     Yancy Rivera is a 68 year old female with the following diagnoses:   Encounter Diagnoses   Name Primary?    Postoperative state Yes    Dermatochalasis of both upper eyelids     Involutional ptosis, acquired, bilateral     Dry eyes, bilateral     Senile ectropion of right lower eyelid     Senile ectropion of left lower eyelid      - Patient refused interpretor   - States overall, burning sensation is improved  - Significantly improved surface today though continues to have dry eyes   - States artificial tears are too expensive   - Endorsing \"black bug floating in her left eye\" like a bug in her left eye; attempted to do a dilated exam today, however, patient unable to tolerate dilated exam and refused; no obvious rodrigues ring visualized from brief exam     Plan  - Looks great from oculoplastics perspective   - Provided RD return precautions   - follow up with Dr. Rollins at next available for CEE; also wants updated glasses         Angie Nance MD  PGY-2  Department of Ophthalmology  December 19, 2023 12:47 PM        Attending Physician Attestation: Complete documentation of historical and exam elements from today's encounter can be found in the full encounter summary report (not reduplicated in this progress note). I personally obtained the chief complaint(s) and history of present illness. I confirmed and edited as necessary the review of systems, past medical/surgical history, family history, social history, and examination findings as documented by others; and I examined the patient myself. I personally reviewed the relevant tests, images, and reports as documented above. I formulated and " edited as necessary the assessment and plan and discussed the findings and management plan with the patient.  -Mine Munoz MD

## 2023-12-20 ENCOUNTER — APPOINTMENT (OUTPATIENT)
Dept: INTERPRETER SERVICES | Facility: CLINIC | Age: 68
End: 2023-12-20
Payer: MEDICARE

## 2024-01-04 ENCOUNTER — PATIENT OUTREACH (OUTPATIENT)
Dept: GERIATRIC MEDICINE | Facility: CLINIC | Age: 69
End: 2024-01-04
Payer: MEDICARE

## 2024-01-04 NOTE — PROGRESS NOTES
[facility] Care Coordination Contact    Received after visit chart from care coordinator.  Completed following tasks:   Mailed copy of care plan/support plan to member  Mailed Safe Medication Disposal   Submitted referrals/auths for [facility]  Uploaded consent to communicate form(s) to Epic,  Updated services in Database    Provider Signature - No POC Shared:  Member indicates that they do not want their POC shared with any EW providers.    Josep Contreras  Care Management Specialist  [facility]  [phone]

## 2024-01-04 NOTE — LETTER
January 4, 2024    YANCY DOUGHERTY  [address]  [place] [address]        Dear Yancy:    At [facility], we re dedicated to improving your health and wellness. Enclosed is the Care Plan developed with you on 12/08/2023. Please review the Care Plan carefully.    As a reminder, during your visit we talked about:  Ways to manage your physical and mental health  Using health care to maintain and improve your health   Your preventive care needs     Remember to contact your care coordinator if you:  Are hospitalized, or plan to be hospitalized   Have a fall    Have a change in your physical or mental health  Need help finding support or services    If you have questions, or don t agree with your Care Plan, call me at [phone]. You can also call me if your needs change. TTY users, call the [place] [employer] at (680) or 1-188.845.9669 (icqren-xb-atxvin relay service).    Sincerely,          PRINCESS Jacobson  [phone]  [email or web address]    [id number] accepted    [id number] (07/2022)

## 2024-01-11 ENCOUNTER — ANCILLARY PROCEDURE (OUTPATIENT)
Dept: GENERAL RADIOLOGY | Facility: CLINIC | Age: 69
End: 2024-01-11
Attending: FAMILY MEDICINE
Payer: MEDICARE

## 2024-01-11 ENCOUNTER — OFFICE VISIT (OUTPATIENT)
Dept: FAMILY MEDICINE | Facility: CLINIC | Age: 69
End: 2024-01-11
Attending: FAMILY MEDICINE
Payer: MEDICARE

## 2024-01-11 VITALS
BODY MASS INDEX: 31.79 KG/M2 | OXYGEN SATURATION: 99 % | DIASTOLIC BLOOD PRESSURE: 72 MMHG | RESPIRATION RATE: 18 BRPM | TEMPERATURE: 97.8 F | WEIGHT: 186.2 LBS | HEART RATE: 84 BPM | HEIGHT: 64 IN | SYSTOLIC BLOOD PRESSURE: 138 MMHG

## 2024-01-11 DIAGNOSIS — G89.29 CHRONIC PAIN OF RIGHT KNEE: ICD-10-CM

## 2024-01-11 DIAGNOSIS — Z29.11 NEED FOR VACCINATION AGAINST RESPIRATORY SYNCYTIAL VIRUS: ICD-10-CM

## 2024-01-11 DIAGNOSIS — Z23 NEED FOR TDAP VACCINATION: ICD-10-CM

## 2024-01-11 DIAGNOSIS — M25.561 CHRONIC PAIN OF RIGHT KNEE: ICD-10-CM

## 2024-01-11 DIAGNOSIS — K21.00 GASTROESOPHAGEAL REFLUX DISEASE WITH ESOPHAGITIS WITHOUT HEMORRHAGE: ICD-10-CM

## 2024-01-11 DIAGNOSIS — I10 BENIGN ESSENTIAL HYPERTENSION: ICD-10-CM

## 2024-01-11 DIAGNOSIS — R26.81 UNSTABLE GAIT: ICD-10-CM

## 2024-01-11 DIAGNOSIS — F32.A DEPRESSION, UNSPECIFIED DEPRESSION TYPE: ICD-10-CM

## 2024-01-11 DIAGNOSIS — L30.9 ECZEMA, UNSPECIFIED TYPE: ICD-10-CM

## 2024-01-11 DIAGNOSIS — Z00.00 ENCOUNTER FOR MEDICARE ANNUAL WELLNESS EXAM: Primary | ICD-10-CM

## 2024-01-11 DIAGNOSIS — E78.5 HYPERLIPIDEMIA LDL GOAL <160: ICD-10-CM

## 2024-01-11 DIAGNOSIS — H04.123 DRY EYES, BILATERAL: ICD-10-CM

## 2024-01-11 DIAGNOSIS — Z13.1 SCREENING FOR DIABETES MELLITUS: ICD-10-CM

## 2024-01-11 LAB
ANION GAP SERPL CALCULATED.3IONS-SCNC: 9 MMOL/L (ref 7–15)
BUN SERPL-MCNC: 9.6 MG/DL (ref 8–23)
CALCIUM SERPL-MCNC: 9.6 MG/DL (ref 8.8–10.2)
CHLORIDE SERPL-SCNC: 102 MMOL/L (ref 98–107)
CHOLEST SERPL-MCNC: 238 MG/DL
CREAT SERPL-MCNC: 0.6 MG/DL (ref 0.51–0.95)
DEPRECATED HCO3 PLAS-SCNC: 24 MMOL/L (ref 22–29)
EGFRCR SERPLBLD CKD-EPI 2021: >90 ML/MIN/1.73M2
FASTING STATUS PATIENT QL REPORTED: YES
GLUCOSE SERPL-MCNC: 101 MG/DL (ref 70–99)
HDLC SERPL-MCNC: 65 MG/DL
LDLC SERPL CALC-MCNC: 143 MG/DL
NONHDLC SERPL-MCNC: 173 MG/DL
POTASSIUM SERPL-SCNC: 4.4 MMOL/L (ref 3.4–5.3)
SODIUM SERPL-SCNC: 135 MMOL/L (ref 135–145)
TRIGL SERPL-MCNC: 150 MG/DL

## 2024-01-11 PROCEDURE — 80048 BASIC METABOLIC PNL TOTAL CA: CPT | Performed by: FAMILY MEDICINE

## 2024-01-11 PROCEDURE — G0008 ADMIN INFLUENZA VIRUS VAC: HCPCS | Performed by: FAMILY MEDICINE

## 2024-01-11 PROCEDURE — 90662 IIV NO PRSV INCREASED AG IM: CPT | Performed by: FAMILY MEDICINE

## 2024-01-11 PROCEDURE — 73562 X-RAY EXAM OF KNEE 3: CPT | Mod: TC | Performed by: RADIOLOGY

## 2024-01-11 PROCEDURE — 80061 LIPID PANEL: CPT | Performed by: FAMILY MEDICINE

## 2024-01-11 PROCEDURE — 91320 SARSCV2 VAC 30MCG TRS-SUC IM: CPT | Performed by: FAMILY MEDICINE

## 2024-01-11 PROCEDURE — 99215 OFFICE O/P EST HI 40 MIN: CPT | Mod: 25 | Performed by: FAMILY MEDICINE

## 2024-01-11 PROCEDURE — 36415 COLL VENOUS BLD VENIPUNCTURE: CPT | Performed by: FAMILY MEDICINE

## 2024-01-11 PROCEDURE — 90480 ADMN SARSCOV2 VAC 1/ONLY CMP: CPT | Performed by: FAMILY MEDICINE

## 2024-01-11 PROCEDURE — G0438 PPPS, INITIAL VISIT: HCPCS | Performed by: FAMILY MEDICINE

## 2024-01-11 RX ORDER — QUETIAPINE FUMARATE 25 MG/1
25 TABLET, FILM COATED ORAL DAILY
Qty: 90 TABLET | Refills: 3 | Status: SHIPPED | OUTPATIENT
Start: 2024-01-11 | End: 2024-07-08

## 2024-01-11 RX ORDER — CARVEDILOL 6.25 MG/1
6.25 TABLET ORAL 2 TIMES DAILY WITH MEALS
Qty: 180 TABLET | Refills: 3 | Status: SHIPPED | OUTPATIENT
Start: 2024-01-11

## 2024-01-11 RX ORDER — TRIAMCINOLONE ACETONIDE 1 MG/G
CREAM TOPICAL 2 TIMES DAILY PRN
Qty: 454 G | Refills: 1 | Status: SHIPPED | OUTPATIENT
Start: 2024-01-11 | End: 2024-03-25

## 2024-01-11 RX ORDER — PANTOPRAZOLE SODIUM 40 MG/1
40 TABLET, DELAYED RELEASE ORAL DAILY
Qty: 90 TABLET | Refills: 3 | Status: SHIPPED | OUTPATIENT
Start: 2024-01-11 | End: 2024-08-05

## 2024-01-11 RX ORDER — NAPROXEN 500 MG/1
500 TABLET ORAL 2 TIMES DAILY PRN
Qty: 60 TABLET | Refills: 1 | Status: SHIPPED | OUTPATIENT
Start: 2024-01-11 | End: 2024-03-12

## 2024-01-11 RX ORDER — CARBOXYMETHYLCELLULOSE SODIUM 10 MG/ML
1 GEL OPHTHALMIC 4 TIMES DAILY
Qty: 60 EACH | Refills: 11 | Status: SHIPPED | OUTPATIENT
Start: 2024-01-11 | End: 2024-01-24

## 2024-01-11 RX ORDER — ATORVASTATIN CALCIUM 20 MG/1
20 TABLET, FILM COATED ORAL DAILY
Qty: 90 TABLET | Refills: 3 | Status: SHIPPED | OUTPATIENT
Start: 2024-01-11 | End: 2024-08-05

## 2024-01-11 RX ORDER — RESPIRATORY SYNCYTIAL VIRUS VACCINE 120MCG/0.5
0.5 KIT INTRAMUSCULAR ONCE
Qty: 1 EACH | Refills: 0 | Status: SHIPPED | OUTPATIENT
Start: 2024-01-11 | End: 2024-01-11

## 2024-01-11 ASSESSMENT — ENCOUNTER SYMPTOMS
PSYCHIATRIC NEGATIVE: 1
DIZZINESS: 0
FEVER: 0
WEAKNESS: 0
FREQUENCY: 0
BRUISES/BLEEDS EASILY: 0
CONSTIPATION: 0
EYE PAIN: 0
ARTHRALGIAS: 1
PARESTHESIAS: 0
COUGH: 0
MYALGIAS: 0
SORE THROAT: 0
DIARRHEA: 0
CHILLS: 0
DYSURIA: 0
ABDOMINAL PAIN: 0
PALPITATIONS: 0
SHORTNESS OF BREATH: 0
HEADACHES: 0

## 2024-01-11 ASSESSMENT — PATIENT HEALTH QUESTIONNAIRE - PHQ9
SUM OF ALL RESPONSES TO PHQ QUESTIONS 1-9: 0
SUM OF ALL RESPONSES TO PHQ QUESTIONS 1-9: 0
10. IF YOU CHECKED OFF ANY PROBLEMS, HOW DIFFICULT HAVE THESE PROBLEMS MADE IT FOR YOU TO DO YOUR WORK, TAKE CARE OF THINGS AT HOME, OR GET ALONG WITH OTHER PEOPLE: NOT DIFFICULT AT ALL

## 2024-01-11 ASSESSMENT — PAIN SCALES - GENERAL: PAINLEVEL: EXTREME PAIN (8)

## 2024-01-11 NOTE — PATIENT INSTRUCTIONS
Paul Haines,    Thank you for allowing [facility] to manage your care.    I ordered some blood work, please go to the laboratory to get your laboratory studies.    I ordered some xrays, please go to our radiology department to get your xrays.    I sent your prescriptions to your pharmacy.    I made a physical therapy referral, they will be calling in approximately 1 week to set up your appointment.  If you do not hear from them, please call the specialty number on your after visit.     For your convenience, test results are released as soon as they are available  Please allow 1-2 business days for me to send you a comment about your results.  If not done so, I encourage you to login into [facility] ([email or web address] to review your results in real time.     If you have any questions or concerns, please feel free to call us at [phone].    Sincerely,    Dr. Winters    Did you know?      You can schedule a video visit for follow-up appointments as well as future appointments for certain conditions.  Please see the below link.     [email or web address]    If you have not already done so,  I encourage you to sign up for [facility] ([email or web address]).  This will allow you to review your results, securely communicate with a provider, and schedule virtual visits as well.        Patient Education   Personalized Prevention Plan  You are due for the preventive services outlined below.  Your care team is available to assist you in scheduling these services.  If you have already completed any of these items, please share that information with your care team to update in your medical record.  Health Maintenance Due   Topic Date Due    Polio Vaccine (2 of 3 - Adult catch-up series) 07/30/2013    RSV VACCINE (Pregnancy & 60+) (1 - 1-dose 60+ series) Never done    Diptheria Tetanus Pertussis (DTAP/TDAP/TD) Vaccine (2 - Td or Tdap) 12/21/2021    Annual  Wellness Visit  06/15/2022    Flu Vaccine (1) 09/01/2023    COVID-19 Vaccine (5 - 2023-24 season) 09/01/2023
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
## 2024-01-11 NOTE — LETTER
January 15, 2024      Yancy Rivera  [address] [address]        Dear ,    We are writing to inform you of your test results.  Your recent labs show:   -Cholesterol levels are borderline elevated.  ADVISE: continuing your medication, a regular exercise program with at least 150 minutes of aerobic exercise per week, and eating a low saturated fat/low carbohydrate diet.  Also, you should recheck this fasting cholesterol panel in 12 months.   -Kidney function is normal (Cr, GFR), Sodium is normal, Potassium is normal, Calcium is normal, Glucose is borderline elevated.  Please decrease carbohydrate intake (bread, potato, pasta, and sweets).  ADVISE: recheck in 12 months.     Resulted Orders   Lipid panel reflex to direct LDL Fasting   Result Value Ref Range    Cholesterol 238 (H) <200 mg/dL    Triglycerides 150 (H) <150 mg/dL    Direct Measure HDL 65 >=50 mg/dL    LDL Cholesterol Calculated 143 (H) <=100 mg/dL    Non HDL Cholesterol 173 (H) <130 mg/dL    Patient Fasting > 8hrs? Yes     Narrative    Cholesterol  Desirable:  <200 mg/dL    Triglycerides  Normal:  Less than 150 mg/dL  Borderline High:  150-199 mg/dL  High:  200-499 mg/dL  Very High:  Greater than or equal to 500 mg/dL    Direct Measure HDL  Female:  Greater than or equal to 50 mg/dL   Male:  Greater than or equal to 40 mg/dL    LDL Cholesterol  Desirable:  <100mg/dL  Above Desirable:  100-129 mg/dL   Borderline High:  130-159 mg/dL   High:  160-189 mg/dL   Very High:  >= 190 mg/dL    Non HDL Cholesterol  Desirable:  130 mg/dL  Above Desirable:  130-159 mg/dL  Borderline High:  160-189 mg/dL  High:  190-219 mg/dL  Very High:  Greater than or equal to 220 mg/dL   Basic metabolic panel  (Ca, Cl, CO2, Creat, Gluc, K, Na, BUN)   Result Value Ref Range    Sodium 135 135 - 145 mmol/L      Comment:      Reference intervals for this test were updated on 09/26/2023 to more accurately reflect our healthy population. There may be differences  in the flagging of prior results with similar values performed with this method. Interpretation of those prior results can be made in the context of the updated reference intervals.     Potassium 4.4 3.4 - 5.3 mmol/L    Chloride 102 98 - 107 mmol/L    Carbon Dioxide (CO2) 24 22 - 29 mmol/L    Anion Gap 9 7 - 15 mmol/L    Urea Nitrogen 9.6 8.0 - 23.0 mg/dL    Creatinine 0.60 0.51 - 0.95 mg/dL    GFR Estimate >90 >60 mL/min/1.73m2    Calcium 9.6 8.8 - 10.2 mg/dL    Glucose 101 (H) 70 - 99 mg/dL       If you have any questions or concerns, please call the clinic at the number listed above.     Sincerely,  Abundio Winters DO

## 2024-01-16 ASSESSMENT — ACTIVITIES OF DAILY LIVING (ADL): CURRENT_FUNCTION: NEEDS ASSISTANCE

## 2024-01-24 ENCOUNTER — OFFICE VISIT (OUTPATIENT)
Dept: OPHTHALMOLOGY | Facility: CLINIC | Age: 69
End: 2024-01-24
Attending: OPHTHALMOLOGY
Payer: MEDICARE

## 2024-01-24 DIAGNOSIS — Z98.890 POSTOPERATIVE STATE: ICD-10-CM

## 2024-01-24 DIAGNOSIS — H04.123 DRY EYES, BILATERAL: ICD-10-CM

## 2024-01-24 DIAGNOSIS — H02.403 INVOLUTIONAL PTOSIS, ACQUIRED, BILATERAL: ICD-10-CM

## 2024-01-24 DIAGNOSIS — H02.831 DERMATOCHALASIS OF BOTH UPPER EYELIDS: ICD-10-CM

## 2024-01-24 DIAGNOSIS — H02.834 DERMATOCHALASIS OF BOTH UPPER EYELIDS: ICD-10-CM

## 2024-01-24 DIAGNOSIS — H40.1132 PRIMARY OPEN ANGLE GLAUCOMA OF BOTH EYES, MODERATE STAGE: Primary | ICD-10-CM

## 2024-01-24 PROCEDURE — 99214 OFFICE O/P EST MOD 30 MIN: CPT | Performed by: OPHTHALMOLOGY

## 2024-01-24 PROCEDURE — 92015 DETERMINE REFRACTIVE STATE: CPT | Mod: GY

## 2024-01-24 PROCEDURE — G0463 HOSPITAL OUTPT CLINIC VISIT: HCPCS | Performed by: OPHTHALMOLOGY

## 2024-01-24 PROCEDURE — 92133 CPTRZD OPH DX IMG PST SGM ON: CPT | Performed by: OPHTHALMOLOGY

## 2024-01-24 PROCEDURE — 92083 EXTENDED VISUAL FIELD XM: CPT | Performed by: OPHTHALMOLOGY

## 2024-01-24 RX ORDER — ERYTHROMYCIN 5 MG/G
OINTMENT OPHTHALMIC
Qty: 7 G | Refills: 11 | Status: SHIPPED | OUTPATIENT
Start: 2024-01-24 | End: 2024-08-05

## 2024-01-24 RX ORDER — CARBOXYMETHYLCELLULOSE SODIUM 10 MG/ML
1 GEL OPHTHALMIC 4 TIMES DAILY
Qty: 30 EACH | Refills: 11 | Status: SHIPPED | OUTPATIENT
Start: 2024-01-24 | End: 2024-07-08

## 2024-01-24 RX ORDER — TIMOLOL MALEATE 5 MG/ML
1 SOLUTION/ DROPS OPHTHALMIC EVERY MORNING
Qty: 10 ML | Refills: 5 | Status: SHIPPED | OUTPATIENT
Start: 2024-01-24 | End: 2024-07-07

## 2024-01-24 ASSESSMENT — VISUAL ACUITY
METHOD: SNELLEN - LINEAR
OS_SC: 20/50
OD_SC: 20/30
OS_SC+: -1
OD_SC+: -2

## 2024-01-24 ASSESSMENT — CONF VISUAL FIELD
OS_INFERIOR_NASAL_RESTRICTION: 0
OS_INFERIOR_TEMPORAL_RESTRICTION: 0
OD_INFERIOR_NASAL_RESTRICTION: 0
OD_SUPERIOR_TEMPORAL_RESTRICTION: 0
OD_SUPERIOR_NASAL_RESTRICTION: 0
OS_SUPERIOR_TEMPORAL_RESTRICTION: 0
OS_SUPERIOR_NASAL_RESTRICTION: 0
OS_NORMAL: 1
OD_NORMAL: 1
OD_INFERIOR_TEMPORAL_RESTRICTION: 0
METHOD: COUNTING FINGERS

## 2024-01-24 ASSESSMENT — REFRACTION_MANIFEST
OD_CYLINDER: +0.75
OS_ADD: +2.50
OS_SPHERE: +1.50
OS_CYLINDER: SPHERE
OD_AXIS: 015
OD_SPHERE: -0.50
OD_ADD: +2.50

## 2024-01-24 ASSESSMENT — CUP TO DISC RATIO
OS_RATIO: 0.8
OD_RATIO: 0.6

## 2024-01-24 ASSESSMENT — TONOMETRY
OS_IOP_MMHG: 11
OD_IOP_MMHG: 10
IOP_METHOD: ICARE

## 2024-01-24 ASSESSMENT — EXTERNAL EXAM - RIGHT EYE: OD_EXAM: NORMAL

## 2024-01-24 ASSESSMENT — EXTERNAL EXAM - LEFT EYE: OS_EXAM: NORMAL

## 2024-01-24 NOTE — NURSING NOTE
Chief Complaints and History of Present Illnesses   Patient presents with    COMPREHENSIVE EYE EXAM     Chief Complaint(s) and History of Present Illness(es)       COMPREHENSIVE EYE EXAM              Laterality: both eyes    Course: stable    Associated symptoms: dryness.  Negative for eye pain, flashes and floaters    Treatments tried: artificial tears              Comments    Yancy Rivera is a(n) 68 year old female who presents for a comprehensive exam. Since last exam, vision is about the same. Uses lubricating drops. No flashes and floaters. No eye pain.     Lab Results       Component                Value               Date                       A1C                      5.9                 07/18/2023                 A1C                      5.8                 02/01/2022                 A1C                      5.7                 04/30/2021              Pasquale Bob COT 9:20 AM January 24, 2024

## 2024-01-24 NOTE — PROGRESS NOTES
Chief Complaint(s) and History of Present Illness(es)     COMPREHENSIVE EYE EXAM            Laterality: both eyes    Course: stable    Associated symptoms: dryness.  Negative for eye pain, flashes and   floaters    Treatments tried: artificial tears          Comments    Yancy Rivera is a(n) 68 year old female who presents for a   comprehensive exam. Since last exam, vision is about the same. Uses   lubricating drops. No flashes and floaters. No eye pain.     Lab Results       Component                Value               Date                       A1C                      5.9                 07/18/2023                 A1C                      5.8                 02/01/2022                 A1C                      5.7                 04/30/2021              Pasquale Dumaso COT 9:20 AM January 24, 2024             Review of systems for the eyes was negative other than the pertinent positives/negatives listed in the HPI.      Assessment & Plan      Yancy Rivera is a 68 year old female with the following diagnoses:   1. Primary open angle glaucoma of both eyes, moderate stage    2. Dry eyes, bilateral    3. Postoperative state    4. Dermatochalasis of both upper eyelids    5. Involutional ptosis, acquired, bilateral           S/P Selected laser trabeculoplasty (SLT) both eyes Spring 2021  S/P BULB 10/2023  Visual field reliability improved today   OCT Nerve fiber layer stable   Intraocular pressure excellent   Has been using timolol in both eyes   We will continue every morning both eyes   Goal low teens both eyes      Continue artificial tears four times a day both eyes   ESS agustin at bedtime as needed   Refractive options reviewed  Refraction given     Patient disposition:   Return in about 6 months (around 7/24/2024) for VT only, OCT NFL, 24-2 Dynamic VF.           Attending Physician Attestation:  Complete documentation of historical and exam elements from today's encounter can be found in the full encounter summary  report (not reduplicated in this progress note).  I personally obtained the chief complaint(s) and history of present illness.  I confirmed and edited as necessary the review of systems, past medical/surgical history, family history, social history, and examination findings as documented by others; and I examined the patient myself.  I personally reviewed the relevant tests, images, and reports as documented above.  I formulated and edited as necessary the assessment and plan and discussed the findings and management plan with the patient and family. . - Constantin Rollins MD

## 2024-01-26 ENCOUNTER — TELEPHONE (OUTPATIENT)
Dept: FAMILY MEDICINE | Facility: CLINIC | Age: 69
End: 2024-01-26
Payer: MEDICARE

## 2024-01-26 NOTE — TELEPHONE ENCOUNTER
Forms received from:  [facility]   Phone number listed: [phone]   Fax listed: [phone]  Date received: 1/24/24  Form description: Physician order  Once forms are completed, please return to [facility]  via fax.  Is patient requesting to be contacted when forms are completed: na  Phone: na  Form placed:  Dr. Vianey Rogers

## 2024-01-30 NOTE — PROGRESS NOTES
Member called, reported no Homemaking service at [facility] yet. Notified CC to return member called at this number [phone].     Josep Contreras  Care Management Specialist  [facility]  [phone]

## 2024-02-01 ENCOUNTER — THERAPY VISIT (OUTPATIENT)
Dept: PHYSICAL THERAPY | Facility: CLINIC | Age: 69
End: 2024-02-01
Payer: MEDICARE

## 2024-02-01 ENCOUNTER — MEDICAL CORRESPONDENCE (OUTPATIENT)
Dept: HEALTH INFORMATION MANAGEMENT | Facility: CLINIC | Age: 69
End: 2024-02-01

## 2024-02-01 DIAGNOSIS — M25.561 CHRONIC PAIN OF RIGHT KNEE: Primary | ICD-10-CM

## 2024-02-01 DIAGNOSIS — G89.29 CHRONIC PAIN OF RIGHT KNEE: Primary | ICD-10-CM

## 2024-02-01 PROCEDURE — 97161 PT EVAL LOW COMPLEX 20 MIN: CPT | Mod: GP | Performed by: PHYSICAL THERAPIST

## 2024-02-01 PROCEDURE — 97110 THERAPEUTIC EXERCISES: CPT | Mod: GP | Performed by: PHYSICAL THERAPIST
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
## 2024-02-16 ENCOUNTER — THERAPY VISIT (OUTPATIENT)
Dept: PHYSICAL THERAPY | Facility: CLINIC | Age: 69
End: 2024-02-16
Payer: MEDICARE

## 2024-02-16 DIAGNOSIS — M25.561 CHRONIC PAIN OF RIGHT KNEE: Primary | ICD-10-CM

## 2024-02-16 DIAGNOSIS — G89.29 CHRONIC PAIN OF RIGHT KNEE: Primary | ICD-10-CM

## 2024-02-16 PROCEDURE — 97110 THERAPEUTIC EXERCISES: CPT | Mod: GP | Performed by: PHYSICAL THERAPIST

## 2024-02-16 PROCEDURE — 97112 NEUROMUSCULAR REEDUCATION: CPT | Mod: GP | Performed by: PHYSICAL THERAPIST

## 2024-03-01 ENCOUNTER — THERAPY VISIT (OUTPATIENT)
Dept: PHYSICAL THERAPY | Facility: CLINIC | Age: 69
End: 2024-03-01
Payer: MEDICARE

## 2024-03-01 DIAGNOSIS — G89.29 CHRONIC PAIN OF RIGHT KNEE: Primary | ICD-10-CM

## 2024-03-01 DIAGNOSIS — M25.561 CHRONIC PAIN OF RIGHT KNEE: Primary | ICD-10-CM

## 2024-03-01 PROCEDURE — 97116 GAIT TRAINING THERAPY: CPT | Mod: GP | Performed by: PHYSICAL THERAPIST

## 2024-03-01 PROCEDURE — 97110 THERAPEUTIC EXERCISES: CPT | Mod: GP | Performed by: PHYSICAL THERAPIST

## 2024-03-09 DIAGNOSIS — G89.29 CHRONIC PAIN OF RIGHT KNEE: ICD-10-CM

## 2024-03-09 DIAGNOSIS — M25.561 CHRONIC PAIN OF RIGHT KNEE: ICD-10-CM

## 2024-03-12 RX ORDER — NAPROXEN 500 MG/1
TABLET ORAL
Qty: 60 TABLET | Refills: 1 | Status: SHIPPED | OUTPATIENT
Start: 2024-03-12 | End: 2024-07-07

## 2024-03-23 DIAGNOSIS — L30.9 ECZEMA, UNSPECIFIED TYPE: ICD-10-CM

## 2024-03-25 RX ORDER — TRIAMCINOLONE ACETONIDE 1 MG/G
CREAM TOPICAL 2 TIMES DAILY PRN
Qty: 454 G | Refills: 1 | Status: SHIPPED | OUTPATIENT
Start: 2024-03-25 | End: 2024-08-05

## 2024-03-29 ENCOUNTER — TELEPHONE (OUTPATIENT)
Dept: FAMILY MEDICINE | Facility: CLINIC | Age: 69
End: 2024-03-29

## 2024-03-29 ENCOUNTER — ORDERS ONLY (AUTO-RELEASED) (OUTPATIENT)
Dept: FAMILY MEDICINE | Facility: CLINIC | Age: 69
End: 2024-03-29

## 2024-03-29 ENCOUNTER — OFFICE VISIT (OUTPATIENT)
Dept: FAMILY MEDICINE | Facility: CLINIC | Age: 69
End: 2024-03-29
Payer: MEDICARE

## 2024-03-29 VITALS
WEIGHT: 190.6 LBS | BODY MASS INDEX: 31.75 KG/M2 | DIASTOLIC BLOOD PRESSURE: 54 MMHG | SYSTOLIC BLOOD PRESSURE: 138 MMHG | RESPIRATION RATE: 20 BRPM | HEART RATE: 72 BPM | TEMPERATURE: 97.1 F | OXYGEN SATURATION: 98 % | HEIGHT: 65 IN

## 2024-03-29 DIAGNOSIS — Z12.11 SCREEN FOR COLON CANCER: ICD-10-CM

## 2024-03-29 DIAGNOSIS — F33.41 MAJOR DEPRESSIVE DISORDER, RECURRENT, IN PARTIAL REMISSION (H): ICD-10-CM

## 2024-03-29 DIAGNOSIS — J10.1 INFLUENZA A: Primary | ICD-10-CM

## 2024-03-29 DIAGNOSIS — E87.1 HYPONATREMIA: ICD-10-CM

## 2024-03-29 PROCEDURE — 99214 OFFICE O/P EST MOD 30 MIN: CPT | Performed by: FAMILY MEDICINE

## 2024-03-29 RX ORDER — QUETIAPINE FUMARATE 50 MG/1
1 TABLET, EXTENDED RELEASE ORAL
COMMUNITY
Start: 2024-01-05

## 2024-03-29 RX ORDER — IBUPROFEN 200 MG
TABLET ORAL
COMMUNITY
End: 2024-07-08

## 2024-03-29 RX ORDER — ACETAMINOPHEN 325 MG/1
650 TABLET ORAL
COMMUNITY
End: 2024-07-08

## 2024-03-29 RX ORDER — PREDNISONE 50 MG/1
50 TABLET ORAL DAILY
Qty: 5 TABLET | Refills: 0 | Status: SHIPPED | OUTPATIENT
Start: 2024-03-29 | End: 2024-07-08

## 2024-03-29 RX ORDER — CODEINE PHOSPHATE/GUAIFENESIN 10-100MG/5
5 LIQUID (ML) ORAL EVERY 6 HOURS PRN
Qty: 180 ML | Refills: 0 | Status: SHIPPED | OUTPATIENT
Start: 2024-03-29 | End: 2024-07-08

## 2024-03-29 RX ORDER — RESPIRATORY SYNCYTIAL VIRUS VACCINE 120MCG/0.5
0.5 KIT INTRAMUSCULAR ONCE
Qty: 1 EACH | Refills: 0 | Status: CANCELLED | OUTPATIENT
Start: 2024-03-29 | End: 2024-03-29

## 2024-03-29 RX ORDER — AMOXICILLIN 500 MG/1
500 CAPSULE ORAL
COMMUNITY
Start: 2024-03-26 | End: 2024-04-02

## 2024-03-29 ASSESSMENT — ENCOUNTER SYMPTOMS
EYES NEGATIVE: 1
WHEEZING: 1
SLEEP DISTURBANCE: 0
COLOR CHANGE: 0
CHILLS: 0
AGITATION: 0
DIZZINESS: 0
PALPITATIONS: 0
HEADACHES: 0
HEMATOLOGIC/LYMPHATIC NEGATIVE: 1
ACTIVITY CHANGE: 0
SHORTNESS OF BREATH: 0
COUGH: 1
APPETITE CHANGE: 0
GASTROINTESTINAL NEGATIVE: 1
ENDOCRINE NEGATIVE: 1
FATIGUE: 0
NERVOUS/ANXIOUS: 0
ALLERGIC/IMMUNOLOGIC NEGATIVE: 1
WEAKNESS: 0

## 2024-03-29 NOTE — TELEPHONE ENCOUNTER
Reason for Call:  Appointment Request    Patient requesting this type of appt:  Hospital/ED Follow-Up     Requested provider:  open to any provider     Reason patient unable to be scheduled: Not within requested timeframe    When does patient want to be seen/preferred time: Same day    Comments: patient has an appointment in [place] for today but would like to be seen in [facility] to leave a detailed message?: Yes at Cell number on file:    Telephone Information:   Mobile [phone]       Call taken on 3/29/2024 at 8:25 AM by Ragini Romo

## 2024-03-29 NOTE — PROGRESS NOTES
"1. Influenza A  Most recent chest CT reviewed.  - guaiFENesin-codeine (GUAIFENESIN AC) 100-10 MG/5ML syrup; Take 5 mLs by mouth every 6 hours as needed for cough  Dispense: 180 mL; Refill: 0  - predniSONE (DELTASONE) 50 MG tablet; Take 1 tablet (50 mg) by mouth daily  Dispense: 5 tablet; Refill: 0    2. Screen for colon cancer  - COLOGUARD([employer]); Future    3. Major depressive disorder, recurrent, in partial remission (H24)  Chronic and stable.  Continue with seroquel.     4. Hyponatremia  Most likely secondary to recent illness. Would like to repeat at next visit.    Lexy Haines is a 68 year old, presenting for the following health issues:  Hospital F/U      3/29/2024     9:51 AM   Additional Questions   Roomed by Karley   Accompanied by Daughter     History of Present Illness       Headaches:   Since the patient's last clinic visit, headaches are: worsened  The patient is getting headaches:  All the time  She is not able to do normal daily activities when she has a migraine.  The patient is taking the following rescue/relief medications:  Ibuprofen (Advil, Motrin) and Tylenol   Patient states \"I get no relief\" from the rescue/relief medications.   The patient is taking the following medications to prevent migraines:  No medications to prevent migraines  In the past 4 weeks, the patient has gone to an Urgent Care or Emergency Room 2 times times due to headaches.    She eats 0-1 servings of fruits and vegetables daily.She exercises with enough effort to increase her heart rate 10 to 19 minutes per day.    She is taking medications regularly.           Hospital Follow-up Visit:    Hospital/Nursing Home/IP Rehab Facility:  [facility]  Date of Admission: 3/27/24  Date of Discharge: 3/28/24  Reason(s) for Admission: Influenza A, Vasovagal Syncope     Was your hospitalization related to COVID-19? No   Problems taking medications regularly:  None  Medication changes since discharge: Antibiotic and " "Tylenol, and ibuprofen   Problems adhering to non-medication therapy:  None    Summary of hospitalization:  CareEverywhere information obtained and reviewed  Diagnostic Tests/Treatments reviewed.  Follow up needed: BMP needs to repeated in 1 week  Other Healthcare Providers Involved in Patient s Care:         None  Update since discharge: stable.         Plan of care communicated with patient           ER Follow-up: Headache, right ear pain, jaw pain, and joint pain.  Symptoms started on Sunday.  Went urgent care and diagnosed with influenza A.  Cough symptoms started on 3/27.  Had xray and CT scan which was negative.  Patient had sharp pain.  Incidental finding of hyponatremia.  Takes ibuprofen and tyelnol for the fever.  States that cough makes sharp pain.  Patient is able to eat.  States of sweating episodes.      Review of Systems   Constitutional:  Negative for activity change, appetite change, chills and fatigue.   HENT: Negative.     Eyes: Negative.    Respiratory:  Positive for cough and wheezing. Negative for shortness of breath.    Cardiovascular:  Negative for chest pain and palpitations.   Gastrointestinal: Negative.    Endocrine: Negative.    Skin:  Negative for color change and rash.   Allergic/Immunologic: Negative.    Neurological:  Negative for dizziness, weakness and headaches.   Hematological: Negative.    Psychiatric/Behavioral:  Negative for agitation and sleep disturbance. The patient is not nervous/anxious.           Objective    /54   Pulse 72   Temp 97.1  F (36.2  C) (Temporal)   Resp 20   Ht 1.654 m (5' 5.12\")   Wt 86.5 kg (190 lb 9.6 oz)   LMP  (LMP Unknown)   SpO2 98%   BMI 31.60 kg/m    Body mass index is 31.6 kg/m .  Physical Exam  Constitutional:       General: She is not in acute distress.     Appearance: She is well-developed.   HENT:      Head: Normocephalic and atraumatic.      Nose: Nose normal.   Eyes:      Conjunctiva/sclera: Conjunctivae normal.   Neck:      " Trachea: No tracheal deviation.   Cardiovascular:      Rate and Rhythm: Normal rate and regular rhythm.      Heart sounds: Normal heart sounds.   Pulmonary:      Effort: Pulmonary effort is normal. No respiratory distress.      Breath sounds: Wheezing and rhonchi present.   Musculoskeletal:         General: Normal range of motion.      Cervical back: Normal range of motion.   Skin:     General: Skin is warm.   Neurological:      Mental Status: She is alert and oriented to person, place, and time.   Psychiatric:         Behavior: Behavior normal.                Chest CT 3/27/24  Opacification of the lower left hemithorax on the preceding chest radiograph is explained by benign prominent epicardial fat and atelectasis. No mass or evidence for pneumonia.   Signed Electronically by: Abundio Winters DO

## 2024-03-29 NOTE — PATIENT INSTRUCTIONS
Paul Haines,    Thank you for allowing [facility] to manage your care.    I sent your prescriptions to your pharmacy.    For your convenience, test results are released as soon as they are available  Please allow 1-2 business days for me to send you a comment about your results.  If not done so, I encourage you to login into [employer] ([email or web address] to review your results in real time.     If you have any questions or concerns, please feel free to call us at [phone].    Sincerely,    Dr. Winters    Did you know?      You can schedule a video visit for follow-up appointments as well as future appointments for certain conditions.  Please see the below link.     [email or web address]    If you have not already done so,  I encourage you to sign up for [employer] ([email or web address]).  This will allow you to review your results, securely communicate with a provider, and schedule virtual visits as well.

## 2024-04-03 ENCOUNTER — TELEPHONE (OUTPATIENT)
Dept: FAMILY MEDICINE | Facility: CLINIC | Age: 69
End: 2024-04-03
Payer: MEDICARE

## 2024-04-03 NOTE — TELEPHONE ENCOUNTER
Reason for Call:  Appointment Request    Patient requesting this type of appt:  Hospital/ED Follow-Up     Requested provider: Abundio Winters    Reason patient unable to be scheduled: Not within requested timeframe    When does patient want to be seen/preferred time: 1-2 days    Comments: Patient is not feeling any better and would like an appt with her PCP following her ED visit. Please call to arrange    Okay to leave a detailed message?: Yes at Cell number on file:    Telephone Information:   Mobile [phone]       Call taken on 4/3/2024 at 3:39 PM by Cherri Miller

## 2024-04-04 NOTE — TELEPHONE ENCOUNTER
Please call patient to further ask why she is not feeling better.  She had a hospital follow-up on 3/29/24 regarding influenza and was started on prednisone and guaifenesin.  If patient still would like to be seen, may schedule a virtual appointment to discuss influenza follow-up.  I am comfortable with a virtual appointment.  If she wants to have an in person appointment may convert today's 230 virtual to in person or tomorrow's 1100 am to virtual.  Advised to arrive 20 minutes prior.

## 2024-04-04 NOTE — TELEPHONE ENCOUNTER
Called [phone] (home) using [facility] .  Did they answer the phone: No, left a message on voicemail to return call to the [facility] at [phone], and to ask for any available triage nurse.  (RN did not leave specific details on voicemail for confidential reasons)    Obdulia JARRELL RN  Triage Nurse  [facility]       Other

## 2024-04-04 NOTE — TELEPHONE ENCOUNTER
Patient's niece calling  Consent on file    She states pt  continues to have a cough, she tried to connect 3 way call but call was disconnected.    They were able to call back again    Pt describes her chest hurting bad and that she has SOB and some dizziness and headache   Heavy coughing heard over phone. Right Shoulder and chest hurts when she coughs.  Headache is on the right side of her head  She has taken ibuprofen this morning only 200 mg.     Advised she can taken 2 tabs of ibuprofen with food.     She is trying to drink fluids, 2-3 bottles daily. Should increase fluids as well as warm fluids like soups and tea     She is taking all of her medication as prescribed - no fever      Pt has an appointment tomorrow morning, she should continue increasing fluids and rest.  If pain worsens she may need to be seen more urgently      PEGGY Aggarwal    Triage Nurse  [facility]

## 2024-04-05 ENCOUNTER — HOSPITAL ENCOUNTER (EMERGENCY)
Facility: CLINIC | Age: 69
Discharge: HOME | End: 2024-04-05
Attending: EMERGENCY MEDICINE | Admitting: EMERGENCY MEDICINE
Payer: MEDICARE

## 2024-04-05 ENCOUNTER — APPOINTMENT (OUTPATIENT)
Dept: CT IMAGING | Facility: CLINIC | Age: 69
End: 2024-04-05
Attending: EMERGENCY MEDICINE
Payer: MEDICARE

## 2024-04-05 ENCOUNTER — OFFICE VISIT (OUTPATIENT)
Dept: FAMILY MEDICINE | Facility: CLINIC | Age: 69
End: 2024-04-05
Payer: MEDICARE

## 2024-04-05 VITALS
TEMPERATURE: 97.8 F | DIASTOLIC BLOOD PRESSURE: 77 MMHG | SYSTOLIC BLOOD PRESSURE: 138 MMHG | RESPIRATION RATE: 22 BRPM | OXYGEN SATURATION: 97 % | HEART RATE: 86 BPM

## 2024-04-05 VITALS
RESPIRATION RATE: 18 BRPM | OXYGEN SATURATION: 96 % | HEART RATE: 82 BPM | DIASTOLIC BLOOD PRESSURE: 74 MMHG | TEMPERATURE: 97.4 F | BODY MASS INDEX: 31.39 KG/M2 | SYSTOLIC BLOOD PRESSURE: 138 MMHG | WEIGHT: 188.4 LBS | HEIGHT: 65 IN

## 2024-04-05 DIAGNOSIS — J10.1 INFLUENZA A: ICD-10-CM

## 2024-04-05 DIAGNOSIS — R06.00 DYSPNEA, UNSPECIFIED TYPE: ICD-10-CM

## 2024-04-05 DIAGNOSIS — R07.9 CHEST PAIN, UNSPECIFIED TYPE: Primary | ICD-10-CM

## 2024-04-05 DIAGNOSIS — R04.2 HEMOPTYSIS: ICD-10-CM

## 2024-04-05 DIAGNOSIS — R06.01 ORTHOPNEA: ICD-10-CM

## 2024-04-05 DIAGNOSIS — R06.02 SHORTNESS OF BREATH: Primary | ICD-10-CM

## 2024-04-05 LAB
ALBUMIN SERPL BCG-MCNC: 4.1 G/DL (ref 3.5–5.2)
ALP SERPL-CCNC: 97 U/L (ref 40–150)
ALT SERPL W P-5'-P-CCNC: 31 U/L (ref 0–50)
ANION GAP SERPL CALCULATED.3IONS-SCNC: 10 MMOL/L (ref 7–15)
AST SERPL W P-5'-P-CCNC: 24 U/L (ref 0–45)
ATRIAL RATE - MUSE: 77 BPM
BASOPHILS # BLD AUTO: 0 10E3/UL (ref 0–0.2)
BASOPHILS NFR BLD AUTO: 0 %
BILIRUB SERPL-MCNC: 0.3 MG/DL
BUN SERPL-MCNC: 15.5 MG/DL (ref 8–23)
CALCIUM SERPL-MCNC: 8.9 MG/DL (ref 8.8–10.2)
CHLORIDE SERPL-SCNC: 101 MMOL/L (ref 98–107)
CREAT SERPL-MCNC: 0.63 MG/DL (ref 0.51–0.95)
DEPRECATED HCO3 PLAS-SCNC: 22 MMOL/L (ref 22–29)
DIASTOLIC BLOOD PRESSURE - MUSE: NORMAL MMHG
EGFRCR SERPLBLD CKD-EPI 2021: >90 ML/MIN/1.73M2
EOSINOPHIL # BLD AUTO: 0.4 10E3/UL (ref 0–0.7)
EOSINOPHIL NFR BLD AUTO: 4 %
ERYTHROCYTE [DISTWIDTH] IN BLOOD BY AUTOMATED COUNT: 12.8 % (ref 10–15)
GLUCOSE SERPL-MCNC: 125 MG/DL (ref 70–99)
HCT VFR BLD AUTO: 38.4 % (ref 35–47)
HGB BLD-MCNC: 12.6 G/DL (ref 11.7–15.7)
IMM GRANULOCYTES # BLD: 0.3 10E3/UL
IMM GRANULOCYTES NFR BLD: 4 %
INTERPRETATION ECG - MUSE: NORMAL
LYMPHOCYTES # BLD AUTO: 2 10E3/UL (ref 0.8–5.3)
LYMPHOCYTES NFR BLD AUTO: 23 %
MCH RBC QN AUTO: 29.8 PG (ref 26.5–33)
MCHC RBC AUTO-ENTMCNC: 32.8 G/DL (ref 31.5–36.5)
MCV RBC AUTO: 91 FL (ref 78–100)
MONOCYTES # BLD AUTO: 0.8 10E3/UL (ref 0–1.3)
MONOCYTES NFR BLD AUTO: 9 %
NEUTROPHILS # BLD AUTO: 5 10E3/UL (ref 1.6–8.3)
NEUTROPHILS NFR BLD AUTO: 60 %
NRBC # BLD AUTO: 0 10E3/UL
NRBC BLD AUTO-RTO: 0 /100
NT-PROBNP SERPL-MCNC: 90 PG/ML (ref 0–900)
P AXIS - MUSE: 74 DEGREES
PLATELET # BLD AUTO: 288 10E3/UL (ref 150–450)
POTASSIUM SERPL-SCNC: 4.3 MMOL/L (ref 3.4–5.3)
PR INTERVAL - MUSE: 146 MS
PROT SERPL-MCNC: 6.5 G/DL (ref 6.4–8.3)
QRS DURATION - MUSE: 70 MS
QT - MUSE: 382 MS
QTC - MUSE: 432 MS
R AXIS - MUSE: -22 DEGREES
RBC # BLD AUTO: 4.23 10E6/UL (ref 3.8–5.2)
SODIUM SERPL-SCNC: 133 MMOL/L (ref 135–145)
SYSTOLIC BLOOD PRESSURE - MUSE: NORMAL MMHG
T AXIS - MUSE: 22 DEGREES
TROPONIN T SERPL HS-MCNC: <6 NG/L
VENTRICULAR RATE- MUSE: 77 BPM
WBC # BLD AUTO: 8.4 10E3/UL (ref 4–11)

## 2024-04-05 PROCEDURE — 93005 ELECTROCARDIOGRAM TRACING: CPT | Performed by: EMERGENCY MEDICINE

## 2024-04-05 PROCEDURE — 258N000003 HC RX IP 258 OP 636: Performed by: EMERGENCY MEDICINE

## 2024-04-05 PROCEDURE — 250N000011 HC RX IP 250 OP 636: Performed by: EMERGENCY MEDICINE

## 2024-04-05 PROCEDURE — 71275 CT ANGIOGRAPHY CHEST: CPT | Mod: 26 | Performed by: RADIOLOGY

## 2024-04-05 PROCEDURE — 83880 ASSAY OF NATRIURETIC PEPTIDE: CPT | Performed by: EMERGENCY MEDICINE

## 2024-04-05 PROCEDURE — G1010 CDSM STANSON: HCPCS

## 2024-04-05 PROCEDURE — 99285 EMERGENCY DEPT VISIT HI MDM: CPT | Mod: 25 | Performed by: EMERGENCY MEDICINE

## 2024-04-05 PROCEDURE — 84484 ASSAY OF TROPONIN QUANT: CPT | Performed by: EMERGENCY MEDICINE

## 2024-04-05 PROCEDURE — 96375 TX/PRO/DX INJ NEW DRUG ADDON: CPT | Mod: 59 | Performed by: EMERGENCY MEDICINE

## 2024-04-05 PROCEDURE — 93005 ELECTROCARDIOGRAM TRACING: CPT | Mod: RTG

## 2024-04-05 PROCEDURE — 36415 COLL VENOUS BLD VENIPUNCTURE: CPT | Performed by: EMERGENCY MEDICINE

## 2024-04-05 PROCEDURE — 85025 COMPLETE CBC W/AUTO DIFF WBC: CPT | Performed by: EMERGENCY MEDICINE

## 2024-04-05 PROCEDURE — 99284 EMERGENCY DEPT VISIT MOD MDM: CPT | Mod: 25 | Performed by: EMERGENCY MEDICINE

## 2024-04-05 PROCEDURE — G1010 CDSM STANSON: HCPCS | Performed by: RADIOLOGY

## 2024-04-05 PROCEDURE — 99214 OFFICE O/P EST MOD 30 MIN: CPT | Performed by: PHYSICIAN ASSISTANT

## 2024-04-05 PROCEDURE — 93010 ELECTROCARDIOGRAM REPORT: CPT | Performed by: EMERGENCY MEDICINE

## 2024-04-05 PROCEDURE — 250N000011 HC RX IP 250 OP 636: Performed by: STUDENT IN AN ORGANIZED HEALTH CARE EDUCATION/TRAINING PROGRAM

## 2024-04-05 PROCEDURE — 96361 HYDRATE IV INFUSION ADD-ON: CPT | Performed by: EMERGENCY MEDICINE

## 2024-04-05 PROCEDURE — 96374 THER/PROPH/DIAG INJ IV PUSH: CPT | Mod: 59 | Performed by: EMERGENCY MEDICINE

## 2024-04-05 PROCEDURE — 80053 COMPREHEN METABOLIC PANEL: CPT | Performed by: EMERGENCY MEDICINE

## 2024-04-05 RX ORDER — PREDNISONE 20 MG/1
TABLET ORAL
Qty: 10 TABLET | Refills: 0 | Status: SHIPPED | OUTPATIENT
Start: 2024-04-05 | End: 2024-07-08

## 2024-04-05 RX ORDER — ALBUTEROL SULFATE 90 UG/1
2 AEROSOL, METERED RESPIRATORY (INHALATION) EVERY 4 HOURS PRN
Qty: 18 G | Refills: 0 | Status: SHIPPED | OUTPATIENT
Start: 2024-04-05 | End: 2024-07-08

## 2024-04-05 RX ORDER — IOPAMIDOL 755 MG/ML
68 INJECTION, SOLUTION INTRAVASCULAR ONCE
Status: COMPLETED | OUTPATIENT
Start: 2024-04-05 | End: 2024-04-05

## 2024-04-05 RX ORDER — DEXAMETHASONE SODIUM PHOSPHATE 10 MG/ML
10 INJECTION, SOLUTION INTRAMUSCULAR; INTRAVENOUS ONCE
Status: COMPLETED | OUTPATIENT
Start: 2024-04-05 | End: 2024-04-05

## 2024-04-05 RX ORDER — IPRATROPIUM BROMIDE AND ALBUTEROL SULFATE 2.5; .5 MG/3ML; MG/3ML
3 SOLUTION RESPIRATORY (INHALATION) ONCE
Status: DISCONTINUED | OUTPATIENT
Start: 2024-04-05 | End: 2024-04-05 | Stop reason: HOSPADM

## 2024-04-05 RX ADMIN — SODIUM CHLORIDE 1000 ML: 9 INJECTION, SOLUTION INTRAVENOUS at 13:14

## 2024-04-05 RX ADMIN — IOPAMIDOL 68 ML: 755 INJECTION, SOLUTION INTRAVENOUS at 14:21

## 2024-04-05 RX ADMIN — DEXAMETHASONE SODIUM PHOSPHATE 10 MG: 10 INJECTION, SOLUTION INTRAMUSCULAR; INTRAVENOUS at 13:18

## 2024-04-05 ASSESSMENT — ENCOUNTER SYMPTOMS
DIARRHEA: 0
RHINORRHEA: 1
MYALGIAS: 1
HEADACHES: 1
SHORTNESS OF BREATH: 1
FEVER: 1
VOMITING: 0
ABDOMINAL PAIN: 0
FATIGUE: 1
CHILLS: 1
COUGH: 1

## 2024-04-05 ASSESSMENT — ACTIVITIES OF DAILY LIVING (ADL)
ADLS_ACUITY_SCORE: 36
ADLS_ACUITY_SCORE: 36
ADLS_ACUITY_SCORE: 34
ADLS_ACUITY_SCORE: 36

## 2024-04-05 ASSESSMENT — PAIN SCALES - GENERAL: PAINLEVEL: EXTREME PAIN (8)

## 2024-04-05 ASSESSMENT — COLUMBIA-SUICIDE SEVERITY RATING SCALE - C-SSRS
6. HAVE YOU EVER DONE ANYTHING, STARTED TO DO ANYTHING, OR PREPARED TO DO ANYTHING TO END YOUR LIFE?: NO
2. HAVE YOU ACTUALLY HAD ANY THOUGHTS OF KILLING YOURSELF IN THE PAST MONTH?: NO
1. IN THE PAST MONTH, HAVE YOU WISHED YOU WERE DEAD OR WISHED YOU COULD GO TO SLEEP AND NOT WAKE UP?: NO

## 2024-04-05 NOTE — ED PROVIDER NOTES
ED PROVIDER NOTE  April 5, 2024  History     Chief Complaint   Patient presents with    Flu Symptoms    referral from PCP    Shortness of Breath    Chest Pain     CONNOR  Yancy Rivera is a 68 year old female who arrives today to the emergency department increasing cough frequency, exertional shortness of breath and some orthopnea with associated hemoptysis.  Of note this patient was evaluated in an outside emergency department approximate 1 week ago.  At that time she presented with cough, headache, and chest discomfort.  She had troponin x 2 negative felt to be low risk for ACS.  She was tested positive for influenza.  Patient was managed symptomatically.  She of note received a single dose of ceftriaxone but no further antibiotics prescribed at that time.  On chart review patient of note was recently on a course of amoxicillin for gingivitis the day prior.  Family report persistence of above symptoms.  She has had chills without objective fevers.  She has had a persistent headache without visual disturbance, neck discomfort, unilateral change in strength or sensation.  Difficulty walking.  Patient has had some persistent cough that has been worsening and productive making it difficult to sleep.  She was seen at her PCP today with complaints of above including hemoptysis with referral to the emergency department to further evaluate for possible PE, infectious etiology or heart failure.  She does report some peripheral edema.  No history of DVT or PE.  No recent major surgeries or prolonged travel.  No history of coagulopathy.  Chest discomfort feels as tightness that has been consistent with initial.  She feels wheezy and persistent coughing.  No known cardiopulmonary disease.      Past Medical History  Past Medical History:   Diagnosis Date    Bipolar disorder (H)     Blepharitis     Esophageal reflux (aka GERD)     Glaucoma     Hyperlipidemia LDL goal < 130     Nonsenile cataract     Shingles     11/14/15 Left  approximately C6 distribution     Past Surgical History:   Procedure Laterality Date    CATARACT IOL, RT/LT Right 10/16/2018    [facility]     CATARACT IOL, RT/LT Left 11/01/2018    [facility]     CHALAZION EXCISION  08/21/2015    Left lid    COMBINED REPAIR PTOSIS WITH BLEPHAROPLASTY BILATERAL Bilateral 10/12/2023    Procedure: Both upper eyelid blepharoplasty, ptosis repair, right internal browpexy;  Surgeon: Mine Munoz MD;  Location: [facility] OR    HERNIA REPAIR      abdominal hernia repair 2012    REPAIR ECTROPION BILATERAL Bilateral 10/12/2023    Procedure: Both lower eyelid ectropion repair;  Surgeon: Mine Munoz MD;  [place] [facility] [place]     acetaminophen (TYLENOL) 325 MG tablet  albuterol (PROAIR HFA) 108 (90 Base) MCG/ACT inhaler  artificial tears OINT ophthalmic ointment  atorvastatin (LIPITOR) 20 MG tablet  carboxymethylcellulose PF (REFRESH LIQUIGEL) 1 % ophthalmic gel  carvedilol (COREG) 6.25 MG tablet  clonazePAM (KLONOPIN) 0.5 MG tablet  erythromycin (ROMYCIN) 5 MG/GM ophthalmic ointment  guaiFENesin-codeine (GUAIFENESIN AC) 100-10 MG/5ML syrup  ibuprofen (ADVIL/MOTRIN) 200 MG tablet  naproxen (NAPROSYN) 500 MG tablet  pantoprazole (PROTONIX) 40 MG EC tablet  predniSONE (DELTASONE) 20 MG tablet  predniSONE (DELTASONE) 50 MG tablet  QUEtiapine (SEROQUEL XR) 50 MG TB24 24 hr tablet  QUEtiapine (SEROQUEL) 25 MG tablet  timolol maleate (TIMOPTIC) 0.5 % ophthalmic solution  triamcinolone (KENALOG) 0.1 % external cream  triamcinolone (KENALOG) 0.1 % external ointment      Allergies   Allergen Reactions    Isoniazid Other (See Comments) and Nausea and Vomiting     INH (medication) treatment for TB caused liver side effects     Family History  Family History   Problem Relation Age of Onset    Glaucoma Mother     Macular Degeneration No family hx of     Cancer No family hx of     Diabetes No family hx of      Hypertension No family hx of     Cerebrovascular Disease No family hx of     Thyroid Disease No family hx of     Eye Surgery No family hx of      Social History   Social History     Tobacco Use    Smoking status: Never     Passive exposure: Never    Smokeless tobacco: Never   Vaping Use    Vaping Use: Never used   Substance Use Topics    Alcohol use: No    Drug use: Yes         A medically appropriate review of systems was performed with pertinent positives and negatives noted in the HPI, and all other systems negative.      Physical Exam   BP: 138/77  Pulse: 86  Temp: 97.8  F (36.6  C)  Resp: 22  SpO2: 97 %      Physical Exam  Vitals and nursing note reviewed.   Constitutional:       General: She is in acute distress.      Appearance: Normal appearance. She is not ill-appearing, toxic-appearing or diaphoretic.   HENT:      Head: Normocephalic and atraumatic.      Right Ear: External ear normal.      Left Ear: External ear normal.      Nose: Nose normal. No congestion.      Mouth/Throat:      Mouth: Mucous membranes are moist.      Pharynx: Oropharynx is clear. No oropharyngeal exudate.   Eyes:      Extraocular Movements: Extraocular movements intact.      Conjunctiva/sclera: Conjunctivae normal.      Pupils: Pupils are equal, round, and reactive to light.   Cardiovascular:      Rate and Rhythm: Normal rate.      Pulses: Normal pulses.      Heart sounds: Normal heart sounds. No murmur heard.     No friction rub.   Pulmonary:      Effort: Pulmonary effort is normal. No respiratory distress.      Breath sounds: No stridor. Wheezing present. No rhonchi or rales.   Abdominal:      General: Abdomen is flat. There is no distension.      Tenderness: There is no abdominal tenderness. There is no guarding or rebound.   Musculoskeletal:         General: No deformity or signs of injury. Normal range of motion.      Cervical back: Normal range of motion.   Skin:     General: Skin is warm.      Capillary Refill: Capillary refill  takes less than 2 seconds.      Coloration: Skin is not pale.      Findings: No bruising or erythema.   Neurological:      General: No focal deficit present.      Mental Status: She is alert.      Cranial Nerves: No cranial nerve deficit.      Motor: No weakness.   Psychiatric:         Mood and Affect: Mood normal.         Behavior: Behavior normal.         ED Course        Procedures         Results for orders placed or performed during the hospital encounter of 04/05/24 (from the past 24 hour(s))   EKG 12 lead   Result Value Ref Range    Systolic Blood Pressure  mmHg    Diastolic Blood Pressure  mmHg    Ventricular Rate 77 BPM    Atrial Rate 77 BPM    CO Interval 146 ms    QRS Duration 70 ms     ms    QTc 432 ms    P Axis 74 degrees    R AXIS -22 degrees    T Axis 22 degrees    Interpretation ECG       Sinus rhythm  Biatrial enlargement  Abnormal ECG  Unconfirmed report - interpretation of this ECG is computer generated - see medical record for final interpretation  Confirmed by - EMERGENCY ROOM, PHYSICIAN ([id number]),  GELACIO RAJPUT ([id number]) on 4/5/2024 3:42:47 PM     CBC with platelets differential    Narrative    The following orders were created for panel order CBC with platelets differential.  Procedure                               Abnormality         Status                     ---------                               -----------         ------                     CBC with platelets and d...[[id number]]                      Final result                 Please view results for these tests on the individual orders.   Comprehensive metabolic panel   Result Value Ref Range    Sodium 133 (L) 135 - 145 mmol/L    Potassium 4.3 3.4 - 5.3 mmol/L    Carbon Dioxide (CO2) 22 22 - 29 mmol/L    Anion Gap 10 7 - 15 mmol/L    Urea Nitrogen 15.5 8.0 - 23.0 mg/dL    Creatinine 0.63 0.51 - 0.95 mg/dL    GFR Estimate >90 >60 mL/min/1.73m2    Calcium 8.9 8.8 - 10.2 mg/dL    Chloride 101 98 - 107 mmol/L    Glucose 125 (H) 70 -  99 mg/dL    Alkaline Phosphatase 97 40 - 150 U/L    AST 24 0 - 45 U/L    ALT 31 0 - 50 U/L    Protein Total 6.5 6.4 - 8.3 g/dL    Albumin 4.1 3.5 - 5.2 g/dL    Bilirubin Total 0.3 <=1.2 mg/dL   Troponin T, High Sensitivity   Result Value Ref Range    Troponin T, High Sensitivity <6 <=14 ng/L   Nt probnp inpatient (BNP)   Result Value Ref Range    N terminal Pro BNP Inpatient 90 0 - 900 pg/mL   CBC with platelets and differential   Result Value Ref Range    WBC Count 8.4 4.0 - 11.0 10e3/uL    RBC Count 4.23 3.80 - 5.20 10e6/uL    Hemoglobin 12.6 11.7 - 15.7 g/dL    Hematocrit 38.4 35.0 - 47.0 %    MCV 91 78 - 100 fL    MCH 29.8 26.5 - 33.0 pg    MCHC 32.8 31.5 - 36.5 g/dL    RDW 12.8 10.0 - 15.0 %    Platelet Count 288 150 - 450 10e3/uL    % Neutrophils 60 %    % Lymphocytes 23 %    % Monocytes 9 %    % Eosinophils 4 %    % Basophils 0 %    % Immature Granulocytes 4 %    NRBCs per 100 WBC 0 <1 /100    Absolute Neutrophils 5.0 1.6 - 8.3 10e3/uL    Absolute Lymphocytes 2.0 0.8 - 5.3 10e3/uL    Absolute Monocytes 0.8 0.0 - 1.3 10e3/uL    Absolute Eosinophils 0.4 0.0 - 0.7 10e3/uL    Absolute Basophils 0.0 0.0 - 0.2 10e3/uL    Absolute Immature Granulocytes 0.3 <=0.4 10e3/uL    Absolute NRBCs 0.0 10e3/uL   CT Chest Pulmonary Embolism w Contrast    Narrative    EXAMINATION: CT CHEST PULMONARY EMBOLISM W CONTRAST on 4/5/2024 2:38  PM.    INDICATION: exertional dyspnea    COMPARISON: 6/7/2021 CTA.    TECHNIQUE: CT imaging obtained through the chest with contrast.  Coronal and axial MIP reformatted images obtained. Three-dimensional  (3D) post-processed angiographic images were reconstructed, archived  to PACS and used in interpretation of this study.     CONTRAST: 68 ml isovue 370 IV    FINDINGS:    Vessels: No central filling defect consistent with a pulmonary  embolism.  No aortic aneurysm. The left common carotid artery  originates from the brachiocephalic trunk, common variant.    Mediastinum: No thyroid nodules.  Central tracheobronchial tree is  patent. Mild cardiomegaly. No pericardial effusion.  No thoracic  lymphadenopathy.     Lungs: Bibasilar fibroatelectasis. No pneumothorax or pleural  effusion. Unchanged left apical nodular scarring. No new or enlarging  pulmonary nodules.    Bones and soft tissues: No acute or aggressive bone findings.     Upper abdomen: No acute findings in the visualized upper abdomen.  Lobulated left hepatic lobe capsular contour which was present dating  back to 1/2/2010.      Impression    IMPRESSION:   No pulmonary embolism. No acute findings in the chest. Stable mild  cardiomegaly.    I have personally reviewed the examination and initial interpretation  and I agree with the findings.    JENI MARIE,          SYSTEM ID:  [id number]     Medications   ipratropium - albuterol 0.5 mg/2.5 mg/3 mL (DUONEB) neb solution 3 mL (has no administration in time range)   sodium chloride 0.9% BOLUS 1,000 mL (0 mLs Intravenous Stopped 4/5/24 1546)   dexAMETHasone PF (DECADRON) injection 10 mg (10 mg Intravenous $Given 4/5/24 1318)   iopamidol (ISOVUE-370) solution 68 mL (68 mLs Intravenous $Given 4/5/24 1421)   sodium chloride (PF) 0.9% PF flush 94 mL (94 mLs Intravenous $Given 4/5/24 1421)             Critical care was not performed.     Medical Decision Making  The patient's presentation was of moderate complexity (an acute complicated injury).    The patient's evaluation involved:  review of external note(s) from 3+ sources (see separate area of note for details)  review of 3+ test result(s) ordered prior to this encounter (see separate area of note for details)  ordering and/or review of 3+ test(s) in this encounter (see separate area of note for details)    The patient's management necessitated moderate risk (prescription drug management including medications given in the ED).    Assessments & Plan (with Medical Decision Making)     Yancy Rivera is a 68 year old female who arrives today to the  emergency department increasing cough frequency, exertional shortness of breath and some orthopnea with associated hemoptysis.  On arrival patient ambulated.  He is presently afebrile and hemodynamically stable.  She is lying supine in bed on my evaluation.  She appears to be somewhat uncomfortable with a fairly frequent nonproductive cough.  No clinical evidence of increased work of breathing.  Auscultation she is moving air well but with note of some inspiratory as well as expiratory wheezing.  She was on a short course of steroids but believe she would benefit from redosing of IV dexamethasone.  I would plan for a DuoNeb as a suspect this will help symptomatically.  Differential broad however most likely infectious.  Symptoms have now been present for close to 2 weeks somewhat unusual and longer than I would anticipate for influenza alone.  Possible secondary bacterial infection.  I have reviewed outside clinic notes and laboratory studies with negative troponin 1 week ago.  Low suspicion for ACS or heart failure but possible.  Less likely be pulmonary embolus, pneumothorax, effusion or empyema.    EKG in my interpretation demonstrating normal sinus rhythm and rate.  No interval abnormality, ST changes, evidence of ischemia or PVCs appreciated.    Laboratory studies overall reassuring with no evidence of fluid overload or heart strain.  Low suspicion for ACS as primary cause.  I did obtain CT imaging of the chest demonstrate no sign of PE, pneumonia, effusion, or pneumothorax.  I suspect symptoms most likely with persistent pulmonary inflammation status post influenza.  She has had improvement in the emergency department after dose of steroids and nebulized medication.  SpO2 is remained in the upper 90s on room air.  I believe she may safely return to home with prescription for albuterol inhaler and short course of steroids.  She has no formal underlying diagnosis of pulmonary disease such as COPD or interstitial  lung disease but I do believe with new and persistent symptoms she would benefit from outpatient follow-up with pulmonology.  Should she have change, progression or worsening symptoms we will have her call or return emergently for reevaluation.    I have reviewed the nursing notes.    I have reviewed the findings, diagnosis, plan and need for follow up with the patient.    New Prescriptions    ALBUTEROL (PROAIR HFA) 108 (90 BASE) MCG/ACT INHALER    Inhale 2 puffs into the lungs every 4 hours as needed for shortness of breath    PREDNISONE (DELTASONE) 20 MG TABLET    Take two tablets (= 40mg) each day for 5 (five) days       Final diagnoses:   Shortness of breath       DAVEY MONDRAGON MD    4/5/2024   [facility] EMERGENCY DEPARTMENT     Davey Mondragon MD  04/05/24 7654

## 2024-04-05 NOTE — PROGRESS NOTES
Assessment & Plan     Influenza A  Chest pain, unspecified type  Hemoptysis  Orthopnea  Dyspnea, unspecified type  Patient is a 68-year-old female with past medical history significant for current diagnosis of influenza A with syncopal episode, prediabetes, HLD, HTN, hyponatremia, who presents to clinic with  due to 12 days of symptoms including headache, severe cough, fatigue, diffuse myalgias, chest pain, dyspnea, orthopnea, and hemoptysis.  Patient completed course of prescribed amoxicillin and was treated with ceftriaxone during ED visit.  Following primary care visit she was prescribed prednisone and guaifenesin which she has been compliant with.  Vital signs normal.  Exam concerning for cough with wheezing and rhonchi at bilateral lung bases.  Hemoptysis present during evaluation.  Based on history and exam differential diagnosis includes pneumonia, PE, heart failure.  Recommended proceeding to ED for more rapid evaluation and further management.  Patient and  are agreeable.      26 minutes spent by me on the date of the encounter doing chart review, history and exam, documentation and further activities per the note    MED REC REQUIRED  Post Medication Reconciliation Status:  Discharge medications reconciled, continue medications without change        See Patient Instructions    Lexy Haines is a 68 year old, presenting for the following health issues:  ER F/U        4/5/2024     9:54 AM   Additional Questions   Roomed by Erna KLINE MA   Accompanied by -Les         4/5/2024     9:54 AM   Patient Reported Additional Medications   Patient reports taking the following new medications None     HPI     Patient was seen at [facility] on 03/27/2024 for Chest Pain and HA and was diagnosed with influenza. Patient stated still experiencing same symptoms, no improvement she has frequent dry cough with bloody sputum, headache, chills, fatigue.  Patient notes she cannot lie flat because  "shortness of breath becomes severe.  Because of that she has been sleeping in recliner.  Entire body is painful.  She completed the prescribed Amoxicillin X 10 days.  She is currently taking guaifenesin and prednisone as prescribed.    Per chart review, patient evaluated in the emergency department 3/27/2024 due to chest pain, difficulty breathing, and headache.  Patient was seen in urgent care the day prior and tested positive for influenza.  Patient has syncopal episode in waiting room of ED and recovered quickly afterwards.  Symptoms thought to be vasovagal.  EKG completed and without arrhythmia.  Labs completed showing mild hyponatremia patient treated with normal saline.  Exam with ear infection.  Influenza testing positive for influenza A.  Tylenol/ibuprofen and OTC medications recommended for management.  Okay to complete course of amoxicillin for gingivitis?.  Recommended follow-up as outpatient for recheck.  Patient followed up with primary care 3/29/2024 and treated with guaifenesin and prednisone.    Review of Systems   Constitutional:  Positive for chills, fatigue and fever.   HENT:  Positive for congestion and rhinorrhea. Negative for ear pain.    Respiratory:  Positive for cough and shortness of breath.    Cardiovascular:  Positive for chest pain.   Gastrointestinal:  Negative for abdominal pain, diarrhea and vomiting.   Musculoskeletal:  Positive for myalgias.   Neurological:  Positive for headaches.   All other systems reviewed and are negative.          Objective    /74   Pulse 82   Temp 97.4  F (36.3  C) (Temporal)   Resp 18   Ht 1.654 m (5' 5.12\")   Wt 85.5 kg (188 lb 6.4 oz)   LMP  (LMP Unknown)   SpO2 96%   Breastfeeding No   BMI 31.24 kg/m    Body mass index is 31.24 kg/m .  Physical Exam  Vitals and nursing note reviewed.   Constitutional:       General: She is not in acute distress.     Appearance: Normal appearance.   HENT:      Head: Normocephalic and atraumatic.      Nose: " Congestion present.      Mouth/Throat:      Mouth: Mucous membranes are moist.      Pharynx: Oropharynx is clear.   Eyes:      Extraocular Movements: Extraocular movements intact.      Pupils: Pupils are equal, round, and reactive to light.   Cardiovascular:      Rate and Rhythm: Normal rate and regular rhythm.      Heart sounds: Normal heart sounds.   Pulmonary:      Effort: Pulmonary effort is normal.      Breath sounds: Wheezing (With cough) and rhonchi (Bilateral lower lobes) present. No rales.      Comments: Produced bloody sputum during clinic visit  Musculoskeletal:         General: Normal range of motion.      Cervical back: Normal range of motion.      Right lower leg: Edema (1+) present.      Left lower leg: Edema (1+) present.   Skin:     General: Skin is warm and dry.   Neurological:      General: No focal deficit present.      Mental Status: She is alert.   Psychiatric:         Mood and Affect: Mood normal.         Behavior: Behavior normal.                    Signed Electronically by: Maribell Haile PA-C

## 2024-04-05 NOTE — ED TRIAGE NOTES
Arrives ambulatory with SOB, chest pain, cough and generalized weakness. Per patient was referred to the ER by her PCP. Has been having symptoms for 14 days. Recently seen at [facility] and was given oral steroids.     Triage Assessment (Adult)       Row Name 04/05/24 1201          Triage Assessment    Airway WDL WDL        Respiratory WDL    Respiratory WDL X;rhythm/pattern     Rhythm/Pattern, Respiratory shortness of breath        Skin Circulation/Temperature WDL    Skin Circulation/Temperature WDL WDL        Cardiac WDL    Cardiac WDL X;chest pain        Chest Pain Assessment    Chest Pain Location retrosternal     Chest Pain Radiation abdomen;back;shoulder     Character sharp;squeezing;throbbing     Alleviating Factors sitting up     Associated Signs/Symptoms dyspnea     Chest Pain Intervention 12-lead ECG obtained        Peripheral/Neurovascular WDL    Peripheral Neurovascular WDL WDL        Cognitive/Neuro/Behavioral WDL    Cognitive/Neuro/Behavioral WDL WDL

## 2024-04-05 NOTE — DISCHARGE INSTRUCTIONS
I was happy to see that the CAT scan of your chest demonstrates no sign of blood clot, collapsed lung, fluid development, or obvious evidence of pneumonia.  I suspect her ongoing symptoms are related to the recent diagnosis of influenza.  You were noted by myself and your primary care doctor to have some wheezing.  I suspect this is secondary to inflammation from underlying viral infection.  You were prescribed a course of steroids to decrease inflammation and an albuterol inhaler to help symptomatically when you have wheezing.  Further, understanding no formal diagnosis underlying lung disease I did place an outpatient follow-up with pulmonology who specialize in lung care.  You may benefit from evaluation and specific pulmonary testing to ensure good lung health moving forward.  Should you have change, progression or worsening symptoms we are certainly happy to reevaluate you at any time.

## 2024-04-05 NOTE — PATIENT INSTRUCTIONS
For further evaluation of your symptoms we recommend you be seen in the emergency department.  Please go there right away.    Patient has orthopnea, hemoptysis, chest pain, dyspnea.  Influenza A symptoms started 12 days ago.  No improvement.  Needs evaluation for PE, heart failure, repeat evaluation for pneumonia.

## 2024-04-09 ENCOUNTER — PATIENT OUTREACH (OUTPATIENT)
Dept: GERIATRIC MEDICINE | Facility: CLINIC | Age: 69
End: 2024-04-09
Payer: MEDICARE

## 2024-04-09 NOTE — PROGRESS NOTES
[facility] Care Coordination Contact  CC received notification of Emergency Room visit.  ER visit occurred on 4/5/24 at [facility] with Dx of flu symptoms; SOB; chest pain; referral from PCP.    CC contacted member and left a message requesting a return call.  Member has a follow-up appointment with PCP: Yes: scheduled on 4/11/24  Member has had a change in condition: No  New referrals placed: No  Home Visit Needed: No  Care plan reviewed and updated.  PCP notified of ED visit via EMR.    PRINCESS Jacobson  [facility]  [phone]

## 2024-04-11 ENCOUNTER — OFFICE VISIT (OUTPATIENT)
Dept: FAMILY MEDICINE | Facility: CLINIC | Age: 69
End: 2024-04-11
Attending: FAMILY MEDICINE
Payer: MEDICARE

## 2024-04-11 VITALS
BODY MASS INDEX: 31.86 KG/M2 | HEIGHT: 65 IN | OXYGEN SATURATION: 98 % | TEMPERATURE: 98 F | RESPIRATION RATE: 20 BRPM | SYSTOLIC BLOOD PRESSURE: 134 MMHG | HEART RATE: 78 BPM | WEIGHT: 191.2 LBS | DIASTOLIC BLOOD PRESSURE: 60 MMHG

## 2024-04-11 DIAGNOSIS — F33.41 MAJOR DEPRESSIVE DISORDER, RECURRENT, IN PARTIAL REMISSION (H): ICD-10-CM

## 2024-04-11 DIAGNOSIS — J06.9 UPPER RESPIRATORY TRACT INFECTION, UNSPECIFIED TYPE: Primary | ICD-10-CM

## 2024-04-11 PROCEDURE — 99214 OFFICE O/P EST MOD 30 MIN: CPT | Performed by: FAMILY MEDICINE

## 2024-04-11 RX ORDER — DOXYCYCLINE 100 MG/1
100 CAPSULE ORAL 2 TIMES DAILY
Qty: 14 CAPSULE | Refills: 0 | Status: SHIPPED | OUTPATIENT
Start: 2024-04-11 | End: 2024-04-18

## 2024-04-11 ASSESSMENT — PATIENT HEALTH QUESTIONNAIRE - PHQ9
5. POOR APPETITE OR OVEREATING: NOT AT ALL
SUM OF ALL RESPONSES TO PHQ QUESTIONS 1-9: 3

## 2024-04-11 ASSESSMENT — ANXIETY QUESTIONNAIRES
2. NOT BEING ABLE TO STOP OR CONTROL WORRYING: NOT AT ALL
3. WORRYING TOO MUCH ABOUT DIFFERENT THINGS: NOT AT ALL
1. FEELING NERVOUS, ANXIOUS, OR ON EDGE: NOT AT ALL
IF YOU CHECKED OFF ANY PROBLEMS ON THIS QUESTIONNAIRE, HOW DIFFICULT HAVE THESE PROBLEMS MADE IT FOR YOU TO DO YOUR WORK, TAKE CARE OF THINGS AT HOME, OR GET ALONG WITH OTHER PEOPLE: NOT DIFFICULT AT ALL
GAD7 TOTAL SCORE: 0
5. BEING SO RESTLESS THAT IT IS HARD TO SIT STILL: NOT AT ALL
7. FEELING AFRAID AS IF SOMETHING AWFUL MIGHT HAPPEN: NOT AT ALL
GAD7 TOTAL SCORE: 0
6. BECOMING EASILY ANNOYED OR IRRITABLE: NOT AT ALL

## 2024-04-11 ASSESSMENT — ENCOUNTER SYMPTOMS
NERVOUS/ANXIOUS: 0
PALPITATIONS: 0
ALLERGIC/IMMUNOLOGIC NEGATIVE: 1
COUGH: 1
DIZZINESS: 0
FATIGUE: 1
SLEEP DISTURBANCE: 0
HEADACHES: 0
ENDOCRINE NEGATIVE: 1
ACTIVITY CHANGE: 0
SHORTNESS OF BREATH: 0
EYES NEGATIVE: 1
HEMATOLOGIC/LYMPHATIC NEGATIVE: 1
CHILLS: 0
APPETITE CHANGE: 0
WEAKNESS: 0
GASTROINTESTINAL NEGATIVE: 1
AGITATION: 0
WHEEZING: 0
COLOR CHANGE: 0

## 2024-04-11 NOTE — PROGRESS NOTES
1. Upper respiratory tract infection, unspecified type  History of influenza on 3/27/24 and prednisone treatment prescribed on 3/29/24 and in the ER on 4/5/24.  Non-improving for greater than 14 days.  Most recent CT reviewed. Would like to empirically treat for atypical pneumonia.  Advised to keep appointment with pulmonology.   - doxycycline hyclate (VIBRAMYCIN) 100 MG capsule; Take 1 capsule (100 mg) by mouth 2 times daily for 7 days  Dispense: 14 capsule; Refill: 0    2. Major depressive disorder, recurrent, in partial remission (H24)  Patient is seeking home support.  Continue with seroquel at this time.   - Primary Care - Care Coordination Referral; Future    I spent 30 minutes with patient of which 50% was spent counseling and coordinating patient's care as well as discussing patient's plan of care.    Lexy Haines is a 68 year old, presenting for the following health issues:  Depression and Cough        4/11/2024     9:56 AM   Additional Questions   Roomed by Karley   Accompanied by Self     History of Present Illness       Hyperlipidemia:  She presents for follow up of hyperlipidemia.   She is taking medication to lower cholesterol. She is not having myalgia or other side effects to statin medications.        Depression   How are you doing with your depression since your last visit? No change  Are you having other symptoms that might be associated with depression? No  Have you had a significant life event?  No   Are you feeling anxious or having panic attacks?   No  Do you have any concerns with your use of alcohol or other drugs? No    Social History     Tobacco Use    Smoking status: Never     Passive exposure: Never    Smokeless tobacco: Never   Vaping Use    Vaping status: Never Used   Substance Use Topics    Alcohol use: No    Drug use: Yes         8/15/2023     9:52 AM 1/11/2024     7:44 AM 4/11/2024    10:04 AM   PHQ   PHQ-9 Total Score 12 0 3   Q9: Thoughts of better off dead/self-harm past  2 weeks Not at all Not at all Not at all         4/18/2022     2:04 PM 4/28/2023    12:55 PM 4/11/2024    10:04 AM   RICHARD-7 SCORE   Total Score  17 (severe anxiety)    Total Score 0 17 0         4/11/2024    10:04 AM   Last PHQ-9   1.  Little interest or pleasure in doing things 0   2.  Feeling down, depressed, or hopeless 1   3.  Trouble falling or staying asleep, or sleeping too much 1   4.  Feeling tired or having little energy 1   5.  Poor appetite or overeating 0   6.  Feeling bad about yourself 0   7.  Trouble concentrating 0   8.  Moving slowly or restless 0   Q9: Thoughts of better off dead/self-harm past 2 weeks 0   PHQ-9 Total Score 3   Difficulty at work, home, or with people Not difficult at all         4/11/2024    10:04 AM   RICHARD-7    1. Feeling nervous, anxious, or on edge 0   2. Not being able to stop or control worrying 0   3. Worrying too much about different things 0   4. Trouble relaxing 0   5. Being so restless that it is hard to sit still 0   6. Becoming easily annoyed or irritable 0   7. Feeling afraid, as if something awful might happen 0   RICHARD-7 Total Score 0   If you checked any problems, how difficult have they made it for you to do your work, take care of things at home, or get along with other people? Not difficult at all       Suicide Assessment Five-step Evaluation and Treatment (SAFE-T)    How many servings of fruits and vegetables do you eat daily?  2-3  On average, how many sweetened beverages do you drink each day (Examples: soda, juice, sweet tea, etc.  Do NOT count diet or artificially sweetened beverages)?   0  How many days per week do you exercise enough to make your heart beat faster? 3 or less  How many minutes a day do you exercise enough to make your heart beat faster? 10 - 19  How many days per week do you miss taking your medication? 0    Acute Illness  Acute illness concerns: cough, SOB  Onset/Duration: 2-3 weeks  Symptoms:  Fever: No  Chills/Sweats: No  Headache  "(location?): No  Sinus Pressure: No  Conjunctivitis:  No  Ear Pain: no  Rhinorrhea: No  Congestion: YES  Sore Throat: No  Cough: YES-non-productive  Wheeze: No  Decreased Appetite: No  Nausea: No  Vomiting: No  Diarrhea: No  Dysuria/Freq.: No  Dysuria or Hematuria: No  Fatigue/Achiness: YES  Sick/Strep Exposure: No  Therapies tried and outcome: None      ER follow-up on 4/5/24: Due to non-improving cough.  Patient had a CT scan performed.  Patient was given albuterol and prednisone with minimal improvement.  Diagnosed influenza A on 3/27/24.  Continues to have cough symptoms and shortness of breath.  Patient is scheduled to follow-up with pulmonology next week.     Review of Systems   Constitutional:  Positive for fatigue. Negative for activity change, appetite change and chills.   HENT: Negative.     Eyes: Negative.    Respiratory:  Positive for cough. Negative for shortness of breath and wheezing.    Cardiovascular:  Negative for chest pain and palpitations.   Gastrointestinal: Negative.    Endocrine: Negative.    Skin:  Negative for color change and rash.   Allergic/Immunologic: Negative.    Neurological:  Negative for dizziness, weakness and headaches.   Hematological: Negative.    Psychiatric/Behavioral:  Negative for agitation and sleep disturbance. The patient is not nervous/anxious.            Objective    /60   Pulse 78   Temp 98  F (36.7  C) (Temporal)   Resp 20   Ht 1.656 m (5' 5.2\")   Wt 86.7 kg (191 lb 3.2 oz)   LMP  (LMP Unknown)   SpO2 98%   BMI 31.63 kg/m    Body mass index is 31.63 kg/m .  Physical Exam  Constitutional:       General: She is not in acute distress.     Appearance: She is well-developed.   HENT:      Head: Normocephalic and atraumatic.      Nose: Nose normal.   Eyes:      Conjunctiva/sclera: Conjunctivae normal.   Neck:      Trachea: No tracheal deviation.   Cardiovascular:      Rate and Rhythm: Normal rate and regular rhythm.      Heart sounds: Normal heart sounds. "   Pulmonary:      Effort: No respiratory distress.      Comments: Course breath sounds bilaterally  Musculoskeletal:         General: Normal range of motion.      Cervical back: Normal range of motion.   Skin:     General: Skin is warm.   Neurological:      Mental Status: She is alert and oriented to person, place, and time.   Psychiatric:         Behavior: Behavior normal.        4/5/24  IMPRESSION:   No pulmonary embolism. No acute findings in the chest. Stable mild  cardiomegaly.      3/27/24  FINDINGS:   LUNGS AND PLEURA: Opacification of the lower left hemithorax on the preceding chest radiograph is explained by prominent epicardial fat and mild atelectasis. Additional atelectasis of the dependent portions of both lungs and at the right base. No mass or specific evidence for pneumonia.     Signed Electronically by: Abundio Winters DO

## 2024-04-18 ENCOUNTER — OFFICE VISIT (OUTPATIENT)
Dept: PULMONOLOGY | Facility: CLINIC | Age: 69
End: 2024-04-18
Attending: EMERGENCY MEDICINE
Payer: MEDICARE

## 2024-04-18 DIAGNOSIS — R06.02 SHORTNESS OF BREATH: Primary | ICD-10-CM

## 2024-04-18 PROCEDURE — 94729 DIFFUSING CAPACITY: CPT | Performed by: INTERNAL MEDICINE

## 2024-04-18 PROCEDURE — 94375 RESPIRATORY FLOW VOLUME LOOP: CPT | Performed by: INTERNAL MEDICINE

## 2024-04-18 PROCEDURE — 94726 PLETHYSMOGRAPHY LUNG VOLUMES: CPT | Performed by: INTERNAL MEDICINE

## 2024-04-18 NOTE — PROGRESS NOTES
Yancy Rivera comes into clinic today at the request of Dr. Rubens Ng Ordering Provider for PFT      José Luis Soto, RRT

## 2024-04-19 ENCOUNTER — TELEPHONE (OUTPATIENT)
Dept: PULMONOLOGY | Facility: CLINIC | Age: 69
End: 2024-04-19
Payer: MEDICARE

## 2024-04-19 NOTE — TELEPHONE ENCOUNTER
Spoke with Zita on behalf of pt- consent to communicate on file    Called to offer 6/5 sooner appt with Dr. Kilgore, pt is traveling that day and cannot schedule. Message sent to Cathi to update, will keep current 6/26 appt on wait list and call pt back if spot opens up for 1-2 week referral.

## 2024-04-21 LAB
DLCOCOR-%PRED-PRE: 80 %
DLCOCOR-PRE: 15.42 ML/MIN/MMHG
DLCOUNC-%PRED-PRE: 78 %
DLCOUNC-PRE: 15.03 ML/MIN/MMHG
DLCOUNC-PRED: 19.22 ML/MIN/MMHG
ERV-%PRED-PRE: 15 %
ERV-PRE: 0.16 L
ERV-PRED: 1.02 L
EXPTIME-PRE: 5.64 SEC
FEF2575-%PRED-PRE: 79 %
FEF2575-PRE: 1.5 L/SEC
FEF2575-PRED: 1.89 L/SEC
FEFMAX-%PRED-PRE: 65 %
FEFMAX-PRE: 3.83 L/SEC
FEFMAX-PRED: 5.87 L/SEC
FEV1-%PRED-PRE: 71 %
FEV1-PRE: 1.53 L
FEV1FEV6-PRE: 80 %
FEV1FEV6-PRED: 80 %
FEV1FVC-PRE: 80 %
FEV1FVC-PRED: 79 %
FEV1SVC-PRE: 84 %
FEV1SVC-PRED: 68 %
FIFMAX-PRE: 3.01 L/SEC
FRCPLETH-%PRED-PRE: 96 %
FRCPLETH-PRE: 2.61 L
FRCPLETH-PRED: 2.71 L
FVC-%PRED-PRE: 70 %
FVC-PRE: 1.92 L
FVC-PRED: 2.72 L
IC-%PRED-PRE: 81 %
IC-PRE: 1.66 L
IC-PRED: 2.05 L
RVPLETH-%PRED-PRE: 121 %
RVPLETH-PRE: 2.45 L
RVPLETH-PRED: 2.01 L
TLCPLETH-%PRED-PRE: 86 %
TLCPLETH-PRE: 4.27 L
TLCPLETH-PRED: 4.94 L
VA-%PRED-PRE: 70 %
VA-PRE: 3.27 L
VC-%PRED-PRE: 57 %
VC-PRE: 1.82 L
VC-PRED: 3.15 L

## 2024-04-25 LAB — NONINV COLON CA DNA+OCC BLD SCRN STL QL: NEGATIVE

## 2024-05-09 ENCOUNTER — TELEPHONE (OUTPATIENT)
Dept: OPHTHALMOLOGY | Facility: CLINIC | Age: 69
End: 2024-05-09
Payer: MEDICARE

## 2024-05-09 NOTE — TELEPHONE ENCOUNTER
[facility] Call Center    Phone Message    May a detailed message be left on voicemail: yes     Reason for Call: Symptoms or Concerns     If patient has red-flag symptoms, warm transfer to triage line    Current symptom or concern: per pt left eye is bothering her since surgery dry eye  please contact pt at this number  [phone]    Symptoms have been present for:  1-2 month(s)    Has patient previously been seen for this? Yes    By Dr Rollins:     Date: 1/24/24    Are there any new or worsening symptoms? Yes:     Action Taken: Message routed to:  [facility] eye    Travel Screening: Not Applicable

## 2024-05-10 NOTE — TELEPHONE ENCOUNTER
Spoke to pt at 0915    Pt with dryness/symptoms times 1-2 months    Pt would like to earlier exam.    No sooner appt than July with Dr. Rollins.    Offered appt next week with Dr. Echeverria and pt/daughter accept.    Scheduled at 1040 May 17th with Dr. Echeverria and pt aware of date/time/location at [facility].    Sb Freitas RN 9:26 AM 05/10/24

## 2024-05-16 PROBLEM — M25.561 CHRONIC PAIN OF RIGHT KNEE: Status: RESOLVED | Noted: 2024-03-01 | Resolved: 2024-05-16

## 2024-05-16 PROBLEM — G89.29 CHRONIC PAIN OF RIGHT KNEE: Status: RESOLVED | Noted: 2024-03-01 | Resolved: 2024-05-16

## 2024-05-16 NOTE — PROGRESS NOTES
DISCHARGE SUMMARY    Yancy Rivera was seen 3  times for evaluation and treatment.  Patient did not return for further treatment and current status is unknown.  Due to short treatment duration, no objective or functional changes were made.  Please see goal flow sheet from episode noted date below and initial evaluation for further information.  Patient is discharged from therapy and therapy episode is resolved as of 05/16/24.      Linked Episodes   Type: Episode: Status: Noted: Resolved: Last update: Updated by:   PHYSICAL THERAPY R knee pain 2/1/24 Active 2/1/2024  3/1/2024 11:16 AM Al Polo, PT      Comments:

## 2024-05-17 ENCOUNTER — OFFICE VISIT (OUTPATIENT)
Dept: OPHTHALMOLOGY | Facility: CLINIC | Age: 69
End: 2024-05-17
Attending: OPTOMETRIST
Payer: MEDICARE

## 2024-05-17 DIAGNOSIS — H02.834 DERMATOCHALASIS OF BOTH UPPER EYELIDS: ICD-10-CM

## 2024-05-17 DIAGNOSIS — H40.1132 PRIMARY OPEN ANGLE GLAUCOMA OF BOTH EYES, MODERATE STAGE: Primary | ICD-10-CM

## 2024-05-17 DIAGNOSIS — H02.831 DERMATOCHALASIS OF BOTH UPPER EYELIDS: ICD-10-CM

## 2024-05-17 DIAGNOSIS — H04.123 DRY EYES, BILATERAL: ICD-10-CM

## 2024-05-17 DIAGNOSIS — H02.135 SENILE ECTROPION OF LEFT LOWER EYELID: ICD-10-CM

## 2024-05-17 PROCEDURE — G0463 HOSPITAL OUTPT CLINIC VISIT: HCPCS | Performed by: OPTOMETRIST

## 2024-05-17 PROCEDURE — 99213 OFFICE O/P EST LOW 20 MIN: CPT | Performed by: OPTOMETRIST

## 2024-05-17 ASSESSMENT — EXTERNAL EXAM - RIGHT EYE: OD_EXAM: NORMAL

## 2024-05-17 ASSESSMENT — VISUAL ACUITY
OD_PH_SC+: -2
METHOD: SNELLEN - LINEAR
OD_PH_SC: 20/25
OS_SC: 20/50
OS_PH_SC: 20/40
OD_SC: 20/30
OD_SC+: -2
OS_SC+: -1+2

## 2024-05-17 ASSESSMENT — TONOMETRY
IOP_METHOD: ICARE
OD_IOP_MMHG: 10
OS_IOP_MMHG: 13

## 2024-05-17 ASSESSMENT — EXTERNAL EXAM - LEFT EYE: OS_EXAM: NORMAL

## 2024-05-17 NOTE — NURSING NOTE
Chief Complaints and History of Present Illnesses   Patient presents with    Dry Eye(s) Both Eyes     Here for Dry Eyes each eye      Chief Complaint(s) and History of Present Illness(es)       Dry Eye(s) Both Eyes              Associated symptoms: eye pain and dryness    Pain scale: 8/10    Comments: Here for Dry Eyes each eye               Comments    Pt states she has daily left eye pain (8/10) since blepharoplasty last year.   She tells me both eyes feel dry today.   She is leaving for [place] in July and would like a refill on her eyedrops beforehand.     Ocular Meds:  Timolol once daily each eye   Erythromycin qPM each eye   Celluvisc QID each eye     Beni Ho 10:54 AM May 17, 2024

## 2024-05-17 NOTE — PATIENT INSTRUCTIONS
POAG cont Timoptic in AM each eye   Dry eyes Erythro agustin at night   Refresh drops during the day  Dermatochalasis follow  Left eye LL ectropion follow if worse Oculoplastics  Recheck August

## 2024-05-17 NOTE — PROGRESS NOTES
Assessment & Plan       Yancy Rivera is a 68 year old female with the following diagnoses:   1. Primary open angle glaucoma of both eyes, moderate stage - Both Eyes    2. Dry eyes, bilateral - Both Eyes    3. Dermatochalasis of both upper eyelids - Both Eyes    4. Senile ectropion of left lower eyelid - Left Eye          POAG cont Timoptic in AM each eye   Dry eyes Erythro agustin at night   Refresh drops during the day  Dermatochalasis follow  Left eye LL ectropion follow if worse Oculoplastics  Recheck August     Patient disposition:   No follow-ups on file.          Complete documentation of historical and exam elements from today's encounter can be found in the full encounter summary report (not reduplicated in this progress note). I personally obtained the chief complaint(s) and history of present illness.  I confirmed and edited as necessary the review of systems, past medical/surgical history, family history, social history, and examination findings as documented by others; and I examined the patient myself. I personally reviewed the relevant tests, images, and reports as documented above. I formulated and edited as necessary the assessment and plan and discussed the findings and management plan with the patient and family.  Dr. John Echeverria

## 2024-05-22 ENCOUNTER — DOCUMENTATION ONLY (OUTPATIENT)
Dept: FAMILY MEDICINE | Facility: CLINIC | Age: 69
End: 2024-05-22
Payer: MEDICARE

## 2024-05-22 DIAGNOSIS — E87.1 HYPONATREMIA: ICD-10-CM

## 2024-05-22 DIAGNOSIS — E78.5 BORDERLINE HYPERLIPIDEMIA: Primary | ICD-10-CM

## 2024-05-22 NOTE — PROGRESS NOTES
Patient is requesting fasting labs. Please order future labs for 5-24 appointment. Thanks, Saba QURESHI

## 2024-05-24 ENCOUNTER — LAB (OUTPATIENT)
Dept: LAB | Facility: CLINIC | Age: 69
End: 2024-05-24
Payer: MEDICARE

## 2024-05-24 DIAGNOSIS — E78.5 BORDERLINE HYPERLIPIDEMIA: ICD-10-CM

## 2024-05-24 DIAGNOSIS — E87.1 HYPONATREMIA: ICD-10-CM

## 2024-05-24 PROCEDURE — 80048 BASIC METABOLIC PNL TOTAL CA: CPT

## 2024-05-24 PROCEDURE — 80061 LIPID PANEL: CPT

## 2024-05-24 PROCEDURE — 36415 COLL VENOUS BLD VENIPUNCTURE: CPT

## 2024-05-24 NOTE — LETTER
May 28, 2024      Yancy Rivera  [address] [address]        Dear ,    We are writing to inform you of your test results.    -Cholesterol levels are at your goal levels.  ADVISE: continuing your medication, a regular exercise program with at least 150 minutes of aerobic exercise per week, and eating a low saturated fat/low carbohydrate diet.  Also, you should recheck this fasting cholesterol panel in 12 months.   -Kidney function is normal (Cr, GFR), Sodium levels are now normal, Potassium is normal, Calcium is normal, Glucose is normal. ADVISE: rechecking also in 12 months.     Resulted Orders   Basic metabolic panel  (Ca, Cl, CO2, Creat, Gluc, K, Na, BUN)   Result Value Ref Range    Sodium 137 135 - 145 mmol/L      Comment:      Reference intervals for this test were updated on 09/26/2023 to more accurately reflect our healthy population. There may be differences in the flagging of prior results with similar values performed with this method. Interpretation of those prior results can be made in the context of the updated reference intervals.     Potassium 4.4 3.4 - 5.3 mmol/L    Chloride 106 98 - 107 mmol/L    Carbon Dioxide (CO2) 21 (L) 22 - 29 mmol/L    Anion Gap 10 7 - 15 mmol/L    Urea Nitrogen 12.1 8.0 - 23.0 mg/dL    Creatinine 0.69 0.51 - 0.95 mg/dL    GFR Estimate >90 >60 mL/min/1.73m2    Calcium 9.7 8.8 - 10.2 mg/dL    Glucose 89 70 - 99 mg/dL    Patient Fasting > 8hrs? Yes    Lipid panel reflex to direct LDL Fasting   Result Value Ref Range    Cholesterol 195 <200 mg/dL    Triglycerides 104 <150 mg/dL    Direct Measure HDL 58 >=50 mg/dL    LDL Cholesterol Calculated 116 (H) <=100 mg/dL    Non HDL Cholesterol 137 (H) <130 mg/dL    Patient Fasting > 8hrs? Yes     Narrative    Cholesterol  Desirable:  <200 mg/dL    Triglycerides  Normal:  Less than 150 mg/dL  Borderline High:  150-199 mg/dL  High:  200-499 mg/dL  Very High:  Greater than or equal to 500 mg/dL    Direct Measure  HDL  Female:  Greater than or equal to 50 mg/dL   Male:  Greater than or equal to 40 mg/dL    LDL Cholesterol  Desirable:  <100mg/dL  Above Desirable:  100-129 mg/dL   Borderline High:  130-159 mg/dL   High:  160-189 mg/dL   Very High:  >= 190 mg/dL    Non HDL Cholesterol  Desirable:  130 mg/dL  Above Desirable:  130-159 mg/dL  Borderline High:  160-189 mg/dL  High:  190-219 mg/dL  Very High:  Greater than or equal to 220 mg/dL       If you have any questions or concerns, please call the clinic at the number listed above.       Sincerely,      Abundio Winters DO/fp

## 2024-05-25 LAB
ANION GAP SERPL CALCULATED.3IONS-SCNC: 10 MMOL/L (ref 7–15)
BUN SERPL-MCNC: 12.1 MG/DL (ref 8–23)
CALCIUM SERPL-MCNC: 9.7 MG/DL (ref 8.8–10.2)
CHLORIDE SERPL-SCNC: 106 MMOL/L (ref 98–107)
CHOLEST SERPL-MCNC: 195 MG/DL
CREAT SERPL-MCNC: 0.69 MG/DL (ref 0.51–0.95)
DEPRECATED HCO3 PLAS-SCNC: 21 MMOL/L (ref 22–29)
EGFRCR SERPLBLD CKD-EPI 2021: >90 ML/MIN/1.73M2
FASTING STATUS PATIENT QL REPORTED: YES
FASTING STATUS PATIENT QL REPORTED: YES
GLUCOSE SERPL-MCNC: 89 MG/DL (ref 70–99)
HDLC SERPL-MCNC: 58 MG/DL
LDLC SERPL CALC-MCNC: 116 MG/DL
NONHDLC SERPL-MCNC: 137 MG/DL
POTASSIUM SERPL-SCNC: 4.4 MMOL/L (ref 3.4–5.3)
SODIUM SERPL-SCNC: 137 MMOL/L (ref 135–145)
TRIGL SERPL-MCNC: 104 MG/DL

## 2024-06-07 NOTE — TELEPHONE ENCOUNTER
RECORDS RECEIVED FROM:    Appt Date: 6/26/2024     Shortness of Breath    NOTES STATUS DETAILS   OFFICE NOTE from referring provider Internal Davey Duncan MD    DISCHARGE REPORT from the ER Internal 4/5/2024 Shortness of Breath    MEDICATION LIST Internal    IMAGING  (NEED IMAGES AND REPORTS)     CT SCAN Internal  Requested- [facility]  CT Chest 4/5/2024     [facility] scan- 3/27/2024   CHEST XRAY (CXR) Requested- [facility] Xray Chest 3/27/2024, 4/16/2023      Xray Chest - in PACS 4/28/2023    TESTS     PULMONARY FUNCTION TESTING (PFT) Internal

## 2024-06-25 ENCOUNTER — PATIENT OUTREACH (OUTPATIENT)
Dept: GERIATRIC MEDICINE | Facility: CLINIC | Age: 69
End: 2024-06-25
Payer: MEDICARE

## 2024-06-25 NOTE — PROGRESS NOTES
[facility] Care Coordination Contact  CC received notification of Emergency Room visit.  ER visit occurred on 6/22/24 at [facility] with Dx of abdominal pain.    CC contacted member and left a message requesting a return call.  Member has a follow-up appointment with PCP: Yes: scheduled on 6/26/24  Member has had a change in condition: No  New referrals placed: No  Home Visit Needed: No  Care plan reviewed and updated.  PCP notified of ED visit via EMR.    PRINCESS Jacobson  [facility]  [phone]

## 2024-06-26 ENCOUNTER — PRE VISIT (OUTPATIENT)
Dept: PULMONOLOGY | Facility: CLINIC | Age: 69
End: 2024-06-26
Payer: MEDICARE

## 2024-07-07 ENCOUNTER — HOSPITAL ENCOUNTER (EMERGENCY)
Facility: CLINIC | Age: 69
Discharge: HOME | End: 2024-07-07
Attending: INTERNAL MEDICINE | Admitting: INTERNAL MEDICINE
Payer: MEDICARE

## 2024-07-07 ENCOUNTER — APPOINTMENT (OUTPATIENT)
Dept: CT IMAGING | Facility: CLINIC | Age: 69
End: 2024-07-07
Attending: INTERNAL MEDICINE
Payer: MEDICARE

## 2024-07-07 VITALS
DIASTOLIC BLOOD PRESSURE: 69 MMHG | SYSTOLIC BLOOD PRESSURE: 145 MMHG | HEART RATE: 79 BPM | RESPIRATION RATE: 18 BRPM | OXYGEN SATURATION: 96 %

## 2024-07-07 DIAGNOSIS — G43.819 OTHER MIGRAINE WITHOUT STATUS MIGRAINOSUS, INTRACTABLE: ICD-10-CM

## 2024-07-07 DIAGNOSIS — H40.10X2 OPEN-ANGLE GLAUCOMA OF BOTH EYES, MODERATE STAGE, UNSPECIFIED OPEN-ANGLE GLAUCOMA TYPE: ICD-10-CM

## 2024-07-07 LAB
ALBUMIN SERPL BCG-MCNC: 4.1 G/DL (ref 3.5–5.2)
ALP SERPL-CCNC: 105 U/L (ref 40–150)
ALT SERPL W P-5'-P-CCNC: 14 U/L (ref 0–50)
ANION GAP SERPL CALCULATED.3IONS-SCNC: 13 MMOL/L (ref 7–15)
AST SERPL W P-5'-P-CCNC: 24 U/L (ref 0–45)
BASOPHILS # BLD AUTO: 0 10E3/UL (ref 0–0.2)
BASOPHILS NFR BLD AUTO: 0 %
BILIRUB SERPL-MCNC: 0.3 MG/DL
BUN SERPL-MCNC: 12.6 MG/DL (ref 8–23)
CALCIUM SERPL-MCNC: 9.7 MG/DL (ref 8.8–10.2)
CHLORIDE SERPL-SCNC: 103 MMOL/L (ref 98–107)
CREAT SERPL-MCNC: 0.75 MG/DL (ref 0.51–0.95)
CRP SERPL-MCNC: <3 MG/L
DEPRECATED HCO3 PLAS-SCNC: 17 MMOL/L (ref 22–29)
EGFRCR SERPLBLD CKD-EPI 2021: 87 ML/MIN/1.73M2
EOSINOPHIL # BLD AUTO: 0.2 10E3/UL (ref 0–0.7)
EOSINOPHIL NFR BLD AUTO: 3 %
ERYTHROCYTE [DISTWIDTH] IN BLOOD BY AUTOMATED COUNT: 12.6 % (ref 10–15)
ERYTHROCYTE [SEDIMENTATION RATE] IN BLOOD BY WESTERGREN METHOD: 3 MM/HR (ref 0–30)
GLUCOSE SERPL-MCNC: 118 MG/DL (ref 70–99)
HCT VFR BLD AUTO: 40.6 % (ref 35–47)
HGB BLD-MCNC: 13.2 G/DL (ref 11.7–15.7)
IMM GRANULOCYTES # BLD: 0 10E3/UL
IMM GRANULOCYTES NFR BLD: 0 %
INR PPP: 1.01 (ref 0.85–1.15)
LYMPHOCYTES # BLD AUTO: 1.7 10E3/UL (ref 0.8–5.3)
LYMPHOCYTES NFR BLD AUTO: 25 %
MCH RBC QN AUTO: 29.9 PG (ref 26.5–33)
MCHC RBC AUTO-ENTMCNC: 32.5 G/DL (ref 31.5–36.5)
MCV RBC AUTO: 92 FL (ref 78–100)
MONOCYTES # BLD AUTO: 0.5 10E3/UL (ref 0–1.3)
MONOCYTES NFR BLD AUTO: 7 %
NEUTROPHILS # BLD AUTO: 4.4 10E3/UL (ref 1.6–8.3)
NEUTROPHILS NFR BLD AUTO: 65 %
NRBC # BLD AUTO: 0 10E3/UL
NRBC BLD AUTO-RTO: 0 /100
PLATELET # BLD AUTO: 242 10E3/UL (ref 150–450)
POTASSIUM SERPL-SCNC: 4.2 MMOL/L (ref 3.4–5.3)
PROT SERPL-MCNC: 6.8 G/DL (ref 6.4–8.3)
RBC # BLD AUTO: 4.41 10E6/UL (ref 3.8–5.2)
SODIUM SERPL-SCNC: 133 MMOL/L (ref 135–145)
WBC # BLD AUTO: 6.9 10E3/UL (ref 4–11)

## 2024-07-07 PROCEDURE — 85025 COMPLETE CBC W/AUTO DIFF WBC: CPT | Performed by: INTERNAL MEDICINE

## 2024-07-07 PROCEDURE — 250N000009 HC RX 250: Performed by: INTERNAL MEDICINE

## 2024-07-07 PROCEDURE — 999N000128 HC STATISTIC PERIPHERAL IV START W/O US GUIDANCE

## 2024-07-07 PROCEDURE — 85610 PROTHROMBIN TIME: CPT | Mod: GZ | Performed by: INTERNAL MEDICINE

## 2024-07-07 PROCEDURE — 70450 CT HEAD/BRAIN W/O DYE: CPT | Mod: 26 | Performed by: RADIOLOGY

## 2024-07-07 PROCEDURE — 96374 THER/PROPH/DIAG INJ IV PUSH: CPT | Performed by: INTERNAL MEDICINE

## 2024-07-07 PROCEDURE — 250N000011 HC RX IP 250 OP 636: Performed by: INTERNAL MEDICINE

## 2024-07-07 PROCEDURE — 96361 HYDRATE IV INFUSION ADD-ON: CPT | Performed by: INTERNAL MEDICINE

## 2024-07-07 PROCEDURE — 99285 EMERGENCY DEPT VISIT HI MDM: CPT | Mod: 25 | Performed by: INTERNAL MEDICINE

## 2024-07-07 PROCEDURE — 36415 COLL VENOUS BLD VENIPUNCTURE: CPT | Performed by: INTERNAL MEDICINE

## 2024-07-07 PROCEDURE — 70450 CT HEAD/BRAIN W/O DYE: CPT | Mod: MA

## 2024-07-07 PROCEDURE — 96375 TX/PRO/DX INJ NEW DRUG ADDON: CPT | Performed by: INTERNAL MEDICINE

## 2024-07-07 PROCEDURE — 86140 C-REACTIVE PROTEIN: CPT | Performed by: INTERNAL MEDICINE

## 2024-07-07 PROCEDURE — 85652 RBC SED RATE AUTOMATED: CPT | Performed by: INTERNAL MEDICINE

## 2024-07-07 PROCEDURE — 80053 COMPREHEN METABOLIC PANEL: CPT | Performed by: INTERNAL MEDICINE

## 2024-07-07 PROCEDURE — 258N000003 HC RX IP 258 OP 636: Performed by: INTERNAL MEDICINE

## 2024-07-07 PROCEDURE — 99285 EMERGENCY DEPT VISIT HI MDM: CPT | Performed by: INTERNAL MEDICINE

## 2024-07-07 RX ORDER — LORAZEPAM 2 MG/ML
0.5 INJECTION INTRAMUSCULAR ONCE
Status: COMPLETED | OUTPATIENT
Start: 2024-07-07 | End: 2024-07-07

## 2024-07-07 RX ORDER — KETOROLAC TROMETHAMINE 15 MG/ML
15 INJECTION, SOLUTION INTRAMUSCULAR; INTRAVENOUS ONCE
Status: COMPLETED | OUTPATIENT
Start: 2024-07-07 | End: 2024-07-07

## 2024-07-07 RX ORDER — PROPARACAINE HYDROCHLORIDE 5 MG/ML
1 SOLUTION/ DROPS OPHTHALMIC ONCE
Status: COMPLETED | OUTPATIENT
Start: 2024-07-07 | End: 2024-07-07

## 2024-07-07 RX ORDER — ONDANSETRON 4 MG/1
4 TABLET, ORALLY DISINTEGRATING ORAL EVERY 8 HOURS PRN
Qty: 10 TABLET | Refills: 0 | Status: SHIPPED | OUTPATIENT
Start: 2024-07-07 | End: 2024-07-10

## 2024-07-07 RX ORDER — TIMOLOL MALEATE 5 MG/ML
1 SOLUTION/ DROPS OPHTHALMIC 2 TIMES DAILY
Qty: 10 ML | Refills: 0 | Status: SHIPPED | OUTPATIENT
Start: 2024-07-07 | End: 2024-08-05

## 2024-07-07 RX ORDER — ONDANSETRON 2 MG/ML
4 INJECTION INTRAMUSCULAR; INTRAVENOUS ONCE
Status: COMPLETED | OUTPATIENT
Start: 2024-07-07 | End: 2024-07-07

## 2024-07-07 RX ADMIN — KETOROLAC TROMETHAMINE 15 MG: 15 INJECTION INTRAMUSCULAR; INTRAVENOUS at 12:33

## 2024-07-07 RX ADMIN — ONDANSETRON 4 MG: 2 INJECTION INTRAMUSCULAR; INTRAVENOUS at 12:25

## 2024-07-07 RX ADMIN — LORAZEPAM 0.5 MG: 2 INJECTION INTRAMUSCULAR; INTRAVENOUS at 12:33

## 2024-07-07 RX ADMIN — SODIUM CHLORIDE 1000 ML: 9 INJECTION, SOLUTION INTRAVENOUS at 12:30

## 2024-07-07 RX ADMIN — PROPARACAINE HYDROCHLORIDE 1 DROP: 5 SOLUTION/ DROPS OPHTHALMIC at 15:19

## 2024-07-07 ASSESSMENT — ENCOUNTER SYMPTOMS
WEAKNESS: 0
DIZZINESS: 1
VOMITING: 0
CONFUSION: 0
APPETITE CHANGE: 1
HEADACHES: 1
PHOTOPHOBIA: 0
LIGHT-HEADEDNESS: 1
ADENOPATHY: 0
WHEEZING: 0
FEVER: 0
RHINORRHEA: 0
COUGH: 0
ABDOMINAL PAIN: 0
NAUSEA: 0
SPEECH DIFFICULTY: 0
DIFFICULTY URINATING: 0
FATIGUE: 1
FACIAL ASYMMETRY: 0
CHILLS: 0
SHORTNESS OF BREATH: 0
BACK PAIN: 0

## 2024-07-07 ASSESSMENT — ACTIVITIES OF DAILY LIVING (ADL)
ADLS_ACUITY_SCORE: 36

## 2024-07-07 ASSESSMENT — COLUMBIA-SUICIDE SEVERITY RATING SCALE - C-SSRS
1. IN THE PAST MONTH, HAVE YOU WISHED YOU WERE DEAD OR WISHED YOU COULD GO TO SLEEP AND NOT WAKE UP?: NO
2. HAVE YOU ACTUALLY HAD ANY THOUGHTS OF KILLING YOURSELF IN THE PAST MONTH?: NO
6. HAVE YOU EVER DONE ANYTHING, STARTED TO DO ANYTHING, OR PREPARED TO DO ANYTHING TO END YOUR LIFE?: NO

## 2024-07-07 ASSESSMENT — TONOMETRY
OS_IOP_MMHG: 25
OD_IOP_MMHG: 35
IOP_HANDHELD: 1

## 2024-07-07 NOTE — ED PROVIDER NOTES
ED Provider Note  [facility]      History     Chief Complaint   Patient presents with    Fatigue    Headache     HPI  Yancy Rivera is a 69 year old female who presents on referral from urgent care with severe right frontal headache for the past 2 days. She has been under stress for the past couple of weeks. She feels lightheaded and has spinning sensation. She has been refusing to eat or drink much over the past 2-3 days. She has no fever, chills, URI symptoms, cough, sputum, shortness of breath, chest pain, nausea, vomiting, weakness or difficulty with speech. Daughter is unsure if she has been compliant with eye drops recently.     Past Medical History  Past Medical History:   Diagnosis Date    Bipolar disorder (H)     Blepharitis     Esophageal reflux (aka GERD)     Glaucoma     Hyperlipidemia LDL goal < 130     N[place]enile cataract     Shingles     11/14/15 Left approximately C6 distribution     Past Surgical History:   Procedure Laterality Date    CATARACT IOL, RT/LT Right 10/16/2018    [facility]     CATARACT IOL, RT/LT Left 11/01/2018    [facility]     CHALAZION EXCISION  08/21/2015    Left lid    COMBINED REPAIR PTOSIS WITH BLEPHAROPLASTY BILATERAL Bilateral 10/12/2023    Procedure: Both upper eyelid blepharoplasty, ptosis repair, right internal browpexy;  Surgeon: Mine Munoz MD;  [place] [facility] [place]    HERNIA REPAIR      abdominal hernia repair 2012    REPAIR ECTROPION BILATERAL Bilateral 10/12/2023    Procedure: Both lower eyelid ectropion repair;  Surgeon: Mine Munoz MD;  [place] [facility] [place]     naproxen (NAPROSYN) 375 MG tablet  ondansetron (ZOFRAN ODT) 4 MG ODT tab  timolol maleate (TIMOPTIC) 0.5 % ophthalmic solution  acetaminophen (TYLENOL) 325 MG tablet  albuterol (PROAIR HFA) 108 (90 Base) MCG/ACT inhaler  artificial tears OINT ophthalmic ointment  atorvastatin (LIPITOR) 20  MG tablet  carboxymethylcellulose PF (REFRESH LIQUIGEL) 1 % ophthalmic gel  carvedilol (COREG) 6.25 MG tablet  clonazePAM (KLONOPIN) 0.5 MG tablet  erythromycin (ROMYCIN) 5 MG/GM ophthalmic ointment  guaiFENesin-codeine (GUAIFENESIN AC) 100-10 MG/5ML syrup  ibuprofen (ADVIL/MOTRIN) 200 MG tablet  pantoprazole (PROTONIX) 40 MG EC tablet  predniSONE (DELTASONE) 20 MG tablet  predniSONE (DELTASONE) 50 MG tablet  QUEtiapine (SEROQUEL XR) 50 MG TB24 24 hr tablet  QUEtiapine (SEROQUEL) 25 MG tablet  triamcinolone (KENALOG) 0.1 % external cream  triamcinolone (KENALOG) 0.1 % external ointment      Allergies   Allergen Reactions    Isoniazid Other (See Comments) and Nausea and Vomiting     INH (medication) treatment for TB caused liver side effects     Family History  Family History   Problem Relation Age of Onset    Glaucoma Mother     Macular Degeneration No family hx of     Cancer No family hx of     Diabetes No family hx of     Hypertension No family hx of     Cerebrovascular Disease No family hx of     Thyroid Disease No family hx of     Eye Surgery No family hx of      Social History   Social History     Tobacco Use    Smoking status: Never     Passive exposure: Never    Smokeless tobacco: Never   Vaping Use    Vaping status: Never Used   Substance Use Topics    Alcohol use: No    Drug use: Yes      Review of Systems   Constitutional:  Positive for appetite change and fatigue. Negative for chills and fever.   HENT:  Negative for congestion and rhinorrhea.    Eyes:  Positive for visual disturbance. Negative for photophobia.   Respiratory:  Negative for cough, shortness of breath and wheezing.    Cardiovascular:  Negative for chest pain and leg swelling.   Gastrointestinal:  Negative for abdominal pain, nausea and vomiting.   Genitourinary:  Negative for difficulty urinating.   Musculoskeletal:  Negative for back pain.   Neurological:  Positive for dizziness, light-headedness and headaches. Negative for facial  asymmetry, speech difficulty and weakness.   Hematological:  Negative for adenopathy.   Psychiatric/Behavioral:  Negative for confusion.        Physical Exam   BP: (!) 150/69  Pulse: 79  Resp: 18  SpO2: 96 %  Physical Exam  Vitals and nursing note reviewed.   Constitutional:       General: She is not in acute distress.     Appearance: Normal appearance.   HENT:      Head: Normocephalic and atraumatic.      Right Ear: External ear normal.      Left Ear: External ear normal.      Nose: Nose normal.      Mouth/Throat:      Mouth: Mucous membranes are moist.   Eyes:      General: No scleral icterus.     Intraocular pressure: Right eye pressure is 35 mmHg. Left eye pressure is 25 mmHg. Measurements were taken using a handheld tonometer.     Extraocular Movements: Extraocular movements intact.      Right eye: Normal extraocular motion and no nystagmus.      Left eye: Normal extraocular motion and no nystagmus.      Conjunctiva/sclera:      Right eye: Right conjunctiva is not injected.      Left eye: Left conjunctiva is not injected.      Pupils: Pupils are equal, round, and reactive to light.   Cardiovascular:      Rate and Rhythm: Normal rate and regular rhythm.      Heart sounds: No murmur heard.  Pulmonary:      Effort: Pulmonary effort is normal.      Breath sounds: Normal breath sounds. No wheezing or rales.   Abdominal:      Tenderness: There is no abdominal tenderness. There is no guarding.   Musculoskeletal:      Cervical back: Normal range of motion and neck supple.      Right lower leg: No edema.      Left lower leg: No edema.   Skin:     General: Skin is warm and dry.   Neurological:      General: No focal deficit present.      Mental Status: She is alert and oriented to person, place, and time.      Cranial Nerves: No cranial nerve deficit.      Motor: No weakness.   Psychiatric:         Mood and Affect: Mood normal.         Behavior: Behavior normal.           ED Course, Procedures, & Data      Procedures          Labs/Imaging    Results for orders placed or performed during the hospital encounter of 07/07/24 (from the past 24 hour(s))   CBC with platelets differential    Narrative    The following orders were created for panel order CBC with platelets differential.  Procedure                               Abnormality         Status                     ---------                               -----------         ------                     CBC with platelets and d...[[id number]]                      Final result                 Please view results for these tests on the individual orders.   INR   Result Value Ref Range    INR 1.01 0.85 - 1.15   Comprehensive metabolic panel   Result Value Ref Range    Sodium 133 (L) 135 - 145 mmol/L    Potassium 4.2 3.4 - 5.3 mmol/L    Carbon Dioxide (CO2) 17 (L) 22 - 29 mmol/L    Anion Gap 13 7 - 15 mmol/L    Urea Nitrogen 12.6 8.0 - 23.0 mg/dL    Creatinine 0.75 0.51 - 0.95 mg/dL    GFR Estimate 87 >60 mL/min/1.73m2    Calcium 9.7 8.8 - 10.2 mg/dL    Chloride 103 98 - 107 mmol/L    Glucose 118 (H) 70 - 99 mg/dL    Alkaline Phosphatase 105 40 - 150 U/L    AST 24 0 - 45 U/L    ALT 14 0 - 50 U/L    Protein Total 6.8 6.4 - 8.3 g/dL    Albumin 4.1 3.5 - 5.2 g/dL    Bilirubin Total 0.3 <=1.2 mg/dL   CRP inflammation   Result Value Ref Range    CRP Inflammation <3.00 <5.00 mg/L   Erythrocyte sedimentation rate auto   Result Value Ref Range    Erythrocyte Sedimentation Rate 3 0 - 30 mm/hr   CBC with platelets and differential   Result Value Ref Range    WBC Count 6.9 4.0 - 11.0 10e3/uL    RBC Count 4.41 3.80 - 5.20 10e6/uL    Hemoglobin 13.2 11.7 - 15.7 g/dL    Hematocrit 40.6 35.0 - 47.0 %    MCV 92 78 - 100 fL    MCH 29.9 26.5 - 33.0 pg    MCHC 32.5 31.5 - 36.5 g/dL    RDW 12.6 10.0 - 15.0 %    Platelet Count 242 150 - 450 10e3/uL    % Neutrophils 65 %    % Lymphocytes 25 %    % Monocytes 7 %    % Eosinophils 3 %    % Basophils 0 %    % Immature Granulocytes 0 %    NRBCs per 100 WBC 0 <1 /100     Absolute Neutrophils 4.4 1.6 - 8.3 10e3/uL    Absolute Lymphocytes 1.7 0.8 - 5.3 10e3/uL    Absolute Monocytes 0.5 0.0 - 1.3 10e3/uL    Absolute Eosinophils 0.2 0.0 - 0.7 10e3/uL    Absolute Basophils 0.0 0.0 - 0.2 10e3/uL    Absolute Immature Granulocytes 0.0 <=0.4 10e3/uL    Absolute NRBCs 0.0 10e3/uL   CT Head w/o Contrast    Narrative    EXAM: CT HEAD W/O CONTRAST  7/7/2024 12:49 PM     HISTORY: Severe headache.    COMPARISON: Stroke MR 4/21/2023, head CT 2/27/2023    TECHNIQUE: Using multidetector thin collimation helical acquisition  technique, axial, coronal and sagittal CT images from the skull base  to the vertex were obtained without intravenous contrast.   (topogram) image(s) also obtained and reviewed.    FINDINGS:  No acute intracranial hemorrhage, mass effect, or midline shift. No  acute loss of gray-white matter differentiation in the cerebral  hemispheres. Ventricles are proportionate to the cerebral sulci. Clear  basal cisterns.    Hyperostosis frontalis interna.. The visualized portions of the  paranasal sinuses and mastoid air cells are clear. Bilateral  pseudophakia..      Impression    IMPRESSION: No acute intracranial pathology.     I have personally reviewed the examination and initial interpretation  and I agree with the findings.    BROWN MENDEZ MD         SYSTEM ID:  [id number]          Results for orders placed or performed during the hospital encounter of 07/07/24   CT Head w/o Contrast     Status: None    Narrative    EXAM: CT HEAD W/O CONTRAST  7/7/2024 12:49 PM     HISTORY: Severe headache.    COMPARISON: Stroke MR 4/21/2023, head CT 2/27/2023    TECHNIQUE: Using multidetector thin collimation helical acquisition  technique, axial, coronal and sagittal CT images from the skull base  to the vertex were obtained without intravenous contrast.   (topogram) image(s) also obtained and reviewed.    FINDINGS:  No acute intracranial hemorrhage, mass effect, or midline shift.  No  acute loss of gray-white matter differentiation in the cerebral  hemispheres. Ventricles are proportionate to the cerebral sulci. Clear  basal cisterns.    Hyperostosis frontalis interna.. The visualized portions of the  paranasal sinuses and mastoid air cells are clear. Bilateral  pseudophakia..      Impression    IMPRESSION: No acute intracranial pathology.     I have personally reviewed the examination and initial interpretation  and I agree with the findings.    BROWN MENDEZ MD         SYSTEM ID:  [id number]   INR     Status: Normal   Result Value Ref Range    INR 1.01 0.85 - 1.15   Comprehensive metabolic panel     Status: Abnormal   Result Value Ref Range    Sodium 133 (L) 135 - 145 mmol/L    Potassium 4.2 3.4 - 5.3 mmol/L    Carbon Dioxide (CO2) 17 (L) 22 - 29 mmol/L    Anion Gap 13 7 - 15 mmol/L    Urea Nitrogen 12.6 8.0 - 23.0 mg/dL    Creatinine 0.75 0.51 - 0.95 mg/dL    GFR Estimate 87 >60 mL/min/1.73m2    Calcium 9.7 8.8 - 10.2 mg/dL    Chloride 103 98 - 107 mmol/L    Glucose 118 (H) 70 - 99 mg/dL    Alkaline Phosphatase 105 40 - 150 U/L    AST 24 0 - 45 U/L    ALT 14 0 - 50 U/L    Protein Total 6.8 6.4 - 8.3 g/dL    Albumin 4.1 3.5 - 5.2 g/dL    Bilirubin Total 0.3 <=1.2 mg/dL   CRP inflammation     Status: Normal   Result Value Ref Range    CRP Inflammation <3.00 <5.00 mg/L   Erythrocyte sedimentation rate auto     Status: Normal   Result Value Ref Range    Erythrocyte Sedimentation Rate 3 0 - 30 mm/hr   CBC with platelets and differential     Status: None   Result Value Ref Range    WBC Count 6.9 4.0 - 11.0 10e3/uL    RBC Count 4.41 3.80 - 5.20 10e6/uL    Hemoglobin 13.2 11.7 - 15.7 g/dL    Hematocrit 40.6 35.0 - 47.0 %    MCV 92 78 - 100 fL    MCH 29.9 26.5 - 33.0 pg    MCHC 32.5 31.5 - 36.5 g/dL    RDW 12.6 10.0 - 15.0 %    Platelet Count 242 150 - 450 10e3/uL    % Neutrophils 65 %    % Lymphocytes 25 %    % Monocytes 7 %    % Eosinophils 3 %    % Basophils 0 %    % Immature Granulocytes 0 %     NRBCs per 100 WBC 0 <1 /100    Absolute Neutrophils 4.4 1.6 - 8.3 10e3/uL    Absolute Lymphocytes 1.7 0.8 - 5.3 10e3/uL    Absolute Monocytes 0.5 0.0 - 1.3 10e3/uL    Absolute Eosinophils 0.2 0.0 - 0.7 10e3/uL    Absolute Basophils 0.0 0.0 - 0.2 10e3/uL    Absolute Immature Granulocytes 0.0 <=0.4 10e3/uL    Absolute NRBCs 0.0 10e3/uL   CBC with platelets differential     Status: None    Narrative    The following orders were created for panel order CBC with platelets differential.  Procedure                               Abnormality         Status                     ---------                               -----------         ------                     CBC with platelets and d...[[id number]]                      Final result                 Please view results for these tests on the individual orders.     Medications   sodium chloride 0.9% BOLUS 1,000 mL (0 mLs Intravenous Stopped 7/7/24 1350)   LORazepam (ATIVAN) injection 0.5 mg (0.5 mg Intravenous $Given 7/7/24 1233)   ketorolac (TORADOL) injection 15 mg (15 mg Intravenous $Given 7/7/24 1233)   ondansetron (ZOFRAN) injection 4 mg (4 mg Intravenous $Given 7/7/24 1225)   proparacaine (ALCAINE) 0.5 % ophthalmic solution 1 drop (1 drop Both Eyes $Given by Other Clinician 7/7/24 1519)     Labs Ordered and Resulted from Time of ED Arrival to Time of ED Departure   COMPREHENSIVE METABOLIC PANEL - Abnormal       Result Value    Sodium 133 (*)     Potassium 4.2      Carbon Dioxide (CO2) 17 (*)     Anion Gap 13      Urea Nitrogen 12.6      Creatinine 0.75      GFR Estimate 87      Calcium 9.7      Chloride 103      Glucose 118 (*)     Alkaline Phosphatase 105      AST 24      ALT 14      Protein Total 6.8      Albumin 4.1      Bilirubin Total 0.3     INR - Normal    INR 1.01     CRP INFLAMMATION - Normal    CRP Inflammation <3.00     ERYTHROCYTE SEDIMENTATION RATE AUTO - Normal    Erythrocyte Sedimentation Rate 3     CBC WITH PLATELETS AND DIFFERENTIAL    WBC Count 6.9       RBC Count 4.41      Hemoglobin 13.2      Hematocrit 40.6      MCV 92      MCH 29.9      MCHC 32.5      RDW 12.6      Platelet Count 242      % Neutrophils 65      % Lymphocytes 25      % Monocytes 7      % Eosinophils 3      % Basophils 0      % Immature Granulocytes 0      NRBCs per 100 WBC 0      Absolute Neutrophils 4.4      Absolute Lymphocytes 1.7      Absolute Monocytes 0.5      Absolute Eosinophils 0.2      Absolute Basophils 0.0      Absolute Immature Granulocytes 0.0      Absolute NRBCs 0.0       CT Head w/o Contrast   Final Result   IMPRESSION: No acute intracranial pathology.       I have personally reviewed the examination and initial interpretation   and I agree with the findings.      BROWN MENDEZ MD            SYSTEM ID:  [id number]             Critical care was not performed.     Medical Decision Making  The patient's presentation was of high complexity (an acute health issue posing potential threat to life or bodily function).    The patient's evaluation involved:  ordering and/or review of 3+ test(s) in this encounter (see separate area of note for details)    The patient's management necessitated moderate risk (prescription drug management including medications given in the ED).    Assessment & Plan    Impression:  Middle aged woman with history of bipolar disorder, open angle glaucoma, bilateral cataract removal with IOL placement and hyperlipidemia presents with frontal headache R>L, lightheadedness, poor appetite, nausea, and poor sleep associated with 2 weeks of increased stress. Her headache is consistent with migraine. Her eye pressures are elevated, particularly on the right. On review of her medications it is not clear that she has been using the eye drops since last fill was several months ago. Head CT has no evidence of acute process and neurologic exam has no focal deficits. Blood pressure was reportedly somewhat low at urgent care, but was elevated by time of arrival here. She has  been apparently eating and drinking poorly recently. Labs are notable for mild hyponatremia. CBC, creatinine, ESR and CRP are normal. Headache improved after IV fluids, toradol ativan and Zofran. Her anterior chamber pressures were elevated, though not critically so, (The tonopen has been somewhat erratic today and I am not fully confident in its accuracy). On review of her medication history, she has not had a refill of eye drops since March and family are uncertain if she has been compliant. She was prescribed timolol in the past. I will refill it today and have discussed the importance of using it consistently. She will be referred to ophthalmology for follow up.     I have reviewed the nursing notes. I have reviewed the findings, diagnosis, plan and need for follow up with the patient.    New Prescriptions    NAPROXEN (NAPROSYN) 375 MG TABLET    Take 1 tablet (375 mg) by mouth 2 times daily (with meals)    ONDANSETRON (ZOFRAN ODT) 4 MG ODT TAB    Take 1 tablet (4 mg) by mouth every 8 hours as needed for nausea    TIMOLOL MALEATE (TIMOPTIC) 0.5 % OPHTHALMIC SOLUTION    Place 1 drop into both eyes 2 times daily       Final diagnoses:   Other migraine without status migrainosus, intractable   Open-angle glaucoma of both eyes, moderate stage, unspecified open-angle glaucoma type       Sb Williamson  [facility] EMERGENCY DEPARTMENT  7/7/2024     Sb Williamson MD  07/07/24 3897

## 2024-07-07 NOTE — DISCHARGE INSTRUCTIONS
Be sure to use your eye drops every day. Timolol 1 drop to both eyes morning and evening.  Please make an appointment to follow up with Eye Clinic (phone: [phone]) in 7-14 days.  Naproxen 375 mg twice/ day as needed for headache.  Zofran 4 mg every 8 hours as needed for nausea.

## 2024-07-07 NOTE — ED TRIAGE NOTES
Arrives w/ c/o body aches, headache, dizziness, anxiety, depression, fatigue. Went to urgent care and was hypotensive and was unable to follow neuro commands like visual tracking. Has a hx of UTIs, dehydration, and hyponatremia, has had poor intake lately. The patients family member states this has been ongoing for about 2 weeks.

## 2024-07-08 ENCOUNTER — OFFICE VISIT (OUTPATIENT)
Dept: FAMILY MEDICINE | Facility: CLINIC | Age: 69
End: 2024-07-08
Payer: MEDICARE

## 2024-07-08 VITALS
HEIGHT: 65 IN | SYSTOLIC BLOOD PRESSURE: 116 MMHG | WEIGHT: 184 LBS | TEMPERATURE: 97.2 F | OXYGEN SATURATION: 99 % | RESPIRATION RATE: 16 BRPM | DIASTOLIC BLOOD PRESSURE: 72 MMHG | HEART RATE: 71 BPM | BODY MASS INDEX: 30.66 KG/M2

## 2024-07-08 DIAGNOSIS — F39 MOOD DISORDER (H): ICD-10-CM

## 2024-07-08 DIAGNOSIS — G47.09 OTHER INSOMNIA: Primary | ICD-10-CM

## 2024-07-08 DIAGNOSIS — K21.9 GASTROESOPHAGEAL REFLUX DISEASE WITHOUT ESOPHAGITIS: ICD-10-CM

## 2024-07-08 PROCEDURE — 99214 OFFICE O/P EST MOD 30 MIN: CPT | Performed by: PHYSICIAN ASSISTANT

## 2024-07-08 RX ORDER — QUETIAPINE FUMARATE 25 MG/1
25 TABLET, FILM COATED ORAL DAILY
Qty: 90 TABLET | Refills: 0 | Status: SHIPPED | OUTPATIENT
Start: 2024-07-08

## 2024-07-08 RX ORDER — HYDROXYZINE HYDROCHLORIDE 10 MG/1
10-20 TABLET, FILM COATED ORAL
Qty: 20 TABLET | Refills: 1 | Status: SHIPPED | OUTPATIENT
Start: 2024-07-08

## 2024-07-08 RX ORDER — RESPIRATORY SYNCYTIAL VIRUS VACCINE 120MCG/0.5
0.5 KIT INTRAMUSCULAR ONCE
Qty: 1 EACH | Refills: 0 | Status: CANCELLED | OUTPATIENT
Start: 2024-07-08 | End: 2024-07-08

## 2024-07-08 ASSESSMENT — PAIN SCALES - GENERAL: PAINLEVEL: NO PAIN (0)

## 2024-07-08 NOTE — PROGRESS NOTES
Assessment & Plan     Other insomnia  Discussed ideally they will f/u with psychiatrist for this as it often goes along with bipolar disorder, as this is related to travel anxiety ok to add atarax prn sleep  - hydrOXYzine HCl (ATARAX) 10 MG tablet; Take 1-2 tablets (10-20 mg) by mouth nightly as needed for other (insomnia) For sleep if needed    Gastroesophageal reflux disease without esophagitis  Improved, to gi for egd in future if not resolving or if worsening again    Mood disorder (H24)  Continue with psychiatry            Subjective   Yancy is a 69 year old, presenting for the following health issues:  ER F/U        7/8/2024     9:59 AM   Additional Questions   Roomed by Maribell INGRAM CMA   Accompanied by daughter         7/8/2024     9:59 AM   Patient Reported Additional Medications   Patient reports taking the following new medications None     HPI     New patient to me for er f/u:  Gastritis pain improved in GI goctail. No bloody stools.  They increase her protonix and added carafate prn. Since then she has felt much better. She stopped the protonix. Still some epigastric discomfort but much better. She will re-start this, does not need refill. Daughter is with today, patient also speaks some English.   Patient is traveling tomorrow to [place] and needs seroquel prn refilled, their psychiatrist refilled the XR but forgot the daily prn so they would like this today. [facility], this is the provider name they are unsure of the clinic name. Patient with h/o bipolar disorder.  Trouble with sleep only recently due to anxiety about traveling they would like something additional to help prn.       ED/UC Followup:    Facility:  [facility] Emergency Room  Date of visit: 6/22/24  Reason for visit: Abdominal pain   Current Status: yes      ED/UC Followup:    Facility:  [facility] Emergency Department  Date of visit: 7/7/24  Reason for visit: headache, dizzy  Current  Status: headache has resolved      From er abdominal pain visit:    ZEHRA Rivera is a 69 y.o. female who presents to the emergency department for evaluation of epigastric abdominal pain with associated nausea. Please above for details in the HPI and exam. Patient is afebrile vitally stable. She has mild epigastric tenderness on exam. Broad differentials considered including but not limited to gastritis, GERD, PUD, gallbladder/hepatobiliary pathology amongst other things. Lab studies above are largely unremarkable. Right upper quadrant ultrasound shows no acute pathology including cholelithiasis/cholecystitis. Patient had good relief of symptoms with GI cocktail raising suspicion for gastritis versus GERD versus PUD. Patient has had normal coloration of stools, no concern at this time for acute GI bleed. We will have the patient increase her dose of pantoprazole to 2 mg twice daily for the next 2 weeks and we will add Carafate and Zofran as needed for nausea. She is overall safe to discharge home and I recommend follow-up with GI if symptoms persist for endoscopy. She is comfortable with this plan. Discussed return precautions for the emergency department. All questions were answered prior to discharge.    Disposition  The patient was discharged.    ICD-10 Codes:  ENCOUNTER DIAGNOSES   ICD-10-CM   1. Epigastric abdominal pain R10.13 AMB CONSULT TO GASTROENTEROLOGY   pantoprazole (PROTONIX) 40 mg delayed-release tablet   sucralfate (CARAFATE) 100 mg/mL suspension     2. Nausea and vomiting, unspecified vomiting type R11.2 ondansetron (ZOFRAN ODT) 4 mg disintegrating tablet       IKANDIS am serving as a scribe to document services personally performed by Nba Alas MD, based on my observations and the provider's statements to me.    6/22/2024  [facility] EMERGENCY ROOM       She was then seen yesterday for migraine. Headache improved with IV fluids and meds for migraine.  "          Objective    /72   Pulse 71   Temp 97.2  F (36.2  C) (Temporal)   Resp 16   Ht 1.656 m (5' 5.2\")   Wt 83.5 kg (184 lb)   LMP  (LMP Unknown)   SpO2 99%   BMI 30.43 kg/m    Body mass index is 30.43 kg/m .  Physical Exam   GENERAL: alert and no distress  RESP: lungs clear to auscultation - no rales, rhonchi or wheezes  CV: regular rate and rhythm, normal S1 S2, no S3 or S4, no murmur, click or rub, no peripheral edema  ABDOMEN: soft, nontender, no hepatosplenomegaly, no masses and bowel sounds normal  MS: no gross musculoskeletal defects noted, no edema  PSYCH: mentation appears normal, affect normal/bright            Signed Electronically by: Yolanda Peralta PA-C    "

## 2024-07-09 ENCOUNTER — PATIENT OUTREACH (OUTPATIENT)
Dept: GERIATRIC MEDICINE | Facility: CLINIC | Age: 69
End: 2024-07-09
Payer: MEDICARE

## 2024-07-09 NOTE — PROGRESS NOTES
[facility] Care Coordination Contact  CC received notification of Emergency Room visit.  ER visit occurred on 7/7/24 at [facility] with Dx of fatigue and headache.    CC contacted member and left a message requesting a return call.  Member has a follow-up appointment with PCP: Yes: scheduled on 7/24/24  Member has had a change in condition: No  New referrals placed: No  Home Visit Needed: No  Care plan reviewed and updated.  PCP notified of ED visit via EMR.    PRINCESS Jacobson  [facility]  [phone]

## 2024-07-10 ENCOUNTER — PATIENT OUTREACH (OUTPATIENT)
Dept: GERIATRIC MEDICINE | Facility: CLINIC | Age: 69
End: 2024-07-10
Payer: MEDICARE

## 2024-07-10 NOTE — PROGRESS NOTES
[facility] Care Coordination Contact  CC received notification of Emergency Room visit.  ER visit occurred on 7/9/2024 at [facility] with Dx of Anxiety.    CC contacted member and left a message requesting a return call.  Member has a follow-up appointment with PCP: No: Offered Assistance with setting up a follow up appointment  Member has had a change in condition: No  New referrals placed: No  Home Visit Needed: No  Care plan reviewed and updated.  PCP notified of ED visit via EMR.    PRINCESS Jacobson  [facility]  [phone]

## 2024-07-23 ENCOUNTER — NURSE TRIAGE (OUTPATIENT)
Dept: FAMILY MEDICINE | Facility: CLINIC | Age: 69
End: 2024-07-23
Payer: MEDICARE

## 2024-07-23 NOTE — TELEPHONE ENCOUNTER
Nurse Triage SBAR    Is this a 2nd Level Triage? YES, LICENSED PRACTITIONER REVIEW IS REQUIRED    Situation: dizziness and feeling very tired.    Background:   - symptoms started last night    Assessment:   - feels like the room is spinning  - also feels very tired. Just want to lay in bed  - still dizzy even when lying down  - standing makes it worse  - need assistance standing  - pt had some diarrhea but took medication and no longer having diarrhea  - no fever  - no vomiting  - no other symptoms besides dizziness and fatigue    Protocol Recommended Disposition:   Call ADS/Go to ED/UCC Now (Or To Office with PCP Approval)    Recommendation:  pt's niece stated they will take her to UCC or ER, depending on her preference.     Routed to provider    Does the patient meet one of the following criteria for ADS visit consideration? 16+ years old, with an [facility] PCP     TIP  Providers, please consider if this condition is appropriate for management at one of our Acute and Diagnostic Services sites.     If patient is a good candidate, please use dotphrase <dot>triageresponse and select Refer to ADS to document.      Alistair Lo, BSN RN  [facility]      Reason for Disposition   SEVERE dizziness (e.g., unable to stand, requires support to walk, feels like passing out now)    Additional Information   Negative: SEVERE difficulty breathing (e.g., struggling for each breath, speaks in single words)   Negative: Shock suspected (e.g., cold/pale/clammy skin, too weak to stand, low BP, rapid pulse)   Negative: Difficult to awaken or acting confused (e.g., disoriented, slurred speech)   Negative: Fainted, and still feels dizzy afterwards   Negative: Overdose (accidental or intentional) of medications   Negative: New neurologic deficit that is present now: * Weakness of the face, arm, or leg on one side of the body * Numbness of the face, arm, or leg on one side of the body * Loss of speech or garbled speech    "Negative: Heart beating < 50 beats per minute OR > 140 beats per minute   Negative: Sounds like a life-threatening emergency to the triager   Negative: Chest pain   Negative: Rectal bleeding, bloody stool, or tarry-black stool   Negative: Vomiting is main symptom   Negative: Diarrhea is main symptom   Negative: Headache is main symptom   Negative: Heat exhaustion suspected (i.e., dehydration from heat exposure)   Negative: Patient states that they are having an anxiety or panic attack   Negative: Dizziness from low blood sugar (i.e., < 60 mg/dl or 3.5 mmol/l)    Answer Assessment - Initial Assessment Questions  1. DESCRIPTION: \"Describe your dizziness.\"      The room is spinning    2. LIGHTHEADED: \"Do you feel lightheaded?\" (e.g., somewhat faint, woozy, weak upon standing)      Doesn't want to get up    3. VERTIGO: \"Do you feel like either you or the room is spinning or tilting?\" (i.e. vertigo)      Spinning    4. SEVERITY: \"How bad is it?\"  \"Do you feel like you are going to faint?\" \"Can you stand and walk?\"    - MILD: Feels slightly dizzy, but walking normally.    - MODERATE: Feels unsteady when walking, but not falling; interferes with normal activities (e.g., school, work).    - SEVERE: Unable to walk without falling, or requires assistance to walk without falling; feels like passing out now.       Moderate    5. ONSET:  \"When did the dizziness begin?\"      This morning    6. AGGRAVATING FACTORS: \"Does anything make it worse?\" (e.g., standing, change in head position)      Not sure    7. HEART RATE: \"Can you tell me your heart rate?\" \"How many beats in 15 seconds?\"  (Note: not all patients can do this)        N/a    8. CAUSE: \"What do you think is causing the dizziness?\"      Nothing    9. RECURRENT SYMPTOM: \"Have you had dizziness before?\" If Yes, ask: \"When was the last time?\" \"What happened that time?\"      No    10. OTHER SYMPTOMS: \"Do you have any other symptoms?\" (e.g., fever, chest pain, vomiting, " "diarrhea, bleeding)        No chest pain, no vomiting. Some diarrhea. No fveer    11. PREGNANCY: \"Is there any chance you are pregnant?\" \"When was your last menstrual period?\"        no    Protocols used: Dizziness-A-OH    "

## 2024-07-23 NOTE — TELEPHONE ENCOUNTER
RN following up, appears this is an FYI due to pt family following the advice of triage nurse below:    Recommendation:  pt's niece stated they will take her to UCC or ER, depending on her preference.     No further action needed from pcp.    Liz Breen, RN on 7/23/2024 at 12:39 PM

## 2024-07-25 ENCOUNTER — PATIENT OUTREACH (OUTPATIENT)
Dept: GERIATRIC MEDICINE | Facility: CLINIC | Age: 69
End: 2024-07-25
Payer: MEDICARE

## 2024-07-25 NOTE — LETTER
July 29, 2024    YANCY DOUGHERTY  [address]  [place] [address]    Dear Yancy:     I m your care coordinator. I ve been unable to reach you by phone. I am writing to ask you or your authorized representative to call me at [phone]. If you reach my voicemail, leave a message with your daytime phone number. Include a date and time that I can call you. If you are hearing impaired, call the [place] [employer] at 419 or [phone] ([id number]-fo-aceyme relay service).    The reason I am trying to reach you is:     [] To schedule an assessment  [x] For your six (6)-month check-in  [] Other:       Please call me as soon as you receive this letter. I look forward to speaking with you.    Sincerely,      PRINCESS Jacobson  [phone]  [email or web address]        [id number] accepted  [id number]                                                                        B  (08/2022)

## 2024-07-25 NOTE — PROGRESS NOTES
[facility] Care Coordination Contact    Called member 3x to complete six month assessment and left a message requesting a return call.    Tasked CMS to mail [facility] letter for 4th attempt for 6 month contact.    PRINCESS Jacobson  [facility]  [phone]

## 2024-07-29 NOTE — PROGRESS NOTES
"Per CC, mailed client an \"Unable to Contact\" letter.    Josep Contreras  Care Management Specialist  [facility]  [phone]    "

## 2024-07-31 ENCOUNTER — OFFICE VISIT (OUTPATIENT)
Dept: FAMILY MEDICINE | Facility: CLINIC | Age: 69
End: 2024-07-31
Payer: MEDICARE

## 2024-07-31 VITALS
WEIGHT: 183.4 LBS | DIASTOLIC BLOOD PRESSURE: 64 MMHG | SYSTOLIC BLOOD PRESSURE: 132 MMHG | HEART RATE: 89 BPM | OXYGEN SATURATION: 98 % | TEMPERATURE: 97.6 F | HEIGHT: 65 IN | BODY MASS INDEX: 30.56 KG/M2 | RESPIRATION RATE: 16 BRPM

## 2024-07-31 DIAGNOSIS — G47.9 DIFFICULTY SLEEPING: ICD-10-CM

## 2024-07-31 DIAGNOSIS — Z13.1 SCREENING FOR DIABETES MELLITUS: ICD-10-CM

## 2024-07-31 DIAGNOSIS — R42 DIZZINESS: Primary | ICD-10-CM

## 2024-07-31 DIAGNOSIS — R51.9 INTRACTABLE EPISODIC HEADACHE, UNSPECIFIED HEADACHE TYPE: ICD-10-CM

## 2024-07-31 DIAGNOSIS — R53.81 MALAISE: ICD-10-CM

## 2024-07-31 LAB
ALBUMIN SERPL BCG-MCNC: 4.1 G/DL (ref 3.5–5.2)
ALP SERPL-CCNC: 106 U/L (ref 40–150)
ALT SERPL W P-5'-P-CCNC: 11 U/L (ref 0–50)
ANION GAP SERPL CALCULATED.3IONS-SCNC: 12 MMOL/L (ref 7–15)
AST SERPL W P-5'-P-CCNC: 22 U/L (ref 0–45)
BILIRUB SERPL-MCNC: 0.3 MG/DL
BUN SERPL-MCNC: 10.4 MG/DL (ref 8–23)
CALCIUM SERPL-MCNC: 9.7 MG/DL (ref 8.8–10.4)
CHLORIDE SERPL-SCNC: 101 MMOL/L (ref 98–107)
CREAT SERPL-MCNC: 0.62 MG/DL (ref 0.51–0.95)
EGFRCR SERPLBLD CKD-EPI 2021: >90 ML/MIN/1.73M2
ERYTHROCYTE [DISTWIDTH] IN BLOOD BY AUTOMATED COUNT: 12 % (ref 10–15)
ERYTHROCYTE [SEDIMENTATION RATE] IN BLOOD BY WESTERGREN METHOD: 8 MM/HR (ref 0–30)
GLUCOSE SERPL-MCNC: 139 MG/DL (ref 70–99)
HBA1C MFR BLD: 5.9 % (ref 0–5.6)
HCO3 SERPL-SCNC: 19 MMOL/L (ref 22–29)
HCT VFR BLD AUTO: 38.9 % (ref 35–47)
HGB BLD-MCNC: 13.1 G/DL (ref 11.7–15.7)
MCH RBC QN AUTO: 30.3 PG (ref 26.5–33)
MCHC RBC AUTO-ENTMCNC: 33.7 G/DL (ref 31.5–36.5)
MCV RBC AUTO: 90 FL (ref 78–100)
PLATELET # BLD AUTO: 244 10E3/UL (ref 150–450)
POTASSIUM SERPL-SCNC: 4 MMOL/L (ref 3.4–5.3)
PROT SERPL-MCNC: 6.7 G/DL (ref 6.4–8.3)
RBC # BLD AUTO: 4.33 10E6/UL (ref 3.8–5.2)
SODIUM SERPL-SCNC: 132 MMOL/L (ref 135–145)
WBC # BLD AUTO: 7.3 10E3/UL (ref 4–11)

## 2024-07-31 PROCEDURE — G2211 COMPLEX E/M VISIT ADD ON: HCPCS | Performed by: PHYSICIAN ASSISTANT

## 2024-07-31 PROCEDURE — 99214 OFFICE O/P EST MOD 30 MIN: CPT | Performed by: PHYSICIAN ASSISTANT

## 2024-07-31 PROCEDURE — 85652 RBC SED RATE AUTOMATED: CPT | Performed by: PHYSICIAN ASSISTANT

## 2024-07-31 PROCEDURE — 80053 COMPREHEN METABOLIC PANEL: CPT | Performed by: PHYSICIAN ASSISTANT

## 2024-07-31 PROCEDURE — 86140 C-REACTIVE PROTEIN: CPT | Performed by: PHYSICIAN ASSISTANT

## 2024-07-31 PROCEDURE — 93000 ELECTROCARDIOGRAM COMPLETE: CPT | Performed by: PHYSICIAN ASSISTANT

## 2024-07-31 PROCEDURE — 36415 COLL VENOUS BLD VENIPUNCTURE: CPT | Performed by: PHYSICIAN ASSISTANT

## 2024-07-31 PROCEDURE — 85027 COMPLETE CBC AUTOMATED: CPT | Performed by: PHYSICIAN ASSISTANT

## 2024-07-31 PROCEDURE — 83036 HEMOGLOBIN GLYCOSYLATED A1C: CPT | Performed by: PHYSICIAN ASSISTANT

## 2024-07-31 PROCEDURE — 84443 ASSAY THYROID STIM HORMONE: CPT | Performed by: PHYSICIAN ASSISTANT

## 2024-07-31 RX ORDER — LANOLIN ALCOHOL/MO/W.PET/CERES
3 CREAM (GRAM) TOPICAL
Qty: 30 TABLET | Refills: 0 | Status: SHIPPED | OUTPATIENT
Start: 2024-07-31

## 2024-07-31 ASSESSMENT — PAIN SCALES - GENERAL: PAINLEVEL: NO PAIN (0)

## 2024-07-31 ASSESSMENT — ENCOUNTER SYMPTOMS: HEADACHES: 1

## 2024-07-31 NOTE — LETTER
August 1, 2024      Yancy Rivera  [address] [address]        Dear ,    We are writing to inform you of your test results.    -Normal red blood cell (hgb) levels, normal white blood cell count and normal platelet levels.   -A1C (diabetic test) is borderline elevated and may be a sign of early diabetes.  ADVISE: eating a low carbohydrate diet, exercising, trying to lose weight (if necessary) and rechecking your glucose level in 12 months.   -ESR (inflammatory marker) is normal.     Resulted Orders   Hemoglobin A1c   Result Value Ref Range    Hemoglobin A1C 5.9 (H) 0.0 - 5.6 %      Comment:      Normal <5.7%   Prediabetes 5.7-6.4%    Diabetes 6.5% or higher     Note: Adopted from ADA consensus guidelines.   CBC with platelets   Result Value Ref Range    WBC Count 7.3 4.0 - 11.0 10e3/uL    RBC Count 4.33 3.80 - 5.20 10e6/uL    Hemoglobin 13.1 11.7 - 15.7 g/dL    Hematocrit 38.9 35.0 - 47.0 %    MCV 90 78 - 100 fL    MCH 30.3 26.5 - 33.0 pg    MCHC 33.7 31.5 - 36.5 g/dL    RDW 12.0 10.0 - 15.0 %    Platelet Count 244 150 - 450 10e3/uL   ESR: Erythrocyte sedimentation rate   Result Value Ref Range    Erythrocyte Sedimentation Rate 8 0 - 30 mm/hr       If you have any questions or concerns, please call the clinic at the number listed above.       Sincerely,      DAVID Gongora

## 2024-07-31 NOTE — PROGRESS NOTES
Assessment & Plan   Problem List Items Addressed This Visit    None  Visit Diagnoses       Dizziness    -  Primary    Relevant Orders    Comprehensive metabolic panel (BMP + Alb, Alk Phos, ALT, AST, Total. Bili, TP) (Completed)    CBC with platelets (Completed)    TSH with free T4 reflex (Completed)    MR Brain w/o Contrast    EKG 12-lead complete w/read - Clinics (Completed)    Malaise        Relevant Orders    TSH with free T4 reflex (Completed)    Intractable episodic headache, unspecified headache type        Relevant Orders    MR Brain w/o Contrast    CRP, inflammation (Completed)    ESR: Erythrocyte sedimentation rate (Completed)    Difficulty sleeping        Relevant Medications    melatonin 3 MG tablet    Screening for diabetes mellitus        Relevant Orders    Hemoglobin A1c (Completed)           Pleasant 70yo F here with dizziness, headache and fatigue for ~2 weeks in the morning of unknown etiology, though could be due to  insomnia..  Low suspicion for giant cell arteritis given benign exam and normal CRP/ESR. EKG nonischemic and without worrisome dysrhythmia.  CBC shows no anemia or leukocytosis and comprehensive metabolic panel was reassuring without renal insufficiency or  hepatitis.  TSH shows no thyroid dysfunction  And A1c is stable.  Low suspicion for brain neoplasm, CVA or other worrisome central cause at this point. No fever or meningismus to support CNS infection. Plan to treat with melatonin as a sleep aid to see if this improves symptoms and if it does not, she and her family will schedule an MRI of her brain without contrast to look for other causes.  Follow up with primary or myself in next month if not improving.    Complete history and physical exam as below. Afebrile with normal vital signs.    DDx and Dx discussed with and explained to the pt to their satisfaction.  All questions were answered at this time. Pt expressed understanding of and agreement with this dx, tx, and plan. No  "further workup warranted and standard medication warnings given. I have given the patient a list of pertinent indications for re-evaluation. Will go to the Emergency Department if symptoms worsen or new concerning symptoms arise. Patient left in no apparent distress.      BMI  Estimated body mass index is 30.33 kg/m  as calculated from the following:    Height as of this encounter: 1.656 m (5' 5.2\").    Weight as of this encounter: 83.2 kg (183 lb 6.4 oz).     See Patient Instructions      Lexy Haines is a 69 year old, presenting for the following health issues:  Headache        7/31/2024     2:16 PM   Additional Questions   Roomed by Juan Santillan CMA   Accompanied by Daughter         7/31/2024     2:16 PM   Patient Reported Additional Medications   Patient reports taking the following new medications No new medications     Headache        Patient is coming in today due to headaches, tiredness, and dizziness every morning.  Ongoing for 1-2 weeks. Frontal headache. Daughter says that these have been historical symptoms when she is feeling stressed with a lack of sleep.  Symptoms are absent when she is sleeping well.no fevers, vision changes, sob, chest pain, or other symptoms. Declined professional .    Review of Systems  Constitutional, HEENT, cardiovascular, pulmonary, gi and gu systems are negative, except as otherwise noted.      Objective    /64   Pulse 89   Temp 97.6  F (36.4  C) (Temporal)   Resp 16   Ht 1.656 m (5' 5.2\")   Wt 83.2 kg (183 lb 6.4 oz)   LMP  (LMP Unknown)   SpO2 98%   BMI 30.33 kg/m    Body mass index is 30.33 kg/m .  Physical Exam  Vitals and nursing note reviewed.   Constitutional:       General: She is not in acute distress.     Appearance: She is not ill-appearing or diaphoretic.   HENT:      Head: Normocephalic and atraumatic.      Comments: No castillo sign, raccoon eyes or hemotympanum. Skull and facial bones non-tender.      Mouth/Throat:      Mouth: " Mucous membranes are moist.   Eyes:      Conjunctiva/sclera: Conjunctivae normal.   Neck:      Vascular: No carotid bruit.   Cardiovascular:      Rate and Rhythm: Normal rate and regular rhythm.      Heart sounds: Normal heart sounds. No murmur heard.     No friction rub. No gallop.      Comments: 2+ symmetric radial/PT pulses. No LE edema or tenderness.  Pulmonary:      Effort: Pulmonary effort is normal. No respiratory distress.      Breath sounds: Normal breath sounds. No stridor. No wheezing, rhonchi or rales.   Abdominal:      General: Bowel sounds are normal. There is no distension.      Palpations: Abdomen is soft. There is no mass.      Tenderness: There is no abdominal tenderness. There is no guarding or rebound.      Hernia: No hernia is present.   Musculoskeletal:      Cervical back: Normal range of motion and neck supple. No rigidity.   Lymphadenopathy:      Cervical: No cervical adenopathy.   Skin:     General: Skin is warm and dry.   Neurological:      General: No focal deficit present.      Mental Status: She is alert. Mental status is at baseline.      Comments: Negative finger-nose-finger, heel-to-shin and pronator drift.  Face symmetric.  Speech clear. CN 2-12 intact. Normal strength and sensation in face and upper/lower extremities bilaterally.   Psychiatric:         Mood and Affect: Mood normal.         Behavior: Behavior normal.        EKG - Reviewed and interpreted by me Normal Sinus Rhythm, normal axis, normal intervals, no acute ST/T changes c/w ischemia, no LVH by voltage criteria, unchanged from previous tracings aside from flattened Ts in v2-3.  Results for orders placed or performed in visit on 07/31/24   Hemoglobin A1c     Status: Abnormal   Result Value Ref Range    Hemoglobin A1C 5.9 (H) 0.0 - 5.6 %   Comprehensive metabolic panel (BMP + Alb, Alk Phos, ALT, AST, Total. Bili, TP)     Status: Abnormal   Result Value Ref Range    Sodium 132 (L) 135 - 145 mmol/L    Potassium 4.0 3.4 - 5.3  mmol/L    Carbon Dioxide (CO2) 19 (L) 22 - 29 mmol/L    Anion Gap 12 7 - 15 mmol/L    Urea Nitrogen 10.4 8.0 - 23.0 mg/dL    Creatinine 0.62 0.51 - 0.95 mg/dL    GFR Estimate >90 >60 mL/min/1.73m2    Calcium 9.7 8.8 - 10.4 mg/dL    Chloride 101 98 - 107 mmol/L    Glucose 139 (H) 70 - 99 mg/dL    Alkaline Phosphatase 106 40 - 150 U/L    AST 22 0 - 45 U/L    ALT 11 0 - 50 U/L    Protein Total 6.7 6.4 - 8.3 g/dL    Albumin 4.1 3.5 - 5.2 g/dL    Bilirubin Total 0.3 <=1.2 mg/dL   CBC with platelets     Status: Normal   Result Value Ref Range    WBC Count 7.3 4.0 - 11.0 10e3/uL    RBC Count 4.33 3.80 - 5.20 10e6/uL    Hemoglobin 13.1 11.7 - 15.7 g/dL    Hematocrit 38.9 35.0 - 47.0 %    MCV 90 78 - 100 fL    MCH 30.3 26.5 - 33.0 pg    MCHC 33.7 31.5 - 36.5 g/dL    RDW 12.0 10.0 - 15.0 %    Platelet Count 244 150 - 450 10e3/uL   TSH with free T4 reflex     Status: Normal   Result Value Ref Range    TSH 0.84 0.30 - 4.20 uIU/mL   CRP, inflammation     Status: Normal   Result Value Ref Range    CRP Inflammation <3.00 <5.00 mg/L   ESR: Erythrocyte sedimentation rate     Status: Normal   Result Value Ref Range    Erythrocyte Sedimentation Rate 8 0 - 30 mm/hr           Signed Electronically by: DAVID Gongora

## 2024-07-31 NOTE — LETTER
August 3, 2024      Yancy Rivera  [address] [address]        Dear ,    We are writing to inform you of your test results.    Your las are reassuring aside from your hemoglobin A1C (diabetic test) is borderline elevated and may be a sign of early diabetes.  Please eat a low carbohydrate diet, exercising, trying to lose weight (if necessary) and recheck your glucose level in 12 months.    Results for orders placed or performed in visit on 07/31/24   Hemoglobin A1c     Status: Abnormal   Result Value Ref Range    Hemoglobin A1C 5.9 (H) 0.0 - 5.6 %   Comprehensive metabolic panel (BMP + Alb, Alk Phos, ALT, AST, Total. Bili, TP)     Status: Abnormal   Result Value Ref Range    Sodium 132 (L) 135 - 145 mmol/L    Potassium 4.0 3.4 - 5.3 mmol/L    Carbon Dioxide (CO2) 19 (L) 22 - 29 mmol/L    Anion Gap 12 7 - 15 mmol/L    Urea Nitrogen 10.4 8.0 - 23.0 mg/dL    Creatinine 0.62 0.51 - 0.95 mg/dL    GFR Estimate >90 >60 mL/min/1.73m2    Calcium 9.7 8.8 - 10.4 mg/dL    Chloride 101 98 - 107 mmol/L    Glucose 139 (H) 70 - 99 mg/dL    Alkaline Phosphatase 106 40 - 150 U/L    AST 22 0 - 45 U/L    ALT 11 0 - 50 U/L    Protein Total 6.7 6.4 - 8.3 g/dL    Albumin 4.1 3.5 - 5.2 g/dL    Bilirubin Total 0.3 <=1.2 mg/dL   CBC with platelets     Status: Normal   Result Value Ref Range    WBC Count 7.3 4.0 - 11.0 10e3/uL    RBC Count 4.33 3.80 - 5.20 10e6/uL    Hemoglobin 13.1 11.7 - 15.7 g/dL    Hematocrit 38.9 35.0 - 47.0 %    MCV 90 78 - 100 fL    MCH 30.3 26.5 - 33.0 pg    MCHC 33.7 31.5 - 36.5 g/dL    RDW 12.0 10.0 - 15.0 %    Platelet Count 244 150 - 450 10e3/uL   TSH with free T4 reflex     Status: Normal   Result Value Ref Range    TSH 0.84 0.30 - 4.20 uIU/mL   CRP, inflammation     Status: Normal   Result Value Ref Range    CRP Inflammation <3.00 <5.00 mg/L   ESR: Erythrocyte sedimentation rate     Status: Normal   Result Value Ref Range    Erythrocyte Sedimentation Rate 8 0 - 30 mm/hr         If you  have any questions or concerns, please call the clinic at the number listed above.       Sincerely,      DAVID Gongora

## 2024-07-31 NOTE — PATIENT INSTRUCTIONS
Ramana Haines,    Thank you for allowing [facility] to manage your care.    I am unsure of the cause of your symptoms, but your exam is reassuring. We will see what our workup shows.     If you develop worsening/changing symptoms at any time, please be seen in clinic/urgent care or call 911/go to the emergency department for evaluation as we discussed.    I ordered some lab work. Please go to the laboratory to get your studies.    I sent your prescriptions to your pharmacy. For difficulty sleeping, please use melatonin as prescribed. Do not use this medication while driving, operating machinery, with other sedating medications, or while drinking alcohol as it will make you drowsy.    I ordered an MRI of your brain. Please call diagnostic imaging [phone] to schedule your test.    Please allow 1-2 business days for our office to contact you in regards to your laboratory/radiological studies.  If not done so, I encourage you to login into [employer] ([email or web address] to review your results as well.     Drink 8-10 glasses of fluid daily to stay well-hydrated.    If you have any questions or concerns, please feel free to call us at [phone]    Sincerely,    Farzad Dupree PA-C    Did you know?      You can schedule a video visit for follow-up appointments as well as future appointments for certain conditions.  Please see the below link.     [email or web address]    If you have not already done so,  I encourage you to sign up for [employer] ([email or web address]).  This will allow you to review your results, securely communicate with a provider, and schedule virtual visits as well.

## 2024-08-01 LAB
CRP SERPL-MCNC: <3 MG/L
TSH SERPL DL<=0.005 MIU/L-ACNC: 0.84 UIU/ML (ref 0.3–4.2)

## 2024-08-05 ENCOUNTER — TELEPHONE (OUTPATIENT)
Dept: FAMILY MEDICINE | Facility: CLINIC | Age: 69
End: 2024-08-05
Payer: MEDICARE

## 2024-08-05 ENCOUNTER — APPOINTMENT (OUTPATIENT)
Dept: INTERPRETER SERVICES | Facility: CLINIC | Age: 69
End: 2024-08-05
Payer: MEDICARE

## 2024-08-05 DIAGNOSIS — E87.1 HYPONATREMIA: Primary | ICD-10-CM

## 2024-08-05 NOTE — TELEPHONE ENCOUNTER
Pt's niece calling back- consent on file    Reviewed message with her and assisted in scheduling lab only appointment.      Pt is wondering what she should take in place of the protonix?     RN did advise for the meantime avoiding acidic foods like tomatoes, onions, ect,      Alessia, RN    Triage Nurse  [facility]

## 2024-08-05 NOTE — TELEPHONE ENCOUNTER
Called patient with [facility]  on the line and left a voicemail to return our call to the clinic.       Clari Marroquin RN on 8/5/2024 at 10:12 AM

## 2024-08-05 NOTE — TELEPHONE ENCOUNTER
Protonix is not critical.  Agree with plan with avoiding foods or drinks which contain citrus (tomato, pineapple, lime, lemon, etc), caffeine, chocolate, and spicy foods.  Avoid eating late at night.  Based on laboratory results and reflux symptoms, we can determine if she needs a replacement prescription.

## 2024-08-05 NOTE — TELEPHONE ENCOUNTER
----- Message from Abundio Winters sent at 8/5/2024  9:38 AM CDT -----  Please call patient.  Recent labs show that her sodium levels continue to be borderline low.  Advise to repeat in 1 week.  Please ensure that she is no longer taking pantoprazole (protonix) since this can lower her sodium levels.  Otherwise, her CRP (inflammatory marker) and her thyroid levels are normal.

## 2024-08-14 ENCOUNTER — PATIENT OUTREACH (OUTPATIENT)
Dept: GERIATRIC MEDICINE | Facility: CLINIC | Age: 69
End: 2024-08-14

## 2024-08-14 NOTE — PROGRESS NOTES
TRANSITIONS OF CARE (MAY) LOG    MAY tasks should be completed by the CC within one (1) business day of notification of each transition. Follow up contact with member is required after return to their usual care setting.  Note:  If CC finds out about the transitions fifteen (15) days or more after the member has returned to their usual care setting, no MAY log is needed. However, the CC should check in with the member to discuss the transition process, any changes needed to the care plan and document it in a case note.     Member Name:  Yancy Rivera [facility] Member ID[facility] [id number]   Product: MSC+ Care Coordinator Contact:  PRINCESS Jacobson Agency/County/Care System: Interview   Transition Communication Actions from Care Management Contact   Transition #1   Notification Date: 8/14/24 Transition Date:   8/13/24 Transition From: Home     Is this the member s usual care setting?               yes Transition To: Hospital, [facility]    Transition Type:  Unplanned    Documentation from conversation with the member/responsible party, provider, discharging and receiving facility:   Date: 8/14/24: Received notification of admission to hospital with dx of Hyperchloremia with acidosis; ingestion of beach.  CC contacted Hospital /discharge planner, [phone]  (hospital  for Name and Phone Number) and left a message with this CC contact information, reviewed community POC as well requested to be notified of concerns, care conferences and discharge planning.  CC reached out to family jessica Ahumada  regarding transition and left a message requesting a return call.  Reviewed and update care plan as needed.  Notified community service providers and placed services Adult Day Care PCA on hold as needed.  Transition log initiated.   PCP, Abundio Winters, notified of hospitalization via EMR.    10/1/24 - Care Coordinator spoke with jessica Claros  and she was asking what services Yancy could have if she didn't go to TCU but went directly home.  Care Coordinator stated that I don't know Yancy's full medical needs but that would need to be discussed with her doctor first and the hospital would set up home care services and then I could come out to do an early reassessment for PCA services.  I informed her that PCA is not 24 hour care and may only be 10-12 hours per day max.  A TCU is where she'd need to go for 24 hour care.  Hyacinth understood.  She will keep CC updated on Yancy's discharge plans from hospital.     Transition #2   Notification Date: 10/4/2024 Transition Date:   10/4/2024 Transition From: [facility],       Is this the member s usual care setting?               no Transition To: Home   Transition Type:  Planned    Documentation from conversation with the member/responsible party, provider, discharging and receiving facility:   Date: 10/4/2024: Received notification of transition to home.  CC contacted adult daughter Hyacinth  and reviewed discharge summary.  Member has a follow-up appointment with PCP in 7 days: Yes: will be followed by facility on-site care team. (Home care)  Member has had a change in condition: Yes: feeding tube  Home visit needed: Yes: for PCA and waivered services and is scheduled for 10/8/2024 at 11am  Support Plan reviewed and updated.  The following home based services Homemaking PCA were resumed.  New referrals placed: No  Transition log completed.   PCP, Abundio Winters, notified of transition back to home via EMR.                                           *RETURN TO USUAL CARE SETTING: *Complete tasks below when the member is discharging TO their usual care setting within one (1) business day of notification..      For situations where the Care Coordinator is notified of the discharge prior to the date of discharge, the Care Coordinator must follow up with the member or designated  representative to confirm that discharge actually occurred and discuss required MAY tasks as outlined in the MAY Instructions.  (This includes situations where it may be a  new  usual care setting for the member. (i.e., a community member who decides upon permanent nursing home placement following hospitalization and rehab).    Discuss with Member/Responsible Party:    Check  Yes  - if the member, family member and/or SNF/facility staff manages the following:    If  No  provide explanation in the comments section.          Date completed: 10/4/2024 Communicated with member or their designated representative about the following:  care transition process; about changes to the member s health status; plan of care updates; education about transitions and how to prevent unplanned transitions/readmissions    Four Pillars for Optimal Transition:    Check  Yes  - if the member, family member and/or SNF/facility staff manages the following:    If  No  provide explanation in the comments section.          [x]  Yes     []  No Does the member have a follow-up appointment scheduled with primary care or specialist? (Mental health hospitalizations--the appt. should be w/in 7 days)              For mental health hospitalizations:  []  Yes     []  No     Does the member have a follow-up appointment scheduled with a mental health practitioner within 7 days of discharge?  [x]  Yes     []  No     Has a medication review been completed with member? If no, refer to PCP, home care nurse, MTM, pharmacist  []  Yes     [x]  No     Can the member manage their medications or is there a system in place to manage medications (e.g. home care set-up)?         [x]  Yes     []  No     Can the member verbalize warning signs and symptoms to watch for and how to respond?  [x]  Yes     []  No     Does the member have a copy of and understand their discharge instructions?  If no, assist to obtain copy of discharge instructions, review discharge  instructions, and assist to contact PCP to discuss questions about their recent hospitalization.  [x]  Yes     []  No     Does the member have adequate food, housing and transportation?  If no, add goal and discuss additional supports available to the member                                                                                                                                                                                 [x]  Yes     []  No     Is the member safe in their home?  If no, document needs and support provided                                                                                                                                                                          []  Yes     [x]  No     Are there any concerns of vulnerability, abuse, or neglect?  If yes, document concerns and actions taken by Care Coordinator as a mandated                                                                                                                                                                              [x]  Yes     []  No     Does the member use a Personal Health Care Record?  Check  Yes  if visit summary, discharge summary, and/or healthcare summary are being used as a PHR.                                                                                                                                                                                  [x]  Yes     []  No     Have you reviewed the discharge summary with the member? If  No  provide explanation in comments.  [x]  Yes     []  No     Have you updated the member s care plan/support plan? Add new diagnosis, medications, treatments, goals & interventions, as applicable. If No, provide explanation in comments.    Comments:           Notes from conversation with the member/responsible party, provider, discharging and receiving facility (as applicable): 10/4/2024- Care coordinator spoke with Yancy's family member, Hyacinth.  She  constanza Haines is comign home today and would like CC to come out as soon as possible to do an early assessment for PCA and Homemaking Services.  We scheduled a visit for Tuesday, Oct 8 at 11am.          Vidhi Varma HANS  [facility]  [phone]

## 2024-08-15 ENCOUNTER — TRANSFERRED RECORDS (OUTPATIENT)
Dept: HEALTH INFORMATION MANAGEMENT | Facility: CLINIC | Age: 69
End: 2024-08-15

## 2024-08-29 ENCOUNTER — TRANSFERRED RECORDS (OUTPATIENT)
Dept: HEALTH INFORMATION MANAGEMENT | Facility: CLINIC | Age: 69
End: 2024-08-29

## 2024-09-05 ENCOUNTER — TRANSFERRED RECORDS (OUTPATIENT)
Dept: HEALTH INFORMATION MANAGEMENT | Facility: CLINIC | Age: 69
End: 2024-09-05

## 2024-09-08 DIAGNOSIS — H40.1132 PRIMARY OPEN ANGLE GLAUCOMA OF BOTH EYES, MODERATE STAGE: Primary | ICD-10-CM

## 2024-09-23 NOTE — TELEPHONE ENCOUNTER
RECORDS RECEIVED FROM:    Appt Date: 10/1/2024     Shortness of breath   Action    Action Taken 9/23/2024 3:50pm CLARITA     I called [facility] [phone]- They will push all recent chest scans to  PACS.       NOTES STATUS DETAILS   OFFICE NOTE from referring provider Internal Davey Duncan MD    MEDICATION LIST Internal    IMAGING  (NEED IMAGES AND REPORTS)     CT SCAN Care Everywhere-Requested from  [facility]  CT Chest 9/11/2024    CT Chest 8/28/2024    More in CE   CHEST XRAY (CXR) Care Everywhere- Requested- [facility]     TESTS     PULMONARY FUNCTION TESTING (PFT) Internal General PFT 4/18/2024

## 2024-10-01 ENCOUNTER — PRE VISIT (OUTPATIENT)
Dept: PULMONOLOGY | Facility: CLINIC | Age: 69
End: 2024-10-01
Payer: MEDICARE

## 2024-10-07 ENCOUNTER — OFFICE VISIT (OUTPATIENT)
Dept: FAMILY MEDICINE | Facility: CLINIC | Age: 69
End: 2024-10-07
Payer: MEDICARE

## 2024-10-07 VITALS
HEART RATE: 87 BPM | RESPIRATION RATE: 20 BRPM | OXYGEN SATURATION: 95 % | SYSTOLIC BLOOD PRESSURE: 103 MMHG | TEMPERATURE: 98.6 F | DIASTOLIC BLOOD PRESSURE: 66 MMHG

## 2024-10-07 DIAGNOSIS — K22.3 ESOPHAGEAL PERFORATION: ICD-10-CM

## 2024-10-07 DIAGNOSIS — Z29.11 NEED FOR VACCINATION AGAINST RESPIRATORY SYNCYTIAL VIRUS: ICD-10-CM

## 2024-10-07 DIAGNOSIS — R73.03 PREDIABETES: ICD-10-CM

## 2024-10-07 DIAGNOSIS — K59.00 CONSTIPATION, UNSPECIFIED CONSTIPATION TYPE: ICD-10-CM

## 2024-10-07 DIAGNOSIS — R26.2 DIFFICULTY WALKING: ICD-10-CM

## 2024-10-07 DIAGNOSIS — B37.0 THRUSH: Primary | ICD-10-CM

## 2024-10-07 PROCEDURE — 99214 OFFICE O/P EST MOD 30 MIN: CPT | Performed by: PHYSICIAN ASSISTANT

## 2024-10-07 RX ORDER — ESCITALOPRAM OXALATE 10 MG/1
10 TABLET ORAL DAILY
COMMUNITY
Start: 2024-10-03 | End: 2024-10-10

## 2024-10-07 RX ORDER — FAMOTIDINE 20 MG/1
20 TABLET, FILM COATED ORAL 2 TIMES DAILY PRN
COMMUNITY
Start: 2024-10-02 | End: 2024-10-22

## 2024-10-07 RX ORDER — ATORVASTATIN CALCIUM 20 MG/1
20 TABLET, FILM COATED ORAL DAILY
COMMUNITY
Start: 2024-10-03

## 2024-10-07 RX ORDER — NYSTATIN 100000 [USP'U]/ML
500000 SUSPENSION ORAL 4 TIMES DAILY
Qty: 200 ML | Refills: 0 | Status: SHIPPED | OUTPATIENT
Start: 2024-10-07 | End: 2024-10-17

## 2024-10-07 RX ORDER — POLYETHYLENE GLYCOL 3350 17 G/17G
1 POWDER, FOR SOLUTION ORAL 2 TIMES DAILY PRN
Qty: 850 G | Refills: 11 | Status: SHIPPED | OUTPATIENT
Start: 2024-10-07 | End: 2024-10-22

## 2024-10-07 RX ORDER — OLANZAPINE 15 MG/1
15 TABLET, ORALLY DISINTEGRATING ORAL AT BEDTIME
COMMUNITY
Start: 2024-10-02 | End: 2024-10-10

## 2024-10-07 RX ORDER — HYDROCHLOROTHIAZIDE 12.5 MG/1
CAPSULE ORAL
Qty: 6 EACH | Refills: 3 | Status: SHIPPED | OUTPATIENT
Start: 2024-10-07

## 2024-10-07 RX ORDER — GABAPENTIN 300 MG/1
300 CAPSULE ORAL AT BEDTIME
COMMUNITY
Start: 2024-10-02 | End: 2024-10-10

## 2024-10-07 RX ORDER — OLANZAPINE 2.5 MG/1
1.25 TABLET, FILM COATED ORAL EVERY MORNING
COMMUNITY
Start: 2024-10-03 | End: 2024-10-10

## 2024-10-07 RX ORDER — SUCRALFATE 1 G/1
1 TABLET ORAL 4 TIMES DAILY
COMMUNITY
Start: 2024-10-02 | End: 2024-10-10

## 2024-10-07 RX ORDER — ONDANSETRON 4 MG/1
4 TABLET, FILM COATED ORAL EVERY 6 HOURS PRN
Qty: 40 TABLET | Refills: 0 | Status: SHIPPED | OUTPATIENT
Start: 2024-10-07 | End: 2024-10-22

## 2024-10-07 RX ORDER — INSULIN ASPART 100 [IU]/ML
0-18 INJECTION, SOLUTION INTRAVENOUS; SUBCUTANEOUS EVERY 6 HOURS
COMMUNITY
Start: 2024-10-04

## 2024-10-07 RX ORDER — KETOROLAC TROMETHAMINE 30 MG/ML
1 INJECTION, SOLUTION INTRAMUSCULAR; INTRAVENOUS ONCE
Qty: 1 EACH | Refills: 0 | Status: SHIPPED | OUTPATIENT
Start: 2024-10-07 | End: 2024-10-07

## 2024-10-07 ASSESSMENT — PATIENT HEALTH QUESTIONNAIRE - PHQ9
SUM OF ALL RESPONSES TO PHQ QUESTIONS 1-9: 1
10. IF YOU CHECKED OFF ANY PROBLEMS, HOW DIFFICULT HAVE THESE PROBLEMS MADE IT FOR YOU TO DO YOUR WORK, TAKE CARE OF THINGS AT HOME, OR GET ALONG WITH OTHER PEOPLE: NOT DIFFICULT AT ALL
SUM OF ALL RESPONSES TO PHQ QUESTIONS 1-9: 1

## 2024-10-07 NOTE — PATIENT INSTRUCTIONS
Ramana Haines,    Thank you for allowing [facility] to manage your care.    If you develop worsening/changing symptoms at any time, please be seen in clinic/urgent care or call 911/go to the emergency department for evaluation as we discussed.    I ordered some lab work. Please call [address] [place] [address] or your local [facility] to schedule a lab only visit in 2 weeks.    I ordered some xrays. Please go to our radiology department to get your xrays.    I sent your prescriptions to your pharmacy.    I made a referral to [facility]. They will be calling in approximately 1 week to set up your appointment.  If you do not hear from them, please call the specialty number on your after visit summary.     Please allow 1-2 business days for our office to contact you in regards to your laboratory/radiological studies.  If not done so, I encourage you to login into [employer] ([email or web address] to review your results as well.     If you have any questions or concerns, please feel free to call us at [phone]    Sincerely,    Farzad Dupree PA-C    Did you know?      You can schedule a video visit for follow-up appointments as well as future appointments for certain conditions.  Please see the below link.     [email or web address]    If you have not already done so,  I encourage you to sign up for [employer] ([email or web address]).  This will allow you to review your results, securely communicate with a provider, and schedule virtual visits as well.

## 2024-10-07 NOTE — PROGRESS NOTES
Assessment & Plan   Problem List Items Addressed This Visit          Digestive    Esophageal perforation    Relevant Medications    ondansetron (ZOFRAN) 4 MG tablet    Other Relevant Orders    Adult GI  Referral - Consult Only    Hospital Bed Order for DME - ONLY FOR DME    CBC with platelets    Comprehensive metabolic panel (BMP + Alb, Alk Phos, ALT, AST, Total. Bili, TP)       Endocrine    Prediabetes    Relevant Medications    Continuous Glucose Sensor (FREESTYLE BOOGIE 3 PLUS SENSOR) MISC     Other Visit Diagnoses       Thrush    -  Primary    Relevant Medications    nystatin (MYCOSTATIN) 238159 UNIT/ML suspension    Need for vaccination against respiratory syncytial virus        Difficulty walking        Constipation, unspecified constipation type        Relevant Medications    polyethylene glycol (MIRALAX) 17 GM/Dose powder           Assessment & Plan  Constipation  - Patient has experienced an absence of bowel movements for two days.  - Prescribe powdered MiraLAX to be mixed with liquids and given through G-tube.    Vomiting  - Patient has experienced frequent episodes of emesis.  - Switch ondansetron to be given through G-tube. Given tablet as she does not like the taste of ODT.    Hypoglycemia on Lantus  -discontinue long acting insulin and use only sliding scale     Thrush  - Patient presents with tongue exudate, suspected to be oropharyngeal candidiasis.  - Prescribe an Nystatin rinse for the patient to swish and spit.    Hospital bed  - Patient is experiencing difficulty with bed mobility in her current hospital bed.  - Order a new hospital bed for the patient.    Prescription  - Prescribed powdered MiraLAX for constipation.  - Switched nausea medication to be given through G-tube.  - Stopped insulin due to normal blood sugar levels.  - Prescribed a rinse medication for thrush.    Appointments  - Follow-up appointment with Dr. Winters in the next month.  - Laboratory appointment in a couple of  "weeks per discharge summary. Labs ordered.  - Referral to a GI doctor at [facility] in [place].    Complete history and physical exam as below. Afebrile with normal vital signs.    DDx and Dx discussed with and explained to the pt and adult children to their satisfaction.  All questions were answered at this time. Pt and  adult children expressed understanding of and agreement with this dx, tx, and plan. No further workup warranted and standard medication warnings given. I have given the patient and family a list of pertinent indications for re-evaluation. Will go to the Emergency Department if symptoms worsen or new concerning symptoms arise. Patient left with family in no apparent distress.      MED REC REQUIRED  Post Medication Reconciliation Status: discharge medications reconciled and changed, per note/orders  BMI  Estimated body mass index is 30.33 kg/m  as calculated from the following:    Height as of 7/31/24: 1.656 m (5' 5.2\").    Weight as of 7/31/24: 83.2 kg (183 lb 6.4 oz).     See Patient Instructions      Lexy Haines is a 69 year old, presenting for the following health issues:  Hospital F/U        10/7/2024     2:02 PM   Additional Questions   Roomed by Maribell INGRAM CMA         10/7/2024     2:02 PM   Patient Reported Additional Medications   Patient reports taking the following new medications Medications missing from list - Provider please review and add; creon, escitalopram, gabapentin olanzapine, sucralfate, valproic acid, atorvastatin, ondansetron,     HPI     Hospital Follow-up Visit:    Hospital/Nursing Home/IP Rehab Facility:  [facility]  Date of Admission: 8/13/24  Date of Discharge: 10/05/24  Reason(s) for Admission: Esophageal perforation   Was the patient in the ICU or did the patient experience delirium during hospitalization?  Yes     Problems taking medications regularly:  None  Medication changes since discharge: yes   Problems adhering to " non-medication therapy:  None    Summary of hospitalization:  CareEverywhere information obtained and reviewed. Hospitalized for intentional ingestion of bleach with esophageal perforation. Now with G tube and slowly increasing po intake. Doing tube feedings.. Mood much improved. Son and daughter caring for her at home.   Diagnostic Tests/Treatments reviewed.  Follow up needed: GI and psychiatry  Other Healthcare Providers Involved in Patient s Care:         Homecare  Update since discharge: improved. Mood much better. Taking some fluids po, but majority of fluid and calories through feeding tube.    Plan of care communicated with patient and family     - Patient recently discharged from [facility].  - Patient's family quit their jobs to help care for her.  - Patient has a G-tube for medication and feeding.  - Patient has been vomiting intermittently  - Patient has been constipated, not having bowel movements for two days. Normally has Bms daily.  - Patient's blood sugar was high while on TPN, but is now normal after switching to tube feeding. Family discontinued long acting insulin as she was having hypoglycemic episodes.  - Patient has a lesion on her tongue, suspected to be thrush.    Review of Systems  Constitutional, neuro, ENT, endocrine, pulmonary, cardiac, gastrointestinal, genitourinary, musculoskeletal, integument and psychiatric systems are negative, except as otherwise noted.      Objective    /66   Pulse 87   Temp 98.6  F (37  C) (Oral)   Resp 20   LMP  (LMP Unknown)   SpO2 95%   There is no height or weight on file to calculate BMI.  Physical Exam  Vitals and nursing note reviewed.   Constitutional:       General: She is not in acute distress.     Appearance: She is not ill-appearing or diaphoretic.   HENT:      Head: Normocephalic and atraumatic.      Mouth/Throat:      Mouth: Mucous membranes are moist.      Comments: White exudate on tongue. No trismus, voice abnormalities or  asymmetry to the oropharynx.  Eyes:      Conjunctiva/sclera: Conjunctivae normal.   Cardiovascular:      Rate and Rhythm: Normal rate and regular rhythm.      Heart sounds: Normal heart sounds. No murmur heard.     No friction rub. No gallop.   Pulmonary:      Effort: Pulmonary effort is normal. No respiratory distress.      Breath sounds: Normal breath sounds. No stridor. No wheezing, rhonchi or rales.   Abdominal:      General: Bowel sounds are normal. There is no distension.      Palpations: Abdomen is soft. There is no mass.      Tenderness: There is no abdominal tenderness. There is no guarding or rebound.      Hernia: No hernia is present.      Comments: G tube in place.   Skin:     General: Skin is warm and dry.   Neurological:      General: No focal deficit present.      Mental Status: She is alert. Mental status is at baseline.   Psychiatric:         Mood and Affect: Mood normal.         Behavior: Behavior normal.              Signed Electronically by: DAVID Gongora    Answers submitted by the patient for this visit:  Patient Health Questionnaire (Submitted on 10/7/2024)  If you checked off any problems, how difficult have these problems made it for you to do your work, take care of things at home, or get along with other people?: Not difficult at all  PHQ9 TOTAL SCORE: 1

## 2024-10-08 ENCOUNTER — TELEPHONE (OUTPATIENT)
Dept: FAMILY MEDICINE | Facility: CLINIC | Age: 69
End: 2024-10-08

## 2024-10-08 ENCOUNTER — MEDICAL CORRESPONDENCE (OUTPATIENT)
Dept: HEALTH INFORMATION MANAGEMENT | Facility: CLINIC | Age: 69
End: 2024-10-08

## 2024-10-08 DIAGNOSIS — R73.03 PREDIABETES: Primary | ICD-10-CM

## 2024-10-08 NOTE — TELEPHONE ENCOUNTER
Forms received from: [facility]   Phone number listed: [phone]   Fax listed: [phone]  Date received: 10/8/24  Form description: Order ID [id number]  Once forms are completed, please return to [facility] via fax.  Is patient requesting to be contacted when forms are completed: na  Phone: na  Form placed: Dr. Winters's in box

## 2024-10-08 NOTE — TELEPHONE ENCOUNTER
Alternative Requested for the scripts below:    Continuous Glucose  (FREESTYLE BOOGIE 3 READER) ARIAN   Continuous Glucose Sensor (FREESTYLE BOOGIE 3 PLUS SENSOR) MISC     Message from pharmacy: Alternative requested. Freestyle not covered.    Kevin Dumont on 10/8/2024 at 4:05 PM

## 2024-10-09 ENCOUNTER — PATIENT OUTREACH (OUTPATIENT)
Dept: GERIATRIC MEDICINE | Facility: CLINIC | Age: 69
End: 2024-10-09
Payer: COMMERCIAL

## 2024-10-09 ENCOUNTER — MEDICAL CORRESPONDENCE (OUTPATIENT)
Dept: HEALTH INFORMATION MANAGEMENT | Facility: CLINIC | Age: 69
End: 2024-10-09
Payer: COMMERCIAL

## 2024-10-09 PROBLEM — K22.3 ESOPHAGEAL PERFORATION: Status: ACTIVE | Noted: 2024-08-13

## 2024-10-09 RX ORDER — KETOROLAC TROMETHAMINE 30 MG/ML
1 INJECTION, SOLUTION INTRAMUSCULAR; INTRAVENOUS ONCE
Qty: 1 EACH | Refills: 0 | Status: SHIPPED | OUTPATIENT
Start: 2024-10-09 | End: 2024-10-09

## 2024-10-09 RX ORDER — HYDROCHLOROTHIAZIDE 12.5 MG/1
CAPSULE ORAL
Qty: 6 EACH | Refills: 3 | Status: SHIPPED | OUTPATIENT
Start: 2024-10-09

## 2024-10-09 NOTE — PROGRESS NOTES
[facility] Care Coordination Contact    [facility] Early Reassessment     Home visit for Change in Condition Health Risk Assessment with Jaydekatie Rivera completed on October 9, 2024    Reason for Early reassessment: Health Status Change  Yes, if yes explain 2 month hospital stay.  Returned home on G-Tube and limited mobility.    Type of residence:: Private home - stairs  Current living arrangement:: I live in a private home with spouse     Assessment completed with:: Patient    Current Care Plan  Member currently receiving the following home care services: Skilled Nursing, Physical Therapy, Speech Therapy, Occupational Therapy   Member currently receiving the following community resources: [facility], PCA       Medication Review  Medication reconciliation completed in Epic: Yes  Medication set-up & administration: RN set up daily.  Family caregiver administers medications.  Medication Risk Assessment Medication (1 or more, place referral to MTM): N/A: No risk factors identified  MTM Referral Placed: No: No risk factors idenified    Mental/Behavioral Health   Depression Screening:            Mental health DX:: No        Falls Assessment:   Fallen 2 or more times in the past year?: (!) Yes   Any fall with injury in the past year?: Yes    ADL/IADL Dependencies:   Dependent ADLs:: Bathing, Dressing, Ambulation-walker, Eating, Grooming, Positioning, Transfers, Wheelchair-with assist, Toileting  Dependent IADLs:: Cleaning, Cooking, Laundry, Shopping, Meal Preparation, Medication Management, Money Management, Transportation, Incontinence    Health Plan sponsored benefits: [employer] MSC+: Shared information regarding preventative health screening and health plan supplemental benefits/incentives. Reviewed medication disposal form.    PCA Assessment completed at visit: Yes Annual PCA assessment indicated 10.5 hours per day of PCA. This is an increase from the previous assessment.      Elderly Waiver  Eligibility: Yes-will continue on EW    Care Plan & Recommendations: Early reassessment visit completed on 10/8/24 due to Yancy returning home from the hospital after an almost 2 month stay.  Yancy returned home on a G-tube and limited mobility.  Yancy is unable to compete any ADLs or IADLs on her own at this time.  Dioni has requested a new PCA assessment, homemaking services, lifeline and equipment/supplies - gloves.  Care Coordinator will place these referrals and also talked to Yancy and family about the new PCA services which is now called CFSS and the proccess that needs to be completed with contacting a consultation services to write Yancy's CFSS plan.  Family understood.  No further questions, concerns or needs at this time.    See [employer] Assessment for detailed assessment information.    Follow-Up Plan: Member informed of future contact, plan to f/u with member with a 6 month telephone assessment.  Contact information shared with member and family, encouraged member to call with any questions or concerns at any time.    [place] care continuum providers: Please see Snapshot and Care Management Flowsheets for Specific details of care plan.    This CC note routed to PCP, Abundio Winters.       PRINCESS Jacobson  [facility]  [phone]

## 2024-10-09 NOTE — TELEPHONE ENCOUNTER
Order signed.    1. Prediabetes  - Continuous Glucose  (FREESTYLE BOOGIE 3 READER) ARIAN; 1 each once for 1 dose. Use to read blood sugars per 's instructions.  Dispense: 1 each; Refill: 0  - Continuous Glucose Sensor (FREESTYLE BOOGIE 3 PLUS SENSOR) MISC; Use 1 sensor every 15 days. Use to read blood sugars per 's instructions.  Dispense: 6 each; Refill: 3

## 2024-10-10 ENCOUNTER — TELEPHONE (OUTPATIENT)
Dept: FAMILY MEDICINE | Facility: CLINIC | Age: 69
End: 2024-10-10
Payer: COMMERCIAL

## 2024-10-10 DIAGNOSIS — Z93.1 PEG (PERCUTANEOUS ENDOSCOPIC GASTROSTOMY) STATUS (H): ICD-10-CM

## 2024-10-10 DIAGNOSIS — I10 BENIGN ESSENTIAL HYPERTENSION: ICD-10-CM

## 2024-10-10 DIAGNOSIS — F33.41 MAJOR DEPRESSIVE DISORDER, RECURRENT, IN PARTIAL REMISSION (H): Primary | ICD-10-CM

## 2024-10-10 DIAGNOSIS — F39 MOOD DISORDER (H): ICD-10-CM

## 2024-10-10 NOTE — TELEPHONE ENCOUNTER
Forms received from: [employer]   Phone number listed: [phone]   Fax listed: [phone]  Date received: 10/9/24  Form description: Order ID [id number]  Once forms are completed, please return to [employer] via fax.  Is patient requesting to be contacted when forms are completed: na  Phone: na  Form placed: Dr. Winters's in box

## 2024-10-11 ENCOUNTER — TELEPHONE (OUTPATIENT)
Dept: FAMILY MEDICINE | Facility: CLINIC | Age: 69
End: 2024-10-11
Payer: COMMERCIAL

## 2024-10-11 RX ORDER — ESCITALOPRAM OXALATE 10 MG/1
10 TABLET ORAL DAILY
Qty: 90 TABLET | Refills: 0 | Status: SHIPPED | OUTPATIENT
Start: 2024-10-11 | End: 2024-10-22

## 2024-10-11 RX ORDER — CARVEDILOL 6.25 MG/1
6.25 TABLET ORAL 2 TIMES DAILY WITH MEALS
Qty: 180 TABLET | Refills: 0 | Status: SHIPPED | OUTPATIENT
Start: 2024-10-11 | End: 2024-10-22

## 2024-10-11 RX ORDER — GABAPENTIN 300 MG/1
300 CAPSULE ORAL AT BEDTIME
Qty: 90 CAPSULE | Refills: 0 | Status: SHIPPED | OUTPATIENT
Start: 2024-10-11 | End: 2024-10-22

## 2024-10-11 RX ORDER — OLANZAPINE 15 MG/1
15 TABLET, ORALLY DISINTEGRATING ORAL AT BEDTIME
Qty: 90 TABLET | Refills: 0 | Status: SHIPPED | OUTPATIENT
Start: 2024-10-11 | End: 2024-10-22

## 2024-10-11 RX ORDER — SUCRALFATE 1 G/1
1 TABLET ORAL 4 TIMES DAILY
Qty: 360 TABLET | Refills: 0 | Status: SHIPPED | OUTPATIENT
Start: 2024-10-11

## 2024-10-11 RX ORDER — OLANZAPINE 2.5 MG/1
1.25 TABLET, FILM COATED ORAL EVERY MORNING
Qty: 90 TABLET | Refills: 0 | Status: SHIPPED | OUTPATIENT
Start: 2024-10-11 | End: 2024-10-22

## 2024-10-11 NOTE — TELEPHONE ENCOUNTER
Please see the following response to the below questions:  Regarding TPN feedings, GI referral was placed.  This will need to discussed with the GI specialist.  Please confirm that the dosage was discussed at discharge.  That is correct.  Patient is not on long acting insulin (glargine).  Continue with aspart sliding scale.  Yes, continue with sucralfate.  This prescription was sent to the pharmacy.

## 2024-10-11 NOTE — TELEPHONE ENCOUNTER
Spoke with [facility] and relayed pcp's message below. [facility] verbalized understanding.     [facility] stated they can accommodate at home lab draws in the future if needed. They would need verbal OK for lab draw at home.     Liz Breen RN on 10/11/2024 at 4:30 PM

## 2024-10-11 NOTE — TELEPHONE ENCOUNTER
Routing to PCP    PEGGY Crump with home care, calling with some questions    TPN tube feeding - they were sent home with TPN from hospital  Premix Osmolite and bags of protein that they were adding. They were given a large bottle of protein and the family does not know how much to add to the TPN feeding. There is 15 g of protein in 30 ml of the liquid protein. Would they be able to just bring this to the visit on 10/18 for clarification on this?      Patient's family told them that she is no longer taking long acting insulin (insulin glargine). They are only doing the sliding scale (insulin aspart). RN said that per hospital AVS, there is no mention of long acting insulin on her discharge med list.      Patient was started on sucralfate at the hospital and they are wondering if it needs to be continued. She has 2 tablets left for this rx. - this was prescribed in the hospital due to her ingestion of bleach/intentional self harm. If pcp would like her to continue, she would need a refill for this rx.    Call back:  [phone]  NOLVIA Estrada RN

## 2024-10-11 NOTE — TELEPHONE ENCOUNTER
Received call from patient's niece. She is calling to follow-up on medication refill request as patient is completely out of Sucralfate, and running out of other medications soon.     Patient's niece requesting call back once this is completed.     CHRYSTAL Tong RN  [facility]

## 2024-10-11 NOTE — TELEPHONE ENCOUNTER
Forms received from: [employer]   Phone number listed: [phone]   Fax listed: [phone]  Date received: 10/11/24  Form description: Order ID [id number]  Once forms are completed, please return to [employer] via fax.  Is patient requesting to be contacted when forms are completed: na  Phone: na  Form placed: Dr. Winters's in box

## 2024-10-11 NOTE — TELEPHONE ENCOUNTER
Home Care is calling regarding an established patient with [facility].       Requesting orders from: Abundio Winters  Provider is following patient: Yes  Is this a 60-day recertification request?  No    Orders Requested    Occupational Therapy  Request for initial certification (first set of orders)   Frequency:  1 x a week x 10 days then 1 visit every 3 weeks to work on endurance home exercise program and daily activities       Confirmed ok to leave a detailed message with call back.  Contact information confirmed and updated as needed.      Staci Vargas RN  [facility]

## 2024-10-13 ENCOUNTER — MEDICAL CORRESPONDENCE (OUTPATIENT)
Dept: HEALTH INFORMATION MANAGEMENT | Facility: CLINIC | Age: 69
End: 2024-10-13
Payer: COMMERCIAL

## 2024-10-14 NOTE — TELEPHONE ENCOUNTER
I called number given for Home Care nurse, no answer; left detailed message on Mana's voicemail as advised relaying provider's verbal approval of home care orders.  Call back number [phone] provided if she has questions or concerns.    Lisa Gasca RN  [facility]

## 2024-10-15 ENCOUNTER — TELEPHONE (OUTPATIENT)
Dept: FAMILY MEDICINE | Facility: CLINIC | Age: 69
End: 2024-10-15
Payer: COMMERCIAL

## 2024-10-15 ENCOUNTER — MEDICAL CORRESPONDENCE (OUTPATIENT)
Dept: HEALTH INFORMATION MANAGEMENT | Facility: CLINIC | Age: 69
End: 2024-10-15
Payer: COMMERCIAL

## 2024-10-15 NOTE — TELEPHONE ENCOUNTER
Forms received from: [facility]   Phone number listed: [phone]   Fax listed: [phone]  Date received: 10/12/24  Form description: Order ID [id number]  Once forms are completed, please return to [facility] via fax.  Is patient requesting to be contacted when forms are completed: na  Phone: na  Form placed: Dr. Winters's in basket

## 2024-10-15 NOTE — TELEPHONE ENCOUNTER
Received call from patient's sister, Javier. She states that patient has feeding tube as her main source of nutrition, and is needing clarification regarding the feeds. She is asking for clarification specifically on ProSource feedings via feeding tube. She is asking how many mL/day she will need, 30 or 60 mL. She has a feeding pump and everything she will need, which was supplied after discharged from [facility]. Since last week, she has not gotten a clear answer regarding this via home care nurse. She does have home care facilitating orders. She states that patient is scheduled with [place] [facility] for follow-up on 10/31, does not want to wait until that appointment to get answers.    Callback: [phone]  CHRYSTAL Samson RN  [facility], [facility]

## 2024-10-15 NOTE — TELEPHONE ENCOUNTER
"Received call from ST Eliane, with [facility].     She is looking for clarification on diet orders.    She reports that pt was discharged from [facility] 10/5 with diet of clear liquids only.     She would like verbal order from PCP for \"Okay to advance diet per ST.\"    She is looking to make sure it is okay to start doing assessment with other textures because of pt's diagnosis of esophageal perforation.     Pt has only been doing liquids since getting home. They were under the impression that ST would do swallow evaluation and advance diet, which Eliane thinks is appropriate as well. But, she needs PCP okay for her to advance diet given that pt was in the hospital for esophageal perforation     Call back #[phone]. Okay to leave a dvm.    Carole Anton RN     "

## 2024-10-15 NOTE — TELEPHONE ENCOUNTER
Forms received from: [facility]   Phone number listed: [phone]   Fax listed: [phone]  Date received: 10/12/24  Form description: Order ID [id number]  Once forms are completed, please return to [facility] via fax.  Is patient requesting to be contacted when forms are completed: na  Phone: na  Form placed: Dr. Winters's in basket

## 2024-10-16 ENCOUNTER — TELEPHONE (OUTPATIENT)
Dept: FAMILY MEDICINE | Facility: CLINIC | Age: 69
End: 2024-10-16
Payer: COMMERCIAL

## 2024-10-16 NOTE — TELEPHONE ENCOUNTER
RE:AMYLASE-LIPASE-PROTEASE.SIG CODE CANNOT DISPENSE OPEN BOTTLES. CAN ONLY DISPENSE 'S.NEED CLARIFICATION HOW MANY CAPS BY MOUTH FOR EACH DOSE & IS DOSE 4 OR 5 TIMES DAILY INCLUDING BEDTIME DOSE?

## 2024-10-17 ENCOUNTER — TELEPHONE (OUTPATIENT)
Dept: FAMILY MEDICINE | Facility: CLINIC | Age: 69
End: 2024-10-17
Payer: COMMERCIAL

## 2024-10-17 NOTE — TELEPHONE ENCOUNTER
Again, this should have been managed by the gastroenterologist or at hospital discharge.  I have not seen patient since patient was discharged and do not feel comfortable giving feeding tube instructions.  Patient is currently being evaluated by home health speech therapy and they should be able to provide input.

## 2024-10-17 NOTE — TELEPHONE ENCOUNTER
Forms received from: [employer]   Phone number listed: [phone]   Fax listed: [phone]  Date received: 10/17/24  Form description: Order ID [id number]  Once forms are completed, please return to [employer] via fax.  Is patient requesting to be contacted when forms are completed: na  Phone: na  Form placed: Dr. Winters's in basket

## 2024-10-18 ENCOUNTER — TELEPHONE (OUTPATIENT)
Dept: FAMILY MEDICINE | Facility: CLINIC | Age: 69
End: 2024-10-18

## 2024-10-18 ENCOUNTER — PATIENT OUTREACH (OUTPATIENT)
Dept: GERIATRIC MEDICINE | Facility: CLINIC | Age: 69
End: 2024-10-18

## 2024-10-18 NOTE — TELEPHONE ENCOUNTER
RN called patients jessica Herrera and relayed providers message. Javier verbalized understanding. She was asking If they could get in with patients PCP sooner than 11/7/24. RN offered appointment slot 10/22/24 at 2 pm. Javier stated they will take this appointment time.     She stated home care is awaiting providers orders about feeding tube. Patient also has appointment with GI at end of month with [facility] also requested to keep 11/7/24 appointment in case any follow up is needed.       Clari Marroquin RN on 10/18/2024 at 12:24 PM

## 2024-10-18 NOTE — PROGRESS NOTES
[facility] Care Coordination Contact    [facility]:  Emailed required PCA documents to [facility].  Faxed copy of PCA assessment to [facility] and mailed copy to member.      Michelle Ricardo  Case Management Specialist  [facility]  [phone]

## 2024-10-18 NOTE — TELEPHONE ENCOUNTER
Forms received from: [employer]   Phone number listed: [phone]   Fax listed: [phone]  Date received: 10/17/24  Form description: Order ID [id number]  Once forms are completed, please return to [employer] via fax.  Is patient requesting to be contacted when forms are completed: na  Phone: na  Form placed: Dr. Winters's in basket

## 2024-10-21 ENCOUNTER — PATIENT OUTREACH (OUTPATIENT)
Dept: GERIATRIC MEDICINE | Facility: CLINIC | Age: 69
End: 2024-10-21

## 2024-10-21 ENCOUNTER — TELEPHONE (OUTPATIENT)
Dept: FAMILY MEDICINE | Facility: CLINIC | Age: 69
End: 2024-10-21

## 2024-10-21 NOTE — LETTER
October 21, 2024       Yancy Rivera  [address]  [place] [address]      Dear Yancy,    At [facility], we re dedicated to improving your health and wellness. Enclosed is the Support Plan developed with you on 10/8/2024. Please review the Support Plan carefully.    As a reminder, during your visit we talked about:   Ways to manage your physical and mental health   Using health care to maintain and improve your health    Your preventive care needs      Remember to contact your care coordinator if you:   Are hospitalized or plan to be hospitalized    Have a fall     Have a change in your physical or mental health   Need help finding support or services    If you have questions or don t agree with your Support Plan, call me at [phone]. You can also call me if your needs change. TTY users call the [place] Relay at 369 or 1-435.214.9078 (jotcqy-if-vnruvx relay service).    Sincerely,         PRINCESS Jacobson  [phone]  [email or web address]                [id number] accepted     (06/2024)                [address] [place] [address]  [phone]  fax [phone]  [facility]

## 2024-10-21 NOTE — TELEPHONE ENCOUNTER
Forms received from: [employer]   Phone number listed: [phone]   Fax listed: [phone]  Date received: 10/18/24  Form description: Order ID [id number]  Once forms are completed, please return to [employer] via fax.  Is patient requesting to be contacted when forms are completed: na  Phone: na  Form placed: Dr. Winters's in basket

## 2024-10-21 NOTE — PROGRESS NOTES
[facility] Care Coordination Contact    Received after visit chart from care coordinator.  Completed following tasks: Mailed copy of support plan to member, Mailed [place] Choices signature sheet pages 3-4, Mailed Safe Medication Disposal , Submitted referrals/auths for hmkg and PERS, and Updated services in Database  Provider Signature - No Support Plan Shared:  Member indicates that they do not want their support plan shared with any EW providers.    Michelle Ricardo  Case Management Specialist  [facility]  [phone]

## 2024-10-21 NOTE — TELEPHONE ENCOUNTER
Forms received from: [employer]   Phone number listed: [phone]   Fax listed: [phone]  Date received: 10/18/24  Form description: Order ID [id number]  Once forms are completed, please return to [employer] via fax.  Is patient requesting to be contacted when forms are completed: na  Phone: na  Form placed: Dr. Winters's in basket

## 2024-10-22 ENCOUNTER — MEDICAL CORRESPONDENCE (OUTPATIENT)
Dept: HEALTH INFORMATION MANAGEMENT | Facility: CLINIC | Age: 69
End: 2024-10-22

## 2024-10-22 ENCOUNTER — OFFICE VISIT (OUTPATIENT)
Dept: FAMILY MEDICINE | Facility: CLINIC | Age: 69
End: 2024-10-22
Payer: MEDICARE

## 2024-10-22 VITALS
HEART RATE: 79 BPM | DIASTOLIC BLOOD PRESSURE: 56 MMHG | BODY MASS INDEX: 31.45 KG/M2 | SYSTOLIC BLOOD PRESSURE: 104 MMHG | WEIGHT: 184.2 LBS | HEIGHT: 64 IN | RESPIRATION RATE: 20 BRPM | OXYGEN SATURATION: 95 % | TEMPERATURE: 97.5 F

## 2024-10-22 DIAGNOSIS — F39 MOOD DISORDER (H): ICD-10-CM

## 2024-10-22 DIAGNOSIS — F33.41 MAJOR DEPRESSIVE DISORDER, RECURRENT, IN PARTIAL REMISSION (H): ICD-10-CM

## 2024-10-22 DIAGNOSIS — Z23 NEED FOR COVID-19 VACCINE: ICD-10-CM

## 2024-10-22 DIAGNOSIS — R53.83 OTHER FATIGUE: ICD-10-CM

## 2024-10-22 DIAGNOSIS — Z13.1 SCREENING FOR DIABETES MELLITUS: ICD-10-CM

## 2024-10-22 DIAGNOSIS — Z29.11 NEED FOR RSV IMMUNIZATION: ICD-10-CM

## 2024-10-22 DIAGNOSIS — J98.2 PNEUMOMEDIASTINUM (H): Primary | ICD-10-CM

## 2024-10-22 DIAGNOSIS — Z23 NEED FOR INFLUENZA VACCINATION: ICD-10-CM

## 2024-10-22 DIAGNOSIS — Z93.1 PEG (PERCUTANEOUS ENDOSCOPIC GASTROSTOMY) STATUS (H): ICD-10-CM

## 2024-10-22 DIAGNOSIS — K22.3 ESOPHAGEAL PERFORATION: ICD-10-CM

## 2024-10-22 DIAGNOSIS — I10 BENIGN ESSENTIAL HYPERTENSION: ICD-10-CM

## 2024-10-22 LAB
ERYTHROCYTE [DISTWIDTH] IN BLOOD BY AUTOMATED COUNT: 12.9 % (ref 10–15)
EST. AVERAGE GLUCOSE BLD GHB EST-MCNC: 108 MG/DL
HBA1C MFR BLD: 5.4 % (ref 0–5.6)
HCT VFR BLD AUTO: 33.5 % (ref 35–47)
HGB BLD-MCNC: 10.5 G/DL (ref 11.7–15.7)
MCH RBC QN AUTO: 27.6 PG (ref 26.5–33)
MCHC RBC AUTO-ENTMCNC: 31.3 G/DL (ref 31.5–36.5)
MCV RBC AUTO: 88 FL (ref 78–100)
PLATELET # BLD AUTO: 317 10E3/UL (ref 150–450)
RBC # BLD AUTO: 3.81 10E6/UL (ref 3.8–5.2)
WBC # BLD AUTO: 5.3 10E3/UL (ref 4–11)

## 2024-10-22 PROCEDURE — 83036 HEMOGLOBIN GLYCOSYLATED A1C: CPT | Performed by: FAMILY MEDICINE

## 2024-10-22 PROCEDURE — 90480 ADMN SARSCOV2 VAC 1/ONLY CMP: CPT | Performed by: FAMILY MEDICINE

## 2024-10-22 PROCEDURE — 80053 COMPREHEN METABOLIC PANEL: CPT | Performed by: FAMILY MEDICINE

## 2024-10-22 PROCEDURE — 85027 COMPLETE CBC AUTOMATED: CPT | Performed by: FAMILY MEDICINE

## 2024-10-22 PROCEDURE — G0008 ADMIN INFLUENZA VIRUS VAC: HCPCS | Performed by: FAMILY MEDICINE

## 2024-10-22 PROCEDURE — 90662 IIV NO PRSV INCREASED AG IM: CPT | Performed by: FAMILY MEDICINE

## 2024-10-22 PROCEDURE — 36415 COLL VENOUS BLD VENIPUNCTURE: CPT | Performed by: FAMILY MEDICINE

## 2024-10-22 PROCEDURE — 99214 OFFICE O/P EST MOD 30 MIN: CPT | Mod: 25 | Performed by: FAMILY MEDICINE

## 2024-10-22 PROCEDURE — 91320 SARSCV2 VAC 30MCG TRS-SUC IM: CPT | Performed by: FAMILY MEDICINE

## 2024-10-22 RX ORDER — ESCITALOPRAM OXALATE 10 MG/1
10 TABLET ORAL DAILY
Qty: 90 TABLET | Refills: 3 | Status: SHIPPED | OUTPATIENT
Start: 2024-10-22

## 2024-10-22 RX ORDER — OLANZAPINE 15 MG/1
15 TABLET, ORALLY DISINTEGRATING ORAL AT BEDTIME
Qty: 90 TABLET | Refills: 0 | Status: SHIPPED | OUTPATIENT
Start: 2024-10-22

## 2024-10-22 RX ORDER — CARVEDILOL 6.25 MG/1
6.25 TABLET ORAL 2 TIMES DAILY WITH MEALS
Qty: 180 TABLET | Refills: 3 | Status: SHIPPED | OUTPATIENT
Start: 2024-10-22

## 2024-10-22 RX ORDER — GABAPENTIN 300 MG/1
300 CAPSULE ORAL AT BEDTIME
Qty: 90 CAPSULE | Refills: 3 | Status: SHIPPED | OUTPATIENT
Start: 2024-10-22

## 2024-10-22 RX ORDER — OLANZAPINE 2.5 MG/1
1.25 TABLET, FILM COATED ORAL EVERY MORNING
Qty: 90 TABLET | Refills: 0 | Status: SHIPPED | OUTPATIENT
Start: 2024-10-22

## 2024-10-22 ASSESSMENT — PAIN SCALES - GENERAL: PAINLEVEL: MODERATE PAIN (4)

## 2024-10-22 NOTE — LETTER
October 24, 2024      Yancy Rivera  [address] [address]        Dear ,    We are writing to inform you of your test results.    -Stable red blood cell (hgb) levels, normal white blood cell count and normal platelet levels.   -Liver and gallbladder tests are normal (ALT,AST, Alk phos, bilirubin), kidney function is normal (Cr, GFR), sodium is normal, potassium is normal, calcium is normal, glucose is normal.   -A1C (diabetic test) is normal and indicates that your blood sugar has been in a normal range the last 3 months.     Resulted Orders   CBC with platelets   Result Value Ref Range    WBC Count 5.3 4.0 - 11.0 10e3/uL    RBC Count 3.81 3.80 - 5.20 10e6/uL    Hemoglobin 10.5 (L) 11.7 - 15.7 g/dL    Hematocrit 33.5 (L) 35.0 - 47.0 %    MCV 88 78 - 100 fL    MCH 27.6 26.5 - 33.0 pg    MCHC 31.3 (L) 31.5 - 36.5 g/dL    RDW 12.9 10.0 - 15.0 %    Platelet Count 317 150 - 450 10e3/uL   Hemoglobin A1c   Result Value Ref Range    Estimated Average Glucose 108 <117 mg/dL    Hemoglobin A1C 5.4 0.0 - 5.6 %      Comment:      Normal <5.7%   Prediabetes 5.7-6.4%    Diabetes 6.5% or higher     Note: Adopted from ADA consensus guidelines.   Comprehensive metabolic panel (BMP + Alb, Alk Phos, ALT, AST, Total. Bili, TP)   Result Value Ref Range    Sodium 136 135 - 145 mmol/L    Potassium 4.8 3.4 - 5.3 mmol/L    Carbon Dioxide (CO2) 23 22 - 29 mmol/L    Anion Gap 9 7 - 15 mmol/L    Urea Nitrogen 16.9 8.0 - 23.0 mg/dL    Creatinine 0.65 0.51 - 0.95 mg/dL    GFR Estimate >90 >60 mL/min/1.73m2      Comment:      eGFR calculated using 2021 CKD-EPI equation.    Calcium 9.6 8.8 - 10.4 mg/dL      Comment:      Reference intervals for this test were updated on 7/16/2024 to reflect our healthy population more accurately. There may be differences in the flagging of prior results with similar values performed with this method. Those prior results can be interpreted in the context of the updated reference intervals.     Chloride 104 98 - 107 mmol/L    Glucose 81 70 - 99 mg/dL    Alkaline Phosphatase 110 40 - 150 U/L    AST 30 0 - 45 U/L    ALT 10 0 - 50 U/L    Protein Total 7.4 6.4 - 8.3 g/dL    Albumin 3.2 (L) 3.5 - 5.2 g/dL    Bilirubin Total 0.3 <=1.2 mg/dL       If you have any questions or concerns, please call the clinic at the number listed above.       Sincerely,      Abundio Winters DO

## 2024-10-22 NOTE — PROGRESS NOTES
1. Pneumomediastinum (H) (Primary)  Secondary to intentional bleach ingestion resulting perforated esophagus requiring PEG placement.    2. Major depressive disorder, recurrent, in partial remission (H)  - escitalopram (LEXAPRO) 10 MG tablet; Take 1 tablet (10 mg) by mouth or Feeding Tube daily.  Dispense: 90 tablet; Refill: 3    3. PEG (percutaneous endoscopic gastrostomy) status (H)  Continue with Pro-Source supplementation.  Stressed the importance of keeping appointment with GI.   - lipase-protease-amylase (CREON 12) [id number] units CPEP; Take four times daily by mouth or feeding tube with meals and at bedtime.  Dispense: 360 capsule; Refill: 3    4. Esophageal perforation  Secondary to intentional bleach ingestion  - Comprehensive metabolic panel (BMP + Alb, Alk Phos, ALT, AST, Total. Bili, TP)    5. Mood disorder (H)  Chronic. Seroquel was discontinued.  Keep upcoming appointment with psychiatry.   - gabapentin (NEURONTIN) 300 MG capsule; Take 1 capsule (300 mg) by mouth or Feeding Tube at bedtime.  Dispense: 90 capsule; Refill: 3  - OLANZapine zydis (ZYPREXA) 15 MG ODT; Take 1 tablet (15 mg) by mouth at bedtime.  Dispense: 90 tablet; Refill: 0  - OLANZapine (ZYPREXA) 2.5 MG tablet; Place 0.5 tablets (1.25 mg) into G tube every morning.  Dispense: 90 tablet; Refill: 0    7. Screening for diabetes mellitus  - Hemoglobin A1c; Future  - Hemoglobin A1c    8. Benign essential hypertension  Chronic and stable  - carvedilol (COREG) 6.25 MG tablet; Take 1 tablet (6.25 mg) by mouth or Feeding Tube 2 times daily (with meals).  Dispense: 180 tablet; Refill: 3    9. Need for RSV immunization  - RSV vaccine, bivalent, ABRYSVO, injection; Inject 0.5 mLs into the muscle once for 1 dose. Pharmacist administered  Dispense: 0.5 mL; Refill: 0    10. Other fatigue  - CBC with platelets; Future  - CBC with platelets        Subjective   Belenesh is a 69 year old, presenting for the following health issues:  No chief  complaint on file.        10/22/2024     1:30 PM   Additional Questions   Roomed by Lilli   Accompanied by son         10/22/2024     1:41 PM   Patient Reported Additional Medications   Patient reports taking the following new medications valproic acid 250 mg/5mL oral solution admin 5 mL via g-tube once daily and 10mL at bedtime     Hospitals in [place]         Hospital Follow-up Visit:    Hospital/Nursing Home/IP Rehab Facility:  [facility]   Date of Admission: 8/13/2024  Date of Discharge: 10/5/2024  Reason(s) for Admission: Anxiety and depression   At risk for aspiration related to tube feeding   Acute respiratory failure, unspecified whether with hypoxia or hypercapnia (HC)   Ingestion of bleach, accidental or unintentional, initial encounter   Was the patient in the ICU or did the patient experience delirium during hospitalization?  Yes         10/22/2024     1:48 PM   Mini-Cog Total Score   Mini Cog Total Score 4     Do you have any other stressors you would like to discuss with your provider? No    Problems taking medications regularly:  None  Medication changes since discharge: None  Problems adhering to non-medication therapy:  None    Summary of hospitalization:  CareEverywhere information obtained and reviewed  Diagnostic Tests/Treatments reviewed.  Follow up needed: none  Other Healthcare Providers Involved in Patient s Care:         Specialist appointment - Gastroenterology,  Psychiatry, and Pulmonology  Update since discharge: stable.         Plan of care communicated with patient and family           Hospital follow-up 8/13-10/5: On the morning of admission patient was found on the floor by family member with empty bottles of bleach, vomiting blood, and altered mental status.  Was seen in the ER which showed CT head was negative.  CT Neck showed soft tissue attenuation and stenosis due to possible prominent venous plexus vs epdidural bleed.  CT chest showed pneumomediastinum concerning for  "peforated esophagus.  Transferred to ICU.  Thoracic surgery was consulted and recommended non-operative management.  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  CT chest on 8/16 showed moderate-sized bilateral pleural effusions and persistent, but improved, esophageal perforation.  ENT was consulted and patient was extubated on 8/18.  On 8/20, esophagram showed stable linear contained left-sided esophageal perforation and narrowing of distal esophagus.  Hospital course was complicated by the fact patient develop low grade fever and was treated for UTI.  Antibiotics was stopped on 8/30.  Due to fear of choking feeding, PEG was placed on 9/16.  As of today, patient is feeling better today.  Patient is currently being followed by speech therapy and is cleared on diet.  Patient is currently getting 60 mL of ProSource per day.  She is able to drinking sips of water.  Patient is scheduled to follow-up with gastroenterology on 10/31.  Per patient's son, patient continues to feel fatigue.      Review of Systems   Constitutional:  Negative for activity change, appetite change, chills and fatigue.   HENT: Negative.     Eyes: Negative.    Respiratory:  Negative for cough, shortness of breath and wheezing.    Cardiovascular:  Negative for chest pain and palpitations.   Gastrointestinal: Negative.    Endocrine: Negative.    Skin:  Negative for color change and rash.   Allergic/Immunologic: Negative.    Neurological:  Negative for dizziness, weakness and headaches.   Hematological: Negative.    Psychiatric/Behavioral:  Negative for agitation and sleep disturbance. The patient is not nervous/anxious.            Objective    /56   Pulse 79   Temp 97.5  F (36.4  C) (Temporal)   Resp 20   Ht 1.626 m (5' 4\")   Wt 83.6 kg (184 lb 3.2 oz)   LMP  (LMP Unknown)   SpO2 95%   BMI 31.62 kg/m    Body mass index " is 31.62 kg/m .  Physical Exam  Constitutional:       General: She is not in acute distress.  HENT:      Head: Normocephalic and atraumatic.   Eyes:      Conjunctiva/sclera: Conjunctivae normal.   Neck:      Trachea: No tracheal deviation.   Cardiovascular:      Rate and Rhythm: Normal rate and regular rhythm.      Heart sounds: Normal heart sounds.   Pulmonary:      Effort: Pulmonary effort is normal. No respiratory distress.      Breath sounds: Normal breath sounds. No wheezing or rales.   Abdominal:      Palpations: Abdomen is soft.      Comments: G-Tube intact appears C/D/I   Musculoskeletal:      Cervical back: Normal range of motion.   Skin:     Findings: No rash.   Neurological:      Mental Status: She is alert.          Prior to immunization administration, verified patients identity using patient s name and date of birth. Please see Immunization Activity for additional information.     Screening Questionnaire for Adult Immunization    Are you sick today?   No   Do you have allergies to medications, food, a vaccine component or latex?   No   Have you ever had a serious reaction after receiving a vaccination?   No   Do you have a long-term health problem with heart, lung, kidney, or metabolic disease (e.g., diabetes), asthma, a blood disorder, no spleen, complement component deficiency, a cochlear implant, or a spinal fluid leak?  Are you on long-term aspirin therapy?   No   Do you have cancer, leukemia, HIV/AIDS, or any other immune system problem?   No   Do you have a parent, brother, or sister with an immune system problem?   No   In the past 3 months, have you taken medications that affect  your immune system, such as prednisone, other steroids, or anticancer drugs; drugs for the treatment of rheumatoid arthritis, Crohn s disease, or psoriasis; or have you had radiation treatments?   No   Have you had a seizure, or a brain or other nervous system problem?   No   During the past year, have you received a  transfusion of blood or blood    products, or been given immune (gamma) globulin or antiviral drug?   No   For women: Are you pregnant or is there a chance you could become       pregnant during the next month?   No   Have you received any vaccinations in the past 4 weeks?   No     Immunization questionnaire answers were all negative.      Patient instructed to remain in clinic for 15 minutes afterwards, and to report any adverse reactions.     Screening performed by Lilli Domingo MA on 10/22/2024 at 2:41 PM.          Signed Electronically by: Abundio Winters DO

## 2024-10-22 NOTE — PATIENT INSTRUCTIONS
Paul Haines,    Thank you for allowing [facility] to manage your care.    I ordered some blood work, please go to the laboratory to get your laboratory studies.    I sent your prescriptions to your pharmacy.    For your convenience, test results are released as soon as they are available  Please allow 1-2 business days for me to send you a comment about your results.  If not done so, I encourage you to login into [email or web address] ([email or web address] to review your results in real time.     If you have any questions or concerns, please feel free to call us at [phone].    Sincerely,    Dr. Winters    Did you know?      You can schedule a video visit for follow-up appointments as well as future appointments for certain conditions.  Please see the below link.     [email or web address]    If you have not already done so,  I encourage you to sign up for [email or web address] ([email or web address]  This will allow you to review your results, securely communicate with a provider, and schedule virtual visits as well.

## 2024-10-23 ENCOUNTER — TELEPHONE (OUTPATIENT)
Dept: FAMILY MEDICINE | Facility: CLINIC | Age: 69
End: 2024-10-23
Payer: COMMERCIAL

## 2024-10-23 LAB
ALBUMIN SERPL BCG-MCNC: 3.2 G/DL (ref 3.5–5.2)
ALP SERPL-CCNC: 110 U/L (ref 40–150)
ALT SERPL W P-5'-P-CCNC: 10 U/L (ref 0–50)
ANION GAP SERPL CALCULATED.3IONS-SCNC: 9 MMOL/L (ref 7–15)
AST SERPL W P-5'-P-CCNC: 30 U/L (ref 0–45)
BILIRUB SERPL-MCNC: 0.3 MG/DL
BUN SERPL-MCNC: 16.9 MG/DL (ref 8–23)
CALCIUM SERPL-MCNC: 9.6 MG/DL (ref 8.8–10.4)
CHLORIDE SERPL-SCNC: 104 MMOL/L (ref 98–107)
CREAT SERPL-MCNC: 0.65 MG/DL (ref 0.51–0.95)
EGFRCR SERPLBLD CKD-EPI 2021: >90 ML/MIN/1.73M2
GLUCOSE SERPL-MCNC: 81 MG/DL (ref 70–99)
HCO3 SERPL-SCNC: 23 MMOL/L (ref 22–29)
POTASSIUM SERPL-SCNC: 4.8 MMOL/L (ref 3.4–5.3)
PROT SERPL-MCNC: 7.4 G/DL (ref 6.4–8.3)
SODIUM SERPL-SCNC: 136 MMOL/L (ref 135–145)

## 2024-10-23 ASSESSMENT — ENCOUNTER SYMPTOMS
COUGH: 0
ENDOCRINE NEGATIVE: 1
ALLERGIC/IMMUNOLOGIC NEGATIVE: 1
HEMATOLOGIC/LYMPHATIC NEGATIVE: 1
WHEEZING: 0
SHORTNESS OF BREATH: 0
ACTIVITY CHANGE: 0
NERVOUS/ANXIOUS: 0
EYES NEGATIVE: 1
PALPITATIONS: 0
SLEEP DISTURBANCE: 0
DIZZINESS: 0
AGITATION: 0
GASTROINTESTINAL NEGATIVE: 1
APPETITE CHANGE: 0
HEADACHES: 0
COLOR CHANGE: 0
WEAKNESS: 0
FATIGUE: 0
CHILLS: 0

## 2024-10-24 ENCOUNTER — TELEPHONE (OUTPATIENT)
Dept: FAMILY MEDICINE | Facility: CLINIC | Age: 69
End: 2024-10-24
Payer: COMMERCIAL

## 2024-10-24 DIAGNOSIS — F33.41 MAJOR DEPRESSIVE DISORDER, RECURRENT, IN PARTIAL REMISSION (H): Primary | ICD-10-CM

## 2024-10-24 NOTE — TELEPHONE ENCOUNTER
Received call from patient's niece requesting Valproic Acid refill, which was prescribed upon discharge from the hospital. Patient will be out in the next couple days. Per Hospital Notes:      [facility] [address], [place] - [address]       GRAHAM GuillermoN PEGGY  [facility], [facility]

## 2024-10-24 NOTE — TELEPHONE ENCOUNTER
Called and notified pharmacy of the directions below per PCP.    Obdulia BSN RN  Triage Nurse  [facility]

## 2024-10-25 ENCOUNTER — TELEPHONE (OUTPATIENT)
Dept: FAMILY MEDICINE | Facility: CLINIC | Age: 69
End: 2024-10-25
Payer: COMMERCIAL

## 2024-10-25 NOTE — TELEPHONE ENCOUNTER
Rx(s) sent.    1. Major depressive disorder, recurrent, in partial remission (H) (Primary)  - valproic acid (DEPAKENE) 250 MG/5ML syrup; Take 10 mLs (500 mg) by mouth or G tube every morning.  Dispense: 473 mL; Refill: 1  - valproic acid (DEPAKENE) 250 MG/5ML syrup; Take 5 mLs (250 mg) by mouth or G tube at bedtime.  Dispense: 473 mL; Refill: 1

## 2024-10-25 NOTE — TELEPHONE ENCOUNTER
[facility] pharmacy calling regarding Valproic Acid, as there are two prescriptions of Valproic Acid with specifications for patient to take in the morning and one at night.     They are calling to confirm this was intended. See TE from yesterday. Confirmed this was intended, as hospital had prescribed it this way and patient's niece requesting prescription as this way.    GRAHAM TongN RN  [facility]

## 2024-10-29 ENCOUNTER — TELEPHONE (OUTPATIENT)
Dept: FAMILY MEDICINE | Facility: CLINIC | Age: 69
End: 2024-10-29
Payer: COMMERCIAL

## 2024-10-29 NOTE — TELEPHONE ENCOUNTER
Please verify what is needed. Is this 5 mg tack a 1/4 tab every bedtime? Writer not sure what is being asked for.

## 2024-10-29 NOTE — TELEPHONE ENCOUNTER
Called pharmacy regarding the 2.5 mg tablets of Zyprexa.    Looks like patient was taking a full tablet instead of 1/2 tablet every morning. Script states; Place 0.5 tablets (1.25 mg) into G tube every morning     Looks like it was filled on [facility], on 10/2/24 ? If this was one tab per day then.    Per pharmacy will need to call patient to clarify dosage.    Is patient also getting meds through [facility]?      Called [phone] (home) [facility]    Unable to leave a message, as there was no answer or no voicemail.  Obdulia STEVENSONN RN  Triage Nurse  [facility]           The patient feels that the cataract is significantly impacting daily activities and has elected cataract surgery. The risks, benefits, and alternatives to surgery were discussed. The patient elects to proceed with surgery.

## 2024-10-29 NOTE — TELEPHONE ENCOUNTER
OLANZapine (ZYPREXA) 2.5 MG tablet - Patient was using 1qb by mistake and is now out. Insurance won't cover until Nov.19th. Can we get a RX for 5 mg 1/4 qb so insurance covers it?

## 2024-10-30 ENCOUNTER — APPOINTMENT (OUTPATIENT)
Dept: INTERPRETER SERVICES | Facility: CLINIC | Age: 69
End: 2024-10-30
Payer: MEDICARE

## 2024-10-30 ENCOUNTER — TRANSFERRED RECORDS (OUTPATIENT)
Dept: HEALTH INFORMATION MANAGEMENT | Facility: CLINIC | Age: 69
End: 2024-10-30
Payer: MEDICARE

## 2024-10-30 NOTE — TELEPHONE ENCOUNTER
With the assistance of an [facility] , called both phone numbers listed. We were able to leave a message on the home phone [phone].     Patient instructed to call the clinic at [phone] & ask to speak with a Triage Nurse.    Grace STEVENSONN  [facility]

## 2024-10-31 NOTE — TELEPHONE ENCOUNTER
I called   Services, placed call to patient with assistance of [facility]  #[phone].    Zita answered.   We have consent to communicate.    Turns out they got this figured out, has plenty of 2.5 mg tabs left.    Lisa NINO RN  [facility] Triage

## 2024-11-01 ENCOUNTER — TELEPHONE (OUTPATIENT)
Dept: FAMILY MEDICINE | Facility: CLINIC | Age: 69
End: 2024-11-01
Payer: MEDICARE

## 2024-11-01 NOTE — TELEPHONE ENCOUNTER
Forms received from: [employer]   Phone number listed: [phone]   Fax listed: [phone]  Date received: 11/1/24  Form description: Order ID [id number]  Once forms are completed, please return to [employer] via fax.  Is patient requesting to be contacted when forms are completed: na  Phone: na  Form placed: Dr. Winters's in basket

## 2024-11-01 NOTE — TELEPHONE ENCOUNTER
Forms received from:  [facility]   Phone number listed: [phone]   Fax listed: [phone]  Date received: 10/30/24  Form description: Written Confirmation of Verbal order/prescription  Once forms are completed, please return to  [facility] via fax.  Is patient requesting to be contacted when forms are completed: na  Phone: na  Form placed: Dr. Winters's in basket

## 2024-11-05 ENCOUNTER — MEDICAL CORRESPONDENCE (OUTPATIENT)
Dept: HEALTH INFORMATION MANAGEMENT | Facility: CLINIC | Age: 69
End: 2024-11-05
Payer: MEDICARE

## 2024-11-05 ENCOUNTER — TELEPHONE (OUTPATIENT)
Dept: FAMILY MEDICINE | Facility: CLINIC | Age: 69
End: 2024-11-05
Payer: MEDICARE

## 2024-11-05 NOTE — TELEPHONE ENCOUNTER
Forms received from: [facility]   Phone number listed: [phone]   Fax listed: [phone]  Date received: 11/5/24  Form description: Order ID [id number]  Once forms are completed, please return to [facility] via fax.  Is patient requesting to be contacted when forms are completed: na  Phone: na  Form placed: Dr. Winters's in basket

## 2024-11-06 ENCOUNTER — TELEPHONE (OUTPATIENT)
Dept: FAMILY MEDICINE | Facility: CLINIC | Age: 69
End: 2024-11-06
Payer: MEDICARE

## 2024-11-06 NOTE — TELEPHONE ENCOUNTER
RE:Olanzapine. Pt was using 1 qb by mistake and is now out. Ins won't cover until Nov. 19th. Can we get a Rx for 5 mg 1/4 qb so insurance will cover it? Thanks!

## 2024-11-07 ENCOUNTER — OFFICE VISIT (OUTPATIENT)
Dept: FAMILY MEDICINE | Facility: CLINIC | Age: 69
End: 2024-11-07
Payer: COMMERCIAL

## 2024-11-07 VITALS
HEART RATE: 79 BPM | OXYGEN SATURATION: 94 % | TEMPERATURE: 97.5 F | DIASTOLIC BLOOD PRESSURE: 66 MMHG | RESPIRATION RATE: 20 BRPM | SYSTOLIC BLOOD PRESSURE: 122 MMHG

## 2024-11-07 DIAGNOSIS — R60.9 EDEMA, UNSPECIFIED TYPE: ICD-10-CM

## 2024-11-07 DIAGNOSIS — Z93.1 PEG (PERCUTANEOUS ENDOSCOPIC GASTROSTOMY) STATUS (H): Primary | ICD-10-CM

## 2024-11-07 DIAGNOSIS — F39 MOOD DISORDER (H): ICD-10-CM

## 2024-11-07 DIAGNOSIS — F33.41 MAJOR DEPRESSIVE DISORDER, RECURRENT, IN PARTIAL REMISSION (H): ICD-10-CM

## 2024-11-07 PROCEDURE — 99214 OFFICE O/P EST MOD 30 MIN: CPT | Performed by: FAMILY MEDICINE

## 2024-11-07 RX ORDER — PANTOPRAZOLE SODIUM 40 MG/1
TABLET, DELAYED RELEASE ORAL
COMMUNITY
Start: 2024-11-07

## 2024-11-07 ASSESSMENT — ENCOUNTER SYMPTOMS
APPETITE CHANGE: 0
EYES NEGATIVE: 1
WEAKNESS: 0
WHEEZING: 0
PALPITATIONS: 0
HEMATOLOGIC/LYMPHATIC NEGATIVE: 1
FATIGUE: 0
COUGH: 0
GASTROINTESTINAL NEGATIVE: 1
DIZZINESS: 0
NERVOUS/ANXIOUS: 0
COLOR CHANGE: 0
ACTIVITY CHANGE: 0
SLEEP DISTURBANCE: 0
ENDOCRINE NEGATIVE: 1
CHILLS: 0
ALLERGIC/IMMUNOLOGIC NEGATIVE: 1
SHORTNESS OF BREATH: 0
AGITATION: 0
HEADACHES: 0

## 2024-11-07 NOTE — PATIENT INSTRUCTIONS
Paul Haines,    Thank you for allowing [facility] to manage your care.    I sent your prescriptions to your pharmacy.    Keep upcoming gastroenterology appointment.     If you have any questions or concerns, please feel free to call us at [phone].    Sincerely,    Dr. Winters    Did you know?      You can schedule a video visit for follow-up appointments as well as future appointments for certain conditions.  Please see the below link.     [email or web address]    If you have not already done so,  I encourage you to sign up for [facility] ([email or web address]).  This will allow you to review your results, securely communicate with a provider, and schedule virtual visits as well.

## 2024-11-07 NOTE — PROGRESS NOTES
1. PEG (percutaneous endoscopic gastrostomy) status (H) (Primary)  S/P placement on 9/16 due intentional bleach ingestion resulting in perforated esophagus.  Keep upcoming appointment with gastroenterology to determine PEG tube status.     2. Edema, unspecified type  Most recent labs reviewed.   - Compression Sleeve/Stocking Order    3. Major depressive disorder, recurrent, in partial remission (H)  Chronic.  Continue with Escitalopram   - valproic acid (DEPAKENE) 250 MG/5ML syrup; Take 10 mLs (500 mg) by mouth or G tube every morning.  Dispense: 473 mL; Refill: 1    4. Mood disorder (H)  Chronic.  Continue with Zyprexa. Continue to follow up Psychiatry.       Lexy Haines is a 69 year old, presenting for the following health issues:  Gastrointestinal Problem        11/7/2024     8:51 AM   Additional Questions   Roomed by KATJA   Accompanied by petar         11/7/2024   Declines Weight   Did patient decline having their weight taken? Yes          11/7/2024     8:51 AM   Patient Reported Additional Medications   Patient reports taking the following new medications Multiple medications missing     History of Present Illness       Reason for visit:  Follow up    She eats 0-1 servings of fruits and vegetables daily.She consumes 1 sweetened beverage(s) daily.She exercises with enough effort to increase her heart rate 9 or less minutes per day.  She exercises with enough effort to increase her heart rate 3 or less days per week.   She is taking medications regularly.   Patient would like to discuss sweaty feet    PEG tube dependent: Patient is scheduled to follow-up with GI this upcoming Monday.  Continues to get home health.  Regarding her mood disorder, she is tolerating olanzapine.  Has not required insulin.  As of today, patient states of intermittent bilateral leg swelling.  Patient states of normal urine.       Review of Systems   Constitutional:  Negative for activity change, appetite change, chills and  fatigue.   HENT: Negative.     Eyes: Negative.    Respiratory:  Negative for cough, shortness of breath and wheezing.    Cardiovascular:  Negative for chest pain and palpitations.   Gastrointestinal: Negative.    Endocrine: Negative.    Skin:  Negative for color change and rash.   Allergic/Immunologic: Negative.    Neurological:  Negative for dizziness, weakness and headaches.   Hematological: Negative.    Psychiatric/Behavioral:  Negative for agitation and sleep disturbance. The patient is not nervous/anxious.            Objective    /66   Pulse 79   Temp 97.5  F (36.4  C) (Temporal)   Resp 20   LMP  (LMP Unknown)   SpO2 94%   There is no height or weight on file to calculate BMI.  Physical Exam  Constitutional:       General: She is not in acute distress.     Appearance: She is well-developed.   HENT:      Head: Normocephalic and atraumatic.      Nose: Nose normal.   Eyes:      Conjunctiva/sclera: Conjunctivae normal.   Neck:      Trachea: No tracheal deviation.   Cardiovascular:      Rate and Rhythm: Normal rate and regular rhythm.      Heart sounds: Normal heart sounds.   Pulmonary:      Effort: Pulmonary effort is normal. No respiratory distress.      Breath sounds: Normal breath sounds.   Abdominal:      Comments: G-tube intact   Musculoskeletal:         General: Normal range of motion.      Cervical back: Normal range of motion.   Skin:     General: Skin is warm.   Neurological:      Mental Status: She is alert and oriented to person, place, and time.   Psychiatric:         Behavior: Behavior normal.              Signed Electronically by: Abundio Winters DO  DME (Durable Medical Equipment) Orders and Documentation  Orders Placed This Encounter   Procedures    Compression Sleeve/Stocking Order        The patient was assessed and it was determined the patient is in need of the following listed DME Supplies/Equipment. Please complete supporting documentation below to demonstrate medical necessity.

## 2024-11-08 ENCOUNTER — TELEPHONE (OUTPATIENT)
Dept: FAMILY MEDICINE | Facility: CLINIC | Age: 69
End: 2024-11-08

## 2024-11-08 NOTE — TELEPHONE ENCOUNTER
Forms received from: [facility]   Phone number listed: [phone]   Fax listed: [phone]  Date received: 11/8/24  Form description: Order ID [id number]   Once forms are completed, please return to [facility] via fax.  Is patient requesting to be contacted when forms are completed: na  Phone: na  Form placed: Dr. Winters's in basket

## 2024-11-08 NOTE — TELEPHONE ENCOUNTER
Home Care is calling regarding an established patient with [facility].       Requesting orders from: Abundio Winters  Provider is following patient: Yes  Is this a 60-day recertification request?  No    Orders Requested    Skilled Nursing  Request for continuation of care with increase in frequency  Frequency:  1x/wk for 3 wks  1 time in 4 days    Pt requesting HC to visit her after seeing GI     Information was gathered and will be sent to provider for review.  RN will contact Home Care with information after provider review.  Confirmed ok to leave a detailed message with call back.  Contact information confirmed and updated as needed.    Liz Breen RN

## 2024-11-12 ENCOUNTER — MEDICAL CORRESPONDENCE (OUTPATIENT)
Dept: HEALTH INFORMATION MANAGEMENT | Facility: CLINIC | Age: 69
End: 2024-11-12

## 2024-11-12 ENCOUNTER — OFFICE VISIT (OUTPATIENT)
Dept: FAMILY MEDICINE | Facility: CLINIC | Age: 69
End: 2024-11-12
Payer: COMMERCIAL

## 2024-11-12 VITALS
TEMPERATURE: 98.1 F | BODY MASS INDEX: 31.07 KG/M2 | HEART RATE: 78 BPM | HEIGHT: 64 IN | OXYGEN SATURATION: 97 % | RESPIRATION RATE: 16 BRPM | DIASTOLIC BLOOD PRESSURE: 72 MMHG | WEIGHT: 182 LBS | SYSTOLIC BLOOD PRESSURE: 118 MMHG

## 2024-11-12 DIAGNOSIS — I10 BENIGN ESSENTIAL HYPERTENSION: ICD-10-CM

## 2024-11-12 DIAGNOSIS — E78.5 HYPERLIPIDEMIA WITH TARGET LDL LESS THAN 130: ICD-10-CM

## 2024-11-12 DIAGNOSIS — K22.3 ESOPHAGEAL PERFORATION: ICD-10-CM

## 2024-11-12 DIAGNOSIS — K21.9 GASTROESOPHAGEAL REFLUX DISEASE WITHOUT ESOPHAGITIS: ICD-10-CM

## 2024-11-12 DIAGNOSIS — K22.2 STRICTURE AND STENOSIS OF ESOPHAGUS: ICD-10-CM

## 2024-11-12 DIAGNOSIS — F33.41 MAJOR DEPRESSIVE DISORDER, RECURRENT, IN PARTIAL REMISSION (H): ICD-10-CM

## 2024-11-12 DIAGNOSIS — Z01.818 PREOP GENERAL PHYSICAL EXAM: Primary | ICD-10-CM

## 2024-11-12 PROCEDURE — 99215 OFFICE O/P EST HI 40 MIN: CPT | Performed by: INTERNAL MEDICINE

## 2024-11-12 ASSESSMENT — PAIN SCALES - GENERAL: PAINLEVEL_OUTOF10: NO PAIN (0)

## 2024-11-12 NOTE — TELEPHONE ENCOUNTER
Called and notified Alisia of the orders below.    Obdulia STEVENSONN RN  Triage Nurse  [facility]

## 2024-11-12 NOTE — PROGRESS NOTES
Preoperative Evaluation  [facility]  [address] [facility] [address]  Phone: [phone]  Primary Provider: Abundio Winters,   Pre-op Performing Provider: Corby Becerra MD  Nov 12, 2024 11/12/2024   Surgical Information   What procedure is being done? ESOPHAGOGASTRODUODENOSCOPY WITH DILATION    Facility or Hospital where procedure/surgery will be performed: [facility]   Who is doing the procedure / surgery? Grace Self    Date of surgery / procedure: 11/13/2024    Time of surgery / procedure: 730    Where do you plan to recover after surgery? Other         Patient-reported     Fax number for surgical facility: Note does not need to be faxed, will be available electronically in Epic.    Assessment & Plan     The proposed surgical procedure is considered LOW risk.    (Z01.818) Preop general physical exam  (primary encounter diagnosis)  Comment: She is scheduled for upper GI endoscopy and possible esophageal dilatation.  She has a history of an intentional ingestion of household bleach.  There was a question of esophageal perforation.  There was some scarring of the esophagus.  She has had 2 prior dilatations in the past.    Plan:  She is cleared for upper GI endoscopy with possible dilatation    (K21.9) Gastroesophageal reflux disease without esophagitis  (K22.3) Esophageal perforation  (K22.2) Stricture and stenosis of esophagus  Comment: Household bleach esophageal injury with possible perforation as above  Plan: Hold Carafate and Protonix in the morning    (E78.5) Hyperlipidemia with target LDL less than 130  Comment: On Lipitor  Plan: Continue Lipitor    (I10) Benign essential hypertension  Comment: On Coreg  Plan: Hold Coreg in the morning prior to surgery    (F33.41) Major depressive disorder, recurrent, in partial remission (H)  Comment: Noted.  Per family her symptoms are well-controlled at this time  Plan: Hold medications on the morning of surgery     - No  identified additional risk factors other than previously addressed         Recommendation  Approval given to proceed with proposed procedure, without further diagnostic evaluation.    Lexy Haines is a 69 year old, presenting for the following:  Pre-Op Exam          11/12/2024    10:46 AM   Additional Questions   Roomed by Elena SCHMIDT   Accompanied by Self         11/12/2024   Forms   Any forms needing to be completed Yes        HPI related to upcoming procedure:   69-year-old female with a history of mental health issues had an intentional ingestion of household bleach this last summer.  There was concern for esophageal perforation.  It is not clear if this was ever documented.  She did have scarring of her esophagus.  She has had 2 prior esophageal dilatations.  She  had a feeding tube placed during her hospitalization.  She has been getting tube feedings since that time.  She has been on Creon to help with digestion.  Her albumin levels have been slightly on the low side, most recently 3.2.  She was getting insulin intermittently for elevated blood sugars.  Her family reports that she has not required insulin for a while.  She has a history of supraventricular tachycardia in the past.  Her EKG dated 7/31/2024 showed normal sinus rhythm.  She is going to have another upper GI endoscopy with possible dilatation.  She was advised to hold her tube feedings and medications after 11:00 the evening prior to her endoscopy.  She should not take any of her morning medications.  It is likely that she will be able to restart her medications after the procedure if everything goes well.      11/12/2024   Pre-Op Questionnaire   Have you ever had a heart attack or stroke? No    Have you ever had surgery on your heart or blood vessels, such as a stent placement, a coronary artery bypass, or surgery on an artery in your head, neck, heart, or legs? No    Do you have chest pain with activity? No    Do you have a history of  heart failure? No    Do you currently have a cold, bronchitis or symptoms of other infection? No    Do you have a cough, shortness of breath, or wheezing? No    Do you or anyone in your family have previous history of blood clots? No    Do you or does anyone in your family have a serious bleeding problem such as prolonged bleeding following surgeries or cuts? No    Have you ever had problems with anemia or been told to take iron pills? No    Have you had any abnormal blood loss such as black, tarry or bloody stools, or abnormal vaginal bleeding? No    Have you ever had a blood transfusion? No    Are you willing to have a blood transfusion if it is medically needed before, during, or after your surgery? (!) NO     Have you or any of your relatives ever had problems with anesthesia? No    Do you have sleep apnea, excessive snoring or daytime drowsiness? No    Do you have any artifical heart valves or other implanted medical devices like a pacemaker, defibrillator, or continuous glucose monitor? No    Do you have artificial joints? No    Are you allergic to latex? No        Patient-reported     Health Care Directive  Patient does not have a Health Care Directive:             Patient Active Problem List    Diagnosis Date Noted    Pneumomediastinum (H) 10/22/2024     Priority: Medium    Esophageal perforation 08/13/2024     Priority: Medium    Gastroesophageal reflux disease without esophagitis 07/08/2024     Priority: Medium    Mood disorder (H) 07/08/2024     Priority: Medium    Major depressive disorder, recurrent, in partial remission (H) 03/29/2024     Priority: Medium    Telephone  service required 10/10/2023     Priority: Medium    Senile ectropion of left lower eyelid 07/05/2023     Priority: Medium    Senile ectropion of right lower eyelid 07/05/2023     Priority: Medium    Dermatochalasis of both upper eyelids 07/05/2023     Priority: Medium    Involutional ptosis, acquired, bilateral  07/05/2023     Priority: Medium    Hyponatremia 04/20/2023     Priority: Medium    Syncope, unspecified syncope type 04/20/2023     Priority: Medium    Neck pain 05/04/2022     Priority: Medium    Right wrist injury, subsequent encounter 02/17/2022     Priority: Medium    Pseudophakia of both eyes 06/09/2021     Priority: Medium    Chronic midline low back pain without sciatica 05/31/2021     Priority: Medium    Right wrist pain 05/07/2020     Priority: Medium    Benign essential hypertension 12/19/2016     Priority: Medium    Right lumbar radiculopathy 11/10/2016     Priority: Medium    Primary open angle glaucoma of both eyes, moderate stage 09/02/2016     Priority: Medium    Senile nuclear sclerosis, bilateral 09/02/2016     Priority: Medium    Shingles      Priority: Medium     11/14/15 Left approximately C6 distribution      Disorder of bursae and tendons in shoulder region 03/11/2015     Priority: Medium     Problem list name updated by automated process. Provider to review      Hyperlipidemia with target LDL less than 130 09/15/2014     Priority: Medium     Diagnosis updated by automated process. Provider to review and confirm.      Prediabetes 09/15/2014     Priority: Medium      Past Medical History:   Diagnosis Date    Bipolar disorder (H)     Blepharitis     Esophageal reflux (aka GERD)     Glaucoma     Hyperlipidemia LDL goal < 130     [place] cataract     Shingles     11/14/15 Left approximately C6 distribution     Past Surgical History:   Procedure Laterality Date    CATARACT IOL, RT/LT Right 10/16/2018    [place] [facility]     CATARACT IOL, RT/LT Left 11/01/2018    [place] [facility]     CHALAZION EXCISION  08/21/2015    Left lid    COMBINED REPAIR PTOSIS WITH BLEPHAROPLASTY BILATERAL Bilateral 10/12/2023    Procedure: Both upper eyelid blepharoplasty, ptosis repair, right internal browpexy;  Surgeon: Mine Munoz MD;   [place] [facility] [place]    HERNIA REPAIR      abdominal hernia repair 2012    REPAIR ECTROPION BILATERAL Bilateral 10/12/2023    Procedure: Both lower eyelid ectropion repair;  Surgeon: Mine Munoz MD;  [place] [facility] [place]     Current Outpatient Medications   Medication Sig Dispense Refill    atorvastatin (LIPITOR) 20 MG tablet Place 20 mg into G tube daily.      carvedilol (COREG) 6.25 MG tablet Take 1 tablet (6.25 mg) by mouth or Feeding Tube 2 times daily (with meals). 180 tablet 3    Continuous Glucose Sensor (FREESTYLE BOOGIE 3 PLUS SENSOR) MISC Use 1 sensor every 15 days. Use to read blood sugars per 's instructions. 6 each 3    Continuous Glucose Sensor (FREESTYLE BOOGIE 3 PLUS SENSOR) MISC Use 1 sensor every 15 days. Use to read blood sugars per 's instructions. 6 each 3    escitalopram (LEXAPRO) 10 MG tablet Take 1 tablet (10 mg) by mouth or Feeding Tube daily. 90 tablet 3    gabapentin (NEURONTIN) 300 MG capsule Take 1 capsule (300 mg) by mouth or Feeding Tube at bedtime. 90 capsule 3    Insulin Aspart FlexPen 100 UNIT/ML SOPN Inject 0-18 Units subcutaneously every 6 hours.      lipase-protease-amylase (CREON 12) 45788-00143-21339 units CPEP Take four times daily by mouth or feeding tube with meals and at bedtime. 360 capsule 3    OLANZapine (ZYPREXA) 2.5 MG tablet Place 0.5 tablets (1.25 mg) into G tube every morning. 90 tablet 0    OLANZapine zydis (ZYPREXA) 15 MG ODT Take 1 tablet (15 mg) by mouth at bedtime. 90 tablet 0    pantoprazole (PROTONIX) 40 MG EC tablet Take 1 tablet po or via Gtube BID      sucralfate (CARAFATE) 1 GM tablet Take 1 tablet (1 g) by mouth 4 times daily. 360 tablet 0    valproic acid (DEPAKENE) 250 MG/5ML syrup Take 10 mLs (500 mg) by mouth or G tube every morning. 473 mL 1    valproic acid (DEPAKENE) 250 MG/5ML syrup Take 5 mLs (250 mg) by mouth or G tube at bedtime. 473 mL 1       Allergies   Allergen Reactions    Isoniazid Other (See Comments) and Nausea  "and Vomiting     INH (medication) treatment for TB caused liver side effects        Social History     Tobacco Use    Smoking status: Never     Passive exposure: Never    Smokeless tobacco: Never   Substance Use Topics    Alcohol use: No     Family History   Problem Relation Age of Onset    Glaucoma Mother     Macular Degeneration No family hx of     Cancer No family hx of     Diabetes No family hx of     Hypertension No family hx of     Cerebrovascular Disease No family hx of     Thyroid Disease No family hx of     Eye Surgery No family hx of      History   Drug Use             Review of Systems  Constitutional, HEENT, cardiovascular, pulmonary, gi and gu systems are negative, except as otherwise noted.    Objective    LMP  (LMP Unknown)    Estimated body mass index is 31.62 kg/m  as calculated from the following:    Height as of 10/22/24: 1.626 m (5' 4\").    Weight as of 10/22/24: 83.6 kg (184 lb 3.2 oz).  Physical Exam  GENERAL: alert and no distress  EYES: Eyes grossly normal to inspection, PERRL and conjunctivae and sclerae normal  HENT: ear canals and TM's normal, nose and mouth without ulcers or lesions  NECK: no adenopathy, no asymmetry, masses, or scars  RESP: lungs clear to auscultation - no rales, rhonchi or wheezes  CV: regular rate and rhythm, normal S1 S2, no S3 or S4, no murmur, click or rub, no peripheral edema  ABDOMEN: soft, nontender, no hepatosplenomegaly, no masses and bowel sounds normal, PEG tube site clear, no drainage  MS: no gross musculoskeletal defects noted, no edema  SKIN: no suspicious lesions or rashes  NEURO: Normal strength and tone, mentation intact and speech normal  PSYCH: mentation appears normal, affect normal/bright    Recent Labs   Lab Test 10/22/24  1451 07/31/24  1508 07/07/24  1223   HGB 10.5* 13.1 13.2    244 242   INR  --   --  1.01    132* 133*   POTASSIUM 4.8 4.0 4.2   CR 0.65 0.62 0.75   A1C 5.4 5.9*  --         Diagnostics  No labs were ordered during " this visit.   No EKG this visit, completed in the last 90 days.    Revised Cardiac Risk Index (RCRI)  The patient has the following serious cardiovascular risks for perioperative complications:   - No serious cardiac risks = 0 points     RCRI Interpretation: 0 points: Class I (very low risk - 0.4% complication rate)         Signed Electronically by: Corby Becerra MD  A copy of this evaluation report is provided to the requesting physician.

## 2024-11-13 ENCOUNTER — TRANSFERRED RECORDS (OUTPATIENT)
Dept: HEALTH INFORMATION MANAGEMENT | Facility: CLINIC | Age: 69
End: 2024-11-13

## 2024-11-13 NOTE — CONFIDENTIAL NOTE
RECORDS RECEIVED FROM: internal /ce    DATE RECEIVED: 11.13.24    NOTES STATUS DETAILS   OFFICE NOTE from referring provider internal  MARISSA Martinez    DISCHARGE REPORT from the ER Ce  [facility]- 8.13.24    MEDICATION LIST internal     IMAGING  (NEED IMAGES AND REPORTS)     CT SCAN ce [facility]- 9.11.24 , 8.28.24, 8.21.24, 8.16.24 more in epic    CHEST XRAY (CXR) ce [facility]- 8.18.24,    TESTS     PULMONARY FUNCTION TESTING (PFT) internal  4/18/24        Action 11.13.24 sv    Action Taken Message sent to nurse pool to place PFT order

## 2024-11-14 ENCOUNTER — TELEPHONE (OUTPATIENT)
Dept: FAMILY MEDICINE | Facility: CLINIC | Age: 69
End: 2024-11-14
Payer: MEDICARE

## 2024-11-14 ENCOUNTER — MEDICAL CORRESPONDENCE (OUTPATIENT)
Dept: HEALTH INFORMATION MANAGEMENT | Facility: CLINIC | Age: 69
End: 2024-11-14
Payer: MEDICARE

## 2024-11-14 NOTE — TELEPHONE ENCOUNTER
Forms received from: [facility]   Phone number listed: [phone]   Fax listed: [phone]  Date received: 11/13/24  Form description: Order ID [id number]  Once forms are completed, please return to [facility] via fax.  Is patient requesting to be contacted when forms are completed: na  Phone: na  Form placed: Dr. Winters's in basket

## 2024-11-15 ENCOUNTER — TELEPHONE (OUTPATIENT)
Dept: FAMILY MEDICINE | Facility: CLINIC | Age: 69
End: 2024-11-15
Payer: MEDICARE

## 2024-11-25 ENCOUNTER — TELEPHONE (OUTPATIENT)
Dept: FAMILY MEDICINE | Facility: CLINIC | Age: 69
End: 2024-11-25
Payer: MEDICARE

## 2024-11-25 NOTE — TELEPHONE ENCOUNTER
[place] Per speech therapist, patient's swallowing is back to normal, but she is still on a soft diet per GI.    Pt needs consult for dietician- she needs this by telephone.    She needs to see a dietician in order to wean off the tube feed.        Home Care is calling regarding an established patient with [facility].       Requesting orders from: Abundio Winters  Provider is following patient: Yes  Is this a 60-day recertification request?  No    Orders Requested    Dietary Consult/Visit        PLEASE PLACE ORDER FOR DIETARY CONSULT - E CONSULT    Confirmed ok to leave a detailed message with call back.  Contact information confirmed and updated as needed.    Alessia Izquierdo RN

## 2024-11-26 ENCOUNTER — PATIENT OUTREACH (OUTPATIENT)
Dept: GERIATRIC MEDICINE | Facility: CLINIC | Age: 69
End: 2024-11-26
Payer: MEDICARE

## 2024-11-26 NOTE — PROGRESS NOTES
[facility] Care Coordination Contact  CC received notification of Emergency Room visit.  ER visit occurred on 11/25/24 at [facility]  with Dx of Problem with gastrostomy tube .    CC contacted adult daughter Javier daughter,  and reviewed discharge summary.  Member has a follow-up appointment with PCP: No: Offered Assistance with setting up a follow up appointment  Member has had a change in condition: No  New referrals placed: No  Home Visit Needed: No  Support Plan reviewed and updated.  PCP notified of ED visit via EMR.    RICARDO Lopez  [facility]  [phone]

## 2024-11-27 ENCOUNTER — TELEPHONE (OUTPATIENT)
Dept: FAMILY MEDICINE | Facility: CLINIC | Age: 69
End: 2024-11-27
Payer: MEDICARE

## 2024-11-27 NOTE — TELEPHONE ENCOUNTER
Forms received from: [employer] [facility]   Phone number listed: [phone]   Fax listed: [phone]  Date received: 11/27/24  Form description: Undated patient reported medications  Form placed: Providers kashif Cortez

## 2024-12-03 ENCOUNTER — TELEPHONE (OUTPATIENT)
Dept: FAMILY MEDICINE | Facility: CLINIC | Age: 69
End: 2024-12-03
Payer: MEDICARE

## 2024-12-03 ENCOUNTER — PATIENT OUTREACH (OUTPATIENT)
Dept: GERIATRIC MEDICINE | Facility: CLINIC | Age: 69
End: 2024-12-03
Payer: MEDICARE

## 2024-12-03 NOTE — TELEPHONE ENCOUNTER
Forms received from: [facility] [employer]    Phone number listed: [phone]   Fax listed: [phone]  Date received: 12/3/24  Form description: Gloves  Once forms are completed, please return to [facility] [employer] via fax.  Is patient requesting to be contacted when forms are completed: na  Phone: na  Form placed: Dr. Winters's in basket

## 2024-12-03 NOTE — PROGRESS NOTES
[facility] Care Coordination Contact    Updated Epic to change annual due date.    PRINCESS Jacobson  [facility]  [phone]

## 2024-12-05 ENCOUNTER — MEDICAL CORRESPONDENCE (OUTPATIENT)
Dept: HEALTH INFORMATION MANAGEMENT | Facility: CLINIC | Age: 69
End: 2024-12-05
Payer: MEDICARE

## 2024-12-09 ENCOUNTER — PRE VISIT (OUTPATIENT)
Dept: PULMONOLOGY | Facility: CLINIC | Age: 69
End: 2024-12-09
Payer: MEDICARE

## 2024-12-20 ENCOUNTER — TRANSFERRED RECORDS (OUTPATIENT)
Dept: HEALTH INFORMATION MANAGEMENT | Facility: CLINIC | Age: 69
End: 2024-12-20
Payer: MEDICARE

## 2024-12-26 DIAGNOSIS — H40.1132 PRIMARY OPEN ANGLE GLAUCOMA OF BOTH EYES, MODERATE STAGE: Primary | ICD-10-CM

## 2024-12-30 DIAGNOSIS — Z93.1 PEG (PERCUTANEOUS ENDOSCOPIC GASTROSTOMY) STATUS (H): ICD-10-CM

## 2024-12-30 NOTE — TELEPHONE ENCOUNTER
Medication Question or Refill    What medication are you calling about (include dose and sig)?: Sucralfate 1 MG tablet    Preferred Pharmacy:  [facility] [address] [place], [place] [address] [facility]  [address] [address]  Phone: [phone] Fax: [phone]      Controlled Substance Agreement on file:   CSA -- Patient Level:    CSA: None found at the patient level.       Who prescribed the medication?:     Do you need a refill? Yes      Do you have any questions or concerns?  Yes      Okay to leave a detailed message?: Yes at Cell number on file:    Telephone Information:   Mobile [phone]

## 2024-12-31 ENCOUNTER — OFFICE VISIT (OUTPATIENT)
Dept: OPHTHALMOLOGY | Facility: CLINIC | Age: 69
End: 2024-12-31
Attending: OPHTHALMOLOGY
Payer: MEDICARE

## 2024-12-31 DIAGNOSIS — H11.232 SYMBLEPHARON OF LEFT EYE: ICD-10-CM

## 2024-12-31 DIAGNOSIS — H40.1132 PRIMARY OPEN ANGLE GLAUCOMA OF BOTH EYES, MODERATE STAGE: Primary | ICD-10-CM

## 2024-12-31 DIAGNOSIS — H02.115 CICATRICIAL ECTROPION OF LEFT LOWER EYELID: ICD-10-CM

## 2024-12-31 DIAGNOSIS — H04.123 DRY EYES, BILATERAL: ICD-10-CM

## 2024-12-31 PROCEDURE — 92083 EXTENDED VISUAL FIELD XM: CPT | Performed by: OPHTHALMOLOGY

## 2024-12-31 PROCEDURE — 92133 CPTRZD OPH DX IMG PST SGM ON: CPT | Performed by: OPHTHALMOLOGY

## 2024-12-31 PROCEDURE — G0463 HOSPITAL OUTPT CLINIC VISIT: HCPCS | Performed by: OPHTHALMOLOGY

## 2024-12-31 RX ORDER — TIMOLOL MALEATE 5 MG/ML
1 SOLUTION/ DROPS OPHTHALMIC
COMMUNITY
Start: 2024-11-15 | End: 2024-12-31

## 2024-12-31 RX ORDER — ERYTHROMYCIN 5 MG/G
OINTMENT OPHTHALMIC AT BEDTIME
Qty: 7 G | Refills: 4 | Status: SHIPPED | OUTPATIENT
Start: 2024-12-31

## 2024-12-31 RX ORDER — ERYTHROMYCIN 5 MG/G
OINTMENT OPHTHALMIC AT BEDTIME
COMMUNITY
Start: 2024-11-15 | End: 2024-12-31

## 2024-12-31 RX ORDER — TIMOLOL MALEATE 5 MG/ML
1 SOLUTION/ DROPS OPHTHALMIC EVERY MORNING
Qty: 10 ML | Refills: 3 | Status: SHIPPED | OUTPATIENT
Start: 2024-12-31

## 2024-12-31 ASSESSMENT — CONF VISUAL FIELD
OD_NORMAL: 1
OS_SUPERIOR_NASAL_RESTRICTION: 0
OD_SUPERIOR_TEMPORAL_RESTRICTION: 0
OD_SUPERIOR_NASAL_RESTRICTION: 0
OS_SUPERIOR_TEMPORAL_RESTRICTION: 0
OD_INFERIOR_TEMPORAL_RESTRICTION: 0
METHOD: COUNTING FINGERS
OS_NORMAL: 1
OS_INFERIOR_TEMPORAL_RESTRICTION: 0
OD_INFERIOR_NASAL_RESTRICTION: 0
OS_INFERIOR_NASAL_RESTRICTION: 0

## 2024-12-31 ASSESSMENT — TONOMETRY
OD_IOP_MMHG: 9
IOP_METHOD: TONOPEN
OS_IOP_MMHG: 9

## 2024-12-31 ASSESSMENT — VISUAL ACUITY
OD_PH_SC+: -1
OS_SC+: -2
OD_SC+: -1
METHOD: SNELLEN - LINEAR
OS_PH_SC+: +2
OD_PH_SC: 20/30
OD_SC: 20/40
OS_PH_SC: 20/40
OS_SC: 20/40

## 2024-12-31 ASSESSMENT — SLIT LAMP EXAM - LIDS: COMMENTS: PTOSIS

## 2024-12-31 ASSESSMENT — CUP TO DISC RATIO
OD_RATIO: 0.6
OS_RATIO: 0.7

## 2024-12-31 ASSESSMENT — EXTERNAL EXAM - LEFT EYE: OS_EXAM: NORMAL

## 2024-12-31 ASSESSMENT — EXTERNAL EXAM - RIGHT EYE: OD_EXAM: NORMAL

## 2024-12-31 NOTE — NURSING NOTE
"Chief Complaints and History of Present Illnesses   Patient presents with    Follow Up     Chief Complaint(s) and History of Present Illness(es)       Follow Up              Course: stable    Associated symptoms: dryness, eye pain and photophobia.  Negative for flashes and floaters              Comments    Pt was admitted to hospital for about 2 months for \"an incident\" and was in a coma for maybe about 1.5 weeks. Pt and her caretaker aren't sure how long.     Ocular Meds, she'd like to make sure she has refills:   Systane QID each eye  Erythromycin at bedtime OU  Timolol QAM each eye    María Stone OA 1:47 PM December 31, 2024                      "

## 2024-12-31 NOTE — PROGRESS NOTES
"HPI       Follow Up    Since onset it is stable.  Associated symptoms include dryness, eye pain and photophobia.  Negative for flashes and floaters.             Comments    Pt was admitted to hospital for about 2 months for \"an incident\" and was in a coma for maybe about 1.5 weeks. Pt and her caretaker aren't sure how long.     Ocular Meds, she'd like to make sure she has refills:   Systane QID each eye  Erythromycin at bedtime OU  Timolol QAM each eye    María Stone OA 1:47 PM December 31, 2024             Last edited by María Stone on 12/31/2024  1:49 PM.          Review of systems for the eyes was negative other than the pertinent positives/negatives listed in the HPI.      Assessment & Plan      Yancy Rivera is a 69 year old female with the following diagnoses:   1. Primary open angle glaucoma of both eyes, moderate stage    2. Dry eyes, bilateral - Both Eyes    3. Symblepharon of left eye    4. Cicatricial ectropion of left lower eyelid          History obtained via son as interpretor   S/P Selected laser trabeculoplasty (SLT) both eyes Spring 2021  S/P BULB 10/2023  Visual field reliability improved today   OCT Nerve fiber layer stable   Intraocular pressure excellent   Has been using timolol in both eyes   We will continue every morning both eyes   Goal low teens both eyes      New symblepharon noted nasally left eye   Worsening Left lower lid ectopion  Refer to Dr. Cobos for eval/treatment  Return to [facility] for recheck of ectropion    Continue artificial tears four times a day both eyes   ESS agustin at bedtime as needed   Refractive options reviewed  Refraction given       Patient disposition:   Return in about 6 months (around 6/30/2025) for DFE, OCT NFL.           Attending Physician Attestation:  Complete documentation of historical and exam elements from today's encounter can be found in the full encounter summary report (not reduplicated in this progress note).  I personally obtained the " chief complaint(s) and history of present illness.  I confirmed and edited as necessary the review of systems, past medical/surgical history, family history, social history, and examination findings as documented by others; and I examined the patient myself.  I personally reviewed the relevant tests, images, and reports as documented above.  I formulated and edited as necessary the assessment and plan and discussed the findings and management plan with the patient and family. .The longitudinal plan of care for the diagnosis(es)/condition(s) as documented were addressed during this visit. Due to the added complexity in care, I will continue to support Yancy Rivera in the subsequent management and with the ongoing continuity of care  - Constantin Rollins MD

## 2025-01-01 DIAGNOSIS — Z93.1 PEG (PERCUTANEOUS ENDOSCOPIC GASTROSTOMY) STATUS (H): ICD-10-CM

## 2025-01-01 RX ORDER — SUCRALFATE 1 G/1
1 TABLET ORAL 4 TIMES DAILY
Qty: 360 TABLET | Refills: 0 | Status: SHIPPED | OUTPATIENT
Start: 2025-01-01

## 2025-01-02 RX ORDER — SUCRALFATE 1 G/1
1 TABLET ORAL 4 TIMES DAILY
Qty: 360 TABLET | Refills: 0 | Status: SHIPPED | OUTPATIENT
Start: 2025-01-02

## 2025-01-08 NOTE — CONFIDENTIAL NOTE
RECORDS RECEIVED FROM: internal    DATE RECEIVED:  2.12.25    NOTES STATUS DETAILS   OFFICE NOTE from referring provider internal    Davey Duncan MD      DISCHARGE REPORT from the ER ce 8.13.24 Ozzie    MEDICATION LIST internal     IMAGING  (NEED IMAGES AND REPORTS)     CT SCAN Ce  [facility]- 9.11.24, 8.28.24, 8.21.25, 8.16.24    CHEST XRAY (CXR) ce [facility]- 8.18.24, 8.13.24, 3.27.24, 4.16.23    Internal- 4.28.23, 2.27.23   TESTS     PULMONARY FUNCTION TESTING (PFT) internal  4/18/24       Action 1.8.25 sv    Action Taken Message sent to nurse pool to place PFT and CXR orders

## 2025-01-14 ENCOUNTER — OFFICE VISIT (OUTPATIENT)
Dept: FAMILY MEDICINE | Facility: CLINIC | Age: 70
End: 2025-01-14
Attending: FAMILY MEDICINE
Payer: MEDICARE

## 2025-01-14 VITALS
WEIGHT: 195.4 LBS | SYSTOLIC BLOOD PRESSURE: 130 MMHG | BODY MASS INDEX: 32.55 KG/M2 | DIASTOLIC BLOOD PRESSURE: 62 MMHG | OXYGEN SATURATION: 96 % | HEART RATE: 71 BPM | TEMPERATURE: 97.9 F | RESPIRATION RATE: 16 BRPM | HEIGHT: 65 IN

## 2025-01-14 DIAGNOSIS — F39 MOOD DISORDER: ICD-10-CM

## 2025-01-14 DIAGNOSIS — E78.5 HYPERLIPIDEMIA LDL GOAL <160: ICD-10-CM

## 2025-01-14 DIAGNOSIS — F29 PSYCHOSIS, UNSPECIFIED PSYCHOSIS TYPE (H): ICD-10-CM

## 2025-01-14 DIAGNOSIS — Z00.00 ENCOUNTER FOR MEDICARE ANNUAL WELLNESS EXAM: Primary | ICD-10-CM

## 2025-01-14 DIAGNOSIS — R25.1 TREMOR: ICD-10-CM

## 2025-01-14 DIAGNOSIS — E87.1 HYPONATREMIA: ICD-10-CM

## 2025-01-14 DIAGNOSIS — K94.20 PROBLEM WITH GASTROSTOMY TUBE (H): ICD-10-CM

## 2025-01-14 DIAGNOSIS — M06.9 RHEUMATOID ARTHRITIS, INVOLVING UNSPECIFIED SITE, UNSPECIFIED WHETHER RHEUMATOID FACTOR PRESENT (H): ICD-10-CM

## 2025-01-14 PROBLEM — J98.2 PNEUMOMEDIASTINUM (H): Status: RESOLVED | Noted: 2024-10-22 | Resolved: 2025-01-14

## 2025-01-14 LAB
ERYTHROCYTE [DISTWIDTH] IN BLOOD BY AUTOMATED COUNT: 15.6 % (ref 10–15)
HCT VFR BLD AUTO: 38.3 % (ref 35–47)
HGB BLD-MCNC: 12.3 G/DL (ref 11.7–15.7)
MCH RBC QN AUTO: 26.2 PG (ref 26.5–33)
MCHC RBC AUTO-ENTMCNC: 32.1 G/DL (ref 31.5–36.5)
MCV RBC AUTO: 82 FL (ref 78–100)
PLATELET # BLD AUTO: 254 10E3/UL (ref 150–450)
RBC # BLD AUTO: 4.7 10E6/UL (ref 3.8–5.2)
WBC # BLD AUTO: 6.2 10E3/UL (ref 4–11)

## 2025-01-14 PROCEDURE — 80061 LIPID PANEL: CPT | Performed by: FAMILY MEDICINE

## 2025-01-14 PROCEDURE — 80053 COMPREHEN METABOLIC PANEL: CPT | Performed by: FAMILY MEDICINE

## 2025-01-14 PROCEDURE — 99214 OFFICE O/P EST MOD 30 MIN: CPT | Mod: 25 | Performed by: FAMILY MEDICINE

## 2025-01-14 PROCEDURE — G0439 PPPS, SUBSEQ VISIT: HCPCS | Performed by: FAMILY MEDICINE

## 2025-01-14 PROCEDURE — G2211 COMPLEX E/M VISIT ADD ON: HCPCS | Performed by: FAMILY MEDICINE

## 2025-01-14 PROCEDURE — 85027 COMPLETE CBC AUTOMATED: CPT | Performed by: FAMILY MEDICINE

## 2025-01-14 PROCEDURE — 36415 COLL VENOUS BLD VENIPUNCTURE: CPT | Performed by: FAMILY MEDICINE

## 2025-01-14 PROCEDURE — 84443 ASSAY THYROID STIM HORMONE: CPT | Performed by: FAMILY MEDICINE

## 2025-01-14 RX ORDER — OLANZAPINE 15 MG/1
15 TABLET ORAL AT BEDTIME
Qty: 90 TABLET | Refills: 0 | Status: SHIPPED | OUTPATIENT
Start: 2025-01-14

## 2025-01-14 SDOH — HEALTH STABILITY: PHYSICAL HEALTH: ON AVERAGE, HOW MANY DAYS PER WEEK DO YOU ENGAGE IN MODERATE TO STRENUOUS EXERCISE (LIKE A BRISK WALK)?: 5 DAYS

## 2025-01-14 ASSESSMENT — ENCOUNTER SYMPTOMS
NERVOUS/ANXIOUS: 0
WEAKNESS: 0
ALLERGIC/IMMUNOLOGIC NEGATIVE: 1
TREMORS: 1
ACTIVITY CHANGE: 0
HEMATOLOGIC/LYMPHATIC NEGATIVE: 1
HEADACHES: 0
SLEEP DISTURBANCE: 0
PALPITATIONS: 0
APPETITE CHANGE: 0
CHILLS: 0
FATIGUE: 0
WHEEZING: 0
AGITATION: 0
ENDOCRINE NEGATIVE: 1
EYES NEGATIVE: 1
COUGH: 0
SHORTNESS OF BREATH: 0
COLOR CHANGE: 0
DIZZINESS: 0
GASTROINTESTINAL NEGATIVE: 1

## 2025-01-14 ASSESSMENT — SOCIAL DETERMINANTS OF HEALTH (SDOH): HOW OFTEN DO YOU GET TOGETHER WITH FRIENDS OR RELATIVES?: MORE THAN THREE TIMES A WEEK

## 2025-01-14 ASSESSMENT — PATIENT HEALTH QUESTIONNAIRE - PHQ9
SUM OF ALL RESPONSES TO PHQ QUESTIONS 1-9: 3
SUM OF ALL RESPONSES TO PHQ QUESTIONS 1-9: 3
10. IF YOU CHECKED OFF ANY PROBLEMS, HOW DIFFICULT HAVE THESE PROBLEMS MADE IT FOR YOU TO DO YOUR WORK, TAKE CARE OF THINGS AT HOME, OR GET ALONG WITH OTHER PEOPLE: NOT DIFFICULT AT ALL

## 2025-01-14 ASSESSMENT — PAIN SCALES - GENERAL: PAINLEVEL_OUTOF10: NO PAIN (0)

## 2025-01-14 NOTE — PROGRESS NOTES
Preventive Care Visit  [facility] YUNI Winters DO, Family Medicine  Jan 14, 2025      1. Encounter for Medicare annual wellness exam (Primary)    2. Problem with gastrostomy tube (H)  Chronic and stable.  Scheduled for an upcoming esophageal dilation.  Otherwise, continue soft diet and G-tube bolus.     3. Psychosis, unspecified psychosis type (H)  Chronic and stable.  Concerning for EPS due to Zyprexa.  Advised to follow-up with pyschiatry with decreasing the dosage.     4. Rheumatoid arthritis, involving unspecified site, unspecified whether rheumatoid factor present (H)  Chronic and stable    5. Tremor  - Comprehensive metabolic panel (BMP + Alb, Alk Phos, ALT, AST, Total. Bili, TP); Future  - CBC with platelets; Future  - TSH with free T4 reflex; Future  - Comprehensive metabolic panel (BMP + Alb, Alk Phos, ALT, AST, Total. Bili, TP)  - CBC with platelets  - TSH with free T4 reflex    6. Hyperlipidemia LDL goal <160  Chronic. Continue with atorvasatin  - Lipid panel reflex to direct LDL Fasting; Future  - Lipid panel reflex to direct LDL Fasting    7. Hyponatremia  Pending CMP    In addition to the preventive service, I spent 30 minutes discussing the patient s acute/chronic conditions and   options for treatment including medication therapy, counseling services and meditation.    The longitudinal plan of care for the diagnosis(es)/condition(s) as documented were addressed during this visit. Due to the added complexity in care, I will continue to support Yancy in the subsequent management and with ongoing continuity of care.      Lexy Haines is a 69 year old, presenting for the following:  Wellness Visit, Derm Problem (X 15 days, itching sensation on abdominal region), Foot Problems (Foot swelling, toes discoloration , would like to have it checked on ), Shaking (X 2-3 months, shaking and trembling of hands, lips and teeth. Has worsen since she's noticed symptoms. Patient  informs to have not been able to hold items in hands and would drop them. ), and Weight Problem (Noticed weight gain )        1/14/2025    10:39 AM   Additional Questions   Roomed by CLEMENTINA Carbajal   Accompanied by Patient son - Melia and daughter - Javier         1/14/2025    10:39 AM   Patient Reported Additional Medications   Patient reports taking the following new medications n/a           HPI    Health Care Directive  Patient does not have a Health Care Directive: Discussed advance care planning with patient; however, patient declined at this time.      1/14/2025   General Health   How would you rate your overall physical health? Good   Feel stress (tense, anxious, or unable to sleep) Not at all         1/14/2025   Nutrition   Diet: Regular (no restrictions)         1/14/2025   Exercise   Days per week of moderate/strenous exercise 5 days         1/14/2025   Social Factors   Frequency of gathering with friends or relatives More than three times a week   Worry food won't last until get money to buy more No   Food not last or not have enough money for food? No   Do you have housing? (Housing is defined as stable permanent housing and does not include staying ouside in a car, in a tent, in an abandoned building, in an overnight shelter, or couch-surfing.) Yes   Are you worried about losing your housing? No   Lack of transportation? No   Unable to get utilities (heat,electricity)? No         1/14/2025   Fall Risk   Fallen 2 or more times in the past year? No   Trouble with walking or balance? No          1/14/2025   Activities of Daily Living- Home Safety   Needs help with the following daily activites None of the above   Safety concerns in the home None of the above         1/14/2025   Dental   Dentist two times every year? Yes         1/14/2025   Hearing Screening   Hearing concerns? None of the above         1/14/2025   Driving Risk Screening   Patient/family members have concerns about driving No         1/14/2025    General Alertness/Fatigue Screening   Have you been more tired than usual lately? No         1/14/2025   Urinary Incontinence Screening   Bothered by leaking urine in past 6 months No         1/14/2025   TB Screening   Were you born outside of the [place]? Yes       Today's PHQ-9 Score:       1/14/2025    10:13 AM   PHQ-9 SCORE   PHQ-9 Total Score MyChart 3 (Minimal depression)   PHQ-9 Total Score 3        Patient-reported         1/14/2025   Substance Use   Alcohol more than 3/day or more than 7/wk No   Do you have a current opioid prescription? No   How severe/bad is pain from 1 to 10? 0/10 (No Pain)   Do you use any other substances recreationally? No     Social History     Tobacco Use    Smoking status: Never     Passive exposure: Never    Smokeless tobacco: Never   Vaping Use    Vaping status: Never Used   Substance Use Topics    Alcohol use: No    Drug use: Yes           3/30/2023   LAST FHS-7 RESULTS   1st degree relative breast or ovarian cancer No   Any relative bilateral breast cancer No   Any male have breast cancer No   Any ONE woman have BOTH breast AND ovarian cancer No   Any woman with breast cancer before 50yrs No   2 or more relatives with breast AND/OR ovarian cancer No   2 or more relatives with breast AND/OR bowel cancer No              History of abnormal Pap smear: No - age 65 or older with adequate negative prior screening test results (3 consecutive negative cytology results, 2 consecutive negative cotesting results, or 2 consecutive negative HrHPV test results within 10 years, with the most recent test occurring within the recommended screening interval for the test used)        Latest Ref Rng & Units 4/30/2021    11:30 AM 4/30/2021    11:12 AM 6/20/2016     3:00 PM   PAP / HPV   PAP (Historical)   NIL     HPV 16 DNA NEG^Negative Negative   Negative    HPV 18 DNA NEG^Negative Negative   Negative    Other HR HPV NEG^Negative Negative   Negative      ASCVD Risk   The ASCVD Risk score  (Kate MARCOS, et al., 2019) failed to calculate for the following reasons:    Risk score cannot be calculated because patient has a medical history suggesting prior/existing ASCVD            Reviewed and updated as needed this visit by Provider                    Past Medical History:   Diagnosis Date    Bipolar disorder (H)     Blepharitis     Esophageal reflux (aka GERD)     Glaucoma     Hyperlipidemia LDL goal < 130     [place]en[place] cataract     Shingles     11/14/15 Left approximately C6 distribution     Past Surgical History:   Procedure Laterality Date    CATARACT IOL, RT/LT Right 10/16/2018    [place] [facility]     CATARACT IOL, RT/LT Left 11/01/2018    [place] [facility]     CHALAZION EXCISION  08/21/2015    Left lid    COMBINED REPAIR PTOSIS WITH BLEPHAROPLASTY BILATERAL Bilateral 10/12/2023    Procedure: Both upper eyelid blepharoplasty, ptosis repair, right internal browpexy;  Surgeon: Mine Munoz MD;  [place] [facility] [place]    HERNIA REPAIR      abdominal hernia repair 2012    REPAIR ECTROPION BILATERAL Bilateral 10/12/2023    Procedure: Both lower eyelid ectropion repair;  Surgeon: Mine Munoz MD;  [place] [facility] [place]     Current providers sharing in care for this patient include:  Patient Care Team:  Abundio Winters DO as PCP - General (Family Medicine)  Keira Hendrix MD as MD (Internal Medicine)  Mart Sanabria MD as Resident  Fransisca Lund MD as MD (Internal Medicine)  Jonatan Villegas MD as MD (Internal Medicine)  Constantin Rollins MD as MD (Ophthalmology)  Melony Madison MD as Referring Physician (Internal Medicine)  Lilli Mensah MD as MD (Dermatology)  Tom Meyer MD as MD (Dermatology)  Saman Camilo MD as MD (Otolaryngology)  Merari Lr AuD as Audiologist (Audiology)  Karin Kim APRN CNP as Nurse Practitioner (Internal Medicine)  Mikayla Nesbitt NP  as Nurse Practitioner  Basil Grigsby MD as MD (Cardiovascular Disease)  Ar Marquez MD as MD (Cardiovascular Disease)  Kerry Lombardi MD as MD (Neurology)  Mine Munoz MD as MD (Ophthalmology)  Viktor Albrecht MD as Assigned Behavioral Health Provider  Vidhi Varma LSW as Lead Care Coordinator (Primary Care - CC)  Ar Marquez MD as Assigned Heart and Vascular Provider  Mine Munoz MD as Assigned Surgical Provider  Abundio Winters DO as Assigned PCP  Rubens Ng MD as Fellow (Pulmonary & Critical Care Medicine)  Constantin Rollins MD as MD (Ophthalmology)  Raegan Washington DO (Psychiatry)    The following health maintenance items are reviewed in Epic and correct as of today:  Health Maintenance   Topic Date Due    RSV VACCINE (1 - Risk 60-74 years 1-dose series) Never done    MEDICARE ANNUAL WELLNESS VISIT  01/11/2025    ANNUAL REVIEW OF HM ORDERS  03/29/2025    MAMMO SCREENING  03/30/2025    LIPID  05/24/2025    PHQ-9  07/14/2025    BMP  10/22/2025    FALL RISK ASSESSMENT  01/14/2026    COLORECTAL CANCER SCREENING  04/15/2027    GLUCOSE  10/22/2027    ADVANCE CARE PLANNING  01/16/2029    DTAP/TDAP/TD IMMUNIZATION (3 - Td or Tdap) 07/05/2034    DEXA  03/28/2038    HEPATITIS C SCREENING  Completed    DEPRESSION ACTION PLAN  Completed    INFLUENZA VACCINE  Completed    Pneumococcal Vaccine: 50+ Years  Completed    ZOSTER IMMUNIZATION  Completed    COVID-19 Vaccine  Completed    HPV IMMUNIZATION  Aged Out    MENINGITIS IMMUNIZATION  Aged Out    RSV MONOCLONAL ANTIBODY  Aged Out    URINE DRUG SCREEN  Discontinued    PAP  Discontinued       MAWV    2. G-tube dependent: Continues to be dependent.  Getting Pro-Source (30 mL)during the day and gets fluids/nutrition continuous at night-time.  No residual.  States of some itchiness.  No redness or pain.     3. Mental Health: Currently on depakote, zyprexa, and escitalopram.  States of good mood.       4. Tremors: Started couple  "months ago.  Thought due to coldness but continues to have.  Sometimes drop things.  States of intentional tremors.      Review of Systems   Constitutional:  Negative for activity change, appetite change, chills and fatigue.   HENT: Negative.     Eyes: Negative.    Respiratory:  Negative for cough, shortness of breath and wheezing.    Cardiovascular:  Negative for chest pain and palpitations.   Gastrointestinal: Negative.    Endocrine: Negative.    Skin:  Negative for color change and rash.   Allergic/Immunologic: Negative.    Neurological:  Positive for tremors. Negative for dizziness, weakness and headaches.   Hematological: Negative.    Psychiatric/Behavioral:  Negative for agitation and sleep disturbance. The patient is not nervous/anxious.           Objective    Exam  /62   Pulse 71   Temp 97.9  F (36.6  C) (Temporal)   Resp 16   Ht 1.651 m (5' 5\")   Wt 88.6 kg (195 lb 6.4 oz)   LMP  (LMP Unknown)   SpO2 96%   BMI 32.52 kg/m     Estimated body mass index is 32.52 kg/m  as calculated from the following:    Height as of this encounter: 1.651 m (5' 5\").    Weight as of this encounter: 88.6 kg (195 lb 6.4 oz).    Physical Exam  Constitutional:       General: She is not in acute distress.  HENT:      Head: Normocephalic and atraumatic.   Eyes:      Conjunctiva/sclera: Conjunctivae normal.   Neck:      Trachea: No tracheal deviation.   Cardiovascular:      Rate and Rhythm: Normal rate and regular rhythm.      Heart sounds: Normal heart sounds.   Pulmonary:      Effort: Pulmonary effort is normal. No respiratory distress.      Breath sounds: Normal breath sounds. No wheezing or rales.   Abdominal:      Comments: G-Tube intact: appears C/D/I   Musculoskeletal:      Cervical back: Normal range of motion.   Skin:     Findings: No rash.   Neurological:      Mental Status: She is alert.               1/14/2025   Mini Cog   Clock Draw Score 0 Abnormal   3 Item Recall 1 object recalled   Mini Cog Total Score 1 "              Signed Electronically by: Abundio Winters DO    Answers submitted by the patient for this visit:  Patient Health Questionnaire (Submitted on 1/14/2025)  If you checked off any problems, how difficult have these problems made it for you to do your work, take care of things at home, or get along with other people?: Not difficult at all  PHQ9 TOTAL SCORE: 3

## 2025-01-14 NOTE — PATIENT INSTRUCTIONS
Paul Haines,    Thank you for allowing [facility] to manage your care.    I ordered some blood work, please go to the laboratory to get your laboratory studies.    Please communicate with psychiatrist to consider decreasing her Zyprexa.    Encourage vitamin D 1000 international unit(s) and calcium 1200 mg daily.    Please go to the pharmacy to  your prescription.      For your convenience, test results are released as soon as they are available  Please allow 1-2 business days for me to send you a comment about your results.  If not done so, I encourage you to login into [employer] ([email or web address] to review your results in real time.     If you have any questions or concerns, please feel free to call us at [phone].    Sincerely,    Dr. Winters    Did you know?      You can schedule a video visit for follow-up appointments as well as future appointments for certain conditions.  Please see the below link.     [email or web address]    If you have not already done so,  I encourage you to sign up for [employer] ([email or web address]).  This will allow you to review your results, securely communicate with a provider, and schedule virtual visits as well.      Patient Education   Preventive Care Advice   This is general advice given by our system to help you stay healthy. However, your care team may have specific advice just for you. Please talk to your care team about your preventive care needs.  Nutrition  Eat 5 or more servings of fruits and vegetables each day.  Try wheat bread, brown rice and whole grain pasta (instead of white bread, rice, and pasta).  Get enough calcium and vitamin D. Check the label on foods and aim for 100% of the RDA (recommended daily allowance).  Lifestyle  Exercise at least 150 minutes each week  (30 minutes a day, 5 days a week).  Do muscle strengthening activities 2 days a week. These help control your weight  and prevent disease.  No smoking.  Wear sunscreen to prevent skin cancer.  Have a dental exam and cleaning every 6 months.  Yearly exams  See your health care team every year to talk about:  Any changes in your health.  Any medicines your care team has prescribed.  Preventive care, family planning, and ways to prevent chronic diseases.  Shots (vaccines)   HPV shots (up to age 26), if you've never had them before.  Hepatitis B shots (up to age 59), if you've never had them before.  COVID-19 shot: Get this shot when it's due.  Flu shot: Get a flu shot every year.  Tetanus shot: Get a tetanus shot every 10 years.  Pneumococcal, hepatitis A, and RSV shots: Ask your care team if you need these based on your risk.  Shingles shot (for age 50 and up)  General health tests  Diabetes screening:  Starting at age 35, Get screened for diabetes at least every 3 years.  If you are younger than age 35, ask your care team if you should be screened for diabetes.  Cholesterol test: At age 39, start having a cholesterol test every 5 years, or more often if advised.  Bone density scan (DEXA): At age 50, ask your care team if you should have this scan for osteoporosis (brittle bones).  Hepatitis C: Get tested at least once in your life.  STIs (sexually transmitted infections)  Before age 24: Ask your care team if you should be screened for STIs.  After age 24: Get screened for STIs if you're at risk. You are at risk for STIs (including HIV) if:  You are sexually active with more than one person.  You don't use condoms every time.  You or a partner was diagnosed with a sexually transmitted infection.  If you are at risk for HIV, ask about PrEP medicine to prevent HIV.  Get tested for HIV at least once in your life, whether you are at risk for HIV or not.  Cancer screening tests  Cervical cancer screening: If you have a cervix, begin getting regular cervical cancer screening tests starting at age 21.  Breast cancer scan (mammogram): If  you've ever had breasts, begin having regular mammograms starting at age 40. This is a scan to check for breast cancer.  Colon cancer screening: It is important to start screening for colon cancer at age 45.  Have a colonoscopy test every 10 years (or more often if you're at risk) Or, ask your provider about stool tests like a FIT test every year or Cologuard test every 3 years.  To learn more about your testing options, visit:   .  For help making a decision, visit:   https://bit.ly/fr19418.  Prostate cancer screening test: If you have a prostate, ask your care team if a prostate cancer screening test (PSA) at age 55 is right for you.  Lung cancer screening: If you are a current or former smoker ages 50 to 80, ask your care team if ongoing lung cancer screenings are right for you.  For informational purposes only. Not to replace the advice of your health care provider. Copyright   2023 [facility] [employer]. All rights reserved. Clinically reviewed by the [facility]. "[employer] 396348 - REV 01/24.

## 2025-01-14 NOTE — LETTER
January 15, 2025      Yancy Rivera  [address] [facility] [address]        Dear ,    -Normal red blood cell (hgb) levels, normal white blood cell count and normal platelet levels.   -Cholesterol levels are at your goal levels.  ADVISE: continuing your medication, a regular exercise program with at least 150 minutes of aerobic exercise per week, and eating a low saturated fat/low carbohydrate diet.  Also, you should recheck this fasting cholesterol panel in 12 months.   -Liver and gallbladder tests are normal (ALT,AST, Alk phos, bilirubin), kidney function is normal (Cr, GFR), sodium is normal, potassium is normal, calcium is normal, glucose is normal.   -TSH (thyroid stimulating hormone) level is normal which indicates normal thyroid function.     Resulted Orders   Comprehensive metabolic panel (BMP + Alb, Alk Phos, ALT, AST, Total. Bili, TP)   Result Value Ref Range    Sodium 139 135 - 145 mmol/L    Potassium 4.8 3.4 - 5.3 mmol/L    Carbon Dioxide (CO2) 24 22 - 29 mmol/L    Anion Gap 8 7 - 15 mmol/L    Urea Nitrogen 21.1 8.0 - 23.0 mg/dL    Creatinine 0.70 0.51 - 0.95 mg/dL    GFR Estimate >90 >60 mL/min/1.73m2      Comment:      eGFR calculated using 2021 CKD-EPI equation.    Calcium 9.6 8.8 - 10.4 mg/dL      Comment:      Reference intervals for this test were updated on 7/16/2024 to reflect our healthy population more accurately. There may be differences in the flagging of prior results with similar values performed with this method. Those prior results can be interpreted in the context of the updated reference intervals.    Chloride 107 98 - 107 mmol/L    Glucose 98 70 - 99 mg/dL    Alkaline Phosphatase 128 40 - 150 U/L    AST 22 0 - 45 U/L    ALT 12 0 - 50 U/L    Protein Total 7.7 6.4 - 8.3 g/dL    Albumin 3.9 3.5 - 5.2 g/dL    Bilirubin Total 0.4 <=1.2 mg/dL    Patient Fasting > 8hrs? Yes    CBC with platelets   Result Value Ref Range    WBC Count 6.2 4.0 - 11.0 10e3/uL    RBC Count 4.70  3.80 - 5.20 10e6/uL    Hemoglobin 12.3 11.7 - 15.7 g/dL    Hematocrit 38.3 35.0 - 47.0 %    MCV 82 78 - 100 fL    MCH 26.2 (L) 26.5 - 33.0 pg    MCHC 32.1 31.5 - 36.5 g/dL    RDW 15.6 (H) 10.0 - 15.0 %    Platelet Count 254 150 - 450 10e3/uL   TSH with free T4 reflex   Result Value Ref Range    TSH 2.19 0.30 - 4.20 uIU/mL   Lipid panel reflex to direct LDL Fasting   Result Value Ref Range    Cholesterol 131 <200 mg/dL    Triglycerides 118 <150 mg/dL    Direct Measure HDL 51 >=50 mg/dL    LDL Cholesterol Calculated 56 <100 mg/dL    Non HDL Cholesterol 80 <130 mg/dL    Patient Fasting > 8hrs? Yes     Narrative    Cholesterol  Desirable: < 200 mg/dL  Borderline High: 200 - 239 mg/dL  High: >= 240 mg/dL    Triglycerides  Normal: < 150 mg/dL  Borderline High: 150 - 199 mg/dL  High: 200-499 mg/dL  Very High: >= 500 mg/dL    Direct Measure HDL  Female: >= 50 mg/dL   Male: >= 40 mg/dL    LDL Cholesterol  Desirable: < 100 mg/dL  Above Desirable: 100 - 129 mg/dL   Borderline High: 130 - 159 mg/dL   High:  160 - 189 mg/dL   Very High: >= 190 mg/dL    Non HDL Cholesterol  Desirable: < 130 mg/dL  Above Desirable: 130 - 159 mg/dL  Borderline High: 160 - 189 mg/dL  High: 190 - 219 mg/dL  Very High: >= 220 mg/dL       If you have any questions or concerns, please call the clinic at the number listed above.       Sincerely,      Abundio Winters, DO    Electronically signed

## 2025-01-15 LAB
ALBUMIN SERPL BCG-MCNC: 3.9 G/DL (ref 3.5–5.2)
ALP SERPL-CCNC: 128 U/L (ref 40–150)
ALT SERPL W P-5'-P-CCNC: 12 U/L (ref 0–50)
ANION GAP SERPL CALCULATED.3IONS-SCNC: 8 MMOL/L (ref 7–15)
AST SERPL W P-5'-P-CCNC: 22 U/L (ref 0–45)
BILIRUB SERPL-MCNC: 0.4 MG/DL
BUN SERPL-MCNC: 21.1 MG/DL (ref 8–23)
CALCIUM SERPL-MCNC: 9.6 MG/DL (ref 8.8–10.4)
CHLORIDE SERPL-SCNC: 107 MMOL/L (ref 98–107)
CHOLEST SERPL-MCNC: 131 MG/DL
CREAT SERPL-MCNC: 0.7 MG/DL (ref 0.51–0.95)
EGFRCR SERPLBLD CKD-EPI 2021: >90 ML/MIN/1.73M2
FASTING STATUS PATIENT QL REPORTED: YES
FASTING STATUS PATIENT QL REPORTED: YES
GLUCOSE SERPL-MCNC: 98 MG/DL (ref 70–99)
HCO3 SERPL-SCNC: 24 MMOL/L (ref 22–29)
HDLC SERPL-MCNC: 51 MG/DL
LDLC SERPL CALC-MCNC: 56 MG/DL
NONHDLC SERPL-MCNC: 80 MG/DL
POTASSIUM SERPL-SCNC: 4.8 MMOL/L (ref 3.4–5.3)
PROT SERPL-MCNC: 7.7 G/DL (ref 6.4–8.3)
SODIUM SERPL-SCNC: 139 MMOL/L (ref 135–145)
TRIGL SERPL-MCNC: 118 MG/DL
TSH SERPL DL<=0.005 MIU/L-ACNC: 2.19 UIU/ML (ref 0.3–4.2)

## 2025-01-27 ENCOUNTER — TELEPHONE (OUTPATIENT)
Dept: FAMILY MEDICINE | Facility: CLINIC | Age: 70
End: 2025-01-27
Payer: MEDICARE

## 2025-01-27 NOTE — TELEPHONE ENCOUNTER
Team coordinators    [facility] called regarding tube feeds. They are faxing over a form that needs to be signed to fax number [phone].       Cover letter will have dietician recommendation  Second form filled out only needs PCP signature  Change in feed due to: quantity was 129 cartons, but need order for 60 cartons as amount of intake has decreased.       Please ensure form signed and faxed back.       Thanks,  PEGGY Soto  [facility]

## 2025-01-28 ENCOUNTER — OFFICE VISIT (OUTPATIENT)
Dept: OPHTHALMOLOGY | Facility: CLINIC | Age: 70
End: 2025-01-28
Payer: MEDICARE

## 2025-01-28 DIAGNOSIS — H11.232 SYMBLEPHARON OF LEFT EYE: Primary | ICD-10-CM

## 2025-01-28 DIAGNOSIS — H02.135 SENILE ECTROPION OF LEFT LOWER EYELID: ICD-10-CM

## 2025-01-28 PROCEDURE — 92285 EXTERNAL OCULAR PHOTOGRAPHY: CPT | Mod: GC | Performed by: OPHTHALMOLOGY

## 2025-01-28 PROCEDURE — 99214 OFFICE O/P EST MOD 30 MIN: CPT | Mod: GC | Performed by: OPHTHALMOLOGY

## 2025-01-28 ASSESSMENT — TONOMETRY
OS_IOP_MMHG: 14
IOP_METHOD: ICARE
OD_IOP_MMHG: 11

## 2025-01-28 ASSESSMENT — EXTERNAL EXAM - LEFT EYE: OS_EXAM: NORMAL

## 2025-01-28 ASSESSMENT — VISUAL ACUITY
OD_SC: 20/60
OS_SC+: -1
METHOD: SNELLEN - LINEAR
OS_SC: 20/40
OD_PH_SC+: -2
OD_SC+: -1+2
OS_PH_SC+: -1
OD_PH_SC: 20/30
OS_PH_SC: 20/30

## 2025-01-28 ASSESSMENT — EXTERNAL EXAM - RIGHT EYE: OD_EXAM: NORMAL

## 2025-01-28 ASSESSMENT — SLIT LAMP EXAM - LIDS: COMMENTS: PTOSIS

## 2025-01-28 NOTE — PROGRESS NOTES
"     Chief Complaint(s) and History of Present Illness(es)     Consult For            Comments: Yancy Rivera is being seen for a consult today by the   request of Dr. Rollins for recheck of ectropion of LLL.           Comments    Pt's son is interpreting for today's visit.  Pt states Dr. Rollins found \"scar in left eye\" and she is here for   evaluation.   She feel that \"left eye is weaker\"  Denies eye pain or discomfort.     Ocular Meds:  AT QID each eye   Erythromycin agustin qPM each eye   Timolol qAM left eye     Beni Ho 12:24 PM January 28, 2025 1/28/25: Hx of bilateral  upper eyelid blepharoplasty, ptosis repair,  right internal  browpexy, and bilateral ectropion repair with  (10/12/23). Patient was seen by  for complete eye exam on 12/31/24 (follows for POAG) where new symblepharon was noted in the nasal aspect of the left eye.  Patient endorses eye discomfort and irritation bilaterally (left>>> right). Has been using artificial tears and erythromycin ointment with some relief.        Assessment & Plan     Yancy Rivera is a 69 year old female with the following diagnoses:     ICD-10-CM    1. Cicatricial ectropion of left lower eyelid  H02.115       2. Symblepharon of left eye  H11.232         Exam findings today demonstrate medial lower eyelid ectropion with associated nasal symblepharon. Corneal exam showed 2+ PEE and inferior limbus corneal neovascularization, concerning for exposure keratopathy related sequelae vs. OCP?      Skylar Ornelas MD  Ophthalmology PGY-2   [facility]    Nasal left lower lid symblepharon - unclear etiology. Does not appear otherwise inflammed and no other fornix shortening. Observe at this time. I will not biopsy now, but if progresses, can consider biopsy.    Left lower lid ectropion contributing to dry eye symptoms.    Plan: Left lower eyelid in clinic ectropion repair (closed angle, canthopexy). [place] [id number].    Today with Yancy Rivera  " "and her son who also is acting as an , I reviewed the indications, risks, benefits, and alternatives of the proposed surgical procedure including, but not limited to, failure obtain the desired result  and need for additional surgery, bleeding, infection, loss of vision, recurrent ectropion.  I provided multiple opportunities for the questions, answered all questions to the best of my ability, and confirmed that my answers and my discussion were understood.   Mine Munoz MD      Patient Instructions   Timolol eye drop both eyes 1 drop every morning.    Use over the counter artificial tears at least four times daily.     Continue to use erythromycin ophthalmic ointment -1/2\" strip into both eyes at bedtime.          Attending Physician Attestation: Complete documentation of historical and exam elements from today's encounter can be found in the full encounter summary report (not reduplicated in this progress note). I personally obtained the chief complaint(s) and history of present illness. I confirmed and edited as necessary the review of systems, past medical/surgical history, family history, social history, and examination findings as documented by others; and I examined the patient myself. I personally reviewed the relevant tests, images, and reports as documented above. I formulated and edited as necessary the assessment and plan and discussed the findings and management plan with the patient.  -Mine Munoz MD       "

## 2025-01-28 NOTE — PATIENT INSTRUCTIONS
"Timolol eye drop both eyes 1 drop every morning.    Use over the counter artificial tears at least four times daily.     Continue to use erythromycin ophthalmic ointment -1/2\" strip into both eyes at bedtime.   "

## 2025-01-28 NOTE — NURSING NOTE
"Chief Complaints and History of Present Illnesses   Patient presents with    Consult For     Yancy TU Rivera is being seen for a consult today by the request of Dr. Rollins for recheck of ectropion of LLL.      Chief Complaint(s) and History of Present Illness(es)       Consult For              Comments: Yancy Rivera is being seen for a consult today by the request of Dr. Rollins for recheck of ectropion of LLL.               Comments    Pt's son is interpreting for today's visit.  Pt states Dr. Rollins found \"scar in left eye\" and she is here for evaluation.   She feel that \"left eye is weaker\"  Denies eye pain or discomfort.     Ocular Meds:  AT QID each eye   Erythromycin agustin qPM each eye   Timolol qAM left eye     Beni Ho 12:24 PM January 28, 2025                    "

## 2025-01-29 NOTE — TELEPHONE ENCOUNTER
[facility] calling they need this form today faxed to Melonie at [facility] at [phone].    Thank you,  María Edgar RN

## 2025-02-03 ENCOUNTER — TELEPHONE (OUTPATIENT)
Dept: OPHTHALMOLOGY | Facility: CLINIC | Age: 70
End: 2025-02-03
Payer: MEDICARE

## 2025-02-03 NOTE — TELEPHONE ENCOUNTER
Spoke with patient regarding scheduling an In-Clinic Procedure for: ectropion repair OS. (PA Approved). Scheduled patient for a March 2025 appointment as patient requested. Sent reminder letter to address in Chart.-Appt Per PT

## 2025-02-06 DIAGNOSIS — R06.02 SHORTNESS OF BREATH: Primary | ICD-10-CM

## 2025-02-11 NOTE — CONFIDENTIAL NOTE
RECORDS RECEIVED FROM: internal    DATE RECEIVED:  2.12.25    NOTES STATUS DETAILS   OFFICE NOTE from referring provider internal    Davey Duncan MD      DISCHARGE REPORT from the ER ce 8.13.24 Ozzie    MEDICATION LIST internal      IMAGING  (NEED IMAGES AND REPORTS)       CT SCAN Ce  [facility]- 9.11.24, 8.28.24, 8.21.25, 8.16.24    CHEST XRAY (CXR) ce [facility]- 8.18.24, 8.13.24, 3.27.24, 4.16.23     Internal- 4.28.23, 2.27.23   TESTS       PULMONARY FUNCTION TESTING (PFT) internal  4/18/24  Scheduled 5.19.25

## 2025-02-12 ENCOUNTER — PRE VISIT (OUTPATIENT)
Dept: PULMONOLOGY | Facility: CLINIC | Age: 70
End: 2025-02-12
Payer: MEDICARE

## 2025-02-19 ENCOUNTER — OFFICE VISIT (OUTPATIENT)
Dept: FAMILY MEDICINE | Facility: CLINIC | Age: 70
End: 2025-02-19
Payer: MEDICARE

## 2025-02-19 VITALS
HEART RATE: 79 BPM | WEIGHT: 205.13 LBS | RESPIRATION RATE: 20 BRPM | SYSTOLIC BLOOD PRESSURE: 128 MMHG | OXYGEN SATURATION: 94 % | BODY MASS INDEX: 34.18 KG/M2 | HEIGHT: 65 IN | DIASTOLIC BLOOD PRESSURE: 60 MMHG | TEMPERATURE: 97.7 F

## 2025-02-19 DIAGNOSIS — M25.561 ARTHRALGIA OF BOTH KNEES: ICD-10-CM

## 2025-02-19 DIAGNOSIS — K22.2 STRICTURE AND STENOSIS OF ESOPHAGUS: ICD-10-CM

## 2025-02-19 DIAGNOSIS — M25.562 ARTHRALGIA OF BOTH KNEES: ICD-10-CM

## 2025-02-19 DIAGNOSIS — Z01.818 PREOP GENERAL PHYSICAL EXAM: Primary | ICD-10-CM

## 2025-02-19 PROCEDURE — 93000 ELECTROCARDIOGRAM COMPLETE: CPT | Performed by: PHYSICIAN ASSISTANT

## 2025-02-19 PROCEDURE — 99214 OFFICE O/P EST MOD 30 MIN: CPT | Mod: 25 | Performed by: PHYSICIAN ASSISTANT

## 2025-02-19 RX ORDER — OLANZAPINE 10 MG/1
10 TABLET, ORALLY DISINTEGRATING ORAL AT BEDTIME
COMMUNITY
Start: 2025-02-04

## 2025-02-19 ASSESSMENT — PAIN SCALES - GENERAL: PAINLEVEL_OUTOF10: NO PAIN (0)

## 2025-02-19 NOTE — PROGRESS NOTES
Preoperative Evaluation  [facility] [place]  [address]  [place] [address]  Phone: [phone]  Primary Provider: Abundio Winters,   Pre-op Performing Provider: Tao Rollins PA-C  Feb 19, 2025 2/19/2025   Surgical Information   What procedure is being done? checkup   Facility or Hospital where procedure/surgery will be performed: [facility]   Who is doing the procedure / surgery? not sure   Date of surgery / procedure: [id number]   Time of surgery / procedure: not sure   Where do you plan to recover after surgery? at home with family     Fax number for surgical facility: [phone]       Lexy Haines is a 69 year old, presenting for the following:  Pre-Op Exam          2/19/2025    10:40 AM   Additional Questions   Roomed by Elena Vasquez CMA   Accompanied by Son     HPI related to upcoming procedure: history of an esophageal stenosis/stricture.         2/19/2025   Pre-Op Questionnaire   Have you ever had a heart attack or stroke? No   Have you ever had surgery on your heart or blood vessels, such as a stent placement, a coronary artery bypass, or surgery on an artery in your head, neck, heart, or legs? No   Do you have chest pain with activity? No   Do you have a history of heart failure? No   Do you currently have a cold, bronchitis or symptoms of other infection? No   Do you have a cough, shortness of breath, or wheezing? No   Do you or anyone in your family have previous history of blood clots? No   Do you or does anyone in your family have a serious bleeding problem such as prolonged bleeding following surgeries or cuts? No   Have you ever had problems with anemia or been told to take iron pills? No   Have you had any abnormal blood loss such as black, tarry or bloody stools, or abnormal vaginal bleeding? No   Have you ever had a blood transfusion? No   Are you willing to have a blood transfusion if it is medically needed before, during, or after  your surgery? Yes   Have you or any of your relatives ever had problems with anesthesia? No   Do you have sleep apnea, excessive snoring or daytime drowsiness? No   Do you have any artifical heart valves or other implanted medical devices like a pacemaker, defibrillator, or continuous glucose monitor? No   Do you have artificial joints? No   Are you allergic to latex? No     Health Care Directive  Patient does not have a Health Care Directive: Discussed advance care planning with patient; information given to patient to review.    Preoperative Review of    reviewed - no record of controlled substances prescribed.      Status of Chronic Conditions:  See problem list for active medical problems.  Problems all longstanding and stable, except as noted/documented.  See ROS for pertinent symptoms related to these conditions.    Patient Active Problem List    Diagnosis Date Noted    Problem with gastrostomy tube (H) 01/14/2025     Priority: Medium    Psychosis, unspecified psychosis type (H) 01/14/2025     Priority: Medium    Stricture and stenosis of esophagus 11/12/2024     Priority: Medium    Esophageal perforation 08/13/2024     Priority: Medium    Gastroesophageal reflux disease without esophagitis 07/08/2024     Priority: Medium    Mood disorder 07/08/2024     Priority: Medium    Major depressive disorder, recurrent, in partial remission 03/29/2024     Priority: Medium    Telephone  service required 10/10/2023     Priority: Medium    Senile ectropion of left lower eyelid 07/05/2023     Priority: Medium    Senile ectropion of right lower eyelid 07/05/2023     Priority: Medium    Dermatochalasis of both upper eyelids 07/05/2023     Priority: Medium    Involutional ptosis, acquired, bilateral 07/05/2023     Priority: Medium    Hyponatremia 04/20/2023     Priority: Medium    Syncope, unspecified syncope type 04/20/2023     Priority: Medium    Rheumatoid arthritis, involving unspecified site,  unspecified whether rheumatoid factor present (H) 04/14/2023     Priority: Medium    Neck pain 05/04/2022     Priority: Medium    Right wrist injury, subsequent encounter 02/17/2022     Priority: Medium    Pseudophakia of both eyes 06/09/2021     Priority: Medium    Chronic midline low back pain without sciatica 05/31/2021     Priority: Medium    Right wrist pain 05/07/2020     Priority: Medium    Benign essential hypertension 12/19/2016     Priority: Medium    Right lumbar radiculopathy 11/10/2016     Priority: Medium    Primary open angle glaucoma of both eyes, moderate stage 09/02/2016     Priority: Medium    Senile nuclear sclerosis, bilateral 09/02/2016     Priority: Medium    Shingles      Priority: Medium     11/14/15 Left approximately C6 distribution      Disorder of bursae and tendons in shoulder region 03/11/2015     Priority: Medium     Problem list name updated by automated process. Provider to review      Hyperlipidemia with target LDL less than 130 09/15/2014     Priority: Medium     Diagnosis updated by automated process. Provider to review and confirm.      Prediabetes 09/15/2014     Priority: Medium      Past Medical History:   Diagnosis Date    Bipolar disorder (H)     Blepharitis     Esophageal reflux (aka GERD)     Glaucoma     Hyperlipidemia LDL goal < 130     Nonsenile cataract     Shingles     11/14/15 Left approximately C6 distribution     Past Surgical History:   Procedure Laterality Date    CATARACT IOL, RT/LT Right 10/16/2018    [place] [facility]     CATARACT IOL, RT/LT Left 11/01/2018    [place] [facility]     CHALAZION EXCISION  08/21/2015    Left lid    COMBINED REPAIR PTOSIS WITH BLEPHAROPLASTY BILATERAL Bilateral 10/12/2023    Procedure: Both upper eyelid blepharoplasty, ptosis repair, right internal browpexy;  Surgeon: Mine Munoz MD;  Location: [facility] OR    HERNIA REPAIR      abdominal hernia repair 2012     REPAIR ECTROPION BILATERAL Bilateral 10/12/2023    Procedure: Both lower eyelid ectropion repair;  Surgeon: Mine Munoz MD;  Location: [facility] OR     Current Outpatient Medications   Medication Sig Dispense Refill    atorvastatin (LIPITOR) 20 MG tablet Place 20 mg into G tube daily.      carvedilol (COREG) 6.25 MG tablet Take 1 tablet (6.25 mg) by mouth or Feeding Tube 2 times daily (with meals). 180 tablet 3    dextran 70-hypromellose (TEARS NATURALE FREE PF) 0.1-0.3 % ophthalmic solution Place 1 drop into both eyes 4 times daily. 36 each 11    erythromycin (ROMYCIN) 5 MG/GM ophthalmic ointment Place into both eyes at bedtime. 7 g 4    escitalopram (LEXAPRO) 10 MG tablet Take 1 tablet (10 mg) by mouth or Feeding Tube daily. 90 tablet 3    gabapentin (NEURONTIN) 300 MG capsule Take 1 capsule (300 mg) by mouth or Feeding Tube at bedtime. 90 capsule 3    lipase-protease-amylase (CREON 12) 73113-060[address]-40184 units CPEP Take four times daily by mouth or feeding tube with meals and at bedtime. 360 capsule 3    OLANZapine zydis (ZYPREXA) 10 MG ODT Take 10 mg by mouth at bedtime.      pantoprazole (PROTONIX) 40 MG EC tablet 2 times daily. Take 1 tablet po or via Gtube BID      sucralfate (CARAFATE) 1 GM tablet TAKE 1 TABLET (1 G) BY MOUTH 4 TIMES DAILY 360 tablet 0    sucralfate (CARAFATE) 1 GM tablet Take 1 tablet (1 g) by mouth 4 times daily. 360 tablet 0    timolol maleate (TIMOPTIC) 0.5 % ophthalmic solution Place 1 drop into both eyes every morning. Pt takes only in the AM each eye 10 mL 3    valproic acid (DEPAKENE) 250 MG/5ML syrup Take 10 mLs (500 mg) by mouth or G tube every morning. 473 mL 1    valproic acid (DEPAKENE) 250 MG/5ML syrup Take 5 mLs (250 mg) by mouth or G tube at bedtime. 473 mL 1    OLANZapine (ZYPREXA) 15 MG tablet TAKE 1 TABLET BY MOUTH AT BEDTIME (Patient not taking: Reported on 2/19/2025) 90 tablet 0       Allergies   Allergen Reactions    Isoniazid Other (See Comments) and Nausea and  "Vomiting     INH (medication) treatment for TB caused liver side effects        Social History     Tobacco Use    Smoking status: Never     Passive exposure: Never    Smokeless tobacco: Never   Substance Use Topics    Alcohol use: No     Family History   Problem Relation Age of Onset    Glaucoma Mother     Macular Degeneration No family hx of     Cancer No family hx of     Diabetes No family hx of     Hypertension No family hx of     Cerebrovascular Disease No family hx of     Thyroid Disease No family hx of     Eye Surgery No family hx of      History   Drug Use             Review of Systems  Constitutional, HEENT, cardiovascular, pulmonary, GI, , musculoskeletal, neuro, skin, endocrine and psych systems are negative, except as otherwise noted.    Objective    /60   Pulse 79   Temp 97.7  F (36.5  C) (Temporal)   Resp 20   Ht 1.651 m (5' 5\")   Wt 93 kg (205 lb 2 oz)   LMP  (LMP Unknown)   SpO2 94%   BMI 34.13 kg/m     Estimated body mass index is 34.13 kg/m  as calculated from the following:    Height as of this encounter: 1.651 m (5' 5\").    Weight as of this encounter: 93 kg (205 lb 2 oz).  Physical Exam  GENERAL: alert and no distress  EYES: Eyes grossly normal to inspection, PERRL and conjunctivae and sclerae normal  HENT: ear canals and TM's normal, nose and mouth without ulcers or lesions  NECK: no adenopathy, no asymmetry, masses, or scars  RESP: lungs clear to auscultation - no rales, rhonchi or wheezes  CV: regular rate and rhythm, normal S1 S2, no S3 or S4, no murmur, click or rub, no peripheral edema  ABDOMEN: soft, nontender, no hepatosplenomegaly, no masses and bowel sounds normal  MS: no gross musculoskeletal defects noted, no edema  SKIN: no suspicious lesions or rashes  NEURO: Normal strength and tone, mentation intact and speech normal  PSYCH: mentation appears normal, affect normal/bright    Recent Labs   Lab Test 01/14/25  1125 10/22/24  1451 07/31/24  1508 07/07/24  1223   HGB " 12.3 10.5* 13.1 13.2    317 244 242   INR  --   --   --  1.01    136 132* 133*   POTASSIUM 4.8 4.8 4.0 4.2   CR 0.70 0.65 0.62 0.75   A1C  --  5.4 5.9*  --         Diagnostics  No labs were ordered during this visit.   EKG: appears normal, NSR, normal axis, normal intervals, no acute ST/T changes c/w ischemia, no LVH by voltage criteria, unchanged from previous tracings    Revised Cardiac Risk Index (RCRI)  The patient has the following serious cardiovascular risks for perioperative complications:   - No serious cardiac risks = 0 points     RCRI Interpretation: 0 points: Class I (very low risk - 0.4% complication rate)     Yancy was seen today for pre-op exam.    Diagnoses and all orders for this visit:    Preop general physical exam  -     EKG 12-lead complete w/read - Clinics    Stricture and stenosis of esophagus    Arthralgia of both knees  -     diclofenac (VOLTAREN) 1 % topical gel; Apply 2 g topically 4 times daily.    Other orders  -     REVIEW OF HEALTH MAINTENANCE PROTOCOL ORDERS      Yancy is cleared for her procedure and appropriate anesthesia.     Signed Electronically by: Tao Rollins PA-C  A copy of this evaluation report is provided to the requesting physician.

## 2025-03-04 ENCOUNTER — TRANSFERRED RECORDS (OUTPATIENT)
Dept: HEALTH INFORMATION MANAGEMENT | Facility: CLINIC | Age: 70
End: 2025-03-04

## 2025-03-11 ENCOUNTER — TELEPHONE (OUTPATIENT)
Dept: OPHTHALMOLOGY | Facility: CLINIC | Age: 70
End: 2025-03-11
Payer: MEDICARE

## 2025-03-11 ENCOUNTER — PATIENT OUTREACH (OUTPATIENT)
Dept: GERIATRIC MEDICINE | Facility: CLINIC | Age: 70
End: 2025-03-11
Payer: MEDICARE

## 2025-03-11 NOTE — TELEPHONE ENCOUNTER
Spoke to emergency contact.  No preop history and physical necessary      Deborah Rodriguez on 3/11/2025 at 10:14 AM

## 2025-03-11 NOTE — TELEPHONE ENCOUNTER
[facility] Care Coordination Contact    Completed following tasks: Sent copy of service delivery plan and addendum to member and [employer] agency. Sent copy of revised support plan to member. Submitted auth to health plan. Updated database.    Stephany Warren  Care Management Specialist  [facility]  [phone]

## 2025-03-11 NOTE — TELEPHONE ENCOUNTER
[facility] Call Center    Phone Message    May a detailed message be left on voicemail: yes     Reason for Call: Other: Javier wants to know if the patient needs a Preop exam for her procedure on 3/20/2025.  Please call asap.  Thank you.      Action Taken: Message routed to:  [facility] Ophthalmology    Travel Screening: Not Applicable     Date of Service:

## 2025-03-13 ENCOUNTER — TRANSFERRED RECORDS (OUTPATIENT)
Dept: HEALTH INFORMATION MANAGEMENT | Facility: CLINIC | Age: 70
End: 2025-03-13

## 2025-03-13 ENCOUNTER — OFFICE VISIT (OUTPATIENT)
Dept: FAMILY MEDICINE | Facility: CLINIC | Age: 70
End: 2025-03-13
Attending: FAMILY MEDICINE
Payer: MEDICARE

## 2025-03-13 VITALS
SYSTOLIC BLOOD PRESSURE: 126 MMHG | DIASTOLIC BLOOD PRESSURE: 68 MMHG | HEART RATE: 74 BPM | WEIGHT: 206.8 LBS | HEIGHT: 65 IN | RESPIRATION RATE: 20 BRPM | TEMPERATURE: 98 F | OXYGEN SATURATION: 95 % | BODY MASS INDEX: 34.45 KG/M2

## 2025-03-13 DIAGNOSIS — Z93.1 PEG (PERCUTANEOUS ENDOSCOPIC GASTROSTOMY) STATUS (H): Primary | ICD-10-CM

## 2025-03-13 DIAGNOSIS — Z12.31 VISIT FOR SCREENING MAMMOGRAM: ICD-10-CM

## 2025-03-13 DIAGNOSIS — R60.0 LOWER EXTREMITY EDEMA: ICD-10-CM

## 2025-03-13 DIAGNOSIS — E43 UNSPECIFIED SEVERE PROTEIN-CALORIE MALNUTRITION: ICD-10-CM

## 2025-03-13 LAB
ERYTHROCYTE [DISTWIDTH] IN BLOOD BY AUTOMATED COUNT: 15.2 % (ref 10–15)
HCT VFR BLD AUTO: 36.5 % (ref 35–47)
HGB BLD-MCNC: 12 G/DL (ref 11.7–15.7)
MCH RBC QN AUTO: 27.9 PG (ref 26.5–33)
MCHC RBC AUTO-ENTMCNC: 32.9 G/DL (ref 31.5–36.5)
MCV RBC AUTO: 85 FL (ref 78–100)
PLATELET # BLD AUTO: 228 10E3/UL (ref 150–450)
RBC # BLD AUTO: 4.3 10E6/UL (ref 3.8–5.2)
WBC # BLD AUTO: 6.1 10E3/UL (ref 4–11)

## 2025-03-13 PROCEDURE — 82306 VITAMIN D 25 HYDROXY: CPT | Performed by: FAMILY MEDICINE

## 2025-03-13 PROCEDURE — 36415 COLL VENOUS BLD VENIPUNCTURE: CPT | Performed by: FAMILY MEDICINE

## 2025-03-13 PROCEDURE — 1126F AMNT PAIN NOTED NONE PRSNT: CPT | Performed by: FAMILY MEDICINE

## 2025-03-13 PROCEDURE — 85027 COMPLETE CBC AUTOMATED: CPT | Performed by: FAMILY MEDICINE

## 2025-03-13 PROCEDURE — 80053 COMPREHEN METABOLIC PANEL: CPT | Performed by: FAMILY MEDICINE

## 2025-03-13 PROCEDURE — 3074F SYST BP LT 130 MM HG: CPT | Performed by: FAMILY MEDICINE

## 2025-03-13 PROCEDURE — 99214 OFFICE O/P EST MOD 30 MIN: CPT | Performed by: FAMILY MEDICINE

## 2025-03-13 PROCEDURE — 3078F DIAST BP <80 MM HG: CPT | Performed by: FAMILY MEDICINE

## 2025-03-13 ASSESSMENT — ENCOUNTER SYMPTOMS
COUGH: 0
FATIGUE: 0
NERVOUS/ANXIOUS: 0
HEMATOLOGIC/LYMPHATIC NEGATIVE: 1
CHILLS: 0
WHEEZING: 0
SLEEP DISTURBANCE: 0
ALLERGIC/IMMUNOLOGIC NEGATIVE: 1
ACTIVITY CHANGE: 0
COLOR CHANGE: 0
DIZZINESS: 0
ENDOCRINE NEGATIVE: 1
AGITATION: 0
WEAKNESS: 0
HEADACHES: 0
PALPITATIONS: 0
GASTROINTESTINAL NEGATIVE: 1
SHORTNESS OF BREATH: 0
EYES NEGATIVE: 1
APPETITE CHANGE: 0

## 2025-03-13 ASSESSMENT — PAIN SCALES - GENERAL: PAINLEVEL_OUTOF10: NO PAIN (0)

## 2025-03-13 NOTE — LETTER
March 17, 2025      Yancy Rivera  [address] [address]        Dear ,    We are writing to inform you of your test results.    -Liver and gallbladder tests are normal (ALT,AST, Alk phos, bilirubin), kidney function is normal (Cr, GFR), sodium is normal, potassium is normal, calcium is normal, glucose is normal.   -Vitamin D level is normal and getting 1000 IU daily in your diet or supplements is recommended.     Resulted Orders   Comprehensive metabolic panel (BMP + Alb, Alk Phos, ALT, AST, Total. Bili, TP)   Result Value Ref Range    Sodium 136 135 - 145 mmol/L    Potassium 4.4 3.4 - 5.3 mmol/L    Carbon Dioxide (CO2) 23 22 - 29 mmol/L    Anion Gap 10 7 - 15 mmol/L    Urea Nitrogen 15.6 8.0 - 23.0 mg/dL    Creatinine 0.68 0.51 - 0.95 mg/dL    GFR Estimate >90 >60 mL/min/1.73m2      Comment:      eGFR calculated using 2021 CKD-EPI equation.    Calcium 9.5 8.8 - 10.4 mg/dL    Chloride 103 98 - 107 mmol/L    Glucose 93 70 - 99 mg/dL    Alkaline Phosphatase 118 40 - 150 U/L    AST 21 0 - 45 U/L    ALT 13 0 - 50 U/L    Protein Total 7.1 6.4 - 8.3 g/dL    Albumin 3.9 3.5 - 5.2 g/dL    Bilirubin Total 0.4 <=1.2 mg/dL   Vitamin D Deficiency   Result Value Ref Range    Vitamin D, Total (25-Hydroxy) 27 20 - 50 ng/mL      Comment:      optimum levels    Narrative    Season, race, dietary intake, and treatment affect the concentration of 25-hydroxy-Vitamin D. Values may decrease during winter months and increase during summer months.    Vitamin D determination is routinely performed by an immunoassay specific for 25 hydroxyvitamin D3.  If an individual is on vitamin D2(ergocalciferol) supplementation, please specify 25 OH vitamin D2 and D3 level determination by LCMSMS test VITD23.     CBC with platelets   Result Value Ref Range    WBC Count 6.1 4.0 - 11.0 10e3/uL    RBC Count 4.30 3.80 - 5.20 10e6/uL    Hemoglobin 12.0 11.7 - 15.7 g/dL    Hematocrit 36.5 35.0 - 47.0 %    MCV 85 78 - 100 fL    MCH  27.9 26.5 - 33.0 pg    MCHC 32.9 31.5 - 36.5 g/dL    RDW 15.2 (H) 10.0 - 15.0 %    Platelet Count 228 150 - 450 10e3/uL       If you have any questions or concerns, please call the clinic at the number listed above.       Sincerely,      Abundio Winters, DO    Electronically signed

## 2025-03-13 NOTE — LETTER
March 14, 2025      Yancy Rivera  [address] [address]        Dear ,    We are writing to inform you of your test results.    Normal red blood cell (hgb) levels, normal white blood cell count and normal platelet levels.     Resulted Orders   CBC with platelets   Result Value Ref Range    WBC Count 6.1 4.0 - 11.0 10e3/uL    RBC Count 4.30 3.80 - 5.20 10e6/uL    Hemoglobin 12.0 11.7 - 15.7 g/dL    Hematocrit 36.5 35.0 - 47.0 %    MCV 85 78 - 100 fL    MCH 27.9 26.5 - 33.0 pg    MCHC 32.9 31.5 - 36.5 g/dL    RDW 15.2 (H) 10.0 - 15.0 %    Platelet Count 228 150 - 450 10e3/uL       If you have any questions or concerns, please call the clinic at the number listed above.       Sincerely,      Abundio Winters, DO    Electronically signed

## 2025-03-13 NOTE — PROGRESS NOTES
"  Assessment & Plan     PEG (percutaneous endoscopic gastrostomy) status (H)  S/P dilation on 3/4.  Currently on soft diet and bolus.  Appreciate dietician follow-up. Advised to keep upcoming appointment with GI.    - Vitamin D Deficiency; Future    Visit for screening mammogram  - MA Screening Bilateral w/ Luis Daniel; Future    Lower extremity edema  Encourage compression stockings and leg elevation  - Comprehensive metabolic panel (BMP + Alb, Alk Phos, ALT, AST, Total. Bili, TP); Future    Unspecified severe protein-calorie malnutrition  - Vitamin D Deficiency; Future  - CBC with platelets; Future    I spent 30 minutes discussing the patient s acute/chronic conditions and options for treatment including medication therapy, counseling services and meditation.      BMI  Estimated body mass index is 34.66 kg/m  as calculated from the following:    Height as of this encounter: 1.645 m (5' 4.76\").    Weight as of this encounter: 93.8 kg (206 lb 12.8 oz).   Weight management plan: Discussed healthy diet and exercise guidelines      Work on weight loss    Lexy Haines is a 69 year old, presenting for the following health issues:  Recheck Medication        3/13/2025     9:42 AM   Additional Questions   Accompanied by daughter     History of Present Illness       Reason for visit:  Follo up    She eats 0-1 servings of fruits and vegetables daily.She consumes 1 sweetened beverage(s) daily.She exercises with enough effort to increase her heart rate 9 or less minutes per day.  She exercises with enough effort to increase her heart rate 3 or less days per week.   She is taking medications regularly.      Patient is here to do a recheck on her Xyprexa and follow up after esophagogastroduodenoscopy w/dilation.       G-tube dependent: Had dilation on 3/4 and scheduled for follow-up with GI today. Currently getting 1.5 bolus at night time and doing soft diet.  Concern from dietitian that she is enough getting enough calories.  " "Gained some weight     2. Psychosis: Currently on Zyprexa and seen pyschiatry.  Had Zyprexa reduced to 10 mg daily due to EPS symptoms.     3. Intermittent lower extremity swelling: Denies any SOB.  Chronic in nature.  Gets worse at the end of the day.     Review of Systems   Constitutional:  Negative for activity change, appetite change, chills and fatigue.   HENT: Negative.     Eyes: Negative.    Respiratory:  Negative for cough, shortness of breath and wheezing.    Cardiovascular:  Negative for chest pain and palpitations.   Gastrointestinal: Negative.    Endocrine: Negative.    Skin:  Negative for color change and rash.   Allergic/Immunologic: Negative.    Neurological:  Negative for dizziness, weakness and headaches.   Hematological: Negative.    Psychiatric/Behavioral:  Negative for agitation and sleep disturbance. The patient is not nervous/anxious.            Objective    /68   Pulse 74   Temp 98  F (36.7  C) (Temporal)   Resp 20   Ht 1.645 m (5' 4.76\")   Wt 93.8 kg (206 lb 12.8 oz)   LMP  (LMP Unknown)   SpO2 95%   BMI 34.66 kg/m    Body mass index is 34.66 kg/m .  Physical Exam  Constitutional:       General: She is not in acute distress.  HENT:      Head: Normocephalic and atraumatic.   Eyes:      Conjunctiva/sclera: Conjunctivae normal.   Neck:      Trachea: No tracheal deviation.   Cardiovascular:      Rate and Rhythm: Normal rate and regular rhythm.      Heart sounds: Normal heart sounds.   Pulmonary:      Effort: Pulmonary effort is normal. No respiratory distress.      Breath sounds: Normal breath sounds. No wheezing or rales.   Musculoskeletal:      Cervical back: Normal range of motion.   Skin:     Findings: No rash.   Neurological:      Mental Status: She is alert.        Signed Electronically by: Abundio Winters DO    "

## 2025-03-13 NOTE — PATIENT INSTRUCTIONS
Paul Haines,    Thank you for allowing [facility] to manage your care.    I ordered some blood work, please go to the laboratory to get your laboratory studies.    I ordered a mammogram, please call diagnostic imaging [phone] to schedule your test.    For your convenience, test results are released as soon as they are available  Please allow 1-2 business days for me to send you a comment about your results.  If not done so, I encourage you to login into [employer] ([email or web address] to review your results in real time.     If you have any questions or concerns, please feel free to call us at [phone].    Sincerely,    Dr. Winters    Did you know?      You can schedule a video visit for follow-up appointments as well as future appointments for certain conditions.  Please see the below link.     [email or web address]    If you have not already done so,  I encourage you to sign up for [employer] ([email or web address]).  This will allow you to review your results, securely communicate with a provider, and schedule virtual visits as well.

## 2025-03-14 LAB
ALBUMIN SERPL BCG-MCNC: 3.9 G/DL (ref 3.5–5.2)
ALP SERPL-CCNC: 118 U/L (ref 40–150)
ALT SERPL W P-5'-P-CCNC: 13 U/L (ref 0–50)
ANION GAP SERPL CALCULATED.3IONS-SCNC: 10 MMOL/L (ref 7–15)
AST SERPL W P-5'-P-CCNC: 21 U/L (ref 0–45)
BILIRUB SERPL-MCNC: 0.4 MG/DL
BUN SERPL-MCNC: 15.6 MG/DL (ref 8–23)
CALCIUM SERPL-MCNC: 9.5 MG/DL (ref 8.8–10.4)
CHLORIDE SERPL-SCNC: 103 MMOL/L (ref 98–107)
CREAT SERPL-MCNC: 0.68 MG/DL (ref 0.51–0.95)
EGFRCR SERPLBLD CKD-EPI 2021: >90 ML/MIN/1.73M2
GLUCOSE SERPL-MCNC: 93 MG/DL (ref 70–99)
HCO3 SERPL-SCNC: 23 MMOL/L (ref 22–29)
POTASSIUM SERPL-SCNC: 4.4 MMOL/L (ref 3.4–5.3)
PROT SERPL-MCNC: 7.1 G/DL (ref 6.4–8.3)
SODIUM SERPL-SCNC: 136 MMOL/L (ref 135–145)
VIT D+METAB SERPL-MCNC: 27 NG/ML (ref 20–50)

## 2025-03-18 ENCOUNTER — TRANSFERRED RECORDS (OUTPATIENT)
Dept: HEALTH INFORMATION MANAGEMENT | Facility: CLINIC | Age: 70
End: 2025-03-18

## 2025-03-18 ENCOUNTER — TELEPHONE (OUTPATIENT)
Dept: OPHTHALMOLOGY | Facility: CLINIC | Age: 70
End: 2025-03-18
Payer: MEDICARE

## 2025-03-18 ENCOUNTER — PATIENT OUTREACH (OUTPATIENT)
Dept: GERIATRIC MEDICINE | Facility: CLINIC | Age: 70
End: 2025-03-18
Payer: MEDICARE

## 2025-03-18 NOTE — PROGRESS NOTES
[facility] Care Coordination Contact    Received after visit chart from care coordinator.  Completed following tasks: Mailed copy of support plan to member, Submitted referrals/auths for adult day care and transportation, and Updated services in Database.  Provider Signature - No Support Plan Shared:  Member indicates that they do not want their support plan shared with any [facility] providers. and Member Signature - Support Plan Change:  Per CC, member has made a change to their support plan.  Care Plan Change Letter mailed to member for signature with a self-addressed return envelope.      Mame Kelly  Care Management Specialist  [facility]  [phone]

## 2025-03-20 ENCOUNTER — OFFICE VISIT (OUTPATIENT)
Dept: OPHTHALMOLOGY | Facility: CLINIC | Age: 70
End: 2025-03-20
Payer: MEDICARE

## 2025-03-20 DIAGNOSIS — H02.135 SENILE ECTROPION OF LEFT LOWER EYELID: Primary | ICD-10-CM

## 2025-03-20 RX ORDER — ERYTHROMYCIN 5 MG/G
OINTMENT OPHTHALMIC ONCE
Status: COMPLETED | OUTPATIENT
Start: 2025-03-20 | End: 2025-03-20

## 2025-03-20 RX ADMIN — ERYTHROMYCIN 1 G: 5 OINTMENT OPHTHALMIC at 10:45

## 2025-03-20 NOTE — PROGRESS NOTES
Oculoplastic Surgery Operative Note    PREOPERATIVE DIAGNOSIS:    Left lower eyelid ectropion     POSTOPERATIVE DIAGNOSIS:    Left lower eyelid ectropion    PROCEDURE:   Left lower eyelid ectropion repair    ANESTHESIA: 1% Lidocaine with epinephrine    SURGEON:  Mine Munoz MD    ASSISTANT: None    ESTIMATED BLOOD LOSS:  1 mL    INDICATIONS:  Yancy Rivera presented with history of left lower eyelid ectropion.  She had previously undergone both upper eyelid ectropion repair in 2023 but had recurrent dry eye symptoms and was noted to have dehiscence of the lateral canthal angle.  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  The etiology of this is unclear but decision is to observe as it has been stable and does not appear inflamed. Risks, benefits, and alternatives to the proposed procedure were discussed, and she elected to proceed.    PROCEDURE: Yancy Rivera was brought to the operating room and placed supine on the operating table.  Local anesthetic as above was infiltrated into the left lower eyelid and lateral canthal area.  She was prepped and draped in typical sterile fashion.  Attention was directed to the left side.  15 blade was used to incise the skin along the lateral commissure.  Dissection was carried through the orbicularis oculi muscle and the lateral canthal tendon was disinserted from the lateral orbital rim.  Hemostasis was assured with thermal cautery.  A double-armed 5-0 Vicryl suture was passed through the lateral commissure into the wound.  The lateral canthal tendon was imbricated with the same 5-0 Vicryl suture and this was secured to the superior lateral orbital rim periosteum in a mattress fashion.  Again hemostasis was assured.  Skin was closed with a running 6-0 plain gut suture.  Erythromycin ophthalmic ointment was applied. Yancy Rivera tolerated the procedure well and left the room in  stable condition. I was present for the entire procedure. Mine Munoz MD          This report was dictated using Dragon voice recognition software.

## 2025-03-20 NOTE — PATIENT INSTRUCTIONS
Post-operative Instructions  Ophthalmic Plastic and Reconstructive Surgery    Mine Munoz M.D.     All instructions apply to the operated eye(s) or eyelid(s).    Wound care and personal care    ? Apply ice compresses and gentle pressure 15 minutes on, 15 minutes off, for 2 days. If you are sleeping, you don't need to wake up to ice. As long as there is no further bleeding, after two days, switch to warm water compresses for five minutes, four times a day until seen by your physician.   ? You may shower or wash your hair the day after surgery. Do not go swimming for at least 2 weeks to prevent contamination of your wounds.  ? You may go for walks and light activity is ok, but no heavy (over 15 pounds) lifting, bending or excessive straining for one week.   ? Do not apply make-up to the eyes or eyelids for 2 weeks after surgery.  ? Expect some swelling, bruising, black eye (even into the lower eyelids and cheeks). Also expect serum caking, crusting and discharge from the eye and/or incisions. A small amount of surface bleeding, and depending on the type of surgery, bleeding from the inside of the eyelid, is normal for the first 48 hours. Vision can become blurry from blood tinged tears or drops and ointment in your eyes.   ? Avoid straining, bending at the waist, or lifting more than 15 pounds for 1 week. Sleeping with your head elevated, such as in a recliner, for the first several days can help swelling resolve more quickly.   ? Do continue to ambulate (walk) as you normally would - being sedentary after surgery can cause blood clots.   ? Your eye(s) and eyelid(s) may be painful and tender. This is normal after surgery.      Contact information and follow-up  ? Return to the Eye Clinic for a follow-up appointment with your physician as scheduled. If no appointment has been scheduled:   - [phone] for an appointment with Dr. Munoz within 2 to 3 weeks from your date of surgery.     ? For severe pain,  bleeding, or loss of vision, call the [facility] at [phone].    After hours or on weekends and holidays, call [phone] and ask to speak with the ophthalmologist on call.    An on call person can be reached after hours for concerns. The on call doctor should not call in medication refill requests after hours or on weekends, so please plan accordingly.     Medications  ? Avoid Aspirin and ibuprofen for 3 days to reduce the risk of bleeding. You may take Tylenol (acetaminophen).  ? In addition to your home medications, take the following post-operative medications as prescribed by your physician:    ? Apply antibiotic ointment to all sutures three times a day. Once you run out you can stop. If it is still irritated, you can put a bit of Vaseline on it.  ? If you have ocular irritation, you can use over the counter artificial tears such as Refresh, Systane, or Blink. Do not use Visine, Clear Eyes, or any other drop that gets the red out.     Continue all drops you are currently on.

## 2025-03-30 DIAGNOSIS — Z93.1 PEG (PERCUTANEOUS ENDOSCOPIC GASTROSTOMY) STATUS (H): ICD-10-CM

## 2025-03-31 RX ORDER — SUCRALFATE 1 G/1
1 TABLET ORAL 4 TIMES DAILY
Qty: 360 TABLET | Refills: 0 | Status: SHIPPED | OUTPATIENT
Start: 2025-03-31

## 2025-04-08 ENCOUNTER — OFFICE VISIT (OUTPATIENT)
Dept: OPHTHALMOLOGY | Facility: CLINIC | Age: 70
End: 2025-04-08
Payer: MEDICARE

## 2025-04-08 DIAGNOSIS — H02.135 SENILE ECTROPION OF LEFT LOWER EYELID: Primary | ICD-10-CM

## 2025-04-08 ASSESSMENT — VISUAL ACUITY
OS_PH_SC+: -1
OS_PH_SC: 20/60
OS_SC: 20/60
OD_SC+: -1
OS_SC+: -2
METHOD: SNELLEN - LINEAR
OD_SC: 20/40

## 2025-04-08 ASSESSMENT — TONOMETRY
OS_IOP_MMHG: 10
IOP_METHOD: ICARE
OD_IOP_MMHG: 9

## 2025-04-08 NOTE — PATIENT INSTRUCTIONS
"- Apply warm compresses for 1 minute two to three times a day until your bruising has resolved. You can continue this for one more month.   - Apply Aquaphor or Vaseline to the incision at bedtime until it is smooth, usually about one month.   - It is good to use over the counter artificial tears. Good brands include Refresh, Blink, and Systane. Do NOT get drops that are for \"red eyes.\"   - It is normal for the incision to appear raised, red, itch and have small lumps. You can gently massage any small bumps along the incision line. These can take up to six months to resolve.    "

## 2025-04-08 NOTE — NURSING NOTE
Chief Complaints and History of Present Illnesses   Patient presents with    Follow Up     S/p Left lower eyelid ectropion repair 3/20/25     Chief Complaint(s) and History of Present Illness(es)       Follow Up              Laterality: left eye    Pain scale: 2/10    Comments: S/p Left lower eyelid ectropion repair 3/20/25              Comments    Patient report that the procedure with went well.   Patient reports that she is not bothered by pain and would rate it a 2.   No issues or concerns today.     Patient present today with her son today. She declined any  use.    Catarina Weeks, COT COT 10:44 AM April 8, 2025                             
No

## 2025-04-08 NOTE — PROGRESS NOTES
"Chief Complaint(s) and History of Present Illness(es)     Follow Up            Laterality: left eye    Pain scale: 2/10    Comments: S/p Left lower eyelid ectropion repair 3/20/25       Comments    Patient report that the procedure with went well.   Patient reports that she is not bothered by pain and would rate it a 2.   No issues or concerns today.     Patient present today with her son today. She declined any    use.    Catarina Weeks, COT COT 10:44 AM April 8, 2025        Doing well. Happy with outcome. No tearing and feels that the eye has improved. Still on eye drops and ointment.     Patient Instructions   - Apply warm compresses for 1 minute two to three times a day until your bruising has resolved. You can continue this for one more month.   - Apply Aquaphor or Vaseline to the incision at bedtime until it is smooth, usually about one month.   - It is good to use over the counter artificial tears. Good brands include Refresh, Blink, and Systane. Do NOT get drops that are for \"red eyes.\"   - It is normal for the incision to appear raised, red, itch and have small lumps. You can gently massage any small bumps along the incision line. These can take up to six months to resolve.    Return for Rollins 1 month DFE, OCT NFL MRx. (Patient would like)    Omer Medina on 4/8/2025 at 11:31 AM      Attending Physician Attestation: Complete documentation of historical and exam elements from today's encounter can be found in the full encounter summary report (not reduplicated in this progress note). I personally obtained the chief complaint(s) and history of present illness. I confirmed and edited as necessary the review of systems, past medical/surgical history, family history, social history, and examination findings as documented by others; and I examined the patient myself. I personally reviewed the relevant tests, images, and reports as documented above. I formulated and edited as necessary the assessment " and plan and discussed the findings and management plan with the patient and family. I personally reviewed the ophthalmic test(s) associated with this encounter, agree with the interpretation(s) as documented by the resident/fellow, and have edited the corresponding report(s) as necessary. Mine Munoz MD

## 2025-05-05 NOTE — CONFIDENTIAL NOTE
RECORDS RECEIVED FROM: internal    DATE RECEIVED:  7.22.25    NOTES STATUS DETAILS   OFFICE NOTE from referring provider internal    Davey Duncan MD      DISCHARGE REPORT from the ER ce 8.13.24 Ozzie    MEDICATION LIST internal      IMAGING  (NEED IMAGES AND REPORTS)       CT SCAN Ce  [facility]- 9.11.24, 8.28.24, 8.21.25, 8.16.24    CHEST XRAY (CXR) ce [facility]- 8.18.24, 8.13.24, 3.27.24, 4.16.23     Internal- 4.28.23, 2.27.23   TESTS       PULMONARY FUNCTION TESTING (PFT) internal  4/18/24  Scheduled 7.22.25

## 2025-05-08 ENCOUNTER — ANCILLARY PROCEDURE (OUTPATIENT)
Dept: CT IMAGING | Facility: CLINIC | Age: 70
End: 2025-05-08
Attending: INTERNAL MEDICINE
Payer: MEDICARE

## 2025-05-08 DIAGNOSIS — K86.81 EXOCRINE PANCREATIC INSUFFICIENCY: ICD-10-CM

## 2025-05-08 DIAGNOSIS — K22.2 ESOPHAGEAL STRICTURE: ICD-10-CM

## 2025-05-08 RX ORDER — IOPAMIDOL 755 MG/ML
126 INJECTION, SOLUTION INTRAVASCULAR ONCE
Status: COMPLETED | OUTPATIENT
Start: 2025-05-08 | End: 2025-05-08

## 2025-05-08 RX ADMIN — IOPAMIDOL 126 ML: 755 INJECTION, SOLUTION INTRAVASCULAR at 14:32

## 2025-05-09 ENCOUNTER — TRANSFERRED RECORDS (OUTPATIENT)
Dept: ADMINISTRATIVE | Facility: CLINIC | Age: 70
End: 2025-05-09
Payer: MEDICARE

## 2025-05-19 ENCOUNTER — PRE VISIT (OUTPATIENT)
Dept: PULMONOLOGY | Facility: CLINIC | Age: 70
End: 2025-05-19
Payer: MEDICARE

## 2025-05-19 ENCOUNTER — OFFICE VISIT (OUTPATIENT)
Dept: FAMILY MEDICINE | Facility: CLINIC | Age: 70
End: 2025-05-19
Payer: MEDICARE

## 2025-05-19 VITALS
OXYGEN SATURATION: 96 % | SYSTOLIC BLOOD PRESSURE: 108 MMHG | TEMPERATURE: 97.9 F | HEIGHT: 65 IN | WEIGHT: 214 LBS | DIASTOLIC BLOOD PRESSURE: 62 MMHG | RESPIRATION RATE: 18 BRPM | HEART RATE: 80 BPM | BODY MASS INDEX: 35.65 KG/M2

## 2025-05-19 DIAGNOSIS — Z93.1 PEG (PERCUTANEOUS ENDOSCOPIC GASTROSTOMY) STATUS (H): Primary | ICD-10-CM

## 2025-05-19 PROCEDURE — 1126F AMNT PAIN NOTED NONE PRSNT: CPT | Performed by: STUDENT IN AN ORGANIZED HEALTH CARE EDUCATION/TRAINING PROGRAM

## 2025-05-19 PROCEDURE — 3074F SYST BP LT 130 MM HG: CPT | Performed by: STUDENT IN AN ORGANIZED HEALTH CARE EDUCATION/TRAINING PROGRAM

## 2025-05-19 PROCEDURE — 99214 OFFICE O/P EST MOD 30 MIN: CPT | Performed by: STUDENT IN AN ORGANIZED HEALTH CARE EDUCATION/TRAINING PROGRAM

## 2025-05-19 PROCEDURE — 3078F DIAST BP <80 MM HG: CPT | Performed by: STUDENT IN AN ORGANIZED HEALTH CARE EDUCATION/TRAINING PROGRAM

## 2025-05-19 PROCEDURE — G2211 COMPLEX E/M VISIT ADD ON: HCPCS | Performed by: STUDENT IN AN ORGANIZED HEALTH CARE EDUCATION/TRAINING PROGRAM

## 2025-05-19 ASSESSMENT — PAIN SCALES - GENERAL: PAINLEVEL_OUTOF10: NO PAIN (0)

## 2025-05-19 NOTE — PATIENT INSTRUCTIONS
Paul Haines,    Thank you for allowing [facility] to manage your care.    PEG Tube Site Care After Removal  1. Keep the site clean and dry:    Gently clean the area daily with mild soap and warm water.    Pat dry with a clean towel or gauze.    Avoid using alcohol or hydrogen peroxide, as they can delay healing.    2. Monitor for drainage:    Some drainage (clear, yellowish, or a bit of mucus) is normal for a few days.    Use dry gauze over the site to absorb drainage and change it as needed (at least once daily or when soiled).    3. Avoid covering with airtight dressings:    Let the site breathe. Use non-occlusive dressings (like dry gauze) to prevent moisture build-up.    4. Avoid submerging the site in water:    No baths, swimming, or soaking until the site is fully closed (usually within 1-2 weeks).    Showers are typically okay, but avoid spraying directly on the site.    5. Watch for signs of infection:    Redness, warmth, swelling, increased pain    Foul-smelling drainage or pus    Fever    6. Stomach care:    The tract usually closes on its own within a few days to a couple of weeks.    If leakage persists beyond 2 weeks, or if the site appears to be opening more, follow up with your provider. Make an appointment with the GI doctor    7. Diet and activity:    Resume regular diet unless otherwise instructed.    Avoid heavy lifting or abdominal strain for a few days after removal.    Call your healthcare provider if you notice:    Persistent or increasing pain    Signs of infection    Continued leakage after 2 weeks    Fever       For your convenience, test results are released as soon as they are available  Please allow 1-2 business days for me to send you a comment about your results.  If not done so, I encourage you to login into [employer] ([email or web address] to review your results in real time.     If you have any questions or concerns, please feel free to call us at [phone].    Sincerely,    Dr. Mcleod    Did you know?      You can schedule a video visit for follow-up appointments as well as future appointments for certain conditions.  Please see the below link.     [email or web address]    If you have not already done so,  I encourage you to sign up for [employer] ([email or web address]).  This will allow you to review your results, securely communicate with a provider, and schedule virtual visits as well.

## 2025-05-19 NOTE — PROGRESS NOTES
Assessment & Plan     PEG (percutaneous endoscopic gastrostomy) status (H)  The patient is stable with no signs of infection at the former PEG tube site. Current symptoms are consistent with minor residual drainage likely related to food particles passing through a partially open tract.    Plan includes conservative management:  Stop hydrogen peroxide. Dressing performed today in the clinic. Supplies including NS given to them to take home.  Skin protection: Recommend applying barrier cream  around the site to prevent skin breakdown from moisture.    Drainage control: Use dry gauze dressing as needed to absorb drainage and keep the area clean and dry.    Acid suppression: continue a proton pump inhibitor (PPI) or H2 blocker to minimize gastric acid secretion and reduce the likelihood of irritation or leakage.    Dietary modifications: Encourage small, frequent meals and maintaining an upright position after eating to reduce abdominal pressure and potential reflux.    Patient to monitor for any signs of infection (pain, redness, swelling, fever) and follow up with the specialist at [facility] . Both Patient and son verbalized understanding.      30 minutes spent by me on the date of the encounter doing chart review, patient visit, documentation, and discussion with family   Subjective   Yancy is a 69 year old, presenting for the following health issues:  Follow Up (Patient had her feeding tube removed, wanting to check for infection or any issues. Patient states it is just itching, no pain present. )        5/19/2025    11:16 AM   Additional Questions   Roomed by Leni Rollins CMA   Accompanied by Alicja         5/19/2025    11:16 AM   Patient Reported Additional Medications   Patient reports taking the following new medications No new medications.     History of Present Illness       Reason for visit:  Check up    She eats 2-3 servings of fruits and vegetables daily.She consumes 0 sweetened beverage(s) daily.She  "exercises with enough effort to increase her heart rate 9 or less minutes per day.  She exercises with enough effort to increase her heart rate 7 days per week.   She is taking medications regularly.    PEG tube was initially placed due to an esophageal stricture and was removed in March. Since then, the patient has been tolerating oral intake well. She reports occasional discharge of a very small amount of greenish, sometimes creamy material from the site, particularly after consuming green-colored foods. She denies any associated pain, redness, fever, or chills.  They currently clean daily with hydrogen peroxide and cover it with gauze,        Review of Systems  Constitutional, HEENT, cardiovascular, pulmonary, gi and gu systems are negative, except as otherwise noted.      Objective    /62   Pulse 80   Temp 97.9  F (36.6  C) (Temporal)   Resp 18   Ht 1.645 m (5' 4.76\")   Wt 97.1 kg (214 lb)   LMP  (LMP Unknown)   SpO2 96%   Breastfeeding No   BMI 35.88 kg/m    Body mass index is 35.88 kg/m .  Physical Exam   Abdomen: Soft, non-distended. No tenderness to palpation. Bowel sounds present and normal.    PEG Site: healing site at prior PEG tube location. Small amount of greenish(patient just had avocado, creamy discharge noted with no surrounding erythema, warmth, induration, or tenderness. No signs of cellulitis or local infection. No fluctuance or drainage expressed on palpation.        Signed Electronically by: Nalini Mcleod MD    "

## 2025-05-20 ENCOUNTER — PATIENT OUTREACH (OUTPATIENT)
Dept: GERIATRIC MEDICINE | Facility: CLINIC | Age: 70
End: 2025-05-20

## 2025-05-20 NOTE — PROGRESS NOTES
[facility] Care Coordination Contact      [facility] Six-Month Telephone Assessment    6 month telephone assessment completed on 5/20/25 with jessica Herrera.    ER visits: Yes -  [facility]   Hospitalizations: No  TCU stays: No  Significant health status changes: none  Falls/Injuries: No  ADL/IADL changes: No  Changes in services: No    Caregiver Assessment follow up:  None    Goals: See Support Plan for goal progress documentation.       Will see member in 6 months for an annual health risk assessment.   Encouraged member to call CC with any questions or concerns in the meantime.       PRINCESS Jacobson  [facility]  [phone]

## 2025-05-21 NOTE — PROGRESS NOTES
2025        [facility] Miguel   [address]  [place]  [place] [address]      [facility] [place],  :  1955    Dear Miguel,  Your CT scan shows a normal pancreas. There is some fat in the liver. Take care!      Thank you.    Electronically signed by:  Raul Catherine MD 2025 04:23 PM  Document generated by:  Raul Catherine MD  2025  If your provider ordered multiple tests; the results may not become available at the same time.  If multiple test results are received within 14 days of one another, you may receive a duplicate.  cc:  Abundio Winters DO  cc:  Abundio Winters DO

## 2025-05-27 ENCOUNTER — OFFICE VISIT (OUTPATIENT)
Dept: OPHTHALMOLOGY | Facility: CLINIC | Age: 70
End: 2025-05-27
Attending: OPHTHALMOLOGY
Payer: MEDICARE

## 2025-05-27 DIAGNOSIS — H02.115 CICATRICIAL ECTROPION OF LEFT LOWER EYELID: ICD-10-CM

## 2025-05-27 DIAGNOSIS — H04.123 DRY EYES, BILATERAL: ICD-10-CM

## 2025-05-27 DIAGNOSIS — H11.232 SYMBLEPHARON OF LEFT EYE: ICD-10-CM

## 2025-05-27 DIAGNOSIS — H40.1132 PRIMARY OPEN ANGLE GLAUCOMA OF BOTH EYES, MODERATE STAGE: Primary | ICD-10-CM

## 2025-05-27 PROCEDURE — G0463 HOSPITAL OUTPT CLINIC VISIT: HCPCS | Performed by: OPHTHALMOLOGY

## 2025-05-27 PROCEDURE — 92133 CPTRZD OPH DX IMG PST SGM ON: CPT | Performed by: OPHTHALMOLOGY

## 2025-05-27 PROCEDURE — 92015 DETERMINE REFRACTIVE STATE: CPT | Mod: GY

## 2025-05-27 RX ORDER — MINERAL OIL, PETROLATUM 425; 573 MG/G; MG/G
OINTMENT OPHTHALMIC
COMMUNITY

## 2025-05-27 RX ORDER — TIMOLOL MALEATE 5 MG/ML
1 SOLUTION/ DROPS OPHTHALMIC EVERY MORNING
Qty: 10 ML | Refills: 5 | Status: SHIPPED | OUTPATIENT
Start: 2025-05-27

## 2025-05-27 ASSESSMENT — REFRACTION_MANIFEST
OD_CYLINDER: +0.75
OD_AXIS: 015
OS_ADD: +2.75
OD_ADD: +2.75
OS_SPHERE: +1.50
OS_CYLINDER: +0.75
OS_AXIS: 015
OD_SPHERE: PLANO

## 2025-05-27 ASSESSMENT — VISUAL ACUITY
OS_CC: 20/70
METHOD: SNELLEN - LINEAR
OD_PH_CC: 20/30
OD_CC: 20/40
CORRECTION_TYPE: GLASSES
OD_PH_CC+: -2
OS_PH_CC+: +
OS_PH_CC: 20/50
OD_CC+: -2
OS_CC+: -1

## 2025-05-27 ASSESSMENT — CONF VISUAL FIELD
OD_SUPERIOR_TEMPORAL_RESTRICTION: 0
OS_SUPERIOR_NASAL_RESTRICTION: 0
OS_NORMAL: 1
OD_INFERIOR_TEMPORAL_RESTRICTION: 3
METHOD: COUNTING FINGERS
OS_INFERIOR_TEMPORAL_RESTRICTION: 0
OD_INFERIOR_NASAL_RESTRICTION: 0
OD_SUPERIOR_NASAL_RESTRICTION: 0
OS_SUPERIOR_TEMPORAL_RESTRICTION: 0
OS_INFERIOR_NASAL_RESTRICTION: 0

## 2025-05-27 ASSESSMENT — REFRACTION_WEARINGRX
OS_CYLINDER: SPHERE
OD_AXIS: 015
OS_SPHERE: +1.25
OD_ADD: +2.50
OD_SPHERE: -0.50
OD_CYLINDER: +0.75
OS_ADD: +2.50

## 2025-05-27 ASSESSMENT — SLIT LAMP EXAM - LIDS: COMMENTS: PTOSIS

## 2025-05-27 ASSESSMENT — EXTERNAL EXAM - LEFT EYE: OS_EXAM: NORMAL

## 2025-05-27 ASSESSMENT — TONOMETRY
OD_IOP_MMHG: 16
IOP_METHOD: APPLANATION
OS_IOP_MMHG: 17

## 2025-05-27 ASSESSMENT — CUP TO DISC RATIO
OD_RATIO: 0.6
OS_RATIO: 0.7

## 2025-05-27 ASSESSMENT — EXTERNAL EXAM - RIGHT EYE: OD_EXAM: NORMAL

## 2025-05-27 NOTE — PROGRESS NOTES
HPI       Follow Up    In both eyes.  Since onset it is stable.  Associated symptoms include dryness and photophobia.  Negative for eye pain and headache.  Treatments tried include eye drops, artificial tears and ointment.  Pain was noted as 0/10. Additional comments: Primary open angle glaucoma of both eyes             Comments    She states that her vision has seemed stable in both eyes since her last eye exam.  Both eyes feel dry.    TAMMI Bradford 10:10 AM  May 27, 2025                  Last edited by Lisa Salcedo COT on 5/27/2025 10:10 AM.          Review of systems for the eyes was negative other than the pertinent positives/negatives listed in the HPI.      Assessment & Plan      Yancy Rivera is a 69 year old female with the following diagnoses:   1. Primary open angle glaucoma of both eyes, moderate stage    2. Cicatricial ectropion of left lower eyelid    3. Symblepharon of left eye    4. Dry eyes, bilateral - Both Eyes         History obtained via son as interpretor   S/P Selected laser trabeculoplasty (SLT) both eyes Spring 2021  S/P BULB 10/2023  S/p Left lower eyelid ectropion repair 3/20/25   Stable symblepharon on SLE today       OCT Nerve fiber layer stable   Goal low teens both eyes   Intraocular pressure borderline both eyes   Has been using timolol in both eyes c good compliance    Continue timolol  every morning both eyes - refills sent  Continue artificial tears four times a day both eyes   Continue ESS agustin at bedtime as needed   Refractive options reviewed  Refraction given       Patient disposition:   Return in about 6 months (around 11/27/2025) for LVC VF, OCT NFL, VT only.           Attending Physician Attestation:  Complete documentation of historical and exam elements from today's encounter can be found in the full encounter summary report (not reduplicated in this progress note).  I personally obtained the chief complaint(s) and history of present illness.  I confirmed and  edited as necessary the review of systems, past medical/surgical history, family history, social history, and examination findings as documented by others; and I examined the patient myself.  I personally reviewed the relevant tests, images, and reports as documented above.  I formulated and edited as necessary the assessment and plan and discussed the findings and management plan with the patient and family. . - Constantin Rollins MD

## 2025-05-27 NOTE — NURSING NOTE
Chief Complaints and History of Present Illnesses   Patient presents with    Follow Up     Primary open angle glaucoma of both eyes     Chief Complaint(s) and History of Present Illness(es)       Follow Up              Laterality: both eyes    Course: stable    Associated symptoms: dryness and photophobia.  Negative for eye pain and headache    Treatments tried: eye drops, artificial tears and ointment    Pain scale: 0/10    Comments: Primary open angle glaucoma of both eyes              Comments    She states that her vision has seemed stable in both eyes since her last eye exam.  Both eyes feel dry.    TAMMI Bradford 10:10 AM  May 27, 2025

## 2025-06-11 ENCOUNTER — OFFICE VISIT (OUTPATIENT)
Dept: FAMILY MEDICINE | Facility: CLINIC | Age: 70
End: 2025-06-11
Payer: MEDICARE

## 2025-06-11 VITALS
HEART RATE: 68 BPM | DIASTOLIC BLOOD PRESSURE: 54 MMHG | WEIGHT: 215.4 LBS | HEIGHT: 65 IN | OXYGEN SATURATION: 94 % | RESPIRATION RATE: 20 BRPM | TEMPERATURE: 97.5 F | SYSTOLIC BLOOD PRESSURE: 112 MMHG | BODY MASS INDEX: 35.89 KG/M2

## 2025-06-11 DIAGNOSIS — F39 MOOD DISORDER: ICD-10-CM

## 2025-06-11 DIAGNOSIS — M25.561 ARTHRALGIA OF BOTH KNEES: ICD-10-CM

## 2025-06-11 DIAGNOSIS — F33.41 MAJOR DEPRESSIVE DISORDER, RECURRENT, IN PARTIAL REMISSION: ICD-10-CM

## 2025-06-11 DIAGNOSIS — I10 BENIGN ESSENTIAL HYPERTENSION: ICD-10-CM

## 2025-06-11 DIAGNOSIS — M25.562 ARTHRALGIA OF BOTH KNEES: ICD-10-CM

## 2025-06-11 DIAGNOSIS — Z93.1 S/P PERCUTANEOUS ENDOSCOPIC GASTROSTOMY (PEG) TUBE PLACEMENT (H): Primary | ICD-10-CM

## 2025-06-11 PROCEDURE — 99214 OFFICE O/P EST MOD 30 MIN: CPT | Mod: 24 | Performed by: FAMILY MEDICINE

## 2025-06-11 PROCEDURE — 3074F SYST BP LT 130 MM HG: CPT | Performed by: FAMILY MEDICINE

## 2025-06-11 PROCEDURE — 3078F DIAST BP <80 MM HG: CPT | Performed by: FAMILY MEDICINE

## 2025-06-11 PROCEDURE — 1126F AMNT PAIN NOTED NONE PRSNT: CPT | Performed by: FAMILY MEDICINE

## 2025-06-11 RX ORDER — CARVEDILOL 6.25 MG/1
6.25 TABLET ORAL 2 TIMES DAILY WITH MEALS
Qty: 180 TABLET | Refills: 3 | Status: SHIPPED | OUTPATIENT
Start: 2025-06-11

## 2025-06-11 RX ORDER — DIVALPROEX SODIUM 125 MG/1
500 TABLET, DELAYED RELEASE ORAL AT BEDTIME
COMMUNITY
Start: 2025-06-11

## 2025-06-11 RX ORDER — GABAPENTIN 300 MG/1
300 CAPSULE ORAL AT BEDTIME
COMMUNITY
Start: 2025-06-11

## 2025-06-11 RX ORDER — ESCITALOPRAM OXALATE 10 MG/1
10 TABLET ORAL DAILY
COMMUNITY
Start: 2025-06-11

## 2025-06-11 ASSESSMENT — PAIN SCALES - GENERAL: PAINLEVEL_OUTOF10: NO PAIN (0)

## 2025-06-11 NOTE — PATIENT INSTRUCTIONS
Paul Haines,    Thank you for allowing [facility] to manage your care.    I sent your prescriptions to your pharmacy.    If you have any questions or concerns, please feel free to call us at [phone].    Sincerely,    Dr. Winters    Did you know?      You can schedule a video visit for follow-up appointments as well as future appointments for certain conditions.  Please see the below link.     [email or web address]    If you have not already done so,  I encourage you to sign up for [facility] ([email or web address]  This will allow you to review your results, securely communicate with a provider, and schedule virtual visits as well.

## 2025-06-11 NOTE — PROGRESS NOTES
Assessment & Plan     S/P percutaneous endoscopic gastrostomy (PEG) tube placement (H)  S/P removal in May.  Advised to keep upcoming appointment to follow-up regarding post-surgical wound site.  Scheduled for upcoming esophageal stricture dilations.   - lipase-protease-amylase (CREON 12) [id number] units CPEP; Take four times daily by mouth    Mood disorder  Chronic and stable.  Continue with Olanzapine.  Appreciate psychiatry follow-up.   - gabapentin (NEURONTIN) 300 MG capsule; Take 1 capsule (300 mg) by mouth at bedtime.    Benign essential hypertension  Chronic and stable.  - carvedilol (COREG) 6.25 MG tablet; Take 1 tablet (6.25 mg) by mouth 2 times daily (with meals).    Major depressive disorder, recurrent, in partial remission  Chronic and stable.   - escitalopram (LEXAPRO) 10 MG tablet; Take 1 tablet (10 mg) by mouth daily.    Arthralgia of both knees  Chronic and stable.   - diclofenac (VOLTAREN) 1 % topical gel; Apply 2 g topically 4 times daily.    I spent 30 minutes discussing the patient s acute/chronic conditions and options for treatment including medication therapy, counseling services and meditation.    Follow-up    Follow-up Visit   Expected date: Aug 06, 2025      Follow Up Appointment Details:     Follow-up with whom?: Me    Follow-Up for what?: Pre-Op    How?: In Person                 Lexy Haines is a 69 year old, presenting for the following health issues:  RECHECK (GI symptoms (ulcer) and discharge from feeding tube area that has come back /)        6/11/2025    12:51 PM   Additional Questions   Accompanied by Javier         6/11/2025    12:53 PM   Patient Reported Additional Medications   Patient reports taking the following new medications Divalproex spr 125 mg cap, take 4 capsules by mouth at bedtime     History of Present Illness       Reason for visit:  Velma   She is taking medications regularly.        Psychosis: Currently on olzanzapine 10 mg at night time and 5  "mg daily prn.  States that his helping with her mood.  She is concerned of potential weight gain.       2. S/P Peg-tube removal: Was removed beginning of May..   Noticed discharge.  Seen by GI and was started on antibiotics.  Patient has an upcoming appointment this Friday to follow-up on the wound site.  Otherwise, she is tolerating oral and drinking without any difficulties.  Denies any abdominal pain or changes with bowel movement.     Review of Systems   Constitutional:  Negative for activity change, appetite change, chills and fatigue.   HENT: Negative.     Eyes: Negative.    Respiratory:  Negative for cough, shortness of breath and wheezing.    Cardiovascular:  Negative for chest pain and palpitations.   Gastrointestinal: Negative.    Endocrine: Negative.    Skin:  Negative for color change and rash.   Allergic/Immunologic: Negative.    Neurological:  Negative for dizziness, weakness and headaches.   Hematological: Negative.    Psychiatric/Behavioral:  Negative for agitation and sleep disturbance. The patient is not nervous/anxious.            Objective    /54   Pulse 68   Temp 97.5  F (36.4  C) (Temporal)   Resp 20   Ht 1.645 m (5' 4.76\")   Wt 97.7 kg (215 lb 6.4 oz)   LMP  (LMP Unknown)   SpO2 94%   BMI 36.11 kg/m    Body mass index is 36.11 kg/m .  Physical Exam  Constitutional:       General: She is not in acute distress.     Appearance: She is well-developed.   HENT:      Head: Normocephalic and atraumatic.      Nose: Nose normal.   Eyes:      Conjunctiva/sclera: Conjunctivae normal.   Neck:      Trachea: No tracheal deviation.   Cardiovascular:      Rate and Rhythm: Normal rate and regular rhythm.      Heart sounds: Normal heart sounds.   Pulmonary:      Effort: Pulmonary effort is normal. No respiratory distress.      Breath sounds: Normal breath sounds.   Abdominal:      General: There is no distension.      Palpations: Abdomen is soft.      Tenderness: There is no abdominal tenderness. "      Comments: Surgical site: granula tissue at previous PEG tube site appears to be healing.  No erythema.   Musculoskeletal:         General: Normal range of motion.      Cervical back: Normal range of motion.   Skin:     General: Skin is warm.   Neurological:      Mental Status: She is alert and oriented to person, place, and time.   Psychiatric:         Behavior: Behavior normal.            Signed Electronically by: Abundio Winters DO

## 2025-06-12 ASSESSMENT — ENCOUNTER SYMPTOMS
EYES NEGATIVE: 1
AGITATION: 0
WEAKNESS: 0
SLEEP DISTURBANCE: 0
HEMATOLOGIC/LYMPHATIC NEGATIVE: 1
WHEEZING: 0
APPETITE CHANGE: 0
COUGH: 0
HEADACHES: 0
COLOR CHANGE: 0
SHORTNESS OF BREATH: 0
DIZZINESS: 0
FATIGUE: 0
ALLERGIC/IMMUNOLOGIC NEGATIVE: 1
GASTROINTESTINAL NEGATIVE: 1
ENDOCRINE NEGATIVE: 1
NERVOUS/ANXIOUS: 0
PALPITATIONS: 0
ACTIVITY CHANGE: 0
CHILLS: 0

## 2025-06-13 ENCOUNTER — TRANSFERRED RECORDS (OUTPATIENT)
Dept: HEALTH INFORMATION MANAGEMENT | Facility: CLINIC | Age: 70
End: 2025-06-13
Payer: MEDICARE

## 2025-06-24 DIAGNOSIS — E78.5 HYPERLIPIDEMIA LDL GOAL <160: Primary | ICD-10-CM

## 2025-06-26 RX ORDER — ATORVASTATIN CALCIUM 20 MG/1
20 TABLET, FILM COATED ORAL DAILY
Qty: 90 TABLET | Refills: 3 | Status: SHIPPED | OUTPATIENT
Start: 2025-06-26

## 2025-06-30 DIAGNOSIS — Z93.1 PEG (PERCUTANEOUS ENDOSCOPIC GASTROSTOMY) STATUS (H): ICD-10-CM

## 2025-07-02 RX ORDER — SUCRALFATE 1 G/1
1 TABLET ORAL 4 TIMES DAILY
Qty: 360 TABLET | Refills: 3 | Status: SHIPPED | OUTPATIENT
Start: 2025-07-02

## 2025-07-07 NOTE — PROGRESS NOTES
ASSESSMENT & PLAN    Yancy was seen today for pain and pain.    Diagnoses and all orders for this visit:    Pain of right hand  -     Orthopedic  Referral  -     XR Hand Right G/E 3 Views; Future    Injury of right wrist, initial encounter  -     XR Hand Right G/E 3 Views; Future      Acute on chronic issue.      She was previously seen for right wrist issues through [facility], as well as by Dr Nugent here. Reviewed those notes.  Plan to brace and monitor for now.  Questions answered. Discussed signs and symptoms that may indicate more serious issues; the patient was instructed to seek appropriate care if noted. Yancy indicates understanding of these issues and agrees with the plan.        See Patient Instructions  Patient Instructions   We discussed right hand and wrist pain flared from injury, in the setting of previously noted hand and wrist issues.  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  More recent x-rays after falling do not demonstrate obvious fracture, which is good.  Pain may simply be flared from the previously known issues in the hand and wrist.  For now, okay to continue with bracing for comfort.  May use over-the-counter medication for comfort as well.  We discussed potential use of Voltaren gel, which is available over-the-counter and can be used topically.  For now, may work on home exercises from previous hand therapy.  Future considerations could include additional imaging with MRI (note previously had 2 MRIs over the last few years, related to wrist pain), referral back to hand therapy, and referral for imaging guided wrist joint steroid injection.  Plan leave follow-up open-ended.  Contact clinic for any questions or concerns.    If you have any further questions for your physician or physician s care team you can contact them thru MyChart or by calling   "[phone] and use option 3 to leave a voice message.   Messages received during business hours will be returned same day.          [facility]      CC: Marlo Dupree    -----  Chief Complaint   Patient presents with     Right Hand - Pain     Right Wrist - Pain       SUBJECTIVE  Yancy Rivera is a/an 67 year old female who is seen in consultation at the request of  Marlo Dupree PA-C for evaluation of right hand and wrist pain. Yancy fell last week on ice and hurt her R wrist and hand. Her pain is primarily along the wrist and ulna. She has trouble gripping things and has pain with all motion.    The patient is seen by themselves.  The patient is Right handed    Onset: 1 week(s) ago. Patient describes injury as a fall on the ice  Location of Pain: right wrist  Worsened by: all motion  Better with: Ice  Treatments tried: elevation, ice and ibuprofen  Associated symptoms: numbness    Orthopedic/Surgical history:   Social History/Occupation:         REVIEW OF SYSTEMS:  Review of Systems      OBJECTIVE:  /78   Ht 1.626 m (5' 4\")   Wt 84.8 kg (187 lb)   LMP  (LMP Unknown)   BMI 32.10 kg/m           Hand/wrist (right):    Inspection:  Mild wrist swelling.    Motion:  Forearm pronation , forearm supination with some ulnar sided wrist pain.  Wrist flexion with some wrist pain  Wrist extension with some wrist pain  Digit motion grossly intact    Strength:  deferred    Sensation:  Grossly intact light touch.    Radial pulses normal, +2/4, capillary refill brisk.    Palpation:  Tender dorsal wrist, dorsal carpals, ulnar styloid, distal ulna        RADIOLOGY:  I independently ordered, visualized and reviewed these images with the patient  Chronic appearing ossicle at level of ulnar styloid. There may be mild widening of the scapholunate interval. Unchanged degenerative findings.      XR HAND RIGHT G/E 3 VIEWS 3/7/2023 11:17 AM "      HISTORY: Pain of right hand     COMPARISON: 2/27/2023                                                                      IMPRESSION: Osteopenia. Chronic ossicle adjacent to the ulnar styloid.  Mild degenerative changes in the hand and wrist. Findings are stable.     JEFF LEWIS MD         Previous x-rays:          XR Hand Right G/E 3 Views    Narrative    RIGHT HAND THREE OR MORE VIEWS   2/27/2023 8:15 AM     HISTORY:  Pain of right hand.    COMPARISON: None.      Impression    IMPRESSION:  1. Small old nonunited ulnar styloid process fracture.  2. Mild osteoarthrosis of the STT joint and first CMC joint as well as  several of the MCP and interphalangeal joints.  3. Diffuse bone demineralization.   4. There is no evidence of an acute or subacute fracture or  inflammatory arthropathy.    NATHAN TOVAR MD         SYSTEM ID:  [id number]   XR Forearm Right 2 Views    Narrative    FOREARM RIGHT TWO VIEWS   2/27/2023 8:15 AM     HISTORY:  Pain of right forearm.    COMPARISON: None.      Impression    IMPRESSION:  1. 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  2. Diffuse bone demineralization.  3. Mild to moderate osteoarthrosis of the STT joint and first CMC  joint.  4. There is no evidence of an acute or subacute radius/ulna fracture.    NATHAN TOVAR MD         SYSTEM ID:  [id number]   XR Humerus Right G/E 2 Views    Narrative    RIGHT HUMERUS TWO OR MORE VIEWS   2/27/2023 8:15 AM     HISTORY:  Acute pain of right shoulder.    COMPARISON: None.      Impression    IMPRESSION: Normal.    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     History: Wrist fracture, tendon/ligament injury suspected, xray  done;  fall two weeks ago, neg xray, persisent right scaphoid and lunate and  distal right radius tenderness on exam, r/o occult fx.; Right wrist  injury, initial encounter; Right wrist pain     Additional History from EMR: MRI 6/11/2020; x-ray 1/10/2022, 5/14/2020        Comparison: MRI 6/11/2020; x-ray 1/10/2022, 5/14/2020     Findings:  External marker placed over the scapholunate joint.     Triangular Fibrocartilage: Disruption of the styloid attachment and  central disc. No radial sided triangular cartilage perforation.     Interosseous Ligaments:  Scapholunate ligament: High-grade sprain of the dorsal and volar  scapholunate ligament. High-grade tear of the membranous scapholunate  ligament, coronal image 8, sagittal image 17. Widening of the  scapholunate interosseous distance measuring 4.5 mm (series 5001,  image 8), new from prior.  Lunatotriquetral ligament: Intact.  Carpal alignment: Widening of scapholunate joint, otherwise  unremarkable.     Bones: Chronic changes of the distal ulna similar-appearing to prior  MR, presumably related to prior injury.     Articulations:  Joint effusion: None.  Cartilage: No hyaline cartilage defects.  Synovium: No synovial thickening.     Tendons:  Flexor tendons: Normal. No tear or tendon sheath effusion.  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     Miscellaneous soft tissues: Volar ganglion cyst originating from the  pisotriquetral  joint extending proximally towards the TFCC, axial  image 16. Increased fluid in the pisotriquetral recess.                                                                      IMPRESSION:  1. Directly underlying the external marker: Widening of the  scapholunate interosseous distance measuring 4.5 mm with high-grade  tearing of the membranous portion of the scapholunate ligament and  high-grade sprain of the dorsal/volar components. This is new  from  prior.  2. In proximity to the external marker:  2a. Second and third extensor compartment tenosynovitis.  2b. Fluid in the pisotriquetral recess.   3. Redemonstration of chronic changes of the TFCC, predominantly of  the central disc and styloid attachment, similar to prior.     I have personally reviewed the examination and initial interpretation  and I agree with the findings.     JUTTA ELLERMANN, MD                 Review of prior external note(s) from - previous eval  Review of the result(s) of each unique test - imaging  Independent interpretation of a test performed by another physician/other qualified health care professional (not separately reported) - imaging  Ordering of each unique test       Left arm;

## 2025-07-17 DIAGNOSIS — F39 MOOD DISORDER: ICD-10-CM

## 2025-07-17 RX ORDER — GABAPENTIN 300 MG/1
300 CAPSULE ORAL AT BEDTIME
Qty: 90 CAPSULE | Refills: 0 | Status: SHIPPED | OUTPATIENT
Start: 2025-07-17

## 2025-07-22 ENCOUNTER — PRE VISIT (OUTPATIENT)
Dept: PULMONOLOGY | Facility: CLINIC | Age: 70
End: 2025-07-22

## 2025-07-22 DIAGNOSIS — R06.02 SHORTNESS OF BREATH: ICD-10-CM

## 2025-07-22 LAB
DLCOUNC-%PRED-PRE: 57 %
DLCOUNC-PRE: 11 ML/MIN/MMHG
DLCOUNC-PRED: 19.12 ML/MIN/MMHG
ERV-%PRED-PRE: 44 %
ERV-PRE: 0.33 L
ERV-PRED: 0.74 L
EXPTIME-PRE: 5.34 SEC
FEF2575-%PRED-PRE: 59 %
FEF2575-PRE: 1.1 L/SEC
FEF2575-PRED: 1.84 L/SEC
FEFMAX-%PRED-PRE: 47 %
FEFMAX-PRE: 2.75 L/SEC
FEFMAX-PRED: 5.79 L/SEC
FEV1-%PRED-PRE: 64 %
FEV1-PRE: 1.42 L
FEV1FEV6-PRED: 79 %
FEV1FVC-PRE: 82 %
FEV1FVC-PRED: 79 %
FEV1SVC-PRE: 76 L
FEV1SVC-PRED: 71 L
FIFMAX-PRE: 1.93 L/SEC
FRCPLETH-%PRED-PRE: 81 %
FRCPLETH-PRE: 2.26 L
FRCPLETH-PRED: 2.77 L
FVC-%PRED-PRE: 62 %
FVC-PRE: 1.74 L
FVC-PRED: 2.79 L
GAW-PRED: 1.03 L/S/CMH2O
IC-%PRED-PRE: 67 %
IC-PRE: 1.54 L
IC-PRED: 2.28 L
Lab: 103 %
RVPLETH-%PRED-PRE: 101 %
RVPLETH-PRE: 1.92 L
RVPLETH-PRED: 1.9 L
SGAW-PRED: 0.2 1/CMH2O*S
SRAW-PRED: < 4.76 CMH2O*S
TLCPLETH-%PRED-PRE: 75 %
TLCPLETH-PRE: 3.8 L
TLCPLETH-PRED: 5.01 L
VA-%PRED-PRE: 62 %
VA-PRE: 2.87 L
VC-%PRED-PRE: 60 %
VC-PRE: 1.87 L
VC-PRED: 3.08 L

## 2025-07-22 PROCEDURE — 94375 RESPIRATORY FLOW VOLUME LOOP: CPT | Performed by: INTERNAL MEDICINE

## 2025-07-22 PROCEDURE — 94726 PLETHYSMOGRAPHY LUNG VOLUMES: CPT | Performed by: INTERNAL MEDICINE

## 2025-07-22 PROCEDURE — 94150 VITAL CAPACITY TEST: CPT | Performed by: INTERNAL MEDICINE

## 2025-07-22 PROCEDURE — 94729 DIFFUSING CAPACITY: CPT | Performed by: INTERNAL MEDICINE

## 2025-07-23 ENCOUNTER — PATIENT OUTREACH (OUTPATIENT)
Dept: CARE COORDINATION | Facility: CLINIC | Age: 70
End: 2025-07-23
Payer: COMMERCIAL

## 2025-07-28 ENCOUNTER — PATIENT OUTREACH (OUTPATIENT)
Dept: CARE COORDINATION | Facility: CLINIC | Age: 70
End: 2025-07-28
Payer: COMMERCIAL

## 2025-07-28 ENCOUNTER — TELEPHONE (OUTPATIENT)
Dept: PULMONOLOGY | Facility: CLINIC | Age: 70
End: 2025-07-28
Payer: COMMERCIAL

## 2025-07-28 NOTE — TELEPHONE ENCOUNTER
Patient Contacted for the patient to call back and schedule the following:    Appointment type: Shane  Provider: Emily  Return date: Jan 2026  Specialty phone number: [phone]  Additional appointment(s) needed: na  Additonal Notes: na

## 2025-07-30 ENCOUNTER — PATIENT OUTREACH (OUTPATIENT)
Dept: CARE COORDINATION | Facility: CLINIC | Age: 70
End: 2025-07-30
Payer: COMMERCIAL

## 2025-07-30 DIAGNOSIS — F33.41 MAJOR DEPRESSIVE DISORDER, RECURRENT, IN PARTIAL REMISSION: ICD-10-CM

## 2025-07-30 RX ORDER — ESCITALOPRAM OXALATE 10 MG/1
10 TABLET ORAL DAILY
Qty: 90 TABLET | Refills: 1 | Status: SHIPPED | OUTPATIENT
Start: 2025-07-30

## 2025-07-30 NOTE — TELEPHONE ENCOUNTER
Javier calling to request refill for patient. Patient is needing refill of escitalopram (LEXAPRO) 10 MG tablet.

## 2025-08-05 DIAGNOSIS — H40.1132 PRIMARY OPEN ANGLE GLAUCOMA OF BOTH EYES, MODERATE STAGE: ICD-10-CM

## 2025-08-05 RX ORDER — ERYTHROMYCIN 5 MG/G
0.5 OINTMENT OPHTHALMIC
Qty: 3.5 G | Refills: 5 | Status: SHIPPED | OUTPATIENT
Start: 2025-08-05

## 2025-08-05 RX ORDER — TIMOLOL MALEATE 5 MG/ML
1 SOLUTION/ DROPS OPHTHALMIC EVERY MORNING
Qty: 10 ML | Refills: 5 | Status: SHIPPED | OUTPATIENT
Start: 2025-08-05

## 2025-08-08 ENCOUNTER — HOSPITAL ENCOUNTER (OUTPATIENT)
Dept: CARDIOLOGY | Facility: CLINIC | Age: 70
Discharge: HOME | End: 2025-08-08
Attending: INTERNAL MEDICINE
Payer: COMMERCIAL

## 2025-08-08 ENCOUNTER — HOSPITAL ENCOUNTER (OUTPATIENT)
Dept: CT IMAGING | Facility: CLINIC | Age: 70
Discharge: HOME | End: 2025-08-08
Attending: INTERNAL MEDICINE
Payer: COMMERCIAL

## 2025-08-08 DIAGNOSIS — J98.4 RESTRICTIVE LUNG DISEASE: ICD-10-CM

## 2025-08-08 DIAGNOSIS — G47.33 OSA (OBSTRUCTIVE SLEEP APNEA): ICD-10-CM

## 2025-08-08 PROCEDURE — 71250 CT THORAX DX C-: CPT | Mod: 26 | Performed by: RADIOLOGY

## 2025-08-08 PROCEDURE — 71250 CT THORAX DX C-: CPT

## 2025-08-08 PROCEDURE — 93306 TTE W/DOPPLER COMPLETE: CPT

## 2025-08-21 ENCOUNTER — TELEPHONE (OUTPATIENT)
Dept: PULMONOLOGY | Facility: CLINIC | Age: 70
End: 2025-08-21
Payer: COMMERCIAL

## 2025-08-21 DIAGNOSIS — R06.02 SHORTNESS OF BREATH: Primary | ICD-10-CM

## 2025-08-22 ENCOUNTER — OFFICE VISIT (OUTPATIENT)
Dept: FAMILY MEDICINE | Facility: CLINIC | Age: 70
End: 2025-08-22
Attending: FAMILY MEDICINE
Payer: COMMERCIAL

## 2025-08-22 VITALS
BODY MASS INDEX: 36.7 KG/M2 | TEMPERATURE: 96.5 F | HEIGHT: 64 IN | RESPIRATION RATE: 16 BRPM | DIASTOLIC BLOOD PRESSURE: 60 MMHG | HEART RATE: 68 BPM | OXYGEN SATURATION: 97 % | WEIGHT: 215 LBS | SYSTOLIC BLOOD PRESSURE: 126 MMHG

## 2025-08-22 DIAGNOSIS — K22.2 STRICTURE AND STENOSIS OF ESOPHAGUS: ICD-10-CM

## 2025-08-22 DIAGNOSIS — M25.561 ARTHRALGIA OF BOTH KNEES: ICD-10-CM

## 2025-08-22 DIAGNOSIS — M25.562 ARTHRALGIA OF BOTH KNEES: ICD-10-CM

## 2025-08-22 DIAGNOSIS — R60.0 BILATERAL LOWER EXTREMITY EDEMA: ICD-10-CM

## 2025-08-22 DIAGNOSIS — Z01.818 PREOP GENERAL PHYSICAL EXAM: Primary | ICD-10-CM

## 2025-08-22 LAB
ERYTHROCYTE [DISTWIDTH] IN BLOOD BY AUTOMATED COUNT: 12.8 % (ref 10–15)
HCT VFR BLD AUTO: 39 % (ref 35–47)
HGB BLD-MCNC: 12.7 G/DL (ref 11.7–15.7)
MCH RBC QN AUTO: 28.9 PG (ref 26.5–33)
MCHC RBC AUTO-ENTMCNC: 32.6 G/DL (ref 31.5–36.5)
MCV RBC AUTO: 88.6 FL (ref 78–100)
PLATELET # BLD AUTO: 258 10E3/UL (ref 150–450)
RBC # BLD AUTO: 4.4 10E6/UL (ref 3.8–5.2)
WBC # BLD AUTO: 7.32 10E3/UL (ref 4–11)

## 2025-08-22 PROCEDURE — 80048 BASIC METABOLIC PNL TOTAL CA: CPT | Performed by: FAMILY MEDICINE

## 2025-08-22 PROCEDURE — 3074F SYST BP LT 130 MM HG: CPT | Performed by: FAMILY MEDICINE

## 2025-08-22 PROCEDURE — 85027 COMPLETE CBC AUTOMATED: CPT | Performed by: FAMILY MEDICINE

## 2025-08-22 PROCEDURE — 3078F DIAST BP <80 MM HG: CPT | Performed by: FAMILY MEDICINE

## 2025-08-22 PROCEDURE — 36415 COLL VENOUS BLD VENIPUNCTURE: CPT | Performed by: FAMILY MEDICINE

## 2025-08-22 PROCEDURE — 99214 OFFICE O/P EST MOD 30 MIN: CPT | Performed by: FAMILY MEDICINE

## 2025-08-22 RX ORDER — DIVALPROEX SODIUM 125 MG/1
250 TABLET, DELAYED RELEASE ORAL AT BEDTIME
COMMUNITY
Start: 2025-08-22

## 2025-08-22 ASSESSMENT — PATIENT HEALTH QUESTIONNAIRE - PHQ9
SUM OF ALL RESPONSES TO PHQ QUESTIONS 1-9: 8
SUM OF ALL RESPONSES TO PHQ QUESTIONS 1-9: 8
10. IF YOU CHECKED OFF ANY PROBLEMS, HOW DIFFICULT HAVE THESE PROBLEMS MADE IT FOR YOU TO DO YOUR WORK, TAKE CARE OF THINGS AT HOME, OR GET ALONG WITH OTHER PEOPLE: NOT DIFFICULT AT ALL

## 2025-08-23 LAB
ANION GAP SERPL CALCULATED.3IONS-SCNC: 11 MMOL/L (ref 7–15)
BUN SERPL-MCNC: 12.3 MG/DL (ref 8–23)
CALCIUM SERPL-MCNC: 9.3 MG/DL (ref 8.8–10.4)
CHLORIDE SERPL-SCNC: 107 MMOL/L (ref 98–107)
CREAT SERPL-MCNC: 0.7 MG/DL (ref 0.51–0.95)
EGFRCR SERPLBLD CKD-EPI 2021: >90 ML/MIN/1.73M2
GLUCOSE SERPL-MCNC: 83 MG/DL (ref 70–99)
HCO3 SERPL-SCNC: 21 MMOL/L (ref 22–29)
POTASSIUM SERPL-SCNC: 4.5 MMOL/L (ref 3.4–5.3)
SODIUM SERPL-SCNC: 139 MMOL/L (ref 135–145)

## 2025-08-25 ENCOUNTER — RESULTS FOLLOW-UP (OUTPATIENT)
Dept: FAMILY MEDICINE | Facility: CLINIC | Age: 70
End: 2025-08-25
Payer: MEDICARE

## 2025-08-25 ASSESSMENT — ENCOUNTER SYMPTOMS
WEAKNESS: 0
WHEEZING: 0
FATIGUE: 0
APPETITE CHANGE: 0
COUGH: 0
COLOR CHANGE: 0
GASTROINTESTINAL NEGATIVE: 1
SHORTNESS OF BREATH: 0
DIZZINESS: 0
PALPITATIONS: 0
NERVOUS/ANXIOUS: 0
ENDOCRINE NEGATIVE: 1
AGITATION: 0
SLEEP DISTURBANCE: 0
HEMATOLOGIC/LYMPHATIC NEGATIVE: 1
HEADACHES: 0
CHILLS: 0
EYES NEGATIVE: 1
ACTIVITY CHANGE: 0
ALLERGIC/IMMUNOLOGIC NEGATIVE: 1

## (undated) DEVICE — SYR 03ML LL W/O NDL 309657

## (undated) DEVICE — LINEN TOWEL PACK X5 5464

## (undated) DEVICE — ESU GROUND PAD ADULT W/CORD E7507

## (undated) DEVICE — EYE PREP BETADINE 5% SOLUTION 30ML 0065-0411-30

## (undated) DEVICE — PACK MINOR EYE CUSTOM ASC

## (undated) DEVICE — SOL WATER IRRIG 500ML BOTTLE 2F7113

## (undated) DEVICE — ESU HIGH TEMP LOOP TIP AA03

## (undated) DEVICE — GLOVE BIOGEL PI MICRO SZ 7.5 48575

## (undated) DEVICE — NDL 30GA 0.5" 305106

## (undated) RX ORDER — ACETAMINOPHEN 325 MG/1
TABLET ORAL
Status: DISPENSED
Start: 2023-10-12

## (undated) RX ORDER — ERYTHROMYCIN 5 MG/G
OINTMENT OPHTHALMIC
Status: DISPENSED
Start: 2025-03-20

## (undated) RX ORDER — OXYCODONE HYDROCHLORIDE 5 MG/1
TABLET ORAL
Status: DISPENSED
Start: 2023-10-12

## (undated) RX ORDER — TRIAMCINOLONE ACETONIDE 40 MG/ML
INJECTION, SUSPENSION INTRA-ARTICULAR; INTRAMUSCULAR
Status: DISPENSED
Start: 2020-07-31

## (undated) RX ORDER — FENTANYL CITRATE 50 UG/ML
INJECTION, SOLUTION INTRAMUSCULAR; INTRAVENOUS
Status: DISPENSED
Start: 2023-10-12

## (undated) RX ORDER — LIDOCAINE HYDROCHLORIDE 10 MG/ML
INJECTION, SOLUTION EPIDURAL; INFILTRATION; INTRACAUDAL; PERINEURAL
Status: DISPENSED
Start: 2020-05-14

## (undated) RX ORDER — TRIAMCINOLONE ACETONIDE 40 MG/ML
INJECTION, SUSPENSION INTRA-ARTICULAR; INTRAMUSCULAR
Status: DISPENSED
Start: 2020-05-14

## (undated) RX ORDER — LIDOCAINE HYDROCHLORIDE 10 MG/ML
INJECTION, SOLUTION EPIDURAL; INFILTRATION; INTRACAUDAL; PERINEURAL
Status: DISPENSED
Start: 2020-07-31